# Patient Record
Sex: FEMALE | Race: WHITE | NOT HISPANIC OR LATINO | Employment: OTHER | URBAN - METROPOLITAN AREA
[De-identification: names, ages, dates, MRNs, and addresses within clinical notes are randomized per-mention and may not be internally consistent; named-entity substitution may affect disease eponyms.]

---

## 2017-07-13 ENCOUNTER — OFFICE VISIT (OUTPATIENT)
Dept: FAMILY MEDICINE CLINIC | Facility: CLINIC | Age: 57
End: 2017-07-13

## 2017-07-13 VITALS
HEART RATE: 80 BPM | TEMPERATURE: 97.7 F | SYSTOLIC BLOOD PRESSURE: 116 MMHG | HEIGHT: 63 IN | DIASTOLIC BLOOD PRESSURE: 80 MMHG | BODY MASS INDEX: 21.3 KG/M2 | RESPIRATION RATE: 20 BRPM | WEIGHT: 120.2 LBS

## 2017-07-13 DIAGNOSIS — F33.1 MODERATE EPISODE OF RECURRENT MAJOR DEPRESSIVE DISORDER (HCC): ICD-10-CM

## 2017-07-13 DIAGNOSIS — Z11.59 NEED FOR HEPATITIS C SCREENING TEST: ICD-10-CM

## 2017-07-13 DIAGNOSIS — Z23 NEED FOR PNEUMOCOCCAL VACCINATION: ICD-10-CM

## 2017-07-13 DIAGNOSIS — Z76.89 ENCOUNTER TO ESTABLISH CARE WITH NEW DOCTOR: ICD-10-CM

## 2017-07-13 DIAGNOSIS — Z12.11 SCREENING FOR COLON CANCER: ICD-10-CM

## 2017-07-13 DIAGNOSIS — F41.9 ANXIETY: Primary | ICD-10-CM

## 2017-07-13 DIAGNOSIS — Z13.220 SCREENING FOR HYPERLIPIDEMIA: ICD-10-CM

## 2017-07-13 DIAGNOSIS — Z72.0 TOBACCO ABUSE: ICD-10-CM

## 2017-07-13 DIAGNOSIS — Z12.39 SCREENING FOR BREAST CANCER: ICD-10-CM

## 2017-07-13 PROCEDURE — 99407 BEHAV CHNG SMOKING > 10 MIN: CPT | Performed by: FAMILY MEDICINE

## 2017-07-13 PROCEDURE — 99203 OFFICE O/P NEW LOW 30 MIN: CPT | Performed by: FAMILY MEDICINE

## 2017-07-13 RX ORDER — NICOTINE 21 MG/24HR
1 PATCH, TRANSDERMAL 24 HOURS TRANSDERMAL EVERY 24 HOURS
Qty: 21 PATCH | Refills: 1 | Status: SHIPPED | OUTPATIENT
Start: 2017-07-13 | End: 2018-01-17

## 2017-07-13 RX ORDER — ALPRAZOLAM 0.5 MG/1
0.5 TABLET ORAL 3 TIMES DAILY PRN
Qty: 60 TABLET | Refills: 0 | Status: SHIPPED | OUTPATIENT
Start: 2017-07-13 | End: 2017-08-09 | Stop reason: SDUPTHER

## 2017-07-13 RX ORDER — FLUOXETINE HYDROCHLORIDE 20 MG/1
CAPSULE ORAL
Qty: 60 CAPSULE | Refills: 1 | Status: SHIPPED | OUTPATIENT
Start: 2017-07-13 | End: 2017-08-09

## 2017-07-13 NOTE — PROGRESS NOTES
Subjective   Brittani Lambert is a 57 y.o. female.     History of Present Illness   Here to establish care  She moved to [place] 1 year ago from [place]  She is willing to update all health maintenance.     She has a long history of anxiety and depression, 20-30 years. She has been on multiple medications to treat the condition. Prozac, Neurontin, and Xanax, this regimen worked best for her. She feels that anxiety and depression are getting much worse quickly lately. Has been without treatment for a year. She can have up to 8 panic attacks in one day, no specific triggers. She will change her breathing to improve.   She did have a job, but before her 90 days was complete and eligible for benefits, she was fired. She is currently looking for a job.     Tobacco abuse: She smokes 1/2 ppd for many years. She is interested in quitting, hasn't previously used any aids.     The following portions of the patient's history were reviewed and updated as appropriate: allergies, current medications, past family history, past medical history, past social history, past surgical history and problem list.    Review of Systems   Constitutional: Negative.  Negative for chills, fatigue, fever and unexpected weight change.   HENT: Negative for congestion, ear pain and hearing loss.    Eyes: Negative for photophobia, pain and visual disturbance.   Respiratory: Negative for cough, chest tightness and shortness of breath.    Cardiovascular: Positive for palpitations (with anxiety). Negative for chest pain and leg swelling.   Gastrointestinal: Negative for abdominal pain, blood in stool, constipation, diarrhea and vomiting.   Endocrine: Negative for cold intolerance, heat intolerance, polydipsia, polyphagia and polyuria.   Genitourinary: Negative for difficulty urinating and dysuria.   Musculoskeletal: Positive for arthralgias. Negative for back pain and gait problem.   Skin: Negative for color change, rash and wound.   Allergic/Immunologic:  Negative for environmental allergies and food allergies.   Neurological: Negative for dizziness, weakness, numbness and headaches.   Hematological: Negative for adenopathy. Does not bruise/bleed easily.   Psychiatric/Behavioral: Positive for dysphoric mood and sleep disturbance. Negative for confusion. The patient is nervous/anxious.        Objective   Physical Exam   Constitutional: She is oriented to person, place, and time. She appears well-developed and well-nourished.   HENT:   Head: Normocephalic and atraumatic.   Right Ear: Hearing, tympanic membrane, external ear and ear canal normal.   Left Ear: Hearing, tympanic membrane, external ear and ear canal normal.   Nose: Nose normal.   Mouth/Throat: Uvula is midline, oropharynx is clear and moist and mucous membranes are normal.   Eyes: Conjunctivae and EOM are normal.   Neck: Normal range of motion. Neck supple. No tracheal deviation present. No thyromegaly present.   Cardiovascular: Normal rate, regular rhythm and normal heart sounds.    No murmur heard.  Pulmonary/Chest: Effort normal and breath sounds normal. No respiratory distress. She has no wheezes.   Abdominal: Soft. Bowel sounds are normal. She exhibits no distension. There is no tenderness.   Musculoskeletal: Normal range of motion. She exhibits no edema, tenderness or deformity.   Lymphadenopathy:     She has no cervical adenopathy.   Neurological: She is alert and oriented to person, place, and time. No cranial nerve deficit.   Skin: Skin is warm and dry. No rash noted.   Psychiatric: She has a normal mood and affect. Her behavior is normal. Judgment and thought content normal.   Nursing note and vitals reviewed.      Assessment/Plan   Brittani was seen today for establish care, anxiety and depression.    Diagnoses and all orders for this visit:    Anxiety  -     FLUoxetine (PROZAC) 20 MG capsule; Take 1 tab po daily x 2 weeks, then increase to 1 tab po BID  -     ALPRAZolam (XANAX) 0.5 MG tablet; Take  1 tablet by mouth 3 (Three) Times a Day As Needed for Anxiety or Sleep.  -     CBC & Differential  -     Lipid Panel  -     Comprehensive Metabolic Panel  -     TSH    Moderate episode of recurrent major depressive disorder  -     FLUoxetine (PROZAC) 20 MG capsule; Take 1 tab po daily x 2 weeks, then increase to 1 tab po BID  -     ALPRAZolam (XANAX) 0.5 MG tablet; Take 1 tablet by mouth 3 (Three) Times a Day As Needed for Anxiety or Sleep.  -     CBC & Differential  -     Lipid Panel  -     Comprehensive Metabolic Panel  -     TSH    Tobacco abuse  -     pneumococcal conj. 13-valent (PREVNAR-13) vaccine; Inject 0.5 mL into the shoulder, thigh, or buttocks 1 (One) Time for 1 dose.  -     nicotine ([employer] NICOTINE TRANSDERMAL SYS) 14 MG/24HR patch; Place 1 patch on the skin Daily.  -     CBC & Differential  -     Lipid Panel  -     Comprehensive Metabolic Panel  -     TSH    Encounter to establish care with new doctor  -     CBC & Differential  -     Lipid Panel  -     Comprehensive Metabolic Panel  -     TSH    Screening for breast cancer  -     Mammo Screening Digital Tomosynthesis Bilateral With CAD  -     TSH    Screening for colon cancer  -     Ambulatory Referral For Screening Colonoscopy  -     TSH    Need for pneumococcal vaccination  -     pneumococcal conj. 13-valent (PREVNAR-13) vaccine; Inject 0.5 mL into the shoulder, thigh, or buttocks 1 (One) Time for 1 dose.    Screening for hyperlipidemia    Need for hepatitis C screening test  -     Hepatitis C Antibody      Resume Prozac for treatment of anxiety. Ok for prn xanax for panic attacks. ZECHARIAH query complete. Treatment plan to include limited course of prescribed  controlled substance. Risks including addiction, benefits, and alternatives presented to patient.   Labs completed today  I advised the patient of the risks in continuing to use tobacco.  I also discussed how to quit smoking and the patient has expressed the willingness to quit.    During this  visit, I spent greater than 10 minutes counseling the patient regarding smoking cessation.

## 2017-07-13 NOTE — PATIENT INSTRUCTIONS
Go to the nearest ER or return to clinic if symptoms worsen, fever/chill develop    Generalized Anxiety Disorder  Generalized anxiety disorder (THALIA) is a mental disorder. It interferes with life functions, including relationships, work, and school.  THALIA is different from normal anxiety, which everyone experiences at some point in their lives in response to specific life events and activities. Normal anxiety actually helps us prepare for and get through these life events and activities. Normal anxiety goes away after the event or activity is over.   THALIA causes anxiety that is not necessarily related to specific events or activities. It also causes excess anxiety in proportion to specific events or activities. The anxiety associated with THALIA is also difficult to control. THALIA can vary from mild to severe. People with severe THALIA can have intense waves of anxiety with physical symptoms (panic attacks).   SYMPTOMS  The anxiety and worry associated with THALIA are difficult to control. This anxiety and worry are related to many life events and activities and also occur more days than not for 6 months or longer. People with THALIA also have three or more of the following symptoms (one or more in children):  · Restlessness.    · Fatigue.  · Difficulty concentrating.    · Irritability.  · Muscle tension.  · Difficulty sleeping or unsatisfying sleep.  DIAGNOSIS  THALIA is diagnosed through an assessment by your health care provider. Your health care provider will ask you questions about your mood, physical symptoms, and events in your life. Your health care provider may ask you about your medical history and use of alcohol or drugs, including prescription medicines. Your health care provider may also do a physical exam and blood tests. Certain medical conditions and the use of certain substances can cause symptoms similar to those associated with THALIA. Your health care provider may refer you to a mental health specialist for further  evaluation.  TREATMENT  The following therapies are usually used to treat THALIA:   · Medication. Antidepressant medication usually is prescribed for long-term daily control. Antianxiety medicines may be added in severe cases, especially when panic attacks occur.    · Talk therapy (psychotherapy). Certain types of talk therapy can be helpful in treating THALIA by providing support, education, and guidance. A form of talk therapy called cognitive behavioral therapy can teach you healthy ways to think about and react to daily life events and activities.  · Stress management techniques. These include yoga, meditation, and exercise and can be very helpful when they are practiced regularly.  A mental health specialist can help determine which treatment is best for you. Some people see improvement with one therapy. However, other people require a combination of therapies.     This information is not intended to replace advice given to you by your health care provider. Make sure you discuss any questions you have with your health care provider.     Document Released: 04/14/2014 Document Revised: 01/08/2016 Document Reviewed: 04/14/2014  [employer] Interactive Patient Education ©2017 [employer]

## 2017-07-14 LAB
ALBUMIN SERPL-MCNC: 4.5 G/DL (ref 3.2–4.8)
ALBUMIN/GLOB SERPL: 1.6 G/DL (ref 1.5–2.5)
ALP SERPL-CCNC: 96 U/L (ref 25–100)
ALT SERPL-CCNC: 15 U/L (ref 7–40)
AST SERPL-CCNC: 19 U/L (ref 0–33)
BASOPHILS # BLD AUTO: 0.05 10*3/MM3 (ref 0–0.2)
BASOPHILS NFR BLD AUTO: 0.5 % (ref 0–1)
BILIRUB SERPL-MCNC: 0.4 MG/DL (ref 0.3–1.2)
BUN SERPL-MCNC: 11 MG/DL (ref 9–23)
BUN/CREAT SERPL: 15.7 (ref 7–25)
CALCIUM SERPL-MCNC: 10.2 MG/DL (ref 8.7–10.4)
CHLORIDE SERPL-SCNC: 105 MMOL/L (ref 99–109)
CHOLEST SERPL-MCNC: 245 MG/DL (ref 0–200)
CO2 SERPL-SCNC: 26 MMOL/L (ref 20–31)
CREAT SERPL-MCNC: 0.7 MG/DL (ref 0.6–1.3)
EOSINOPHIL # BLD AUTO: 0.29 10*3/MM3 (ref 0–0.3)
EOSINOPHIL NFR BLD AUTO: 3 % (ref 0–3)
ERYTHROCYTE [DISTWIDTH] IN BLOOD BY AUTOMATED COUNT: 13.1 % (ref 11.3–14.5)
GLOBULIN SER CALC-MCNC: 2.8 GM/DL
GLUCOSE SERPL-MCNC: 86 MG/DL (ref 70–100)
HCT VFR BLD AUTO: 42.1 % (ref 34.5–44)
HCV AB S/CO SERPL IA: <0.1 S/CO RATIO (ref 0–0.9)
HDLC SERPL-MCNC: 38 MG/DL (ref 40–60)
HGB BLD-MCNC: 13.6 G/DL (ref 11.5–15.5)
IMM GRANULOCYTES # BLD: 0.02 10*3/MM3 (ref 0–0.03)
IMM GRANULOCYTES NFR BLD: 0.2 % (ref 0–0.6)
LDLC SERPL CALC-MCNC: 176 MG/DL (ref 0–100)
LYMPHOCYTES # BLD AUTO: 2.34 10*3/MM3 (ref 0.6–4.8)
LYMPHOCYTES NFR BLD AUTO: 24.1 % (ref 24–44)
MCH RBC QN AUTO: 30.6 PG (ref 27–31)
MCHC RBC AUTO-ENTMCNC: 32.3 G/DL (ref 32–36)
MCV RBC AUTO: 94.8 FL (ref 80–99)
MONOCYTES # BLD AUTO: 0.71 10*3/MM3 (ref 0–1)
MONOCYTES NFR BLD AUTO: 7.3 % (ref 0–12)
NEUTROPHILS # BLD AUTO: 6.31 10*3/MM3 (ref 1.5–8.3)
NEUTROPHILS NFR BLD AUTO: 64.9 % (ref 41–71)
PLATELET # BLD AUTO: 311 10*3/MM3 (ref 150–450)
POTASSIUM SERPL-SCNC: 4.3 MMOL/L (ref 3.5–5.5)
PROT SERPL-MCNC: 7.3 G/DL (ref 5.7–8.2)
RBC # BLD AUTO: 4.44 10*6/MM3 (ref 3.89–5.14)
SODIUM SERPL-SCNC: 138 MMOL/L (ref 132–146)
TRIGL SERPL-MCNC: 154 MG/DL (ref 0–150)
TSH SERPL DL<=0.005 MIU/L-ACNC: 1.46 MIU/ML (ref 0.35–5.35)
VLDLC SERPL CALC-MCNC: 30.8 MG/DL
WBC # BLD AUTO: 9.72 10*3/MM3 (ref 3.5–10.8)

## 2017-07-16 DIAGNOSIS — E78.2 MIXED HYPERLIPIDEMIA: Primary | ICD-10-CM

## 2017-07-16 RX ORDER — ATORVASTATIN CALCIUM 40 MG/1
40 TABLET, FILM COATED ORAL DAILY
Qty: 30 TABLET | Refills: 5 | Status: SHIPPED | OUTPATIENT
Start: 2017-07-16 | End: 2018-01-17 | Stop reason: SINTOL

## 2017-08-01 ENCOUNTER — HOSPITAL ENCOUNTER (OUTPATIENT)
Dept: MAMMOGRAPHY | Facility: HOSPITAL | Age: 57
Discharge: HOME | End: 2017-08-01
Attending: FAMILY MEDICINE | Admitting: FAMILY MEDICINE

## 2017-08-01 PROCEDURE — 77063 BREAST TOMOSYNTHESIS BI: CPT

## 2017-08-01 PROCEDURE — 77063 BREAST TOMOSYNTHESIS BI: CPT | Performed by: RADIOLOGY

## 2017-08-01 PROCEDURE — 77067 SCR MAMMO BI INCL CAD: CPT | Performed by: RADIOLOGY

## 2017-08-01 PROCEDURE — G0202 SCR MAMMO BI INCL CAD: HCPCS

## 2017-08-06 DIAGNOSIS — R92.8 ABNORMAL SCREENING MAMMOGRAM: Primary | ICD-10-CM

## 2017-08-09 ENCOUNTER — OFFICE VISIT (OUTPATIENT)
Dept: FAMILY MEDICINE CLINIC | Facility: CLINIC | Age: 57
End: 2017-08-09

## 2017-08-09 VITALS
RESPIRATION RATE: 20 BRPM | BODY MASS INDEX: 21.16 KG/M2 | WEIGHT: 119.4 LBS | DIASTOLIC BLOOD PRESSURE: 70 MMHG | HEART RATE: 72 BPM | HEIGHT: 63 IN | SYSTOLIC BLOOD PRESSURE: 108 MMHG | TEMPERATURE: 97.2 F

## 2017-08-09 DIAGNOSIS — F41.9 ANXIETY: Primary | ICD-10-CM

## 2017-08-09 PROCEDURE — 99213 OFFICE O/P EST LOW 20 MIN: CPT | Performed by: FAMILY MEDICINE

## 2017-08-09 RX ORDER — ALPRAZOLAM 0.5 MG/1
0.5 TABLET ORAL 3 TIMES DAILY PRN
Qty: 60 TABLET | Refills: 2 | Status: SHIPPED | OUTPATIENT
Start: 2017-08-09 | End: 2017-09-20 | Stop reason: SDUPTHER

## 2017-08-09 RX ORDER — SERTRALINE HYDROCHLORIDE 100 MG/1
100 TABLET, FILM COATED ORAL DAILY
Qty: 30 TABLET | Refills: 1 | Status: SHIPPED | OUTPATIENT
Start: 2017-08-09 | End: 2017-09-20 | Stop reason: SDUPTHER

## 2017-08-09 NOTE — PATIENT INSTRUCTIONS
Go to the nearest ER or return to clinic if symptoms worsen, fever/chill develop    Generalized Anxiety Disorder  Generalized anxiety disorder (THALIA) is a mental disorder. It interferes with life functions, including relationships, work, and school.  THALIA is different from normal anxiety, which everyone experiences at some point in their lives in response to specific life events and activities. Normal anxiety actually helps us prepare for and get through these life events and activities. Normal anxiety goes away after the event or activity is over.   THALIA causes anxiety that is not necessarily related to specific events or activities. It also causes excess anxiety in proportion to specific events or activities. The anxiety associated with THALIA is also difficult to control. THALIA can vary from mild to severe. People with severe THALIA can have intense waves of anxiety with physical symptoms (panic attacks).   SYMPTOMS  The anxiety and worry associated with THALIA are difficult to control. This anxiety and worry are related to many life events and activities and also occur more days than not for 6 months or longer. People with THALIA also have three or more of the following symptoms (one or more in children):  · Restlessness.    · Fatigue.  · Difficulty concentrating.    · Irritability.  · Muscle tension.  · Difficulty sleeping or unsatisfying sleep.  DIAGNOSIS  THALIA is diagnosed through an assessment by your health care provider. Your health care provider will ask you questions about your mood, physical symptoms, and events in your life. Your health care provider may ask you about your medical history and use of alcohol or drugs, including prescription medicines. Your health care provider may also do a physical exam and blood tests. Certain medical conditions and the use of certain substances can cause symptoms similar to those associated with THALIA. Your health care provider may refer you to a mental health specialist for further  evaluation.  TREATMENT  The following therapies are usually used to treat THALIA:   · Medication. Antidepressant medication usually is prescribed for long-term daily control. Antianxiety medicines may be added in severe cases, especially when panic attacks occur.    · Talk therapy (psychotherapy). Certain types of talk therapy can be helpful in treating THALIA by providing support, education, and guidance. A form of talk therapy called cognitive behavioral therapy can teach you healthy ways to think about and react to daily life events and activities.  · Stress management techniques. These include yoga, meditation, and exercise and can be very helpful when they are practiced regularly.  A mental health specialist can help determine which treatment is best for you. Some people see improvement with one therapy. However, other people require a combination of therapies.     This information is not intended to replace advice given to you by your health care provider. Make sure you discuss any questions you have with your health care provider.     Document Released: 04/14/2014 Document Revised: 01/08/2016 Document Reviewed: 04/14/2014  [employer] Interactive Patient Education ©2017 [employer]

## 2017-08-09 NOTE — PROGRESS NOTES
Subjective   Brittani Lambert is a 57 y.o. female.     HPI Comments: She is currently treated with Prozac, however states that anxiety and stress has been worse over the last 2 weeks. She is currently searching for a job. She lost her only son 1.5 years ago on [place], that increases her anxiety.   States that she has been on Prozac for so long, feels that it is no longer working for her.       Anxiety   Presents for follow-up visit. Symptoms include excessive worry, irritability and nervous/anxious behavior. Patient reports no chest pain, depressed mood, nausea, palpitations, shortness of breath or suicidal ideas. Symptoms occur most days. The severity of symptoms is moderate. The quality of sleep is good. Nighttime awakenings: none.     Compliance with medications is %.        The following portions of the patient's history were reviewed and updated as appropriate: allergies, current medications, past family history, past medical history, past social history, past surgical history and problem list.    Review of Systems   Constitutional: Positive for irritability. Negative for chills and fever.   Respiratory: Negative for cough and shortness of breath.    Cardiovascular: Negative for chest pain and palpitations.   Gastrointestinal: Negative for nausea.   Psychiatric/Behavioral: Positive for agitation. Negative for dysphoric mood and suicidal ideas. The patient is nervous/anxious.        Objective   Physical Exam   Constitutional: She is oriented to person, place, and time. She appears well-developed and well-nourished.   HENT:   Head: Normocephalic and atraumatic.   Right Ear: External ear normal.   Left Ear: External ear normal.   Nose: Nose normal.   Eyes: Conjunctivae and EOM are normal.   Neck: Normal range of motion. Neck supple.   Cardiovascular: Normal rate, regular rhythm and normal heart sounds.    Pulmonary/Chest: Effort normal and breath sounds normal.   Musculoskeletal: She exhibits no edema or  deformity.   Neurological: She is alert and oriented to person, place, and time. No cranial nerve deficit.   Skin: Skin is warm and dry.   Psychiatric: She has a normal mood and affect. Her behavior is normal. Judgment and thought content normal.   Nursing note and vitals reviewed.      Assessment/Plan   Brittani was seen today for anxiety.    Diagnoses and all orders for this visit:    Anxiety  -     ALPRAZolam (XANAX) 0.5 MG tablet; Take 1 tablet by mouth 3 (Three) Times a Day As Needed for Anxiety or Sleep.  -     sertraline (ZOLOFT) 100 MG tablet; Take 1 tablet by mouth Daily.      Change Prozac to Zoloft. If no improvement after 2 weeks, she is to notify me and dose will be increased.   Xanax prn refilled.

## 2017-08-16 ENCOUNTER — HOSPITAL ENCOUNTER (OUTPATIENT)
Dept: ULTRASOUND IMAGING | Facility: HOSPITAL | Age: 57
Discharge: HOME | End: 2017-08-16

## 2017-08-16 ENCOUNTER — TRANSCRIBE ORDERS (OUTPATIENT)
Dept: MAMMOGRAPHY | Facility: HOSPITAL | Age: 57
End: 2017-08-16

## 2017-08-16 ENCOUNTER — HOSPITAL ENCOUNTER (OUTPATIENT)
Dept: MAMMOGRAPHY | Facility: HOSPITAL | Age: 57
Discharge: HOME | End: 2017-08-16
Admitting: FAMILY MEDICINE

## 2017-08-16 DIAGNOSIS — R92.8 ABNORMAL MAMMOGRAM: Primary | ICD-10-CM

## 2017-08-16 DIAGNOSIS — R92.8 ABNORMAL SCREENING MAMMOGRAM: ICD-10-CM

## 2017-08-16 PROCEDURE — G0279 TOMOSYNTHESIS, MAMMO: HCPCS

## 2017-08-16 PROCEDURE — 77062 BREAST TOMOSYNTHESIS BI: CPT | Performed by: RADIOLOGY

## 2017-08-16 PROCEDURE — 76641 ULTRASOUND BREAST COMPLETE: CPT | Performed by: RADIOLOGY

## 2017-08-16 PROCEDURE — 77066 DX MAMMO INCL CAD BI: CPT | Performed by: RADIOLOGY

## 2017-08-16 PROCEDURE — G0204 DX MAMMO INCL CAD BI: HCPCS

## 2017-08-16 PROCEDURE — 76641 ULTRASOUND BREAST COMPLETE: CPT

## 2017-08-30 ENCOUNTER — HOSPITAL ENCOUNTER (OUTPATIENT)
Dept: MAMMOGRAPHY | Facility: HOSPITAL | Age: 57
Discharge: HOME | End: 2017-08-30

## 2017-08-30 ENCOUNTER — HOSPITAL ENCOUNTER (OUTPATIENT)
Dept: ULTRASOUND IMAGING | Facility: HOSPITAL | Age: 57
Discharge: HOME | End: 2017-08-30
Admitting: RADIOLOGY

## 2017-08-30 DIAGNOSIS — R92.8 ABNORMAL MAMMOGRAM: ICD-10-CM

## 2017-08-30 PROCEDURE — 19000 PUNCTURE ASPIR CYST BREAST: CPT | Performed by: RADIOLOGY

## 2017-08-30 PROCEDURE — 76942 ECHO GUIDE FOR BIOPSY: CPT

## 2017-08-30 PROCEDURE — 76942 ECHO GUIDE FOR BIOPSY: CPT | Performed by: RADIOLOGY

## 2017-08-30 PROCEDURE — 77065 DX MAMMO INCL CAD UNI: CPT | Performed by: RADIOLOGY

## 2017-08-30 RX ORDER — LIDOCAINE HYDROCHLORIDE 10 MG/ML
5 INJECTION, SOLUTION INFILTRATION; PERINEURAL ONCE
Status: COMPLETED | OUTPATIENT
Start: 2017-08-30 | End: 2017-08-30

## 2017-08-30 RX ORDER — LIDOCAINE HYDROCHLORIDE AND EPINEPHRINE 10; 10 MG/ML; UG/ML
5 INJECTION, SOLUTION INFILTRATION; PERINEURAL ONCE
Status: DISCONTINUED | OUTPATIENT
Start: 2017-08-30 | End: 2018-10-01

## 2017-08-30 RX ADMIN — LIDOCAINE HYDROCHLORIDE 1 ML: 10 INJECTION, SOLUTION INFILTRATION; PERINEURAL at 15:18

## 2017-09-01 LAB
LAB AP CASE REPORT: NORMAL
Lab: NORMAL
PATH REPORT.FINAL DX SPEC: NORMAL

## 2017-09-06 ENCOUNTER — TELEPHONE (OUTPATIENT)
Dept: FAMILY MEDICINE CLINIC | Facility: CLINIC | Age: 57
End: 2017-09-06

## 2017-09-06 ENCOUNTER — TELEPHONE (OUTPATIENT)
Dept: MAMMOGRAPHY | Facility: HOSPITAL | Age: 57
End: 2017-09-06

## 2017-09-06 NOTE — TELEPHONE ENCOUNTER
09.06.17 @ 1635: Pt notified of cytology results and recommendation. Verbalizes understanding. Denies discomfort. Denies any signs and symptoms of infection.

## 2017-09-06 NOTE — TELEPHONE ENCOUNTER
----- Message from Allyson Rodriguez sent at 9/6/2017 10:56 AM EDT -----  Contact: DR LYONS RESULTS  PATIENT IS CALLING FOR BIOPSY RESULTS PLEASE CALL BACK AT [phone]

## 2017-09-07 ENCOUNTER — TRANSCRIBE ORDERS (OUTPATIENT)
Dept: MAMMOGRAPHY | Facility: HOSPITAL | Age: 57
End: 2017-09-07

## 2017-09-07 DIAGNOSIS — R92.8 ABNORMAL MAMMOGRAM: Primary | ICD-10-CM

## 2017-09-20 ENCOUNTER — OFFICE VISIT (OUTPATIENT)
Dept: FAMILY MEDICINE CLINIC | Facility: CLINIC | Age: 57
End: 2017-09-20

## 2017-09-20 VITALS
RESPIRATION RATE: 20 BRPM | HEART RATE: 72 BPM | DIASTOLIC BLOOD PRESSURE: 80 MMHG | BODY MASS INDEX: 20.91 KG/M2 | SYSTOLIC BLOOD PRESSURE: 100 MMHG | WEIGHT: 118 LBS | HEIGHT: 63 IN | TEMPERATURE: 98 F

## 2017-09-20 DIAGNOSIS — Z12.4 PAP SMEAR FOR CERVICAL CANCER SCREENING: ICD-10-CM

## 2017-09-20 DIAGNOSIS — F41.9 ANXIETY: Primary | ICD-10-CM

## 2017-09-20 DIAGNOSIS — F33.1 MODERATE EPISODE OF RECURRENT MAJOR DEPRESSIVE DISORDER (HCC): ICD-10-CM

## 2017-09-20 PROCEDURE — 99396 PREV VISIT EST AGE 40-64: CPT | Performed by: FAMILY MEDICINE

## 2017-09-20 RX ORDER — ALPRAZOLAM 0.5 MG/1
0.5 TABLET ORAL 3 TIMES DAILY PRN
Qty: 60 TABLET | Refills: 2 | Status: SHIPPED | OUTPATIENT
Start: 2017-09-20 | End: 2018-01-17 | Stop reason: SDUPTHER

## 2017-09-20 RX ORDER — SERTRALINE HYDROCHLORIDE 100 MG/1
150 TABLET, FILM COATED ORAL DAILY
Qty: 45 TABLET | Refills: 2 | Status: SHIPPED | OUTPATIENT
Start: 2017-09-20 | End: 2018-01-17 | Stop reason: SDUPTHER

## 2017-09-20 NOTE — PROGRESS NOTES
Subjective     History of Present Illness      Brittani Lambert is a 57 y.o. woman who comes in today for a  pap smear only. Her most recent annual exam was years ago. Her most recent Pap smear was years ago and showed no abnormalities. Previous abnormal Pap smears: yes - precancerous cells. . Contraception: status post hysterectomy. She is currently sexually active.   She is s/p hysterectomy due to precancerous cells.     Anxiety and Depression: Chronic conditions, currently treated with Zoloft daily and Xanax prn.   She feels that Zoloft is improving symptoms overall, but feels that she needs the dose increased for better control.   Her son passed away less than 2 years ago, which causes a lot of anxiety and depression for her.  She is also currently going through breast biopsy due to recent abnormal mammogram. She has biopsy scheduled tomorrow, this makes her anxious also.      The following portions of the patient's history were reviewed and updated as appropriate: allergies, current medications, past family history, past medical history, past social history, past surgical history and problem list.    Review of Systems   Constitutional: Negative.    HENT: Negative.    Eyes: Negative for visual disturbance.   Respiratory: Negative for cough, chest tightness and shortness of breath.    Cardiovascular: Negative for chest pain, palpitations and leg swelling.   Gastrointestinal: Negative for abdominal pain and blood in stool.   Endocrine: Negative for cold intolerance and heat intolerance.   Genitourinary: Negative for dysuria and hematuria.   Musculoskeletal: Negative for gait problem and myalgias.   Skin: Negative for rash.   Allergic/Immunologic: Negative for environmental allergies, food allergies and immunocompromised state.   Neurological: Negative for dizziness, weakness, numbness and headaches.   Hematological: Negative for adenopathy. Does not bruise/bleed easily.   Psychiatric/Behavioral: Positive for  dysphoric mood. Negative for confusion, self-injury, sleep disturbance and suicidal ideas. The patient is nervous/anxious.        Objective   Physical Exam   Constitutional: She is oriented to person, place, and time. She appears well-developed and well-nourished.   HENT:   Head: Normocephalic and atraumatic.   Right Ear: External ear normal.   Left Ear: External ear normal.   Nose: Nose normal.   Eyes: Conjunctivae and EOM are normal.   Neck: Normal range of motion. Neck supple. No thyromegaly present.   Cardiovascular: Normal rate, regular rhythm and normal heart sounds.    No murmur heard.  Pulmonary/Chest: Effort normal and breath sounds normal. No respiratory distress. She has no wheezes. She exhibits no mass. Right breast exhibits no inverted nipple, no mass, no nipple discharge, no skin change and no tenderness. Left breast exhibits no inverted nipple, no mass, no nipple discharge, no skin change and no tenderness.   Abdominal: Soft. There is no tenderness.   Genitourinary: Vagina normal. Pelvic exam was performed with patient supine. There is no rash or lesion on the right labia. There is no rash or lesion on the left labia. No tenderness or bleeding in the vagina. No vaginal discharge found.   Genitourinary Comments: Cervix, uterus, and ovaries are surgically absent   Musculoskeletal: She exhibits no edema or deformity.   Lymphadenopathy:     She has no cervical adenopathy.   Neurological: She is alert and oriented to person, place, and time. No cranial nerve deficit.   Skin: Skin is warm and dry.   Psychiatric: She has a normal mood and affect. Her behavior is normal. Judgment and thought content normal.   Nursing note and vitals reviewed.      Assessment/Plan   Brittani was seen today for annual exam.    Diagnoses and all orders for this visit:    Anxiety  -     sertraline (ZOLOFT) 100 MG tablet; Take 1.5 tablets by mouth Daily.  -     ALPRAZolam (XANAX) 0.5 MG tablet; Take 1 tablet by mouth 3 (Three) Times  a Day As Needed for Anxiety or Sleep.    Moderate episode of recurrent major depressive disorder    Pap smear for cervical cancer screening      Increase Zoloft to 150mg daily  Refilled prn Xanax. ZECHARIAH query complete. Treatment plan to include course of prescribed controlled substance. Risks including addiction, benefits, and alternatives presented to patient.   They denied suicidal/homicidal ideations. Go to the nearest ER if they occur.     Pap smear completed today  Encouraged her to eat a healthy balanced diet and perform routine exercise.   Advised her to complete monthly self breast exams.

## 2017-09-20 NOTE — PATIENT INSTRUCTIONS
Go to the nearest ER or return to clinic if symptoms worsen, fever/chill develop      Preventive Care 40-64 Years, Female  Preventive care refers to lifestyle choices and visits with your health care provider that can promote health and wellness.  WHAT DOES PREVENTIVE CARE INCLUDE?  · A yearly physical exam. This is also called an annual well check.  · Dental exams once or twice a year.  · Routine eye exams. Ask your health care provider how often you should have your eyes checked.  · Personal lifestyle choices, including:    Daily care of your teeth and gums.    Regular physical activity.    Eating a healthy diet.    Avoiding tobacco and drug use.    Limiting alcohol use.    Practicing safe sex.    Taking low-dose aspirin daily starting at age 50.    Taking vitamin and mineral supplements as recommended by your health care provider.  WHAT HAPPENS DURING AN ANNUAL WELL CHECK?  The services and screenings done by your health care provider during your annual well check will depend on your age, overall health, lifestyle risk factors, and family history of disease.  Counseling  Your health care provider may ask you questions about your:  · Alcohol use.  · Tobacco use.  · Drug use.  · Emotional well-being.  · Home and relationship well-being.  · Sexual activity.  · Eating habits.  · Work and work environment.  · Method of birth control.  · Menstrual cycle.  · Pregnancy history.  Screening  You may have the following tests or measurements:  · Height, weight, and BMI.  · Blood pressure.  · Lipid and cholesterol levels. These may be checked every 5 years, or more frequently if you are over 50 years old.  · Skin check.  · Lung cancer screening. You may have this screening every year starting at age 55 if you have a 30-pack-year history of smoking and currently smoke or have quit within the past 15 years.  · Fecal occult blood test (FOBT) of the stool. You may have this test every year starting at age 50.  · Flexible  sigmoidoscopy or colonoscopy. You may have a sigmoidoscopy every 5 years or a colonoscopy every 10 years starting at age 50.  · Hepatitis C blood test.  · Hepatitis B blood test.  · Sexually transmitted disease (STD) testing.  · Diabetes screening. This is done by checking your blood sugar (glucose) after you have not eaten for a while (fasting). You may have this done every 1-3 years.  · Mammogram. This may be done every 1-2 years. Talk to your health care provider about when you should start having regular mammograms. This may depend on whether you have a family history of breast cancer.  · BRCA-related cancer screening. This may be done if you have a family history of breast, ovarian, tubal, or peritoneal cancers.  · Pelvic exam and Pap test. This may be done every 3 years starting at age 21. Starting at age 30, this may be done every 5 years if you have a Pap test in combination with an HPV test.  · Bone density scan. This is done to screen for osteoporosis. You may have this scan if you are at high risk for osteoporosis.  Discuss your test results, treatment options, and if necessary, the need for more tests with your health care provider.  Vaccines   Your health care provider may recommend certain vaccines, such as:  · Influenza vaccine. This is recommended every year.  · Tetanus, diphtheria, and acellular pertussis (Tdap, Td) vaccine. You may need a Td booster every 10 years.  · Varicella vaccine. You may need this if you have not been vaccinated.  · Zoster vaccine. You may need this after age 60.  · Measles, mumps, and rubella (MMR) vaccine. You may need at least one dose of MMR if you were born in 1957 or later. You may also need a second dose.  · Pneumococcal 13-valent conjugate (PCV13) vaccine. You may need this if you have certain conditions and were not previously vaccinated.  · Pneumococcal polysaccharide (PPSV23) vaccine. You may need one or two doses if you smoke cigarettes or if you have certain  conditions.  · Meningococcal vaccine. You may need this if you have certain conditions.  · Hepatitis A vaccine. You may need this if you have certain conditions or if you travel or work in places where you may be exposed to hepatitis A.  · Hepatitis B vaccine. You may need this if you have certain conditions or if you travel or work in places where you may be exposed to hepatitis B.  · Haemophilus influenzae type b (Hib) vaccine. You may need this if you have certain conditions.  Talk to your health care provider about which screenings and vaccines you need and how often you need them.     This information is not intended to replace advice given to you by your health care provider. Make sure you discuss any questions you have with your health care provider.     Document Released: 01/13/2017 Document Reviewed: 01/13/2017  Elsevier Interactive Patient Education ©2017 [employer] Inc.

## 2017-09-21 ENCOUNTER — HOSPITAL ENCOUNTER (OUTPATIENT)
Dept: MAMMOGRAPHY | Facility: HOSPITAL | Age: 57
Discharge: HOME | End: 2017-09-21

## 2017-09-21 ENCOUNTER — HOSPITAL ENCOUNTER (OUTPATIENT)
Dept: ULTRASOUND IMAGING | Facility: HOSPITAL | Age: 57
Discharge: HOME | End: 2017-09-21
Admitting: RADIOLOGY

## 2017-09-21 DIAGNOSIS — R92.8 ABNORMAL MAMMOGRAM: ICD-10-CM

## 2017-09-21 PROCEDURE — A4648 IMPLANTABLE TISSUE MARKER: HCPCS

## 2017-09-21 PROCEDURE — 77065 DX MAMMO INCL CAD UNI: CPT | Performed by: RADIOLOGY

## 2017-09-21 PROCEDURE — 88305 TISSUE EXAM BY PATHOLOGIST: CPT | Performed by: RADIOLOGY

## 2017-09-21 PROCEDURE — 19083 BX BREAST 1ST LESION US IMAG: CPT | Performed by: RADIOLOGY

## 2017-09-21 RX ORDER — LIDOCAINE HYDROCHLORIDE 10 MG/ML
5 INJECTION, SOLUTION INFILTRATION; PERINEURAL ONCE
Status: COMPLETED | OUTPATIENT
Start: 2017-09-21 | End: 2017-09-21

## 2017-09-21 RX ORDER — LIDOCAINE HYDROCHLORIDE AND EPINEPHRINE 10; 10 MG/ML; UG/ML
10 INJECTION, SOLUTION INFILTRATION; PERINEURAL ONCE
Status: COMPLETED | OUTPATIENT
Start: 2017-09-21 | End: 2017-09-21

## 2017-09-21 RX ADMIN — LIDOCAINE HYDROCHLORIDE 3 ML: 10 INJECTION, SOLUTION INFILTRATION; PERINEURAL at 11:09

## 2017-09-21 RX ADMIN — LIDOCAINE HYDROCHLORIDE,EPINEPHRINE BITARTRATE 2 ML: 10; .01 INJECTION, SOLUTION INFILTRATION; PERINEURAL at 11:09

## 2017-09-22 LAB
CYTO UR: NORMAL
LAB AP CASE REPORT: NORMAL
LAB AP CLINICAL INFORMATION: NORMAL
LAB AP DIAGNOSIS COMMENT: NORMAL
Lab: NORMAL
PATH REPORT.FINAL DX SPEC: NORMAL
PATH REPORT.GROSS SPEC: NORMAL

## 2017-09-25 ENCOUNTER — TELEPHONE (OUTPATIENT)
Dept: MAMMOGRAPHY | Facility: HOSPITAL | Age: 57
End: 2017-09-25

## 2017-09-25 NOTE — TELEPHONE ENCOUNTER
09.25.17 @ 1015: Pt notified of pathology results and recommendations. Verbalizes understanding. Denies discomfort. Denies any signs and symptoms of infection.

## 2017-10-03 ENCOUNTER — TELEPHONE (OUTPATIENT)
Dept: FAMILY MEDICINE CLINIC | Facility: CLINIC | Age: 57
End: 2017-10-03

## 2017-10-03 DIAGNOSIS — F41.9 ANXIETY: ICD-10-CM

## 2017-10-03 RX ORDER — SERTRALINE HYDROCHLORIDE 100 MG/1
TABLET, FILM COATED ORAL
Qty: 30 TABLET | Refills: 2 | Status: SHIPPED | OUTPATIENT
Start: 2017-10-03 | End: 2017-10-03

## 2017-10-03 NOTE — TELEPHONE ENCOUNTER
----- Message from Brittani Herring sent at 10/3/2017 12:28 PM EDT -----  Contact: ELOY  PATIENT IS OUT OF HER ZOLOFT BECAUSE THE DOSAGE WAS CHANGED AT HER LAST APPT.  MG. SO SHE HAS BEEN TAKING ONE AND 1/2 EACH DAY TO GET  MG, WHICH IS WHY SHE IS OUT OF THE ZOLOFT.     CAN YOU PLEASE CALL IN A NEW RX FOR THE ZOLOFT 150 MG    [facility] [place]

## 2017-10-03 NOTE — TELEPHONE ENCOUNTER
LVM FOR PT THAT ON LAST RX PT WAS GIVEN 9/20/2017 A RX WITH 2 REFILLS. OFFICE HRS AND NUMBER GIVEN.

## 2017-10-03 NOTE — TELEPHONE ENCOUNTER
The new dosage was sent to [facility] 9/20/17. Can we verify if the pharmacy got that rx? MATT

## 2018-01-17 ENCOUNTER — OFFICE VISIT (OUTPATIENT)
Dept: FAMILY MEDICINE CLINIC | Facility: CLINIC | Age: 58
End: 2018-01-17

## 2018-01-17 VITALS
BODY MASS INDEX: 21.62 KG/M2 | TEMPERATURE: 97.3 F | DIASTOLIC BLOOD PRESSURE: 60 MMHG | HEART RATE: 88 BPM | WEIGHT: 122 LBS | HEIGHT: 63 IN | RESPIRATION RATE: 20 BRPM | SYSTOLIC BLOOD PRESSURE: 118 MMHG

## 2018-01-17 DIAGNOSIS — F33.1 MODERATE EPISODE OF RECURRENT MAJOR DEPRESSIVE DISORDER (HCC): ICD-10-CM

## 2018-01-17 DIAGNOSIS — Z72.0 TOBACCO ABUSE: ICD-10-CM

## 2018-01-17 DIAGNOSIS — E78.2 MIXED HYPERLIPIDEMIA: ICD-10-CM

## 2018-01-17 DIAGNOSIS — F41.9 ANXIETY: Primary | ICD-10-CM

## 2018-01-17 PROCEDURE — 99214 OFFICE O/P EST MOD 30 MIN: CPT | Performed by: FAMILY MEDICINE

## 2018-01-17 RX ORDER — SERTRALINE HYDROCHLORIDE 100 MG/1
150 TABLET, FILM COATED ORAL DAILY
Qty: 45 TABLET | Refills: 5 | Status: SHIPPED | OUTPATIENT
Start: 2018-01-17 | End: 2018-04-25 | Stop reason: SDUPTHER

## 2018-01-17 RX ORDER — ALPRAZOLAM 0.5 MG/1
0.5 TABLET ORAL 3 TIMES DAILY PRN
Qty: 60 TABLET | Refills: 2 | Status: SHIPPED | OUTPATIENT
Start: 2018-01-17 | End: 2018-04-25 | Stop reason: SDUPTHER

## 2018-01-17 NOTE — PATIENT INSTRUCTIONS
Go to the nearest ER or return to clinic if symptoms worsen, fever/chill develop  Generalized Anxiety Disorder  Generalized anxiety disorder (THALIA) is a mental disorder. It interferes with life functions, including relationships, work, and school.  THALIA is different from normal anxiety, which everyone experiences at some point in their lives in response to specific life events and activities. Normal anxiety actually helps us prepare for and get through these life events and activities. Normal anxiety goes away after the event or activity is over.   THALIA causes anxiety that is not necessarily related to specific events or activities. It also causes excess anxiety in proportion to specific events or activities. The anxiety associated with THALIA is also difficult to control. THALIA can vary from mild to severe. People with severe THALIA can have intense waves of anxiety with physical symptoms (panic attacks).   SYMPTOMS  The anxiety and worry associated with THALIA are difficult to control. This anxiety and worry are related to many life events and activities and also occur more days than not for 6 months or longer. People with THALIA also have three or more of the following symptoms (one or more in children):  · Restlessness.    · Fatigue.  · Difficulty concentrating.    · Irritability.  · Muscle tension.  · Difficulty sleeping or unsatisfying sleep.  DIAGNOSIS  THALIA is diagnosed through an assessment by your health care provider. Your health care provider will ask you questions about your mood, physical symptoms, and events in your life. Your health care provider may ask you about your medical history and use of alcohol or drugs, including prescription medicines. Your health care provider may also do a physical exam and blood tests. Certain medical conditions and the use of certain substances can cause symptoms similar to those associated with THALIA. Your health care provider may refer you to a mental health specialist for further  evaluation.  TREATMENT  The following therapies are usually used to treat THALIA:   · Medication. Antidepressant medication usually is prescribed for long-term daily control. Antianxiety medicines may be added in severe cases, especially when panic attacks occur.    · Talk therapy (psychotherapy). Certain types of talk therapy can be helpful in treating THALIA by providing support, education, and guidance. A form of talk therapy called cognitive behavioral therapy can teach you healthy ways to think about and react to daily life events and activities.  · Stress management techniques. These include yoga, meditation, and exercise and can be very helpful when they are practiced regularly.  A mental health specialist can help determine which treatment is best for you. Some people see improvement with one therapy. However, other people require a combination of therapies.  This information is not intended to replace advice given to you by your health care provider. Make sure you discuss any questions you have with your health care provider.  Document Released: 04/14/2014 Document Revised: 01/08/2016 Document Reviewed: 04/14/2014  [employer] Interactive Patient Education © 2017 Elsevier Inc.

## 2018-01-17 NOTE — PROGRESS NOTES
Subjective   Brittani Lambert is a 57 y.o. female.     Anxiety   Presents for follow-up visit. Patient reports no chest pain, decreased concentration, depressed mood, dizziness, nervous/anxious behavior, panic, shortness of breath or suicidal ideas. Symptoms occur rarely. The severity of symptoms is mild. The quality of sleep is good.     Her past medical history is significant for depression. Compliance with medications is %.   Depression   Visit Type: follow-up  Patient is not experiencing: anhedonia, decreased concentration, depressed mood, feelings of worthlessness, nervousness/anxiety, panic, shortness of breath, suicidal ideas, suicidal planning and thoughts of death.  Frequency of symptoms: rarely   Severity: mild   Sleep quality: good  Compliance with medications:  %        Anxiety and depression are currently treated with Zoloft daily and Xanax as needed. Symptoms are stable with treatment.     She continues to smoke, currently 1/2 ppd. She would like to quit eventually.     History of HLD, not treated with statin at this time. Labs last checked July 2017.   She was started on Lipitor however she never started.      The following portions of the patient's history were reviewed and updated as appropriate: allergies, current medications, past family history, past medical history, past social history, past surgical history and problem list.      Review of Systems   Constitutional: Negative.    HENT: Negative.    Respiratory: Negative for cough, chest tightness and shortness of breath.    Cardiovascular: Negative for chest pain and leg swelling.   Gastrointestinal: Negative for abdominal pain, constipation and diarrhea.   Endocrine: Negative for cold intolerance and heat intolerance.   Musculoskeletal: Negative.    Skin: Negative.    Allergic/Immunologic: Negative.    Neurological: Negative for dizziness, numbness and headaches.   Hematological: Negative for adenopathy. Does not bruise/bleed easily.    Psychiatric/Behavioral: Negative for decreased concentration, sleep disturbance and suicidal ideas. The patient is not nervous/anxious.        Objective   Physical Exam   Constitutional: She is oriented to person, place, and time. She appears well-developed and well-nourished.   HENT:   Head: Normocephalic and atraumatic.   Right Ear: External ear normal.   Left Ear: External ear normal.   Nose: Nose normal.   Eyes: Conjunctivae are normal.   Neck: Normal range of motion.   Cardiovascular: Normal rate, regular rhythm and normal heart sounds.    No murmur heard.  Pulmonary/Chest: Effort normal and breath sounds normal. She has no wheezes.   Musculoskeletal: She exhibits no edema or deformity.   Neurological: She is alert and oriented to person, place, and time.   Skin: Skin is warm and dry.   Psychiatric: She has a normal mood and affect. Her behavior is normal. Judgment and thought content normal.   Nursing note and vitals reviewed.      Assessment/Plan   Brittani was seen today for anxiety.    Diagnoses and all orders for this visit:    Anxiety  -     sertraline (ZOLOFT) 100 MG tablet; Take 1.5 tablets by mouth Daily.  -     ALPRAZolam (XANAX) 0.5 MG tablet; Take 1 tablet by mouth 3 (Three) Times a Day As Needed for Anxiety or Sleep.  -     CBC & Differential; Future  -     Comprehensive Metabolic Panel; Future    Moderate episode of recurrent major depressive disorder  -     CBC & Differential; Future  -     Comprehensive Metabolic Panel; Future    Mixed hyperlipidemia  -     Lipid Panel; Future  -     CBC & Differential; Future  -     Comprehensive Metabolic Panel; Future    Tobacco abuse      Anxiety and depression are stable with Zoloft and Xanax, no changes to therapy.  Labs ordered, will complete when fasting.

## 2018-01-20 DIAGNOSIS — F41.9 ANXIETY: ICD-10-CM

## 2018-01-22 RX ORDER — SERTRALINE HYDROCHLORIDE 100 MG/1
TABLET, FILM COATED ORAL
Qty: 45 TABLET | Refills: 5 | Status: SHIPPED | OUTPATIENT
Start: 2018-01-22 | End: 2018-04-25 | Stop reason: SDUPTHER

## 2018-03-22 ENCOUNTER — RESULTS ENCOUNTER (OUTPATIENT)
Dept: FAMILY MEDICINE CLINIC | Facility: CLINIC | Age: 58
End: 2018-03-22

## 2018-03-22 DIAGNOSIS — F41.9 ANXIETY: ICD-10-CM

## 2018-03-22 DIAGNOSIS — F33.1 MODERATE EPISODE OF RECURRENT MAJOR DEPRESSIVE DISORDER (HCC): ICD-10-CM

## 2018-03-22 DIAGNOSIS — E78.2 MIXED HYPERLIPIDEMIA: ICD-10-CM

## 2018-04-25 ENCOUNTER — OFFICE VISIT (OUTPATIENT)
Dept: FAMILY MEDICINE CLINIC | Facility: CLINIC | Age: 58
End: 2018-04-25

## 2018-04-25 VITALS
DIASTOLIC BLOOD PRESSURE: 60 MMHG | HEIGHT: 63 IN | SYSTOLIC BLOOD PRESSURE: 128 MMHG | WEIGHT: 110.6 LBS | BODY MASS INDEX: 19.6 KG/M2 | TEMPERATURE: 98 F | RESPIRATION RATE: 20 BRPM | HEART RATE: 72 BPM

## 2018-04-25 DIAGNOSIS — K59.1 FUNCTIONAL DIARRHEA: ICD-10-CM

## 2018-04-25 DIAGNOSIS — F17.200 CURRENT SMOKER: ICD-10-CM

## 2018-04-25 DIAGNOSIS — F41.9 ANXIETY: Primary | ICD-10-CM

## 2018-04-25 DIAGNOSIS — R63.4 WEIGHT LOSS: ICD-10-CM

## 2018-04-25 DIAGNOSIS — R10.11 RUQ PAIN: ICD-10-CM

## 2018-04-25 LAB
ALBUMIN SERPL-MCNC: 4.6 G/DL (ref 3.2–4.8)
ALBUMIN/GLOB SERPL: 1.8 G/DL (ref 1.5–2.5)
ALP SERPL-CCNC: 94 U/L (ref 25–100)
ALT SERPL-CCNC: 12 U/L (ref 7–40)
AST SERPL-CCNC: 16 U/L (ref 0–33)
BASOPHILS # BLD AUTO: 0.07 10*3/MM3 (ref 0–0.2)
BASOPHILS NFR BLD AUTO: 0.8 % (ref 0–1)
BILIRUB SERPL-MCNC: 0.3 MG/DL (ref 0.3–1.2)
BUN SERPL-MCNC: 14 MG/DL (ref 9–23)
BUN/CREAT SERPL: 20 (ref 7–25)
CALCIUM SERPL-MCNC: 9.6 MG/DL (ref 8.7–10.4)
CHLORIDE SERPL-SCNC: 104 MMOL/L (ref 99–109)
CHOLEST SERPL-MCNC: 272 MG/DL (ref 0–200)
CO2 SERPL-SCNC: 28 MMOL/L (ref 20–31)
CREAT SERPL-MCNC: 0.7 MG/DL (ref 0.6–1.3)
EOSINOPHIL # BLD AUTO: 0.45 10*3/MM3 (ref 0–0.3)
EOSINOPHIL NFR BLD AUTO: 5.3 % (ref 0–3)
ERYTHROCYTE [DISTWIDTH] IN BLOOD BY AUTOMATED COUNT: 14.2 % (ref 11.3–14.5)
GFR SERPLBLD CREATININE-BSD FMLA CKD-EPI: 104 ML/MIN/1.73
GFR SERPLBLD CREATININE-BSD FMLA CKD-EPI: 86 ML/MIN/1.73
GLOBULIN SER CALC-MCNC: 2.6 GM/DL
GLUCOSE SERPL-MCNC: 78 MG/DL (ref 70–100)
HCT VFR BLD AUTO: 41.3 % (ref 34.5–44)
HDLC SERPL-MCNC: 40 MG/DL (ref 40–60)
HGB BLD-MCNC: 13.5 G/DL (ref 11.5–15.5)
IMM GRANULOCYTES # BLD: 0.02 10*3/MM3 (ref 0–0.03)
IMM GRANULOCYTES NFR BLD: 0.2 % (ref 0–0.6)
LDLC SERPL CALC-MCNC: 187 MG/DL (ref 0–100)
LYMPHOCYTES # BLD AUTO: 2.04 10*3/MM3 (ref 0.6–4.8)
LYMPHOCYTES NFR BLD AUTO: 24.2 % (ref 24–44)
MCH RBC QN AUTO: 29.4 PG (ref 27–31)
MCHC RBC AUTO-ENTMCNC: 32.7 G/DL (ref 32–36)
MCV RBC AUTO: 90 FL (ref 80–99)
MONOCYTES # BLD AUTO: 0.61 10*3/MM3 (ref 0–1)
MONOCYTES NFR BLD AUTO: 7.2 % (ref 0–12)
NEUTROPHILS # BLD AUTO: 5.23 10*3/MM3 (ref 1.5–8.3)
NEUTROPHILS NFR BLD AUTO: 62.3 % (ref 41–71)
PLATELET # BLD AUTO: 286 10*3/MM3 (ref 150–450)
POTASSIUM SERPL-SCNC: 4.5 MMOL/L (ref 3.5–5.5)
PROT SERPL-MCNC: 7.2 G/DL (ref 5.7–8.2)
RBC # BLD AUTO: 4.59 10*6/MM3 (ref 3.89–5.14)
SODIUM SERPL-SCNC: 139 MMOL/L (ref 132–146)
TRIGL SERPL-MCNC: 224 MG/DL (ref 0–150)
VLDLC SERPL CALC-MCNC: 44.8 MG/DL
WBC # BLD AUTO: 8.42 10*3/MM3 (ref 3.5–10.8)

## 2018-04-25 PROCEDURE — 99214 OFFICE O/P EST MOD 30 MIN: CPT | Performed by: FAMILY MEDICINE

## 2018-04-25 RX ORDER — ALPRAZOLAM 0.5 MG/1
0.5 TABLET ORAL 3 TIMES DAILY PRN
Qty: 60 TABLET | Refills: 2 | Status: SHIPPED | OUTPATIENT
Start: 2018-04-25 | End: 2018-07-19 | Stop reason: SDUPTHER

## 2018-04-25 RX ORDER — SERTRALINE HYDROCHLORIDE 100 MG/1
200 TABLET, FILM COATED ORAL DAILY
Qty: 60 TABLET | Refills: 5 | Status: SHIPPED | OUTPATIENT
Start: 2018-04-25 | End: 2018-06-12 | Stop reason: SDUPTHER

## 2018-04-25 NOTE — PATIENT INSTRUCTIONS
Go to the nearest ER or return to clinic if symptoms worsen, fever/chill develop      Chronic Diarrhea  Diarrhea is a condition in which a person passes frequent loose and watery stools. It can cause you to feel weak and dehydrated. Dehydration can make you tired and thirsty. It can also cause a dry mouth, decreased urination, and dark yellow urine. Diarrhea is a sign of another underlying problem, such as:  · Infection.  · Medication side effects.  · Dietary intolerance, such as lactose intolerance.  · Conditions such as celiac disease, irritable bowel syndrome (IBS), or inflammatory bowel disease (IBD).  In most cases, diarrhea lasts 2-3 days. Diarrhea that lasts longer than 4 weeks is called long-lasting (chronic) diarrhea. It is important that you treat your diarrhea as told by your health care provider.  Follow these instructions at home:  Follow these recommendations as told by your health care provider.  Eating and drinking   · Take an oral rehydration solution (ORS). This is a drink that is designed to keep you hydrated. It can be found at pharmacies and retail stores.  · Drink clear fluids, such as water, ice chips, diluted fruit juice, and low-calorie sports drinks.  · Follow the diet recommended by your health care provider. You may need to avoid foods that trigger diarrhea for you.  · Avoid foods and beverages that contain a lot of sugar or caffeine.  · Avoid alcohol.  · Avoid spicy or fatty foods.  General instructions   · Drink enough fluid to keep your urine clear or pale yellow.  · Wash your hands often and after each diarrhea episode. If soap and water are not available, use hand .  · Make sure that all people in your household wash their hands well and often.  · Take over-the-counter and prescription medicines only as told by your health care provider.  · If you were prescribed an antibiotic medicine, take it as told by your health care provider. Do not stop taking the antibiotic even if  you start to feel better.  · Rest at home while you recover.  · Watch your condition for any changes.  · Take a warm bath to relieve any burning or pain from frequent diarrhea episodes.  · Keep all follow-up visits as told by your health care provider. This is important.  Contact a health care provider if:  · You have a fever.  · Your diarrhea gets worse or does not get better.  · You have new symptoms.  · You cannot drink fluids without vomiting.  · You feel light-headed or dizzy.  · You have a headache.  · You have muscle cramps.  · You have severe pain in the rectum.  Get help right away if:  · You have persistent vomiting.  · You have chest pain.  · You feel extremely weak or you faint.  · You have bloody or black stools, or stools that look like tar.  · You have severe pain, cramping, or bloating in your abdomen, or pain that stays in one place.  · You have trouble breathing or you are breathing very quickly.  · Your heart is beating very quickly.  · Your skin feels cold and clammy.  · You feel confused.  · You have a severe headache.  · You have signs of dehydration, such as:  ¨ Dark urine, very little urine, or no urine.  ¨ Cracked lips.  ¨ Dry mouth.  ¨ Sunken eyes.  ¨ Sleepiness.  ¨ Weakness.  Summary  · Chronic diarrhea is a condition in which a person passes frequent loose and watery stools for more than 4 weeks.  · Diarrhea is a sign of another underlying problem.  · Drink enough fluid to keep your urine clear or pale yellow to avoid dehydration.  · Wash your hands often and after each diarrhea episode. If soap and water are not available, use hand .  · It is important that you treat your diarrhea as told by your health care provider.  This information is not intended to replace advice given to you by your health care provider. Make sure you discuss any questions you have with your health care provider.  Document Released: 03/09/2005 Document Revised: 11/06/2017 Document Reviewed:  11/06/2017  Elsevier Interactive Patient Education © 2017 Elsevier Inc.

## 2018-04-25 NOTE — PROGRESS NOTES
Subjective   Brittani Lambert is a 58 y.o. female.     Anxiety   Presents for follow-up visit. Symptoms include excessive worry and nervous/anxious behavior. Patient reports no confusion, depressed mood, insomnia, irritability, nausea or shortness of breath. Symptoms occur most days. The severity of symptoms is moderate. The quality of sleep is good. Nighttime awakenings: none.     Compliance with medications is %.   She feels that anxiety could be improved. She is currently treating with Zoloft and Xanax, would like to increase dose.       She is worried about weight loss, since Jan 2018 she has lost 12 lbs without any effort. She is also having diarrhea at least 3 times a week x the last 6 weeks.  Her appetite is really good, eats often. She has a healthy diet overall, lot of fresh foods.   Diarrhea is described as watery, no blood. No triggers, typically occurs right after a meal.   Denies abdominal pain, nausea, and vomiting    The following portions of the patient's history were reviewed and updated as appropriate: allergies, current medications, past family history, past medical history, past social history, past surgical history and problem list.    Review of Systems   Constitutional: Positive for unexpected weight loss. Negative for appetite change, chills, fatigue, fever and irritability.   HENT: Negative.    Eyes: Negative.  Negative for visual disturbance.   Respiratory: Negative for cough, chest tightness and shortness of breath.    Gastrointestinal: Positive for abdominal pain and diarrhea. Negative for abdominal distention, blood in stool, constipation, nausea, vomiting, GERD and indigestion.   Endocrine: Negative for cold intolerance and heat intolerance.   Musculoskeletal: Positive for back pain.   Neurological: Negative for headaches and confusion.   Hematological: Negative for adenopathy. Does not bruise/bleed easily.   Psychiatric/Behavioral: Negative for agitation and depressed mood. The  patient is nervous/anxious. The patient does not have insomnia.        Objective   Physical Exam   Constitutional: She is oriented to person, place, and time. She appears well-developed and well-nourished.   HENT:   Head: Normocephalic and atraumatic.   Right Ear: External ear normal.   Left Ear: External ear normal.   Nose: Nose normal.   Eyes: Conjunctivae are normal.   Neck: Normal range of motion. Neck supple.   Cardiovascular: Normal rate, regular rhythm and normal heart sounds.    No murmur heard.  Pulmonary/Chest: Effort normal and breath sounds normal. She has no wheezes.   Abdominal: Soft. Bowel sounds are normal. There is tenderness in the right upper quadrant. There is positive Hammonds's sign. There is no rigidity and no guarding.   Thin     Musculoskeletal: She exhibits no edema or deformity.   Lymphadenopathy:     She has no cervical adenopathy.   Neurological: She is alert and oriented to person, place, and time. No cranial nerve deficit.   Skin: Skin is warm and dry.   Psychiatric: She has a normal mood and affect. Her behavior is normal. Judgment and thought content normal.   Nursing note and vitals reviewed.        Assessment/Plan   Brittani was seen today for anxiety, weight loss and diarrhea.    Diagnoses and all orders for this visit:    Anxiety  -     sertraline (ZOLOFT) 100 MG tablet; Take 2 tablets by mouth Daily.  -     ALPRAZolam (XANAX) 0.5 MG tablet; Take 1 tablet by mouth 3 (Three) Times a Day As Needed for Anxiety or Sleep.    Current smoker  -     XR Chest PA & Lateral    Weight loss  -     XR Chest PA & Lateral  -     US Gallbladder    RUQ pain  -     US Gallbladder    Functional diarrhea  -     US Gallbladder      She is up to date on mammogram, pap smear, screening colonoscopy- all completed within the last year. Pap +HPV non 16 and 18, s/p hysterectomy.   Will evaluate gallbladder as possible source of diarrhea associated with eating. If normal and she still is having symptoms,  consider CT abd/pelvis  CXR to evaluate lungs and weight loss since she has a history of tobacco abuse. She has started a wellness program at work that will help with tobacco cessation.   Consider GI evaluation if diarrhea and weight loss persists.   Labs completed today.     Increase Zoloft to 200mg daily to improve anxiety.

## 2018-04-26 DIAGNOSIS — E78.00 PURE HYPERCHOLESTEROLEMIA: Primary | ICD-10-CM

## 2018-04-26 RX ORDER — ATORVASTATIN CALCIUM 40 MG/1
40 TABLET, FILM COATED ORAL DAILY
Qty: 30 TABLET | Refills: 5 | Status: SHIPPED | OUTPATIENT
Start: 2018-04-26 | End: 2018-10-26

## 2018-04-30 ENCOUNTER — HOSPITAL ENCOUNTER (OUTPATIENT)
Dept: ULTRASOUND IMAGING | Facility: HOSPITAL | Age: 58
Discharge: HOME | End: 2018-04-30
Attending: FAMILY MEDICINE | Admitting: FAMILY MEDICINE

## 2018-04-30 PROCEDURE — 76705 ECHO EXAM OF ABDOMEN: CPT

## 2018-05-01 DIAGNOSIS — R10.11 RUQ PAIN: ICD-10-CM

## 2018-05-01 DIAGNOSIS — K59.1 FUNCTIONAL DIARRHEA: ICD-10-CM

## 2018-05-01 DIAGNOSIS — R63.4 WEIGHT LOSS: Primary | ICD-10-CM

## 2018-05-08 DIAGNOSIS — R63.4 WEIGHT LOSS: Primary | ICD-10-CM

## 2018-05-08 DIAGNOSIS — K59.1 FUNCTIONAL DIARRHEA: ICD-10-CM

## 2018-05-08 DIAGNOSIS — R10.11 RUQ PAIN: ICD-10-CM

## 2018-05-11 ENCOUNTER — HOSPITAL ENCOUNTER (OUTPATIENT)
Dept: CT IMAGING | Facility: HOSPITAL | Age: 58
Discharge: HOME | End: 2018-05-11
Attending: FAMILY MEDICINE | Admitting: FAMILY MEDICINE

## 2018-05-11 ENCOUNTER — HOSPITAL ENCOUNTER (OUTPATIENT)
Dept: GENERAL RADIOLOGY | Facility: HOSPITAL | Age: 58
Discharge: HOME | End: 2018-05-11
Attending: FAMILY MEDICINE

## 2018-05-11 PROCEDURE — 25010000002 IOPAMIDOL 61 % SOLUTION: Performed by: FAMILY MEDICINE

## 2018-05-11 PROCEDURE — 71046 X-RAY EXAM CHEST 2 VIEWS: CPT

## 2018-05-11 PROCEDURE — 74160 CT ABDOMEN W/CONTRAST: CPT

## 2018-05-11 RX ADMIN — IOPAMIDOL 95 ML: 612 INJECTION, SOLUTION INTRAVENOUS at 14:15

## 2018-05-11 RX ADMIN — BARIUM SULFATE 450 ML: 21 SUSPENSION ORAL at 14:15

## 2018-05-15 ENCOUNTER — TELEPHONE (OUTPATIENT)
Dept: FAMILY MEDICINE CLINIC | Facility: CLINIC | Age: 58
End: 2018-05-15

## 2018-05-15 NOTE — TELEPHONE ENCOUNTER
----- Message from Mara Mathis sent at 5/14/2018 12:10 PM EDT -----  Results provided     Chronic changes seen on CXR, scarring secondary to previous infections and smoking. No mass or current infection seen.    No mass or inflammation seen on CT abdomen. Fatty liver is present. If diarrhea is still present, I will refer to gastroenterology for additional evaluation.

## 2018-06-12 ENCOUNTER — TELEPHONE (OUTPATIENT)
Dept: FAMILY MEDICINE CLINIC | Facility: CLINIC | Age: 58
End: 2018-06-12

## 2018-06-12 DIAGNOSIS — F41.9 ANXIETY: ICD-10-CM

## 2018-06-12 RX ORDER — SERTRALINE HYDROCHLORIDE 100 MG/1
200 TABLET, FILM COATED ORAL DAILY
Qty: 60 TABLET | Refills: 2 | Status: SHIPPED | OUTPATIENT
Start: 2018-06-12 | End: 2018-10-26 | Stop reason: SDUPTHER

## 2018-06-12 NOTE — TELEPHONE ENCOUNTER
----- Message from Mario Guzmán sent at 6/12/2018 11:08 AM EDT -----  Contact: PT   PT CALLED AND STATES SHE IS OUT OF HER RX FOR  SERTRALINE 100 MG; DR YANEZ UP THE DOSE TO 2 TABLETS AND NOW SHE IS OUT AND THE PHARMACY WONT REFILL DUE TO REFILL DATE ON THE 19TH. PT NEEDS IT CALLED IN TO [facility] IN [place]

## 2018-06-12 NOTE — TELEPHONE ENCOUNTER
Verified with pharmacy Rx was ok to go through insurance. She verified that it was. Attempted to notify pt, someone picked up but I couldn't hear them. Not sure if they could hear me or not.

## 2018-06-18 ENCOUNTER — TELEPHONE (OUTPATIENT)
Dept: FAMILY MEDICINE CLINIC | Facility: CLINIC | Age: 58
End: 2018-06-18

## 2018-06-18 DIAGNOSIS — K59.1 FUNCTIONAL DIARRHEA: Primary | ICD-10-CM

## 2018-06-18 DIAGNOSIS — R10.9 ABDOMINAL PAIN, UNSPECIFIED ABDOMINAL LOCATION: ICD-10-CM

## 2018-06-18 NOTE — TELEPHONE ENCOUNTER
----- Message from Brittani Herring sent at 6/18/2018  2:51 PM EDT -----  Contact: ELOY  PATIENT CALLING BACK TO GO AHEAD AND GET THE UPPER GI AS MENTIONED.     DENISA PREFERRED IF POSSIBLE TO HAVE THIS DONE.    PLEASE LEAVE A MESSAGE

## 2018-06-19 NOTE — TELEPHONE ENCOUNTER
Scope orders placed. I ordered a new colonoscopy due to new symptoms. If she was having diarrhea prior to previous scope, then we can cancel colonoscopy.

## 2018-06-21 ENCOUNTER — OFFICE VISIT (OUTPATIENT)
Dept: FAMILY MEDICINE CLINIC | Facility: CLINIC | Age: 58
End: 2018-06-21

## 2018-06-21 VITALS
OXYGEN SATURATION: 98 % | DIASTOLIC BLOOD PRESSURE: 62 MMHG | RESPIRATION RATE: 20 BRPM | WEIGHT: 111 LBS | HEIGHT: 63 IN | SYSTOLIC BLOOD PRESSURE: 120 MMHG | TEMPERATURE: 97.9 F | HEART RATE: 76 BPM | BODY MASS INDEX: 19.67 KG/M2

## 2018-06-21 DIAGNOSIS — K59.1 FUNCTIONAL DIARRHEA: Primary | ICD-10-CM

## 2018-06-21 DIAGNOSIS — E78.2 MIXED HYPERLIPIDEMIA: ICD-10-CM

## 2018-06-21 DIAGNOSIS — R14.0 ABDOMINAL BLOATING: ICD-10-CM

## 2018-06-21 DIAGNOSIS — M25.50 MULTIPLE JOINT PAIN: ICD-10-CM

## 2018-06-21 PROCEDURE — 99213 OFFICE O/P EST LOW 20 MIN: CPT | Performed by: FAMILY MEDICINE

## 2018-06-21 NOTE — PATIENT INSTRUCTIONS
Go to the nearest ER or return to clinic if symptoms worsen, fever/chill develop      Chronic Diarrhea  Diarrhea is a condition in which a person passes frequent loose and watery stools. It can cause you to feel weak and dehydrated. Dehydration can make you tired and thirsty. It can also cause a dry mouth, decreased urination, and dark yellow urine. Diarrhea is a sign of another underlying problem, such as:  · Infection.  · Medication side effects.  · Dietary intolerance, such as lactose intolerance.  · Conditions such as celiac disease, irritable bowel syndrome (IBS), or inflammatory bowel disease (IBD).    In most cases, diarrhea lasts 2-3 days. Diarrhea that lasts longer than 4 weeks is called long-lasting (chronic) diarrhea. It is important that you treat your diarrhea as told by your health care provider.  Follow these instructions at home:  Follow these recommendations as told by your health care provider.  Eating and drinking  · Take an oral rehydration solution (ORS). This is a drink that is designed to keep you hydrated. It can be found at pharmacies and retail stores.  · Drink clear fluids, such as water, ice chips, diluted fruit juice, and low-calorie sports drinks.  · Follow the diet recommended by your health care provider. You may need to avoid foods that trigger diarrhea for you.  · Avoid foods and beverages that contain a lot of sugar or caffeine.  · Avoid alcohol.  · Avoid spicy or fatty foods.  General instructions  · Drink enough fluid to keep your urine clear or pale yellow.  · Wash your hands often and after each diarrhea episode. If soap and water are not available, use hand .  · Make sure that all people in your household wash their hands well and often.  · Take over-the-counter and prescription medicines only as told by your health care provider.  · If you were prescribed an antibiotic medicine, take it as told by your health care provider. Do not stop taking the antibiotic even if  you start to feel better.  · Rest at home while you recover.  · Watch your condition for any changes.  · Take a warm bath to relieve any burning or pain from frequent diarrhea episodes.  · Keep all follow-up visits as told by your health care provider. This is important.  Contact a health care provider if:  · You have a fever.  · Your diarrhea gets worse or does not get better.  · You have new symptoms.  · You cannot drink fluids without vomiting.  · You feel light-headed or dizzy.  · You have a headache.  · You have muscle cramps.  · You have severe pain in the rectum.  Get help right away if:  · You have persistent vomiting.  · You have chest pain.  · You feel extremely weak or you faint.  · You have bloody or black stools, or stools that look like tar.  · You have severe pain, cramping, or bloating in your abdomen, or pain that stays in one place.  · You have trouble breathing or you are breathing very quickly.  · Your heart is beating very quickly.  · Your skin feels cold and clammy.  · You feel confused.  · You have a severe headache.  · You have signs of dehydration, such as:  ? Dark urine, very little urine, or no urine.  ? Cracked lips.  ? Dry mouth.  ? Sunken eyes.  ? Sleepiness.  ? Weakness.  Summary  · Chronic diarrhea is a condition in which a person passes frequent loose and watery stools for more than 4 weeks.  · Diarrhea is a sign of another underlying problem.  · Drink enough fluid to keep your urine clear or pale yellow to avoid dehydration.  · Wash your hands often and after each diarrhea episode. If soap and water are not available, use hand .  · It is important that you treat your diarrhea as told by your health care provider.  This information is not intended to replace advice given to you by your health care provider. Make sure you discuss any questions you have with your health care provider.  Document Released: 03/09/2005 Document Revised: 11/06/2017 Document Reviewed:  11/06/2017  Elsevier Interactive Patient Education © 2017 Elsevier Inc.

## 2018-06-22 LAB
ALBUMIN SERPL-MCNC: 4.3 G/DL (ref 3.2–4.8)
ALBUMIN/GLOB SERPL: 1.7 G/DL (ref 1.5–2.5)
ALP SERPL-CCNC: 88 U/L (ref 25–100)
ALT SERPL-CCNC: 12 U/L (ref 7–40)
ANA SER QL: NEGATIVE
AST SERPL-CCNC: 19 U/L (ref 0–33)
BASOPHILS # BLD AUTO: 0.07 10*3/MM3 (ref 0–0.2)
BASOPHILS NFR BLD AUTO: 0.8 % (ref 0–1)
BILIRUB SERPL-MCNC: 0.3 MG/DL (ref 0.3–1.2)
BUN SERPL-MCNC: 15 MG/DL (ref 9–23)
BUN/CREAT SERPL: 19.2 (ref 7–25)
CALCIUM SERPL-MCNC: 9.2 MG/DL (ref 8.7–10.4)
CHLORIDE SERPL-SCNC: 105 MMOL/L (ref 99–109)
CHOLEST SERPL-MCNC: 262 MG/DL (ref 0–200)
CHOLEST/HDLC SERPL: 6.55 {RATIO}
CO2 SERPL-SCNC: 28 MMOL/L (ref 20–31)
CREAT SERPL-MCNC: 0.78 MG/DL (ref 0.6–1.3)
EOSINOPHIL # BLD AUTO: 0.42 10*3/MM3 (ref 0–0.3)
EOSINOPHIL NFR BLD AUTO: 5 % (ref 0–3)
ERYTHROCYTE [DISTWIDTH] IN BLOOD BY AUTOMATED COUNT: 14.4 % (ref 11.3–14.5)
GFR SERPLBLD CREATININE-BSD FMLA CKD-EPI: 76 ML/MIN/1.73
GFR SERPLBLD CREATININE-BSD FMLA CKD-EPI: 92 ML/MIN/1.73
GLIADIN PEPTIDE IGA SER-ACNC: 3 UNITS (ref 0–19)
GLOBULIN SER CALC-MCNC: 2.6 GM/DL
GLUCOSE SERPL-MCNC: 86 MG/DL (ref 70–100)
HCT VFR BLD AUTO: 40.6 % (ref 34.5–44)
HDLC SERPL-MCNC: 40 MG/DL (ref 40–60)
HGB BLD-MCNC: 12.9 G/DL (ref 11.5–15.5)
IGA SERPL-MCNC: 229 MG/DL (ref 87–352)
IMM GRANULOCYTES # BLD: 0.02 10*3/MM3 (ref 0–0.03)
IMM GRANULOCYTES NFR BLD: 0.2 % (ref 0–0.6)
LDLC SERPL CALC-MCNC: 188 MG/DL (ref 0–100)
LYMPHOCYTES # BLD AUTO: 1.89 10*3/MM3 (ref 0.6–4.8)
LYMPHOCYTES NFR BLD AUTO: 22.5 % (ref 24–44)
MCH RBC QN AUTO: 29.5 PG (ref 27–31)
MCHC RBC AUTO-ENTMCNC: 31.8 G/DL (ref 32–36)
MCV RBC AUTO: 92.9 FL (ref 80–99)
MONOCYTES # BLD AUTO: 0.69 10*3/MM3 (ref 0–1)
MONOCYTES NFR BLD AUTO: 8.2 % (ref 0–12)
NEUTROPHILS # BLD AUTO: 5.32 10*3/MM3 (ref 1.5–8.3)
NEUTROPHILS NFR BLD AUTO: 63.3 % (ref 41–71)
PLATELET # BLD AUTO: 254 10*3/MM3 (ref 150–450)
POTASSIUM SERPL-SCNC: 4.2 MMOL/L (ref 3.5–5.5)
PROT SERPL-MCNC: 6.9 G/DL (ref 5.7–8.2)
RBC # BLD AUTO: 4.37 10*6/MM3 (ref 3.89–5.14)
RHEUMATOID FACT SERPL-ACNC: <10 IU/ML (ref 0–13.9)
SODIUM SERPL-SCNC: 140 MMOL/L (ref 132–146)
TRIGL SERPL-MCNC: 170 MG/DL (ref 0–150)
TTG IGA SER-ACNC: <2 U/ML (ref 0–3)
VIT B12 SERPL-MCNC: 275 PG/ML (ref 211–911)
VLDLC SERPL CALC-MCNC: 34 MG/DL
WBC # BLD AUTO: 8.41 10*3/MM3 (ref 3.5–10.8)

## 2018-07-18 NOTE — PROGRESS NOTES
Subjective   Brittani Lambert is a 58 y.o. female.   Chief Complaint   Patient presents with   • Anxiety   • Med Refill   • ck left breast     lump developed over night     Anxiety   Presents for follow-up visit. Symptoms include nervous/anxious behavior. Patient reports no chest pain, confusion or shortness of breath. Symptoms occur occasionally. The severity of symptoms is mild. The quality of sleep is good. Nighttime awakenings: none.     Compliance with medications is % (Alprazolam prn).      She noticed a lump in left breast 10 days ago, not changing in size.   It isn't tender, no skin changes or nipple discharge.   She completed mammogram Sept 2017, was supposed to follow up repeat mammogram in March 2018 but she did not.   She does have a history of ovarian cancer.     The following portions of the patient's history were reviewed and updated as appropriate: allergies, current medications, past family history, past medical history, past social history, past surgical history and problem list.    Review of Systems   Constitutional: Negative for appetite change, chills, fever and unexpected weight loss.   Respiratory: Negative for cough and shortness of breath.    Cardiovascular: Negative for chest pain.   Gastrointestinal: Negative for abdominal pain.   Endocrine: Negative for cold intolerance and heat intolerance.   Skin: Negative for rash.   Neurological: Negative for confusion.   Hematological: Negative for adenopathy. Does not bruise/bleed easily.   Psychiatric/Behavioral: Negative for agitation. The patient is nervous/anxious.        Objective   Physical Exam   Constitutional: She is oriented to person, place, and time. She appears well-developed and well-nourished.   HENT:   Head: Normocephalic and atraumatic.   Right Ear: External ear normal.   Left Ear: External ear normal.   Nose: Nose normal.   Eyes: Conjunctivae are normal.   Neck: Normal range of motion.   Cardiovascular: Normal rate, regular  rhythm and normal heart sounds.    Pulmonary/Chest: Effort normal and breath sounds normal. Right breast exhibits no inverted nipple, no mass, no nipple discharge, no skin change and no tenderness. Left breast exhibits mass. Left breast exhibits no inverted nipple, no nipple discharge, no skin change and no tenderness.       Musculoskeletal: She exhibits no deformity.   Neurological: She is alert and oriented to person, place, and time.   Skin: Skin is warm and dry.   Psychiatric: She has a normal mood and affect. Her behavior is normal.   Nursing note and vitals reviewed.        Assessment/Plan   Brittani was seen today for anxiety, med refill and ck left breast.    Diagnoses and all orders for this visit:    Left breast lump  -     Mammo Diagnostic Digital Tomosynthesis Bilateral With CAD    Abnormal mammogram  -     Mammo Diagnostic Digital Tomosynthesis Bilateral With CAD    Anxiety  -     ALPRAZolam (XANAX) 0.5 MG tablet; Take 1 tablet by mouth 3 (Three) Times a Day As Needed for Anxiety or Sleep.      Mammogram ordered to evaluate new lump and to update previous.   Xanax refilled. Stable with treatment, no sign of misuse. ZECHARIAH query complete. Treatment plan to include course of prescribed  controlled substance. Risks including addiction, benefits, and alternatives presented to patient.

## 2018-07-19 ENCOUNTER — OFFICE VISIT (OUTPATIENT)
Dept: FAMILY MEDICINE CLINIC | Facility: CLINIC | Age: 58
End: 2018-07-19

## 2018-07-19 VITALS
HEART RATE: 76 BPM | RESPIRATION RATE: 14 BRPM | DIASTOLIC BLOOD PRESSURE: 66 MMHG | HEIGHT: 63 IN | BODY MASS INDEX: 20.29 KG/M2 | SYSTOLIC BLOOD PRESSURE: 102 MMHG | OXYGEN SATURATION: 98 % | TEMPERATURE: 97.6 F | WEIGHT: 114.5 LBS

## 2018-07-19 DIAGNOSIS — R92.8 ABNORMAL MAMMOGRAM: ICD-10-CM

## 2018-07-19 DIAGNOSIS — F41.9 ANXIETY: ICD-10-CM

## 2018-07-19 DIAGNOSIS — N63.20 LEFT BREAST LUMP: Primary | ICD-10-CM

## 2018-07-19 PROCEDURE — 99214 OFFICE O/P EST MOD 30 MIN: CPT | Performed by: FAMILY MEDICINE

## 2018-07-19 RX ORDER — ALPRAZOLAM 0.5 MG/1
0.5 TABLET ORAL 3 TIMES DAILY PRN
Qty: 60 TABLET | Refills: 2 | Status: SHIPPED | OUTPATIENT
Start: 2018-07-19 | End: 2018-10-26 | Stop reason: SDUPTHER

## 2018-07-19 NOTE — PATIENT INSTRUCTIONS
Go to the nearest ER or return to clinic if symptoms worsen, fever/chill develop      Mammogram  A mammogram is an X-ray of the breasts that is done to check for changes that are not normal. This test can screen for and find any changes that may suggest breast cancer. This test can also help to find other changes and variations in the breast.  What happens before the procedure?  · Have this test done about 1-2 weeks after your period. This is usually when your breasts are the least tender.  · If you are visiting a new doctor or clinic, send any past mammogram images to your new doctor's office.  · Wash your breasts and under your arms the day of the test.  · Do not use deodorants, perfumes, lotions, or powders on the day of the test.  · Take off any jewelry from your neck.  · Wear clothes that you can change into and out of easily.  What happens during the procedure?  · You will undress from the waist up. You will put on a gown.  · You will  front of the X-ray machine.  · Each breast will be placed between two plastic or glass plates. The plates will press down on your breast for a few seconds. Try to stay as relaxed as possible. This does not cause any harm to your breasts. Any discomfort you feel will be very brief.  · X-rays will be taken from different angles of each breast.  The procedure may vary among doctors and hospitals.  What happens after the procedure?  · The mammogram will be looked at by a specialist (radiologist).  · You may need to do certain parts of the test again. This depends on the quality of the images.  · Ask when your test results will be ready. Make sure you get your test results.  · You may go back to your normal activities.  This information is not intended to replace advice given to you by your health care provider. Make sure you discuss any questions you have with your health care provider.  Document Released: 03/16/2010 Document Revised: 05/25/2017 Document Reviewed:  02/26/2016  Elsevier Interactive Patient Education © 2018 [employer]vier [employer].

## 2018-08-15 ENCOUNTER — HOSPITAL ENCOUNTER (OUTPATIENT)
Dept: ULTRASOUND IMAGING | Facility: HOSPITAL | Age: 58
Discharge: HOME | End: 2018-08-15

## 2018-08-15 ENCOUNTER — HOSPITAL ENCOUNTER (OUTPATIENT)
Dept: MAMMOGRAPHY | Facility: HOSPITAL | Age: 58
Discharge: HOME | End: 2018-08-15

## 2018-08-15 ENCOUNTER — HOSPITAL ENCOUNTER (OUTPATIENT)
Dept: MAMMOGRAPHY | Facility: HOSPITAL | Age: 58
Discharge: HOME | End: 2018-08-15
Attending: FAMILY MEDICINE | Admitting: RADIOLOGY

## 2018-08-15 DIAGNOSIS — N63.20 LEFT BREAST LUMP: ICD-10-CM

## 2018-08-15 DIAGNOSIS — R92.8 ABNORMAL MAMMOGRAM: ICD-10-CM

## 2018-08-15 DIAGNOSIS — N63.0 BREAST MASS IN FEMALE: ICD-10-CM

## 2018-08-15 PROCEDURE — 19083 BX BREAST 1ST LESION US IMAG: CPT | Performed by: RADIOLOGY

## 2018-08-15 PROCEDURE — A4648 IMPLANTABLE TISSUE MARKER: HCPCS

## 2018-08-15 PROCEDURE — 88305 TISSUE EXAM BY PATHOLOGIST: CPT | Performed by: FAMILY MEDICINE

## 2018-08-15 PROCEDURE — 76642 ULTRASOUND BREAST LIMITED: CPT

## 2018-08-15 PROCEDURE — 77066 DX MAMMO INCL CAD BI: CPT | Performed by: RADIOLOGY

## 2018-08-15 PROCEDURE — 76642 ULTRASOUND BREAST LIMITED: CPT | Performed by: RADIOLOGY

## 2018-08-15 PROCEDURE — 88360 TUMOR IMMUNOHISTOCHEM/MANUAL: CPT | Performed by: FAMILY MEDICINE

## 2018-08-15 PROCEDURE — 88173 CYTOPATH EVAL FNA REPORT: CPT | Performed by: FAMILY MEDICINE

## 2018-08-15 PROCEDURE — G0279 TOMOSYNTHESIS, MAMMO: HCPCS

## 2018-08-15 PROCEDURE — 77066 DX MAMMO INCL CAD BI: CPT

## 2018-08-15 PROCEDURE — 77062 BREAST TOMOSYNTHESIS BI: CPT | Performed by: RADIOLOGY

## 2018-08-15 RX ORDER — LIDOCAINE HYDROCHLORIDE 10 MG/ML
5 INJECTION, SOLUTION INFILTRATION; PERINEURAL ONCE
Status: COMPLETED | OUTPATIENT
Start: 2018-08-15 | End: 2018-08-15

## 2018-08-15 RX ORDER — LIDOCAINE HYDROCHLORIDE AND EPINEPHRINE 10; 10 MG/ML; UG/ML
10 INJECTION, SOLUTION INFILTRATION; PERINEURAL ONCE
Status: COMPLETED | OUTPATIENT
Start: 2018-08-15 | End: 2018-08-15

## 2018-08-15 RX ADMIN — LIDOCAINE HYDROCHLORIDE 5 ML: 10 INJECTION, SOLUTION INFILTRATION; PERINEURAL at 16:11

## 2018-08-15 RX ADMIN — LIDOCAINE HYDROCHLORIDE,EPINEPHRINE BITARTRATE 2 ML: 10; .01 INJECTION, SOLUTION INFILTRATION; PERINEURAL at 16:11

## 2018-08-17 LAB
LAB AP CASE REPORT: NORMAL
PATH REPORT.FINAL DX SPEC: NORMAL

## 2018-08-18 DIAGNOSIS — C50.912 DUCTAL CARCINOMA OF BREAST, LEFT (HCC): Primary | ICD-10-CM

## 2018-08-20 ENCOUNTER — TELEPHONE (OUTPATIENT)
Dept: MAMMOGRAPHY | Facility: HOSPITAL | Age: 58
End: 2018-08-20

## 2018-08-20 NOTE — TELEPHONE ENCOUNTER
Referring provider's office notified pathology returned as cancer & patient had been notified. Pt notified of pathology results and recommendation. Verbalizes understanding. Denies discomfort. Denies any signs and symptoms of infection.Patient desires PCP recommendation for surgeon. Patient notified of appointment with Dr Worthington on 09.19.18 @ 1100. Told to bring photo ID, insurance cards & list of current medications. Patient was encouraged to call back with any questions or concerns. Patient verbalized understanding. Breast cancer information packet offered and accepted.

## 2018-08-23 ENCOUNTER — TELEPHONE (OUTPATIENT)
Dept: FAMILY MEDICINE CLINIC | Facility: CLINIC | Age: 58
End: 2018-08-23

## 2018-08-23 NOTE — TELEPHONE ENCOUNTER
Pt started w/ diarrhea this morning. That has now subsided, she took some meds for it.  Later this afternoon became light headed and felt like she was going to pass out. That has now passed too.  However she is having pain at her biopsy site, thinks she may have pulled something. She was not able to work today. She wants to know if she needs to sched with you for tomorrow?

## 2018-08-23 NOTE — TELEPHONE ENCOUNTER
----- Message from Brittani Herring sent at 8/23/2018  3:41 PM EDT -----  Contact: ELOY  PATIENT ASKING FOR A RETURN CALL RE:    SHE IS JUST FEELING HORRIBLE, SHE HAS SEVERE DIARRHEA, LIGHT HEADED,     [phone]

## 2018-08-23 NOTE — TELEPHONE ENCOUNTER
Yes, advise to schedule tomorrow if she is still feeling bad. I can look at biopsy site then, too.   If anything worsens over night, advise that she go to ER.

## 2018-08-24 ENCOUNTER — OFFICE VISIT (OUTPATIENT)
Dept: FAMILY MEDICINE CLINIC | Facility: CLINIC | Age: 58
End: 2018-08-24

## 2018-08-24 VITALS
HEART RATE: 72 BPM | SYSTOLIC BLOOD PRESSURE: 134 MMHG | TEMPERATURE: 98.3 F | DIASTOLIC BLOOD PRESSURE: 70 MMHG | HEIGHT: 63 IN | RESPIRATION RATE: 20 BRPM | WEIGHT: 113.4 LBS | BODY MASS INDEX: 20.09 KG/M2

## 2018-08-24 DIAGNOSIS — Z98.890 S/P LEFT BREAST BIOPSY: ICD-10-CM

## 2018-08-24 DIAGNOSIS — R52 PAIN: Primary | ICD-10-CM

## 2018-08-24 PROCEDURE — 99213 OFFICE O/P EST LOW 20 MIN: CPT | Performed by: FAMILY MEDICINE

## 2018-08-24 RX ORDER — HYDROCODONE BITARTRATE AND ACETAMINOPHEN 7.5; 325 MG/1; MG/1
1 TABLET ORAL EVERY 4 HOURS PRN
Qty: 18 TABLET | Refills: 0 | Status: SHIPPED | OUTPATIENT
Start: 2018-08-24 | End: 2018-09-19 | Stop reason: SDUPTHER

## 2018-08-24 NOTE — PATIENT INSTRUCTIONS
Go to the nearest ER or return to clinic if symptoms worsen, fever/chill develop      Breast Biopsy  A breast biopsy is a procedure in which a sample of suspicious breast tissue is removed from your breast. Following the procedure, the tissue or liquid that is removed from the breast is examined under a microscope to see if cancerous cells are present. You may need a breast biopsy if you have:  · Any undiagnosed breast mass (tumor).  · Nipple abnormalities, dimpling, crusting, or ulcerations.  · Abnormal discharge from the nipple, especially blood.  · Redness, swelling, and pain of the breast.  · Calcium deposits (calcifications) or abnormalities seen on a mammogram, ultrasound results, or MRI results.  · Suspicious changes in the breast seen on your mammogram.    If the breast abnormality is found to be cancerous (malignant), a breast biopsy can help to determine what the best treatment is for you. There are many different types of breast biopsies. Talk with your health care provider about your options and which type is best for you.  Tell a health care provider about:  · Any allergies you have.  · All medicines you are taking, including vitamins, herbs, eye drops, creams, and over-the-counter medicines.  · Any problems you or family members have had with anesthetic medicines.  · Any blood disorders you have.  · Any surgeries you have had.  · Any medical conditions you have.  · Whether you are pregnant or may be pregnant.  What are the risks?  Generally, this is a safe procedure. However, problems may occur, including:  · Bleeding.  · Infection.  · Discomfort. This is temporary.  · Allergic reactions to medicines.  · Bruising and swelling of the breast.  · Alteration in the shape of the breast.  · Damage to other tissues.  · Not finding the lump or abnormality.  · Needing more surgery.    What happens before the procedure?  · Plan to have someone take you home after the procedure.  · Do not use any tobacco  products, such as cigarettes, chewing tobacco, and e-cigarettes. If you need help quitting, ask your health care provider.  · Do not drink alcohol for 24 hours before the procedure.  · Ask your health care provider about:  ? Changing or stopping your regular medicines. This is especially important if you are taking diabetes medicines or blood thinners.  ? Taking medicines such as aspirin and ibuprofen. These medicines can thin your blood. Do not take these medicines before your procedure if your health care provider instructs you not to.  · Wear a good support bra to the procedure.  · Ask your health care provider how your surgical site will be marked or identified.  · You may be given antibiotic medicine to help prevent infection.  · Your health care provider may perform a procedure to place a wire (needle localization) or a seed that gives off radiation (radioactive seed localization) in the breast lump. A mammogram, ultrasound, MRI, or a combination of these techniques will be done during this procedure to identify the location of the breast abnormality. The imaging technique used will depend on the type of biopsy you are having. The wire or seed will help the health care provider locate the lump when performing the biopsy, especially if the lump cannot be felt.  What happens during the procedure?  You may be given one or both of the following:  · A medicine to numb the breast area (local anesthetic).  · A medicine to help you relax (sedative) during the procedure.    The following are the different types of biopsies that can be performed.  Fine-Needle Aspiration  A thin needle will be attached to a syringe and inserted into a breast cyst. Fluid and cells will be removed. This technique is not as common as a core needle biopsy.  Core Needle Biopsy  A wide, hollow needle (core needle) will be inserted into a breast lump multiple times to remove tissue samples or cores.  Stereotactic Biopsy  You will lie face-down  on a table. Your breast will pass through an opening in the table and will be gently compressed into a fixed position. X-ray equipment and a computer will be used to locate the breast lump. The surgeon will use this information to collect several samples of tissue using a needle collection device.  Vacuum-Assisted Biopsy  A small incision (less than ¼ inch) will be made in your breast. A biopsy device that includes a hollow needle and vacuum will be passed through the incision and into the breast tissue. The vacuum will gently draw abnormal breast tissue into the needle to remove it. No stitches (sutures) will be needed. The incision will be covered with a bandage (dressing). In this type of biopsy, a larger tissue sample is removed than in a regular core needle biopsy.  Ultrasound-Guided Core Needle Biopsy  A high-frequency ultrasound will be used to help guide the core needle to the area of the mass or abnormality. An incision will be made to insert the needle. Then tissue samples will be removed.  Surgical Biopsy  This method requires an incision in the breast to remove part or all of the suspicious tissue. After the tissue is removed, the skin over the area will be closed with sutures and covered with a dressing. There are two types of surgical biopsies:  · Incisional biopsy. The surgeon will remove part of the breast lump.  · Excisional biopsy. The surgeon will attempt to remove the whole breast lump or as much of it as possible.    After any of these procedures, the tissue or liquid that was removed will be examined under a microscope.  What happens after the procedure?  · You will be taken to the recovery area. If you are doing well and have no problems, you will be allowed to go home.  · You may notice bruising on your breast. This is normal.  · You may have a pressure dressing applied on your breast for 24-48 hours. A pressure dressing is a bandage that is wrapped tightly around the chest to stop fluid from  collecting underneath tissues. You may also be advised to wear a supportive bra during this time.  · Do not drive for 24 hours if you received a sedative.  This information is not intended to replace advice given to you by your health care provider. Make sure you discuss any questions you have with your health care provider.  Document Released: 12/18/2006 Document Revised: 04/27/2017 Document Reviewed: 09/20/2016  Elsevier Interactive Patient Education © 2018 Elsevier Inc.

## 2018-08-24 NOTE — PROGRESS NOTES
Subjective   Brittani Lambert is a 58 y.o. female.   Chief Complaint   Patient presents with   • Dizziness     She almost passed out x5-6 times yesterday. She has pain to her L breast Bx site from 10 days ago     History of Present Illness   She had a left breast biopsy 10 days ago. She has had really intense pain at the biopsy site.   Yesterday, pain was so bad that she almost passed out multiple times. Treated with ibuprofen, heat and ice compression, no relief.  Denies any discharge, erythema associated with biopsy site.     The following portions of the patient's history were reviewed and updated as appropriate: allergies, current medications, past family history, past medical history, past social history, past surgical history and problem list.    Review of Systems   Constitutional: Negative for chills, fatigue and fever.   HENT: Negative.    Eyes: Negative.    Respiratory: Negative for cough and shortness of breath.    Cardiovascular: Negative for palpitations.   Genitourinary: Positive for breast pain.   Skin: Negative for color change and rash.   Neurological: Positive for light-headedness (secondary to pain). Negative for headache and confusion.   Hematological: Does not bruise/bleed easily.   Psychiatric/Behavioral: Negative.        Objective   Physical Exam   Constitutional: She is oriented to person, place, and time. She appears well-developed and well-nourished.   HENT:   Head: Normocephalic and atraumatic.   Right Ear: External ear normal.   Left Ear: External ear normal.   Nose: Nose normal.   Eyes: Conjunctivae are normal.   Cardiovascular: Normal rate, regular rhythm and normal heart sounds.    Pulmonary/Chest: Effort normal and breath sounds normal. She has no wheezes.       Neurological: She is alert and oriented to person, place, and time.   Skin: Skin is warm and dry.   Psychiatric: She has a normal mood and affect. Her behavior is normal.   Nursing note and vitals  reviewed.        Assessment/Plan   Brittani was seen today for dizziness.    Diagnoses and all orders for this visit:    Pain  -     HYDROcodone-acetaminophen (NORCO) 7.5-325 MG per tablet; Take 1 tablet by mouth Every 4 (Four) Hours As Needed for Moderate Pain .    S/P left breast biopsy  -     HYDROcodone-acetaminophen (NORCO) 7.5-325 MG per tablet; Take 1 tablet by mouth Every 4 (Four) Hours As Needed for Moderate Pain .      Biopsy site evaluated, no signs of infection/inflammation. Likely musculoskeletal pain related to recent biopsy.   Temporary pain relief provided to her. Advised her to rest over the weekend, continue warm and cool compression to relief symptoms.

## 2018-08-25 LAB
CYTO UR: NORMAL
LAB AP CASE REPORT: NORMAL
LAB AP CLINICAL INFORMATION: NORMAL
LAB AP DIAGNOSIS COMMENT: NORMAL
LAB AP FISH HER2/NEU REPORT,ADDENDUM: NORMAL
LAB AP SPECIAL STAINS: NORMAL
PATH REPORT.FINAL DX SPEC: NORMAL
PATH REPORT.GROSS SPEC: NORMAL

## 2018-09-19 ENCOUNTER — TELEPHONE (OUTPATIENT)
Dept: FAMILY MEDICINE CLINIC | Facility: CLINIC | Age: 58
End: 2018-09-19

## 2018-09-19 ENCOUNTER — DOCUMENTATION (OUTPATIENT)
Dept: OTHER | Facility: HOSPITAL | Age: 58
End: 2018-09-19

## 2018-09-19 DIAGNOSIS — R52 PAIN: ICD-10-CM

## 2018-09-19 DIAGNOSIS — Z98.890 S/P LEFT BREAST BIOPSY: ICD-10-CM

## 2018-09-19 RX ORDER — HYDROCODONE BITARTRATE AND ACETAMINOPHEN 7.5; 325 MG/1; MG/1
1 TABLET ORAL EVERY 6 HOURS PRN
Qty: 60 TABLET | Refills: 0 | Status: SHIPPED | OUTPATIENT
Start: 2018-09-19 | End: 2018-10-18 | Stop reason: HOSPADM

## 2018-09-19 NOTE — PROGRESS NOTES
I saw patient with Dr SEGUNDO - she asked that her mother not be in consultation - Dr SEGUNDO reviewed the pathology report and surgical options - left breast Stage IIA HG IDC, ER negative, MT positive, HER negative. The patient currently smokes. Dr SEGUNDO discussed morales adjuvant treatment if patient wanted BCT - Patient states that she prefers bilateral mastectomies and understands reconstruction would be done delayed due to her smoking. Educational and supportive materials were reviewed and given - Arm measurements were obtained - Patient agreed to be consented for Garment Study and she was consented for Decision Study.

## 2018-09-19 NOTE — TELEPHONE ENCOUNTER
----- Message from Allyson Rodriguez sent at 9/19/2018 12:38 PM EDT -----  Contact: DR LYONS   PATIENT JUST WENT TO [facility]ER [facility] AND SHE HAS STAGE 2. SHE IS GOING TO HAVE A DOUBLE MASTECTOMY IN TWO WEEKS. THEY SAID THAT SHE IS IN PAIN FROM WHERE IT IS GROWING. PATIENT WANTS TO KNOW IF YOU WILL SEND IN SOMETHING FOR PAIN TO [facility] IN [place]. PATIENT WANTS YOU TO DO IT AS SOON AS POSSIBLE BECAUSE SHE LIVES IN [place] AND SHE IS IN [place] NOW.  [phone]

## 2018-09-24 ENCOUNTER — DOCUMENTATION (OUTPATIENT)
Dept: OTHER | Facility: HOSPITAL | Age: 58
End: 2018-09-24

## 2018-09-24 NOTE — PROGRESS NOTES
Patient called to ask about surgical date - I called [facility] and they said surgery will be 10/4/18 for patient to arrive at 5:30 AM - they will call patient tomorrow with details - I gave them patients cell number as her ground line is out due to rain and weather. Patient aware

## 2018-09-28 ENCOUNTER — TRANSCRIBE ORDERS (OUTPATIENT)
Dept: ADMINISTRATIVE | Facility: HOSPITAL | Age: 58
End: 2018-09-28

## 2018-09-28 DIAGNOSIS — C50.812 MALIGNANT NEOPLASM OF OVERLAPPING SITES OF LEFT FEMALE BREAST, UNSPECIFIED ESTROGEN RECEPTOR STATUS (HCC): Primary | ICD-10-CM

## 2018-10-01 ENCOUNTER — APPOINTMENT (OUTPATIENT)
Dept: PREADMISSION TESTING | Facility: HOSPITAL | Age: 58
End: 2018-10-01

## 2018-10-01 VITALS — HEIGHT: 64 IN | WEIGHT: 113.98 LBS | BODY MASS INDEX: 19.46 KG/M2

## 2018-10-01 LAB
ALBUMIN SERPL-MCNC: 4.27 G/DL (ref 3.2–4.8)
ALBUMIN/GLOB SERPL: 2 G/DL (ref 1.5–2.5)
ALP SERPL-CCNC: 85 U/L (ref 25–100)
ALT SERPL W P-5'-P-CCNC: 8 U/L (ref 7–40)
ANION GAP SERPL CALCULATED.3IONS-SCNC: 6 MMOL/L (ref 3–11)
AST SERPL-CCNC: 17 U/L (ref 0–33)
BILIRUB SERPL-MCNC: 0.4 MG/DL (ref 0.3–1.2)
BUN BLD-MCNC: 15 MG/DL (ref 9–23)
BUN/CREAT SERPL: 17.2 (ref 7–25)
CALCIUM SPEC-SCNC: 8.9 MG/DL (ref 8.7–10.4)
CHLORIDE SERPL-SCNC: 106 MMOL/L (ref 99–109)
CO2 SERPL-SCNC: 28 MMOL/L (ref 20–31)
CREAT BLD-MCNC: 0.87 MG/DL (ref 0.6–1.3)
DEPRECATED RDW RBC AUTO: 48 FL (ref 37–54)
ERYTHROCYTE [DISTWIDTH] IN BLOOD BY AUTOMATED COUNT: 14.6 % (ref 11.3–14.5)
GFR SERPL CREATININE-BSD FRML MDRD: 67 ML/MIN/1.73
GLOBULIN UR ELPH-MCNC: 2.1 GM/DL
GLUCOSE BLD-MCNC: 130 MG/DL (ref 70–100)
HCT VFR BLD AUTO: 39 % (ref 34.5–44)
HGB BLD-MCNC: 12.6 G/DL (ref 11.5–15.5)
MCH RBC QN AUTO: 29.4 PG (ref 27–31)
MCHC RBC AUTO-ENTMCNC: 32.3 G/DL (ref 32–36)
MCV RBC AUTO: 91.1 FL (ref 80–99)
PLATELET # BLD AUTO: 211 10*3/MM3 (ref 150–450)
PMV BLD AUTO: 9.8 FL (ref 6–12)
POTASSIUM BLD-SCNC: 4.4 MMOL/L (ref 3.5–5.5)
PROT SERPL-MCNC: 6.4 G/DL (ref 5.7–8.2)
RBC # BLD AUTO: 4.28 10*6/MM3 (ref 3.89–5.14)
SODIUM BLD-SCNC: 140 MMOL/L (ref 132–146)
WBC NRBC COR # BLD: 9.44 10*3/MM3 (ref 3.5–10.8)

## 2018-10-01 PROCEDURE — 93010 ELECTROCARDIOGRAM REPORT: CPT | Performed by: INTERNAL MEDICINE

## 2018-10-01 PROCEDURE — 80053 COMPREHEN METABOLIC PANEL: CPT | Performed by: SURGERY

## 2018-10-01 PROCEDURE — 85027 COMPLETE CBC AUTOMATED: CPT | Performed by: SURGERY

## 2018-10-01 PROCEDURE — 93005 ELECTROCARDIOGRAM TRACING: CPT

## 2018-10-01 PROCEDURE — 36415 COLL VENOUS BLD VENIPUNCTURE: CPT

## 2018-10-01 NOTE — DISCHARGE INSTRUCTIONS

## 2018-10-03 ENCOUNTER — ANESTHESIA EVENT (OUTPATIENT)
Dept: PERIOP | Facility: HOSPITAL | Age: 58
End: 2018-10-03

## 2018-10-04 ENCOUNTER — HOSPITAL ENCOUNTER (OUTPATIENT)
Facility: HOSPITAL | Age: 58
Discharge: HOME | End: 2018-10-05
Attending: SURGERY | Admitting: SURGERY

## 2018-10-04 ENCOUNTER — HOSPITAL ENCOUNTER (OUTPATIENT)
Dept: NUCLEAR MEDICINE | Facility: HOSPITAL | Age: 58
Discharge: HOME | End: 2018-10-04
Attending: SURGERY

## 2018-10-04 ENCOUNTER — ANESTHESIA (OUTPATIENT)
Dept: PERIOP | Facility: HOSPITAL | Age: 58
End: 2018-10-04

## 2018-10-04 DIAGNOSIS — C50.919 BREAST CA (HCC): ICD-10-CM

## 2018-10-04 DIAGNOSIS — C50.812 MALIGNANT NEOPLASM OF OVERLAPPING SITES OF LEFT FEMALE BREAST, UNSPECIFIED ESTROGEN RECEPTOR STATUS (HCC): ICD-10-CM

## 2018-10-04 PROCEDURE — 25010000002 FENTANYL CITRATE (PF) 100 MCG/2ML SOLUTION: Performed by: NURSE ANESTHETIST, CERTIFIED REGISTERED

## 2018-10-04 PROCEDURE — 0 TECHNETIUM FILTERED SULFUR COLLOID: Performed by: SURGERY

## 2018-10-04 PROCEDURE — 88342 IMHCHEM/IMCYTCHM 1ST ANTB: CPT | Performed by: SURGERY

## 2018-10-04 PROCEDURE — 25010000002 PROPOFOL 10 MG/ML EMULSION: Performed by: NURSE ANESTHETIST, CERTIFIED REGISTERED

## 2018-10-04 PROCEDURE — 88341 IMHCHEM/IMCYTCHM EA ADD ANTB: CPT | Performed by: SURGERY

## 2018-10-04 PROCEDURE — 88307 TISSUE EXAM BY PATHOLOGIST: CPT | Performed by: SURGERY

## 2018-10-04 PROCEDURE — 25010000002 HYDROMORPHONE PER 4 MG: Performed by: SURGERY

## 2018-10-04 PROCEDURE — 25010000002 NEOSTIGMINE 10 MG/10ML SOLUTION: Performed by: NURSE ANESTHETIST, CERTIFIED REGISTERED

## 2018-10-04 PROCEDURE — 25010000002 DEXAMETHASONE SODIUM PHOSPHATE 10 MG/ML SOLUTION 1 ML VIAL: Performed by: NURSE ANESTHETIST, CERTIFIED REGISTERED

## 2018-10-04 PROCEDURE — 25010000002 PROPOFOL 1000 MG/ML EMULSION: Performed by: NURSE ANESTHETIST, CERTIFIED REGISTERED

## 2018-10-04 PROCEDURE — 25010000002 PROMETHAZINE PER 50 MG: Performed by: NURSE ANESTHETIST, CERTIFIED REGISTERED

## 2018-10-04 PROCEDURE — 88331 PATH CONSLTJ SURG 1 BLK 1SPC: CPT | Performed by: PATHOLOGY

## 2018-10-04 PROCEDURE — 25010000003 CEFAZOLIN IN DEXTROSE 2-4 GM/100ML-% SOLUTION: Performed by: SURGERY

## 2018-10-04 PROCEDURE — 38792 RA TRACER ID OF SENTINL NODE: CPT

## 2018-10-04 PROCEDURE — 25010000002 PHENYLEPHRINE PER 1 ML: Performed by: NURSE ANESTHETIST, CERTIFIED REGISTERED

## 2018-10-04 PROCEDURE — 25010000002 HYDROMORPHONE PER 4 MG: Performed by: NURSE ANESTHETIST, CERTIFIED REGISTERED

## 2018-10-04 PROCEDURE — 25010000002 MIDAZOLAM PER 1 MG: Performed by: NURSE ANESTHETIST, CERTIFIED REGISTERED

## 2018-10-04 PROCEDURE — 94799 UNLISTED PULMONARY SVC/PX: CPT

## 2018-10-04 PROCEDURE — 25010000002 ONDANSETRON PER 1 MG: Performed by: NURSE ANESTHETIST, CERTIFIED REGISTERED

## 2018-10-04 PROCEDURE — A9541 TC99M SULFUR COLLOID: HCPCS | Performed by: SURGERY

## 2018-10-04 PROCEDURE — 25010000002 BUPRENORPHINE PER 0.1 MG: Performed by: NURSE ANESTHETIST, CERTIFIED REGISTERED

## 2018-10-04 PROCEDURE — 25010000002 DEXAMETHASONE PER 1 MG: Performed by: NURSE ANESTHETIST, CERTIFIED REGISTERED

## 2018-10-04 RX ORDER — HYDROMORPHONE HYDROCHLORIDE 1 MG/ML
0.5 INJECTION, SOLUTION INTRAMUSCULAR; INTRAVENOUS; SUBCUTANEOUS
Status: DISCONTINUED | OUTPATIENT
Start: 2018-10-04 | End: 2018-10-04 | Stop reason: HOSPADM

## 2018-10-04 RX ORDER — PROMETHAZINE HYDROCHLORIDE 25 MG/1
25 SUPPOSITORY RECTAL ONCE AS NEEDED
Status: COMPLETED | OUTPATIENT
Start: 2018-10-04 | End: 2018-10-04

## 2018-10-04 RX ORDER — NEOSTIGMINE METHYLSULFATE 1 MG/ML
INJECTION, SOLUTION INTRAVENOUS AS NEEDED
Status: DISCONTINUED | OUTPATIENT
Start: 2018-10-04 | End: 2018-10-04 | Stop reason: SURG

## 2018-10-04 RX ORDER — CELECOXIB 200 MG/1
200 CAPSULE ORAL ONCE
Status: COMPLETED | OUTPATIENT
Start: 2018-10-04 | End: 2018-10-04

## 2018-10-04 RX ORDER — SODIUM CHLORIDE 0.9 % (FLUSH) 0.9 %
3 SYRINGE (ML) INJECTION EVERY 12 HOURS SCHEDULED
Status: DISCONTINUED | OUTPATIENT
Start: 2018-10-04 | End: 2018-10-04 | Stop reason: HOSPADM

## 2018-10-04 RX ORDER — PROMETHAZINE HYDROCHLORIDE 25 MG/ML
6.25 INJECTION, SOLUTION INTRAMUSCULAR; INTRAVENOUS ONCE AS NEEDED
Status: COMPLETED | OUTPATIENT
Start: 2018-10-04 | End: 2018-10-04

## 2018-10-04 RX ORDER — ACETAMINOPHEN 500 MG
1000 TABLET ORAL ONCE
Status: COMPLETED | OUTPATIENT
Start: 2018-10-04 | End: 2018-10-04

## 2018-10-04 RX ORDER — LIDOCAINE HYDROCHLORIDE 10 MG/ML
0.5 INJECTION, SOLUTION EPIDURAL; INFILTRATION; INTRACAUDAL; PERINEURAL ONCE AS NEEDED
Status: COMPLETED | OUTPATIENT
Start: 2018-10-04 | End: 2018-10-04

## 2018-10-04 RX ORDER — FAMOTIDINE 10 MG/ML
20 INJECTION, SOLUTION INTRAVENOUS ONCE
Status: DISCONTINUED | OUTPATIENT
Start: 2018-10-04 | End: 2018-10-04

## 2018-10-04 RX ORDER — CELECOXIB 200 MG/1
200 CAPSULE ORAL EVERY 12 HOURS
Status: DISCONTINUED | OUTPATIENT
Start: 2018-10-04 | End: 2018-10-05 | Stop reason: HOSPADM

## 2018-10-04 RX ORDER — OXYCODONE HYDROCHLORIDE 5 MG/1
5 TABLET ORAL EVERY 4 HOURS PRN
Status: DISCONTINUED | OUTPATIENT
Start: 2018-10-04 | End: 2018-10-05 | Stop reason: HOSPADM

## 2018-10-04 RX ORDER — FENTANYL CITRATE 50 UG/ML
50 INJECTION, SOLUTION INTRAMUSCULAR; INTRAVENOUS
Status: DISCONTINUED | OUTPATIENT
Start: 2018-10-04 | End: 2018-10-04 | Stop reason: HOSPADM

## 2018-10-04 RX ORDER — PROPOFOL 10 MG/ML
VIAL (ML) INTRAVENOUS AS NEEDED
Status: DISCONTINUED | OUTPATIENT
Start: 2018-10-04 | End: 2018-10-04 | Stop reason: SURG

## 2018-10-04 RX ORDER — PREGABALIN 75 MG/1
75 CAPSULE ORAL ONCE
Status: COMPLETED | OUTPATIENT
Start: 2018-10-04 | End: 2018-10-04

## 2018-10-04 RX ORDER — DEXAMETHASONE SODIUM PHOSPHATE 4 MG/ML
INJECTION, SOLUTION INTRA-ARTICULAR; INTRALESIONAL; INTRAMUSCULAR; INTRAVENOUS; SOFT TISSUE AS NEEDED
Status: DISCONTINUED | OUTPATIENT
Start: 2018-10-04 | End: 2018-10-04 | Stop reason: SURG

## 2018-10-04 RX ORDER — PROMETHAZINE HYDROCHLORIDE 25 MG/1
25 TABLET ORAL ONCE AS NEEDED
Status: COMPLETED | OUTPATIENT
Start: 2018-10-04 | End: 2018-10-04

## 2018-10-04 RX ORDER — MAGNESIUM HYDROXIDE 1200 MG/15ML
LIQUID ORAL AS NEEDED
Status: DISCONTINUED | OUTPATIENT
Start: 2018-10-04 | End: 2018-10-04 | Stop reason: HOSPADM

## 2018-10-04 RX ORDER — CEFAZOLIN SODIUM 2 G/100ML
2 INJECTION, SOLUTION INTRAVENOUS EVERY 8 HOURS
Status: COMPLETED | OUTPATIENT
Start: 2018-10-04 | End: 2018-10-05

## 2018-10-04 RX ORDER — ACETAMINOPHEN 500 MG
1000 TABLET ORAL EVERY 6 HOURS
Status: DISCONTINUED | OUTPATIENT
Start: 2018-10-04 | End: 2018-10-05 | Stop reason: HOSPADM

## 2018-10-04 RX ORDER — ALPRAZOLAM 0.5 MG/1
0.5 TABLET ORAL 3 TIMES DAILY PRN
Status: DISCONTINUED | OUTPATIENT
Start: 2018-10-04 | End: 2018-10-05 | Stop reason: HOSPADM

## 2018-10-04 RX ORDER — MIDAZOLAM HYDROCHLORIDE 1 MG/ML
INJECTION INTRAMUSCULAR; INTRAVENOUS AS NEEDED
Status: DISCONTINUED | OUTPATIENT
Start: 2018-10-04 | End: 2018-10-04 | Stop reason: SURG

## 2018-10-04 RX ORDER — SODIUM CHLORIDE 0.9 % (FLUSH) 0.9 %
3-10 SYRINGE (ML) INJECTION AS NEEDED
Status: DISCONTINUED | OUTPATIENT
Start: 2018-10-04 | End: 2018-10-04 | Stop reason: HOSPADM

## 2018-10-04 RX ORDER — GLYCOPYRROLATE 0.2 MG/ML
INJECTION INTRAMUSCULAR; INTRAVENOUS AS NEEDED
Status: DISCONTINUED | OUTPATIENT
Start: 2018-10-04 | End: 2018-10-04 | Stop reason: SURG

## 2018-10-04 RX ORDER — SODIUM CHLORIDE 9 MG/ML
50 INJECTION, SOLUTION INTRAVENOUS CONTINUOUS
Status: DISCONTINUED | OUTPATIENT
Start: 2018-10-04 | End: 2018-10-05 | Stop reason: HOSPADM

## 2018-10-04 RX ORDER — FENTANYL CITRATE 50 UG/ML
INJECTION, SOLUTION INTRAMUSCULAR; INTRAVENOUS AS NEEDED
Status: DISCONTINUED | OUTPATIENT
Start: 2018-10-04 | End: 2018-10-04 | Stop reason: SURG

## 2018-10-04 RX ORDER — DIPHENHYDRAMINE HYDROCHLORIDE 50 MG/ML
12.5 INJECTION INTRAMUSCULAR; INTRAVENOUS EVERY 6 HOURS PRN
Status: DISCONTINUED | OUTPATIENT
Start: 2018-10-04 | End: 2018-10-05 | Stop reason: HOSPADM

## 2018-10-04 RX ORDER — ONDANSETRON 2 MG/ML
4 INJECTION INTRAMUSCULAR; INTRAVENOUS EVERY 6 HOURS PRN
Status: DISCONTINUED | OUTPATIENT
Start: 2018-10-04 | End: 2018-10-05 | Stop reason: HOSPADM

## 2018-10-04 RX ORDER — PROMETHAZINE HYDROCHLORIDE 25 MG/ML
6.25 INJECTION, SOLUTION INTRAMUSCULAR; INTRAVENOUS EVERY 6 HOURS PRN
Status: DISCONTINUED | OUTPATIENT
Start: 2018-10-04 | End: 2018-10-05 | Stop reason: HOSPADM

## 2018-10-04 RX ORDER — ATORVASTATIN CALCIUM 40 MG/1
40 TABLET, FILM COATED ORAL DAILY
Status: DISCONTINUED | OUTPATIENT
Start: 2018-10-04 | End: 2018-10-05 | Stop reason: HOSPADM

## 2018-10-04 RX ORDER — DIAZEPAM 2 MG/1
2 TABLET ORAL EVERY 8 HOURS PRN
Status: DISCONTINUED | OUTPATIENT
Start: 2018-10-04 | End: 2018-10-05 | Stop reason: HOSPADM

## 2018-10-04 RX ORDER — FAMOTIDINE 20 MG/1
20 TABLET, FILM COATED ORAL ONCE
Status: COMPLETED | OUTPATIENT
Start: 2018-10-04 | End: 2018-10-04

## 2018-10-04 RX ORDER — ATRACURIUM BESYLATE 10 MG/ML
INJECTION, SOLUTION INTRAVENOUS AS NEEDED
Status: DISCONTINUED | OUTPATIENT
Start: 2018-10-04 | End: 2018-10-04 | Stop reason: SURG

## 2018-10-04 RX ORDER — SCOLOPAMINE TRANSDERMAL SYSTEM 1 MG/1
1 PATCH, EXTENDED RELEASE TRANSDERMAL CONTINUOUS
Status: DISCONTINUED | OUTPATIENT
Start: 2018-10-04 | End: 2018-10-05 | Stop reason: HOSPADM

## 2018-10-04 RX ORDER — NALOXONE HCL 0.4 MG/ML
0.1 VIAL (ML) INJECTION
Status: DISCONTINUED | OUTPATIENT
Start: 2018-10-04 | End: 2018-10-05 | Stop reason: HOSPADM

## 2018-10-04 RX ORDER — ONDANSETRON 2 MG/ML
INJECTION INTRAMUSCULAR; INTRAVENOUS AS NEEDED
Status: DISCONTINUED | OUTPATIENT
Start: 2018-10-04 | End: 2018-10-04 | Stop reason: SURG

## 2018-10-04 RX ORDER — CEFAZOLIN SODIUM 2 G/100ML
2 INJECTION, SOLUTION INTRAVENOUS ONCE
Status: COMPLETED | OUTPATIENT
Start: 2018-10-04 | End: 2018-10-04

## 2018-10-04 RX ORDER — SODIUM CHLORIDE, SODIUM LACTATE, POTASSIUM CHLORIDE, CALCIUM CHLORIDE 600; 310; 30; 20 MG/100ML; MG/100ML; MG/100ML; MG/100ML
9 INJECTION, SOLUTION INTRAVENOUS CONTINUOUS
Status: DISCONTINUED | OUTPATIENT
Start: 2018-10-04 | End: 2018-10-05 | Stop reason: HOSPADM

## 2018-10-04 RX ORDER — SERTRALINE HYDROCHLORIDE 100 MG/1
200 TABLET, FILM COATED ORAL DAILY
Status: DISCONTINUED | OUTPATIENT
Start: 2018-10-05 | End: 2018-10-05 | Stop reason: HOSPADM

## 2018-10-04 RX ORDER — ONDANSETRON 2 MG/ML
4 INJECTION INTRAMUSCULAR; INTRAVENOUS ONCE AS NEEDED
Status: DISCONTINUED | OUTPATIENT
Start: 2018-10-04 | End: 2018-10-04 | Stop reason: HOSPADM

## 2018-10-04 RX ADMIN — PHENYLEPHRINE HYDROCHLORIDE 80 MCG: 10 INJECTION INTRAVENOUS at 15:45

## 2018-10-04 RX ADMIN — CEFAZOLIN SODIUM 2 G: 2 INJECTION, SOLUTION INTRAVENOUS at 13:37

## 2018-10-04 RX ADMIN — FAMOTIDINE 20 MG: 20 TABLET ORAL at 11:37

## 2018-10-04 RX ADMIN — GLYCOPYRROLATE 0.2 MG: 0.2 INJECTION, SOLUTION INTRAMUSCULAR; INTRAVENOUS at 13:56

## 2018-10-04 RX ADMIN — PROPOFOL 25 MCG/KG/MIN: 10 INJECTION, EMULSION INTRAVENOUS at 14:07

## 2018-10-04 RX ADMIN — PHENYLEPHRINE HYDROCHLORIDE 40 MCG: 10 INJECTION INTRAVENOUS at 14:07

## 2018-10-04 RX ADMIN — TECHNETIUM TC 99M SULFUR COLLOID 1 DOSE: KIT at 13:45

## 2018-10-04 RX ADMIN — LIDOCAINE HYDROCHLORIDE 0.2 ML: 10 INJECTION, SOLUTION EPIDURAL; INFILTRATION; INTRACAUDAL; PERINEURAL at 11:20

## 2018-10-04 RX ADMIN — CELECOXIB 200 MG: 200 CAPSULE ORAL at 11:37

## 2018-10-04 RX ADMIN — NEOSTIGMINE METHYLSULFATE 3 MG: 1 INJECTION, SOLUTION INTRAVENOUS at 15:52

## 2018-10-04 RX ADMIN — PREGABALIN 75 MG: 75 CAPSULE ORAL at 11:37

## 2018-10-04 RX ADMIN — MIDAZOLAM HYDROCHLORIDE 2 MG: 1 INJECTION, SOLUTION INTRAMUSCULAR; INTRAVENOUS at 13:37

## 2018-10-04 RX ADMIN — HYDROMORPHONE HYDROCHLORIDE 0.5 MG: 1 INJECTION, SOLUTION INTRAMUSCULAR; INTRAVENOUS; SUBCUTANEOUS at 16:15

## 2018-10-04 RX ADMIN — SCOPOLAMINE 1 PATCH: 1 PATCH, EXTENDED RELEASE TRANSDERMAL at 11:37

## 2018-10-04 RX ADMIN — SODIUM CHLORIDE 50 ML/HR: 9 INJECTION, SOLUTION INTRAVENOUS at 18:29

## 2018-10-04 RX ADMIN — FENTANYL CITRATE 100 MCG: 50 INJECTION, SOLUTION INTRAMUSCULAR; INTRAVENOUS at 13:44

## 2018-10-04 RX ADMIN — GLYCOPYRROLATE 0.4 MG: 0.2 INJECTION, SOLUTION INTRAMUSCULAR; INTRAVENOUS at 15:52

## 2018-10-04 RX ADMIN — ATRACURIUM BESYLATE 50 MG: 10 INJECTION, SOLUTION INTRAVENOUS at 13:44

## 2018-10-04 RX ADMIN — CELECOXIB 200 MG: 200 CAPSULE ORAL at 21:32

## 2018-10-04 RX ADMIN — SODIUM CHLORIDE, POTASSIUM CHLORIDE, SODIUM LACTATE AND CALCIUM CHLORIDE: 600; 310; 30; 20 INJECTION, SOLUTION INTRAVENOUS at 15:21

## 2018-10-04 RX ADMIN — ACETAMINOPHEN 1000 MG: 500 TABLET, FILM COATED ORAL at 11:37

## 2018-10-04 RX ADMIN — ACETAMINOPHEN 1000 MG: 500 TABLET, FILM COATED ORAL at 19:28

## 2018-10-04 RX ADMIN — HYDROMORPHONE HYDROCHLORIDE 0.3 MG: 1 INJECTION, SOLUTION INTRAMUSCULAR; INTRAVENOUS; SUBCUTANEOUS at 18:38

## 2018-10-04 RX ADMIN — PHENYLEPHRINE HYDROCHLORIDE 80 MCG: 10 INJECTION INTRAVENOUS at 14:42

## 2018-10-04 RX ADMIN — DEXAMETHASONE SODIUM PHOSPHATE 60 ML: 10 INJECTION, SOLUTION INTRAMUSCULAR; INTRAVENOUS at 13:47

## 2018-10-04 RX ADMIN — CEFAZOLIN SODIUM 2 G: 2 INJECTION, SOLUTION INTRAVENOUS at 21:32

## 2018-10-04 RX ADMIN — GLYCOPYRROLATE 0.2 MG: 0.2 INJECTION, SOLUTION INTRAMUSCULAR; INTRAVENOUS at 14:00

## 2018-10-04 RX ADMIN — ATORVASTATIN CALCIUM 40 MG: 40 TABLET, FILM COATED ORAL at 19:28

## 2018-10-04 RX ADMIN — PROPOFOL 150 MG: 10 INJECTION, EMULSION INTRAVENOUS at 13:44

## 2018-10-04 RX ADMIN — SODIUM CHLORIDE, POTASSIUM CHLORIDE, SODIUM LACTATE AND CALCIUM CHLORIDE 9 ML/HR: 600; 310; 30; 20 INJECTION, SOLUTION INTRAVENOUS at 11:20

## 2018-10-04 RX ADMIN — FENTANYL CITRATE: 50 INJECTION INTRAMUSCULAR; INTRAVENOUS at 16:20

## 2018-10-04 RX ADMIN — HYDROMORPHONE HYDROCHLORIDE 0.5 MG: 1 INJECTION, SOLUTION INTRAMUSCULAR; INTRAVENOUS; SUBCUTANEOUS at 17:05

## 2018-10-04 RX ADMIN — PROMETHAZINE HYDROCHLORIDE 6.25 MG: 25 INJECTION, SOLUTION INTRAMUSCULAR; INTRAVENOUS at 17:05

## 2018-10-04 RX ADMIN — ONDANSETRON 4 MG: 2 INJECTION INTRAMUSCULAR; INTRAVENOUS at 15:38

## 2018-10-04 RX ADMIN — DEXAMETHASONE SODIUM PHOSPHATE 8 MG: 4 INJECTION, SOLUTION INTRAMUSCULAR; INTRAVENOUS at 13:54

## 2018-10-04 NOTE — INTERVAL H&P NOTE
[facility] Pre-op    Full history and physical note from office is up to date.  See office note attached.    /75 (BP Location: Right arm, Patient Position: Sitting)   Pulse 63   Temp 99.2 °F (37.3 °C) (Temporal Artery )   Resp 18   SpO2 98%   Breastfeeding? No     Cancer Staging (if applicable)  Cancer Patient: _x_ yes __no __unknown__N/A; If yes, clinical stage T:_2_ N:_x_M:_x_, stage group II (See HPI/AP in office note)    Petra Elizabeth, APRN 10/4/2018 11:50 AM

## 2018-10-04 NOTE — ANESTHESIA PREPROCEDURE EVALUATION
Anesthesia Evaluation     Patient summary reviewed and Nursing notes reviewed   NPO Solid Status: > 8 hours  NPO Liquid Status: > 8 hours           Airway   Mallampati: II  TM distance: >3 FB  Neck ROM: full  No difficulty expected  Dental - normal exam     Pulmonary    (+) a smoker,   Cardiovascular     ECG reviewed    (+) valvular problems/murmurs murmur,       Neuro/Psych  GI/Hepatic/Renal/Endo      Musculoskeletal     Abdominal    Substance History      OB/GYN          Other      history of cancer                    Anesthesia Plan    ASA 2     general   ([place])  intravenous induction   Anesthetic plan, all risks, benefits, and alternatives have been provided, discussed and informed consent has been obtained with: patient.    Plan discussed with CRNA.

## 2018-10-04 NOTE — ANESTHESIA PROCEDURE NOTES
Airway  Urgency: elective    Airway not difficult    General Information and Staff    Patient location during procedure: OR    Indications and Patient Condition  Indications for airway management: airway protection    Preoxygenated: yes  MILS not maintained throughout  Mask difficulty assessment: 1 - vent by mask    Final Airway Details  Final airway type: endotracheal airway      Successful airway: ETT  Cuffed: yes   Successful intubation technique: direct laryngoscopy  Endotracheal tube insertion site: oral  Blade: Lizeth  Blade size: 3  ETT size: 7.0 mm  Cormack-Lehane Classification: grade IIb - view of arytenoids or posterior of glottis only  Placement verified by: chest auscultation and capnometry   Measured from: lips  ETT to lips (cm): 20  Number of attempts at approach: 1    Additional Comments  Negative epigastric sounds, Breath sound equal bilaterally with symmetric chest rise and fall

## 2018-10-04 NOTE — ANESTHESIA POSTPROCEDURE EVALUATION
Patient: Brittani Lambert    Procedure Summary     Date:  10/04/18 Room / Location:   [facility] 09 /  [facility]    Anesthesia Start:  1336 Anesthesia Stop:  1612    Procedure:  LEFT SKIN SPARING BREAST MASTECTOMY WITH LEFT SENTINEL NODE BIOPSY, RIGHT PROPHYLACTIC SKIN SPARING MASTECTOMY (Bilateral Breast) Diagnosis:  Malignant neoplasm of overlapping sites of left female breast    Surgeon:  Koffi Worthington MD Provider:  Patricia Valente MD    Anesthesia Type:  general ASA Status:  2          Anesthesia Type: general  Last vitals  BP   152/69   Temp   97.0   Pulse   74   Resp   16   SpO2   100     Post Anesthesia Care and Evaluation    Patient location during evaluation: PACU  Patient participation: complete - patient participated  Level of consciousness: awake and alert  Pain score: 0  Pain management: adequate  Airway patency: patent  Anesthetic complications: No anesthetic complications  PONV Status: none  Cardiovascular status: hemodynamically stable and acceptable  Respiratory status: nonlabored ventilation, acceptable and nasal cannula  Hydration status: acceptable

## 2018-10-04 NOTE — ANESTHESIA PROCEDURE NOTES
Peripheral Block      Patient location during procedure: OR  Reason for block: at surgeon's request and post-op pain management  Performed by  CRNA: ENE GEE  Preanesthetic Checklist  Completed: patient identified, site marked, surgical consent, pre-op evaluation, timeout performed, IV checked, risks and benefits discussed and monitors and equipment checked  Prep:  Sterile barriers:cap, gloves, gown and mask  Prep: ChloraPrep  Patient monitoring: blood pressure monitoring, continuous pulse oximetry and EKG  Procedure    Guidance:ultrasound guided and landmark technique  Images:still images obtained    Laterality:Bilateral  Block Type:PECS I and PECS II  Injection Technique:single-shot  Needle Type:short-bevel  Needle Gauge:20 G    Medications  Preservative Free Saline:10ml  Comment:Block Injection:  Total volume of LA divided between Right and Left sided blocks       Adjuncts:   Buprenorphine 0.30 mg ,Decadron 4 mg PSF  Local Injected:bupivacaine 0.25% Local Amount Injected:60mL  Post Assessment  Injection Assessment: negative aspiration for heme and incremental injection  Patient Tolerance:comfortable throughout block  Complications:no  Additional Notes  The pt. Was placed in the Supine Position and GA was induced     The insertion site was prepped with CHG and Ultrasound guidance with In-Plane techniquewas  a 4inch BBraun 360 degree echogenic needle was visualized.  Normal Saline PSF was  utilized for hydrodissection of tissue. PECS 1 Block- Pectoralis Major and Minor where identified and LA was injected between PMM and PmM at the level of the 3rd Rib(10ml),  PECS 2-  Pectoralis Minor and Serratus muscle where identified and the needle was advanced laterally in-plane with the 4th rib as a backstop, pleura was monitored.  LA was injected between SA and PmM at the level of 4th rib( 20ml).  LA injection spread was visualized, injection was incremental 1-5ml, normal or low injection pressure, no intravascular  injection, no pneumothorax appreciated.  Thank You.

## 2018-10-04 NOTE — OP NOTE
Operative Note    Date of Surgery:  10/4/2018    Pre-Operative Diagnosis: Left breast cancer at 12 o'clock position    Post-Operative Diagnosis: Same    Procedure:  Left sentinel lymph node injection                        Left skin sparing mastectomy                        Left deep sentinel lymph node biopsy                        Prophylactic right skin sparing mastectomy      Anesthesia:  General    Surgeon:  Koffi Worthington MD    Assistant:  MARIJA Anderson    Estimated Blood Loss:  Very minimal    Findings: Left sentinel lymph node negative for metastases    Complications: None       Indication for Procedure: Mrs. Lambert is a 58 years old lady who presented with a palpable mass at the 12 o'clock position of the left breast with biopsy showing a 2.2 cm grade 3 invasive ductal carcinoma.  She opted to proceed with the above procedure with plan to have delayed reconstruction.    Procedure: Patient was taken to the operating room by anesthesia and placed supine on the table.  Following induction of general endotracheal anesthesia, SCDs were placed.  She received 2 g of Kefzol IV.  Anesthesia placed ultrasound-guided pectoralis nerve blocks bilaterally.  524 mCi of radioactive technetium was injected in the left subareolar region.  The breasts, chest, axilla and upper arms were then prepped and draped in a sterile fashion.  Timeout was observed.  We proceeded with the prophylactic right skin sparing mastectomy first which was done via a transverse elliptical incision, encompassing the regular and nipple complex.  Superior and inferior flaps were raised dissecting the breast tissue away from the flaps down to where the muscle was exposed, maintaining adequate thickness of the flaps.  The breast was then removed off the underlying pectoralis major muscle taking the fascia along with the specimen starting medially and proceeding laterally with no difficulty.  Some of the larger vessels the vessels were  ligated with 3-0 silk suture.  The breast was removed as a whole, marked with a silk suture laterally, weighed and sent fresh to pathology.  The wound was then irrigated with water with clear return of fluid noted.  We then proceeded with the left skin sparing mastectomy which was done in a similar fashion.  Once in the axilla I identified a deep sentinel lymph node that had a very high radioactive count.  This was excised and sent for frozen section and noted to be negative for metastases.  No other radioactivity or palpable nodes were appreciated in the axilla.  No other that the mass was easily palpable and every effort was made to make sure that the anterior margin is free.  The breast was also removed, marked with a silk suture laterally, weighed and sent fresh to pathology.  The wound was then irrigated with water with clear returns were noted.  15 Kinyarwanda round Dash-Stephens drains were placed on either side and anchored to the skin with 2-0 silk suture.  5 cc of Marcaine 0.25% was injected at the site of the drains.  The subcutaneous tissues were then approximated with interrupted 3 Vicryl sutures and the skin incisions were approximated with staples.  Polysporin ointment with Covaderm was applied.  The patient tolerated procedure well with no complications.  She was extubated and taken to the recovery room in a stable condition.  Sponge count and needle count were correct at the end of the procedure.    Koffi Worthington MD  10/04/18  4:52 PM

## 2018-10-05 VITALS
WEIGHT: 113.98 LBS | HEART RATE: 57 BPM | RESPIRATION RATE: 16 BRPM | SYSTOLIC BLOOD PRESSURE: 90 MMHG | OXYGEN SATURATION: 95 % | DIASTOLIC BLOOD PRESSURE: 52 MMHG | BODY MASS INDEX: 19.46 KG/M2 | HEIGHT: 64 IN | TEMPERATURE: 98.1 F

## 2018-10-05 PROCEDURE — 25010000002 HYDROMORPHONE PER 4 MG: Performed by: SURGERY

## 2018-10-05 PROCEDURE — 25010000003 CEFAZOLIN IN DEXTROSE 2-4 GM/100ML-% SOLUTION: Performed by: SURGERY

## 2018-10-05 RX ADMIN — DIAZEPAM 2 MG: 2 TABLET ORAL at 09:54

## 2018-10-05 RX ADMIN — ACETAMINOPHEN 1000 MG: 500 TABLET, FILM COATED ORAL at 08:43

## 2018-10-05 RX ADMIN — HYDROMORPHONE HYDROCHLORIDE 0.3 MG: 1 INJECTION, SOLUTION INTRAMUSCULAR; INTRAVENOUS; SUBCUTANEOUS at 05:59

## 2018-10-05 RX ADMIN — OXYCODONE HYDROCHLORIDE 5 MG: 5 TABLET ORAL at 02:41

## 2018-10-05 RX ADMIN — CEFAZOLIN SODIUM 2 G: 2 INJECTION, SOLUTION INTRAVENOUS at 05:54

## 2018-10-05 RX ADMIN — SERTRALINE HYDROCHLORIDE 200 MG: 100 TABLET ORAL at 08:43

## 2018-10-05 RX ADMIN — ACETAMINOPHEN 1000 MG: 500 TABLET, FILM COATED ORAL at 02:41

## 2018-10-05 RX ADMIN — CELECOXIB 200 MG: 200 CAPSULE ORAL at 08:43

## 2018-10-05 NOTE — PLAN OF CARE
Problem: Patient Care Overview  Goal: Plan of Care Review  Outcome: Ongoing (interventions implemented as appropriate)   10/05/18 1019   Coping/Psychosocial   Plan of Care Reviewed With patient   Plan of Care Review   Progress improving   OTHER   Outcome Summary C/o pain throughout night. Tolerating PO medications and food. Incisions CDI, surgival bra intact. Patient to be discharged.      Goal: Individualization and Mutuality  Outcome: Ongoing (interventions implemented as appropriate)    Goal: Discharge Needs Assessment  Outcome: Ongoing (interventions implemented as appropriate)    Goal: Interprofessional Rounds/Family Conf  Outcome: Ongoing (interventions implemented as appropriate)      Problem: Breast Surgery/Reconstruction (Adult)  Goal: Signs and Symptoms of Listed Potential Problems Will be Absent, Minimized or Managed (Breast Surgery/Reconstruction)  Outcome: Outcome(s) achieved Date Met: 10/05/18    Goal: Anesthesia/Sedation Recovery  Outcome: Ongoing (interventions implemented as appropriate)      Problem: Pain, Acute (Adult)  Goal: Identify Related Risk Factors and Signs and Symptoms  Outcome: Outcome(s) achieved Date Met: 10/05/18    Goal: Acceptable Pain Control/Comfort Level  Outcome: Ongoing (interventions implemented as appropriate)

## 2018-10-05 NOTE — PLAN OF CARE
Problem: Patient Care Overview  Goal: Plan of Care Review  Outcome: Ongoing (interventions implemented as appropriate)   10/04/18 1951 10/05/18 0320   Coping/Psychosocial   Plan of Care Reviewed With patient;family --    Plan of Care Review   Progress --  improving   OTHER   Outcome Summary --  Post op mary mastectomy. Surg bra with SONIDO x 2. Coverderms in place. Voiding Jean Claude po meds for pain. SCDs.     Goal: Discharge Needs Assessment  Outcome: Ongoing (interventions implemented as appropriate)      Problem: Breast Surgery/Reconstruction (Adult)  Goal: Signs and Symptoms of Listed Potential Problems Will be Absent, Minimized or Managed (Breast Surgery/Reconstruction)  Outcome: Ongoing (interventions implemented as appropriate)      Problem: Pain, Acute (Adult)  Goal: Identify Related Risk Factors and Signs and Symptoms  Outcome: Ongoing (interventions implemented as appropriate)    Goal: Acceptable Pain Control/Comfort Level  Outcome: Ongoing (interventions implemented as appropriate)

## 2018-10-06 ENCOUNTER — NURSE TRIAGE (OUTPATIENT)
Dept: CALL CENTER | Facility: HOSPITAL | Age: 58
End: 2018-10-06

## 2018-10-06 NOTE — TELEPHONE ENCOUNTER
"Caller reports having breast cancer surgery on Thursday. She keeps repeating that she was discharged way too soon. She has bloody drainage all over dressing/bra. She states bloody drainage is coming out by drain tube and that there is a large clot in bulb drain. What should I do? She was instructed to call and speak to surgeon and if no response to go to ER for further evaluation.     Reason for Disposition  • Dressing soaked with blood or body fluid (e.g., drainage)    Additional Information  • Negative: [1] Major abdominal surgical incision AND [2] wound gaping open AND [3] visible internal organs  • Negative: Sounds like a life-threatening emergency to the triager  • Negative: [1] Bleeding from incision AND [2] won't stop after 10 minutes of direct pressure  • Negative: [1] Widespread rash AND [2] bright red, sunburn-like  • Negative: Severe pain in the incision  • Negative: [1] Suture came out early AND [2] wound gaping AND [3] < 48 hours since sutures placed  • Negative: [1] Incision gaping open AND [2] length of opening > 2 inches (5 cm)  • Negative: Patient sounds very sick or weak to the triager  • Negative: Sounds like a serious complication to the triager  • Negative: Fever > 100.5 F (38.1 C)  • Negative: [1] Incision looks infected (spreading redness, pain) AND [2] fever > 99.5 F (37.5 C)  • Negative: [1] Incision looks infected (spreading redness, pain) AND [2] large red area (> 2 in. or 5 cm)  • Negative: [1] Incision looks infected (spreading redness, pain) AND [2] face wound  • Negative: [1] Red streak runs from the incision AND [2] longer than 1 inch (2.5 cm)  • Negative: [1] Pus or bad-smelling fluid draining from incision AND [2] no fever  • Negative: [1] Post-op pain AND [2] not controlled with pain medications    Answer Assessment - Initial Assessment Questions  1. SYMPTOM: \"What's the main symptom you're concerned about?\" (e.g., redness, pain, drainage)      Drainage and pain  2. ONSET: \"When " "did ________  start?\"      Bulb now has a clot in it and is now draining around drain  3. SURGERY: \"What surgery was performed?\"      Breast cancer surgery  4. DATE of SURGERY: \"When was surgery performed?\"       10/4/2018  5. INCISION SITE: \"Where is the incision located?\"       breast  6. REDNESS: \"Is there any redness at the incision site?\" If yes, ask: \"How wide across is the redness?\" (Inches, centimeters)       no  7. PAIN: \"Is there any pain?\" If so, ask: \"How bad is it?\"  (Scale 1-10; or mild, moderate, severe)      Yes moderate/severe  8. BLEEDING: \"Is there any bleeding?\" If so, ask: \"How much?\" and \"Where?\"      Yes, clot in bulb and bleeding around drain tube  9. DRAINAGE: \"Is there any drainage from the incision site?\" If yes, ask: \"What color and how much?\" (e.g., red, cloudy, pus; drops, teaspoon)      Yes see above  10. FEVER: \"Do you have a fever?\" If so, ask: \"What is your temperature, how was it measured, and when did it start?\"        no  11. OTHER SYMPTOMS: \"Do you have any other symptoms?\" (e.g., shaking chills, weakness, rash elsewhere on body)        Pain is pretty bad and was not given any medication.    Protocols used: POST-OP INCISION SYMPTOMS-ADULT-AH      "

## 2018-10-08 ENCOUNTER — NURSE TRIAGE (OUTPATIENT)
Dept: CALL CENTER | Facility: HOSPITAL | Age: 58
End: 2018-10-08

## 2018-10-08 NOTE — TELEPHONE ENCOUNTER
Patient says she had a double mastectomy last Thursday late afternoon and was sent home Friday.  She insists she was sent home too soon.  She is having a lot of pain and they gave her nothing, she was told to take what she had at home. Medication that was prescribed by her PCP and it makes her sick she can't take it.  Her bra is wet with bloody drainage and if she takes the bandage off, the drainage runs down her leg. She is not sure if incision is reddened or swollen, she says she can't tell. She had called the nurse line over the weekend for these same complaints and was given advice. She called the MD on call but they never called her back.  She did not go into the ER as recommended.  Patient keeps insisting she was discharged too soon.  Advice given per guideline.          Reason for Disposition  • Dressing soaked with blood or body fluid (e.g., drainage)    Additional Information  • Negative: [1] Major abdominal surgical incision AND [2] wound gaping open AND [3] visible internal organs  • Negative: Sounds like a life-threatening emergency to the triager  • Negative: [1] Bleeding from incision AND [2] won't stop after 10 minutes of direct pressure  • Negative: [1] Widespread rash AND [2] bright red, sunburn-like  • Negative: Severe pain in the incision  • Negative: [1] Suture came out early AND [2] wound gaping AND [3] < 48 hours since sutures placed  • Negative: [1] Incision gaping open AND [2] length of opening > 2 inches (5 cm)  • Negative: Patient sounds very sick or weak to the triager  • Negative: Sounds like a serious complication to the triager  • Negative: Fever > 100.5 F (38.1 C)  • Negative: [1] Incision looks infected (spreading redness, pain) AND [2] fever > 99.5 F (37.5 C)  • Negative: [1] Incision looks infected (spreading redness, pain) AND [2] large red area (> 2 in. or 5 cm)  • Negative: [1] Incision looks infected (spreading redness, pain) AND [2] face wound  • Negative: [1] Red streak runs  "from the incision AND [2] longer than 1 inch (2.5 cm)  • Negative: [1] Pus or bad-smelling fluid draining from incision AND [2] no fever  • Negative: [1] Post-op pain AND [2] not controlled with pain medications    Answer Assessment - Initial Assessment Questions  1. SYMPTOM: \"What's the main symptom you're concerned about?\" (e.g., redness, pain, drainage)      Drainage and pain  2. ONSET: \"When did ________  start?\"      Friday  3. SURGERY: \"What surgery was performed?\"      Thursday  4. DATE of SURGERY: \"When was surgery performed?\"       10/04/2018  5. INCISION SITE: \"Where is the incision located?\"       Across chest  6. REDNESS: \"Is there any redness at the incision site?\" If yes, ask: \"How wide across is the redness?\" (Inches, centimeters)       Can't tell  7. PAIN: \"Is there any pain?\" If so, ask: \"How bad is it?\"  (Scale 1-10; or mild, moderate, severe)      9/10 did not give nay medication was told to talk what she had at home  8. BLEEDING: \"Is there any bleeding?\" If so, ask: \"How much?\" and \"Where?\"      Bloody drainage  9. DRAINAGE: \"Is there any drainage from the incision site?\" If yes, ask: \"What color and how much?\" (e.g., red, cloudy, pus; drops, teaspoon)      If she takes the bandage off the drainage runs down her leg  10. FEVER: \"Do you have a fever?\" If so, ask: \"What is your temperature, how was it measured, and when did it start?\"        fever  11. OTHER SYMPTOMS: \"Do you have any other symptoms?\" (e.g., shaking chills, weakness, rash elsewhere on body)        weak    Protocols used: POST-OP INCISION SYMPTOMS-ADULT-AH      "

## 2018-10-10 ENCOUNTER — TELEPHONE (OUTPATIENT)
Dept: FAMILY MEDICINE CLINIC | Facility: CLINIC | Age: 58
End: 2018-10-10

## 2018-10-10 NOTE — TELEPHONE ENCOUNTER
Called and spoke with the patient. She followed up with her surgeon today. Said that her drain tubes were plugged, which is why she was experiencing increased drainage. They removed staples and said that the surgical site looked good. She is supposed to follow up there in 1 week.   Advised her to contact me if she needed anything else.

## 2018-10-10 NOTE — TELEPHONE ENCOUNTER
Pt just wants you to call when you can. She wants to discuss her recent surgery by Dr. SEGUNDO. I informed her it would be late this evening before you get a chance to do any call-backs.

## 2018-10-10 NOTE — TELEPHONE ENCOUNTER
----- Message from Mara Mathis sent at 10/10/2018 12:26 PM EDT -----  Contact: ELOY ; PT CALL BACK   PT WOULD LIKE A CALL BACK FROM DR. LYONS WHEN SHE GETS  A CHANCE.

## 2018-10-15 ENCOUNTER — APPOINTMENT (OUTPATIENT)
Dept: CT IMAGING | Facility: HOSPITAL | Age: 58
End: 2018-10-15

## 2018-10-15 ENCOUNTER — HOSPITAL ENCOUNTER (INPATIENT)
Facility: HOSPITAL | Age: 58
LOS: 3 days | Discharge: HOME | End: 2018-10-18
Attending: INTERNAL MEDICINE | Admitting: INTERNAL MEDICINE

## 2018-10-15 ENCOUNTER — APPOINTMENT (OUTPATIENT)
Dept: GENERAL RADIOLOGY | Facility: HOSPITAL | Age: 58
End: 2018-10-15

## 2018-10-15 PROBLEM — L03.313 CELLULITIS OF CHEST WALL: Status: ACTIVE | Noted: 2018-10-15

## 2018-10-15 PROBLEM — A41.9 SEPSIS (HCC): Status: ACTIVE | Noted: 2018-10-15

## 2018-10-15 LAB
ALBUMIN SERPL-MCNC: 3.14 G/DL (ref 3.2–4.8)
ALBUMIN/GLOB SERPL: 1.9 G/DL (ref 1.5–2.5)
ALP SERPL-CCNC: 108 U/L (ref 25–100)
ALT SERPL W P-5'-P-CCNC: 96 U/L (ref 7–40)
ANION GAP SERPL CALCULATED.3IONS-SCNC: 3 MMOL/L (ref 3–11)
AST SERPL-CCNC: 107 U/L (ref 0–33)
BASOPHILS # BLD AUTO: 0.08 10*3/MM3 (ref 0–0.2)
BASOPHILS NFR BLD AUTO: 0.4 % (ref 0–1)
BILIRUB SERPL-MCNC: 0.4 MG/DL (ref 0.3–1.2)
BUN BLD-MCNC: 9 MG/DL (ref 9–23)
BUN/CREAT SERPL: 17 (ref 7–25)
CALCIUM SPEC-SCNC: 7.5 MG/DL (ref 8.7–10.4)
CHLORIDE SERPL-SCNC: 109 MMOL/L (ref 99–109)
CO2 SERPL-SCNC: 24 MMOL/L (ref 20–31)
CREAT BLD-MCNC: 0.53 MG/DL (ref 0.6–1.3)
D-LACTATE SERPL-SCNC: 1 MMOL/L (ref 0.5–2)
DEPRECATED RDW RBC AUTO: 49.1 FL (ref 37–54)
EOSINOPHIL # BLD AUTO: 0.8 10*3/MM3 (ref 0–0.3)
EOSINOPHIL NFR BLD AUTO: 4 % (ref 0–3)
ERYTHROCYTE [DISTWIDTH] IN BLOOD BY AUTOMATED COUNT: 14.6 % (ref 11.3–14.5)
FLUAV SUBTYP SPEC NAA+PROBE: NOT DETECTED
FLUBV RNA ISLT QL NAA+PROBE: NOT DETECTED
GFR SERPL CREATININE-BSD FRML MDRD: 118 ML/MIN/1.73
GLOBULIN UR ELPH-MCNC: 1.7 GM/DL
GLUCOSE BLD-MCNC: 92 MG/DL (ref 70–100)
HBA1C MFR BLD: 5.6 % (ref 4.8–5.6)
HCT VFR BLD AUTO: 30 % (ref 34.5–44)
HGB BLD-MCNC: 9.8 G/DL (ref 11.5–15.5)
IMM GRANULOCYTES # BLD: 0.05 10*3/MM3 (ref 0–0.03)
IMM GRANULOCYTES NFR BLD: 0.2 % (ref 0–0.6)
LYMPHOCYTES # BLD AUTO: 1.67 10*3/MM3 (ref 0.6–4.8)
LYMPHOCYTES NFR BLD AUTO: 8.3 % (ref 24–44)
MAGNESIUM SERPL-MCNC: 1.7 MG/DL (ref 1.3–2.7)
MCH RBC QN AUTO: 29.8 PG (ref 27–31)
MCHC RBC AUTO-ENTMCNC: 32.7 G/DL (ref 32–36)
MCV RBC AUTO: 91.2 FL (ref 80–99)
MONOCYTES # BLD AUTO: 1.41 10*3/MM3 (ref 0–1)
MONOCYTES NFR BLD AUTO: 7 % (ref 0–12)
MRSA DNA SPEC QL NAA+PROBE: NEGATIVE
NEUTROPHILS # BLD AUTO: 16.05 10*3/MM3 (ref 1.5–8.3)
NEUTROPHILS NFR BLD AUTO: 80.3 % (ref 41–71)
PHOSPHATE SERPL-MCNC: 2.3 MG/DL (ref 2.4–5.1)
PLATELET # BLD AUTO: 231 10*3/MM3 (ref 150–450)
PMV BLD AUTO: 10.3 FL (ref 6–12)
POTASSIUM BLD-SCNC: 3.6 MMOL/L (ref 3.5–5.5)
PROCALCITONIN SERPL-MCNC: 0.35 NG/ML
PROT SERPL-MCNC: 4.8 G/DL (ref 5.7–8.2)
RBC # BLD AUTO: 3.29 10*6/MM3 (ref 3.89–5.14)
SODIUM BLD-SCNC: 136 MMOL/L (ref 132–146)
TSH SERPL DL<=0.05 MIU/L-ACNC: 1.55 MIU/ML (ref 0.35–5.35)
WBC NRBC COR # BLD: 20.01 10*3/MM3 (ref 3.5–10.8)

## 2018-10-15 PROCEDURE — 87077 CULTURE AEROBIC IDENTIFY: CPT | Performed by: SURGERY

## 2018-10-15 PROCEDURE — 93010 ELECTROCARDIOGRAM REPORT: CPT | Performed by: INTERNAL MEDICINE

## 2018-10-15 PROCEDURE — 87186 SC STD MICRODIL/AGAR DIL: CPT | Performed by: SURGERY

## 2018-10-15 PROCEDURE — C1894 INTRO/SHEATH, NON-LASER: HCPCS

## 2018-10-15 PROCEDURE — 83036 HEMOGLOBIN GLYCOSYLATED A1C: CPT | Performed by: NURSE PRACTITIONER

## 2018-10-15 PROCEDURE — 0W983ZZ DRAINAGE OF CHEST WALL, PERCUTANEOUS APPROACH: ICD-10-PCS | Performed by: SURGERY

## 2018-10-15 PROCEDURE — 87147 CULTURE TYPE IMMUNOLOGIC: CPT | Performed by: SURGERY

## 2018-10-15 PROCEDURE — 84145 PROCALCITONIN (PCT): CPT | Performed by: NURSE PRACTITIONER

## 2018-10-15 PROCEDURE — 71250 CT THORAX DX C-: CPT

## 2018-10-15 PROCEDURE — 80053 COMPREHEN METABOLIC PANEL: CPT | Performed by: NURSE PRACTITIONER

## 2018-10-15 PROCEDURE — 02HV33Z INSERTION OF INFUSION DEVICE INTO SUPERIOR VENA CAVA, PERCUTANEOUS APPROACH: ICD-10-PCS | Performed by: INTERNAL MEDICINE

## 2018-10-15 PROCEDURE — C1751 CATH, INF, PER/CENT/MIDLINE: HCPCS

## 2018-10-15 PROCEDURE — 25010000002 LINEZOLID 600 MG/300ML SOLUTION: Performed by: NURSE PRACTITIONER

## 2018-10-15 PROCEDURE — 93005 ELECTROCARDIOGRAM TRACING: CPT | Performed by: NURSE PRACTITIONER

## 2018-10-15 PROCEDURE — 87205 SMEAR GRAM STAIN: CPT | Performed by: SURGERY

## 2018-10-15 PROCEDURE — 84443 ASSAY THYROID STIM HORMONE: CPT | Performed by: NURSE PRACTITIONER

## 2018-10-15 PROCEDURE — 87015 SPECIMEN INFECT AGNT CONCNTJ: CPT | Performed by: SURGERY

## 2018-10-15 PROCEDURE — 87641 MR-STAPH DNA AMP PROBE: CPT

## 2018-10-15 PROCEDURE — 84100 ASSAY OF PHOSPHORUS: CPT | Performed by: NURSE PRACTITIONER

## 2018-10-15 PROCEDURE — 83605 ASSAY OF LACTIC ACID: CPT | Performed by: NURSE PRACTITIONER

## 2018-10-15 PROCEDURE — 85025 COMPLETE CBC W/AUTO DIFF WBC: CPT | Performed by: NURSE PRACTITIONER

## 2018-10-15 PROCEDURE — 87502 INFLUENZA DNA AMP PROBE: CPT | Performed by: NURSE PRACTITIONER

## 2018-10-15 PROCEDURE — 87070 CULTURE OTHR SPECIMN AEROBIC: CPT | Performed by: SURGERY

## 2018-10-15 PROCEDURE — 25010000002 VANCOMYCIN PER 500 MG

## 2018-10-15 PROCEDURE — 25010000003 POTASSIUM CHLORIDE PER 2 MEQ: Performed by: INTERNAL MEDICINE

## 2018-10-15 PROCEDURE — 83735 ASSAY OF MAGNESIUM: CPT | Performed by: NURSE PRACTITIONER

## 2018-10-15 PROCEDURE — 99291 CRITICAL CARE FIRST HOUR: CPT | Performed by: INTERNAL MEDICINE

## 2018-10-15 PROCEDURE — 71045 X-RAY EXAM CHEST 1 VIEW: CPT

## 2018-10-15 PROCEDURE — 25010000002 AMIODARONE IN DEXTROSE 5% 360-4.14 MG/200ML-% SOLUTION: Performed by: NURSE PRACTITIONER

## 2018-10-15 PROCEDURE — 25010000002 PIPERACILLIN SOD-TAZOBACTAM PER 1 G: Performed by: NURSE PRACTITIONER

## 2018-10-15 RX ORDER — CLINDAMYCIN PHOSPHATE 600 MG/50ML
600 INJECTION INTRAVENOUS EVERY 8 HOURS
Status: DISCONTINUED | OUTPATIENT
Start: 2018-10-15 | End: 2018-10-16

## 2018-10-15 RX ORDER — MAGNESIUM SULFATE HEPTAHYDRATE 40 MG/ML
2 INJECTION, SOLUTION INTRAVENOUS AS NEEDED
Status: DISCONTINUED | OUTPATIENT
Start: 2018-10-15 | End: 2018-10-18 | Stop reason: HOSPADM

## 2018-10-15 RX ORDER — SODIUM CHLORIDE 9 MG/ML
100 INJECTION, SOLUTION INTRAVENOUS CONTINUOUS
Status: DISCONTINUED | OUTPATIENT
Start: 2018-10-15 | End: 2018-10-16

## 2018-10-15 RX ORDER — PANTOPRAZOLE SODIUM 40 MG/1
40 TABLET, DELAYED RELEASE ORAL
Status: DISCONTINUED | OUTPATIENT
Start: 2018-10-16 | End: 2018-10-18 | Stop reason: HOSPADM

## 2018-10-15 RX ORDER — HYDROCODONE BITARTRATE AND ACETAMINOPHEN 7.5; 325 MG/1; MG/1
1 TABLET ORAL EVERY 4 HOURS PRN
Status: DISCONTINUED | OUTPATIENT
Start: 2018-10-15 | End: 2018-10-18 | Stop reason: HOSPADM

## 2018-10-15 RX ORDER — SODIUM CHLORIDE 0.9 % (FLUSH) 0.9 %
3-10 SYRINGE (ML) INJECTION AS NEEDED
Status: DISCONTINUED | OUTPATIENT
Start: 2018-10-15 | End: 2018-10-18 | Stop reason: HOSPADM

## 2018-10-15 RX ORDER — ACETAMINOPHEN 650 MG/1
650 SUPPOSITORY RECTAL EVERY 4 HOURS PRN
Status: DISCONTINUED | OUTPATIENT
Start: 2018-10-15 | End: 2018-10-18 | Stop reason: HOSPADM

## 2018-10-15 RX ORDER — POTASSIUM CHLORIDE 7.45 MG/ML
10 INJECTION INTRAVENOUS
Status: DISCONTINUED | OUTPATIENT
Start: 2018-10-15 | End: 2018-10-15

## 2018-10-15 RX ORDER — POTASSIUM CHLORIDE 29.8 MG/ML
20 INJECTION INTRAVENOUS
Status: DISCONTINUED | OUTPATIENT
Start: 2018-10-15 | End: 2018-10-18 | Stop reason: HOSPADM

## 2018-10-15 RX ORDER — IPRATROPIUM BROMIDE AND ALBUTEROL SULFATE 2.5; .5 MG/3ML; MG/3ML
3 SOLUTION RESPIRATORY (INHALATION) EVERY 6 HOURS PRN
Status: DISCONTINUED | OUTPATIENT
Start: 2018-10-15 | End: 2018-10-18 | Stop reason: HOSPADM

## 2018-10-15 RX ORDER — LINEZOLID 2 MG/ML
600 INJECTION, SOLUTION INTRAVENOUS EVERY 12 HOURS
Status: DISCONTINUED | OUTPATIENT
Start: 2018-10-15 | End: 2018-10-17

## 2018-10-15 RX ORDER — SODIUM CHLORIDE 0.9 % (FLUSH) 0.9 %
3 SYRINGE (ML) INJECTION EVERY 12 HOURS SCHEDULED
Status: DISCONTINUED | OUTPATIENT
Start: 2018-10-15 | End: 2018-10-18 | Stop reason: HOSPADM

## 2018-10-15 RX ORDER — ONDANSETRON 2 MG/ML
4 INJECTION INTRAMUSCULAR; INTRAVENOUS EVERY 6 HOURS PRN
Status: DISCONTINUED | OUTPATIENT
Start: 2018-10-15 | End: 2018-10-18 | Stop reason: HOSPADM

## 2018-10-15 RX ORDER — SODIUM CHLORIDE 9 MG/ML
100 INJECTION, SOLUTION INTRAVENOUS CONTINUOUS
Status: DISCONTINUED | OUTPATIENT
Start: 2018-10-15 | End: 2018-10-15

## 2018-10-15 RX ORDER — SODIUM CHLORIDE 0.9 % (FLUSH) 0.9 %
10 SYRINGE (ML) INJECTION AS NEEDED
Status: DISCONTINUED | OUTPATIENT
Start: 2018-10-15 | End: 2018-10-18 | Stop reason: HOSPADM

## 2018-10-15 RX ORDER — PANTOPRAZOLE SODIUM 40 MG/10ML
40 INJECTION, POWDER, LYOPHILIZED, FOR SOLUTION INTRAVENOUS
Status: DISCONTINUED | OUTPATIENT
Start: 2018-10-15 | End: 2018-10-15 | Stop reason: ALTCHOICE

## 2018-10-15 RX ORDER — POTASSIUM CHLORIDE 750 MG/1
40 CAPSULE, EXTENDED RELEASE ORAL AS NEEDED
Status: DISCONTINUED | OUTPATIENT
Start: 2018-10-15 | End: 2018-10-18 | Stop reason: HOSPADM

## 2018-10-15 RX ORDER — MAGNESIUM SULFATE HEPTAHYDRATE 40 MG/ML
4 INJECTION, SOLUTION INTRAVENOUS AS NEEDED
Status: DISCONTINUED | OUTPATIENT
Start: 2018-10-15 | End: 2018-10-18 | Stop reason: HOSPADM

## 2018-10-15 RX ORDER — SODIUM CHLORIDE 0.9 % (FLUSH) 0.9 %
10 SYRINGE (ML) INJECTION EVERY 12 HOURS SCHEDULED
Status: DISCONTINUED | OUTPATIENT
Start: 2018-10-15 | End: 2018-10-18 | Stop reason: HOSPADM

## 2018-10-15 RX ORDER — POTASSIUM CHLORIDE 1.5 G/1.77G
40 POWDER, FOR SOLUTION ORAL AS NEEDED
Status: DISCONTINUED | OUTPATIENT
Start: 2018-10-15 | End: 2018-10-18 | Stop reason: HOSPADM

## 2018-10-15 RX ORDER — SERTRALINE HYDROCHLORIDE 100 MG/1
200 TABLET, FILM COATED ORAL DAILY
Status: DISCONTINUED | OUTPATIENT
Start: 2018-10-15 | End: 2018-10-17

## 2018-10-15 RX ORDER — ACETAMINOPHEN 325 MG/1
650 TABLET ORAL EVERY 4 HOURS PRN
Status: DISCONTINUED | OUTPATIENT
Start: 2018-10-15 | End: 2018-10-18 | Stop reason: HOSPADM

## 2018-10-15 RX ADMIN — SODIUM CHLORIDE 100 ML/HR: 9 INJECTION, SOLUTION INTRAVENOUS at 09:46

## 2018-10-15 RX ADMIN — LINEZOLID 600 MG: 600 INJECTION, SOLUTION INTRAVENOUS at 18:00

## 2018-10-15 RX ADMIN — AMIODARONE HYDROCHLORIDE 1 MG/MIN: 1.8 INJECTION, SOLUTION INTRAVENOUS at 21:53

## 2018-10-15 RX ADMIN — VANCOMYCIN HYDROCHLORIDE 750 MG: 750 INJECTION, SOLUTION INTRAVENOUS at 10:42

## 2018-10-15 RX ADMIN — CLINDAMYCIN PHOSPHATE 600 MG: 600 INJECTION, SOLUTION INTRAVENOUS at 18:00

## 2018-10-15 RX ADMIN — PANTOPRAZOLE SODIUM 40 MG: 40 INJECTION, POWDER, FOR SOLUTION INTRAVENOUS at 10:03

## 2018-10-15 RX ADMIN — HYDROCODONE BITARTRATE AND ACETAMINOPHEN 1 TABLET: 7.5; 325 TABLET ORAL at 15:05

## 2018-10-15 RX ADMIN — TAZOBACTAM SODIUM AND PIPERACILLIN SODIUM 3.38 G: 375; 3 INJECTION, SOLUTION INTRAVENOUS at 16:00

## 2018-10-15 RX ADMIN — SODIUM CHLORIDE 100 ML/HR: 9 INJECTION, SOLUTION INTRAVENOUS at 19:20

## 2018-10-15 RX ADMIN — TAZOBACTAM SODIUM AND PIPERACILLIN SODIUM 3.38 G: 375; 3 INJECTION, SOLUTION INTRAVENOUS at 23:21

## 2018-10-15 RX ADMIN — MAGNESIUM SULFATE HEPTAHYDRATE 4 G: 40 INJECTION, SOLUTION INTRAVENOUS at 21:37

## 2018-10-15 RX ADMIN — POTASSIUM CHLORIDE 20 MEQ: 29.8 INJECTION, SOLUTION INTRAVENOUS at 21:42

## 2018-10-15 RX ADMIN — HYDROCODONE BITARTRATE AND ACETAMINOPHEN 1 TABLET: 7.5; 325 TABLET ORAL at 23:21

## 2018-10-15 RX ADMIN — SODIUM CHLORIDE 1000 ML: 9 INJECTION, SOLUTION INTRAVENOUS at 08:30

## 2018-10-15 RX ADMIN — HYDROCODONE BITARTRATE AND ACETAMINOPHEN 1 TABLET: 7.5; 325 TABLET ORAL at 19:18

## 2018-10-15 RX ADMIN — HYDROCODONE BITARTRATE AND ACETAMINOPHEN 1 TABLET: 7.5; 325 TABLET ORAL at 10:03

## 2018-10-15 RX ADMIN — POTASSIUM CHLORIDE 20 MEQ: 29.8 INJECTION, SOLUTION INTRAVENOUS at 23:21

## 2018-10-15 RX ADMIN — SERTRALINE HYDROCHLORIDE 200 MG: 100 TABLET ORAL at 11:59

## 2018-10-15 NOTE — PROGRESS NOTES
Clinical Nutrition   Reason For Visit: MDR, Identified at risk by screening criteria    Patient Name: Brittani Lambert  YOB: 1960  MRN: [id number]  Date of Encounter: 10/15/18 2:58 PM  Admission date: 10/15/2018        Nutrition Assessment     Admission Problem List:    r/o Sepsis    s/p recent bilateral mastectomies 10-4-18      PMH: She  has a past medical history of Anxiety; Arthritis; Breast CA (CMS/HCC) (10/4/2018); Cancer (CMS/HCC); Depression; Heart murmur; and Ovarian cancer (CMS/HCC) (1989).   PSxH: She  has a past surgical history that includes Appendectomy; Breast biopsy (Right, 2003); Hysterectomy; Oophorectomy; Colonoscopy; and Mastectomy w/ sentinel node biopsy (Bilateral, 10/4/2018).        Reported/Observed/Food/Nutrition Related History     Pt resting in bed, on nasal cannula, reports she is starving    Per MD: ok to advance diet      Anthropometrics   Height: 64in  Weight: 113lb standing scale 10-1-18  BMI: 19.4  BMI classification: Normal: 18.5-24.9kg/m2     Weight change: pt reports weight loss of 12 lb's in January, has regained 6lb since then            GI: wnl    SKIN: 2 SONIDO drains, R. chest wall -cellulitis      Labs reviewed   Labs reviewed: Yes    Results from last 7 days  Lab Units 10/15/18  1229   SODIUM mmol/L 136   POTASSIUM mmol/L 3.6   CHLORIDE mmol/L 109   CO2 mmol/L 24.0   BUN mg/dL 9   CREATININE mg/dL 0.53*   GLUCOSE mg/dL 92   CALCIUM mg/dL 7.5*   PHOSPHORUS mg/dL 2.3*   MAGNESIUM mg/dL 1.7       Results from last 7 days  Lab Units 10/15/18  1229   WBC 10*3/mm3 20.01*   ALBUMIN g/dL 3.14*           Lab Results  Lab Value Date/Time   HGBA1C 5.60 10/15/2018 1229     Medications reviewed   Medications reviewed: Yes  Pertinent: abx, protonix, NS @100ml    Intake/Ouptut 24 hrs (7:00AM - 6:59 AM)     Intake & Output (last day)       10/14 0701 - 10/15 0700 10/15 0701 - 10/16 0700    I.V.  1013    Total Intake   1013    Urine  1525    Drains  35    Total Output   1560     Net   -547                Current Nutrition Prescription   PO: NPO      Nutrition Diagnosis     Problem Inadequate oral intake   Etiology Per Diet Order   Signs/Symptoms NPO                       Intervention   Intervention: Follow treatment progress, Advised available snacks, Menu provided, Menu adjusted    Regular diet ordered    Goal:   General: Nutrition support treatment  PO: Establish PO      Monitoring/Evaluation:       Monitoring/Evaluation: Per protocol  Petra Serrano RD  Time Spent: 30min

## 2018-10-15 NOTE — H&P
Critical Care History & Physical    Patient name: Brittani Lambert  CSN: [id number]  MRN: [id number]  : 1960  Today's date: 10/15/2018    Primary Care Physician: Alla Colon DO    Chief complaint:  Fever, body aches and chills    HPI: Mrs. Lambert is a 57 y/o WF who was transferred here from [facility] due to sepsis and fever.  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 SEGUNDO 10/4/18.  She still has her SONIDO drains. She stated that the right SONIDO has had increased clear yellow-orange drainage.  Over the last two weeks, she was doing ok and only trying to manage post op pain.  Yesterday evening, she started to have body aches, chills, malaise and subjective fever.  She also stated that her right chest wall become reddened with increased pain yesterday too.  The fever concerned her and she went to the ED in [place].  She had a temp 103.2, P 117, RR 24, 112/58 and 97% on RA.  She had a WBC 20, Hgb 12.4, HCT 38.7, plts 358, BUN 15, Cr 1.0, LA 2.8 with repeat 1.5,   UA had hazy urine with 0-2 WBC, 3+ bacteria + nitrates.   She was given 1 gm Vanc, 3.375 gms Zosyn, 30 mg Toradol, total 8 mg Morphine, 4 mg Zofran and 2L NS bolus.  She also placed on a levophed drip as her SBP has trended downward.      Upon arrival to the intensive care unit at our facility, her blood pressure has been maintained at approximately 90 without pressors. She is complaining of severe pain primarily in her right breast as well as total body aches.    PMH:   Past Medical History:   Diagnosis Date   • Anxiety    • Arthritis    • Breast CA (CMS/HCC) 10/4/2018   • Cancer (CMS/HCC)    • Depression    • Heart murmur    • Ovarian cancer (CMS/HCC)      PSH:   Past Surgical History:   Procedure Laterality Date   • APPENDECTOMY     • BREAST BIOPSY Right      DONE IN [place]   • COLONOSCOPY     • HYSTERECTOMY      AGE 29   • MASTECTOMY W/ SENTINEL NODE BIOPSY Bilateral 10/4/2018    Procedure: LEFT SKIN SPARING BREAST MASTECTOMY WITH LEFT SENTINEL NODE BIOPSY, RIGHT PROPHYLACTIC SKIN SPARING MASTECTOMY;  Surgeon: Koffi Worthington MD;  Location: [facility];  Service: General   • OOPHORECTOMY      AGE 29     PFH:   Family History   Problem Relation Age of Onset   • Arthritis Mother    • Heart attack Mother    • Osteoporosis Mother    • Liver disease Father    • Cancer Maternal Grandmother    • Celiac disease Other    • Breast cancer Neg Hx    • Ovarian cancer Neg Hx      SH:   Social History     Social History   • Marital status:      Social History Main Topics   • Smoking status: Former Smoker   • Smokeless tobacco: Never Used      Comment: quit 10/3/2018   • Alcohol use No   • Drug use: No   • Sexual activity: Defer     Other Topics Concern   • Not on file     Allergies:   No Known Allergies    Code Status:  Code Status and Medical Interventions:   Ordered at: 10/15/18 0837     Code Status:    CPR     Medical Interventions (Level of Support Prior to Arrest):    Full       Home Medications:  Prescriptions Prior to Admission   Medication Sig Dispense Refill Last Dose   • ALPRAZolam (XANAX) 0.5 MG tablet Take 1 tablet by mouth 3 (Three) Times a Day As Needed for Anxiety or Sleep. 60 tablet 2 10/3/2018 at 1400   • atorvastatin (LIPITOR) 40 MG tablet Take 1 tablet by mouth Daily. (Patient not taking: Reported on 10/15/2018) 30 tablet 5 Not Taking at Unknown time   • HYDROcodone-acetaminophen (NORCO) 7.5-325 MG per tablet Take 1 tablet by mouth Every 6 (Six) Hours As Needed for Moderate Pain . 60 tablet 0 10/4/2018 at 0800   • sertraline (ZOLOFT) 100 MG tablet Take 2 tablets by mouth Daily. 60 tablet 2 10/4/2018 at 0800     Review of Systems  Negative systems except for what is noted in HPI     Vital Signs:  Blood pressure 110/60, pulse 73, temperature 98.4 °F (36.9  °C), temperature source Oral, resp. rate 20, SpO2 94 %, not currently breastfeeding.    Physical Exam:  Constitutional:  Appears well-developed and well-nourished. No distress.   HEENT:  Normocephalic and atraumatic. PERRL  Neck:  Neck supple. No JVD present.   CV: Normal rate, regular rhythm, intact distal pulses.  No gallop, murmur or rub  Pulmonary/Chest: Effort normal and breath sounds normal. No respiratory distress. No wheezes, rhonchi or rales.  Reddened, swollen, painful right anterior chest wall.  Bilateral mastectomy incisions are approximated wo drainage. Bilateral drains in place   Abdominal: Soft. +BS.  No distension and no mass. There is no tenderness.   Neurological: Alert and oriented to person, place, and time.  No focal deficits  Skin: Skin is warm and dry. No rash noted.   Extremities:  No clubbing, edema or cyanosis; moving all extremities equally   Psychiatric: Agitated and tearful    Labs:  Above outside laboratory evaluation in the HPI    Imaging:    Assessment:  Active Hospital Problems    Diagnosis   • Sepsis (CMS/HCC)   • Rt Cellulitis of chest wall   • Lt Breast CA (CMS/HCC)     S/p Left sentinel lymph node injection, Left skin sparing mastectomy, Left deep sentinel lymph node biopsy, Prophylactic right skin sparing mastectomy by Dr. SEGUNDO 10/4/18  Pathology - High grade ductal adenocarcinoma with basaloid features, T2 N0, clean margins, no LN metastasis     • Tobacco abuse   • Anxiety     Plan:   ICU admit  CT chest w/o contrast to evaluate for abscess  Continue Vanc and Zosyn  Stat labs  IVF Resuscitation  Resume levophed drip if needed to keep SBP >90  Consult Dr. SANYA Webb  PICC  Influenza A/B PCR    Florecita Abraham, APRN, AGACN-BC, FNP-BC  Pulmonary & Critical Care Medicine    I discussed the patient's care with Mrs. Parrish and I have completed my own physical examination and review the patient's chart. I have altered the note above to reflect this. The patient  remains critically ill with severe sepsis and at the very least is suffering from some cellulitis. She will remain in the intensive care unit for close monitoring and we will add back pressors as necessary.    Yuval Addison MD      Time: Critical care 37 min

## 2018-10-15 NOTE — NURSING NOTE
"  ACC REVIEW REPORT: [facility]        PATIENT NAME: Brittani Lambert    PATIENT ID: [id number]    BED:[id number]    BED TYPE: ICU    BED GIVEN TO: IBIS BARBOUR RN    TIME BED GIVEN: 0630    YOB: 1960    AGE: 58    GENDER: FEMALE    PREVIOUS ADMIT TO [facility]:     PREVIOUS ADMISSION DATE:     PATIENT CLASS:     TODAY'S DATE: 10/15/2018    TRANSFER DATE: 10/15/18    ETA: WILL CALL EMS    TRANSFERRING FACILITY: [phone]    TRANSFERRING FACILITY PHONE # : DENISA LIRA MD:     DATE/TIME REQUEST RECEIVED: 10/114/18 @ 0003    [facility] RN: CHINEDU GREGORY RN    REPORT FROM: IBIS BARBOUR RN    TIME REPORT TAKEN: 0630    DIAGNOSIS: SEPSIS, S/P DOUBLE MASTECTOMY     REASON FOR TRANSFER TO [facility]: HIGHER LEVEL OF CARE    TRANSPORTATION: LOCAL EMS    CLINICAL REASON FOR TRANSFER TO [facility]: PATIENT IS S/P MASTECTOMY 2 WEEKS AGO, NOW SEPTIC. WBC 20.5, TEMP 103.2 . ARRIVAL /58, 97% ON ROOM AIR    PRESSURE DROPPED TO 68/39  NOW ON LEVOPHED GTT, LAST PRESSURE 116/55  ALERT AND ORIENTED    SONIDO DRAINS STILL INTACT, SCANT DRAINAGE.     INCISION LOOKS OK PER REPORT        CLINICAL INFORMATION    WEIGHT: 53 KG    ALLERGIES: NKDA    SHIN: NO    INFECTIOUS DISEASE: NO    ISOLATION: NO    1ST VITAL SIGNS:   TIME: ARRIVAL  TEMP: 103.2  PULSE: 117  B/P: 112/58  RESP: 18  97% ON ROOM AIR    LAST VITAL SIGNS:  TIME:   TEMP: 98.2  PULSE: 60'S  B/P: 116/55  RESP: 18  98% ON 2 LITERS    LAB INFORMATION: WBC 20.5, LACTIC 2.8 AND 1.5, URINE 3+ BACTERIA, + NITRATES         MEDS/IV FLUIDS: # 20 RA AND # 20 RA, LEVOPHED GTT , 3 LITERS NORMAL SALINE, VANC, ZOSYN, TORADOL, ZOFRAN, MORPHINE       CARDIAC SYSTEM:    CHEST PAIN: DID HAVE A EPISODE OF \"CHEST PRESSURE\" DECRIBED IT MORE OF A SKIN PAIN, TOPICAL CREAM PLACED ON CHEST .    *    Is patient taking or has patient been given any drugs that could increase bleeding?  NONE REPORTED       RESPIRATORY SYSTEM:    LUNG SOUNDS: CLEAR      CNS/MUSCULOSKELETAL    ALERT AND " ORIENTED: YES      GI//GY      ABDOMINAL PAIN: NONE REPORTED       PAST MEDICAL HISTORY:  BREAST CANCER, DOUBLE MASTECTOMY,HYSTERECTOMY, ANXIETY           Devora Jerez RN  10/15/2018  6:38 AM

## 2018-10-15 NOTE — CONSULTS
5FR PICC placed by TUAN Castrejon RN [facility] 38cm with 0cm exposed in the right arm, tip verified by 3CG.

## 2018-10-15 NOTE — PROGRESS NOTES
Pharmacy Consult-Vancomycin Dosing  Brittani Lambert is a  58 y.o. female receiving vancomycin therapy status post bilateral mastectomies and left sentinel lymph biopsy on 10/4/2018 secondary to stage II left breast cancer.      Indication: sepsis  Consulting Provider: Intensivist  ID Consult: no  Goal Trough: 15-20 until source identified    Current Antimicrobial Therapy  Anti-Infectives       Ordered     Dose/Rate Route Frequency Start Stop    10/15/18 0903  piperacillin-tazobactam (ZOSYN) 3.375 g in iso-osmotic dextrose 50 ml (premix)     Comments:  Last dose last night at 2300 at [facility]   Ordering Provider:  Florecita Chavez APRN    3.375 g  over 4 Hours Intravenous Every 8 Hours 10/15/18 1600 10/22/18 1559    10/15/18 1034  vancomycin in dextrose 5% 150 mL (VANCOCIN) IVPB 750 mg     Ordering Provider:  Bhargavi Muñoz [facility]    15 mg/kg × 51.7 kg  over 60 Minutes Intravenous Every 12 Hours Scheduled 10/15/18 1100 10/22/18 1059    10/15/18 0903  piperacillin-tazobactam (ZOSYN) 3.375 g in iso-osmotic dextrose 50 ml (premix)     Ordering Provider:  Florecita Chavez APRN    3.375 g  over 30 Minutes Intravenous Once 10/15/18 1000 10/15/18 1112    10/15/18 0904  Pharmacy to dose vancomycin     Ordering Provider:  Florecita Chavez APRN     Does not apply Continuous PRN 10/15/18 0903 10/29/18 0902          Allergies  Allergies as of 10/15/2018   • (No Known Allergies)     Labs  Results from last 7 days   Lab Units 10/15/18  1229   BUN mg/dL 9   CREATININE mg/dL 0.53*   Serum creatinine: 0.53 mg/dL (L) 10/15/18 1229  Estimated creatinine clearance: 94.4 mL/min (A)  Results from last 7 days   Lab Units 10/15/18  1229   WBC 10*3/mm3 20.01*     Evaluation of Dosing     Last Dose Received in the ED/Outside Facility: at OSH vancomycin 1 gm IV 10/14 at 2157    Ht - 162.6 cm   Wt - 51.7 kg  No intake/output data recorded.    Microbiology and Radiology    Note: urine and  blood cultures at OSH, follow up  Body culture 10/15    Evaluation of Level  Vanc trough 10/16 at 10 am    Assessment/Plan:    1. Recommend vancomycin 15 mg/kg (750 mg) IV q12h     2. Vancomycin trough prior to 4th dose  3. MRSA PCR ordered  4. Pharmacy will continue to follow and adjust dose based on renal function, drug levels, and clinical status.    Thank you for consult,    Bhargavi Muñoz, PharmD

## 2018-10-15 NOTE — PROGRESS NOTES
Brittani Prateryunior  1960  [id number]    Surgery Progress Note    Date of visit: 10/15/2018    Subjective: Patient is well known to me status post bilateral mastectomies and left sentinel lymph biopsy on 10/4/2018 secondary to stage II left breast cancer  Patient was admitted last night with concern about sepsis fever and hypotension.  She did not require any pressors and has responded to IV fluid hydration with no fever this morning  Source most likely right chest wall cellulitis  Patient has had problems with the drains last week.  She was seen in the office on Wednesday and at the drains declogged.  Apparently she was doing well up until last night  She has been complaining of right chest wall drain site pain  She had very minimal drainage from the Dash-Stephens on the right    Objective:    /60   Pulse 73   Temp 98.4 °F (36.9 °C) (Oral)   Resp 20   SpO2 94%     Intake/Output Summary (Last 24 hours) at 10/15/18 0913  Last data filed at 10/15/18 0808   Gross per 24 hour   Intake                0 ml   Output               30 ml   Net              -30 ml         L: normal air entry  BREAST: Bilateral mastectomy incisions are intact  Swelling is noted on the right side with cellulitis of the chest wall  I aspirated 60 mL of seroma and some bloody drainage  Drainage is sent for culture  Drain stripped        LABS:              Invalid input(s): LABALBU, PROT      No results found for: LIPASE      Assessment/ Plan: Patient admitted with sepsis and concern about cellulitis of the right chest wall status post bilateral mastectomies and left sentinel biopsy secondary to left breast cancer on 10/4/2018  Continue with IV antibiotics and await culture results  Continue to monitor closely  Patient has a history of chronic narcotics use  Control pain    Problem List Items Addressed This Visit     None            Koffi Worthington MD  10/15/2018  9:13 AM

## 2018-10-15 NOTE — PLAN OF CARE
Problem: Patient Care Overview  Goal: Plan of Care Review  Outcome: Ongoing (interventions implemented as appropriate)   10/15/18 0967   Coping/Psychosocial   Plan of Care Reviewed With patient   Plan of Care Review   Progress improving   OTHER   Outcome Summary Pt came to [facility] from [facility] around 0800 this morning for sepsis. On 10/4 pt has bilateral mastectomy; last night she developed a fever and chills and went to ED. She presented to [facility] hypotensive on levophed. Turned levo off, gave 1L fluid bolus and started fluids @ 100/hr. Both breasts have a SONIDO drain; the R drain quit draining. R breast warm and red. Dr. SEGUNDO came in this morning and aspirated about 60cc of fluid off of R breast (fluid sent for culture). Pts pain semi controlled with norco PRN q4. VSS, BP labile and will go as low as 80s. UOP adequate and pt able to eat regular diet.      Goal: Individualization and Mutuality  Outcome: Ongoing (interventions implemented as appropriate)    Goal: Discharge Needs Assessment  Outcome: Ongoing (interventions implemented as appropriate)    Goal: Interprofessional Rounds/Family Conf  Outcome: Ongoing (interventions implemented as appropriate)      Problem: Skin Injury Risk (Adult)  Goal: Identify Related Risk Factors and Signs and Symptoms  Outcome: Ongoing (interventions implemented as appropriate)    Goal: Skin Health and Integrity  Outcome: Ongoing (interventions implemented as appropriate)      Problem: Pain, Acute (Adult)  Goal: Identify Related Risk Factors and Signs and Symptoms  Outcome: Ongoing (interventions implemented as appropriate)    Goal: Acceptable Pain Control/Comfort Level  Outcome: Ongoing (interventions implemented as appropriate)      Problem: Sepsis/Septic Shock (Adult)  Goal: Signs and Symptoms of Listed Potential Problems Will be Absent, Minimized or Managed (Sepsis/Septic Shock)  Outcome: Ongoing (interventions implemented as appropriate)      Problem: Breast  Surgery/Reconstruction (Adult)  Goal: Anesthesia/Sedation Recovery  Outcome: Outcome(s) achieved Date Met: 10/15/18

## 2018-10-16 ENCOUNTER — APPOINTMENT (OUTPATIENT)
Dept: CARDIOLOGY | Facility: HOSPITAL | Age: 58
End: 2018-10-16
Attending: INTERNAL MEDICINE

## 2018-10-16 PROBLEM — N30.00 ACUTE CYSTITIS WITHOUT HEMATURIA: Status: ACTIVE | Noted: 2018-10-16

## 2018-10-16 PROBLEM — I48.91 ATRIAL FIBRILLATION (HCC): Status: ACTIVE | Noted: 2018-10-16

## 2018-10-16 LAB
ANION GAP SERPL CALCULATED.3IONS-SCNC: 1 MMOL/L (ref 3–11)
BUN BLD-MCNC: 8 MG/DL (ref 9–23)
BUN/CREAT SERPL: 13.3 (ref 7–25)
CALCIUM SPEC-SCNC: 7.4 MG/DL (ref 8.7–10.4)
CHLORIDE SERPL-SCNC: 110 MMOL/L (ref 99–109)
CO2 SERPL-SCNC: 22 MMOL/L (ref 20–31)
CREAT BLD-MCNC: 0.6 MG/DL (ref 0.6–1.3)
DEPRECATED RDW RBC AUTO: 49 FL (ref 37–54)
ERYTHROCYTE [DISTWIDTH] IN BLOOD BY AUTOMATED COUNT: 14.3 % (ref 11.3–14.5)
GFR SERPL CREATININE-BSD FRML MDRD: 103 ML/MIN/1.73
GLUCOSE BLD-MCNC: 100 MG/DL (ref 70–100)
HCT VFR BLD AUTO: 29.6 % (ref 34.5–44)
HGB BLD-MCNC: 9.6 G/DL (ref 11.5–15.5)
MAGNESIUM SERPL-MCNC: 2.9 MG/DL (ref 1.3–2.7)
MCH RBC QN AUTO: 29.9 PG (ref 27–31)
MCHC RBC AUTO-ENTMCNC: 32.4 G/DL (ref 32–36)
MCV RBC AUTO: 92.2 FL (ref 80–99)
PHOSPHATE SERPL-MCNC: 2.7 MG/DL (ref 2.4–5.1)
PLATELET # BLD AUTO: 215 10*3/MM3 (ref 150–450)
PMV BLD AUTO: 10.2 FL (ref 6–12)
POTASSIUM BLD-SCNC: 4.3 MMOL/L (ref 3.5–5.5)
RBC # BLD AUTO: 3.21 10*6/MM3 (ref 3.89–5.14)
SODIUM BLD-SCNC: 133 MMOL/L (ref 132–146)
WBC NRBC COR # BLD: 18.32 10*3/MM3 (ref 3.5–10.8)

## 2018-10-16 PROCEDURE — 99291 CRITICAL CARE FIRST HOUR: CPT | Performed by: INTERNAL MEDICINE

## 2018-10-16 PROCEDURE — 83735 ASSAY OF MAGNESIUM: CPT | Performed by: NURSE PRACTITIONER

## 2018-10-16 PROCEDURE — 85027 COMPLETE CBC AUTOMATED: CPT | Performed by: NURSE PRACTITIONER

## 2018-10-16 PROCEDURE — 80048 BASIC METABOLIC PNL TOTAL CA: CPT | Performed by: NURSE PRACTITIONER

## 2018-10-16 PROCEDURE — 84100 ASSAY OF PHOSPHORUS: CPT | Performed by: NURSE PRACTITIONER

## 2018-10-16 PROCEDURE — 25010000002 CEFTRIAXONE PER 250 MG: Performed by: INTERNAL MEDICINE

## 2018-10-16 PROCEDURE — 25010000002 ALBUMIN HUMAN 5% PER 50 ML: Performed by: INTERNAL MEDICINE

## 2018-10-16 PROCEDURE — 25010000002 PIPERACILLIN SOD-TAZOBACTAM PER 1 G: Performed by: NURSE PRACTITIONER

## 2018-10-16 PROCEDURE — 93306 TTE W/DOPPLER COMPLETE: CPT

## 2018-10-16 PROCEDURE — 25010000002 HEPARIN (PORCINE) PER 1000 UNITS: Performed by: INTERNAL MEDICINE

## 2018-10-16 PROCEDURE — 25010000002 LINEZOLID 600 MG/300ML SOLUTION: Performed by: NURSE PRACTITIONER

## 2018-10-16 PROCEDURE — P9041 ALBUMIN (HUMAN),5%, 50ML: HCPCS | Performed by: INTERNAL MEDICINE

## 2018-10-16 PROCEDURE — 25010000002 AMIODARONE IN DEXTROSE 5% 360-4.14 MG/200ML-% SOLUTION: Performed by: NURSE PRACTITIONER

## 2018-10-16 PROCEDURE — 93306 TTE W/DOPPLER COMPLETE: CPT | Performed by: INTERNAL MEDICINE

## 2018-10-16 RX ORDER — SODIUM CHLORIDE, SODIUM LACTATE, POTASSIUM CHLORIDE, CALCIUM CHLORIDE 600; 310; 30; 20 MG/100ML; MG/100ML; MG/100ML; MG/100ML
75 INJECTION, SOLUTION INTRAVENOUS CONTINUOUS
Status: DISCONTINUED | OUTPATIENT
Start: 2018-10-16 | End: 2018-10-17

## 2018-10-16 RX ORDER — CEFTRIAXONE SODIUM 1 G/50ML
1 INJECTION, SOLUTION INTRAVENOUS EVERY 24 HOURS
Status: DISCONTINUED | OUTPATIENT
Start: 2018-10-16 | End: 2018-10-18 | Stop reason: HOSPADM

## 2018-10-16 RX ORDER — ALBUMIN, HUMAN INJ 5% 5 %
500 SOLUTION INTRAVENOUS ONCE
Status: COMPLETED | OUTPATIENT
Start: 2018-10-16 | End: 2018-10-16

## 2018-10-16 RX ORDER — HEPARIN SODIUM 5000 [USP'U]/ML
5000 INJECTION, SOLUTION INTRAVENOUS; SUBCUTANEOUS EVERY 8 HOURS SCHEDULED
Status: DISCONTINUED | OUTPATIENT
Start: 2018-10-16 | End: 2018-10-18 | Stop reason: HOSPADM

## 2018-10-16 RX ADMIN — CLINDAMYCIN PHOSPHATE 600 MG: 600 INJECTION, SOLUTION INTRAVENOUS at 02:08

## 2018-10-16 RX ADMIN — LINEZOLID 600 MG: 600 INJECTION, SOLUTION INTRAVENOUS at 18:30

## 2018-10-16 RX ADMIN — HYDROCODONE BITARTRATE AND ACETAMINOPHEN 1 TABLET: 7.5; 325 TABLET ORAL at 12:23

## 2018-10-16 RX ADMIN — SODIUM CHLORIDE, POTASSIUM CHLORIDE, SODIUM LACTATE AND CALCIUM CHLORIDE 75 ML/HR: 600; 310; 30; 20 INJECTION, SOLUTION INTRAVENOUS at 14:42

## 2018-10-16 RX ADMIN — HYDROCODONE BITARTRATE AND ACETAMINOPHEN 1 TABLET: 7.5; 325 TABLET ORAL at 03:40

## 2018-10-16 RX ADMIN — HEPARIN SODIUM 5000 UNITS: 5000 INJECTION, SOLUTION INTRAVENOUS; SUBCUTANEOUS at 11:17

## 2018-10-16 RX ADMIN — SERTRALINE HYDROCHLORIDE 200 MG: 100 TABLET ORAL at 08:04

## 2018-10-16 RX ADMIN — LINEZOLID 600 MG: 600 INJECTION, SOLUTION INTRAVENOUS at 06:07

## 2018-10-16 RX ADMIN — Medication 10 ML: at 08:12

## 2018-10-16 RX ADMIN — ALBUMIN HUMAN 500 ML: 0.05 INJECTION, SOLUTION INTRAVENOUS at 10:47

## 2018-10-16 RX ADMIN — HEPARIN SODIUM 5000 UNITS: 5000 INJECTION, SOLUTION INTRAVENOUS; SUBCUTANEOUS at 23:13

## 2018-10-16 RX ADMIN — AMIODARONE HYDROCHLORIDE 0.5 MG/MIN: 1.8 INJECTION, SOLUTION INTRAVENOUS at 03:39

## 2018-10-16 RX ADMIN — Medication 3 ML: at 21:00

## 2018-10-16 RX ADMIN — Medication 3 ML: at 08:12

## 2018-10-16 RX ADMIN — CEFTRIAXONE SODIUM 1 G: 1 INJECTION, SOLUTION INTRAVENOUS at 18:30

## 2018-10-16 RX ADMIN — PANTOPRAZOLE SODIUM 40 MG: 40 TABLET, DELAYED RELEASE ORAL at 06:07

## 2018-10-16 RX ADMIN — Medication 10 ML: at 21:00

## 2018-10-16 RX ADMIN — HYDROCODONE BITARTRATE AND ACETAMINOPHEN 1 TABLET: 7.5; 325 TABLET ORAL at 19:35

## 2018-10-16 RX ADMIN — HYDROCODONE BITARTRATE AND ACETAMINOPHEN 1 TABLET: 7.5; 325 TABLET ORAL at 23:22

## 2018-10-16 RX ADMIN — HYDROCODONE BITARTRATE AND ACETAMINOPHEN 1 TABLET: 7.5; 325 TABLET ORAL at 08:03

## 2018-10-16 RX ADMIN — TAZOBACTAM SODIUM AND PIPERACILLIN SODIUM 3.38 G: 375; 3 INJECTION, SOLUTION INTRAVENOUS at 09:38

## 2018-10-16 NOTE — PROGRESS NOTES
Clinical Nutrition     Multidisciplinary Rounds    Time: 20min  Patient Name: Brittani Lambert  Date of Encounter: 10/16/18 10:19 AM  MRN: [id number]  Admission date: 10/15/2018    JOS SILVA to continue to follow per protocol.     Current diet: Diet Regular    Intervention:  Follow treatment plan  Care plan reviewed    Follow up:   Per protocol      Petra Serrano RD  10:19 AM

## 2018-10-16 NOTE — PROGRESS NOTES
Brittani Petra  1960  [id number]    Surgery Progress Note    Date of visit: 10/16/2018    Subjective: Feels better today  Right chest wall pain is much better since the Dash-Stephens drain has been working    Objective:    /59   Pulse 63   Temp 98 °F (36.7 °C) (Oral)   Resp 18   SpO2 93%     Intake/Output Summary (Last 24 hours) at 10/16/18 1720  Last data filed at 10/16/18 1448   Gross per 24 hour   Intake             2647 ml   Output             3610 ml   Net             -963 ml         L: normal air entry  BREAST: Bilateral mastectomy incisions are intact and healing well  Very minimal erythema noted on the right  Dash-Stephens drains with adequate serosanguineous drainage        LABS:      Results from last 7 days  Lab Units 10/16/18  0405   WBC 10*3/mm3 18.32*   HEMOGLOBIN g/dL 9.6*   HEMATOCRIT % 29.6*   PLATELETS 10*3/mm3 215       Results from last 7 days  Lab Units 10/16/18  0405 10/15/18  1229   SODIUM mmol/L 133 136   POTASSIUM mmol/L 4.3 3.6   CHLORIDE mmol/L 110* 109   CO2 mmol/L 22.0 24.0   BUN mg/dL 8* 9   CREATININE mg/dL 0.60 0.53*   CALCIUM mg/dL 7.4* 7.5*   BILIRUBIN mg/dL  --  0.4   ALK PHOS U/L  --  108*   ALT (SGPT) U/L  --  96*   AST (SGOT) U/L  --  107*   GLUCOSE mg/dL 100 92       Results from last 7 days  Lab Units 10/16/18  0405   SODIUM mmol/L 133   POTASSIUM mmol/L 4.3   CHLORIDE mmol/L 110*   CO2 mmol/L 22.0   BUN mg/dL 8*   CREATININE mg/dL 0.60   GLUCOSE mg/dL 100   CALCIUM mg/dL 7.4*     No results found for: LIPASE      Assessment/ Plan: Stable course  Responding to IV antibiotics, right mastectomy incision cellulitis resolving   Culture showed light growth of staph aureus  Continue with the current management    Problem List Items Addressed This Visit     None            Koffi Worthington MD  10/16/2018  5:20 PM

## 2018-10-16 NOTE — PROGRESS NOTES
Discharge Planning Assessment  [facility]     Patient Name: Brittani Lambert  MRN: [id number]  Today's Date: 10/16/2018    Admit Date: 10/15/2018          Discharge Needs Assessment     Row Name 10/16/18 1101       Living Environment    Lives With significant other    Current Living Arrangements home/apartment/condo    Primary Care Provided by self    Provides Primary Care For no one    Family Caregiver if Needed significant other    Quality of Family Relationships supportive    Able to Return to Prior Arrangements yes       Resource/Environmental Concerns    Resource/Environmental Concerns none    Transportation Concerns car, none       Transition Planning    Patient/Family Anticipates Transition to home    Patient/Family Anticipated Services at Transition none    Transportation Anticipated family or friend will provide       Discharge Needs Assessment    Readmission Within the Last 30 Days no previous admission in last 30 days    Concerns to be Addressed denies needs/concerns at this time    Equipment Currently Used at Home none    Anticipated Changes Related to Illness none    Equipment Needed After Discharge none            Discharge Plan     Row Name 10/16/18 1102       Plan    Plan Home    Patient/Family in Agreement with Plan yes    Plan Comments Spoke with patient at bedside.  Patient resides in [facility] and lives with her significant other.  Prior to admission patient was independent with ADL's and mobility.  Patient does not have any DME and has never used home health.  Patient plans to go home upon discharge and denies any needs at this time.  CM will continue to follow.        Destination     No service coordination in this encounter.      Durable Medical Equipment     No service coordination in this encounter.      Dialysis/Infusion     No service coordination in this encounter.      Home Medical Care     No service coordination in this encounter.      Social Care     No service coordination in  this encounter.        Expected Discharge Date and Time     Expected Discharge Date Expected Discharge Time    Oct 20, 2018               Demographic Summary     Row Name 10/16/18 1101       General Information    Admission Type inpatient    Arrived From home    Referral Source admission list    Reason for Consult discharge planning    Preferred Language English       Contact Information    Permission Granted to Share Info With             Functional Status    No documentation.           Psychosocial    No documentation.           Abuse/Neglect    No documentation.           Legal    No documentation.           Substance Abuse    No documentation.           Patient Forms    No documentation.         Emeli Vegas RN

## 2018-10-16 NOTE — PROGRESS NOTES
Intensive Care Follow-up     Hospital:  LOS: 1 day   Ms. Brittani Lambert, 58 y.o. female is followed for:   Sepsis (CMS/HCC)        Subjective   Interval History:  The chart has been reviewed. The patient still complains of severe pain in her right chest. She still feels run down. I been able to receive information from the outside hospital which shows urine with sensitivities to Rocephin. We are still waiting cultures on the fluid from the aspiration of the chest. Her blood pressure is intermittently been in the 70s and 80s systolic. She has remained off pressors.    The patient's relevant past medical, surgical and social history were reviewed and updated in Epic as appropriate.        Objective     Infusions:    lactated ringers 75 mL/hr     Medications:    ceftriaxone 1 g Intravenous Q24H   heparin (porcine) 5,000 Units Subcutaneous Q8H   Linezolid 600 mg Intravenous Q12H   pantoprazole 40 mg Oral Q AM   sertraline 200 mg Oral Daily   sodium chloride 1,000 mL Intravenous Once   sodium chloride 10 mL Intravenous Q12H   sodium chloride 3 mL Intravenous Q12H       Vital Sign Min/Max for last 24 hours  Temp  Min: 98.4 °F (36.9 °C)  Max: 99.7 °F (37.6 °C)   BP  Min: 68/53  Max: 121/55   Pulse  Min: 63  Max: 137   Resp  Min: 16  Max: 20   SpO2  Min: 90 %  Max: 99 %   Flow (L/min)  Min: 2  Max: 2       Input/Output for last 24 hour shift  10/15 0701 - 10/16 0700  In: 3161 [P.O.:360; I.V.:2601]  Out: 4255 [Urine:4125; Drains:130]      Objective:  General Appearance:  Uncomfortable, ill-appearing, in no acute distress and in pain.    Vital signs: (most recent): Blood pressure 100/53, pulse 73, temperature 98.4 °F (36.9 °C), temperature source Oral, resp. rate 18, SpO2 94 %, not currently breastfeeding.  No fever.    Output: Producing urine.    HEENT: Normal HEENT exam.    Lungs:  Normal effort and normal respiratory rate.  Breath sounds clear to auscultation.  She is not in respiratory distress.  No decreased breath  sounds.    Heart: Normal rate.  Regular rhythm.  S1 normal and S2 normal.  No murmur.   Chest: (Bilateral mastectomy scars which appear to be well-healed and approximated. Drains in place bilaterally. The previously seen erythema on the right has for the most part resolved.)  Abdomen: Abdomen is soft and non-distended.  Bowel sounds are normal.   There is no abdominal tenderness.     Extremities: Normal range of motion.  There is no deformity or dependent edema.    Neurological: Patient is alert and oriented to person, place and time.    Pupils:  Pupils are equal, round, and reactive to light.  Pupils are equal.   Skin:  Warm.  No rash or cyanosis.               Results from last 7 days  Lab Units 10/16/18  0405 10/15/18  1229   WBC 10*3/mm3 18.32* 20.01*   HEMOGLOBIN g/dL 9.6* 9.8*   PLATELETS 10*3/mm3 215 231       Results from last 7 days  Lab Units 10/16/18  0405 10/15/18  1229   SODIUM mmol/L 133 136   POTASSIUM mmol/L 4.3 3.6   CO2 mmol/L 22.0 24.0   BUN mg/dL 8* 9   CREATININE mg/dL 0.60 0.53*   MAGNESIUM mg/dL 2.9* 1.7   PHOSPHORUS mg/dL 2.7 2.3*   GLUCOSE mg/dL 100 92     Estimated Creatinine Clearance: 83.4 mL/min (by C-G formula based on SCr of 0.6 mg/dL).          I reviewed the patient's results and images.     Assessment/Plan   Impression        Sepsis (CMS/HCC)    Tobacco abuse    Anxiety    Lt Breast CA (CMS/HCC)    Rt Cellulitis of chest wall       Plan        Continue with good pain control.  IV fluid bolus of 500 mL albumin 5%.  We will change Zosyn to Rocephin as the UA from the outside hospital has been returned.  Continue on with Zyvox for possible soft tissue infection of her right chest.  Stop clindamycin.  We will stop amiodarone for now. Check echocardiogram. We will risk stratify her after this.  Follow-up labs and orders have been placed for tomorrow morning.  She remains critically ill from sepsis.     Plan of care and goals reviewed with mulitdisciplinary team at daily rounds.   I  discussed the patient's findings and my recommendations with patient and nursing staff     Time spent Critical care 32 min (It does not include procedure time).    Yuval Addison MD, [facility]  Pulmonary and Critical Care Medicine  10/16/18 11:44 AM

## 2018-10-16 NOTE — PLAN OF CARE
Problem: Patient Care Overview  Goal: Plan of Care Review  Outcome: Ongoing (interventions implemented as appropriate)   10/16/18 9264   Coping/Psychosocial   Plan of Care Reviewed With patient   Plan of Care Review   Progress improving   OTHER   Outcome Summary Patient afebrile. R breast SONIDO drain has started draining adequately; 75cc of bloody drainage. Pain much better and controlled with NORCO PRN. Amio gtt stopped this afternoon. SBP still marginal this am; albumin 500cc given. SBP is between 100-140s. Sat up in chair for a short amount of time. Echo being done this evening. VSS and uop adequate.      Goal: Individualization and Mutuality  Outcome: Ongoing (interventions implemented as appropriate)    Goal: Discharge Needs Assessment  Outcome: Ongoing (interventions implemented as appropriate)    Goal: Interprofessional Rounds/Family Conf  Outcome: Ongoing (interventions implemented as appropriate)      Problem: Skin Injury Risk (Adult)  Goal: Identify Related Risk Factors and Signs and Symptoms  Outcome: Ongoing (interventions implemented as appropriate)    Goal: Skin Health and Integrity  Outcome: Ongoing (interventions implemented as appropriate)      Problem: Pain, Acute (Adult)  Goal: Identify Related Risk Factors and Signs and Symptoms  Outcome: Ongoing (interventions implemented as appropriate)    Goal: Acceptable Pain Control/Comfort Level  Outcome: Ongoing (interventions implemented as appropriate)      Problem: Sepsis/Septic Shock (Adult)  Goal: Signs and Symptoms of Listed Potential Problems Will be Absent, Minimized or Managed (Sepsis/Septic Shock)  Outcome: Ongoing (interventions implemented as appropriate)      Problem: Breast Surgery/Reconstruction (Adult)  Goal: Signs and Symptoms of Listed Potential Problems Will be Absent, Minimized or Managed (Breast Surgery/Reconstruction)  Outcome: Ongoing (interventions implemented as appropriate)

## 2018-10-16 NOTE — PLAN OF CARE
Problem: Patient Care Overview  Goal: Plan of Care Review  Outcome: Ongoing (interventions implemented as appropriate)   10/16/18 8747   OTHER   Outcome Summary Patient afebrile; Minimal from william; SBP remains labile ; Patient went into rapid a fib with rvr, amio drip initated. Converted at ~0000. Decreased to 0.5 at 0400; Mag and K replaced, AM labs WNL;      Goal: Individualization and Mutuality  Outcome: Ongoing (interventions implemented as appropriate)    Goal: Discharge Needs Assessment  Outcome: Ongoing (interventions implemented as appropriate)    Goal: Interprofessional Rounds/Family Conf  Outcome: Ongoing (interventions implemented as appropriate)      Problem: Skin Injury Risk (Adult)  Goal: Identify Related Risk Factors and Signs and Symptoms  Outcome: Ongoing (interventions implemented as appropriate)    Goal: Skin Health and Integrity  Outcome: Ongoing (interventions implemented as appropriate)      Problem: Pain, Acute (Adult)  Goal: Identify Related Risk Factors and Signs and Symptoms  Outcome: Ongoing (interventions implemented as appropriate)    Goal: Acceptable Pain Control/Comfort Level  Outcome: Ongoing (interventions implemented as appropriate)      Problem: Sepsis/Septic Shock (Adult)  Goal: Signs and Symptoms of Listed Potential Problems Will be Absent, Minimized or Managed (Sepsis/Septic Shock)  Outcome: Ongoing (interventions implemented as appropriate)      Problem: Breast Surgery/Reconstruction (Adult)  Goal: Signs and Symptoms of Listed Potential Problems Will be Absent, Minimized or Managed (Breast Surgery/Reconstruction)  Outcome: Ongoing (interventions implemented as appropriate)

## 2018-10-17 LAB
ANION GAP SERPL CALCULATED.3IONS-SCNC: 6 MMOL/L (ref 3–11)
BASOPHILS # BLD AUTO: 0.13 10*3/MM3 (ref 0–0.2)
BASOPHILS NFR BLD AUTO: 0.9 % (ref 0–1)
BH CV ECHO MEAS - AO ROOT AREA (BSA CORRECTED): 1.4
BH CV ECHO MEAS - AO ROOT AREA: 3.8 CM^2
BH CV ECHO MEAS - AO ROOT DIAM: 2.2 CM
BH CV ECHO MEAS - BSA(HAYCOCK): 1.5 M^2
BH CV ECHO MEAS - BSA: 1.5 M^2
BH CV ECHO MEAS - BZI_BMI: 19.4 KILOGRAMS/M^2
BH CV ECHO MEAS - BZI_METRIC_HEIGHT: 162.6 CM
BH CV ECHO MEAS - BZI_METRIC_WEIGHT: 51.3 KG
BH CV ECHO MEAS - EDV(CUBED): 71.5 ML
BH CV ECHO MEAS - EDV(MOD-SP2): 30 ML
BH CV ECHO MEAS - EDV(MOD-SP4): 43 ML
BH CV ECHO MEAS - EDV(TEICH): 76.4 ML
BH CV ECHO MEAS - EF(CUBED): 72 %
BH CV ECHO MEAS - EF(MOD-SP2): 73.3 %
BH CV ECHO MEAS - EF(MOD-SP4): 51.2 %
BH CV ECHO MEAS - EF(TEICH): 64.1 %
BH CV ECHO MEAS - ESV(CUBED): 20.1 ML
BH CV ECHO MEAS - ESV(MOD-SP2): 8 ML
BH CV ECHO MEAS - ESV(MOD-SP4): 21 ML
BH CV ECHO MEAS - ESV(TEICH): 27.4 ML
BH CV ECHO MEAS - FS: 34.5 %
BH CV ECHO MEAS - IVS/LVPW: 0.98
BH CV ECHO MEAS - IVSD: 1.1 CM
BH CV ECHO MEAS - LA DIMENSION: 3.1 CM
BH CV ECHO MEAS - LA/AO: 1.4
BH CV ECHO MEAS - LAD MAJOR: 4.4 CM
BH CV ECHO MEAS - LAT PEAK E' VEL: 7.8 CM/SEC
BH CV ECHO MEAS - LATERAL E/E' RATIO: 10.5
BH CV ECHO MEAS - LV DIASTOLIC VOL/BSA (35-75): 28 ML/M^2
BH CV ECHO MEAS - LV MASS(C)D: 149.2 GRAMS
BH CV ECHO MEAS - LV MASS(C)DI: 97.2 GRAMS/M^2
BH CV ECHO MEAS - LV SYSTOLIC VOL/BSA (12-30): 13.7 ML/M^2
BH CV ECHO MEAS - LVIDD: 4.2 CM
BH CV ECHO MEAS - LVIDS: 2.7 CM
BH CV ECHO MEAS - LVLD AP2: 4.4 CM
BH CV ECHO MEAS - LVLD AP4: 5.3 CM
BH CV ECHO MEAS - LVLS AP2: 3.6 CM
BH CV ECHO MEAS - LVLS AP4: 4.7 CM
BH CV ECHO MEAS - LVPWD: 1.1 CM
BH CV ECHO MEAS - MED PEAK E' VEL: 8 CM/SEC
BH CV ECHO MEAS - MEDIAL E/E' RATIO: 10.3
BH CV ECHO MEAS - MV A MAX VEL: 54.3 CM/SEC
BH CV ECHO MEAS - MV E MAX VEL: 84.4 CM/SEC
BH CV ECHO MEAS - MV E/A: 1.6
BH CV ECHO MEAS - RAP SYSTOLE: 15 MMHG
BH CV ECHO MEAS - RVSP: 50 MMHG
BH CV ECHO MEAS - SI(CUBED): 33.5 ML/M^2
BH CV ECHO MEAS - SI(MOD-SP2): 14.3 ML/M^2
BH CV ECHO MEAS - SI(MOD-SP4): 14.3 ML/M^2
BH CV ECHO MEAS - SI(TEICH): 31.9 ML/M^2
BH CV ECHO MEAS - SV(CUBED): 51.5 ML
BH CV ECHO MEAS - SV(MOD-SP2): 22 ML
BH CV ECHO MEAS - SV(MOD-SP4): 22 ML
BH CV ECHO MEAS - SV(TEICH): 49 ML
BH CV ECHO MEAS - TAPSE (>1.6): 1.2 CM2
BH CV ECHO MEAS - TR MAX PG: 35 MMHG
BH CV ECHO MEAS - TR MAX VEL: 222.7 CM/SEC
BH CV ECHO MEASUREMENTS AVERAGE E/E' RATIO: 10.68
BH CV VAS BP RIGHT ARM: NORMAL MMHG
BH CV XLRA - RV BASE: 3.1 CM
BH CV XLRA - RV LENGTH: 4.7 CM
BH CV XLRA - RV MID: 2.8 CM
BH CV XLRA - TDI S': 10.3 CM/SEC
BUN BLD-MCNC: 8 MG/DL (ref 9–23)
BUN/CREAT SERPL: 12.9 (ref 7–25)
CALCIUM SPEC-SCNC: 8.4 MG/DL (ref 8.7–10.4)
CHLORIDE SERPL-SCNC: 108 MMOL/L (ref 99–109)
CO2 SERPL-SCNC: 24 MMOL/L (ref 20–31)
CREAT BLD-MCNC: 0.62 MG/DL (ref 0.6–1.3)
DEPRECATED RDW RBC AUTO: 48.6 FL (ref 37–54)
EOSINOPHIL # BLD AUTO: 1.06 10*3/MM3 (ref 0–0.3)
EOSINOPHIL NFR BLD AUTO: 7 % (ref 0–3)
ERYTHROCYTE [DISTWIDTH] IN BLOOD BY AUTOMATED COUNT: 14.5 % (ref 11.3–14.5)
GFR SERPL CREATININE-BSD FRML MDRD: 99 ML/MIN/1.73
GLUCOSE BLD-MCNC: 80 MG/DL (ref 70–100)
HCT VFR BLD AUTO: 29 % (ref 34.5–44)
HGB BLD-MCNC: 9.4 G/DL (ref 11.5–15.5)
IMM GRANULOCYTES # BLD: 0.04 10*3/MM3 (ref 0–0.03)
IMM GRANULOCYTES NFR BLD: 0.3 % (ref 0–0.6)
LYMPHOCYTES # BLD AUTO: 2.86 10*3/MM3 (ref 0.6–4.8)
LYMPHOCYTES NFR BLD AUTO: 18.9 % (ref 24–44)
MAXIMAL PREDICTED HEART RATE: 162 BPM
MCH RBC QN AUTO: 29.7 PG (ref 27–31)
MCHC RBC AUTO-ENTMCNC: 32.4 G/DL (ref 32–36)
MCV RBC AUTO: 91.5 FL (ref 80–99)
MONOCYTES # BLD AUTO: 0.78 10*3/MM3 (ref 0–1)
MONOCYTES NFR BLD AUTO: 5.1 % (ref 0–12)
NEUTROPHILS # BLD AUTO: 10.33 10*3/MM3 (ref 1.5–8.3)
NEUTROPHILS NFR BLD AUTO: 68.1 % (ref 41–71)
PLATELET # BLD AUTO: 229 10*3/MM3 (ref 150–450)
PMV BLD AUTO: 10.9 FL (ref 6–12)
POTASSIUM BLD-SCNC: 4.2 MMOL/L (ref 3.5–5.5)
RBC # BLD AUTO: 3.17 10*6/MM3 (ref 3.89–5.14)
SODIUM BLD-SCNC: 138 MMOL/L (ref 132–146)
STRESS TARGET HR: 138 BPM
WBC NRBC COR # BLD: 15.16 10*3/MM3 (ref 3.5–10.8)

## 2018-10-17 PROCEDURE — 25010000002 CEFTRIAXONE PER 250 MG: Performed by: INTERNAL MEDICINE

## 2018-10-17 PROCEDURE — 25010000002 KETOROLAC TROMETHAMINE PER 15 MG: Performed by: INTERNAL MEDICINE

## 2018-10-17 PROCEDURE — 80048 BASIC METABOLIC PNL TOTAL CA: CPT | Performed by: INTERNAL MEDICINE

## 2018-10-17 PROCEDURE — 25010000002 HEPARIN (PORCINE) PER 1000 UNITS: Performed by: INTERNAL MEDICINE

## 2018-10-17 PROCEDURE — 85025 COMPLETE CBC W/AUTO DIFF WBC: CPT | Performed by: INTERNAL MEDICINE

## 2018-10-17 PROCEDURE — 99233 SBSQ HOSP IP/OBS HIGH 50: CPT | Performed by: INTERNAL MEDICINE

## 2018-10-17 PROCEDURE — 25010000002 LINEZOLID 600 MG/300ML SOLUTION: Performed by: NURSE PRACTITIONER

## 2018-10-17 RX ORDER — ALPRAZOLAM 0.5 MG/1
0.5 TABLET ORAL 3 TIMES DAILY PRN
Status: CANCELLED | OUTPATIENT
Start: 2018-10-17

## 2018-10-17 RX ORDER — KETOROLAC TROMETHAMINE 30 MG/ML
30 INJECTION, SOLUTION INTRAMUSCULAR; INTRAVENOUS ONCE
Status: COMPLETED | OUTPATIENT
Start: 2018-10-17 | End: 2018-10-17

## 2018-10-17 RX ORDER — SERTRALINE HYDROCHLORIDE 100 MG/1
100 TABLET, FILM COATED ORAL DAILY
Status: DISCONTINUED | OUTPATIENT
Start: 2018-10-18 | End: 2018-10-18 | Stop reason: HOSPADM

## 2018-10-17 RX ADMIN — SODIUM CHLORIDE, POTASSIUM CHLORIDE, SODIUM LACTATE AND CALCIUM CHLORIDE 75 ML/HR: 600; 310; 30; 20 INJECTION, SOLUTION INTRAVENOUS at 04:14

## 2018-10-17 RX ADMIN — Medication 3 ML: at 21:45

## 2018-10-17 RX ADMIN — Medication 3 ML: at 08:00

## 2018-10-17 RX ADMIN — Medication 10 ML: at 08:02

## 2018-10-17 RX ADMIN — HEPARIN SODIUM 5000 UNITS: 5000 INJECTION, SOLUTION INTRAVENOUS; SUBCUTANEOUS at 05:49

## 2018-10-17 RX ADMIN — HYDROCODONE BITARTRATE AND ACETAMINOPHEN 1 TABLET: 7.5; 325 TABLET ORAL at 05:54

## 2018-10-17 RX ADMIN — KETOROLAC TROMETHAMINE 30 MG: 30 INJECTION, SOLUTION INTRAMUSCULAR at 13:53

## 2018-10-17 RX ADMIN — Medication 10 ML: at 21:45

## 2018-10-17 RX ADMIN — HYDROCODONE BITARTRATE AND ACETAMINOPHEN 1 TABLET: 7.5; 325 TABLET ORAL at 18:23

## 2018-10-17 RX ADMIN — PANTOPRAZOLE SODIUM 40 MG: 40 TABLET, DELAYED RELEASE ORAL at 05:49

## 2018-10-17 RX ADMIN — HYDROCODONE BITARTRATE AND ACETAMINOPHEN 1 TABLET: 7.5; 325 TABLET ORAL at 22:18

## 2018-10-17 RX ADMIN — HEPARIN SODIUM 5000 UNITS: 5000 INJECTION, SOLUTION INTRAVENOUS; SUBCUTANEOUS at 13:53

## 2018-10-17 RX ADMIN — CEFTRIAXONE SODIUM 1 G: 1 INJECTION, SOLUTION INTRAVENOUS at 18:14

## 2018-10-17 RX ADMIN — HYDROCODONE BITARTRATE AND ACETAMINOPHEN 1 TABLET: 7.5; 325 TABLET ORAL at 11:51

## 2018-10-17 RX ADMIN — SERTRALINE HYDROCHLORIDE 200 MG: 100 TABLET ORAL at 08:00

## 2018-10-17 RX ADMIN — LINEZOLID 600 MG: 600 INJECTION, SOLUTION INTRAVENOUS at 05:50

## 2018-10-17 RX ADMIN — HEPARIN SODIUM 5000 UNITS: 5000 INJECTION, SOLUTION INTRAVENOUS; SUBCUTANEOUS at 21:44

## 2018-10-17 NOTE — PROGRESS NOTES
Intensive Care Follow-up     Hospital:  LOS: 2 days   Ms. Birttani Lambert, 58 y.o. female is followed for:   Sepsis (CMS/HCC)   Possibly secondary to both urinary tract infection as well as cellulitis of the right chest wall     Subjective   Interval History:  The chart is been reviewed. The patient has been a bit more hemodynamically stable with average blood pressures between 90 and 100. She has not required further pressor support. She has remained afebrile. She states that her discomfort is much more tolerable though she still has quite a bit of pain in her right chest. No further atrial fibrillation has been observed.    The patient's relevant past medical, surgical and social history were reviewed and updated in Epic as appropriate.        Objective     Infusions:     Medications:    ceftriaxone 1 g Intravenous Q24H   heparin (porcine) 5,000 Units Subcutaneous Q8H   pantoprazole 40 mg Oral Q AM   [START ON 10/18/2018] sertraline 100 mg Oral Daily   sodium chloride 1,000 mL Intravenous Once   sodium chloride 10 mL Intravenous Q12H   sodium chloride 3 mL Intravenous Q12H       Vital Sign Min/Max for last 24 hours  Temp  Min: 98 °F (36.7 °C)  Max: 98.8 °F (37.1 °C)   BP  Min: 95/73  Max: 151/126   Pulse  Min: 53  Max: 72   Resp  Min: 16  Max: 20   SpO2  Min: 89 %  Max: 100 %   Flow (L/min)  Min: 2  Max: 2       Input/Output for last 24 hour shift  10/16 0701 - 10/17 0700  In: 2829.5 [P.O.:240; I.V.:2210.4]  Out: 4762 [Urine:4600; Drains:162]      Objective:  General Appearance:  Ill-appearing, in no acute distress, comfortable and not in pain.    Vital signs: (most recent): Blood pressure 106/77, pulse 72, temperature 98.8 °F (37.1 °C), temperature source Oral, resp. rate 18, weight 53 kg (116 lb 13.5 oz), SpO2 92 %, not currently breastfeeding.  No fever.    Output: Producing urine.    HEENT: Normal HEENT exam.    Lungs:  Normal effort and normal respiratory rate.  Breath sounds clear to auscultation.  She is  not in respiratory distress.  No decreased breath sounds.    Heart: Normal rate.  Regular rhythm.  S1 normal and S2 normal.  No murmur.   Chest: (Bilateral mastectomy scars which appear to be well-healed and approximated. Drains in place bilaterally. )  Abdomen: Abdomen is soft and non-distended.  Bowel sounds are normal.   There is no abdominal tenderness.     Extremities: Normal range of motion.  There is no deformity or dependent edema.    Neurological: Patient is alert and oriented to person, place and time.    Pupils:  Pupils are equal, round, and reactive to light.  Pupils are equal.   Skin:  Warm.  No rash or cyanosis.               Results from last 7 days  Lab Units 10/17/18  0500 10/16/18  0405 10/15/18  1229   WBC 10*3/mm3 15.16* 18.32* 20.01*   HEMOGLOBIN g/dL 9.4* 9.6* 9.8*   PLATELETS 10*3/mm3 229 215 231       Results from last 7 days  Lab Units 10/17/18  0500 10/16/18  0405 10/15/18  1229   SODIUM mmol/L 138 133 136   POTASSIUM mmol/L 4.2 4.3 3.6   CO2 mmol/L 24.0 22.0 24.0   BUN mg/dL 8* 8* 9   CREATININE mg/dL 0.62 0.60 0.53*   MAGNESIUM mg/dL  --  2.9* 1.7   PHOSPHORUS mg/dL  --  2.7 2.3*   GLUCOSE mg/dL 80 100 92     Estimated Creatinine Clearance: 82.8 mL/min (by C-G formula based on SCr of 0.62 mg/dL).              I reviewed the patient's results and images.     Assessment/Plan   Impression        Sepsis (CMS/[facility])    Tobacco abuse    Anxiety    Lt Breast CA (CMS/HCC)    Rt Cellulitis of chest wall    Acute cystitis without hematuria    Atrial fibrillation (CMS/HCC)    UTI, prior to arrival     Plan        Discontinue Zyvox and we will just continue Rocephin. I will plan to transition to an oral medication such as doxycycline within the next day or so to cover the staph aureus.  Follow-up labs and orders are placed for tomorrow.  Mobilize as tolerated.  Hgb is falling, though this may be representative of dilution.  We will follow this instability.  Stop IV fluids.  Overall I will plan to treat  her for her cellulitis for 12-14 days.  I will plan to transition her to telemetry today.    Plan of care and goals reviewed with mulitdisciplinary team at daily rounds.   I discussed the patient's findings and my recommendations with patient and nursing staff       Yuval Addison MD, [facility]  Pulmonary and Critical Care Medicine  10/17/18 12:13 PM

## 2018-10-17 NOTE — PROGRESS NOTES
Clinical Nutrition   Reason For Visit: MDR, Identified at risk by screening criteria    Patient Name: Brittani Lambert  YOB: 1960  MRN: [id number]  Date of Encounter: 10/17/18 2:01 PM  Admission date: 10/15/2018        Nutrition Assessment     Admission Problem List:    r/o Sepsis    s/p recent bilateral mastectomies 10-4-18      PMH: She  has a past medical history of Anxiety; Arthritis; Breast CA (CMS/HCC) (10/4/2018); Cancer (CMS/HCC); Depression; Heart murmur; and Ovarian cancer (CMS/HCC) (1989).   PSxH: She  has a past surgical history that includes Appendectomy; Breast biopsy (Right, 2003); Hysterectomy; Oophorectomy; Colonoscopy; and Mastectomy w/ sentinel node biopsy (Bilateral, 10/4/2018).        Reported/Observed/Food/Nutrition Related History     Pt resting in bed, on nasal cannula, reports she is starving    Per MD: ok to advance diet      Anthropometrics   Height: 64in  Weight: 113lb standing scale 10-1-18  BMI: 19.4  BMI classification: Normal: 18.5-24.9kg/m2     Weight change: pt reports weight loss of 12 lb's in January, has regained 6lb since then              Labs reviewed   Labs reviewed: Yes    Results from last 7 days  Lab Units 10/17/18  0500 10/16/18  0405 10/15/18  1229   SODIUM mmol/L 138 133 136   POTASSIUM mmol/L 4.2 4.3 3.6   CHLORIDE mmol/L 108 110* 109   CO2 mmol/L 24.0 22.0 24.0   BUN mg/dL 8* 8* 9   CREATININE mg/dL 0.62 0.60 0.53*   GLUCOSE mg/dL 80 100 92   CALCIUM mg/dL 8.4* 7.4* 7.5*   PHOSPHORUS mg/dL  --  2.7 2.3*   MAGNESIUM mg/dL  --  2.9* 1.7       Results from last 7 days  Lab Units 10/17/18  0500 10/16/18  0405 10/15/18  1229   WBC 10*3/mm3 15.16* 18.32* 20.01*   ALBUMIN g/dL  --   --  3.14*           Lab Results  Lab Value Date/Time   HGBA1C 5.60 10/15/2018 1229     Medications reviewed   Medications reviewed:   LR@75mL/hr    Intake/Ouptut 24 hrs (7:00AM - 6:59 AM)     Intake & Output (last day)       10/16 0701 - 10/17 0700 10/17 0701 - 10/18 0700    P.O.  240     I.V. (mL/kg) 2210.4 (41.7) 146.3 (2.8)    IV Piggyback 379.1 252    Total Intake(mL/kg) 2829.5 (53.4) 398.3 (7.5)    Urine (mL/kg/hr) 4600 (3.6) 500 (1.3)    Drains 162     Total Output 4762 500    Net -1932.6 -101.7          Unmeasured Stool Occurrence  1 x          Current Nutrition Prescription   PO: Regular   Intake: Insufficent data   88% x 2 meals     Nutrition Diagnosis     10/15  Problem Inadequate oral intake   Etiology Per Diet Order   Signs/Symptoms NPO   Status: resolved      Intervention   Intervention: Follow treatment progress    Goal:   General: Nutrition support treatment  PO: Establish PO      Monitoring/Evaluation:       Monitoring/Evaluation: Per protocol  Cheryl Diane RDN, SUKUMAR  Time Spent: 30min

## 2018-10-17 NOTE — PROGRESS NOTES
Brittani Prateryunior  1960  [id number]    Surgery Progress Note    Date of visit: 10/17/2018    Subjective: Overall feels better    Objective:    /71   Pulse 58   Temp 98.5 °F (36.9 °C) (Oral)   Resp 18   Wt 53 kg (116 lb 13.5 oz)   SpO2 (!) 89%   BMI 20.05 kg/m²     Intake/Output Summary (Last 24 hours) at 10/17/18 0904  Last data filed at 10/17/18 0700   Gross per 24 hour   Intake          2566.45 ml   Output             4362 ml   Net         -1795.55 ml         L: normal air entry  BREAST: Mastectomy incisions are intact and healing well  No erythema  Dash-Stephens drainage is adequate bilaterally        LABS:      Results from last 7 days  Lab Units 10/17/18  0500   WBC 10*3/mm3 15.16*   HEMOGLOBIN g/dL 9.4*   HEMATOCRIT % 29.0*   PLATELETS 10*3/mm3 229       Results from last 7 days  Lab Units 10/17/18  0500  10/15/18  1229   SODIUM mmol/L 138  < > 136   POTASSIUM mmol/L 4.2  < > 3.6   CHLORIDE mmol/L 108  < > 109   CO2 mmol/L 24.0  < > 24.0   BUN mg/dL 8*  < > 9   CREATININE mg/dL 0.62  < > 0.53*   CALCIUM mg/dL 8.4*  < > 7.5*   BILIRUBIN mg/dL  --   --  0.4   ALK PHOS U/L  --   --  108*   ALT (SGPT) U/L  --   --  96*   AST (SGOT) U/L  --   --  107*   GLUCOSE mg/dL 80  < > 92   < > = values in this interval not displayed.    Results from last 7 days  Lab Units 10/17/18  0500   SODIUM mmol/L 138   POTASSIUM mmol/L 4.2   CHLORIDE mmol/L 108   CO2 mmol/L 24.0   BUN mg/dL 8*   CREATININE mg/dL 0.62   GLUCOSE mg/dL 80   CALCIUM mg/dL 8.4*     No results found for: LIPASE      Assessment/ Plan: Stable course  Right mastectomy incision cellulitis with septic picture upon presentation  Responding to antibiotics  Dash-Stephens drain is draining adequately  Continue with IV antibiotics per ID  Home when okay with primary service    Problem List Items Addressed This Visit     None            Koffi Worthingotn MD  10/17/2018  9:04 AM

## 2018-10-17 NOTE — PLAN OF CARE
Problem: Patient Care Overview  Goal: Plan of Care Review  Outcome: Ongoing (interventions implemented as appropriate)   10/17/18 1640   Coping/Psychosocial   Plan of Care Reviewed With patient   Plan of Care Review   Progress improving   OTHER   Outcome Summary Pain control improved this shift. Patient states no change in pain following administration of toradol. Continued output from SONIDO drains. Patient considered stable for transfer to floor.        Problem: Skin Injury Risk (Adult)  Goal: Identify Related Risk Factors and Signs and Symptoms  Outcome: Ongoing (interventions implemented as appropriate)    Goal: Skin Health and Integrity  Outcome: Ongoing (interventions implemented as appropriate)      Problem: Pain, Acute (Adult)  Goal: Identify Related Risk Factors and Signs and Symptoms  Outcome: Ongoing (interventions implemented as appropriate)    Goal: Acceptable Pain Control/Comfort Level  Outcome: Ongoing (interventions implemented as appropriate)      Problem: Sepsis/Septic Shock (Adult)  Goal: Signs and Symptoms of Listed Potential Problems Will be Absent, Minimized or Managed (Sepsis/Septic Shock)  Outcome: Ongoing (interventions implemented as appropriate)      Problem: Breast Surgery/Reconstruction (Adult)  Goal: Signs and Symptoms of Listed Potential Problems Will be Absent, Minimized or Managed (Breast Surgery/Reconstruction)  Outcome: Ongoing (interventions implemented as appropriate)

## 2018-10-17 NOTE — PLAN OF CARE
Problem: Patient Care Overview  Goal: Plan of Care Review   10/17/18 0522   Coping/Psychosocial   Plan of Care Reviewed With patient   Plan of Care Review   Progress improving   OTHER   Outcome Summary Patient had adequate pain relief with her pain medication last night. Having some output from the SONIDO drains. They were stripped a couple of times to ensure no blood clots. Patient seems to be in better spirits.       Problem: Skin Injury Risk (Adult)  Goal: Identify Related Risk Factors and Signs and Symptoms  Outcome: Ongoing (interventions implemented as appropriate)    Goal: Skin Health and Integrity  Outcome: Ongoing (interventions implemented as appropriate)      Problem: Pain, Acute (Adult)  Goal: Identify Related Risk Factors and Signs and Symptoms  Outcome: Ongoing (interventions implemented as appropriate)    Goal: Acceptable Pain Control/Comfort Level  Outcome: Ongoing (interventions implemented as appropriate)      Problem: Sepsis/Septic Shock (Adult)  Goal: Signs and Symptoms of Listed Potential Problems Will be Absent, Minimized or Managed (Sepsis/Septic Shock)  Outcome: Ongoing (interventions implemented as appropriate)      Problem: Breast Surgery/Reconstruction (Adult)  Goal: Signs and Symptoms of Listed Potential Problems Will be Absent, Minimized or Managed (Breast Surgery/Reconstruction)  Outcome: Ongoing (interventions implemented as appropriate)

## 2018-10-18 VITALS
OXYGEN SATURATION: 96 % | DIASTOLIC BLOOD PRESSURE: 75 MMHG | RESPIRATION RATE: 18 BRPM | HEIGHT: 64 IN | SYSTOLIC BLOOD PRESSURE: 130 MMHG | TEMPERATURE: 98.3 F | WEIGHT: 116.84 LBS | HEART RATE: 57 BPM | BODY MASS INDEX: 19.95 KG/M2

## 2018-10-18 LAB
ANION GAP SERPL CALCULATED.3IONS-SCNC: 5 MMOL/L (ref 3–11)
BASOPHILS # BLD AUTO: 0.12 10*3/MM3 (ref 0–0.2)
BASOPHILS NFR BLD AUTO: 1.1 % (ref 0–1)
BUN BLD-MCNC: 11 MG/DL (ref 9–23)
BUN/CREAT SERPL: 17.5 (ref 7–25)
CALCIUM SPEC-SCNC: 8.3 MG/DL (ref 8.7–10.4)
CHLORIDE SERPL-SCNC: 108 MMOL/L (ref 99–109)
CO2 SERPL-SCNC: 26 MMOL/L (ref 20–31)
CREAT BLD-MCNC: 0.63 MG/DL (ref 0.6–1.3)
DEPRECATED RDW RBC AUTO: 48.2 FL (ref 37–54)
EOSINOPHIL # BLD AUTO: 1.27 10*3/MM3 (ref 0–0.3)
EOSINOPHIL NFR BLD AUTO: 11.8 % (ref 0–3)
ERYTHROCYTE [DISTWIDTH] IN BLOOD BY AUTOMATED COUNT: 14.5 % (ref 11.3–14.5)
GFR SERPL CREATININE-BSD FRML MDRD: 97 ML/MIN/1.73
GLUCOSE BLD-MCNC: 77 MG/DL (ref 70–100)
HCT VFR BLD AUTO: 29.6 % (ref 34.5–44)
HGB BLD-MCNC: 9.5 G/DL (ref 11.5–15.5)
IMM GRANULOCYTES # BLD: 0.03 10*3/MM3 (ref 0–0.03)
IMM GRANULOCYTES NFR BLD: 0.3 % (ref 0–0.6)
LYMPHOCYTES # BLD AUTO: 2.65 10*3/MM3 (ref 0.6–4.8)
LYMPHOCYTES NFR BLD AUTO: 24.6 % (ref 24–44)
MCH RBC QN AUTO: 29.2 PG (ref 27–31)
MCHC RBC AUTO-ENTMCNC: 32.1 G/DL (ref 32–36)
MCV RBC AUTO: 91.1 FL (ref 80–99)
MONOCYTES # BLD AUTO: 0.57 10*3/MM3 (ref 0–1)
MONOCYTES NFR BLD AUTO: 5.3 % (ref 0–12)
NEUTROPHILS # BLD AUTO: 6.16 10*3/MM3 (ref 1.5–8.3)
NEUTROPHILS NFR BLD AUTO: 57.2 % (ref 41–71)
PLATELET # BLD AUTO: 257 10*3/MM3 (ref 150–450)
PMV BLD AUTO: 10.7 FL (ref 6–12)
POTASSIUM BLD-SCNC: 4 MMOL/L (ref 3.5–5.5)
RBC # BLD AUTO: 3.25 10*6/MM3 (ref 3.89–5.14)
SODIUM BLD-SCNC: 139 MMOL/L (ref 132–146)
WBC NRBC COR # BLD: 10.77 10*3/MM3 (ref 3.5–10.8)

## 2018-10-18 PROCEDURE — 25010000002 HEPARIN (PORCINE) PER 1000 UNITS: Performed by: INTERNAL MEDICINE

## 2018-10-18 PROCEDURE — 99239 HOSP IP/OBS DSCHRG MGMT >30: CPT | Performed by: NURSE PRACTITIONER

## 2018-10-18 PROCEDURE — 80048 BASIC METABOLIC PNL TOTAL CA: CPT | Performed by: INTERNAL MEDICINE

## 2018-10-18 PROCEDURE — 90661 CCIIV3 VAC ABX FR 0.5 ML IM: CPT | Performed by: INTERNAL MEDICINE

## 2018-10-18 PROCEDURE — 85025 COMPLETE CBC W/AUTO DIFF WBC: CPT | Performed by: INTERNAL MEDICINE

## 2018-10-18 PROCEDURE — 25010000002 INFLUENZA VAC SUBUNIT QUAD 0.5 ML SUSPENSION PREFILLED SYRINGE: Performed by: INTERNAL MEDICINE

## 2018-10-18 PROCEDURE — G0008 ADMIN INFLUENZA VIRUS VAC: HCPCS | Performed by: INTERNAL MEDICINE

## 2018-10-18 RX ORDER — DOXYCYCLINE HYCLATE 100 MG
100 TABLET ORAL 2 TIMES DAILY
Qty: 28 TABLET | Refills: 0 | Status: SHIPPED | OUTPATIENT
Start: 2018-10-18 | End: 2018-11-01

## 2018-10-18 RX ORDER — PANTOPRAZOLE SODIUM 40 MG/1
40 TABLET, DELAYED RELEASE ORAL DAILY
Qty: 30 TABLET | Refills: 0 | Status: SHIPPED | OUTPATIENT
Start: 2018-10-18 | End: 2018-10-26 | Stop reason: SDUPTHER

## 2018-10-18 RX ORDER — SACCHAROMYCES BOULARDII 250 MG
250 CAPSULE ORAL 2 TIMES DAILY
Qty: 60 CAPSULE | Refills: 0 | Status: SHIPPED | OUTPATIENT
Start: 2018-10-18 | End: 2018-10-26 | Stop reason: SDUPTHER

## 2018-10-18 RX ORDER — HYDROCODONE BITARTRATE AND ACETAMINOPHEN 7.5; 325 MG/1; MG/1
1 TABLET ORAL EVERY 4 HOURS PRN
Qty: 12 TABLET | Refills: 0 | Status: SHIPPED | OUTPATIENT
Start: 2018-10-18 | End: 2018-10-21

## 2018-10-18 RX ADMIN — HEPARIN SODIUM 5000 UNITS: 5000 INJECTION, SOLUTION INTRAVENOUS; SUBCUTANEOUS at 05:04

## 2018-10-18 RX ADMIN — SERTRALINE HYDROCHLORIDE 100 MG: 100 TABLET ORAL at 08:20

## 2018-10-18 RX ADMIN — Medication 10 ML: at 10:41

## 2018-10-18 RX ADMIN — PANTOPRAZOLE SODIUM 40 MG: 40 TABLET, DELAYED RELEASE ORAL at 05:04

## 2018-10-18 RX ADMIN — HYDROCODONE BITARTRATE AND ACETAMINOPHEN 1 TABLET: 7.5; 325 TABLET ORAL at 10:58

## 2018-10-18 RX ADMIN — INFLUENZA A VIRUS A/SINGAPORE/GP1908/2015 IVR-180 (H1N1) ANTIGEN (MDCK CELL DERIVED, PROPIOLACTONE INACTIVATED), INFLUENZA A VIRUS A/NORTH CAROLINA/04/2016 (H3N2) HEMAGGLUTININ ANTIGEN (MDCK CELL DERIVED, PROPIOLACTONE INACTIVATED), INFLUENZA B VIRUS B/IOWA/06/2017 HEMAGGLUTININ ANTIGEN (MDCK CELL DERIVED, PROPIOLACTONE INACTIVATED), INFLUENZA B VIRUS B/SINGAPORE/INFTT-16-0610/2016 HEMAGGLUTININ ANTIGEN (MDCK CELL DERIVED, PROPIOLACTONE INACTIVATED) 0.5 ML: 15; 15; 15; 15 INJECTION, SUSPENSION INTRAMUSCULAR at 14:29

## 2018-10-18 RX ADMIN — HYDROCODONE BITARTRATE AND ACETAMINOPHEN 1 TABLET: 7.5; 325 TABLET ORAL at 05:04

## 2018-10-18 RX ADMIN — Medication 10 ML: at 10:42

## 2018-10-18 NOTE — DISCHARGE SUMMARY
Discharge Summary    Patient name: Brittani Lambert  CSN: [id number]  MRN: [id number]  : 1960  Today's date: 10/18/2018     Date of Admission: 10/15/2018    Date of Discharge:  10/18/2018    Admitting Physician: LAUREN Addison MD    Primary Care Provider: Alla Colon DO    Consultations:   Dr. Worthington- Surgery      Admission Diagnosis:     Sepsis (CMS/HCC)    Lt Breast CA (CMS/HCC)    Rt Cellulitis of chest wall    Acute cystitis without hematuria    Atrial fibrillation (CMS/HCC)    Mixed hyperlipidemia    Tobacco abuse    Anxiety    Discharge Diagnoses:     Sepsis (CMS/HCC)    Lt Breast CA (CMS/HCC)    Rt Cellulitis of chest wall    Acute cystitis without hematuria    Atrial fibrillation (CMS/HCC)    Mixed hyperlipidemia    Tobacco abuse    Anxiety      Procedures:  None       History of Present Illness:  Mrs. Lambert is a 57 y/o WF who was transferred here from [facility] due to sepsis and fever.  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 SEGUNDO 10/4/18.  She still has her SONIDO drains. She stated that the right SONIDO has had increased clear yellow-orange drainage.  Over the last two weeks, she was doing ok and only trying to manage post op pain.  Yesterday evening, she started to have body aches, chills, malaise and subjective fever.  She also stated that her right chest wall become reddened with increased pain yesterday too.  The fever concerned her and she went to the ED in [place].  She had a temp 103.2, P 117, RR 24, 112/58 and 97% on RA.  She had a WBC 20, Hgb 12.4, HCT 38.7, plts 358, BUN 15, Cr 1.0, LA 2.8 with repeat 1.5,   UA had hazy urine with 0-2 WBC, 3+ bacteria + nitrates.   She was given 1 gm Vanc, 3.375 gms Zosyn, 30 mg Toradol, total 8 mg Morphine, 4 mg Zofran and 2L NS bolus.  She also placed on a levophed drip as her SBP has trended downward.       Upon arrival  "to the intensive care unit at our facility, her blood pressure has been maintained at approximately 90 without pressors. She is complaining of severe pain primarily in her right breast as well as total body aches.    Hospital Course:  Brittani Lambert is a 58 y.o. female who presented to [facility] as discussed in HPI.  Patient was admitted to ICU as discussed in history present illness.  CT of the chest was ordered to evaluate for abscess and empiric antimicrobials including vancomycin and Zosyn were initiated.  Patient underwent IV fluid resuscitation with consistent monitoring for hypotension and possible need for Levothroid.  On hospital day #1 the patient continued to complain of severe pain in her right chest.  Sensitivities returned from outside facility showing Rocephin as a good agent for urine pathogen.  The pressure remained low with no need for levofed though she did require some fluid boluses.  Cellulitis improving with antimicrobials, cultures showed light growth of Staphylococcus aureus.  She did continue to improve and by hospital day number 2 Zyvox was discontinued.  She was transitioned to doxycycline with treatment for 12-14 days.  On 10/18/18 the patient was deemed appropriate for discharge home.  She is to follow-up with primary care as soon as possible for transition of care, and general surgery. In particular, I would like her to follow-up with her primary care physician regarding the possibility of starting aspirin daily for possible atrial fibrillation. Her DOMINIQUE VASC score was 1.       Vitals:  /75   Pulse 67   Temp 98.3 °F (36.8 °C) (Oral)   Resp 16   Ht 162.6 cm (64.02\")   Wt 53 kg (116 lb 13.5 oz)   SpO2 95%   BMI 20.05 kg/m²     Advance Directives: Code Status and Medical Interventions:   Ordered at: 10/15/18 0837     Code Status:    CPR     Medical Interventions (Level of Support Prior to Arrest):    Full        Physical Exam:  Constitutional:  Appears " well-developed and well-nourished. No distress.   HEENT:  Normocephalic and atraumatic. PERRL  Neck:  Neck supple. No JVD present.   CV: Normal rate, regular rhythm,  intact distal pulses.  No gallop, murmur or rub.  Pulmonary/Chest: Effort normal and breath sounds normal. No respiratory distress. No wheezes, rhonchi or rales. SONIDO drains in place, bilateral mastectomy scars.  Abdominal: Soft. +BS. No distension and no mass. There is no tenderness.   Musculoskeletal: Normal muscle tone and strength  Neurological: Alert and oriented to person, place, and time.  No focal deficits  Skin: Skin is warm and dry. No rash noted.   Extremities:  No clubbing, edema or cyanosis  Psychiatric: Normal mood and affect. Behavior is normal.     Labs:    Results from last 7 days  Lab Units 10/18/18  0427   WBC 10*3/mm3 10.77   HEMOGLOBIN g/dL 9.5*   HEMATOCRIT % 29.6*   PLATELETS 10*3/mm3 257       Results from last 7 days  Lab Units 10/18/18  0427  10/15/18  1229   SODIUM mmol/L 139  < > 136   POTASSIUM mmol/L 4.0  < > 3.6   CHLORIDE mmol/L 108  < > 109   CO2 mmol/L 26.0  < > 24.0   BUN mg/dL 11  < > 9   CREATININE mg/dL 0.63  < > 0.53*   CALCIUM mg/dL 8.3*  < > 7.5*   BILIRUBIN mg/dL  --   --  0.4   ALK PHOS U/L  --   --  108*   ALT (SGPT) U/L  --   --  96*   AST (SGOT) U/L  --   --  107*   GLUCOSE mg/dL 77  < > 92   < > = values in this interval not displayed.      Magnesium   Date Value Ref Range Status   10/16/2018 2.9 (H) 1.3 - 2.7 mg/dL Final     Phosphorus   Date Value Ref Range Status   10/16/2018 2.7 2.4 - 5.1 mg/dL Final                 Discharge Medications:     Discharge Medications      New Medications      Instructions Start Date   doxycycline 100 MG tablet  Commonly known as:  VIBRAMYICN   100 mg, Oral, 2 Times Daily      Influenza Vac Subunit Quad 0.5 ML suspension prefilled syringe injection  Commonly known as:  FLUCELVAX   0.5 mL, Intramuscular, During Hospitalization      pantoprazole 40 MG EC tablet  Commonly known  as:  PROTONIX   40 mg, Oral, Daily      saccharomyces boulardii 250 MG capsule  Commonly known as:  FLORASTOR   250 mg, Oral, 2 Times Daily         Continue These Medications      Instructions Start Date   ALPRAZolam 0.5 MG tablet  Commonly known as:  XANAX   0.5 mg, Oral, 3 Times Daily PRN      atorvastatin 40 MG tablet  Commonly known as:  LIPITOR   40 mg, Oral, Daily      HYDROcodone-acetaminophen 7.5-325 MG per tablet  Commonly known as:  NORCO   1 tablet, Oral, Every 6 Hours PRN      sertraline 100 MG tablet  Commonly known as:  ZOLOFT   200 mg, Oral, Daily             Discharge Diet:   Diet Instructions     Diet: Regular; Thin       Discharge Diet:  Regular    Fluid Consistency:  Thin          Activity at Discharge:   Activity Instructions     Activity as Tolerated             Follow-up Appointments  No future appointments.  Additional Instructions for the Follow-ups that You Need to Schedule     Discharge Follow-up with PCP    As directed      Currently Documented PCP:  Alla Colon DO  PCP Phone Number:  [phone]    Follow Up Details:  ASAP for transition of care.         Discharge Follow-up with Specified Provider: Dr. SEGUNDO; 1 Week    As directed      To:  Dr. SEGUNDO    Follow Up:  1 Week               Discharge Instructions:  Home with assistance  Medications E-prescribed   Follow up as above       LONG Rollins, ACNP-BC  Pulmonary & Critical Care Medicine  10/18/18 1:26 PM    Time: Discharge 35 min    CC: Alla Colon DO         [unfilled]

## 2018-10-18 NOTE — PROGRESS NOTES
Clinical Nutrition     Multidisciplinary Rounds    Time: 20min  Patient Name: Brittani Lambert  Date of Encounter: 10/18/18 9:06 AM  MRN: [id number]  Admission date: 10/15/2018      Reason for visit: MDR. RD to continue to follow per protocol.     Current diet: Diet Regular    EMR reviewed     Intervention:  Follow treatment plan  Care plan reviewed    Follow up:   Per protocol      Cheryl Diane RDN, SUKUMAR  9:06 AM

## 2018-10-18 NOTE — PROGRESS NOTES
"Brittani Lambert  1960  [id number]    Surgery Progress Note    Date of visit: 10/18/2018    Subjective:patient is awaiting discharge  She feels much better    Objective:    /75   Pulse 57   Temp 98.3 °F (36.8 °C) (Oral)   Resp 18   Ht 162.6 cm (64.02\")   Wt 53 kg (116 lb 13.5 oz)   SpO2 96%   BMI 20.05 kg/m²     Intake/Output Summary (Last 24 hours) at 10/18/18 1537  Last data filed at 10/18/18 0500   Gross per 24 hour   Intake               50 ml   Output             2150 ml   Net            -2100 ml         L: normal air entry  BREAST: incisions are healing well  Dash-Stephens drainage is adequate bilaterally        LABS:      Results from last 7 days  Lab Units 10/18/18  0427   WBC 10*3/mm3 10.77   HEMOGLOBIN g/dL 9.5*   HEMATOCRIT % 29.6*   PLATELETS 10*3/mm3 257       Results from last 7 days  Lab Units 10/18/18  0427  10/15/18  1229   SODIUM mmol/L 139  < > 136   POTASSIUM mmol/L 4.0  < > 3.6   CHLORIDE mmol/L 108  < > 109   CO2 mmol/L 26.0  < > 24.0   BUN mg/dL 11  < > 9   CREATININE mg/dL 0.63  < > 0.53*   CALCIUM mg/dL 8.3*  < > 7.5*   BILIRUBIN mg/dL  --   --  0.4   ALK PHOS U/L  --   --  108*   ALT (SGPT) U/L  --   --  96*   AST (SGOT) U/L  --   --  107*   GLUCOSE mg/dL 77  < > 92   < > = values in this interval not displayed.    Results from last 7 days  Lab Units 10/18/18  0427   SODIUM mmol/L 139   POTASSIUM mmol/L 4.0   CHLORIDE mmol/L 108   CO2 mmol/L 26.0   BUN mg/dL 11   CREATININE mg/dL 0.63   GLUCOSE mg/dL 77   CALCIUM mg/dL 8.3*     No results found for: LIPASE      Assessment/ Plan: stable course with right chest wall cellulitis that is responding to IV antibiotics  Dash-Stephens drainage with adequate output  Patient is awaiting discharge  Continue with antibiotics  Follow-up on 10/24/18  Instructions were given to the patient.    Problem List Items Addressed This Visit     None            Koffi Worthington MD  10/18/2018  3:37 PM    "

## 2018-10-18 NOTE — PLAN OF CARE
Problem: Patient Care Overview  Goal: Plan of Care Review  Outcome: Ongoing (interventions implemented as appropriate)   10/18/18 0616   Coping/Psychosocial   Plan of Care Reviewed With patient   Plan of Care Review   Progress improving   OTHER   Outcome Summary VSS. Afebrile. Minimal output from SONIDO drains. 5 mL from the right, and 20 mL from the left. Pain controlled. Pt on transfer to [facility].       Problem: Skin Injury Risk (Adult)  Goal: Skin Health and Integrity  Outcome: Ongoing (interventions implemented as appropriate)      Problem: Pain, Acute (Adult)  Goal: Acceptable Pain Control/Comfort Level  Outcome: Ongoing (interventions implemented as appropriate)      Problem: Sepsis/Septic Shock (Adult)  Goal: Signs and Symptoms of Listed Potential Problems Will be Absent, Minimized or Managed (Sepsis/Septic Shock)  Outcome: Ongoing (interventions implemented as appropriate)      Problem: Breast Surgery/Reconstruction (Adult)  Goal: Signs and Symptoms of Listed Potential Problems Will be Absent, Minimized or Managed (Breast Surgery/Reconstruction)  Outcome: Ongoing (interventions implemented as appropriate)

## 2018-10-18 NOTE — PLAN OF CARE
Problem: Patient Care Overview  Goal: Plan of Care Review  Outcome: Ongoing (interventions implemented as appropriate)   10/18/18 5941   Coping/Psychosocial   Plan of Care Reviewed With patient   Plan of Care Review   Progress improving   OTHER   Outcome Summary VSS. Pain under control. Follow up appointments made and charted. Discharge instructions given. Patient considered stable for discharge.

## 2018-10-18 NOTE — PROGRESS NOTES
Case Management Discharge Note    Final Note: Home, friends or family to transport.    Destination     No service has been selected for the patient.      Durable Medical Equipment     No service has been selected for the patient.      Dialysis/Infusion     No service has been selected for the patient.      Home Medical Care     No service has been selected for the patient.      Social Care     No service has been selected for the patient.             Final Discharge Disposition Code: 01 - home or self-care

## 2018-10-19 ENCOUNTER — READMISSION MANAGEMENT (OUTPATIENT)
Dept: CALL CENTER | Facility: HOSPITAL | Age: 58
End: 2018-10-19

## 2018-10-19 ENCOUNTER — TRANSITIONAL CARE MANAGEMENT TELEPHONE ENCOUNTER (OUTPATIENT)
Dept: FAMILY MEDICINE CLINIC | Facility: CLINIC | Age: 58
End: 2018-10-19

## 2018-10-19 NOTE — OUTREACH NOTE
KYLIE call completed. Please see flow sheet for additional details.  Pt states she is doing ok. Denies fever/chills/n/v/c.  C/o diarrhea last night & this AM, but that has stopped. She notes she went to [facility] on DC & suspects that as cause. R drainage tubes draining as before DC, dressing changed earlier. She does this herself. Pt states she recvd all discharge meds at , has printed instructions re dosing, denies questions.  Confirms Dr SEGUNDO f/up Wed and PCP KYLIE on Friday.

## 2018-10-19 NOTE — OUTREACH NOTE
Prep Survey      Responses   Facility patient discharged from?  [facility]   Is patient eligible?  Yes   Discharge diagnosis   Sepsis (CMS/HCC)   Does the patient have one of the following disease processes/diagnoses(primary or secondary)?  Sepsis   Does the patient have Home health ordered?  No   Is there a DME ordered?  No   Prep survey completed?  Yes          Belén Blancas RN

## 2018-10-20 ENCOUNTER — READMISSION MANAGEMENT (OUTPATIENT)
Dept: CALL CENTER | Facility: HOSPITAL | Age: 58
End: 2018-10-20

## 2018-10-20 NOTE — OUTREACH NOTE
Sepsis Week 1 Survey      Responses   Facility patient discharged from?  [facility]   Does the patient have one of the following disease processes/diagnoses(primary or secondary)?  Sepsis   Is there a successful TCM telephone encounter documented?  Yes          Emeli Cuellar RN

## 2018-10-22 LAB
BACTERIA FLD CULT: ABNORMAL
GRAM STN SPEC: ABNORMAL
GRAM STN SPEC: ABNORMAL

## 2018-10-23 NOTE — PROGRESS NOTES
Subjective     PROBLEM LIST:  1. [id number] (Stage IIA) ER negative, NH positive, Her2 2+ by IHC, negative by FISH invasive ductal carcinoma of the left breast  A) bilateral mastectomy on 10/4/18.  Pathology showed a 2.6 cm high grade IDC with basaloid features.  0/1 SLN involved.  2. Anxiety/depression  3. Hyperlipidemia  4. History of ovarian cancer 1989, s/p BRYN/BSO, no adjuvant therapy    CHIEF COMPLAINT: breast cancer      HISTORY OF PRESENT ILLNESS:  The patient is a 58 y.o. year old female, referred for evaluation of recently diagnosed breast cancer.   She was diagnosed with a ER negative HER-2 negative breast cancer biopsy.  She had a bilateral mastectomy on 10/4/2018.  About 2 weeks after her surgery she began having fevers associated with a significant cellulitis of her chest wall.  She actually was admitted to the hospital with sepsis and was in the ICU for 5 days.  She was discharged home on oral antibiotics.  She had her drains out earlier today and has about one week of antibiotics remaining.    REVIEW OF SYSTEMS:  A 14 point review of systems was performed and is negative except as noted above.    Past Medical History:   Diagnosis Date   • Anxiety    • Arthritis    • Breast CA (CMS/HCC) 10/4/2018   • Cancer (CMS/HCC)    • Depression    • Heart murmur    • Ovarian cancer (CMS/HCC) 1989       GYN History: BRYN at 29.  HRT for 10 years.      Current Outpatient Prescriptions on File Prior to Visit   Medication Sig Dispense Refill   • ALPRAZolam (XANAX) 0.5 MG tablet Take 1 tablet by mouth 3 (Three) Times a Day As Needed for Anxiety or Sleep. 60 tablet 2   • atorvastatin (LIPITOR) 40 MG tablet Take 1 tablet by mouth Daily. 30 tablet 5   • doxycycline (VIBRAMYICN) 100 MG tablet Take 1 tablet by mouth 2 (Two) Times a Day for 14 days. 28 tablet 0   • Influenza Vac Subunit Quad (FLUCELVAX) 0.5 ML suspension prefilled syringe injection Inject 0.5 mL into the appropriate muscle as directed by prescriber During  "Hospitalization (vaccine) for up to 1 dose. 0.5 mL 0   • pantoprazole (PROTONIX) 40 MG EC tablet Take 1 tablet by mouth Daily for 30 days. 30 tablet 0   • saccharomyces boulardii (FLORASTOR) 250 MG capsule Take 1 capsule by mouth 2 (Two) Times a Day 60 capsule 0   • sertraline (ZOLOFT) 100 MG tablet Take 2 tablets by mouth Daily. 60 tablet 2     No current facility-administered medications on file prior to visit.        No Known Allergies    Past Surgical History:   Procedure Laterality Date   • APPENDECTOMY     • BREAST BIOPSY Right 2003    DONE IN [place]   • COLONOSCOPY  06/2018   • HYSTERECTOMY      AGE 29   • MASTECTOMY W/ SENTINEL NODE BIOPSY Bilateral 10/4/2018    Procedure: LEFT SKIN SPARING BREAST MASTECTOMY WITH LEFT SENTINEL NODE BIOPSY, RIGHT PROPHYLACTIC SKIN SPARING MASTECTOMY;  Surgeon: Koffi Worthington MD;  Location: [facility];  Service: General   • OOPHORECTOMY      AGE 29       Social History     Social History   • Marital status:      Social History Main Topics   • Smoking status: Current Every Day Smoker     Years: 20.00     Types: Cigarettes   • Smokeless tobacco: Never Used      Comment: 2 cigs daily; former 1/2 PPD   • Alcohol use Yes      Comment: occ   • Drug use: No   • Sexual activity: Defer     Other Topics Concern   • Not on file       Family History   Problem Relation Age of Onset   • Arthritis Mother    • Heart attack Mother    • Osteoporosis Mother    • Liver disease Father    • Cancer Maternal Grandmother    • Celiac disease Other    • Breast cancer Neg Hx    • Ovarian cancer Neg Hx        Objective     /67 Comment: RUE  Pulse 60   Temp 98.5 °F (36.9 °C) (Temporal Artery )   Resp 18   Ht 160 cm (63\")   Wt 51.3 kg (113 lb)   SpO2 100% Comment: RA  BMI 20.02 kg/m²   Performance Status: 0  General: well appearing female in no acute distress  Neuro: alert and oriented  HEENT: sclera anicteric, oropharynx clear  Lymphatics: no cervical, supraclavicular, or " axillary adenopathy  Chest: Status post bilateral mastectomy.  Wounds are closed.  There is no significant erythema, or swelling.  Cardiovascular: regular rate and rhythm, no murmurs  Lungs: clear to auscultation bilaterally  Abdomen: soft, nontender, nondistended.  No palpable organomegaly  Extremeties: no lower extremity edema  Skin: no rashes, lesions, bruising, or petechiae  Psych: mood and affect appropriate    Labs: On 10/18/2018 white count was 10.7, hemoglobin 9.5, platelets 257.    Echocardiogram 10/17/2018 showed an ejection fraction of 61-65%    CT abdomen and pelvis in May of this year showed no evidence of metastatic disease.  CT chest during her recent hospital stay showed no evidence of metastatic disease.        Assessment/Plan     Brittani Lambert is a 58 y.o. year old female with stage II ER- MT+ Her2 negative IDC of the left breast.  This is a cancer that is only weakly hormone positive and has a basaloid phenotype.  It is unfortunately a fairly high risk for recurrence.    I would recommend adjuvant chemotherapy for this tumor with dose dense AC followed by taxol.  We reviewed the side effects of this regimen including nausea, fatigue, myelosuppression, infusion reaction, cardiotoxicity, myelodysplasia, neuropathy, alopecia, and constipation or diarrhea.    I think the benefit of adjuvant hormonal therapy in this case is fairly low and we can discuss this further at a future time.  Given her recent severe infection requiring hospital and ICU admission, we'll plan to wait at least 2-3 weeks before beginning chemotherapy.  She will need port placement.  She has already had a baseline echocardiogram.  I will see her back prior to cycle 2.           Carrie Patel MD    10/24/2018

## 2018-10-24 ENCOUNTER — CONSULT (OUTPATIENT)
Dept: ONCOLOGY | Facility: CLINIC | Age: 58
End: 2018-10-24

## 2018-10-24 VITALS
SYSTOLIC BLOOD PRESSURE: 142 MMHG | BODY MASS INDEX: 20.02 KG/M2 | TEMPERATURE: 98.5 F | HEIGHT: 63 IN | HEART RATE: 60 BPM | OXYGEN SATURATION: 100 % | WEIGHT: 113 LBS | RESPIRATION RATE: 18 BRPM | DIASTOLIC BLOOD PRESSURE: 67 MMHG

## 2018-10-24 DIAGNOSIS — C50.912 MALIGNANT NEOPLASM OF LEFT BREAST IN FEMALE, ESTROGEN RECEPTOR NEGATIVE, UNSPECIFIED SITE OF BREAST (HCC): Primary | ICD-10-CM

## 2018-10-24 DIAGNOSIS — Z17.1 MALIGNANT NEOPLASM OF LEFT BREAST IN FEMALE, ESTROGEN RECEPTOR NEGATIVE, UNSPECIFIED SITE OF BREAST (HCC): Primary | ICD-10-CM

## 2018-10-24 PROCEDURE — 99205 OFFICE O/P NEW HI 60 MIN: CPT | Performed by: INTERNAL MEDICINE

## 2018-10-26 ENCOUNTER — OFFICE VISIT (OUTPATIENT)
Dept: FAMILY MEDICINE CLINIC | Facility: CLINIC | Age: 58
End: 2018-10-26

## 2018-10-26 VITALS
BODY MASS INDEX: 20.02 KG/M2 | DIASTOLIC BLOOD PRESSURE: 70 MMHG | HEART RATE: 64 BPM | RESPIRATION RATE: 16 BRPM | WEIGHT: 113 LBS | HEIGHT: 63 IN | TEMPERATURE: 97.8 F | SYSTOLIC BLOOD PRESSURE: 130 MMHG

## 2018-10-26 DIAGNOSIS — I48.0 PAROXYSMAL ATRIAL FIBRILLATION (HCC): ICD-10-CM

## 2018-10-26 DIAGNOSIS — F33.1 MODERATE EPISODE OF RECURRENT MAJOR DEPRESSIVE DISORDER (HCC): ICD-10-CM

## 2018-10-26 DIAGNOSIS — K59.1 FUNCTIONAL DIARRHEA: ICD-10-CM

## 2018-10-26 DIAGNOSIS — F41.9 ANXIETY: ICD-10-CM

## 2018-10-26 DIAGNOSIS — Z09 HOSPITAL DISCHARGE FOLLOW-UP: ICD-10-CM

## 2018-10-26 DIAGNOSIS — C50.912 DUCTAL CARCINOMA OF BREAST, LEFT (HCC): Primary | ICD-10-CM

## 2018-10-26 PROCEDURE — 99495 TRANSJ CARE MGMT MOD F2F 14D: CPT | Performed by: FAMILY MEDICINE

## 2018-10-26 RX ORDER — PANTOPRAZOLE SODIUM 40 MG/1
40 TABLET, DELAYED RELEASE ORAL DAILY
Qty: 30 TABLET | Refills: 3 | Status: SHIPPED | OUTPATIENT
Start: 2018-10-26 | End: 2019-03-12 | Stop reason: SDUPTHER

## 2018-10-26 RX ORDER — SERTRALINE HYDROCHLORIDE 100 MG/1
200 TABLET, FILM COATED ORAL DAILY
Qty: 60 TABLET | Refills: 3 | Status: SHIPPED | OUTPATIENT
Start: 2018-10-26 | End: 2019-06-12 | Stop reason: ALTCHOICE

## 2018-10-26 RX ORDER — ALPRAZOLAM 0.5 MG/1
0.5 TABLET ORAL 3 TIMES DAILY PRN
Qty: 60 TABLET | Refills: 3 | Status: SHIPPED | OUTPATIENT
Start: 2018-10-26 | End: 2019-02-08 | Stop reason: SDUPTHER

## 2018-10-26 RX ORDER — SACCHAROMYCES BOULARDII 250 MG
250 CAPSULE ORAL 2 TIMES DAILY
Qty: 60 CAPSULE | Refills: 3 | Status: SHIPPED | OUTPATIENT
Start: 2018-10-26 | End: 2019-04-10

## 2018-10-26 NOTE — PROGRESS NOTES
Transitional Care Follow Up Visit  Subjective     Brittani Lambert is a 58 y.o. female who presents for a transitional care management visit.    Within 48 business hours after discharge our office contacted her via telephone to coordinate her care and needs.      I reviewed and discussed the details of that call along with the discharge summary, hospital problems, inpatient lab results, inpatient diagnostic studies, and consultation reports with Brittani.     Current outpatient and discharge medications have been reconciled for the patient.    Date of TCM Phone Call 10/19/2018   [facility]   Date of Admission 10/15/2018   Date of Discharge 10/18/2018   Discharge Disposition Home or Self Care     Risk for Readmission (LACE) Score: 8 (10/18/2018  6:00 AM)    History of Present Illness   Course During Hospital Stay: She was admitted to [facility] ICU with presenting symptoms of sepsis secondary to complications related to s/p double mastectomy. She was initially treated at [facility], but transferred to [facility] with sepsis and fever. She had cellulitis of right chest wall. CT of the chest was completed to evaluate for abscess and treatment with  vancomycin and Zosyn. She did require IV fluid resuscitation and hypotension monitoring.    Cultures showed light growth of Staphylococcus aureus.  At discharge, she was provided Doxycycline, for a total of 14 days.  While inpatient, she was found to have atrial fibrillation. This is a new diagnosis for her. Her DOMINIQUE VASC score is 1 and they have recommended to consider daily ASA to anticoagulate her. She is rate controlled with medication treatment.   She has followed up with oncology since her discharge. She is scheduled for first chemo infusion on 11/7/18.   She still is taking Doxycycline. Also, Florastor and Protonix have helped with GI upset.   Still having bilateral chest numbness from double mastectomy, some tenderness in left axilla  after lymph nodes removed. No signs of cellulitis.     Anxiety and depression are stable with current treatment, requesting refill of medications.       The following portions of the patient's history were reviewed and updated as appropriate: allergies, current medications, past family history, past medical history, past social history, past surgical history and problem list.    Review of Systems   Constitutional: Negative.    HENT: Negative for ear pain.    Respiratory: Negative for cough, chest tightness and shortness of breath.    Cardiovascular: Positive for chest pain (chest wall pain secondary to recent double mastectomy ). Negative for palpitations and leg swelling.   Gastrointestinal: Negative for abdominal pain, constipation, diarrhea and vomiting.   Endocrine: Negative for cold intolerance and heat intolerance.   Genitourinary: Negative for dysuria.   Musculoskeletal: Negative for gait problem.   Skin: Negative for color change and rash.   Neurological: Positive for numbness (chest wall). Negative for dizziness and headaches.   Hematological: Negative for adenopathy.   Psychiatric/Behavioral: Negative for agitation and confusion.       Objective   Physical Exam   Constitutional: She is oriented to person, place, and time. She appears well-developed and well-nourished.   HENT:   Head: Normocephalic and atraumatic.   Right Ear: External ear normal.   Left Ear: External ear normal.   Nose: Nose normal.   Eyes: Conjunctivae and EOM are normal.   Cardiovascular: Normal rate, regular rhythm and normal heart sounds.    No murmur heard.  Pulmonary/Chest: Effort normal and breath sounds normal. No respiratory distress. She has no wheezes.   Musculoskeletal: She exhibits no edema or deformity.   Neurological: She is alert and oriented to person, place, and time. No cranial nerve deficit.   Skin: Skin is warm and dry.   Psychiatric: She has a normal mood and affect. Her behavior is normal.   Nursing note and vitals  reviewed.      Assessment/Plan   Brittani was seen today for fu from hosp discharge / sepsis.    Diagnoses and all orders for this visit:    Ductal carcinoma of breast, left (CMS/HCC)    Moderate episode of recurrent major depressive disorder (CMS/HCC)    Anxiety  -     sertraline (ZOLOFT) 100 MG tablet; Take 2 tablets by mouth Daily.  -     ALPRAZolam (XANAX) 0.5 MG tablet; Take 1 tablet by mouth 3 (Three) Times a Day As Needed for Anxiety or Sleep.    Functional diarrhea  -     pantoprazole (PROTONIX) 40 MG EC tablet; Take 1 tablet by mouth Daily for 30 days.  -     saccharomyces boulardii (FLORASTOR) 250 MG capsule; Take 1 capsule by mouth 2 (Two) Times a Day    Paroxysmal atrial fibrillation (CMS/HCC)    Hospital discharge follow-up      Since her discharge, she has been doing well.   Advised her to start low dose ASA due to atrial fibrillation finding during inpatient stay. DOMINIQUE VASC score is low. Also, rate controlled without intervention. She will follow up if symptoms arise.   She is happy with treatment Protonix and Florastor, will continue these.   Keep appointments with oncology and general surgery.

## 2018-10-26 NOTE — PATIENT INSTRUCTIONS
Go to the nearest ER or return to clinic if symptoms worsen, fever/chill develop      Generalized Anxiety Disorder, Adult  Generalized anxiety disorder (THALIA) is a mental health disorder. People with this condition constantly worry about everyday events. Unlike normal anxiety, worry related to THALIA is not triggered by a specific event. These worries also do not fade or get better with time. THALIA interferes with life functions, including relationships, work, and school.  THALIA can vary from mild to severe. People with severe THALIA can have intense waves of anxiety with physical symptoms (panic attacks).  What are the causes?  The exact cause of THALIA is not known.  What increases the risk?  This condition is more likely to develop in:  · Women.  · People who have a family history of anxiety disorders.  · People who are very shy.  · People who experience very stressful life events, such as the death of a loved one.  · People who have a very stressful family environment.    What are the signs or symptoms?  People with THALIA often worry excessively about many things in their lives, such as their health and family. They may also be overly concerned about:  · Doing well at work.  · Being on time.  · Natural disasters.  · Friendships.    Physical symptoms of THALIA include:  · Fatigue.  · Muscle tension or having muscle twitches.  · Trembling or feeling shaky.  · Being easily startled.  · Feeling like your heart is pounding or racing.  · Feeling out of breath or like you cannot take a deep breath.  · Having trouble falling asleep or staying asleep.  · Sweating.  · Nausea, diarrhea, or irritable bowel syndrome (IBS).  · Headaches.  · Trouble concentrating or remembering facts.  · Restlessness.  · Irritability.    How is this diagnosed?  Your health care provider can diagnose THALIA based on your symptoms and medical history. You will also have a physical exam. The health care provider will ask specific questions about your symptoms,  including how severe they are, when they started, and if they come and go. Your health care provider may ask you about your use of alcohol or drugs, including prescription medicines. Your health care provider may refer you to a mental health specialist for further evaluation.  Your health care provider will do a thorough examination and may perform additional tests to rule out other possible causes of your symptoms.  To be diagnosed with THALIA, a person must have anxiety that:  · Is out of his or her control.  · Affects several different aspects of his or her life, such as work and relationships.  · Causes distress that makes him or her unable to take part in normal activities.  · Includes at least three physical symptoms of THALIA, such as restlessness, fatigue, trouble concentrating, irritability, muscle tension, or sleep problems.    Before your health care provider can confirm a diagnosis of THALIA, these symptoms must be present more days than they are not, and they must last for six months or longer.  How is this treated?  The following therapies are usually used to treat THALIA:  · Medicine. Antidepressant medicine is usually prescribed for long-term daily control. Antianxiety medicines may be added in severe cases, especially when panic attacks occur.  · Talk therapy (psychotherapy). Certain types of talk therapy can be helpful in treating THALIA by providing support, education, and guidance. Options include:  ? Cognitive behavioral therapy (CBT). People learn coping skills and techniques to ease their anxiety. They learn to identify unrealistic or negative thoughts and behaviors and to replace them with positive ones.  ? Acceptance and commitment therapy (ACT). This treatment teaches people how to be mindful as a way to cope with unwanted thoughts and feelings.  ? Biofeedback. This process trains you to manage your body's response (physiological response) through breathing techniques and relaxation methods. You will work  with a therapist while machines are used to monitor your physical symptoms.  · Stress management techniques. These include yoga, meditation, and exercise.    A mental health specialist can help determine which treatment is best for you. Some people see improvement with one type of therapy. However, other people require a combination of therapies.  Follow these instructions at home:  · Take over-the-counter and prescription medicines only as told by your health care provider.  · Try to maintain a normal routine.  · Try to anticipate stressful situations and allow extra time to manage them.  · Practice any stress management or self-calming techniques as taught by your health care provider.  · Do not punish yourself for setbacks or for not making progress.  · Try to recognize your accomplishments, even if they are small.  · Keep all follow-up visits as told by your health care provider. This is important.  Contact a health care provider if:  · Your symptoms do not get better.  · Your symptoms get worse.  · You have signs of depression, such as:  ? A persistently sad, cranky, or irritable mood.  ? Loss of enjoyment in activities that used to bring you damir.  ? Change in weight or eating.  ? Changes in sleeping habits.  ? Avoiding friends or family members.  ? Loss of energy for normal tasks.  ? Feelings of guilt or worthlessness.  Get help right away if:  · You have serious thoughts about hurting yourself or others.  If you ever feel like you may hurt yourself or others, or have thoughts about taking your own life, get help right away. You can go to your nearest emergency department or call:  · Your local emergency services (911 in the [place]  · A suicide crisis helpline, such as the National Suicide Prevention Lifeline at 1-821.272.3812. This is open 24 hours a day.    Summary  · Generalized anxiety disorder (THALIA) is a mental health disorder that involves worry that is not triggered by a specific event.  · People with  THALIA often worry excessively about many things in their lives, such as their health and family.  · THALIA may cause physical symptoms such as restlessness, trouble concentrating, sleep problems, frequent sweating, nausea, diarrhea, headaches, and trembling or muscle twitching.  · A mental health specialist can help determine which treatment is best for you. Some people see improvement with one type of therapy. However, other people require a combination of therapies.  This information is not intended to replace advice given to you by your health care provider. Make sure you discuss any questions you have with your health care provider.  Document Released: 04/14/2014 Document Revised: 11/07/2017 Document Reviewed: 11/07/2017  [employer]vier Interactive Patient Education © 2018 [employer]

## 2018-10-29 ENCOUNTER — READMISSION MANAGEMENT (OUTPATIENT)
Dept: CALL CENTER | Facility: HOSPITAL | Age: 58
End: 2018-10-29

## 2018-10-29 NOTE — OUTREACH NOTE
"Sepsis Week 2 Survey      Responses   Facility patient discharged from?  [place]   Does the patient have one of the following disease processes/diagnoses(primary or secondary)?  Sepsis   Week 2 attempt successful?  Yes   Call start time  1205   Call end time  1229   General alerts for this patient  Breast Cancer /c bilat mastectomies.   Discharge diagnosis   Sepsis (CMS/[facility])   Meds reviewed with patient/caregiver?  Yes   Is the patient having any side effects they believe may be caused by any medication additions or changes?  Yes   Side effects comments   Pt reports skin is red and peeling off, pain with urination, \"feels like peeing glass\". Advised her to call PCP or surgeon ASAP. Denies issues with SOA.    Does the patient have all medications related to this admission filled (includes all antibiotics, inhalers, nebulizers,steroids,etc.)  Yes   Is the patient taking all medications as directed (includes completed medication regime)?  Yes   Medication comments  Pt reports that the papers said 14days of abx but the pharm filled 30 days worth. Advised her to call Dr SEGUNDO to clarify.   Does the patient have a primary care provider?   Yes   Does the patient have an appointment with their PCP within 7 days of discharge?  Yes   Has the patient kept scheduled appointments due by today?  Yes   Psychosocial issues?  Yes   Psychosocial comments  Pt reports feeling frustrated, anxious and slightly depressed.We discussed her sadness with her change in body/self-image how this is affecting her life. She does not feel she has a strong support system at home, advised her to seek support with mental health counselor or cancer support group. We discussed options such as prosthetics to help with how her clothing looks on her, help with smoking cessation, and counseling for her anx/dep over the cancer diagnosis and treatments. She plans to start counseling.   Notified Case Management  Psychosocial (Non Urgent)   Did the patient " receive a copy of their discharge instructions?  Yes   Nursing interventions  Reviewed instructions with patient   What is the patient's perception of their health status since discharge?  Improving   Nursing interventions  Nurse provided patient education   Is the patient/caregiver able to teach back Sepsis?  S - Shivering,fever or very cold, S - Sleepy, difficult to arouse,confused, S - Short of breath, E - Extreme pain or generalized discomfort (worst ever,especially abdomen)   Nursing interventions  Nurse provided reassurance to patient, Advised patient to call provider, Nurse provided patient education   Is patient/caregiver able to teach back steps to recovery at home?  Exercise as tolerated, Eat a balanced diet, Talk about feelings with family/friends, Rest and regain strength, Set small, achievable goals for return to baseline health   Is the patient/caregiver able to teach back signs and symptoms of worsening condition:  Fever, Rapid heart rate (>90), Shortness of breath/rapid respiratory rate, Edema   If the patient is a current smoker, are they able to teach back resources for cessation?  Smoking cessation medications, [id number] [Pt smoked 1/2pk/day now down to 3-4 cig/day.  Encouraged to stop smoking. She will discuss with PCP meds to assist in smoking cessation at f/u appt. ]   Is the patient/caregiver able to teach back the hierarchy of who to call/visit for symptoms/problems? PCP, Specialist, Home health nurse, Urgent Care, ED, 911  Yes   Week 2 call completed?  Yes          Emeli Cuellar RN

## 2018-10-30 ENCOUNTER — TELEPHONE (OUTPATIENT)
Dept: GENETICS | Facility: HOSPITAL | Age: 58
End: 2018-10-30

## 2018-10-30 DIAGNOSIS — C50.912 MALIGNANT NEOPLASM OF LEFT BREAST IN FEMALE, ESTROGEN RECEPTOR NEGATIVE, UNSPECIFIED SITE OF BREAST (HCC): Primary | ICD-10-CM

## 2018-10-30 DIAGNOSIS — Z17.1 MALIGNANT NEOPLASM OF LEFT BREAST IN FEMALE, ESTROGEN RECEPTOR NEGATIVE, UNSPECIFIED SITE OF BREAST (HCC): Primary | ICD-10-CM

## 2018-10-30 RX ORDER — DEXAMETHASONE 4 MG/1
TABLET ORAL
Qty: 6 TABLET | Refills: 3 | Status: SHIPPED | OUTPATIENT
Start: 2018-10-30 | End: 2018-12-12

## 2018-10-30 RX ORDER — ONDANSETRON HYDROCHLORIDE 8 MG/1
8 TABLET, FILM COATED ORAL 3 TIMES DAILY PRN
Qty: 30 TABLET | Refills: 5 | Status: SHIPPED | OUTPATIENT
Start: 2018-10-30 | End: 2020-05-26

## 2018-10-31 ENCOUNTER — TELEPHONE (OUTPATIENT)
Dept: FAMILY MEDICINE CLINIC | Facility: CLINIC | Age: 58
End: 2018-10-31

## 2018-11-01 RX ORDER — HYDROCODONE BITARTRATE AND ACETAMINOPHEN 5; 325 MG/1; MG/1
1 TABLET ORAL EVERY 4 HOURS PRN
Qty: 20 TABLET | Refills: 0 | Status: SHIPPED | OUTPATIENT
Start: 2018-11-01 | End: 2019-02-08 | Stop reason: SDUPTHER

## 2018-11-02 ENCOUNTER — TELEPHONE (OUTPATIENT)
Dept: ONCOLOGY | Facility: CLINIC | Age: 58
End: 2018-11-02

## 2018-11-02 NOTE — TELEPHONE ENCOUNTER
----- Message from Marce Holley sent at 11/2/2018 10:00 AM EDT -----  Regarding: LEANNA - RX QUESTIONS  Contact: [phone]  PATIENT HAS QUESTIONS ABOUT HER NEW MEDICATIONS AND WHEN TO TAKE THEM.  DEXAMETHASONE 4MG TABLET AND ONDANSETRON 8MG TABLET.

## 2018-11-02 NOTE — TELEPHONE ENCOUNTER
Briefly reviewed zofran and decadron prescriptions. Explained all of the take home medications will be discussed with her on Monday during chemo education visit with the np.

## 2018-11-05 ENCOUNTER — OFFICE VISIT (OUTPATIENT)
Dept: ONCOLOGY | Facility: CLINIC | Age: 58
End: 2018-11-05

## 2018-11-05 VITALS
TEMPERATURE: 97.6 F | BODY MASS INDEX: 20.2 KG/M2 | HEIGHT: 63 IN | SYSTOLIC BLOOD PRESSURE: 135 MMHG | WEIGHT: 114 LBS | RESPIRATION RATE: 12 BRPM | OXYGEN SATURATION: 99 % | HEART RATE: 70 BPM | DIASTOLIC BLOOD PRESSURE: 63 MMHG

## 2018-11-05 DIAGNOSIS — C50.912 MALIGNANT NEOPLASM OF LEFT BREAST IN FEMALE, ESTROGEN RECEPTOR NEGATIVE, UNSPECIFIED SITE OF BREAST (HCC): Primary | ICD-10-CM

## 2018-11-05 DIAGNOSIS — F41.9 ANXIETY: ICD-10-CM

## 2018-11-05 DIAGNOSIS — Z17.1 MALIGNANT NEOPLASM OF LEFT BREAST IN FEMALE, ESTROGEN RECEPTOR NEGATIVE, UNSPECIFIED SITE OF BREAST (HCC): Primary | ICD-10-CM

## 2018-11-05 PROCEDURE — 99215 OFFICE O/P EST HI 40 MIN: CPT | Performed by: NURSE PRACTITIONER

## 2018-11-05 NOTE — PROGRESS NOTES
CHEMOTHERAPY PREPARATION    Brittani Lambert  [id number]  1960    Chief Complaint: Here for chemotherapy preparation visit     History of present illness:  Brittani Lambert is a 58 y.o. year old female who is here today for chemotherapy preparation and needs assessment. The patient has been diagnosed with breast cancer and is scheduled to begin treatment with adriamycin, cytoxan and taxol.     Oncology History:     No history exists.       Past Medical History:   Diagnosis Date   • Anxiety    • Arthritis    • Breast CA (CMS/HCC) 10/4/2018   • Cancer (CMS/HCC)    • Depression    • Heart murmur    • Ovarian cancer (CMS/HCC) 1989       Past Surgical History:   Procedure Laterality Date   • APPENDECTOMY     • BREAST BIOPSY Right 2003    DONE IN [place]   • COLONOSCOPY  06/2018   • HYSTERECTOMY      AGE 29   • MASTECTOMY W/ SENTINEL NODE BIOPSY Bilateral 10/4/2018    Procedure: LEFT SKIN SPARING BREAST MASTECTOMY WITH LEFT SENTINEL NODE BIOPSY, RIGHT PROPHYLACTIC SKIN SPARING MASTECTOMY;  Surgeon: Koffi Worthington MD;  Location: [facility];  Service: General   • OOPHORECTOMY      AGE 29       MEDICATIONS: The current medication list was reviewed and reconciled.     Allergies:  has No Known Allergies.    Family History   Problem Relation Age of Onset   • Arthritis Mother    • Heart attack Mother    • Osteoporosis Mother    • Liver disease Father    • Cancer Maternal Grandmother    • Celiac disease Other    • Breast cancer Neg Hx    • Ovarian cancer Neg Hx          Review of Systems   Constitutional: Positive for activity change and fatigue. Negative for chills and fever.   HENT: Negative.    Eyes: Negative.    Respiratory: Negative.    Cardiovascular: Negative.    Gastrointestinal: Negative.    Endocrine: Negative.    Genitourinary: Positive for difficulty urinating. Negative for dysuria and hematuria.   Musculoskeletal: Positive for myalgias.   Skin: Negative.    Allergic/Immunologic: Negative.    Neurological:  "Negative.    Hematological: Negative.    Psychiatric/Behavioral: Positive for sleep disturbance. The patient is nervous/anxious.        Physical Exam  Vital Signs: /63   Pulse 70   Temp 97.6 °F (36.4 °C) (Temporal Artery )   Resp 12   Ht 160 cm (62.99\")   Wt 51.7 kg (114 lb)   SpO2 99%   BMI 20.20 kg/m²    General Appearance:  alert, cooperative, no apparent distress and appears stated age   Neurologic/Psychiatric: A&O x 3, gait steady, appropriate affect   HEENT:  Normocephalic, without obvious abnormality, mucous membranes moist   Lungs:   Clear to auscultation bilaterally; respirations regular, even, and unlabored bilaterally   Heart:  Regular rate and rhythm, no murmurs appreciated   Extremities: Normal, atraumatic; no clubbing, cyanosis, or edema    Skin: No rashes, lesions, or abnormal coloration noted     ECOG Performance Status: (0) Fully active, able to carry on all predisease performance without restriction          NEEDS ASSESSMENTS    Genetics  The patient's new diagnosis and family history have been reviewed for genetic counseling needs. A genetic referral is recommended.  Patient does not want a genetic referral at this time.     Psychosocial  The patient has completed a PHQ-9 Depression Screening and the Distress Thermometer (DT) today.   PHQ-9 results show 10-14 (Moderate Depression). The patient scored their distress today as 8 on a scale of 0-10 with 0 being no distress and 10 being extreme distress.   Problems marked by the patient as being an issue for them within the last week include practical problems, family problems, emotional problems and physical problems.   Results were reviewed along with psychosocial resources offered by our cancer center. Our oncology social worker will be flagged for a DT score of 4 or above, and a same day call will be made for a score of 9 or 10. A mental health referral is recommended at this time. The patient is accepting of a referral to Yamila Gonsales, " "APRN.   Copies of patient's questionnaires will be scanned into EMR for details and further reference.    Barriers to care  A barriers form was also completed by the patient today. We discussed services offered by our facility to help her have adequate access to care. The patient was given the name and card for our Oncology Social Worker, Frida Barnett. Based upon barriers assessment today, the patient will require a follow-up call from the  to further discuss needs.   A copy of the barriers form will also be scanned into EMR for details and further reference.     VAD Assessment  The patient and I discussed planned intervenous chemotherapy as well as other IV treatments that are often needed throughout the course of treatment. These may include, but are not limited to blood transfusions, antibiotics, and IV hydration. The vasculature does not appear to be adequate for multiple peripheral IVs throughout their treatment course. Discussed risks and benefits of VADs. The patient would like to pursue Port-A-Cath insertion prior to initiation of treatment.     Advanced Care Planning  The patient and I discussed advanced care planning, \"Conversations that Matter\".   This service was offered, free of charge, for development of advance directives with a certified ACP facilitator.  The patient does have an up-to-date advanced directive. This document is on file with our office. The patient is not interested in an appointment with one of our facilitators to create or update their advanced directives.      Palliative Care  The patient and I discussed palliative care services. Palliative care is not the same as Hospice care. This is specialized medical care for people living with serious illness with the goal of improving quality of life for the patient and their family. [facility] has partnered with [facility] to offer our patients outpatient palliative care early along with their treatment to assist " in coordination of care, symptom management, pain management, and medical decision making.  Oncology criteria for palliative care referral is not met at this time. The patient is not interested in a palliative care consultation.     Additional Referral needs  none      CHEMOTHERAPY EDUCATION    Booklets Given: Chemotherapy and You [x]  Eating Hints [x]    Sexuality/Fertility Books []      Chemotherapy/Biotherapy Education Sheets: (list all that apply)  nausea management, acid reflux management, diarrhea management, Cancer resourse contacts information, skin and mouth care and vaccination information                                                                                                                                                                 Chemotherapy Regimen:   Treatment Plans     Name Type Plan dates Plan Provider         Active    OP BREAST AC DD DOXOrubicin / Cyclophosphamide ONCOLOGY TREATMENT  10/30/2018 - Present Carrie Patel MD                    TOPICS EDUCATION PROVIDED COMMENTS   ANEMIA:  role of RBC, cause, s/s, ways to manage, role of transfusion [x]    THROMBOCYTOPENIA:  role of platelet, cause, s/s, ways to prevent bleeding, things to avoid, when to seek help [x]    NEUTROPENIA:  role of WBC, cause, infection precautions, s/s of infection, when to call MD [x]    NUTRITION & APPETITE CHANGES:  importance of maintaining healthy diet & weight, ways to manage to improve intake, dietary consult, exercise regimen [x]    DIARRHEA:  causes, s/s of dehydration, ways to manage, dietary changes, when to call MD [x]    CONSTIPATION:  causes, ways to manage, dietary changes, when to call MD [x]    NAUSEA & VOMITING:  cause, use of antiemetics, dietary changes, when to call MD [x]    MOUTH SORES:  causes, oral care, ways to manage [x]    ALOPECIA:  cause, ways to manage, resources [x]    INFERTILITY & SEXUALITY:  causes, fertility preservation options, sexuality changes, ways to manage,  importance of birth control [x]    NERVOUS SYSTEM CHANGES:  causes, s/s, neuropathies, cognitive changes, ways to manage [x]    PAIN:  causes, ways to manage [x] ????   SKIN & NAIL CHANGES:  cause, s/s, ways to manage [x]    ORGAN TOXICITIES:  cause, s/s, need for diagnostic tests, labs, when to notify MD [x]    SURVIVORSHIP:  distress, distress assessment, secondary malignancies, early/late effects, follow-up, social issues, social support [x]    HOME CARE:  use of spill kits, storing of PO chemo, how to manage bodily fluids [x]    MISCELLANEOUS:  drug interactions, administration, vesicant, et [x]        Assessment and Plan:    Diagnoses and all orders for this visit:    Malignant neoplasm of left breast in female, estrogen receptor negative, unspecified site of breast (CMS/HCC)  -     Ambulatory Referral to Behavioral Health  -     Ambulatory Referral to Social Work    Anxiety  -     Ambulatory Referral to Behavioral Health        This was a 55 minute face-to-face visit with 50 minutes spent in  counseling and coordination of care as documented above.   The patient and I have reviewed their new cancer diagnosis and scheduled treatment plan. Needs assessment was completed including genetics, psychosocial needs, barriers to care, VAD evaluation, advanced care planning, and palliative care services. Referrals have been ordered as appropriate based upon our evaluation and patient desires.     Chemotherapy teaching was also completed today as documented above. Adequate time was given to answer all questions to her satisfaction. Patient and family are aware of their care team members and contact information if they have questions or problems throughout the treatment course. Needs assessments and education has been completed. The patient is adequately prepared to begin treatment as scheduled.       Pao Levy, APRN

## 2018-11-07 ENCOUNTER — APPOINTMENT (OUTPATIENT)
Dept: ONCOLOGY | Facility: HOSPITAL | Age: 58
End: 2018-11-07

## 2018-11-08 ENCOUNTER — TRANSCRIBE ORDERS (OUTPATIENT)
Dept: ADMINISTRATIVE | Facility: HOSPITAL | Age: 58
End: 2018-11-08

## 2018-11-08 DIAGNOSIS — C50.812 MALIGNANT NEOPLASM OF OVERLAPPING SITES OF LEFT FEMALE BREAST, UNSPECIFIED ESTROGEN RECEPTOR STATUS (HCC): Primary | ICD-10-CM

## 2018-11-09 ENCOUNTER — HOSPITAL ENCOUNTER (OUTPATIENT)
Dept: GENERAL RADIOLOGY | Facility: HOSPITAL | Age: 58
Discharge: HOME | End: 2018-11-09
Attending: SURGERY

## 2018-11-09 ENCOUNTER — READMISSION MANAGEMENT (OUTPATIENT)
Dept: CALL CENTER | Facility: HOSPITAL | Age: 58
End: 2018-11-09

## 2018-11-09 DIAGNOSIS — C50.812 MALIGNANT NEOPLASM OF OVERLAPPING SITES OF LEFT FEMALE BREAST, UNSPECIFIED ESTROGEN RECEPTOR STATUS (HCC): ICD-10-CM

## 2018-11-09 PROCEDURE — 71045 X-RAY EXAM CHEST 1 VIEW: CPT

## 2018-11-09 NOTE — OUTREACH NOTE
Sepsis Week 3 Survey      Responses   Facility patient discharged from?  [place]   Does the patient have one of the following disease processes/diagnoses(primary or secondary)?  Sepsis   Week 3 attempt successful?  Yes   Call start time  1059   Call end time  1106   Discharge diagnosis   Sepsis (CMS/[facility])   Meds reviewed with patient/caregiver?  Yes   Is the patient having any side effects they believe may be caused by any medication additions or changes?  No   Does the patient have all medications related to this admission filled (includes all antibiotics, inhalers, nebulizers,steroids,etc.)  Yes   Is the patient taking all medications as directed (includes completed medication regime)?  Yes   Comments regarding appointments  having port for chemotherapy inserted today, 11/9/18   Does the patient have a primary care provider?   Yes   Does the patient have an appointment with their PCP within 7 days of discharge?  Yes   Has the patient kept scheduled appointments due by today?  Yes   Psychosocial issues?  No   Did the patient receive a copy of their discharge instructions?  Yes   What is the patient's perception of their health status since discharge?  Improving   Additional teach back comments  pt had brief call, was in process of having port placed   Week 3 call completed?  Yes          Elizabeth Love RN

## 2018-11-13 DIAGNOSIS — C50.912 MALIGNANT NEOPLASM OF LEFT BREAST IN FEMALE, ESTROGEN RECEPTOR NEGATIVE, UNSPECIFIED SITE OF BREAST (HCC): ICD-10-CM

## 2018-11-13 DIAGNOSIS — Z17.1 MALIGNANT NEOPLASM OF LEFT BREAST IN FEMALE, ESTROGEN RECEPTOR NEGATIVE, UNSPECIFIED SITE OF BREAST (HCC): ICD-10-CM

## 2018-11-13 RX ORDER — SODIUM CHLORIDE 9 MG/ML
250 INJECTION, SOLUTION INTRAVENOUS ONCE
Status: CANCELLED | OUTPATIENT
Start: 2018-11-14

## 2018-11-13 RX ORDER — PALONOSETRON 0.05 MG/ML
0.25 INJECTION, SOLUTION INTRAVENOUS ONCE
Status: CANCELLED | OUTPATIENT
Start: 2018-11-14

## 2018-11-13 RX ORDER — DOXORUBICIN HYDROCHLORIDE 2 MG/ML
60 INJECTION, SOLUTION INTRAVENOUS ONCE
Status: CANCELLED | OUTPATIENT
Start: 2018-11-14

## 2018-11-14 ENCOUNTER — EDUCATION (OUTPATIENT)
Dept: ONCOLOGY | Facility: HOSPITAL | Age: 58
End: 2018-11-14

## 2018-11-14 ENCOUNTER — INFUSION (OUTPATIENT)
Dept: ONCOLOGY | Facility: HOSPITAL | Age: 58
End: 2018-11-14

## 2018-11-14 ENCOUNTER — DOCUMENTATION (OUTPATIENT)
Dept: NUTRITION | Facility: HOSPITAL | Age: 58
End: 2018-11-14

## 2018-11-14 VITALS
RESPIRATION RATE: 14 BRPM | BODY MASS INDEX: 20.2 KG/M2 | HEART RATE: 103 BPM | HEIGHT: 63 IN | SYSTOLIC BLOOD PRESSURE: 150 MMHG | DIASTOLIC BLOOD PRESSURE: 85 MMHG | WEIGHT: 114 LBS | TEMPERATURE: 98.8 F

## 2018-11-14 DIAGNOSIS — C50.912 MALIGNANT NEOPLASM OF LEFT BREAST IN FEMALE, ESTROGEN RECEPTOR NEGATIVE, UNSPECIFIED SITE OF BREAST (HCC): Primary | ICD-10-CM

## 2018-11-14 DIAGNOSIS — Z17.1 MALIGNANT NEOPLASM OF LEFT BREAST IN FEMALE, ESTROGEN RECEPTOR NEGATIVE, UNSPECIFIED SITE OF BREAST (HCC): Primary | ICD-10-CM

## 2018-11-14 LAB
ALBUMIN SERPL-MCNC: 4.4 G/DL (ref 3.2–4.8)
ALBUMIN/GLOB SERPL: 1.8 G/DL (ref 1.5–2.5)
ALP SERPL-CCNC: 82 U/L (ref 25–100)
ALT SERPL W P-5'-P-CCNC: 16 U/L (ref 7–40)
ANION GAP SERPL CALCULATED.3IONS-SCNC: 4 MMOL/L (ref 3–11)
AST SERPL-CCNC: 23 U/L (ref 0–33)
BILIRUB SERPL-MCNC: 0.3 MG/DL (ref 0.3–1.2)
BUN BLD-MCNC: 12 MG/DL (ref 9–23)
BUN/CREAT SERPL: 18.8 (ref 7–25)
CALCIUM SPEC-SCNC: 9.3 MG/DL (ref 8.7–10.4)
CHLORIDE SERPL-SCNC: 108 MMOL/L (ref 99–109)
CO2 SERPL-SCNC: 26 MMOL/L (ref 20–31)
CREAT BLD-MCNC: 0.64 MG/DL (ref 0.6–1.3)
ERYTHROCYTE [DISTWIDTH] IN BLOOD BY AUTOMATED COUNT: 14.7 % (ref 11.3–14.5)
GFR SERPL CREATININE-BSD FRML MDRD: 95 ML/MIN/1.73
GLOBULIN UR ELPH-MCNC: 2.4 GM/DL
GLUCOSE BLD-MCNC: 88 MG/DL (ref 70–100)
HCT VFR BLD AUTO: 37.5 % (ref 34.5–44)
HGB BLD-MCNC: 12.3 G/DL (ref 11.5–15.5)
LYMPHOCYTES # BLD AUTO: 2.5 10*3/MM3 (ref 0.6–4.8)
LYMPHOCYTES NFR BLD AUTO: 27.6 % (ref 24–44)
MCH RBC QN AUTO: 29.4 PG (ref 27–31)
MCHC RBC AUTO-ENTMCNC: 32.8 G/DL (ref 32–36)
MCV RBC AUTO: 89.8 FL (ref 80–99)
MONOCYTES # BLD AUTO: 0.6 10*3/MM3 (ref 0–1)
MONOCYTES NFR BLD AUTO: 6.6 % (ref 0–12)
NEUTROPHILS # BLD AUTO: 6 10*3/MM3 (ref 1.5–8.3)
NEUTROPHILS NFR BLD AUTO: 65.8 % (ref 41–71)
PLATELET # BLD AUTO: 238 10*3/MM3 (ref 150–450)
PMV BLD AUTO: 7.8 FL (ref 6–12)
POTASSIUM BLD-SCNC: 4.2 MMOL/L (ref 3.5–5.5)
PROT SERPL-MCNC: 6.8 G/DL (ref 5.7–8.2)
RBC # BLD AUTO: 4.18 10*6/MM3 (ref 3.89–5.14)
SODIUM BLD-SCNC: 138 MMOL/L (ref 132–146)
WBC NRBC COR # BLD: 9.1 10*3/MM3 (ref 3.5–10.8)

## 2018-11-14 PROCEDURE — 25010000002 CYCLOPHOSPHAMIDE PER 100 MG: Performed by: INTERNAL MEDICINE

## 2018-11-14 PROCEDURE — 25010000002 FOSAPREPITANT PER 1 MG: Performed by: INTERNAL MEDICINE

## 2018-11-14 PROCEDURE — 80053 COMPREHEN METABOLIC PANEL: CPT

## 2018-11-14 PROCEDURE — 25010000002 DOXORUBICIN PER 10 MG: Performed by: INTERNAL MEDICINE

## 2018-11-14 PROCEDURE — 96367 TX/PROPH/DG ADDL SEQ IV INF: CPT

## 2018-11-14 PROCEDURE — 25010000002 PALONOSETRON PER 25 MCG: Performed by: INTERNAL MEDICINE

## 2018-11-14 PROCEDURE — 96413 CHEMO IV INFUSION 1 HR: CPT

## 2018-11-14 PROCEDURE — 96368 THER/DIAG CONCURRENT INF: CPT

## 2018-11-14 PROCEDURE — 85025 COMPLETE CBC W/AUTO DIFF WBC: CPT

## 2018-11-14 PROCEDURE — 96377 APPLICATON ON-BODY INJECTOR: CPT

## 2018-11-14 PROCEDURE — 96411 CHEMO IV PUSH ADDL DRUG: CPT

## 2018-11-14 PROCEDURE — 25010000002 PEGFILGRASTIM 6 MG/0.6ML PREFILLED SYRINGE KIT: Performed by: INTERNAL MEDICINE

## 2018-11-14 PROCEDURE — 25010000002 DEXAMETHASONE PER 1 MG: Performed by: INTERNAL MEDICINE

## 2018-11-14 PROCEDURE — 96375 TX/PRO/DX INJ NEW DRUG ADDON: CPT

## 2018-11-14 RX ORDER — PALONOSETRON 0.05 MG/ML
0.25 INJECTION, SOLUTION INTRAVENOUS ONCE
Status: COMPLETED | OUTPATIENT
Start: 2018-11-14 | End: 2018-11-14

## 2018-11-14 RX ORDER — LIDOCAINE AND PRILOCAINE 25; 25 MG/G; MG/G
CREAM TOPICAL AS NEEDED
Qty: 30 G | Refills: 3 | Status: SHIPPED | OUTPATIENT
Start: 2018-11-14 | End: 2019-07-23 | Stop reason: SDUPTHER

## 2018-11-14 RX ORDER — DOXORUBICIN HYDROCHLORIDE 2 MG/ML
90 INJECTION, SOLUTION INTRAVENOUS ONCE
Status: COMPLETED | OUTPATIENT
Start: 2018-11-14 | End: 2018-11-14

## 2018-11-14 RX ORDER — SODIUM CHLORIDE 9 MG/ML
250 INJECTION, SOLUTION INTRAVENOUS ONCE
Status: COMPLETED | OUTPATIENT
Start: 2018-11-14 | End: 2018-11-14

## 2018-11-14 RX ADMIN — SODIUM CHLORIDE 150 MG: 9 INJECTION, SOLUTION INTRAVENOUS at 11:29

## 2018-11-14 RX ADMIN — SODIUM CHLORIDE 250 ML: 9 INJECTION, SOLUTION INTRAVENOUS at 11:27

## 2018-11-14 RX ADMIN — PEGFILGRASTIM 6 MG: KIT SUBCUTANEOUS at 13:02

## 2018-11-14 RX ADMIN — DOXORUBICIN HYDROCHLORIDE 90 MG: 2 INJECTION, SOLUTION INTRAVENOUS at 12:09

## 2018-11-14 RX ADMIN — DEXAMETHASONE SODIUM PHOSPHATE 12 MG: 4 INJECTION, SOLUTION INTRA-ARTICULAR; INTRALESIONAL; INTRAMUSCULAR; INTRAVENOUS; SOFT TISSUE at 11:28

## 2018-11-14 RX ADMIN — HEPARIN 500 UNITS: 100 SYRINGE at 13:02

## 2018-11-14 RX ADMIN — PALONOSETRON HYDROCHLORIDE 0.25 MG: 0.25 INJECTION, SOLUTION INTRAVENOUS at 11:28

## 2018-11-14 RX ADMIN — CYCLOPHOSPHAMIDE 910 MG: 1 INJECTION, POWDER, FOR SOLUTION INTRAVENOUS; ORAL at 12:22

## 2018-11-14 NOTE — PLAN OF CARE
Outpatient Infusion • [address] • [address] • [place] [address] • [phone]      CHEMOTHERAPY EDUCATION SHEET    NAME:  Brittani Lambert      : 1960           DATE: 18    Booklets Given: Chemotherapy and You []  Eating Hints []    Sexuality/Fertility Books []     Chemotherapy/Biotherapy Education Sheets: (list all that apply)  Chemotherapy booklet already provided                                                                                                                                                                Chemotherapy Regimen:  Doxorubicin 60mg/m2 + Cyclophosphamide 600mg /m2 D1 Q14 days     TOPICS EDUCATION PROVIDED EDUCATION REINFORCED COMMENTS   ANEMIA:  role of RBC, cause, s/s, ways to manage, role of transfusion [x] [] Discussed the potential for fatigue associated with anemia   THROMBOCYTOPENIA:  role of platelet, cause, s/s, ways to prevent bleeding, things to avoid, when to seek help [x] [] Discussed the potential for increased bleeding. Patient takes an everyday aspirin. Told her to continue that and to add tylenol for any aches and pains    NEUTROPENIA:  role of WBC, cause, infection precautions, s/s of infection, when to call MD [x] [] Discussed the potential increased risk of infection with low white count. Counseled to watch for signs of infection such as fever   NUTRITION & APPETITE CHANGES:  importance of maintaining healthy diet & weight, ways to manage to improve intake, dietary consult, exercise regimen [] []    DIARRHEA:  causes, s/s of dehydration, ways to manage, dietary changes, when to call MD [] []    CONSTIPATION:  causes, ways to manage, dietary changes, when to call MD [] []    NAUSEA & VOMITING:  cause, use of antiemetics, dietary changes, when to call MD [x] [] Discussed the high emetic risk with this regimen. Counseled on the proper use of her home prn n/v medications   MOUTH SORES:  causes, oral care, ways to manage [] []    ALOPECIA:   cause, ways to manage, resources [x] []    INFERTILITY & SEXUALITY:  causes, fertility preservation options, sexuality changes, ways to manage, importance of birth control [] []    NERVOUS SYSTEM CHANGES:  causes, s/s, neuropathies, cognitive changes, ways to manage [] []    PAIN:  causes, ways to manage [x] [] See above for discussion of minor aches and pains????   SKIN & NAIL CHANGES:  cause, s/s, ways to manage [] []    ORGAN TOXICITIES:  cause, s/s, need for diagnostic tests, labs, when to notify MD [] []    SURVIVORSHIP:  distress, distress assessment, secondary malignancies, early/late effects, follow-up, social issues, social support [] []    HOME CARE:  use of spill kits, storing of PO chemo, how to manage bodily fluids [] []    MISCELLANEOUS:  drug interactions, administration, vesicant, et [x] [] Discussed the potential for infusion related reactions and potential for discoloration of urine      Referrals:        Notes:   Consent already obtained (see FYI). Patient was already counseled in clinic but I reinforced many of the topics of education. Counseled that she would be received growth factor support with this regimen and that bone pain is a common side effect. Counseled that she may try taking claritin once daily x 5 days for symptom management. Answered all questions appropriately. Medication reconciliation completed at encounter. No longer on pantoprazole or florastor

## 2018-11-15 NOTE — PROGRESS NOTES
Oncology Nutrition Screening    Patient Name:  Brittani Lambert  YOB: 1960  MRN: [id number]  Date:  11/15/18  Physician:  Dr. Patel    Type of Cancer Treatment:   Surgery:   Bilateral mastectomy  Chemotherapy:  DD Adriamycin / Cytoxan - every 14 days x 4 followed by Taxol    Patient Active Problem List   Diagnosis   • Mixed hyperlipidemia   • Tobacco abuse   • Moderate episode of recurrent major depressive disorder (CMS/HCC)   • Anxiety   • Functional diarrhea   • Lt Breast CA (CMS/HCC)   • Sepsis (CMS/HCC)   • Rt Cellulitis of chest wall   • Acute cystitis without hematuria   • Atrial fibrillation (CMS/HCC)       Current Outpatient Medications   Medication Sig Dispense Refill   • ALPRAZolam (XANAX) 0.5 MG tablet Take 1 tablet by mouth 3 (Three) Times a Day As Needed for Anxiety or Sleep. 60 tablet 3   • aspirin 81 MG tablet Take 1 tablet by mouth Daily. 30 tablet    • dexamethasone (DECADRON) 4 MG tablet Take 2 tablets in the morning daily on Days 2, 3 & 4.  Take with food. 6 tablet 3   • HYDROcodone-acetaminophen (NORCO) 5-325 MG per tablet Take 1 tablet by mouth Every 4 (Four) Hours As Needed for Moderate Pain . 20 tablet 0   • Influenza Vac Subunit Quad (FLUCELVAX) 0.5 ML suspension prefilled syringe injection Inject 0.5 mL into the appropriate muscle as directed by prescriber During Hospitalization (vaccine) for up to 1 dose. 0.5 mL 0   • lidocaine-prilocaine (EMLA) 2.5-2.5 % cream Apply  topically to the appropriate area as directed As Needed (45-60 minutes prior to port access.  Cover with saran/plastic wrap.). 30 g 3   • ondansetron (ZOFRAN) 8 MG tablet Take 1 tablet by mouth 3 (Three) Times a Day As Needed for Nausea or Vomiting. 30 tablet 5   • pantoprazole (PROTONIX) 40 MG EC tablet Take 1 tablet by mouth Daily for 30 days. 30 tablet 3   • saccharomyces boulardii (FLORASTOR) 250 MG capsule Take 1 capsule by mouth 2 (Two) Times a Day 60 capsule 3   • sertraline (ZOLOFT) 100 MG tablet Take 2  tablets by mouth Daily. 60 tablet 3     No current facility-administered medications for this visit.        Glycemic Risk:   René    Weight:   Height: 63 inches  Weight: 114 lbs.  Usual Body Weight: ~120 lbs.   BMI: 20.2  Normal  Weight has decreased ~10 pounds over last ~3 months; patient has since regained ~4# and has been stable x ~1 month     Oral Food Intake:  Regular Diet - No Restrictions    Hydration Status:   How many 8 ounce glass of water of fluid do you drink per day?  Specific amount not assessed at this time.    Enteral Feeding:   n/a    Nutrition Symptoms:   Diarrhea - chronic     Activity:   Not my normal self, but able to be up and about with fairly normal activities     reports that she has been smoking cigarettes.  She has smoked for the past 20.00 years. she has never used smokeless tobacco. She reports that she drinks alcohol. She reports that she does not use drugs.    Evaluation of Nutritional Risk:   Patient has been identified at nutritional risk due to diagnosis, treatment plan, weight loss, and nutrition symptoms.  Met with patient and her mother during her initial chemo infusion appointment.  Nutritional screening completed and confirmed by patient as above.  She states her appetite has been pretty good and reports she is managing her diarrhea fairly well.    Discussed the importance of good nutrition during her treatment course focusing on adequate calorie, protein, nutrient, and fluid intake.  Advised her to be consuming smaller more frequent meals/snacks throughout the day to aid with potential nausea management.  Emphasized the importance of protein and its role in the diet; reviewed high protein foods; and recommended she have a protein source at each meal/snack.  Also emphasized the importance of hydration; reviewed good hydrating fluid options; and recommended she drink at least 64 ounces daily.  Reviewed the ACS nutrition booklet and suggested she use it for symptom management  "as needed.  Provided and reviewed written diet material \"Nutritional Considerations in Breast Cancer\".    Answered her questions and she voiced understanding and demonstrated excellent comprehension of information discussed.  RD's contact information provided and encouraged her to call with further questions.  Will monitor as needed during her treatment course.    Electronically signed by:  Carolyn Cutler RD  3:36 PM  "

## 2018-11-26 ENCOUNTER — DOCUMENTATION (OUTPATIENT)
Dept: OTHER | Facility: HOSPITAL | Age: 58
End: 2018-11-26

## 2018-11-26 NOTE — PROGRESS NOTES
Patient called to ask about staying at [facility] after her chemo 11/28/18. I called [facility] - they have put patient on wait list - I sent referral and updated patient

## 2018-11-28 ENCOUNTER — DOCUMENTATION (OUTPATIENT)
Dept: NUTRITION | Facility: HOSPITAL | Age: 58
End: 2018-11-28

## 2018-11-28 ENCOUNTER — OFFICE VISIT (OUTPATIENT)
Dept: ONCOLOGY | Facility: CLINIC | Age: 58
End: 2018-11-28

## 2018-11-28 ENCOUNTER — INFUSION (OUTPATIENT)
Dept: ONCOLOGY | Facility: HOSPITAL | Age: 58
End: 2018-11-28

## 2018-11-28 VITALS
TEMPERATURE: 97.6 F | HEART RATE: 89 BPM | HEIGHT: 63 IN | RESPIRATION RATE: 18 BRPM | WEIGHT: 114 LBS | DIASTOLIC BLOOD PRESSURE: 72 MMHG | SYSTOLIC BLOOD PRESSURE: 138 MMHG | OXYGEN SATURATION: 99 % | BODY MASS INDEX: 20.2 KG/M2

## 2018-11-28 DIAGNOSIS — C50.912 MALIGNANT NEOPLASM OF LEFT BREAST IN FEMALE, ESTROGEN RECEPTOR NEGATIVE, UNSPECIFIED SITE OF BREAST (HCC): Primary | ICD-10-CM

## 2018-11-28 DIAGNOSIS — Z17.1 MALIGNANT NEOPLASM OF LEFT BREAST IN FEMALE, ESTROGEN RECEPTOR NEGATIVE, UNSPECIFIED SITE OF BREAST (HCC): Primary | ICD-10-CM

## 2018-11-28 LAB
ALBUMIN SERPL-MCNC: 4.48 G/DL (ref 3.2–4.8)
ALBUMIN/GLOB SERPL: 1.9 G/DL (ref 1.5–2.5)
ALP SERPL-CCNC: 122 U/L (ref 25–100)
ALT SERPL W P-5'-P-CCNC: 22 U/L (ref 7–40)
ANION GAP SERPL CALCULATED.3IONS-SCNC: 4 MMOL/L (ref 3–11)
AST SERPL-CCNC: 20 U/L (ref 0–33)
BILIRUB SERPL-MCNC: 0.3 MG/DL (ref 0.3–1.2)
BUN BLD-MCNC: 12 MG/DL (ref 9–23)
BUN/CREAT SERPL: 20.7 (ref 7–25)
CALCIUM SPEC-SCNC: 9 MG/DL (ref 8.7–10.4)
CHLORIDE SERPL-SCNC: 107 MMOL/L (ref 99–109)
CO2 SERPL-SCNC: 25 MMOL/L (ref 20–31)
CREAT BLD-MCNC: 0.58 MG/DL (ref 0.6–1.3)
ERYTHROCYTE [DISTWIDTH] IN BLOOD BY AUTOMATED COUNT: 14.6 % (ref 11.3–14.5)
GFR SERPL CREATININE-BSD FRML MDRD: 107 ML/MIN/1.73
GLOBULIN UR ELPH-MCNC: 2.4 GM/DL
GLUCOSE BLD-MCNC: 90 MG/DL (ref 70–100)
HCT VFR BLD AUTO: 34.1 % (ref 34.5–44)
HGB BLD-MCNC: 11.4 G/DL (ref 11.5–15.5)
LYMPHOCYTES # BLD AUTO: 2 10*3/MM3 (ref 0.6–4.8)
LYMPHOCYTES NFR BLD AUTO: 14 % (ref 24–44)
MCH RBC QN AUTO: 30.2 PG (ref 27–31)
MCHC RBC AUTO-ENTMCNC: 33.5 G/DL (ref 32–36)
MCV RBC AUTO: 90.2 FL (ref 80–99)
MONOCYTES # BLD AUTO: 0.3 10*3/MM3 (ref 0–1)
MONOCYTES NFR BLD AUTO: 1.8 % (ref 0–12)
NEUTROPHILS # BLD AUTO: 12.2 10*3/MM3 (ref 1.5–8.3)
NEUTROPHILS NFR BLD AUTO: 84.2 % (ref 41–71)
PLATELET # BLD AUTO: 235 10*3/MM3 (ref 150–450)
PMV BLD AUTO: 7.1 FL (ref 6–12)
POTASSIUM BLD-SCNC: 4.1 MMOL/L (ref 3.5–5.5)
PROT SERPL-MCNC: 6.9 G/DL (ref 5.7–8.2)
RBC # BLD AUTO: 3.78 10*6/MM3 (ref 3.89–5.14)
SODIUM BLD-SCNC: 136 MMOL/L (ref 132–146)
WBC NRBC COR # BLD: 14.5 10*3/MM3 (ref 3.5–10.8)

## 2018-11-28 PROCEDURE — 96367 TX/PROPH/DG ADDL SEQ IV INF: CPT

## 2018-11-28 PROCEDURE — 25010000002 CYCLOPHOSPHAMIDE PER 100 MG: Performed by: INTERNAL MEDICINE

## 2018-11-28 PROCEDURE — 25010000002 PEGFILGRASTIM 6 MG/0.6ML PREFILLED SYRINGE KIT: Performed by: INTERNAL MEDICINE

## 2018-11-28 PROCEDURE — 99213 OFFICE O/P EST LOW 20 MIN: CPT | Performed by: INTERNAL MEDICINE

## 2018-11-28 PROCEDURE — 96413 CHEMO IV INFUSION 1 HR: CPT

## 2018-11-28 PROCEDURE — 25010000002 DOXORUBICIN PER 10 MG: Performed by: INTERNAL MEDICINE

## 2018-11-28 PROCEDURE — 96411 CHEMO IV PUSH ADDL DRUG: CPT

## 2018-11-28 PROCEDURE — 25010000002 DEXAMETHASONE PER 1 MG: Performed by: INTERNAL MEDICINE

## 2018-11-28 PROCEDURE — 96377 APPLICATON ON-BODY INJECTOR: CPT

## 2018-11-28 PROCEDURE — 96375 TX/PRO/DX INJ NEW DRUG ADDON: CPT

## 2018-11-28 PROCEDURE — 25010000002 FOSAPREPITANT PER 1 MG: Performed by: INTERNAL MEDICINE

## 2018-11-28 PROCEDURE — 80053 COMPREHEN METABOLIC PANEL: CPT

## 2018-11-28 PROCEDURE — 96368 THER/DIAG CONCURRENT INF: CPT

## 2018-11-28 PROCEDURE — 85025 COMPLETE CBC W/AUTO DIFF WBC: CPT

## 2018-11-28 PROCEDURE — 25010000002 PALONOSETRON PER 25 MCG: Performed by: INTERNAL MEDICINE

## 2018-11-28 RX ORDER — DOXORUBICIN HYDROCHLORIDE 2 MG/ML
90 INJECTION, SOLUTION INTRAVENOUS ONCE
Status: COMPLETED | OUTPATIENT
Start: 2018-11-28 | End: 2018-11-28

## 2018-11-28 RX ORDER — DOXORUBICIN HYDROCHLORIDE 2 MG/ML
60 INJECTION, SOLUTION INTRAVENOUS ONCE
Status: CANCELLED | OUTPATIENT
Start: 2018-11-28

## 2018-11-28 RX ORDER — PALONOSETRON 0.05 MG/ML
0.25 INJECTION, SOLUTION INTRAVENOUS ONCE
Status: COMPLETED | OUTPATIENT
Start: 2018-11-28 | End: 2018-11-28

## 2018-11-28 RX ORDER — PALONOSETRON 0.05 MG/ML
0.25 INJECTION, SOLUTION INTRAVENOUS ONCE
Status: CANCELLED | OUTPATIENT
Start: 2018-11-28

## 2018-11-28 RX ORDER — SODIUM CHLORIDE 9 MG/ML
250 INJECTION, SOLUTION INTRAVENOUS ONCE
Status: COMPLETED | OUTPATIENT
Start: 2018-11-28 | End: 2018-11-28

## 2018-11-28 RX ORDER — SODIUM CHLORIDE 9 MG/ML
250 INJECTION, SOLUTION INTRAVENOUS ONCE
Status: CANCELLED | OUTPATIENT
Start: 2018-11-28

## 2018-11-28 RX ADMIN — DOXORUBICIN HYDROCHLORIDE 90 MG: 2 INJECTION, SOLUTION INTRAVENOUS at 13:39

## 2018-11-28 RX ADMIN — SODIUM CHLORIDE 150 MG: 9 INJECTION, SOLUTION INTRAVENOUS at 13:12

## 2018-11-28 RX ADMIN — PEGFILGRASTIM 6 MG: KIT SUBCUTANEOUS at 14:43

## 2018-11-28 RX ADMIN — CYCLOPHOSPHAMIDE 910 MG: 1 INJECTION, POWDER, FOR SOLUTION INTRAVENOUS; ORAL at 13:54

## 2018-11-28 RX ADMIN — PALONOSETRON HYDROCHLORIDE 0.25 MG: 0.25 INJECTION, SOLUTION INTRAVENOUS at 13:10

## 2018-11-28 RX ADMIN — SODIUM CHLORIDE 250 ML: 9 INJECTION, SOLUTION INTRAVENOUS at 13:10

## 2018-11-28 RX ADMIN — DEXAMETHASONE SODIUM PHOSPHATE 12 MG: 4 INJECTION, SOLUTION INTRAMUSCULAR; INTRAVENOUS at 13:11

## 2018-11-28 RX ADMIN — HEPARIN 500 UNITS: 100 SYRINGE at 14:44

## 2018-11-28 NOTE — PROGRESS NOTES
"      PROBLEM LIST:  1. [id number] (Stage IIA) ER negative, IA positive, Her2 2+ by IHC, negative by FISH invasive ductal carcinoma of the left breast  A) bilateral mastectomy on 10/4/18.  Pathology showed a 2.6 cm high grade IDC with basaloid features.  0/1 SLN involved.  B) adjuvant chemotherapy with ddAC followed by taxol started on 11/14/18  2. Anxiety/depression  3. Hyperlipidemia  4. History of ovarian cancer 1989, s/p BRYN/BSO, no adjuvant therapy          Subjective     HISTORY OF PRESENT ILLNESS:   Brittani Lambert returns for follow-up.   She says she did okay with her first round of treatment.  She did not have a lot of nausea or vomiting.  She did have trouble with constipation and also noticed that she felt more tired. She gave herself an enema at home and this helped with her symptoms. Today she feels fairly well.    Past Medical History, Past Surgical History, Social History, Family History have been reviewed and are without significant changes except as mentioned.    Review of Systems   A comprehensive 14 point review of systems was performed and was negative except as mentioned.    Medications:  The current medication list was reviewed in the EMR    ALLERGIES:  No Known Allergies    Objective      /72 Comment: RUE  Pulse 89   Temp 97.6 °F (36.4 °C) (Temporal)   Resp 18   Ht 160 cm (63\")   Wt 51.7 kg (114 lb)   SpO2 99% Comment: RA  BMI 20.19 kg/m²      Performance Status: 0    General: well appearing female in no acute distress  Neuro: alert and oriented  HEENT: sclera anicteric, oropharynx clear  Lymphatics: no cervical, supraclavicular, or axillary adenopathy  Cardiovascular: regular rate and rhythm, no murmurs  Lungs: clear to auscultation bilaterally  Abdomen: soft, nontender, nondistended.  No palpable organomegaly  Extremeties: no lower extremity edema  Skin: no rashes, lesions, bruising, or petechiae  Psych: mood and affect appropriate      RECENT LABS:  White count 14.5, hemoglobin " 11.4, platelets 235.  CMP is unremarkable other than an alkaline phosphatase of 122        Assessment/Plan   Brittani Lambert is a 58 y.o. year old female with a ER negative HER-2 negative breast cancer who returns for follow-up after her first cycle of Adriamycin and Cytoxan.  She did fairly well with treatment with expected side effects of fatigue and constipation.  We reviewed some over-the-counter constipation treatments.  We'll proceed with cycle 2 today.  She will follow-up in 2 weeks for her next round of treatment.                  Visit time was 15 minutes, greater than 50% spent in counseling      Carrie Patel MD  [facility]    11/28/2018          CC:

## 2018-11-29 ENCOUNTER — TELEPHONE (OUTPATIENT)
Dept: ONCOLOGY | Facility: CLINIC | Age: 58
End: 2018-11-29

## 2018-11-29 NOTE — TELEPHONE ENCOUNTER
Called and spoke with pt. Discussed options/instructions for constipation. Also mailed her a copy of my constipation teach sheet for OTC medications and lifestyle modifications.

## 2018-11-29 NOTE — PROGRESS NOTES
Onc Nutrition     Patient Name:  Brittani Lambert  YOB: 1960    Weight: 114#; stable  Nutrition Symptoms: none    Follow up with patient during her chemo infusion appointment.  She reports tolerating her 1st cycle of chemo pretty well.  She states her appetite has been good and denies significant nutritional complaints at this time.  She also denies nutritional questions at this time.    Encouraged her to continue with her good oral intake and advised her to call RD if questions arise.  She voiced understanding.  Will follow up as indicated.    Electronically signed by:  Carolyn Cutler RD  11/29/18 1:50 PM

## 2018-12-11 DIAGNOSIS — Z17.1 MALIGNANT NEOPLASM OF LEFT BREAST IN FEMALE, ESTROGEN RECEPTOR NEGATIVE, UNSPECIFIED SITE OF BREAST (HCC): ICD-10-CM

## 2018-12-11 DIAGNOSIS — C50.912 MALIGNANT NEOPLASM OF LEFT BREAST IN FEMALE, ESTROGEN RECEPTOR NEGATIVE, UNSPECIFIED SITE OF BREAST (HCC): ICD-10-CM

## 2018-12-11 RX ORDER — SODIUM CHLORIDE 9 MG/ML
250 INJECTION, SOLUTION INTRAVENOUS ONCE
Status: CANCELLED | OUTPATIENT
Start: 2018-12-12

## 2018-12-11 RX ORDER — PALONOSETRON 0.05 MG/ML
0.25 INJECTION, SOLUTION INTRAVENOUS ONCE
Status: CANCELLED | OUTPATIENT
Start: 2018-12-12

## 2018-12-11 RX ORDER — DOXORUBICIN HYDROCHLORIDE 2 MG/ML
60 INJECTION, SOLUTION INTRAVENOUS ONCE
Status: CANCELLED | OUTPATIENT
Start: 2018-12-12

## 2018-12-11 NOTE — PROGRESS NOTES
"      PROBLEM LIST:  1. [id number] (Stage IIA) ER negative, OH positive, Her2 2+ by IHC, negative by FISH invasive ductal carcinoma of the left breast  A) bilateral mastectomy on 10/4/18.  Pathology showed a 2.6 cm high grade IDC with basaloid features.  0/1 SLN involved.  B) adjuvant chemotherapy with ddAC followed by taxol started on 11/14/18  2. Anxiety/depression  3. Hyperlipidemia  4. History of ovarian cancer 1989, s/p BRYN/BSO, no adjuvant therapy          Subjective     HISTORY OF PRESENT ILLNESS:   Brittani Lambert returns for follow-up on chemotherapy.  She says she did okay with this cycle.  She did not take the steroids and did not have any nausea.  She feels tired.  She did have constipation but was able to manage it.  She also has developed a blister rash on her right upper arm and upper chest.  It is very itchy and sore.    Past Medical History, Past Surgical History, Social History, Family History have been reviewed and are without significant changes except as mentioned.    Review of Systems   A comprehensive 14 point review of systems was performed and was negative except as mentioned.    Medications:  The current medication list was reviewed in the EMR    ALLERGIES:  No Known Allergies    Objective      /65 Comment: LUE  Pulse 92   Temp 97.2 °F (36.2 °C) (Temporal)   Resp 16   Ht 160 cm (63\")   Wt 52.2 kg (115 lb)   SpO2 96% Comment: RA  BMI 20.37 kg/m²      Performance Status: 0    General: well appearing female in no acute distress  Neuro: alert and oriented  HEENT: sclera anicteric, oropharynx clear  Lymphatics: no cervical, supraclavicular, or axillary adenopathy  Cardiovascular: regular rate and rhythm, no murmurs  Lungs: clear to auscultation bilaterally  Abdomen: soft, nontender, nondistended.  No palpable organomegaly  Extremeties: no lower extremity edema  Skin: Vesicular rash on the right upper inner arm and right upper chest.  Lesions are broken open.  Psych: mood and affect " appropriate                Assessment/Plan   Brittani Lambert is a 58 y.o. year old female with a ER negative HER-2 negative breast cancer who returns for follow-up after her second cycle of Adriamycin and Cytoxan.  She is doing fairly well with fatigue and constipation.  She has had minimal nausea so she does not need to take the dexamethasone with any future cycles.  We discussed the options of doing Taxol weekly for 12 weeks versus every 2 weeks for 4 doses.  At this point we are planning on the every 2 week dosing, but she will think about it and let us know for sure her next visit.  I think there is a slightly higher risk of neuropathy with the 12 week treatment, but the short-term side effects of myalgias and fatigue or more significant with the every 2 week dosing.    Shingles: We discussed that shingles is a risk in settings of immunosuppression.  She does not need to worry about the contagious to her family members.  The lesions have been present for about 3-4 days.  I will put her on Valtrex.    Follow-up in 2 weeks.                I spent 25 minutes with the patient. I spent > 50% percent of this time counseling and discussing prognosis, diagnostic testing, evaluation, current status and management.        Carrie Patel MD  [facility]    12/12/2018          CC:

## 2018-12-12 ENCOUNTER — TELEPHONE (OUTPATIENT)
Dept: FAMILY MEDICINE CLINIC | Facility: CLINIC | Age: 58
End: 2018-12-12

## 2018-12-12 ENCOUNTER — HOSPITAL ENCOUNTER (OUTPATIENT)
Dept: ONCOLOGY | Facility: HOSPITAL | Age: 58
Setting detail: INFUSION SERIES
Discharge: HOME | End: 2018-12-12

## 2018-12-12 ENCOUNTER — OFFICE VISIT (OUTPATIENT)
Dept: ONCOLOGY | Facility: CLINIC | Age: 58
End: 2018-12-12

## 2018-12-12 VITALS
HEART RATE: 92 BPM | WEIGHT: 115 LBS | DIASTOLIC BLOOD PRESSURE: 65 MMHG | RESPIRATION RATE: 16 BRPM | HEIGHT: 63 IN | OXYGEN SATURATION: 96 % | TEMPERATURE: 97.2 F | SYSTOLIC BLOOD PRESSURE: 125 MMHG | BODY MASS INDEX: 20.38 KG/M2

## 2018-12-12 DIAGNOSIS — Z17.1 MALIGNANT NEOPLASM OF LEFT BREAST IN FEMALE, ESTROGEN RECEPTOR NEGATIVE, UNSPECIFIED SITE OF BREAST (HCC): Primary | ICD-10-CM

## 2018-12-12 DIAGNOSIS — C50.912 MALIGNANT NEOPLASM OF LEFT BREAST IN FEMALE, ESTROGEN RECEPTOR NEGATIVE, UNSPECIFIED SITE OF BREAST (HCC): Primary | ICD-10-CM

## 2018-12-12 LAB
ALBUMIN SERPL-MCNC: 4.26 G/DL (ref 3.2–4.8)
ALBUMIN/GLOB SERPL: 2.2 G/DL (ref 1.5–2.5)
ALP SERPL-CCNC: 125 U/L (ref 25–100)
ALT SERPL W P-5'-P-CCNC: 13 U/L (ref 7–40)
ANION GAP SERPL CALCULATED.3IONS-SCNC: 7 MMOL/L (ref 3–11)
AST SERPL-CCNC: 17 U/L (ref 0–33)
BILIRUB SERPL-MCNC: 0.2 MG/DL (ref 0.3–1.2)
BUN BLD-MCNC: 13 MG/DL (ref 9–23)
BUN/CREAT SERPL: 22.4 (ref 7–25)
CALCIUM SPEC-SCNC: 8.9 MG/DL (ref 8.7–10.4)
CHLORIDE SERPL-SCNC: 109 MMOL/L (ref 99–109)
CO2 SERPL-SCNC: 25 MMOL/L (ref 20–31)
CREAT BLD-MCNC: 0.58 MG/DL (ref 0.6–1.3)
ERYTHROCYTE [DISTWIDTH] IN BLOOD BY AUTOMATED COUNT: 15.6 % (ref 11.3–14.5)
GFR SERPL CREATININE-BSD FRML MDRD: 107 ML/MIN/1.73
GLOBULIN UR ELPH-MCNC: 1.9 GM/DL
GLUCOSE BLD-MCNC: 92 MG/DL (ref 70–100)
HCT VFR BLD AUTO: 28.4 % (ref 34.5–44)
HGB BLD-MCNC: 9.7 G/DL (ref 11.5–15.5)
LYMPHOCYTES # BLD AUTO: 1.6 10*3/MM3 (ref 0.6–4.8)
LYMPHOCYTES NFR BLD AUTO: 12.8 % (ref 24–44)
MCH RBC QN AUTO: 30.9 PG (ref 27–31)
MCHC RBC AUTO-ENTMCNC: 34.2 G/DL (ref 32–36)
MCV RBC AUTO: 90.5 FL (ref 80–99)
MONOCYTES # BLD AUTO: 0.5 10*3/MM3 (ref 0–1)
MONOCYTES NFR BLD AUTO: 4.1 % (ref 0–12)
NEUTROPHILS # BLD AUTO: 10.2 10*3/MM3 (ref 1.5–8.3)
NEUTROPHILS NFR BLD AUTO: 83.1 % (ref 41–71)
PLATELET # BLD AUTO: 168 10*3/MM3 (ref 150–450)
PMV BLD AUTO: 7.2 FL (ref 6–12)
POTASSIUM BLD-SCNC: 4.4 MMOL/L (ref 3.5–5.5)
PROT SERPL-MCNC: 6.2 G/DL (ref 5.7–8.2)
RBC # BLD AUTO: 3.14 10*6/MM3 (ref 3.89–5.14)
SODIUM BLD-SCNC: 141 MMOL/L (ref 132–146)
WBC NRBC COR # BLD: 12.3 10*3/MM3 (ref 3.5–10.8)

## 2018-12-12 PROCEDURE — 25010000002 DEXAMETHASONE PER 1 MG: Performed by: INTERNAL MEDICINE

## 2018-12-12 PROCEDURE — 25010000002 FOSAPREPITANT PER 1 MG: Performed by: INTERNAL MEDICINE

## 2018-12-12 PROCEDURE — 96377 APPLICATON ON-BODY INJECTOR: CPT

## 2018-12-12 PROCEDURE — 25010000002 PALONOSETRON PER 25 MCG: Performed by: INTERNAL MEDICINE

## 2018-12-12 PROCEDURE — 96366 THER/PROPH/DIAG IV INF ADDON: CPT

## 2018-12-12 PROCEDURE — 25010000002 CYCLOPHOSPHAMIDE PER 100 MG: Performed by: INTERNAL MEDICINE

## 2018-12-12 PROCEDURE — 96411 CHEMO IV PUSH ADDL DRUG: CPT

## 2018-12-12 PROCEDURE — 25010000002 PEGFILGRASTIM 6 MG/0.6ML PREFILLED SYRINGE KIT: Performed by: INTERNAL MEDICINE

## 2018-12-12 PROCEDURE — 85025 COMPLETE CBC W/AUTO DIFF WBC: CPT | Performed by: INTERNAL MEDICINE

## 2018-12-12 PROCEDURE — 99214 OFFICE O/P EST MOD 30 MIN: CPT | Performed by: INTERNAL MEDICINE

## 2018-12-12 PROCEDURE — 96413 CHEMO IV INFUSION 1 HR: CPT

## 2018-12-12 PROCEDURE — 80053 COMPREHEN METABOLIC PANEL: CPT | Performed by: INTERNAL MEDICINE

## 2018-12-12 PROCEDURE — 25010000002 DOXORUBICIN PER 10 MG: Performed by: INTERNAL MEDICINE

## 2018-12-12 PROCEDURE — 96367 TX/PROPH/DG ADDL SEQ IV INF: CPT

## 2018-12-12 PROCEDURE — 96375 TX/PRO/DX INJ NEW DRUG ADDON: CPT

## 2018-12-12 RX ORDER — DOXORUBICIN HYDROCHLORIDE 2 MG/ML
90 INJECTION, SOLUTION INTRAVENOUS ONCE
Status: COMPLETED | OUTPATIENT
Start: 2018-12-12 | End: 2018-12-12

## 2018-12-12 RX ORDER — PALONOSETRON 0.05 MG/ML
0.25 INJECTION, SOLUTION INTRAVENOUS ONCE
Status: COMPLETED | OUTPATIENT
Start: 2018-12-12 | End: 2018-12-12

## 2018-12-12 RX ORDER — SODIUM CHLORIDE 9 MG/ML
250 INJECTION, SOLUTION INTRAVENOUS ONCE
Status: COMPLETED | OUTPATIENT
Start: 2018-12-12 | End: 2018-12-12

## 2018-12-12 RX ORDER — VALACYCLOVIR HYDROCHLORIDE 500 MG/1
1000 TABLET, FILM COATED ORAL 3 TIMES DAILY
Qty: 42 TABLET | Refills: 0 | Status: SHIPPED | OUTPATIENT
Start: 2018-12-12 | End: 2018-12-19

## 2018-12-12 RX ADMIN — SODIUM CHLORIDE 150 MG: 9 INJECTION, SOLUTION INTRAVENOUS at 11:13

## 2018-12-12 RX ADMIN — CYCLOPHOSPHAMIDE 910 MG: 1 INJECTION, POWDER, FOR SOLUTION INTRAVENOUS; ORAL at 12:02

## 2018-12-12 RX ADMIN — PEGFILGRASTIM 6 MG: KIT SUBCUTANEOUS at 12:35

## 2018-12-12 RX ADMIN — SODIUM CHLORIDE 250 ML: 9 INJECTION, SOLUTION INTRAVENOUS at 11:04

## 2018-12-12 RX ADMIN — DOXORUBICIN HYDROCHLORIDE 90 MG: 2 INJECTION, SOLUTION INTRAVENOUS at 11:56

## 2018-12-12 RX ADMIN — PALONOSETRON HYDROCHLORIDE 0.25 MG: 0.25 INJECTION, SOLUTION INTRAVENOUS at 11:05

## 2018-12-12 RX ADMIN — DEXAMETHASONE SODIUM PHOSPHATE 12 MG: 4 INJECTION, SOLUTION INTRA-ARTICULAR; INTRALESIONAL; INTRAMUSCULAR; INTRAVENOUS; SOFT TISSUE at 11:06

## 2018-12-12 RX ADMIN — HEPARIN 500 UNITS: 100 SYRINGE at 12:35

## 2018-12-12 NOTE — TELEPHONE ENCOUNTER
----- Message from Rodger Johnson sent at 12/12/2018 12:07 PM EST -----  Contact: pt; claudia  Patient is having her chemo today and states she has shingles can she get something to called in?    [facility] [place]    Phone: [phone]

## 2018-12-12 NOTE — TELEPHONE ENCOUNTER
Looks like Valtrex was sent in to treat Shingles per another provider. Please confirm with patient.

## 2018-12-19 ENCOUNTER — OFFICE VISIT (OUTPATIENT)
Dept: PSYCHIATRY | Facility: CLINIC | Age: 58
End: 2018-12-19

## 2018-12-19 DIAGNOSIS — F41.1 GENERALIZED ANXIETY DISORDER: ICD-10-CM

## 2018-12-19 DIAGNOSIS — F33.1 MODERATE EPISODE OF RECURRENT MAJOR DEPRESSIVE DISORDER (HCC): Primary | ICD-10-CM

## 2018-12-19 PROCEDURE — 90792 PSYCH DIAG EVAL W/MED SRVCS: CPT | Performed by: NURSE PRACTITIONER

## 2018-12-19 RX ORDER — BUPROPION HYDROCHLORIDE 100 MG/1
TABLET, EXTENDED RELEASE ORAL
Qty: 30 TABLET | Refills: 1 | Status: SHIPPED | OUTPATIENT
Start: 2018-12-19 | End: 2019-02-08 | Stop reason: SDUPTHER
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
## 2018-12-26 ENCOUNTER — OFFICE VISIT (OUTPATIENT)
Dept: ONCOLOGY | Facility: CLINIC | Age: 58
End: 2018-12-26

## 2018-12-26 ENCOUNTER — HOSPITAL ENCOUNTER (OUTPATIENT)
Dept: ONCOLOGY | Facility: HOSPITAL | Age: 58
Setting detail: INFUSION SERIES
Discharge: HOME | End: 2018-12-26

## 2018-12-26 VITALS
BODY MASS INDEX: 20.02 KG/M2 | RESPIRATION RATE: 18 BRPM | DIASTOLIC BLOOD PRESSURE: 65 MMHG | WEIGHT: 113 LBS | HEART RATE: 94 BPM | SYSTOLIC BLOOD PRESSURE: 118 MMHG | TEMPERATURE: 97.7 F | OXYGEN SATURATION: 100 % | HEIGHT: 63 IN

## 2018-12-26 DIAGNOSIS — C50.912 MALIGNANT NEOPLASM OF LEFT BREAST IN FEMALE, ESTROGEN RECEPTOR NEGATIVE, UNSPECIFIED SITE OF BREAST (HCC): ICD-10-CM

## 2018-12-26 DIAGNOSIS — Z17.1 MALIGNANT NEOPLASM OF LEFT BREAST IN FEMALE, ESTROGEN RECEPTOR NEGATIVE, UNSPECIFIED SITE OF BREAST (HCC): ICD-10-CM

## 2018-12-26 DIAGNOSIS — C50.912 MALIGNANT NEOPLASM OF LEFT BREAST IN FEMALE, ESTROGEN RECEPTOR NEGATIVE, UNSPECIFIED SITE OF BREAST (HCC): Primary | ICD-10-CM

## 2018-12-26 DIAGNOSIS — Z17.1 MALIGNANT NEOPLASM OF LEFT BREAST IN FEMALE, ESTROGEN RECEPTOR NEGATIVE, UNSPECIFIED SITE OF BREAST (HCC): Primary | ICD-10-CM

## 2018-12-26 LAB
ALBUMIN SERPL-MCNC: 4.42 G/DL (ref 3.2–4.8)
ALBUMIN/GLOB SERPL: 2.4 G/DL (ref 1.5–2.5)
ALP SERPL-CCNC: 121 U/L (ref 25–100)
ALT SERPL W P-5'-P-CCNC: 14 U/L (ref 7–40)
ANION GAP SERPL CALCULATED.3IONS-SCNC: 4 MMOL/L (ref 3–11)
AST SERPL-CCNC: 17 U/L (ref 0–33)
BILIRUB SERPL-MCNC: 0.2 MG/DL (ref 0.3–1.2)
BUN BLD-MCNC: 12 MG/DL (ref 9–23)
BUN/CREAT SERPL: 17.1 (ref 7–25)
CALCIUM SPEC-SCNC: 9 MG/DL (ref 8.7–10.4)
CHLORIDE SERPL-SCNC: 107 MMOL/L (ref 99–109)
CO2 SERPL-SCNC: 26 MMOL/L (ref 20–31)
CREAT BLD-MCNC: 0.7 MG/DL (ref 0.6–1.3)
ERYTHROCYTE [DISTWIDTH] IN BLOOD BY AUTOMATED COUNT: 17.9 % (ref 11.3–14.5)
GFR SERPL CREATININE-BSD FRML MDRD: 86 ML/MIN/1.73
GLOBULIN UR ELPH-MCNC: 1.9 GM/DL
GLUCOSE BLD-MCNC: 88 MG/DL (ref 70–100)
HCT VFR BLD AUTO: 26 % (ref 34.5–44)
HGB BLD-MCNC: 8.8 G/DL (ref 11.5–15.5)
LYMPHOCYTES # BLD AUTO: 1.3 10*3/MM3 (ref 0.6–4.8)
LYMPHOCYTES NFR BLD AUTO: 10 % (ref 24–44)
MCH RBC QN AUTO: 30.7 PG (ref 27–31)
MCHC RBC AUTO-ENTMCNC: 33.9 G/DL (ref 32–36)
MCV RBC AUTO: 90.5 FL (ref 80–99)
MONOCYTES # BLD AUTO: 0.5 10*3/MM3 (ref 0–1)
MONOCYTES NFR BLD AUTO: 3.9 % (ref 0–12)
NEUTROPHILS # BLD AUTO: 11.2 10*3/MM3 (ref 1.5–8.3)
NEUTROPHILS NFR BLD AUTO: 86.1 % (ref 41–71)
PLATELET # BLD AUTO: 246 10*3/MM3 (ref 150–450)
PMV BLD AUTO: 7.2 FL (ref 6–12)
POTASSIUM BLD-SCNC: 4.2 MMOL/L (ref 3.5–5.5)
PROT SERPL-MCNC: 6.3 G/DL (ref 5.7–8.2)
RBC # BLD AUTO: 2.87 10*6/MM3 (ref 3.89–5.14)
SODIUM BLD-SCNC: 137 MMOL/L (ref 132–146)
WBC NRBC COR # BLD: 13 10*3/MM3 (ref 3.5–10.8)

## 2018-12-26 PROCEDURE — 25010000002 DOXORUBICIN PER 10 MG: Performed by: INTERNAL MEDICINE

## 2018-12-26 PROCEDURE — 99214 OFFICE O/P EST MOD 30 MIN: CPT | Performed by: NURSE PRACTITIONER

## 2018-12-26 PROCEDURE — 96417 CHEMO IV INFUS EACH ADDL SEQ: CPT

## 2018-12-26 PROCEDURE — 96367 TX/PROPH/DG ADDL SEQ IV INF: CPT

## 2018-12-26 PROCEDURE — 96374 THER/PROPH/DIAG INJ IV PUSH: CPT

## 2018-12-26 PROCEDURE — 25010000002 PEGFILGRASTIM 6 MG/0.6ML PREFILLED SYRINGE KIT: Performed by: INTERNAL MEDICINE

## 2018-12-26 PROCEDURE — 80053 COMPREHEN METABOLIC PANEL: CPT | Performed by: INTERNAL MEDICINE

## 2018-12-26 PROCEDURE — 96377 APPLICATON ON-BODY INJECTOR: CPT

## 2018-12-26 PROCEDURE — 25010000002 CYCLOPHOSPHAMIDE PER 100 MG: Performed by: INTERNAL MEDICINE

## 2018-12-26 PROCEDURE — 96413 CHEMO IV INFUSION 1 HR: CPT

## 2018-12-26 PROCEDURE — 96375 TX/PRO/DX INJ NEW DRUG ADDON: CPT

## 2018-12-26 PROCEDURE — 25010000002 FOSAPREPITANT PER 1 MG: Performed by: INTERNAL MEDICINE

## 2018-12-26 PROCEDURE — 36591 DRAW BLOOD OFF VENOUS DEVICE: CPT

## 2018-12-26 PROCEDURE — 25010000002 DEXAMETHASONE PER 1 MG: Performed by: INTERNAL MEDICINE

## 2018-12-26 PROCEDURE — 96365 THER/PROPH/DIAG IV INF INIT: CPT

## 2018-12-26 PROCEDURE — 96411 CHEMO IV PUSH ADDL DRUG: CPT

## 2018-12-26 PROCEDURE — 85025 COMPLETE CBC W/AUTO DIFF WBC: CPT | Performed by: INTERNAL MEDICINE

## 2018-12-26 PROCEDURE — 25010000002 PALONOSETRON PER 25 MCG: Performed by: INTERNAL MEDICINE

## 2018-12-26 RX ORDER — DOXORUBICIN HYDROCHLORIDE 2 MG/ML
60 INJECTION, SOLUTION INTRAVENOUS ONCE
Status: CANCELLED | OUTPATIENT
Start: 2018-12-26

## 2018-12-26 RX ORDER — SODIUM CHLORIDE 9 MG/ML
250 INJECTION, SOLUTION INTRAVENOUS ONCE
Status: CANCELLED | OUTPATIENT
Start: 2018-12-26

## 2018-12-26 RX ORDER — PALONOSETRON 0.05 MG/ML
0.25 INJECTION, SOLUTION INTRAVENOUS ONCE
Status: COMPLETED | OUTPATIENT
Start: 2018-12-26 | End: 2018-12-26

## 2018-12-26 RX ORDER — SODIUM CHLORIDE 9 MG/ML
250 INJECTION, SOLUTION INTRAVENOUS ONCE
Status: COMPLETED | OUTPATIENT
Start: 2018-12-26 | End: 2018-12-26

## 2018-12-26 RX ORDER — DOXORUBICIN HYDROCHLORIDE 2 MG/ML
90 INJECTION, SOLUTION INTRAVENOUS ONCE
Status: COMPLETED | OUTPATIENT
Start: 2018-12-26 | End: 2018-12-26

## 2018-12-26 RX ORDER — PALONOSETRON 0.05 MG/ML
0.25 INJECTION, SOLUTION INTRAVENOUS ONCE
Status: CANCELLED | OUTPATIENT
Start: 2018-12-26

## 2018-12-26 RX ADMIN — DEXAMETHASONE SODIUM PHOSPHATE 12 MG: 4 INJECTION, SOLUTION INTRAMUSCULAR; INTRAVENOUS at 13:22

## 2018-12-26 RX ADMIN — CYCLOPHOSPHAMIDE 910 MG: 1 INJECTION, POWDER, FOR SOLUTION INTRAVENOUS; ORAL at 14:33

## 2018-12-26 RX ADMIN — SODIUM CHLORIDE 150 MG: 9 INJECTION, SOLUTION INTRAVENOUS at 13:51

## 2018-12-26 RX ADMIN — SODIUM CHLORIDE 250 ML: 9 INJECTION, SOLUTION INTRAVENOUS at 13:20

## 2018-12-26 RX ADMIN — PALONOSETRON HYDROCHLORIDE 0.25 MG: 0.25 INJECTION, SOLUTION INTRAVENOUS at 13:20

## 2018-12-26 RX ADMIN — PEGFILGRASTIM 6 MG: KIT SUBCUTANEOUS at 15:20

## 2018-12-26 RX ADMIN — HEPARIN 500 UNITS: 100 SYRINGE at 15:18

## 2018-12-26 RX ADMIN — DOXORUBICIN HYDROCHLORIDE 90 MG: 2 INJECTION, SOLUTION INTRAVENOUS at 14:15

## 2018-12-26 NOTE — PROGRESS NOTES
"      PROBLEM LIST:  1. [id number] (Stage IIA) ER negative, PA positive, Her2 2+ by IHC, negative by FISH invasive ductal carcinoma of the left breast  A) bilateral mastectomy on 10/4/18.  Pathology showed a 2.6 cm high grade IDC with basaloid features.  0/1 SLN involved.  B) adjuvant chemotherapy with ddAC followed by taxol started on 11/14/18  2. Anxiety/depression  3. Hyperlipidemia  4. History of ovarian cancer 1989, s/p BRYN/BSO, no adjuvant therapy          Subjective     HISTORY OF PRESENT ILLNESS:   Brittani Lambert returns for follow-up on chemotherapy.  She is here for cycle 4 of adriamycin and cytoxan today. She tolerated her last cycle well. She had mild nausea with no vomiting. She states Zofran does help relieve the nausea. She complains of mild fatigue. She reports clear rhinorrhea but no cough, congestion or fevers.     Past Medical History, Past Surgical History, Social History, Family History have been reviewed and are without significant changes except as mentioned.    Review of Systems   A comprehensive 14 point review of systems was performed and was negative except as mentioned.    Medications:  The current medication list was reviewed in the EMR    ALLERGIES:  No Known Allergies    Objective      /65 Comment: RUE  Pulse 94   Temp 97.7 °F (36.5 °C) (Temporal)   Resp 18   Ht 160 cm (63\")   Wt 51.3 kg (113 lb)   SpO2 100% Comment: RA  BMI 20.02 kg/m²      Performance Status: 0    General: well appearing female in no acute distress  Neuro: alert and oriented  HEENT: sclera anicteric, oropharynx clear  Lymphatics: no cervical, supraclavicular, or axillary adenopathy  Cardiovascular: regular rate and rhythm, no murmurs  Lungs: clear to auscultation bilaterally  Abdomen: soft, nontender, nondistended.  No palpable organomegaly  Extremeties: no lower extremity edema  Skin: no rashes or  petechiae   Psych: mood and affect appropriate              Assessment/Plan   Brittani Lambert is a 58 y.o. " year old female with a ER negative HER-2 negative breast cancer who returns for follow-up after her second cycle of Adriamycin and Cytoxan.  She is doing fairly well with fatigue and mild nausea.  She wants to receive taxol weekly. We discussed there is a slightly higher risk of neuropathy with the 12 week treatment, but the short-term side effects of myalgias and fatigue or more significant with the every 2 week dosing. She wants to try it starting out weekly. She will begin in two weeks.     Shingles: resolved    Follow-up in 2 weeks.                I spent 25 minutes with the patient. I spent > 50% percent of this time counseling and discussing prognosis, diagnostic testing, evaluation, current status and management.        Pao Levy, LONG  [facility] On[facility]    12/26/2018          CC:

## 2018-12-27 ENCOUNTER — TRANSCRIBE ORDERS (OUTPATIENT)
Dept: PHYSICAL THERAPY | Facility: HOSPITAL | Age: 58
End: 2018-12-27

## 2018-12-27 DIAGNOSIS — C50.812 MALIGNANT NEOPLASM OF OVERLAPPING SITES OF LEFT FEMALE BREAST, UNSPECIFIED ESTROGEN RECEPTOR STATUS (HCC): Primary | ICD-10-CM

## 2019-01-02 ENCOUNTER — DOCUMENTATION (OUTPATIENT)
Dept: ONCOLOGY | Facility: CLINIC | Age: 59
End: 2019-01-02

## 2019-01-02 NOTE — PROGRESS NOTES
12/31/2018  ~per Marce  PATIENT IS CALLING TO SEE WE RECEIVED PAPERWORK FROM [employer] TO  CONTINUE HER SHORT TERM DISABILITY.  THEIR FAX NUMBER TO FAX BACK TO IS [phone].  REGROUP ALSO NEEDS DOCUMENTATION SENT TO THEM, THEIR PHONE NUMBER IS [phone]. THESE WERE FAXED TO US ON December 7TH.     Tried contacting pt, no answer.    1/2/2019  ~per Nelly PERAZA HAD FAXED OVER PAPERWORK NEEDING AN UPDATE ON TREATMENT, SHE HAS 7.5 WEEKS LEFT OF SHORT TERM, AND THEY ARE NEEDING PROOF AND DOCUMENTATION FROM OUR OFFICE.     PLEASE CALL PATIENT WITH STATUS OR IF YOU HAVEN'T RECEIVED THIS. SHE SAID THAT SHE HAS CALLED SEVERAL TIMES AND SHE ISN'T GETTING AN ANSWER BACK.     Spoke with pt, paperwork was due 12/31/2018 & we have never rec'd it.  Clarified fax number and she was given incorrect number.  Waiting on receiving forms via fax.    1/4/2019  Still have not rec'd fax.  Called insurance number above and left a vm.  Spoke with pt and notified that pw is still not rec'd.    1/7/2019  Called insurance company again and spoke with Michelle.  She will fax pw to my attn @ [phone].    Rec'd both FMLA from [employer] [facility] & Disability from [email or web address] via fax.  Completed & put in MD box.    1/9/2019  Rec'd both forms with MD signature.  Faxed FMLA to [employer] [facility] @ fax# [phone]  Faxed Disability to [email or web address] @ fax#[phone]  Pt notified.

## 2019-01-09 ENCOUNTER — TELEPHONE (OUTPATIENT)
Dept: ONCOLOGY | Facility: CLINIC | Age: 59
End: 2019-01-09

## 2019-01-09 NOTE — TELEPHONE ENCOUNTER
Called to check on patient when she did not show up for treatment today.  She said she just needed a week off.  She would like to be rescheduled for next week.

## 2019-01-14 ENCOUNTER — TELEPHONE (OUTPATIENT)
Dept: ONCOLOGY | Facility: CLINIC | Age: 59
End: 2019-01-14

## 2019-01-14 NOTE — TELEPHONE ENCOUNTER
"Returned call to patient and she reported that her short term disability is in review and patient will not be getting paid and she wanted to know if it was worth taking the taxol for 12 weeks because she needed to work and she wasn't sure what her disability would pay if any. Patient states \"I just can't do this if I am not going to get pain.  I am discussing with Dr. Patel tomorrow at my follow visit and just make sure that I have to have the taxol, I just really need to go back to work, this isn't worth all this\"      "

## 2019-01-14 NOTE — TELEPHONE ENCOUNTER
----- Message from Nelly Lozada sent at 1/14/2019 12:56 PM EST -----  Regarding: [facility]AN[facility] - TREATMENT QUESTIONS   Contact: [phone]  PATIENT CALLED NEEDING TO KNOW HOW MUCH MORE TREATMENT WILL SHE NEED BECAUSE SHE IS NOT GETTING PAID ANYMORE AND IS GETTING AGGRAVATED.     PLEASE CALL AND LET HER KNOW HOW MUCH MORE TREATMENT SHE HAS AND HOW MUCH LONGER SHE COULD BE OFF WORK

## 2019-01-15 ENCOUNTER — OFFICE VISIT (OUTPATIENT)
Dept: ONCOLOGY | Facility: CLINIC | Age: 59
End: 2019-01-15

## 2019-01-15 ENCOUNTER — HOSPITAL ENCOUNTER (OUTPATIENT)
Dept: ONCOLOGY | Facility: HOSPITAL | Age: 59
Setting detail: INFUSION SERIES
Discharge: HOME | End: 2019-01-15

## 2019-01-15 VITALS — HEART RATE: 85 BPM | DIASTOLIC BLOOD PRESSURE: 75 MMHG | SYSTOLIC BLOOD PRESSURE: 134 MMHG

## 2019-01-15 VITALS
DIASTOLIC BLOOD PRESSURE: 74 MMHG | BODY MASS INDEX: 19.31 KG/M2 | TEMPERATURE: 97.5 F | HEIGHT: 63 IN | HEART RATE: 87 BPM | SYSTOLIC BLOOD PRESSURE: 139 MMHG | RESPIRATION RATE: 18 BRPM | OXYGEN SATURATION: 100 % | WEIGHT: 109 LBS

## 2019-01-15 DIAGNOSIS — C50.912 MALIGNANT NEOPLASM OF LEFT BREAST IN FEMALE, ESTROGEN RECEPTOR NEGATIVE, UNSPECIFIED SITE OF BREAST (HCC): Primary | ICD-10-CM

## 2019-01-15 DIAGNOSIS — Z17.1 MALIGNANT NEOPLASM OF LEFT BREAST IN FEMALE, ESTROGEN RECEPTOR NEGATIVE, UNSPECIFIED SITE OF BREAST (HCC): Primary | ICD-10-CM

## 2019-01-15 DIAGNOSIS — C50.912 MALIGNANT NEOPLASM OF LEFT BREAST IN FEMALE, ESTROGEN RECEPTOR NEGATIVE, UNSPECIFIED SITE OF BREAST (HCC): ICD-10-CM

## 2019-01-15 DIAGNOSIS — Z17.1 MALIGNANT NEOPLASM OF LEFT BREAST IN FEMALE, ESTROGEN RECEPTOR NEGATIVE, UNSPECIFIED SITE OF BREAST (HCC): ICD-10-CM

## 2019-01-15 LAB
ALBUMIN SERPL-MCNC: 4.54 G/DL (ref 3.2–4.8)
ALBUMIN/GLOB SERPL: 2.2 G/DL (ref 1.5–2.5)
ALP SERPL-CCNC: 107 U/L (ref 25–100)
ALT SERPL W P-5'-P-CCNC: 25 U/L (ref 7–40)
ANION GAP SERPL CALCULATED.3IONS-SCNC: 6 MMOL/L (ref 3–11)
AST SERPL-CCNC: 21 U/L (ref 0–33)
BILIRUB SERPL-MCNC: 0.4 MG/DL (ref 0.3–1.2)
BUN BLD-MCNC: 11 MG/DL (ref 9–23)
BUN/CREAT SERPL: 18.3 (ref 7–25)
CALCIUM SPEC-SCNC: 9.1 MG/DL (ref 8.7–10.4)
CHLORIDE SERPL-SCNC: 107 MMOL/L (ref 99–109)
CO2 SERPL-SCNC: 24 MMOL/L (ref 20–31)
CREAT BLD-MCNC: 0.6 MG/DL (ref 0.6–1.3)
ERYTHROCYTE [DISTWIDTH] IN BLOOD BY AUTOMATED COUNT: 25.4 % (ref 11.3–14.5)
GFR SERPL CREATININE-BSD FRML MDRD: 103 ML/MIN/1.73
GLOBULIN UR ELPH-MCNC: 2.1 GM/DL
GLUCOSE BLD-MCNC: 99 MG/DL (ref 70–100)
HCT VFR BLD AUTO: 25.9 % (ref 34.5–44)
HGB BLD-MCNC: 8.7 G/DL (ref 11.5–15.5)
LYMPHOCYTES # BLD AUTO: 1 10*3/MM3 (ref 0.6–4.8)
LYMPHOCYTES NFR BLD AUTO: 11 % (ref 24–44)
MCH RBC QN AUTO: 31.9 PG (ref 27–31)
MCHC RBC AUTO-ENTMCNC: 33.6 G/DL (ref 32–36)
MCV RBC AUTO: 95 FL (ref 80–99)
MONOCYTES # BLD AUTO: 0.2 10*3/MM3 (ref 0–1)
MONOCYTES NFR BLD AUTO: 2.1 % (ref 0–12)
NEUTROPHILS # BLD AUTO: 8.1 10*3/MM3 (ref 1.5–8.3)
NEUTROPHILS NFR BLD AUTO: 86.9 % (ref 41–71)
PLATELET # BLD AUTO: 256 10*3/MM3 (ref 150–450)
PMV BLD AUTO: 6.6 FL (ref 6–12)
POTASSIUM BLD-SCNC: 3.7 MMOL/L (ref 3.5–5.5)
PROT SERPL-MCNC: 6.6 G/DL (ref 5.7–8.2)
RBC # BLD AUTO: 2.73 10*6/MM3 (ref 3.89–5.14)
SODIUM BLD-SCNC: 137 MMOL/L (ref 132–146)
WBC NRBC COR # BLD: 9.3 10*3/MM3 (ref 3.5–10.8)

## 2019-01-15 PROCEDURE — 96413 CHEMO IV INFUSION 1 HR: CPT

## 2019-01-15 PROCEDURE — 85025 COMPLETE CBC W/AUTO DIFF WBC: CPT | Performed by: NURSE PRACTITIONER

## 2019-01-15 PROCEDURE — 80053 COMPREHEN METABOLIC PANEL: CPT | Performed by: NURSE PRACTITIONER

## 2019-01-15 PROCEDURE — 25010000002 PACLITAXEL PER 30 MG: Performed by: NURSE PRACTITIONER

## 2019-01-15 PROCEDURE — 25010000002 DEXAMETHASONE PER 1 MG: Performed by: NURSE PRACTITIONER

## 2019-01-15 PROCEDURE — 99214 OFFICE O/P EST MOD 30 MIN: CPT | Performed by: NURSE PRACTITIONER

## 2019-01-15 PROCEDURE — 96375 TX/PRO/DX INJ NEW DRUG ADDON: CPT

## 2019-01-15 PROCEDURE — 25010000002 DIPHENHYDRAMINE PER 50 MG: Performed by: NURSE PRACTITIONER

## 2019-01-15 RX ORDER — FAMOTIDINE 10 MG/ML
20 INJECTION, SOLUTION INTRAVENOUS ONCE
Status: COMPLETED | OUTPATIENT
Start: 2019-01-15 | End: 2019-01-15

## 2019-01-15 RX ORDER — SODIUM CHLORIDE 9 MG/ML
250 INJECTION, SOLUTION INTRAVENOUS ONCE
Status: COMPLETED | OUTPATIENT
Start: 2019-01-15 | End: 2019-01-15

## 2019-01-15 RX ORDER — SODIUM CHLORIDE 9 MG/ML
250 INJECTION, SOLUTION INTRAVENOUS ONCE
Status: CANCELLED | OUTPATIENT
Start: 2019-01-15

## 2019-01-15 RX ORDER — SODIUM CHLORIDE 9 MG/ML
250 INJECTION, SOLUTION INTRAVENOUS ONCE
Status: CANCELLED | OUTPATIENT
Start: 2019-01-22

## 2019-01-15 RX ORDER — FAMOTIDINE 10 MG/ML
20 INJECTION, SOLUTION INTRAVENOUS ONCE
Status: CANCELLED | OUTPATIENT
Start: 2019-01-22

## 2019-01-15 RX ORDER — FAMOTIDINE 10 MG/ML
20 INJECTION, SOLUTION INTRAVENOUS ONCE
Status: CANCELLED | OUTPATIENT
Start: 2019-02-08

## 2019-01-15 RX ORDER — FAMOTIDINE 10 MG/ML
20 INJECTION, SOLUTION INTRAVENOUS ONCE
Status: CANCELLED | OUTPATIENT
Start: 2019-01-15

## 2019-01-15 RX ORDER — SODIUM CHLORIDE 9 MG/ML
250 INJECTION, SOLUTION INTRAVENOUS ONCE
Status: CANCELLED | OUTPATIENT
Start: 2019-02-08

## 2019-01-15 RX ADMIN — PACLITAXEL 120 MG: 6 INJECTION, SOLUTION INTRAVENOUS at 12:10

## 2019-01-15 RX ADMIN — DIPHENHYDRAMINE HYDROCHLORIDE 50 MG: 50 INJECTION INTRAMUSCULAR; INTRAVENOUS at 11:46

## 2019-01-15 RX ADMIN — HEPARIN 500 UNITS: 100 SYRINGE at 13:18

## 2019-01-15 RX ADMIN — DEXAMETHASONE SODIUM PHOSPHATE 12 MG: 4 INJECTION, SOLUTION INTRA-ARTICULAR; INTRALESIONAL; INTRAMUSCULAR; INTRAVENOUS; SOFT TISSUE at 11:45

## 2019-01-15 RX ADMIN — FAMOTIDINE 20 MG: 10 INJECTION, SOLUTION INTRAVENOUS at 11:43

## 2019-01-15 RX ADMIN — SODIUM CHLORIDE 250 ML: 9 INJECTION, SOLUTION INTRAVENOUS at 11:43

## 2019-01-15 NOTE — PROGRESS NOTES
"      PROBLEM LIST:  1. [id number] (Stage IIA) ER negative, NV positive, Her2 2+ by IHC, negative by FISH invasive ductal carcinoma of the left breast  A) bilateral mastectomy on 10/4/18.  Pathology showed a 2.6 cm high grade IDC with basaloid features.  0/1 SLN involved.  B) adjuvant chemotherapy with ddAC followed by taxol started on 11/14/18  2. Anxiety/depression  3. Hyperlipidemia  4. History of ovarian cancer 1989, s/p BYRN/BSO, no adjuvant therapy          Subjective     HISTORY OF PRESENT ILLNESS:   Brittani Lambert returns for follow-up on chemotherapy.  She is here for cycle 1 weekly Taxol today. She tolerated her adriamycin and cytoxan fairly well. She had mild nausea with no vomiting. She states Zofran does help relieve the nausea. She continues to complain of fatigue. She is having some pain under the right arm that has been there off and on since surgery and would like pain medication. She is having problems with her disability paperwork.     Past Medical History, Past Surgical History, Social History, Family History have been reviewed and are without significant changes except as mentioned.    Review of Systems   A comprehensive 14 point review of systems was performed and was negative except as mentioned.    Medications:  The current medication list was reviewed in the EMR    ALLERGIES:  No Known Allergies    Objective      /74 Comment: RUE  Pulse 87   Temp 97.5 °F (36.4 °C) (Temporal)   Resp 18   Ht 160 cm (63\")   Wt 49.4 kg (109 lb)   SpO2 100% Comment: RA  BMI 19.31 kg/m²      Performance Status: 0    General: well appearing female in no acute distress  Neuro: alert and oriented  HEENT: sclera anicteric, oropharynx clear  Lymphatics: no cervical, supraclavicular, or axillary adenopathy  Cardiovascular: regular rate and rhythm, no murmurs  Lungs: clear to auscultation bilaterally  Abdomen: soft, nontender, nondistended.  No palpable organomegaly  Extremeties: no lower extremity edema  Skin: " no rashes or  petechiae   Psych: mood and affect appropriate              Assessment/Plan   Brittani Lambert is a 58 y.o. year old female with a ER negative HER-2 negative breast cancer who returns for follow-up after her completing Adriamycin and Cytoxan.  She is doing fairly well with fatigue and mild nausea.  She would like to continue current plan to receive taxol weekly. We again reviewed  there is a slightly higher risk of neuropathy with the weekly treatment, but the short-term side effects of myalgias and fatigue were more significant with the every 2 week dosing.      Right axillary pain: We will defer to her primary care doctor for pain medication. I consulted physical therapy Christina Toscano to see if she can evaluate and help with pain.     I consulted social work to potentially help with paperwork.  This has been a big stressor for her and she is concerned about her finances.  We also discussed that she will follow up with her PCP for management of anxiety and depression medication.     Follow-up in 2 weeks.          I spent 25 minutes with the patient. I spent > 50% percent of this time counseling and discussing prognosis, diagnostic testing, evaluation, current status and management.        Luda ALVES  [facility] Hematology and Oncology    1/15/2019          CC:

## 2019-01-15 NOTE — PROGRESS NOTES
"      PROBLEM LIST:  1. [id number] (Stage IIA) ER negative, DE positive, Her2 2+ by IHC, negative by FISH invasive ductal carcinoma of the left breast  A) bilateral mastectomy on 10/4/18.  Pathology showed a 2.6 cm high grade IDC with basaloid features.  0/1 SLN involved.  B) adjuvant chemotherapy with ddAC followed by taxol started on 11/14/18  2. Anxiety/depression  3. Hyperlipidemia  4. History of ovarian cancer 1989, s/p BRYN/BSO, no adjuvant therapy          Subjective     HISTORY OF PRESENT ILLNESS:   Brittani Lambert returns for follow-up on chemotherapy.  She is here for cycle 4 of adriamycin and cytoxan today. She tolerated her last cycle well. She had mild nausea with no vomiting. She states Zofran does help relieve the nausea. She complains of mild fatigue. She reports clear rhinorrhea but no cough, congestion or fevers.     Past Medical History, Past Surgical History, Social History, Family History have been reviewed and are without significant changes except as mentioned.    Review of Systems   A comprehensive 14 point review of systems was performed and was negative except as mentioned.    Medications:  The current medication list was reviewed in the EMR    ALLERGIES:  No Known Allergies    Objective      Ht 160 cm (63\")   BMI 20.02 kg/m²      Performance Status: 0    General: well appearing female in no acute distress  Neuro: alert and oriented  HEENT: sclera anicteric, oropharynx clear  Lymphatics: no cervical, supraclavicular, or axillary adenopathy  Cardiovascular: regular rate and rhythm, no murmurs  Lungs: clear to auscultation bilaterally  Abdomen: soft, nontender, nondistended.  No palpable organomegaly  Extremeties: no lower extremity edema  Skin: no rashes or  petechiae   Psych: mood and affect appropriate              Assessment/Plan   Brittani Lambert is a 58 y.o. year old female with a ER negative HER-2 negative breast cancer who returns for follow-up after her second cycle of Adriamycin and " Cytoxan.  She is doing fairly well with fatigue and mild nausea.  She wants to receive taxol weekly. We discussed there is a slightly higher risk of neuropathy with the 12 week treatment, but the short-term side effects of myalgias and fatigue or more significant with the every 2 week dosing. She wants to try it starting out weekly. She will begin in two weeks.     Shingles: resolved    Follow-up in 2 weeks.                I spent 25 minutes with the patient. I spent > 50% percent of this time counseling and discussing prognosis, diagnostic testing, evaluation, current status and management.        Carrie Patel MD  [facility] Hematology and Oncology    1/15/2019          CC:

## 2019-01-22 ENCOUNTER — TELEPHONE (OUTPATIENT)
Dept: SOCIAL WORK | Facility: HOSPITAL | Age: 59
End: 2019-01-22

## 2019-01-22 ENCOUNTER — HOSPITAL ENCOUNTER (OUTPATIENT)
Dept: ONCOLOGY | Facility: HOSPITAL | Age: 59
Setting detail: INFUSION SERIES
Discharge: HOME | End: 2019-01-22

## 2019-01-22 VITALS
DIASTOLIC BLOOD PRESSURE: 62 MMHG | HEIGHT: 63 IN | RESPIRATION RATE: 16 BRPM | WEIGHT: 114 LBS | HEART RATE: 78 BPM | BODY MASS INDEX: 20.2 KG/M2 | SYSTOLIC BLOOD PRESSURE: 118 MMHG | TEMPERATURE: 97.1 F

## 2019-01-22 DIAGNOSIS — C50.912 MALIGNANT NEOPLASM OF LEFT BREAST IN FEMALE, ESTROGEN RECEPTOR NEGATIVE, UNSPECIFIED SITE OF BREAST (HCC): Primary | ICD-10-CM

## 2019-01-22 DIAGNOSIS — Z17.1 MALIGNANT NEOPLASM OF LEFT BREAST IN FEMALE, ESTROGEN RECEPTOR NEGATIVE, UNSPECIFIED SITE OF BREAST (HCC): Primary | ICD-10-CM

## 2019-01-22 LAB
ALBUMIN SERPL-MCNC: 4.6 G/DL (ref 3.2–4.8)
ALBUMIN/GLOB SERPL: 2.4 G/DL (ref 1.5–2.5)
ALP SERPL-CCNC: 81 U/L (ref 25–100)
ALT SERPL W P-5'-P-CCNC: 16 U/L (ref 7–40)
ANION GAP SERPL CALCULATED.3IONS-SCNC: 7 MMOL/L (ref 3–11)
AST SERPL-CCNC: 21 U/L (ref 0–33)
BILIRUB SERPL-MCNC: 0.4 MG/DL (ref 0.3–1.2)
BUN BLD-MCNC: 15 MG/DL (ref 9–23)
BUN/CREAT SERPL: 27.3 (ref 7–25)
CALCIUM SPEC-SCNC: 9 MG/DL (ref 8.7–10.4)
CHLORIDE SERPL-SCNC: 109 MMOL/L (ref 99–109)
CO2 SERPL-SCNC: 24 MMOL/L (ref 20–31)
CREAT BLD-MCNC: 0.55 MG/DL (ref 0.6–1.3)
ERYTHROCYTE [DISTWIDTH] IN BLOOD BY AUTOMATED COUNT: 25.7 % (ref 11.3–14.5)
GFR SERPL CREATININE-BSD FRML MDRD: 113 ML/MIN/1.73
GLOBULIN UR ELPH-MCNC: 1.9 GM/DL
GLUCOSE BLD-MCNC: 98 MG/DL (ref 70–100)
HCT VFR BLD AUTO: 29.9 % (ref 34.5–44)
HGB BLD-MCNC: 10.1 G/DL (ref 11.5–15.5)
LYMPHOCYTES # BLD AUTO: 1.1 10*3/MM3 (ref 0.6–4.8)
LYMPHOCYTES NFR BLD AUTO: 15 % (ref 24–44)
MCH RBC QN AUTO: 33 PG (ref 27–31)
MCHC RBC AUTO-ENTMCNC: 33.7 G/DL (ref 32–36)
MCV RBC AUTO: 98.1 FL (ref 80–99)
MONOCYTES # BLD AUTO: 0.4 10*3/MM3 (ref 0–1)
MONOCYTES NFR BLD AUTO: 6 % (ref 0–12)
NEUTROPHILS # BLD AUTO: 5.8 10*3/MM3 (ref 1.5–8.3)
NEUTROPHILS NFR BLD AUTO: 79 % (ref 41–71)
PLATELET # BLD AUTO: 312 10*3/MM3 (ref 150–450)
PMV BLD AUTO: 6.9 FL (ref 6–12)
POTASSIUM BLD-SCNC: 4 MMOL/L (ref 3.5–5.5)
PROT SERPL-MCNC: 6.5 G/DL (ref 5.7–8.2)
RBC # BLD AUTO: 3.05 10*6/MM3 (ref 3.89–5.14)
SODIUM BLD-SCNC: 140 MMOL/L (ref 132–146)
WBC NRBC COR # BLD: 7.4 10*3/MM3 (ref 3.5–10.8)

## 2019-01-22 PROCEDURE — 63710000001 DIPHENHYDRAMINE PER 50 MG

## 2019-01-22 PROCEDURE — 85025 COMPLETE CBC W/AUTO DIFF WBC: CPT | Performed by: NURSE PRACTITIONER

## 2019-01-22 PROCEDURE — 25010000002 DEXAMETHASONE PER 1 MG: Performed by: NURSE PRACTITIONER

## 2019-01-22 PROCEDURE — 25010000002 PACLITAXEL PER 30 MG: Performed by: NURSE PRACTITIONER

## 2019-01-22 PROCEDURE — 96413 CHEMO IV INFUSION 1 HR: CPT

## 2019-01-22 PROCEDURE — 96375 TX/PRO/DX INJ NEW DRUG ADDON: CPT

## 2019-01-22 PROCEDURE — 80053 COMPREHEN METABOLIC PANEL: CPT | Performed by: NURSE PRACTITIONER

## 2019-01-22 RX ORDER — DIPHENHYDRAMINE HCL 25 MG
25 CAPSULE ORAL ONCE
Status: COMPLETED | OUTPATIENT
Start: 2019-01-22 | End: 2019-01-22

## 2019-01-22 RX ORDER — FAMOTIDINE 10 MG/ML
20 INJECTION, SOLUTION INTRAVENOUS ONCE
Status: COMPLETED | OUTPATIENT
Start: 2019-01-22 | End: 2019-01-22

## 2019-01-22 RX ORDER — SODIUM CHLORIDE 9 MG/ML
250 INJECTION, SOLUTION INTRAVENOUS ONCE
Status: COMPLETED | OUTPATIENT
Start: 2019-01-22 | End: 2019-01-22

## 2019-01-22 RX ADMIN — SODIUM CHLORIDE 250 ML: 9 INJECTION, SOLUTION INTRAVENOUS at 11:47

## 2019-01-22 RX ADMIN — HEPARIN 500 UNITS: 100 SYRINGE at 13:17

## 2019-01-22 RX ADMIN — FAMOTIDINE 20 MG: 10 INJECTION, SOLUTION INTRAVENOUS at 11:45

## 2019-01-22 RX ADMIN — PACLITAXEL 120 MG: 6 INJECTION, SOLUTION INTRAVENOUS at 12:10

## 2019-01-22 RX ADMIN — DEXAMETHASONE SODIUM PHOSPHATE 12 MG: 4 INJECTION, SOLUTION INTRA-ARTICULAR; INTRALESIONAL; INTRAMUSCULAR; INTRAVENOUS; SOFT TISSUE at 11:47

## 2019-01-22 RX ADMIN — DIPHENHYDRAMINE HYDROCHLORIDE 25 MG: 25 CAPSULE ORAL at 11:45

## 2019-01-22 NOTE — TELEPHONE ENCOUNTER
SW called pt to assist with financial concerns.  Pt is mostly concerned with her deductible amount due and has requested a financial assistance packet.  SW mailed this to pt and encouraged her to complete at her earliest convenience.  SW also will provide a gas card to pt to help her get back and forth to her treatments.  Pt was appreciative.  SW available for ongoing support and resource needs.

## 2019-01-30 DIAGNOSIS — C50.912 MALIGNANT NEOPLASM OF LEFT BREAST IN FEMALE, ESTROGEN RECEPTOR NEGATIVE, UNSPECIFIED SITE OF BREAST (HCC): Primary | ICD-10-CM

## 2019-01-30 DIAGNOSIS — Z17.1 MALIGNANT NEOPLASM OF LEFT BREAST IN FEMALE, ESTROGEN RECEPTOR NEGATIVE, UNSPECIFIED SITE OF BREAST (HCC): Primary | ICD-10-CM

## 2019-02-01 DIAGNOSIS — C50.912 MALIGNANT NEOPLASM OF LEFT BREAST IN FEMALE, ESTROGEN RECEPTOR NEGATIVE, UNSPECIFIED SITE OF BREAST (HCC): ICD-10-CM

## 2019-02-01 DIAGNOSIS — Z17.1 MALIGNANT NEOPLASM OF LEFT BREAST IN FEMALE, ESTROGEN RECEPTOR NEGATIVE, UNSPECIFIED SITE OF BREAST (HCC): ICD-10-CM

## 2019-02-01 RX ORDER — FAMOTIDINE 10 MG/ML
20 INJECTION, SOLUTION INTRAVENOUS ONCE
Status: CANCELLED | OUTPATIENT
Start: 2019-02-15

## 2019-02-01 RX ORDER — SODIUM CHLORIDE 9 MG/ML
250 INJECTION, SOLUTION INTRAVENOUS ONCE
Status: CANCELLED | OUTPATIENT
Start: 2019-02-15

## 2019-02-07 NOTE — PROGRESS NOTES
"      PROBLEM LIST:  1. [id number] (Stage IIA) ER negative, [place] positive, Her2 2+ by IHC, negative by FISH invasive ductal carcinoma of the left breast  A) bilateral mastectomy on 10/4/18.  Pathology showed a 2.6 cm high grade IDC with basaloid features.  0/1 SLN involved.  B) adjuvant chemotherapy with ddAC followed by taxol started on 11/14/18  2. Anxiety/depression  3. Hyperlipidemia  4. History of ovarian cancer 1989, s/p BRYN/BSO, no adjuvant therapy          Subjective     HISTORY OF PRESENT ILLNESS:   Brittani Lambert returns for follow-up on chemotherapy.  She is here after recently starting weekly taxol.  She says she has felt tired but otherwise is tolerating the treatment fairly well.  She is not having much nausea.  She does have a little bit of diarrhea which is manageable.  She has not noticed any neuropathy symptoms so far.      Past Medical History, Past Surgical History, Social History, Family History have been reviewed and are without significant changes except as mentioned.    Review of Systems   A comprehensive 14 point review of systems was performed and was negative except as mentioned.    Medications:  The current medication list was reviewed in the EMR    ALLERGIES:  No Known Allergies    Objective      /59 Comment: RUE  Pulse 84   Temp 97.3 °F (36.3 °C) (Temporal)   Resp 18   Ht 160 cm (63\")   Wt 50.8 kg (112 lb)   SpO2 95% Comment: RA  BMI 19.84 kg/m²      Performance Status: 0    General: well appearing female in no acute distress  Neuro: alert and oriented  HEENT: sclera anicteric, oropharynx clear  Lymphatics: no cervical, supraclavicular, or axillary adenopathy  Cardiovascular: regular rate and rhythm, no murmurs  Lungs: clear to auscultation bilaterally  Abdomen: soft, nontender, nondistended.  No palpable organomegaly  Extremeties: no lower extremity edema  Skin: no rashes or  petechiae   Psych: mood and affect appropriate              Assessment/Plan   Brittani Lambert is a 59 " y.o. year old female with a ER negative HER-2 negative breast cancer who returns for follow-up on weekly taxol.    So far she is tolerating treatment well.  We discussed that neuropathy symptoms will often show up after several doses of treatment.  She can use over-the-counter medicines to manage her diarrhea as needed.    We discussed her work status.  She does not feel that she is 100% and could perform all of the duties of her job.  I think it's reasonable for her to remain on short-term disability until her chemotherapy is completed in April.    Follow-up in 3 weeks.        I spent 25 minutes with the patient. I spent > 50% percent of this time counseling and discussing prognosis, diagnostic testing, evaluation, current status and management.        Carrie Patel MD    [facility] Hematology and Oncology    2/8/2019          CC:

## 2019-02-08 ENCOUNTER — HOSPITAL ENCOUNTER (OUTPATIENT)
Dept: ONCOLOGY | Facility: HOSPITAL | Age: 59
Setting detail: INFUSION SERIES
Discharge: HOME | End: 2019-02-08

## 2019-02-08 ENCOUNTER — OFFICE VISIT (OUTPATIENT)
Dept: ONCOLOGY | Facility: CLINIC | Age: 59
End: 2019-02-08

## 2019-02-08 ENCOUNTER — DOCUMENTATION (OUTPATIENT)
Dept: ONCOLOGY | Facility: CLINIC | Age: 59
End: 2019-02-08

## 2019-02-08 ENCOUNTER — LAB (OUTPATIENT)
Dept: LAB | Facility: HOSPITAL | Age: 59
End: 2019-02-08

## 2019-02-08 ENCOUNTER — OFFICE VISIT (OUTPATIENT)
Dept: FAMILY MEDICINE CLINIC | Facility: CLINIC | Age: 59
End: 2019-02-08

## 2019-02-08 VITALS — DIASTOLIC BLOOD PRESSURE: 75 MMHG | HEART RATE: 82 BPM | SYSTOLIC BLOOD PRESSURE: 126 MMHG

## 2019-02-08 VITALS
WEIGHT: 112 LBS | SYSTOLIC BLOOD PRESSURE: 112 MMHG | RESPIRATION RATE: 16 BRPM | DIASTOLIC BLOOD PRESSURE: 68 MMHG | HEART RATE: 90 BPM | TEMPERATURE: 99.4 F | BODY MASS INDEX: 19.84 KG/M2

## 2019-02-08 VITALS
RESPIRATION RATE: 18 BRPM | OXYGEN SATURATION: 95 % | SYSTOLIC BLOOD PRESSURE: 116 MMHG | HEIGHT: 63 IN | TEMPERATURE: 97.3 F | DIASTOLIC BLOOD PRESSURE: 59 MMHG | WEIGHT: 112 LBS | HEART RATE: 84 BPM | BODY MASS INDEX: 19.84 KG/M2

## 2019-02-08 DIAGNOSIS — Z17.1 MALIGNANT NEOPLASM OF LEFT BREAST IN FEMALE, ESTROGEN RECEPTOR NEGATIVE, UNSPECIFIED SITE OF BREAST (HCC): Primary | ICD-10-CM

## 2019-02-08 DIAGNOSIS — F41.9 ANXIETY: Primary | ICD-10-CM

## 2019-02-08 DIAGNOSIS — Z90.13 H/O BILATERAL MASTECTOMY: ICD-10-CM

## 2019-02-08 DIAGNOSIS — F33.1 MODERATE EPISODE OF RECURRENT MAJOR DEPRESSIVE DISORDER (HCC): ICD-10-CM

## 2019-02-08 DIAGNOSIS — C50.912 MALIGNANT NEOPLASM OF LEFT BREAST IN FEMALE, ESTROGEN RECEPTOR NEGATIVE, UNSPECIFIED SITE OF BREAST (HCC): Primary | ICD-10-CM

## 2019-02-08 DIAGNOSIS — Z17.1 MALIGNANT NEOPLASM OF LEFT BREAST IN FEMALE, ESTROGEN RECEPTOR NEGATIVE, UNSPECIFIED SITE OF BREAST (HCC): ICD-10-CM

## 2019-02-08 DIAGNOSIS — C50.912 MALIGNANT NEOPLASM OF LEFT BREAST IN FEMALE, ESTROGEN RECEPTOR NEGATIVE, UNSPECIFIED SITE OF BREAST (HCC): ICD-10-CM

## 2019-02-08 LAB
ALBUMIN SERPL-MCNC: 4.78 G/DL (ref 3.2–4.8)
ALBUMIN/GLOB SERPL: 2.4 G/DL (ref 1.5–2.5)
ALP SERPL-CCNC: 86 U/L (ref 25–100)
ALT SERPL W P-5'-P-CCNC: 23 U/L (ref 7–40)
ANION GAP SERPL CALCULATED.3IONS-SCNC: 3 MMOL/L (ref 3–11)
AST SERPL-CCNC: 28 U/L (ref 0–33)
BILIRUB SERPL-MCNC: 0.4 MG/DL (ref 0.3–1.2)
BUN BLD-MCNC: 16 MG/DL (ref 9–23)
BUN/CREAT SERPL: 22.2 (ref 7–25)
CALCIUM SPEC-SCNC: 9.3 MG/DL (ref 8.7–10.4)
CHLORIDE SERPL-SCNC: 107 MMOL/L (ref 99–109)
CO2 SERPL-SCNC: 27 MMOL/L (ref 20–31)
CREAT BLD-MCNC: 0.72 MG/DL (ref 0.6–1.3)
ERYTHROCYTE [DISTWIDTH] IN BLOOD BY AUTOMATED COUNT: 21 % (ref 11.3–14.5)
GFR SERPL CREATININE-BSD FRML MDRD: 83 ML/MIN/1.73
GLOBULIN UR ELPH-MCNC: 2 GM/DL
GLUCOSE BLD-MCNC: 95 MG/DL (ref 70–100)
HCT VFR BLD AUTO: 37.6 % (ref 34.5–44)
HGB BLD-MCNC: 12.6 G/DL (ref 11.5–15.5)
LYMPHOCYTES # BLD AUTO: 1.1 10*3/MM3 (ref 0.6–4.8)
LYMPHOCYTES NFR BLD AUTO: 17.6 % (ref 24–44)
MCH RBC QN AUTO: 34 PG (ref 27–31)
MCHC RBC AUTO-ENTMCNC: 33.6 G/DL (ref 32–36)
MCV RBC AUTO: 101 FL (ref 80–99)
MONOCYTES # BLD AUTO: 0.2 10*3/MM3 (ref 0–1)
MONOCYTES NFR BLD AUTO: 3.6 % (ref 0–12)
NEUTROPHILS # BLD AUTO: 4.9 10*3/MM3 (ref 1.5–8.3)
NEUTROPHILS NFR BLD AUTO: 78.8 % (ref 41–71)
PLATELET # BLD AUTO: 207 10*3/MM3 (ref 150–450)
PMV BLD AUTO: 7.2 FL (ref 6–12)
POTASSIUM BLD-SCNC: 4.5 MMOL/L (ref 3.5–5.5)
PROT SERPL-MCNC: 6.8 G/DL (ref 5.7–8.2)
RBC # BLD AUTO: 3.72 10*6/MM3 (ref 3.89–5.14)
SODIUM BLD-SCNC: 137 MMOL/L (ref 132–146)
WBC NRBC COR # BLD: 6.2 10*3/MM3 (ref 3.5–10.8)

## 2019-02-08 PROCEDURE — 63710000001 DIPHENHYDRAMINE PER 50 MG: Performed by: INTERNAL MEDICINE

## 2019-02-08 PROCEDURE — 99214 OFFICE O/P EST MOD 30 MIN: CPT | Performed by: INTERNAL MEDICINE

## 2019-02-08 PROCEDURE — 25010000002 PACLITAXEL PER 30 MG: Performed by: NURSE PRACTITIONER

## 2019-02-08 PROCEDURE — 96375 TX/PRO/DX INJ NEW DRUG ADDON: CPT

## 2019-02-08 PROCEDURE — 25010000002 DEXAMETHASONE PER 1 MG: Performed by: NURSE PRACTITIONER

## 2019-02-08 PROCEDURE — 96413 CHEMO IV INFUSION 1 HR: CPT

## 2019-02-08 PROCEDURE — 80053 COMPREHEN METABOLIC PANEL: CPT

## 2019-02-08 PROCEDURE — 85025 COMPLETE CBC W/AUTO DIFF WBC: CPT

## 2019-02-08 PROCEDURE — 99213 OFFICE O/P EST LOW 20 MIN: CPT | Performed by: FAMILY MEDICINE

## 2019-02-08 PROCEDURE — 36415 COLL VENOUS BLD VENIPUNCTURE: CPT

## 2019-02-08 RX ORDER — FAMOTIDINE 10 MG/ML
20 INJECTION, SOLUTION INTRAVENOUS ONCE
Status: CANCELLED | OUTPATIENT
Start: 2019-02-22

## 2019-02-08 RX ORDER — SODIUM CHLORIDE 9 MG/ML
250 INJECTION, SOLUTION INTRAVENOUS ONCE
Status: COMPLETED | OUTPATIENT
Start: 2019-02-08 | End: 2019-02-08

## 2019-02-08 RX ORDER — FAMOTIDINE 10 MG/ML
20 INJECTION, SOLUTION INTRAVENOUS ONCE
Status: CANCELLED | OUTPATIENT
Start: 2019-03-01

## 2019-02-08 RX ORDER — FAMOTIDINE 10 MG/ML
20 INJECTION, SOLUTION INTRAVENOUS ONCE
Status: COMPLETED | OUTPATIENT
Start: 2019-02-08 | End: 2019-02-08

## 2019-02-08 RX ORDER — BUPROPION HYDROCHLORIDE 100 MG/1
TABLET, EXTENDED RELEASE ORAL
Qty: 90 TABLET | Refills: 0 | Status: SHIPPED | OUTPATIENT
Start: 2019-02-08 | End: 2019-04-17

## 2019-02-08 RX ORDER — DIPHENHYDRAMINE HCL 25 MG
25 CAPSULE ORAL ONCE
Status: COMPLETED | OUTPATIENT
Start: 2019-02-08 | End: 2019-02-08

## 2019-02-08 RX ORDER — HYDROCODONE BITARTRATE AND ACETAMINOPHEN 5; 325 MG/1; MG/1
1 TABLET ORAL EVERY 6 HOURS PRN
Qty: 30 TABLET | Refills: 0 | Status: SHIPPED | OUTPATIENT
Start: 2019-02-08 | End: 2019-03-18 | Stop reason: SDUPTHER

## 2019-02-08 RX ORDER — ALPRAZOLAM 0.5 MG/1
0.5 TABLET ORAL 3 TIMES DAILY PRN
Qty: 90 TABLET | Refills: 2 | Status: SHIPPED | OUTPATIENT
Start: 2019-02-08 | End: 2019-08-06 | Stop reason: SDUPTHER

## 2019-02-08 RX ORDER — SODIUM CHLORIDE 9 MG/ML
250 INJECTION, SOLUTION INTRAVENOUS ONCE
Status: CANCELLED | OUTPATIENT
Start: 2019-03-01

## 2019-02-08 RX ORDER — SODIUM CHLORIDE 9 MG/ML
250 INJECTION, SOLUTION INTRAVENOUS ONCE
Status: CANCELLED | OUTPATIENT
Start: 2019-02-22

## 2019-02-08 RX ADMIN — DEXAMETHASONE SODIUM PHOSPHATE 12 MG: 4 INJECTION, SOLUTION INTRAMUSCULAR; INTRAVENOUS at 09:59

## 2019-02-08 RX ADMIN — FAMOTIDINE 20 MG: 10 INJECTION, SOLUTION INTRAVENOUS at 09:59

## 2019-02-08 RX ADMIN — PACLITAXEL 120 MG: 6 INJECTION, SOLUTION INTRAVENOUS at 10:20

## 2019-02-08 RX ADMIN — SODIUM CHLORIDE 250 ML: 9 INJECTION, SOLUTION INTRAVENOUS at 09:58

## 2019-02-08 RX ADMIN — DIPHENHYDRAMINE HYDROCHLORIDE 25 MG: 25 CAPSULE ORAL at 09:59

## 2019-02-08 RX ADMIN — SODIUM CHLORIDE, PRESERVATIVE FREE 500 UNITS: 5 INJECTION INTRAVENOUS at 11:22

## 2019-02-08 NOTE — PROGRESS NOTES
2/8/2019  Spoke with GONZALO Woodson.  Pt is needing a letter for her disability stating that she is unable to work during her treatment until two weeks post final dose.  Please fax.    Typed letter & gave to MD to sign.    Rec'd with MD signature.  Faxed to [facility] @ fax#[phone]    3/1/2019  Spoke with pt today while she was checking out from her apt with [place].  She stated that she was having issues with [facility] regarding her RTW.    Called [facility] @ P#[phone]  Spoke with Faustina.  Notified her that pt's expected RTW date is at least 2 weeks post final treatment currently scheduled 4/12/2019.  I also explained that things could possibly change or effect the patient's treatment schedule.    Estimated f/o apt will be scheduled ~3/15/2019 and approximate RTW ~5/3/2019.  She verbalized understanding and put an extension on the patinent's plan.    Called and notified pt of above, she verbalized understanding.

## 2019-02-08 NOTE — PATIENT INSTRUCTIONS
Go to the nearest ER or return to clinic if symptoms worsen, fever/chill develop      Major Depressive Disorder, Adult  Major depressive disorder (MDD) is a mental health condition. MDD often makes you feel sad, hopeless, or helpless. MDD can also cause symptoms in your body. MDD can affect your:  · Work.  · School.  · Relationships.  · Other normal activities.    MDD can range from mild to very bad. It may occur once (single episode MDD). It can also occur many times (recurrent MDD).  The main symptoms of MDD often include:  · Feeling sad, depressed, or irritable most of the time.  · Loss of interest.    MDD symptoms also include:  · Sleeping too much or too little.  · Eating too much or too little.  · A change in your weight.  · Feeling tired (fatigue) or having low energy.  · Feeling worthless.  · Feeling guilty.  · Trouble making decisions.  · Trouble thinking clearly.  · Thoughts of suicide or harming others.  · Feeling weak.  · Feeling agitated.  · Keeping yourself from being around other people (isolation).    Follow these instructions at home:  Activity  · Do these things as told by your doctor:  ? Go back to your normal activities.  ? Exercise regularly.  ? Spend time outdoors.  Alcohol  · Talk with your doctor about how alcohol can affect your antidepressant medicines.  · Do not drink alcohol. Or, limit how much alcohol you drink.  ? This means no more than 1 drink a day for nonpregnant women and 2 drinks a day for men. One drink equals one of these:  § 12 oz of beer.  § 5 oz of wine.  § 1½ oz of hard liquor.  General instructions  · Take over-the-counter and prescription medicines only as told by your doctor.  · Eat a healthy diet.  · Get plenty of sleep.  · Find activities that you enjoy. Make time to do them.  · Think about joining a support group. Your doctor may be able to suggest a group for you.  · Keep all follow-up visits as told by your doctor. This is important.  Where to find more  information:  · National [place] on Mental Illness:  ? www.omid.org  · [place] National [place] of Mental Health:  ? www.[facility].nih.gov  · National Suicide Prevention Lifeline:  ? 1-783-586-5312. This is free, 24-hour help.  Contact a doctor if:  · Your symptoms get worse.  · You have new symptoms.  Get help right away if:  · You self-harm.  · You see, hear, taste, smell, or feel things that are not present (hallucinate).  If you ever feel like you may hurt yourself or others, or have thoughts about taking your own life, get help right away. You can go to your nearest emergency department or call:  · Your local emergency services (911 in the [place]  · A suicide crisis helpline, such as the National Suicide Prevention Lifeline:  ? 2-066-971-4279. This is open 24 hours a day.    This information is not intended to replace advice given to you by your health care provider. Make sure you discuss any questions you have with your health care provider.  Document Released: 11/28/2016 Document Revised: 09/03/2017 Document Reviewed: 09/03/2017  Elsevier Interactive Patient Education © 2017 Elsevier Inc.

## 2019-02-08 NOTE — PROGRESS NOTES
Subjective   Brittani Lambert is a 59 y.o. female.   Chief Complaint   Patient presents with   • Follow-up     History of Present Illness   She just came from chemotherapy, saw her oncologist today. Currently receiving chemo once per week.   Dr. Patel, oncologist, is planning to extend her short term disability until chemotherapy is complete in April 2019.    She is requesting pain medication to use prn.   History of double mastectomy. Her port is on the right side, pain at site and in right axilla.   She is starting PT soon to stretch and regain mobility in arms.     Anxiety and Depression  She is treated with Alprazolam prn, sertraline, Wellbutrin.   She saw behavioral health and was started on Wellbutrin in December 2018 to help with mood, motivation, smoking. Tolerating medication well, requests refill.     The following portions of the patient's history were reviewed and updated as appropriate: allergies, current medications, past family history, past medical history, past social history, past surgical history and problem list.    Review of Systems   Constitutional: Negative for chills.   HENT: Positive for sneezing.    Respiratory: Negative for cough and shortness of breath.    Cardiovascular: Negative for chest pain and palpitations.   Musculoskeletal:        Right axilla pain   Skin: Negative for color change and rash.   Neurological: Negative for light-headedness and headache.   Psychiatric/Behavioral: Positive for depressed mood.       Objective   Physical Exam   Constitutional: She is oriented to person, place, and time. She appears well-developed and well-nourished.   HENT:   Head: Normocephalic and atraumatic.   Right Ear: Hearing, tympanic membrane, external ear and ear canal normal.   Left Ear: Hearing, tympanic membrane, external ear and ear canal normal.   Nose: Nose normal.   Eyes: Conjunctivae are normal.   Neck: Neck supple.   Cardiovascular: Normal rate, regular rhythm and normal heart sounds.    No murmur heard.  Pulmonary/Chest: Effort normal and breath sounds normal. She has no wheezes.   Musculoskeletal: She exhibits no edema.   Neurological: She is alert and oriented to person, place, and time.   Skin: Skin is warm and dry.   Psychiatric: She has a normal mood and affect. Her behavior is normal.   Nursing note and vitals reviewed.        Assessment/Plan   Brittani was seen today for follow-up.    Diagnoses and all orders for this visit:    Anxiety  -     ALPRAZolam (XANAX) 0.5 MG tablet; Take 1 tablet by mouth 3 (Three) Times a Day As Needed for Anxiety or Sleep.    Moderate episode of recurrent major depressive disorder (CMS/HCC)  -     buPROPion SR (WELLBUTRIN SR) 100 MG 12 hr tablet; Take one tablet daily in mornings    H/O bilateral mastectomy  -     HYDROcodone-acetaminophen (NORCO) 5-325 MG per tablet; Take 1 tablet by mouth Every 6 (Six) Hours As Needed for Moderate Pain .      Anxiety and depression are stable with current treatment, medications refilled.  Pain medication provided, use prn. She is aware to contact me when she needs refills.   ZECHARIAH query complete. Treatment plan to include limited course of prescribed controlled substance. Risks including addiction, benefits, and alternatives presented to patient.     She does have a low grade fever today, just received chemo. No sign of bacterial infection at this time.   If symptoms worsen, advised her to seek medical attention.

## 2019-02-15 ENCOUNTER — HOSPITAL ENCOUNTER (OUTPATIENT)
Dept: ONCOLOGY | Facility: HOSPITAL | Age: 59
Setting detail: INFUSION SERIES
Discharge: HOME | End: 2019-02-15

## 2019-02-15 VITALS
TEMPERATURE: 97.9 F | BODY MASS INDEX: 20.2 KG/M2 | HEART RATE: 82 BPM | RESPIRATION RATE: 16 BRPM | SYSTOLIC BLOOD PRESSURE: 122 MMHG | DIASTOLIC BLOOD PRESSURE: 65 MMHG | WEIGHT: 114 LBS | HEIGHT: 63 IN

## 2019-02-15 DIAGNOSIS — C50.912 MALIGNANT NEOPLASM OF LEFT BREAST IN FEMALE, ESTROGEN RECEPTOR NEGATIVE, UNSPECIFIED SITE OF BREAST (HCC): Primary | ICD-10-CM

## 2019-02-15 DIAGNOSIS — Z17.1 MALIGNANT NEOPLASM OF LEFT BREAST IN FEMALE, ESTROGEN RECEPTOR NEGATIVE, UNSPECIFIED SITE OF BREAST (HCC): Primary | ICD-10-CM

## 2019-02-15 LAB
ALBUMIN SERPL-MCNC: 4.67 G/DL (ref 3.2–4.8)
ALBUMIN/GLOB SERPL: 2.2 G/DL (ref 1.5–2.5)
ALP SERPL-CCNC: 73 U/L (ref 25–100)
ALT SERPL W P-5'-P-CCNC: 19 U/L (ref 7–40)
ANION GAP SERPL CALCULATED.3IONS-SCNC: 6 MMOL/L (ref 3–11)
AST SERPL-CCNC: 22 U/L (ref 0–33)
BILIRUB SERPL-MCNC: 0.4 MG/DL (ref 0.3–1.2)
BUN BLD-MCNC: 13 MG/DL (ref 9–23)
BUN/CREAT SERPL: 18.6 (ref 7–25)
CALCIUM SPEC-SCNC: 9.7 MG/DL (ref 8.7–10.4)
CHLORIDE SERPL-SCNC: 106 MMOL/L (ref 99–109)
CO2 SERPL-SCNC: 24 MMOL/L (ref 20–31)
CREAT BLD-MCNC: 0.7 MG/DL (ref 0.6–1.3)
ERYTHROCYTE [DISTWIDTH] IN BLOOD BY AUTOMATED COUNT: 18.8 % (ref 11.3–14.5)
GFR SERPL CREATININE-BSD FRML MDRD: 86 ML/MIN/1.73
GLOBULIN UR ELPH-MCNC: 2.1 GM/DL
GLUCOSE BLD-MCNC: 108 MG/DL (ref 70–100)
HCT VFR BLD AUTO: 33.5 % (ref 34.5–44)
HGB BLD-MCNC: 11.4 G/DL (ref 11.5–15.5)
LYMPHOCYTES # BLD AUTO: 1 10*3/MM3 (ref 0.6–4.8)
LYMPHOCYTES NFR BLD AUTO: 15.6 % (ref 24–44)
MCH RBC QN AUTO: 34.2 PG (ref 27–31)
MCHC RBC AUTO-ENTMCNC: 33.9 G/DL (ref 32–36)
MCV RBC AUTO: 100.6 FL (ref 80–99)
MONOCYTES # BLD AUTO: 0.3 10*3/MM3 (ref 0–1)
MONOCYTES NFR BLD AUTO: 5.4 % (ref 0–12)
NEUTROPHILS # BLD AUTO: 4.8 10*3/MM3 (ref 1.5–8.3)
NEUTROPHILS NFR BLD AUTO: 79 % (ref 41–71)
PLATELET # BLD AUTO: 230 10*3/MM3 (ref 150–450)
PMV BLD AUTO: 7.1 FL (ref 6–12)
POTASSIUM BLD-SCNC: 4.1 MMOL/L (ref 3.5–5.5)
PROT SERPL-MCNC: 6.8 G/DL (ref 5.7–8.2)
RBC # BLD AUTO: 3.32 10*6/MM3 (ref 3.89–5.14)
SODIUM BLD-SCNC: 136 MMOL/L (ref 132–146)
WBC NRBC COR # BLD: 6.1 10*3/MM3 (ref 3.5–10.8)

## 2019-02-15 PROCEDURE — 96375 TX/PRO/DX INJ NEW DRUG ADDON: CPT

## 2019-02-15 PROCEDURE — 25010000002 PACLITAXEL PER 30 MG: Performed by: INTERNAL MEDICINE

## 2019-02-15 PROCEDURE — 25010000002 DEXAMETHASONE PER 1 MG: Performed by: INTERNAL MEDICINE

## 2019-02-15 PROCEDURE — 85025 COMPLETE CBC W/AUTO DIFF WBC: CPT | Performed by: NURSE PRACTITIONER

## 2019-02-15 PROCEDURE — 63710000001 DIPHENHYDRAMINE PER 50 MG: Performed by: INTERNAL MEDICINE

## 2019-02-15 PROCEDURE — 96413 CHEMO IV INFUSION 1 HR: CPT

## 2019-02-15 PROCEDURE — 80053 COMPREHEN METABOLIC PANEL: CPT | Performed by: NURSE PRACTITIONER

## 2019-02-15 RX ORDER — SODIUM CHLORIDE 9 MG/ML
250 INJECTION, SOLUTION INTRAVENOUS ONCE
Status: COMPLETED | OUTPATIENT
Start: 2019-02-15 | End: 2019-02-15

## 2019-02-15 RX ORDER — FAMOTIDINE 10 MG/ML
20 INJECTION, SOLUTION INTRAVENOUS ONCE
Status: COMPLETED | OUTPATIENT
Start: 2019-02-15 | End: 2019-02-15

## 2019-02-15 RX ORDER — DIPHENHYDRAMINE HCL 25 MG
25 CAPSULE ORAL ONCE
Status: COMPLETED | OUTPATIENT
Start: 2019-02-15 | End: 2019-02-15

## 2019-02-15 RX ADMIN — DEXAMETHASONE SODIUM PHOSPHATE 12 MG: 4 INJECTION, SOLUTION INTRAMUSCULAR; INTRAVENOUS at 11:27

## 2019-02-15 RX ADMIN — HEPARIN 500 UNITS: 100 SYRINGE at 12:56

## 2019-02-15 RX ADMIN — FAMOTIDINE 20 MG: 10 INJECTION, SOLUTION INTRAVENOUS at 11:28

## 2019-02-15 RX ADMIN — SODIUM CHLORIDE 250 ML: 9 INJECTION, SOLUTION INTRAVENOUS at 11:28

## 2019-02-15 RX ADMIN — PACLITAXEL 120 MG: 6 INJECTION, SOLUTION INTRAVENOUS at 11:48

## 2019-02-15 RX ADMIN — DIPHENHYDRAMINE HYDROCHLORIDE 25 MG: 25 CAPSULE ORAL at 11:26

## 2019-02-22 ENCOUNTER — HOSPITAL ENCOUNTER (OUTPATIENT)
Dept: ONCOLOGY | Facility: HOSPITAL | Age: 59
Setting detail: INFUSION SERIES
Discharge: HOME | End: 2019-02-22

## 2019-02-22 VITALS
SYSTOLIC BLOOD PRESSURE: 114 MMHG | HEART RATE: 92 BPM | DIASTOLIC BLOOD PRESSURE: 66 MMHG | BODY MASS INDEX: 20.2 KG/M2 | HEIGHT: 63 IN

## 2019-02-22 DIAGNOSIS — Z17.1 MALIGNANT NEOPLASM OF LEFT BREAST IN FEMALE, ESTROGEN RECEPTOR NEGATIVE, UNSPECIFIED SITE OF BREAST (HCC): Primary | ICD-10-CM

## 2019-02-22 DIAGNOSIS — C50.912 MALIGNANT NEOPLASM OF LEFT BREAST IN FEMALE, ESTROGEN RECEPTOR NEGATIVE, UNSPECIFIED SITE OF BREAST (HCC): Primary | ICD-10-CM

## 2019-02-22 LAB
ALBUMIN SERPL-MCNC: 4.6 G/DL (ref 3.2–4.8)
ALBUMIN/GLOB SERPL: 2.1 G/DL (ref 1.5–2.5)
ALP SERPL-CCNC: 71 U/L (ref 25–100)
ALT SERPL W P-5'-P-CCNC: 19 U/L (ref 7–40)
ANION GAP SERPL CALCULATED.3IONS-SCNC: 5 MMOL/L (ref 3–11)
AST SERPL-CCNC: 20 U/L (ref 0–33)
BILIRUB SERPL-MCNC: 0.5 MG/DL (ref 0.3–1.2)
BUN BLD-MCNC: 11 MG/DL (ref 9–23)
BUN/CREAT SERPL: 16.7 (ref 7–25)
CALCIUM SPEC-SCNC: 9.5 MG/DL (ref 8.7–10.4)
CHLORIDE SERPL-SCNC: 105 MMOL/L (ref 99–109)
CO2 SERPL-SCNC: 26 MMOL/L (ref 20–31)
CREAT BLD-MCNC: 0.66 MG/DL (ref 0.6–1.3)
ERYTHROCYTE [DISTWIDTH] IN BLOOD BY AUTOMATED COUNT: 17.1 % (ref 11.3–14.5)
GFR SERPL CREATININE-BSD FRML MDRD: 92 ML/MIN/1.73
GLOBULIN UR ELPH-MCNC: 2.2 GM/DL
GLUCOSE BLD-MCNC: 95 MG/DL (ref 70–100)
HCT VFR BLD AUTO: 32.4 % (ref 34.5–44)
HGB BLD-MCNC: 10.9 G/DL (ref 11.5–15.5)
LYMPHOCYTES # BLD AUTO: 1 10*3/MM3 (ref 0.6–4.8)
LYMPHOCYTES NFR BLD AUTO: 21.5 % (ref 24–44)
MCH RBC QN AUTO: 34.1 PG (ref 27–31)
MCHC RBC AUTO-ENTMCNC: 33.8 G/DL (ref 32–36)
MCV RBC AUTO: 100.8 FL (ref 80–99)
MONOCYTES # BLD AUTO: 0.2 10*3/MM3 (ref 0–1)
MONOCYTES NFR BLD AUTO: 5 % (ref 0–12)
NEUTROPHILS # BLD AUTO: 3.5 10*3/MM3 (ref 1.5–8.3)
NEUTROPHILS NFR BLD AUTO: 73.5 % (ref 41–71)
PLATELET # BLD AUTO: 223 10*3/MM3 (ref 150–450)
PMV BLD AUTO: 7 FL (ref 6–12)
POTASSIUM BLD-SCNC: 4 MMOL/L (ref 3.5–5.5)
PROT SERPL-MCNC: 6.8 G/DL (ref 5.7–8.2)
RBC # BLD AUTO: 3.21 10*6/MM3 (ref 3.89–5.14)
SODIUM BLD-SCNC: 136 MMOL/L (ref 132–146)
WBC NRBC COR # BLD: 4.8 10*3/MM3 (ref 3.5–10.8)

## 2019-02-22 PROCEDURE — 25010000002 DIPHENHYDRAMINE PER 50 MG: Performed by: INTERNAL MEDICINE

## 2019-02-22 PROCEDURE — 25010000002 DEXAMETHASONE PER 1 MG: Performed by: INTERNAL MEDICINE

## 2019-02-22 PROCEDURE — 85025 COMPLETE CBC W/AUTO DIFF WBC: CPT | Performed by: INTERNAL MEDICINE

## 2019-02-22 PROCEDURE — 80053 COMPREHEN METABOLIC PANEL: CPT | Performed by: INTERNAL MEDICINE

## 2019-02-22 PROCEDURE — 96375 TX/PRO/DX INJ NEW DRUG ADDON: CPT

## 2019-02-22 PROCEDURE — 25010000002 PACLITAXEL PER 30 MG: Performed by: INTERNAL MEDICINE

## 2019-02-22 PROCEDURE — 96413 CHEMO IV INFUSION 1 HR: CPT

## 2019-02-22 PROCEDURE — 96376 TX/PRO/DX INJ SAME DRUG ADON: CPT

## 2019-02-22 RX ORDER — FAMOTIDINE 10 MG/ML
20 INJECTION, SOLUTION INTRAVENOUS ONCE
Status: COMPLETED | OUTPATIENT
Start: 2019-02-22 | End: 2019-02-22

## 2019-02-22 RX ORDER — SODIUM CHLORIDE 9 MG/ML
250 INJECTION, SOLUTION INTRAVENOUS ONCE
Status: COMPLETED | OUTPATIENT
Start: 2019-02-22 | End: 2019-02-22

## 2019-02-22 RX ADMIN — PACLITAXEL 120 MG: 6 INJECTION, SOLUTION INTRAVENOUS at 11:56

## 2019-02-22 RX ADMIN — SODIUM CHLORIDE 250 ML: 9 INJECTION, SOLUTION INTRAVENOUS at 11:31

## 2019-02-22 RX ADMIN — DEXAMETHASONE SODIUM PHOSPHATE 12 MG: 4 INJECTION, SOLUTION INTRA-ARTICULAR; INTRALESIONAL; INTRAMUSCULAR; INTRAVENOUS; SOFT TISSUE at 11:40

## 2019-02-22 RX ADMIN — DIPHENHYDRAMINE HYDROCHLORIDE 25 MG: 50 INJECTION INTRAMUSCULAR; INTRAVENOUS at 11:40

## 2019-02-22 RX ADMIN — HEPARIN 500 UNITS: 100 SYRINGE at 13:08

## 2019-02-22 RX ADMIN — FAMOTIDINE 20 MG: 10 INJECTION, SOLUTION INTRAVENOUS at 11:31

## 2019-02-25 ENCOUNTER — TELEPHONE (OUTPATIENT)
Dept: ONCOLOGY | Facility: CLINIC | Age: 59
End: 2019-02-25

## 2019-02-25 ENCOUNTER — LAB (OUTPATIENT)
Dept: LAB | Facility: HOSPITAL | Age: 59
End: 2019-02-25

## 2019-02-25 DIAGNOSIS — C50.912 MALIGNANT NEOPLASM OF LEFT BREAST IN FEMALE, ESTROGEN RECEPTOR NEGATIVE, UNSPECIFIED SITE OF BREAST (HCC): ICD-10-CM

## 2019-02-25 DIAGNOSIS — Z17.1 MALIGNANT NEOPLASM OF LEFT BREAST IN FEMALE, ESTROGEN RECEPTOR NEGATIVE, UNSPECIFIED SITE OF BREAST (HCC): ICD-10-CM

## 2019-02-25 DIAGNOSIS — F33.1 MODERATE EPISODE OF RECURRENT MAJOR DEPRESSIVE DISORDER (HCC): Primary | ICD-10-CM

## 2019-02-25 LAB
ALBUMIN SERPL-MCNC: 4.96 G/DL (ref 3.2–4.8)
ALBUMIN/GLOB SERPL: 2.4 G/DL (ref 1.5–2.5)
ALP SERPL-CCNC: 68 U/L (ref 25–100)
ALT SERPL W P-5'-P-CCNC: 17 U/L (ref 7–40)
ANION GAP SERPL CALCULATED.3IONS-SCNC: 7 MMOL/L (ref 3–11)
AST SERPL-CCNC: 17 U/L (ref 0–33)
BASOPHILS # BLD AUTO: 0.02 10*3/MM3 (ref 0–0.2)
BASOPHILS NFR BLD AUTO: 0.4 % (ref 0–1)
BILIRUB SERPL-MCNC: 0.7 MG/DL (ref 0.3–1.2)
BUN BLD-MCNC: 10 MG/DL (ref 9–23)
BUN/CREAT SERPL: 13.9 (ref 7–25)
CALCIUM SPEC-SCNC: 10 MG/DL (ref 8.7–10.4)
CHLORIDE SERPL-SCNC: 100 MMOL/L (ref 99–109)
CO2 SERPL-SCNC: 25 MMOL/L (ref 20–31)
CREAT BLD-MCNC: 0.72 MG/DL (ref 0.6–1.3)
DEPRECATED RDW RBC AUTO: 55.4 FL (ref 37–54)
EOSINOPHIL # BLD AUTO: 0.05 10*3/MM3 (ref 0–0.3)
EOSINOPHIL NFR BLD AUTO: 0.9 % (ref 0–3)
ERYTHROCYTE [DISTWIDTH] IN BLOOD BY AUTOMATED COUNT: 14.8 % (ref 11.3–14.5)
GFR SERPL CREATININE-BSD FRML MDRD: 83 ML/MIN/1.73
GLOBULIN UR ELPH-MCNC: 2 GM/DL
GLUCOSE BLD-MCNC: 105 MG/DL (ref 70–100)
HCT VFR BLD AUTO: 37 % (ref 34.5–44)
HGB BLD-MCNC: 12.2 G/DL (ref 11.5–15.5)
IMM GRANULOCYTES # BLD AUTO: 0.01 10*3/MM3 (ref 0–0.05)
IMM GRANULOCYTES NFR BLD AUTO: 0.2 % (ref 0–0.6)
LYMPHOCYTES # BLD AUTO: 1.25 10*3/MM3 (ref 0.6–4.8)
LYMPHOCYTES NFR BLD AUTO: 23.6 % (ref 24–44)
MCH RBC QN AUTO: 33.6 PG (ref 27–31)
MCHC RBC AUTO-ENTMCNC: 33 G/DL (ref 32–36)
MCV RBC AUTO: 101.9 FL (ref 80–99)
MONOCYTES # BLD AUTO: 0.22 10*3/MM3 (ref 0–1)
MONOCYTES NFR BLD AUTO: 4.2 % (ref 0–12)
NEUTROPHILS # BLD AUTO: 3.75 10*3/MM3 (ref 1.5–8.3)
NEUTROPHILS NFR BLD AUTO: 70.9 % (ref 41–71)
PLATELET # BLD AUTO: 243 10*3/MM3 (ref 150–450)
PMV BLD AUTO: 10.8 FL (ref 6–12)
POTASSIUM BLD-SCNC: 4.2 MMOL/L (ref 3.5–5.5)
PROT SERPL-MCNC: 7 G/DL (ref 5.7–8.2)
RBC # BLD AUTO: 3.63 10*6/MM3 (ref 3.89–5.14)
SODIUM BLD-SCNC: 132 MMOL/L (ref 132–146)
WBC NRBC COR # BLD: 5.29 10*3/MM3 (ref 3.5–10.8)

## 2019-02-25 PROCEDURE — 80053 COMPREHEN METABOLIC PANEL: CPT

## 2019-02-25 PROCEDURE — 85025 COMPLETE CBC W/AUTO DIFF WBC: CPT

## 2019-02-25 PROCEDURE — 36415 COLL VENOUS BLD VENIPUNCTURE: CPT

## 2019-02-25 RX ORDER — TRAMADOL HYDROCHLORIDE 50 MG/1
TABLET ORAL
Qty: 10 TABLET | Refills: 0 | Status: SHIPPED | OUTPATIENT
Start: 2019-02-25 | End: 2019-04-10

## 2019-02-25 NOTE — TELEPHONE ENCOUNTER
"Received call from patient through triage line. She is a breast cancer patient receiving weekly taxol, most recent- cycle 5 on 2/22. Over the weekend, she has been running a low grade fever, , had a migraine, and severe body aches. She has been rotating tylenol and advil. Discussed that generalized body aches are a common side effect from chemotherapy and it is ok to continue rotating tylenol and advil. Pt said she can't handle this pain, she is getting no relief from the tylenol/advil and can't wait until her follow-up apt on Friday because the pain is \"astronomincal.\"  Discussed with LONG Fisher. Tramadol escribed to her pharmacy. For the fever, she is unable to come to [place] at all today. I asked if she would be able to stop by the [facility] in [place] to check a cbc. She can do this. I will call her back to review blood counts.   " Adequate: hears normal conversation without difficulty

## 2019-02-26 ENCOUNTER — TELEPHONE (OUTPATIENT)
Dept: ONCOLOGY | Facility: CLINIC | Age: 59
End: 2019-02-26

## 2019-02-26 NOTE — TELEPHONE ENCOUNTER
----- Message from Nelly Lozada sent at 2/26/2019 12:48 PM EST -----  Regarding: LEANNA - LAB RESULTS   Contact: [phone]  PATIENT IS WANTING LAB RESULTS. SHE HASN'T RECEIVED A PHONE CALL BACK FROM YESTERDAY

## 2019-02-26 NOTE — TELEPHONE ENCOUNTER
Returned call to patient. Let her know cbc did not result until 8pm last night, but Dr Patel reviewed it and it looked ok. She states her temp is back down, has been in normal range all day. Pain was unchanged by tramadol. The generalized body pains and headache are still severe. Pt said her head hurts so bad all she can do is close her eyes and wait for it to pass. Discussed with Dr Patel. She would like for pt to be evaluated in [facility]. Offered for pt to come in and be seen. Pt said she did not think this was necessary and will call me back if she is unable to wait until her scheduled follow up on Friday.

## 2019-03-01 ENCOUNTER — HOSPITAL ENCOUNTER (OUTPATIENT)
Dept: ONCOLOGY | Facility: HOSPITAL | Age: 59
Setting detail: INFUSION SERIES
Discharge: HOME | End: 2019-03-01

## 2019-03-01 ENCOUNTER — OFFICE VISIT (OUTPATIENT)
Dept: ONCOLOGY | Facility: CLINIC | Age: 59
End: 2019-03-01

## 2019-03-01 VITALS
TEMPERATURE: 97.6 F | RESPIRATION RATE: 17 BRPM | HEART RATE: 88 BPM | SYSTOLIC BLOOD PRESSURE: 134 MMHG | DIASTOLIC BLOOD PRESSURE: 71 MMHG | HEIGHT: 63 IN | BODY MASS INDEX: 19.84 KG/M2 | WEIGHT: 112 LBS

## 2019-03-01 VITALS — DIASTOLIC BLOOD PRESSURE: 77 MMHG | HEART RATE: 86 BPM | SYSTOLIC BLOOD PRESSURE: 136 MMHG

## 2019-03-01 DIAGNOSIS — Z17.1 MALIGNANT NEOPLASM OF LEFT BREAST IN FEMALE, ESTROGEN RECEPTOR NEGATIVE, UNSPECIFIED SITE OF BREAST (HCC): ICD-10-CM

## 2019-03-01 DIAGNOSIS — M79.602 LEFT ARM PAIN: ICD-10-CM

## 2019-03-01 DIAGNOSIS — C50.912 MALIGNANT NEOPLASM OF LEFT BREAST IN FEMALE, ESTROGEN RECEPTOR NEGATIVE, UNSPECIFIED SITE OF BREAST (HCC): ICD-10-CM

## 2019-03-01 DIAGNOSIS — R51.9 ACUTE NONINTRACTABLE HEADACHE, UNSPECIFIED HEADACHE TYPE: Primary | ICD-10-CM

## 2019-03-01 DIAGNOSIS — Z17.1 MALIGNANT NEOPLASM OF LEFT BREAST IN FEMALE, ESTROGEN RECEPTOR NEGATIVE, UNSPECIFIED SITE OF BREAST (HCC): Primary | ICD-10-CM

## 2019-03-01 DIAGNOSIS — C50.912 MALIGNANT NEOPLASM OF LEFT BREAST IN FEMALE, ESTROGEN RECEPTOR NEGATIVE, UNSPECIFIED SITE OF BREAST (HCC): Primary | ICD-10-CM

## 2019-03-01 LAB
ALBUMIN SERPL-MCNC: 4.31 G/DL (ref 3.2–4.8)
ALBUMIN/GLOB SERPL: 2.1 G/DL (ref 1.5–2.5)
ALP SERPL-CCNC: 63 U/L (ref 25–100)
ALT SERPL W P-5'-P-CCNC: 11 U/L (ref 7–40)
ANION GAP SERPL CALCULATED.3IONS-SCNC: 6 MMOL/L (ref 3–11)
AST SERPL-CCNC: 15 U/L (ref 0–33)
BILIRUB SERPL-MCNC: 0.3 MG/DL (ref 0.3–1.2)
BUN BLD-MCNC: 13 MG/DL (ref 9–23)
BUN/CREAT SERPL: 22.4 (ref 7–25)
CALCIUM SPEC-SCNC: 9 MG/DL (ref 8.7–10.4)
CHLORIDE SERPL-SCNC: 106 MMOL/L (ref 99–109)
CO2 SERPL-SCNC: 24 MMOL/L (ref 20–31)
CREAT BLD-MCNC: 0.58 MG/DL (ref 0.6–1.3)
ERYTHROCYTE [DISTWIDTH] IN BLOOD BY AUTOMATED COUNT: 15.3 % (ref 11.3–14.5)
GFR SERPL CREATININE-BSD FRML MDRD: 106 ML/MIN/1.73
GLOBULIN UR ELPH-MCNC: 2.1 GM/DL
GLUCOSE BLD-MCNC: 78 MG/DL (ref 70–100)
HCT VFR BLD AUTO: 29.1 % (ref 34.5–44)
HGB BLD-MCNC: 9.9 G/DL (ref 11.5–15.5)
LYMPHOCYTES # BLD AUTO: 1 10*3/MM3 (ref 0.6–4.8)
LYMPHOCYTES NFR BLD AUTO: 21.8 % (ref 24–44)
MCH RBC QN AUTO: 34.3 PG (ref 27–31)
MCHC RBC AUTO-ENTMCNC: 34.2 G/DL (ref 32–36)
MCV RBC AUTO: 100.3 FL (ref 80–99)
MONOCYTES # BLD AUTO: 0.3 10*3/MM3 (ref 0–1)
MONOCYTES NFR BLD AUTO: 7.3 % (ref 0–12)
NEUTROPHILS # BLD AUTO: 3.1 10*3/MM3 (ref 1.5–8.3)
NEUTROPHILS NFR BLD AUTO: 70.9 % (ref 41–71)
PLATELET # BLD AUTO: 208 10*3/MM3 (ref 150–450)
PMV BLD AUTO: 6.8 FL (ref 6–12)
POTASSIUM BLD-SCNC: 4 MMOL/L (ref 3.5–5.5)
PROT SERPL-MCNC: 6.4 G/DL (ref 5.7–8.2)
RBC # BLD AUTO: 2.9 10*6/MM3 (ref 3.89–5.14)
SODIUM BLD-SCNC: 136 MMOL/L (ref 132–146)
WBC NRBC COR # BLD: 4.4 10*3/MM3 (ref 3.5–10.8)

## 2019-03-01 PROCEDURE — 25010000002 DEXAMETHASONE PER 1 MG: Performed by: INTERNAL MEDICINE

## 2019-03-01 PROCEDURE — 99214 OFFICE O/P EST MOD 30 MIN: CPT | Performed by: NURSE PRACTITIONER

## 2019-03-01 PROCEDURE — 96375 TX/PRO/DX INJ NEW DRUG ADDON: CPT

## 2019-03-01 PROCEDURE — 80053 COMPREHEN METABOLIC PANEL: CPT | Performed by: NURSE PRACTITIONER

## 2019-03-01 PROCEDURE — 85025 COMPLETE CBC W/AUTO DIFF WBC: CPT | Performed by: INTERNAL MEDICINE

## 2019-03-01 PROCEDURE — 96376 TX/PRO/DX INJ SAME DRUG ADON: CPT

## 2019-03-01 PROCEDURE — 25010000002 DIPHENHYDRAMINE PER 50 MG: Performed by: INTERNAL MEDICINE

## 2019-03-01 PROCEDURE — 25010000002 PACLITAXEL PER 30 MG: Performed by: INTERNAL MEDICINE

## 2019-03-01 PROCEDURE — 96413 CHEMO IV INFUSION 1 HR: CPT

## 2019-03-01 RX ORDER — SODIUM CHLORIDE 9 MG/ML
250 INJECTION, SOLUTION INTRAVENOUS ONCE
Status: CANCELLED | OUTPATIENT
Start: 2019-03-08

## 2019-03-01 RX ORDER — FAMOTIDINE 10 MG/ML
20 INJECTION, SOLUTION INTRAVENOUS ONCE
Status: CANCELLED | OUTPATIENT
Start: 2019-03-08

## 2019-03-01 RX ORDER — FAMOTIDINE 10 MG/ML
20 INJECTION, SOLUTION INTRAVENOUS ONCE
Status: CANCELLED | OUTPATIENT
Start: 2019-03-15

## 2019-03-01 RX ORDER — SODIUM CHLORIDE 9 MG/ML
250 INJECTION, SOLUTION INTRAVENOUS ONCE
Status: CANCELLED | OUTPATIENT
Start: 2019-03-15

## 2019-03-01 RX ORDER — FAMOTIDINE 10 MG/ML
20 INJECTION, SOLUTION INTRAVENOUS ONCE
Status: COMPLETED | OUTPATIENT
Start: 2019-03-01 | End: 2019-03-01

## 2019-03-01 RX ORDER — SODIUM CHLORIDE 9 MG/ML
250 INJECTION, SOLUTION INTRAVENOUS ONCE
Status: COMPLETED | OUTPATIENT
Start: 2019-03-01 | End: 2019-03-01

## 2019-03-01 RX ADMIN — FAMOTIDINE 20 MG: 10 INJECTION, SOLUTION INTRAVENOUS at 11:40

## 2019-03-01 RX ADMIN — HEPARIN 500 UNITS: 100 SYRINGE at 13:08

## 2019-03-01 RX ADMIN — DIPHENHYDRAMINE HYDROCHLORIDE 25 MG: 50 INJECTION, SOLUTION INTRAMUSCULAR; INTRAVENOUS at 11:44

## 2019-03-01 RX ADMIN — DEXAMETHASONE SODIUM PHOSPHATE 12 MG: 4 INJECTION, SOLUTION INTRA-ARTICULAR; INTRALESIONAL; INTRAMUSCULAR; INTRAVENOUS; SOFT TISSUE at 11:40

## 2019-03-01 RX ADMIN — PACLITAXEL 120 MG: 6 INJECTION, SOLUTION INTRAVENOUS at 12:02

## 2019-03-01 RX ADMIN — SODIUM CHLORIDE 250 ML: 9 INJECTION, SOLUTION INTRAVENOUS at 12:02

## 2019-03-01 NOTE — PROGRESS NOTES
"      PROBLEM LIST:  1. [id number] (Stage IIA) ER negative, DE positive, Her2 2+ by IHC, negative by FISH invasive ductal carcinoma of the left breast  A) bilateral mastectomy on 10/4/18.  Pathology showed a 2.6 cm high grade IDC with basaloid features.  0/1 SLN involved.  B) adjuvant chemotherapy with ddAC followed by taxol started on 11/14/18  2. Anxiety/depression  3. Hyperlipidemia  4. History of ovarian cancer 1989, s/p BRYN/BSO, no adjuvant therapy          Subjective     HISTORY OF PRESENT ILLNESS:   Brittani Lambert returns for follow-up on chemotherapy.  She continues on weekly taxol. She complains of a 1 week history of left sided headaches with pain that radiates down her left arm.  She has light sensitivity associated with the headaches but denies any nausea or vomiting.  The headaches are worse with standing up or lying down.  She rates the pain from the headaches 10/10. No diplopia or gait changes.  She also has generalized pain in her legs and knees.  She has tried alternating Tylenol and ibuprofen with no relief.  She also tried tramadol with no relief in symptoms.  She had a low-grade fever of  Saturday through Tuesday.  She has had no further fever since that time. She has mild fatigue and diarrhea which is manageable.  She denies any neuropathy symptoms so far.      Past Medical History, Past Surgical History, Social History, Family History have been reviewed and are without significant changes except as mentioned.    Review of Systems   A comprehensive 14 point review of systems was performed and was negative except as mentioned.    Medications:  The current medication list was reviewed in the EMR    ALLERGIES:  No Known Allergies    Objective      /71   Pulse 88   Temp 97.6 °F (36.4 °C) (Temporal)   Resp 17   Ht 160 cm (62.99\")   Wt 50.8 kg (112 lb)   BMI 19.85 kg/m²      Performance Status: 0    General: well appearing female in no acute distress  Neuro: alert and oriented  HEENT: " sclera anicteric, oropharynx clear  Lymphatics: no cervical, supraclavicular, or axillary adenopathy  Cardiovascular: regular rate and rhythm, no murmurs  Lungs: clear to auscultation bilaterally  Abdomen: soft, nontender, nondistended.  No palpable organomegaly  Extremeties: no lower extremity edema  Skin: no rashes or  petechiae   Psych: mood and affect appropriate              Assessment/Plan   Brittani Lambert is a 59 y.o. year old female with a ER negative HER-2 negative breast cancer who returns for follow-up on weekly taxol.    She is experiencing arthralgias and myalgias related to the Taxol.  She is alternating Tylenol and ibuprofen without much relief in symptoms. She does have Ultram that she can use prn pain.  She is having mild diarrhea and she can use over-the-counter medicines to manage this as needed.     In regards to her headache and pain that radiates down her left arm, I discussed this with Dr. Genao and she recommends a MRI of the cervical spine with and without contrast for further evaluation.  This could possibly be a pinched nerve that could cause this constellation of symptoms.     Follow-up in 2 weeks.        I spent 25 minutes with the patient. I spent > 50% percent of this time counseling and discussing prognosis, diagnostic testing, evaluation, current status and management.        LONG Sheikh  [facility] Hematology and Oncology    3/1/2019          CC:

## 2019-03-05 ENCOUNTER — TELEPHONE (OUTPATIENT)
Dept: ONCOLOGY | Facility: CLINIC | Age: 59
End: 2019-03-05

## 2019-03-05 NOTE — TELEPHONE ENCOUNTER
"Called pt, she informed me that she wishes to RTW full-time starting Monday, 3/11/2019, with restrictions being \"as can tolerate\".    Typed letter and placed in MD box.  "

## 2019-03-05 NOTE — TELEPHONE ENCOUNTER
----- Message from Kandice Drake RN sent at 3/5/2019 11:35 AM EST -----  Regarding: FW: LEANNA-WANTS TO BE RELEASED TO GO BACK TO WORK  Contact: [phone]  Dr Patel is ok with her going back to work. Do you mind to do this?       ----- Message -----  From: Janice Wick  Sent: 3/5/2019  11:16 AM  To: e Onc [facility] Nurse Pool  Subject: LEANNA-WANTS TO BE RELEASED TO GO BACK TO WO#    Patient wants Dr. Patel to release her to go back to work. She said this release needs to be faxed to [facility] and we should have the fax number.

## 2019-03-07 NOTE — TELEPHONE ENCOUNTER
Rec'd form with MD signature.  Faxed to [facility] per request @ fax#[phone].  Will mail pt copy.

## 2019-03-08 ENCOUNTER — APPOINTMENT (OUTPATIENT)
Dept: MRI IMAGING | Facility: HOSPITAL | Age: 59
End: 2019-03-08

## 2019-03-08 ENCOUNTER — HOSPITAL ENCOUNTER (OUTPATIENT)
Dept: ONCOLOGY | Facility: HOSPITAL | Age: 59
Setting detail: INFUSION SERIES
Discharge: HOME | End: 2019-03-08

## 2019-03-08 VITALS
BODY MASS INDEX: 20.38 KG/M2 | TEMPERATURE: 98.6 F | SYSTOLIC BLOOD PRESSURE: 126 MMHG | RESPIRATION RATE: 16 BRPM | DIASTOLIC BLOOD PRESSURE: 68 MMHG | HEART RATE: 87 BPM | HEIGHT: 63 IN | WEIGHT: 115 LBS

## 2019-03-08 DIAGNOSIS — Z17.1 MALIGNANT NEOPLASM OF LEFT BREAST IN FEMALE, ESTROGEN RECEPTOR NEGATIVE, UNSPECIFIED SITE OF BREAST (HCC): Primary | ICD-10-CM

## 2019-03-08 DIAGNOSIS — C50.912 MALIGNANT NEOPLASM OF LEFT BREAST IN FEMALE, ESTROGEN RECEPTOR NEGATIVE, UNSPECIFIED SITE OF BREAST (HCC): Primary | ICD-10-CM

## 2019-03-08 LAB
ALBUMIN SERPL-MCNC: 4.41 G/DL (ref 3.2–4.8)
ALBUMIN/GLOB SERPL: 2.2 G/DL (ref 1.5–2.5)
ALP SERPL-CCNC: 63 U/L (ref 25–100)
ALT SERPL W P-5'-P-CCNC: 12 U/L (ref 7–40)
ANION GAP SERPL CALCULATED.3IONS-SCNC: 7 MMOL/L (ref 3–11)
AST SERPL-CCNC: 20 U/L (ref 0–33)
BILIRUB SERPL-MCNC: 0.3 MG/DL (ref 0.3–1.2)
BUN BLD-MCNC: 11 MG/DL (ref 9–23)
BUN/CREAT SERPL: 19 (ref 7–25)
CALCIUM SPEC-SCNC: 9.3 MG/DL (ref 8.7–10.4)
CHLORIDE SERPL-SCNC: 104 MMOL/L (ref 99–109)
CO2 SERPL-SCNC: 25 MMOL/L (ref 20–31)
CREAT BLD-MCNC: 0.58 MG/DL (ref 0.6–1.3)
CREAT BLDA-MCNC: 0.5 MG/DL
CREAT BLDA-MCNC: 0.5 MG/DL (ref 0.6–1.3)
ERYTHROCYTE [DISTWIDTH] IN BLOOD BY AUTOMATED COUNT: 14.7 % (ref 11.3–14.5)
GFR SERPL CREATININE-BSD FRML MDRD: 106 ML/MIN/1.73
GLOBULIN UR ELPH-MCNC: 2 GM/DL
GLUCOSE BLD-MCNC: 88 MG/DL (ref 70–100)
HCT VFR BLD AUTO: 30.6 % (ref 34.5–44)
HGB BLD-MCNC: 10.4 G/DL (ref 11.5–15.5)
LYMPHOCYTES # BLD AUTO: 1.1 10*3/MM3 (ref 0.6–4.8)
LYMPHOCYTES NFR BLD AUTO: 22.5 % (ref 24–44)
MCH RBC QN AUTO: 34.3 PG (ref 27–31)
MCHC RBC AUTO-ENTMCNC: 34.1 G/DL (ref 32–36)
MCV RBC AUTO: 100.5 FL (ref 80–99)
MONOCYTES # BLD AUTO: 0.5 10*3/MM3 (ref 0–1)
MONOCYTES NFR BLD AUTO: 9.6 % (ref 0–12)
NEUTROPHILS # BLD AUTO: 3.2 10*3/MM3 (ref 1.5–8.3)
NEUTROPHILS NFR BLD AUTO: 67.9 % (ref 41–71)
PLATELET # BLD AUTO: 250 10*3/MM3 (ref 150–450)
PMV BLD AUTO: 7 FL (ref 6–12)
POTASSIUM BLD-SCNC: 3.7 MMOL/L (ref 3.5–5.5)
PROT SERPL-MCNC: 6.4 G/DL (ref 5.7–8.2)
RBC # BLD AUTO: 3.04 10*6/MM3 (ref 3.89–5.14)
SODIUM BLD-SCNC: 136 MMOL/L (ref 132–146)
WBC NRBC COR # BLD: 4.7 10*3/MM3 (ref 3.5–10.8)

## 2019-03-08 PROCEDURE — 96374 THER/PROPH/DIAG INJ IV PUSH: CPT

## 2019-03-08 PROCEDURE — 36591 DRAW BLOOD OFF VENOUS DEVICE: CPT

## 2019-03-08 PROCEDURE — 25010000002 DIPHENHYDRAMINE PER 50 MG: Performed by: NURSE PRACTITIONER

## 2019-03-08 PROCEDURE — 96375 TX/PRO/DX INJ NEW DRUG ADDON: CPT

## 2019-03-08 PROCEDURE — 25010000002 DEXAMETHASONE PER 1 MG: Performed by: NURSE PRACTITIONER

## 2019-03-08 PROCEDURE — 80053 COMPREHEN METABOLIC PANEL: CPT | Performed by: NURSE PRACTITIONER

## 2019-03-08 PROCEDURE — 85025 COMPLETE CBC W/AUTO DIFF WBC: CPT | Performed by: NURSE PRACTITIONER

## 2019-03-08 PROCEDURE — 25010000002 PACLITAXEL PER 30 MG: Performed by: NURSE PRACTITIONER

## 2019-03-08 PROCEDURE — 82565 ASSAY OF CREATININE: CPT

## 2019-03-08 PROCEDURE — 96413 CHEMO IV INFUSION 1 HR: CPT

## 2019-03-08 PROCEDURE — 96360 HYDRATION IV INFUSION INIT: CPT

## 2019-03-08 RX ORDER — FAMOTIDINE 10 MG/ML
20 INJECTION, SOLUTION INTRAVENOUS ONCE
Status: COMPLETED | OUTPATIENT
Start: 2019-03-08 | End: 2019-03-08

## 2019-03-08 RX ORDER — SODIUM CHLORIDE 9 MG/ML
250 INJECTION, SOLUTION INTRAVENOUS ONCE
Status: COMPLETED | OUTPATIENT
Start: 2019-03-08 | End: 2019-03-08

## 2019-03-08 RX ADMIN — HEPARIN 500 UNITS: 100 SYRINGE at 15:19

## 2019-03-08 RX ADMIN — PACLITAXEL 120 MG: 6 INJECTION, SOLUTION INTRAVENOUS at 14:16

## 2019-03-08 RX ADMIN — SODIUM CHLORIDE 250 ML: 9 INJECTION, SOLUTION INTRAVENOUS at 13:51

## 2019-03-08 RX ADMIN — DEXAMETHASONE SODIUM PHOSPHATE 12 MG: 4 INJECTION, SOLUTION INTRAMUSCULAR; INTRAVENOUS at 13:54

## 2019-03-08 RX ADMIN — FAMOTIDINE 20 MG: 10 INJECTION, SOLUTION INTRAVENOUS at 13:52

## 2019-03-08 RX ADMIN — DIPHENHYDRAMINE HYDROCHLORIDE 25 MG: 50 INJECTION INTRAMUSCULAR; INTRAVENOUS at 13:53

## 2019-03-12 ENCOUNTER — DOCUMENTATION (OUTPATIENT)
Dept: ONCOLOGY | Facility: CLINIC | Age: 59
End: 2019-03-12

## 2019-03-12 DIAGNOSIS — K59.1 FUNCTIONAL DIARRHEA: ICD-10-CM

## 2019-03-12 RX ORDER — PANTOPRAZOLE SODIUM 40 MG/1
TABLET, DELAYED RELEASE ORAL
Qty: 30 TABLET | Refills: 3 | Status: SHIPPED | OUTPATIENT
Start: 2019-03-12 | End: 2019-04-10

## 2019-03-15 ENCOUNTER — HOSPITAL ENCOUNTER (OUTPATIENT)
Dept: ONCOLOGY | Facility: HOSPITAL | Age: 59
Setting detail: INFUSION SERIES
Discharge: HOME | End: 2019-03-15

## 2019-03-15 ENCOUNTER — LAB (OUTPATIENT)
Dept: LAB | Facility: HOSPITAL | Age: 59
End: 2019-03-15

## 2019-03-15 ENCOUNTER — OFFICE VISIT (OUTPATIENT)
Dept: ONCOLOGY | Facility: CLINIC | Age: 59
End: 2019-03-15

## 2019-03-15 VITALS
TEMPERATURE: 97.9 F | DIASTOLIC BLOOD PRESSURE: 78 MMHG | BODY MASS INDEX: 20.02 KG/M2 | RESPIRATION RATE: 16 BRPM | HEIGHT: 63 IN | WEIGHT: 113 LBS | OXYGEN SATURATION: 99 % | HEART RATE: 84 BPM | SYSTOLIC BLOOD PRESSURE: 147 MMHG

## 2019-03-15 VITALS — SYSTOLIC BLOOD PRESSURE: 130 MMHG | DIASTOLIC BLOOD PRESSURE: 66 MMHG | HEART RATE: 86 BPM

## 2019-03-15 DIAGNOSIS — C50.912 MALIGNANT NEOPLASM OF LEFT BREAST IN FEMALE, ESTROGEN RECEPTOR NEGATIVE, UNSPECIFIED SITE OF BREAST (HCC): Primary | ICD-10-CM

## 2019-03-15 DIAGNOSIS — Z17.1 MALIGNANT NEOPLASM OF LEFT BREAST IN FEMALE, ESTROGEN RECEPTOR NEGATIVE, UNSPECIFIED SITE OF BREAST (HCC): ICD-10-CM

## 2019-03-15 DIAGNOSIS — Z17.1 MALIGNANT NEOPLASM OF LEFT BREAST IN FEMALE, ESTROGEN RECEPTOR NEGATIVE, UNSPECIFIED SITE OF BREAST (HCC): Primary | ICD-10-CM

## 2019-03-15 DIAGNOSIS — C50.912 MALIGNANT NEOPLASM OF LEFT BREAST IN FEMALE, ESTROGEN RECEPTOR NEGATIVE, UNSPECIFIED SITE OF BREAST (HCC): ICD-10-CM

## 2019-03-15 LAB
ALBUMIN SERPL-MCNC: 4.44 G/DL (ref 3.2–4.8)
ALBUMIN/GLOB SERPL: 2.4 G/DL (ref 1.5–2.5)
ALP SERPL-CCNC: 67 U/L (ref 25–100)
ALT SERPL W P-5'-P-CCNC: 13 U/L (ref 7–40)
ANION GAP SERPL CALCULATED.3IONS-SCNC: 8 MMOL/L (ref 3–11)
AST SERPL-CCNC: 19 U/L (ref 0–33)
BILIRUB SERPL-MCNC: 0.4 MG/DL (ref 0.3–1.2)
BUN BLD-MCNC: 15 MG/DL (ref 9–23)
BUN/CREAT SERPL: 25.4 (ref 7–25)
CALCIUM SPEC-SCNC: 9.2 MG/DL (ref 8.7–10.4)
CHLORIDE SERPL-SCNC: 106 MMOL/L (ref 99–109)
CO2 SERPL-SCNC: 23 MMOL/L (ref 20–31)
CREAT BLD-MCNC: 0.59 MG/DL (ref 0.6–1.3)
ERYTHROCYTE [DISTWIDTH] IN BLOOD BY AUTOMATED COUNT: 13.9 % (ref 11.3–14.5)
GFR SERPL CREATININE-BSD FRML MDRD: 104 ML/MIN/1.73
GLOBULIN UR ELPH-MCNC: 1.9 GM/DL
GLUCOSE BLD-MCNC: 95 MG/DL (ref 70–100)
HCT VFR BLD AUTO: 32.3 % (ref 34.5–44)
HGB BLD-MCNC: 11 G/DL (ref 11.5–15.5)
LYMPHOCYTES # BLD AUTO: 1 10*3/MM3 (ref 0.6–4.8)
LYMPHOCYTES NFR BLD AUTO: 19.8 % (ref 24–44)
MCH RBC QN AUTO: 33.8 PG (ref 27–31)
MCHC RBC AUTO-ENTMCNC: 34.1 G/DL (ref 32–36)
MCV RBC AUTO: 99.2 FL (ref 80–99)
MONOCYTES # BLD AUTO: 0.3 10*3/MM3 (ref 0–1)
MONOCYTES NFR BLD AUTO: 7.1 % (ref 0–12)
NEUTROPHILS # BLD AUTO: 3.5 10*3/MM3 (ref 1.5–8.3)
NEUTROPHILS NFR BLD AUTO: 73.1 % (ref 41–71)
PLATELET # BLD AUTO: 257 10*3/MM3 (ref 150–450)
PMV BLD AUTO: 6.9 FL (ref 6–12)
POTASSIUM BLD-SCNC: 3.7 MMOL/L (ref 3.5–5.5)
PROT SERPL-MCNC: 6.3 G/DL (ref 5.7–8.2)
RBC # BLD AUTO: 3.26 10*6/MM3 (ref 3.89–5.14)
SODIUM BLD-SCNC: 137 MMOL/L (ref 132–146)
WBC NRBC COR # BLD: 4.8 10*3/MM3 (ref 3.5–10.8)

## 2019-03-15 PROCEDURE — 96375 TX/PRO/DX INJ NEW DRUG ADDON: CPT

## 2019-03-15 PROCEDURE — 96413 CHEMO IV INFUSION 1 HR: CPT

## 2019-03-15 PROCEDURE — 99213 OFFICE O/P EST LOW 20 MIN: CPT | Performed by: INTERNAL MEDICINE

## 2019-03-15 PROCEDURE — 36415 COLL VENOUS BLD VENIPUNCTURE: CPT

## 2019-03-15 PROCEDURE — 85025 COMPLETE CBC W/AUTO DIFF WBC: CPT

## 2019-03-15 PROCEDURE — 25010000002 DIPHENHYDRAMINE PER 50 MG: Performed by: NURSE PRACTITIONER

## 2019-03-15 PROCEDURE — 80053 COMPREHEN METABOLIC PANEL: CPT | Performed by: INTERNAL MEDICINE

## 2019-03-15 PROCEDURE — 25010000002 DEXAMETHASONE PER 1 MG: Performed by: NURSE PRACTITIONER

## 2019-03-15 PROCEDURE — 25010000002 PACLITAXEL PER 30 MG: Performed by: NURSE PRACTITIONER

## 2019-03-15 RX ORDER — SODIUM CHLORIDE 9 MG/ML
250 INJECTION, SOLUTION INTRAVENOUS ONCE
Status: COMPLETED | OUTPATIENT
Start: 2019-03-15 | End: 2019-03-15

## 2019-03-15 RX ORDER — FAMOTIDINE 10 MG/ML
20 INJECTION, SOLUTION INTRAVENOUS ONCE
Status: COMPLETED | OUTPATIENT
Start: 2019-03-15 | End: 2019-03-15

## 2019-03-15 RX ORDER — FAMOTIDINE 10 MG/ML
20 INJECTION, SOLUTION INTRAVENOUS ONCE
Status: CANCELLED | OUTPATIENT
Start: 2019-03-22

## 2019-03-15 RX ORDER — SODIUM CHLORIDE 9 MG/ML
250 INJECTION, SOLUTION INTRAVENOUS ONCE
Status: CANCELLED | OUTPATIENT
Start: 2019-03-22

## 2019-03-15 RX ADMIN — HEPARIN 500 UNITS: 100 SYRINGE at 13:32

## 2019-03-15 RX ADMIN — DEXAMETHASONE SODIUM PHOSPHATE 12 MG: 4 INJECTION, SOLUTION INTRAMUSCULAR; INTRAVENOUS at 12:07

## 2019-03-15 RX ADMIN — PACLITAXEL 120 MG: 6 INJECTION, SOLUTION INTRAVENOUS at 12:29

## 2019-03-15 RX ADMIN — FAMOTIDINE 20 MG: 10 INJECTION, SOLUTION INTRAVENOUS at 12:05

## 2019-03-15 RX ADMIN — SODIUM CHLORIDE 250 ML: 9 INJECTION, SOLUTION INTRAVENOUS at 12:04

## 2019-03-15 RX ADMIN — DIPHENHYDRAMINE HYDROCHLORIDE 25 MG: 50 INJECTION INTRAMUSCULAR; INTRAVENOUS at 12:06

## 2019-03-15 NOTE — PROGRESS NOTES
"      PROBLEM LIST:  1. [id number] (Stage IIA) ER negative, DE positive, Her2 2+ by IHC, negative by FISH invasive ductal carcinoma of the left breast  A) bilateral mastectomy on 10/4/18.  Pathology showed a 2.6 cm high grade IDC with basaloid features.  0/1 SLN involved.  B) adjuvant chemotherapy with ddAC followed by taxol started on 11/14/18  2. Anxiety/depression  3. Hyperlipidemia  4. History of ovarian cancer 1989, s/p BRYN/BSO, no adjuvant therapy          Subjective     HISTORY OF PRESENT ILLNESS:   Brittani Lambert returns for follow-up on chemotherapy.      She has requested to return to work.  She is works 2 days this week and says it went pretty well.  She does feel tired after not working for several weeks but feels like she can manage to get through her day.  She otherwise says she is doing well.  She does not have any new symptoms or complaints about her chemo.  She has some pain in the base of her thumb in the right hand but no neuropathy symptoms so far.          Past Medical History, Past Surgical History, Social History, Family History have been reviewed and are without significant changes except as mentioned.    Review of Systems   A comprehensive 14 point review of systems was performed and was negative except as mentioned.    Medications:  The current medication list was reviewed in the EMR    ALLERGIES:  No Known Allergies    Objective      /78 Comment: RUE  Pulse 84   Temp 97.9 °F (36.6 °C) (Temporal)   Resp 16   Ht 160 cm (63\")   Wt 51.3 kg (113 lb)   SpO2 99% Comment: RA  BMI 20.02 kg/m²      Performance Status: 0    General: well appearing female in no acute distress  Neuro: alert and oriented  HEENT: sclera anicteric, oropharynx clear  Lymphatics: no cervical, supraclavicular, or axillary adenopathy  Cardiovascular: regular rate and rhythm, no murmurs  Lungs: clear to auscultation bilaterally  Abdomen: soft, nontender, nondistended.  No palpable organomegaly  Extremeties: no lower " extremity edema  Skin: no rashes or  petechiae   Psych: mood and affect appropriate              Assessment/Plan   Brittani Lambert is a 59 y.o. year old female with a ER negative HER-2 negative breast cancer who returns for follow-up on weekly taxol.    She is doing fairly well with her treatment with no new complaints or side effects today.  She has returned to work and is managing pretty well with this so far.  She has about 1 month of chemotherapy remaining.    She has no neuropathy symptoms so far but we will continue to monitor for this.      Follow-up in 2 weeks.        I spent 15 minutes with the patient. I spent > 50% percent of this time counseling and discussing prognosis, diagnostic testing, evaluation, current status and management.        Carrie Patel MD  [facility]    3/15/2019          CC:

## 2019-03-18 ENCOUNTER — TELEPHONE (OUTPATIENT)
Dept: FAMILY MEDICINE CLINIC | Facility: CLINIC | Age: 59
End: 2019-03-18

## 2019-03-18 DIAGNOSIS — Z90.13 H/O BILATERAL MASTECTOMY: ICD-10-CM

## 2019-03-18 RX ORDER — HYDROCODONE BITARTRATE AND ACETAMINOPHEN 5; 325 MG/1; MG/1
1 TABLET ORAL EVERY 6 HOURS PRN
Qty: 60 TABLET | Refills: 0 | Status: ON HOLD | OUTPATIENT
Start: 2019-03-18 | End: 2019-04-12 | Stop reason: SDUPTHER

## 2019-03-18 NOTE — TELEPHONE ENCOUNTER
----- Message from Rodger Jeffries sent at 3/18/2019 12:37 PM EDT -----  Contact: PATIENT  PATIENT REQUESTED SOMETHING BE CALLED IN FOR PAIN.

## 2019-03-22 ENCOUNTER — HOSPITAL ENCOUNTER (OUTPATIENT)
Dept: ONCOLOGY | Facility: HOSPITAL | Age: 59
Setting detail: INFUSION SERIES
Discharge: HOME | End: 2019-03-22

## 2019-03-22 VITALS
BODY MASS INDEX: 19.84 KG/M2 | SYSTOLIC BLOOD PRESSURE: 161 MMHG | TEMPERATURE: 98.2 F | WEIGHT: 112 LBS | HEART RATE: 79 BPM | RESPIRATION RATE: 16 BRPM | DIASTOLIC BLOOD PRESSURE: 91 MMHG | HEIGHT: 63 IN

## 2019-03-22 DIAGNOSIS — C50.011 MALIGNANT NEOPLASM OF NIPPLE OF RIGHT BREAST IN FEMALE, UNSPECIFIED ESTROGEN RECEPTOR STATUS (HCC): Primary | ICD-10-CM

## 2019-03-22 DIAGNOSIS — C50.912 MALIGNANT NEOPLASM OF LEFT BREAST IN FEMALE, ESTROGEN RECEPTOR NEGATIVE, UNSPECIFIED SITE OF BREAST (HCC): ICD-10-CM

## 2019-03-22 DIAGNOSIS — Z17.1 MALIGNANT NEOPLASM OF LEFT BREAST IN FEMALE, ESTROGEN RECEPTOR NEGATIVE, UNSPECIFIED SITE OF BREAST (HCC): ICD-10-CM

## 2019-03-22 LAB
ALBUMIN SERPL-MCNC: 4.31 G/DL (ref 3.2–4.8)
ALBUMIN/GLOB SERPL: 2.3 G/DL (ref 1.5–2.5)
ALP SERPL-CCNC: 60 U/L (ref 25–100)
ALT SERPL W P-5'-P-CCNC: 15 U/L (ref 7–40)
ANION GAP SERPL CALCULATED.3IONS-SCNC: 8 MMOL/L (ref 3–11)
AST SERPL-CCNC: 24 U/L (ref 0–33)
BILIRUB SERPL-MCNC: 0.3 MG/DL (ref 0.3–1.2)
BUN BLD-MCNC: 9 MG/DL (ref 9–23)
BUN/CREAT SERPL: 15 (ref 7–25)
CALCIUM SPEC-SCNC: 8.9 MG/DL (ref 8.7–10.4)
CHLORIDE SERPL-SCNC: 106 MMOL/L (ref 99–109)
CO2 SERPL-SCNC: 25 MMOL/L (ref 20–31)
CREAT BLD-MCNC: 0.6 MG/DL (ref 0.6–1.3)
ERYTHROCYTE [DISTWIDTH] IN BLOOD BY AUTOMATED COUNT: 14.5 % (ref 11.3–14.5)
GFR SERPL CREATININE-BSD FRML MDRD: 102 ML/MIN/1.73
GLOBULIN UR ELPH-MCNC: 1.9 GM/DL
GLUCOSE BLD-MCNC: 91 MG/DL (ref 70–100)
HCT VFR BLD AUTO: 31 % (ref 34.5–44)
HGB BLD-MCNC: 10.5 G/DL (ref 11.5–15.5)
LYMPHOCYTES # BLD AUTO: 1 10*3/MM3 (ref 0.6–4.8)
LYMPHOCYTES NFR BLD AUTO: 20.7 % (ref 24–44)
MCH RBC QN AUTO: 33.8 PG (ref 27–31)
MCHC RBC AUTO-ENTMCNC: 33.9 G/DL (ref 32–36)
MCV RBC AUTO: 99.8 FL (ref 80–99)
MONOCYTES # BLD AUTO: 0.3 10*3/MM3 (ref 0–1)
MONOCYTES NFR BLD AUTO: 5.8 % (ref 0–12)
NEUTROPHILS # BLD AUTO: 3.6 10*3/MM3 (ref 1.5–8.3)
NEUTROPHILS NFR BLD AUTO: 73.5 % (ref 41–71)
PLATELET # BLD AUTO: 217 10*3/MM3 (ref 150–450)
PMV BLD AUTO: 7.2 FL (ref 6–12)
POTASSIUM BLD-SCNC: 4 MMOL/L (ref 3.5–5.5)
PROT SERPL-MCNC: 6.2 G/DL (ref 5.7–8.2)
RBC # BLD AUTO: 3.11 10*6/MM3 (ref 3.89–5.14)
SODIUM BLD-SCNC: 139 MMOL/L (ref 132–146)
WBC NRBC COR # BLD: 4.9 10*3/MM3 (ref 3.5–10.8)

## 2019-03-22 PROCEDURE — 25010000002 DEXAMETHASONE PER 1 MG: Performed by: INTERNAL MEDICINE

## 2019-03-22 PROCEDURE — 85025 COMPLETE CBC W/AUTO DIFF WBC: CPT

## 2019-03-22 PROCEDURE — 25010000002 DIPHENHYDRAMINE PER 50 MG: Performed by: INTERNAL MEDICINE

## 2019-03-22 PROCEDURE — 96375 TX/PRO/DX INJ NEW DRUG ADDON: CPT

## 2019-03-22 PROCEDURE — 96413 CHEMO IV INFUSION 1 HR: CPT

## 2019-03-22 PROCEDURE — 25010000002 PACLITAXEL PER 30 MG: Performed by: INTERNAL MEDICINE

## 2019-03-22 PROCEDURE — 80053 COMPREHEN METABOLIC PANEL: CPT

## 2019-03-22 PROCEDURE — 96368 THER/DIAG CONCURRENT INF: CPT

## 2019-03-22 PROCEDURE — 96367 TX/PROPH/DG ADDL SEQ IV INF: CPT

## 2019-03-22 RX ORDER — SODIUM CHLORIDE 9 MG/ML
250 INJECTION, SOLUTION INTRAVENOUS ONCE
Status: COMPLETED | OUTPATIENT
Start: 2019-03-22 | End: 2019-03-22

## 2019-03-22 RX ORDER — FAMOTIDINE 10 MG/ML
20 INJECTION, SOLUTION INTRAVENOUS ONCE
Status: COMPLETED | OUTPATIENT
Start: 2019-03-22 | End: 2019-03-22

## 2019-03-22 RX ADMIN — PACLITAXEL 120 MG: 6 INJECTION, SOLUTION INTRAVENOUS at 12:10

## 2019-03-22 RX ADMIN — FAMOTIDINE 20 MG: 10 INJECTION, SOLUTION INTRAVENOUS at 11:51

## 2019-03-22 RX ADMIN — DIPHENHYDRAMINE HYDROCHLORIDE 25 MG: 50 INJECTION INTRAMUSCULAR; INTRAVENOUS at 11:51

## 2019-03-22 RX ADMIN — DEXAMETHASONE SODIUM PHOSPHATE 12 MG: 4 INJECTION, SOLUTION INTRA-ARTICULAR; INTRALESIONAL; INTRAMUSCULAR; INTRAVENOUS; SOFT TISSUE at 11:52

## 2019-03-22 RX ADMIN — SODIUM CHLORIDE 250 ML: 9 INJECTION, SOLUTION INTRAVENOUS at 11:50

## 2019-03-22 RX ADMIN — HEPARIN 500 UNITS: 100 SYRINGE at 13:17

## 2019-03-29 ENCOUNTER — HOSPITAL ENCOUNTER (OUTPATIENT)
Dept: ONCOLOGY | Facility: HOSPITAL | Age: 59
Setting detail: INFUSION SERIES
Discharge: HOME | End: 2019-03-29

## 2019-03-29 ENCOUNTER — OFFICE VISIT (OUTPATIENT)
Dept: ONCOLOGY | Facility: CLINIC | Age: 59
End: 2019-03-29

## 2019-03-29 VITALS — HEART RATE: 87 BPM | DIASTOLIC BLOOD PRESSURE: 70 MMHG | SYSTOLIC BLOOD PRESSURE: 131 MMHG

## 2019-03-29 VITALS
OXYGEN SATURATION: 97 % | HEART RATE: 82 BPM | RESPIRATION RATE: 12 BRPM | DIASTOLIC BLOOD PRESSURE: 79 MMHG | SYSTOLIC BLOOD PRESSURE: 137 MMHG | HEIGHT: 63 IN | BODY MASS INDEX: 20.38 KG/M2 | TEMPERATURE: 97.3 F | WEIGHT: 115 LBS

## 2019-03-29 DIAGNOSIS — Z17.1 MALIGNANT NEOPLASM OF LEFT BREAST IN FEMALE, ESTROGEN RECEPTOR NEGATIVE, UNSPECIFIED SITE OF BREAST (HCC): Primary | ICD-10-CM

## 2019-03-29 DIAGNOSIS — C50.912 MALIGNANT NEOPLASM OF LEFT BREAST IN FEMALE, ESTROGEN RECEPTOR NEGATIVE, UNSPECIFIED SITE OF BREAST (HCC): ICD-10-CM

## 2019-03-29 DIAGNOSIS — Z17.1 MALIGNANT NEOPLASM OF LEFT BREAST IN FEMALE, ESTROGEN RECEPTOR NEGATIVE, UNSPECIFIED SITE OF BREAST (HCC): ICD-10-CM

## 2019-03-29 DIAGNOSIS — C50.912 MALIGNANT NEOPLASM OF LEFT BREAST IN FEMALE, ESTROGEN RECEPTOR NEGATIVE, UNSPECIFIED SITE OF BREAST (HCC): Primary | ICD-10-CM

## 2019-03-29 DIAGNOSIS — C50.011 MALIGNANT NEOPLASM OF NIPPLE OF RIGHT BREAST IN FEMALE, UNSPECIFIED ESTROGEN RECEPTOR STATUS (HCC): Primary | ICD-10-CM

## 2019-03-29 LAB
ALBUMIN SERPL-MCNC: 4.12 G/DL (ref 3.2–4.8)
ALBUMIN/GLOB SERPL: 2 G/DL (ref 1.5–2.5)
ALP SERPL-CCNC: 62 U/L (ref 25–100)
ALT SERPL W P-5'-P-CCNC: 15 U/L (ref 7–40)
ANION GAP SERPL CALCULATED.3IONS-SCNC: 10 MMOL/L (ref 3–11)
AST SERPL-CCNC: 16 U/L (ref 0–33)
BILIRUB SERPL-MCNC: 0.4 MG/DL (ref 0.3–1.2)
BUN BLD-MCNC: 13 MG/DL (ref 9–23)
BUN/CREAT SERPL: 20 (ref 7–25)
CALCIUM SPEC-SCNC: 8.9 MG/DL (ref 8.7–10.4)
CHLORIDE SERPL-SCNC: 104 MMOL/L (ref 99–109)
CO2 SERPL-SCNC: 25 MMOL/L (ref 20–31)
CREAT BLD-MCNC: 0.65 MG/DL (ref 0.6–1.3)
ERYTHROCYTE [DISTWIDTH] IN BLOOD BY AUTOMATED COUNT: 14.7 % (ref 11.3–14.5)
GFR SERPL CREATININE-BSD FRML MDRD: 93 ML/MIN/1.73
GLOBULIN UR ELPH-MCNC: 2.1 GM/DL
GLUCOSE BLD-MCNC: 139 MG/DL (ref 70–100)
HCT VFR BLD AUTO: 31 % (ref 34.5–44)
HGB BLD-MCNC: 10.7 G/DL (ref 11.5–15.5)
LYMPHOCYTES # BLD AUTO: 0.9 10*3/MM3 (ref 0.6–4.8)
LYMPHOCYTES NFR BLD AUTO: 21.2 % (ref 24–44)
MCH RBC QN AUTO: 33.9 PG (ref 27–31)
MCHC RBC AUTO-ENTMCNC: 34.4 G/DL (ref 32–36)
MCV RBC AUTO: 98.5 FL (ref 80–99)
MONOCYTES # BLD AUTO: 0.2 10*3/MM3 (ref 0–1)
MONOCYTES NFR BLD AUTO: 5.7 % (ref 0–12)
NEUTROPHILS # BLD AUTO: 3.1 10*3/MM3 (ref 1.5–8.3)
NEUTROPHILS NFR BLD AUTO: 73.1 % (ref 41–71)
PLATELET # BLD AUTO: 244 10*3/MM3 (ref 150–450)
PMV BLD AUTO: 6.9 FL (ref 6–12)
POTASSIUM BLD-SCNC: 3.7 MMOL/L (ref 3.5–5.5)
PROT SERPL-MCNC: 6.2 G/DL (ref 5.7–8.2)
RBC # BLD AUTO: 3.14 10*6/MM3 (ref 3.89–5.14)
SODIUM BLD-SCNC: 139 MMOL/L (ref 132–146)
WBC NRBC COR # BLD: 4.3 10*3/MM3 (ref 3.5–10.8)

## 2019-03-29 PROCEDURE — 99213 OFFICE O/P EST LOW 20 MIN: CPT | Performed by: NURSE PRACTITIONER

## 2019-03-29 PROCEDURE — 25010000002 DEXAMETHASONE PER 1 MG: Performed by: NURSE PRACTITIONER

## 2019-03-29 PROCEDURE — 85025 COMPLETE CBC W/AUTO DIFF WBC: CPT | Performed by: NURSE PRACTITIONER

## 2019-03-29 PROCEDURE — 25010000002 PACLITAXEL PER 30 MG: Performed by: NURSE PRACTITIONER

## 2019-03-29 PROCEDURE — 96374 THER/PROPH/DIAG INJ IV PUSH: CPT

## 2019-03-29 PROCEDURE — 96360 HYDRATION IV INFUSION INIT: CPT

## 2019-03-29 PROCEDURE — 25010000002 DIPHENHYDRAMINE PER 50 MG: Performed by: NURSE PRACTITIONER

## 2019-03-29 PROCEDURE — 80053 COMPREHEN METABOLIC PANEL: CPT | Performed by: NURSE PRACTITIONER

## 2019-03-29 PROCEDURE — 36591 DRAW BLOOD OFF VENOUS DEVICE: CPT

## 2019-03-29 PROCEDURE — 96375 TX/PRO/DX INJ NEW DRUG ADDON: CPT

## 2019-03-29 PROCEDURE — 96413 CHEMO IV INFUSION 1 HR: CPT

## 2019-03-29 RX ORDER — FAMOTIDINE 10 MG/ML
20 INJECTION, SOLUTION INTRAVENOUS ONCE
Status: CANCELLED | OUTPATIENT
Start: 2019-03-29

## 2019-03-29 RX ORDER — FAMOTIDINE 10 MG/ML
20 INJECTION, SOLUTION INTRAVENOUS ONCE
OUTPATIENT
Start: 2019-04-12

## 2019-03-29 RX ORDER — SODIUM CHLORIDE 9 MG/ML
250 INJECTION, SOLUTION INTRAVENOUS ONCE
Status: CANCELLED | OUTPATIENT
Start: 2019-04-05

## 2019-03-29 RX ORDER — SODIUM CHLORIDE 9 MG/ML
250 INJECTION, SOLUTION INTRAVENOUS ONCE
Status: COMPLETED | OUTPATIENT
Start: 2019-03-29 | End: 2019-03-29

## 2019-03-29 RX ORDER — FAMOTIDINE 10 MG/ML
20 INJECTION, SOLUTION INTRAVENOUS ONCE
Status: COMPLETED | OUTPATIENT
Start: 2019-03-29 | End: 2019-03-29

## 2019-03-29 RX ORDER — SODIUM CHLORIDE 9 MG/ML
250 INJECTION, SOLUTION INTRAVENOUS ONCE
Status: CANCELLED | OUTPATIENT
Start: 2019-03-29

## 2019-03-29 RX ORDER — FAMOTIDINE 10 MG/ML
20 INJECTION, SOLUTION INTRAVENOUS ONCE
Status: CANCELLED | OUTPATIENT
Start: 2019-04-05

## 2019-03-29 RX ORDER — SODIUM CHLORIDE 9 MG/ML
250 INJECTION, SOLUTION INTRAVENOUS ONCE
OUTPATIENT
Start: 2019-04-12

## 2019-03-29 RX ADMIN — DIPHENHYDRAMINE HYDROCHLORIDE 25 MG: 50 INJECTION INTRAMUSCULAR; INTRAVENOUS at 13:22

## 2019-03-29 RX ADMIN — FAMOTIDINE 20 MG: 10 INJECTION, SOLUTION INTRAVENOUS at 13:19

## 2019-03-29 RX ADMIN — HEPARIN 500 UNITS: 100 SYRINGE at 15:18

## 2019-03-29 RX ADMIN — PACLITAXEL 120 MG: 6 INJECTION, SOLUTION INTRAVENOUS at 14:14

## 2019-03-29 RX ADMIN — DEXAMETHASONE SODIUM PHOSPHATE 12 MG: 4 INJECTION, SOLUTION INTRA-ARTICULAR; INTRALESIONAL; INTRAMUSCULAR; INTRAVENOUS; SOFT TISSUE at 13:23

## 2019-03-29 RX ADMIN — SODIUM CHLORIDE 250 ML: 9 INJECTION, SOLUTION INTRAVENOUS at 13:19

## 2019-03-29 NOTE — PROGRESS NOTES
"      PROBLEM LIST:  1. [id number] (Stage IIA) ER negative, DE positive, Her2 2+ by IHC, negative by FISH invasive ductal carcinoma of the left breast  A) bilateral mastectomy on 10/4/18.  Pathology showed a 2.6 cm high grade IDC with basaloid features.  0/1 SLN involved.  B) adjuvant chemotherapy with ddAC followed by taxol started on 11/14/18  2. Anxiety/depression  3. Hyperlipidemia  4. History of ovarian cancer 1989, s/p BRYN/BSO, no adjuvant therapy    Chief complaint: follow up for breast cancer management       Subjective     HISTORY OF PRESENT ILLNESS:   Brittani Lambert returns for follow-up on chemotherapy.  She is working full time again but complains of mild fatigue. She has had an intermittent dry nonproductive cough for the past few weeks but has also noticed clear rhinorrhea and eyes watering. She denies any dyspnea, wheezing or hemoptysis.  She denies any fevers or chills. No sore throat.  She does not have any new symptoms or complaints about her chemo.      Past Medical History, Past Surgical History, Social History, Family History have been reviewed and are without significant changes except as mentioned.    Review of Systems   A comprehensive 14 point review of systems was performed and was negative except as mentioned.    Medications:  The current medication list was reviewed in the EMR    ALLERGIES:  No Known Allergies    Objective      /79   Pulse 82   Temp 97.3 °F (36.3 °C) (Temporal)   Resp 12   Ht 160 cm (63\")   Wt 52.2 kg (115 lb)   SpO2 97%   BMI 20.37 kg/m²      Performance Status: 0    General: well appearing female in no acute distress  Neuro: alert and oriented  HEENT: sclera anicteric, oropharynx clear  Lymphatics: no cervical, supraclavicular, or axillary adenopathy  Cardiovascular: regular rate and rhythm, no murmurs  Lungs: clear to auscultation bilaterally  Abdomen: soft, nontender, nondistended.  No palpable organomegaly  Extremeties: no lower extremity edema  Skin: no " rashes or  petechiae   Psych: mood and affect appropriate              Assessment/Plan   Brittani Lambert is a 59 y.o. year old female with a ER negative HER-2 negative breast cancer who returns for follow-up on weekly taxol.    She is doing fairly well with her treatment with no new complaints or side effects today.  We will continue with weekly Taxol unchanged. She will complete week 12 4/12/2019 if there are no delays in treatment.     She has no neuropathy symptoms so far but we will continue to monitor for this.      Follow-up in 2 weeks.        I spent 15 minutes with the patient. I spent > 50% percent of this time counseling and discussing prognosis, diagnostic testing, evaluation, current status and management.        Pao Levy, [facility]ematology [facility] Oncology    3/29/2019          CC:

## 2019-04-05 ENCOUNTER — HOSPITAL ENCOUNTER (OUTPATIENT)
Dept: ONCOLOGY | Facility: HOSPITAL | Age: 59
Setting detail: INFUSION SERIES
Discharge: HOME | End: 2019-04-05

## 2019-04-05 VITALS
WEIGHT: 112 LBS | HEIGHT: 63 IN | TEMPERATURE: 98.2 F | DIASTOLIC BLOOD PRESSURE: 74 MMHG | BODY MASS INDEX: 19.84 KG/M2 | HEART RATE: 80 BPM | SYSTOLIC BLOOD PRESSURE: 132 MMHG | RESPIRATION RATE: 16 BRPM

## 2019-04-05 DIAGNOSIS — Z17.1 MALIGNANT NEOPLASM OF LEFT BREAST IN FEMALE, ESTROGEN RECEPTOR NEGATIVE, UNSPECIFIED SITE OF BREAST (HCC): Primary | ICD-10-CM

## 2019-04-05 DIAGNOSIS — C50.912 MALIGNANT NEOPLASM OF LEFT BREAST IN FEMALE, ESTROGEN RECEPTOR NEGATIVE, UNSPECIFIED SITE OF BREAST (HCC): Primary | ICD-10-CM

## 2019-04-05 LAB
ALBUMIN SERPL-MCNC: 4.11 G/DL (ref 3.2–4.8)
ALBUMIN/GLOB SERPL: 2.2 G/DL (ref 1.5–2.5)
ALP SERPL-CCNC: 63 U/L (ref 25–100)
ALT SERPL W P-5'-P-CCNC: 13 U/L (ref 7–40)
ANION GAP SERPL CALCULATED.3IONS-SCNC: 7 MMOL/L (ref 3–11)
AST SERPL-CCNC: 18 U/L (ref 0–33)
BILIRUB SERPL-MCNC: 0.3 MG/DL (ref 0.3–1.2)
BUN BLD-MCNC: 14 MG/DL (ref 9–23)
BUN/CREAT SERPL: 21.9 (ref 7–25)
CALCIUM SPEC-SCNC: 8.9 MG/DL (ref 8.7–10.4)
CHLORIDE SERPL-SCNC: 107 MMOL/L (ref 99–109)
CO2 SERPL-SCNC: 23 MMOL/L (ref 20–31)
CREAT BLD-MCNC: 0.64 MG/DL (ref 0.6–1.3)
ERYTHROCYTE [DISTWIDTH] IN BLOOD BY AUTOMATED COUNT: 15 % (ref 11.3–14.5)
GFR SERPL CREATININE-BSD FRML MDRD: 95 ML/MIN/1.73
GLOBULIN UR ELPH-MCNC: 1.9 GM/DL
GLUCOSE BLD-MCNC: 84 MG/DL (ref 70–100)
HCT VFR BLD AUTO: 29.7 % (ref 34.5–44)
HGB BLD-MCNC: 10.1 G/DL (ref 11.5–15.5)
LYMPHOCYTES # BLD AUTO: 0.9 10*3/MM3 (ref 0.6–4.8)
LYMPHOCYTES NFR BLD AUTO: 20.5 % (ref 24–44)
MCH RBC QN AUTO: 33.6 PG (ref 27–31)
MCHC RBC AUTO-ENTMCNC: 34.1 G/DL (ref 32–36)
MCV RBC AUTO: 98.4 FL (ref 80–99)
MONOCYTES # BLD AUTO: 0.3 10*3/MM3 (ref 0–1)
MONOCYTES NFR BLD AUTO: 6.6 % (ref 0–12)
NEUTROPHILS # BLD AUTO: 3.2 10*3/MM3 (ref 1.5–8.3)
NEUTROPHILS NFR BLD AUTO: 72.9 % (ref 41–71)
PLATELET # BLD AUTO: 221 10*3/MM3 (ref 150–450)
PMV BLD AUTO: 7.6 FL (ref 6–12)
POTASSIUM BLD-SCNC: 4.1 MMOL/L (ref 3.5–5.5)
PROT SERPL-MCNC: 6 G/DL (ref 5.7–8.2)
RBC # BLD AUTO: 3.02 10*6/MM3 (ref 3.89–5.14)
SODIUM BLD-SCNC: 137 MMOL/L (ref 132–146)
WBC NRBC COR # BLD: 4.4 10*3/MM3 (ref 3.5–10.8)

## 2019-04-05 PROCEDURE — 25010000002 PACLITAXEL PER 30 MG: Performed by: NURSE PRACTITIONER

## 2019-04-05 PROCEDURE — 25010000002 DEXAMETHASONE PER 1 MG: Performed by: NURSE PRACTITIONER

## 2019-04-05 PROCEDURE — 96375 TX/PRO/DX INJ NEW DRUG ADDON: CPT

## 2019-04-05 PROCEDURE — 85025 COMPLETE CBC W/AUTO DIFF WBC: CPT | Performed by: NURSE PRACTITIONER

## 2019-04-05 PROCEDURE — 80053 COMPREHEN METABOLIC PANEL: CPT | Performed by: NURSE PRACTITIONER

## 2019-04-05 PROCEDURE — 25010000002 DIPHENHYDRAMINE PER 50 MG: Performed by: NURSE PRACTITIONER

## 2019-04-05 PROCEDURE — 96413 CHEMO IV INFUSION 1 HR: CPT

## 2019-04-05 RX ORDER — SODIUM CHLORIDE 9 MG/ML
250 INJECTION, SOLUTION INTRAVENOUS ONCE
Status: COMPLETED | OUTPATIENT
Start: 2019-04-05 | End: 2019-04-05

## 2019-04-05 RX ORDER — FAMOTIDINE 10 MG/ML
20 INJECTION, SOLUTION INTRAVENOUS ONCE
Status: COMPLETED | OUTPATIENT
Start: 2019-04-05 | End: 2019-04-05

## 2019-04-05 RX ADMIN — PACLITAXEL 120 MG: 6 INJECTION, SOLUTION INTRAVENOUS at 12:09

## 2019-04-05 RX ADMIN — FAMOTIDINE 20 MG: 10 INJECTION, SOLUTION INTRAVENOUS at 11:22

## 2019-04-05 RX ADMIN — HEPARIN 500 UNITS: 100 SYRINGE at 13:18

## 2019-04-05 RX ADMIN — DIPHENHYDRAMINE HYDROCHLORIDE 25 MG: 50 INJECTION, SOLUTION INTRAMUSCULAR; INTRAVENOUS at 11:22

## 2019-04-05 RX ADMIN — DEXAMETHASONE SODIUM PHOSPHATE 12 MG: 4 INJECTION, SOLUTION INTRAMUSCULAR; INTRAVENOUS at 11:23

## 2019-04-05 RX ADMIN — SODIUM CHLORIDE 250 ML: 9 INJECTION, SOLUTION INTRAVENOUS at 11:17

## 2019-04-08 ENCOUNTER — TELEPHONE (OUTPATIENT)
Dept: ONCOLOGY | Facility: CLINIC | Age: 59
End: 2019-04-08

## 2019-04-08 NOTE — TELEPHONE ENCOUNTER
Pt called and left  on triage line. She reports a hard knot on forehead under the skin that she noticed today. She is unsure whether she should let dr holder look at it or go see pcp for evaluation.     Attempted to call pt back for more details on her phone call. No answer, and voicemail box was full, unable to leave .

## 2019-04-08 NOTE — TELEPHONE ENCOUNTER
Pt called back and we were able to discuss further about the knot on her forehead. She First noticed the spot on Friday. It started as a very small bump under the skin and she assumed it was a pimple. It has grown in size, though still very small. She describes as a hard solid knot. Does not appear to be pus/ liquid filled. Denies discoloration of surrounding skin, itching, or any other accompanying symptoms such as pain/ fever/ headache/ vision changes. I told pt it is unlikely this is related to her cancer diagnosis or treatment. OK if she can get in with pcp, for them to evaluate and treat if necessary. Pt has scheduled follow up Friday. She is currently on her way into work. She is a busy person. Advised ok to wait until Friday to have Dr Patel look at it then. However, if anything changes or worsens before then, she should call and let us know.

## 2019-04-10 ENCOUNTER — APPOINTMENT (OUTPATIENT)
Dept: MRI IMAGING | Facility: HOSPITAL | Age: 59
End: 2019-04-10

## 2019-04-10 ENCOUNTER — APPOINTMENT (OUTPATIENT)
Dept: GENERAL RADIOLOGY | Facility: HOSPITAL | Age: 59
End: 2019-04-10

## 2019-04-10 ENCOUNTER — TELEPHONE (OUTPATIENT)
Dept: ONCOLOGY | Facility: CLINIC | Age: 59
End: 2019-04-10

## 2019-04-10 ENCOUNTER — HOSPITAL ENCOUNTER (INPATIENT)
Facility: HOSPITAL | Age: 59
LOS: 2 days | Discharge: HOME | End: 2019-04-12
Attending: EMERGENCY MEDICINE | Admitting: HOSPITALIST

## 2019-04-10 DIAGNOSIS — Z85.3 HISTORY OF BREAST CANCER: ICD-10-CM

## 2019-04-10 DIAGNOSIS — Z74.09 IMPAIRED MOBILITY AND ADLS: ICD-10-CM

## 2019-04-10 DIAGNOSIS — Z90.13 H/O BILATERAL MASTECTOMY: ICD-10-CM

## 2019-04-10 DIAGNOSIS — R29.818: Primary | ICD-10-CM

## 2019-04-10 DIAGNOSIS — C79.31 METASTATIC CANCER TO BRAIN (HCC): ICD-10-CM

## 2019-04-10 DIAGNOSIS — Z78.9 IMPAIRED MOBILITY AND ADLS: ICD-10-CM

## 2019-04-10 PROBLEM — C50.912 CANCER OF LEFT BREAST METASTATIC TO BRAIN (HCC): Status: ACTIVE | Noted: 2019-04-10

## 2019-04-10 PROBLEM — R53.1 LEFT-SIDED WEAKNESS: Status: ACTIVE | Noted: 2019-04-10

## 2019-04-10 PROBLEM — C50.912 INVASIVE DUCTAL CARCINOMA OF LEFT BREAST: Status: ACTIVE | Noted: 2019-04-10

## 2019-04-10 LAB
ALBUMIN SERPL-MCNC: 4.2 G/DL (ref 3.5–5.2)
ALBUMIN/GLOB SERPL: 1.5 G/DL
ALP SERPL-CCNC: 65 U/L (ref 39–117)
ALT SERPL W P-5'-P-CCNC: 13 U/L (ref 1–33)
ANION GAP SERPL CALCULATED.3IONS-SCNC: 12 MMOL/L
AST SERPL-CCNC: 17 U/L (ref 1–32)
BASOPHILS # BLD AUTO: 0.03 10*3/MM3 (ref 0–0.2)
BASOPHILS NFR BLD AUTO: 0.5 % (ref 0–1.5)
BILIRUB SERPL-MCNC: 0.3 MG/DL (ref 0.2–1.2)
BILIRUB UR QL STRIP: NEGATIVE
BUN BLD-MCNC: 10 MG/DL (ref 6–20)
BUN/CREAT SERPL: 18.9 (ref 7–25)
CALCIUM SPEC-SCNC: 9.3 MG/DL (ref 8.6–10.5)
CHLORIDE SERPL-SCNC: 102 MMOL/L (ref 98–107)
CLARITY UR: CLEAR
CO2 SERPL-SCNC: 24 MMOL/L (ref 22–29)
COLOR UR: YELLOW
CREAT BLD-MCNC: 0.53 MG/DL (ref 0.57–1)
DEPRECATED RDW RBC AUTO: 51.2 FL (ref 37–54)
EOSINOPHIL # BLD AUTO: 0.08 10*3/MM3 (ref 0–0.4)
EOSINOPHIL NFR BLD AUTO: 1.4 % (ref 0.3–6.2)
ERYTHROCYTE [DISTWIDTH] IN BLOOD BY AUTOMATED COUNT: 14.4 % (ref 12.3–15.4)
GFR SERPL CREATININE-BSD FRML MDRD: 118 ML/MIN/1.73
GLOBULIN UR ELPH-MCNC: 2.8 GM/DL
GLUCOSE BLD-MCNC: 92 MG/DL (ref 65–99)
GLUCOSE BLDC GLUCOMTR-MCNC: 236 MG/DL (ref 70–130)
GLUCOSE UR STRIP-MCNC: NEGATIVE MG/DL
HCT VFR BLD AUTO: 34.5 % (ref 34–46.6)
HGB BLD-MCNC: 11.4 G/DL (ref 12–15.9)
HGB UR QL STRIP.AUTO: NEGATIVE
HOLD SPECIMEN: NORMAL
HOLD SPECIMEN: NORMAL
IMM GRANULOCYTES # BLD AUTO: 0.01 10*3/MM3 (ref 0–0.05)
IMM GRANULOCYTES NFR BLD AUTO: 0.2 % (ref 0–0.5)
KETONES UR QL STRIP: NEGATIVE
LEUKOCYTE ESTERASE UR QL STRIP.AUTO: NEGATIVE
LYMPHOCYTES # BLD AUTO: 1.14 10*3/MM3 (ref 0.7–3.1)
LYMPHOCYTES NFR BLD AUTO: 19.3 % (ref 19.6–45.3)
MAGNESIUM SERPL-MCNC: 2 MG/DL (ref 1.6–2.6)
MCH RBC QN AUTO: 32.4 PG (ref 26.6–33)
MCHC RBC AUTO-ENTMCNC: 33 G/DL (ref 31.5–35.7)
MCV RBC AUTO: 98 FL (ref 79–97)
MONOCYTES # BLD AUTO: 0.27 10*3/MM3 (ref 0.1–0.9)
MONOCYTES NFR BLD AUTO: 4.6 % (ref 5–12)
NEUTROPHILS # BLD AUTO: 4.38 10*3/MM3 (ref 1.4–7)
NEUTROPHILS NFR BLD AUTO: 74.2 % (ref 42.7–76)
NITRITE UR QL STRIP: NEGATIVE
PH UR STRIP.AUTO: 5.5 [PH] (ref 5–8)
PLATELET # BLD AUTO: 248 10*3/MM3 (ref 140–450)
PMV BLD AUTO: 10.3 FL (ref 6–12)
POTASSIUM BLD-SCNC: 3.9 MMOL/L (ref 3.5–5.2)
PROT SERPL-MCNC: 7 G/DL (ref 6–8.5)
PROT UR QL STRIP: NEGATIVE
RBC # BLD AUTO: 3.52 10*6/MM3 (ref 3.77–5.28)
SODIUM BLD-SCNC: 138 MMOL/L (ref 136–145)
SP GR UR STRIP: 1.01 (ref 1–1.03)
TROPONIN I SERPL-MCNC: 0 NG/ML (ref 0–0.07)
UROBILINOGEN UR QL STRIP: NORMAL
WBC NRBC COR # BLD: 5.9 10*3/MM3 (ref 3.4–10.8)
WHOLE BLOOD HOLD SPECIMEN: NORMAL
WHOLE BLOOD HOLD SPECIMEN: NORMAL

## 2019-04-10 PROCEDURE — 93005 ELECTROCARDIOGRAM TRACING: CPT

## 2019-04-10 PROCEDURE — A9577 INJ MULTIHANCE: HCPCS | Performed by: EMERGENCY MEDICINE

## 2019-04-10 PROCEDURE — 82962 GLUCOSE BLOOD TEST: CPT

## 2019-04-10 PROCEDURE — 81003 URINALYSIS AUTO W/O SCOPE: CPT | Performed by: NURSE PRACTITIONER

## 2019-04-10 PROCEDURE — 0 GADOBENATE DIMEGLUMINE 529 MG/ML SOLUTION: Performed by: EMERGENCY MEDICINE

## 2019-04-10 PROCEDURE — 99285 EMERGENCY DEPT VISIT HI MDM: CPT

## 2019-04-10 PROCEDURE — 93005 ELECTROCARDIOGRAM TRACING: CPT | Performed by: EMERGENCY MEDICINE

## 2019-04-10 PROCEDURE — 72156 MRI NECK SPINE W/O & W/DYE: CPT

## 2019-04-10 PROCEDURE — 80053 COMPREHEN METABOLIC PANEL: CPT | Performed by: EMERGENCY MEDICINE

## 2019-04-10 PROCEDURE — 70553 MRI BRAIN STEM W/O & W/DYE: CPT

## 2019-04-10 PROCEDURE — 99223 1ST HOSP IP/OBS HIGH 75: CPT | Performed by: FAMILY MEDICINE

## 2019-04-10 PROCEDURE — 25010000002 HYDROMORPHONE PER 4 MG: Performed by: FAMILY MEDICINE

## 2019-04-10 PROCEDURE — 25010000002 DEXAMETHASONE PER 1 MG: Performed by: NURSE PRACTITIONER

## 2019-04-10 PROCEDURE — 84484 ASSAY OF TROPONIN QUANT: CPT

## 2019-04-10 PROCEDURE — 71045 X-RAY EXAM CHEST 1 VIEW: CPT

## 2019-04-10 PROCEDURE — 83735 ASSAY OF MAGNESIUM: CPT | Performed by: EMERGENCY MEDICINE

## 2019-04-10 PROCEDURE — 85025 COMPLETE CBC W/AUTO DIFF WBC: CPT | Performed by: EMERGENCY MEDICINE

## 2019-04-10 PROCEDURE — 25010000002 LORAZEPAM PER 2 MG: Performed by: EMERGENCY MEDICINE

## 2019-04-10 RX ORDER — ATORVASTATIN CALCIUM 40 MG/1
80 TABLET, FILM COATED ORAL NIGHTLY
Status: DISCONTINUED | OUTPATIENT
Start: 2019-04-10 | End: 2019-04-12 | Stop reason: HOSPADM

## 2019-04-10 RX ORDER — SODIUM CHLORIDE 0.9 % (FLUSH) 0.9 %
3-10 SYRINGE (ML) INJECTION AS NEEDED
Status: DISCONTINUED | OUTPATIENT
Start: 2019-04-10 | End: 2019-04-12 | Stop reason: HOSPADM

## 2019-04-10 RX ORDER — SODIUM CHLORIDE 0.9 % (FLUSH) 0.9 %
3 SYRINGE (ML) INJECTION EVERY 12 HOURS SCHEDULED
Status: DISCONTINUED | OUTPATIENT
Start: 2019-04-10 | End: 2019-04-12 | Stop reason: HOSPADM

## 2019-04-10 RX ORDER — LORAZEPAM 2 MG/ML
0.5 INJECTION INTRAMUSCULAR ONCE
Status: COMPLETED | OUTPATIENT
Start: 2019-04-10 | End: 2019-04-10

## 2019-04-10 RX ORDER — ASPIRIN 300 MG/1
300 SUPPOSITORY RECTAL DAILY
Status: DISCONTINUED | OUTPATIENT
Start: 2019-04-10 | End: 2019-04-12 | Stop reason: HOSPADM

## 2019-04-10 RX ORDER — LIDOCAINE 40 MG/G
CREAM TOPICAL AS NEEDED
Status: DISCONTINUED | OUTPATIENT
Start: 2019-04-10 | End: 2019-04-12 | Stop reason: HOSPADM

## 2019-04-10 RX ORDER — SERTRALINE HYDROCHLORIDE 100 MG/1
200 TABLET, FILM COATED ORAL DAILY
Status: DISCONTINUED | OUTPATIENT
Start: 2019-04-11 | End: 2019-04-12 | Stop reason: HOSPADM

## 2019-04-10 RX ORDER — ASPIRIN 81 MG/1
81 TABLET, CHEWABLE ORAL DAILY
Status: DISCONTINUED | OUTPATIENT
Start: 2019-04-10 | End: 2019-04-12 | Stop reason: HOSPADM

## 2019-04-10 RX ORDER — ALPRAZOLAM 0.5 MG/1
0.5 TABLET ORAL 3 TIMES DAILY PRN
Status: DISCONTINUED | OUTPATIENT
Start: 2019-04-10 | End: 2019-04-12 | Stop reason: HOSPADM

## 2019-04-10 RX ORDER — LORAZEPAM 2 MG/ML
1 INJECTION INTRAMUSCULAR ONCE
Status: COMPLETED | OUTPATIENT
Start: 2019-04-10 | End: 2019-04-10

## 2019-04-10 RX ORDER — SODIUM CHLORIDE 0.9 % (FLUSH) 0.9 %
10 SYRINGE (ML) INJECTION AS NEEDED
Status: DISCONTINUED | OUTPATIENT
Start: 2019-04-10 | End: 2019-04-12 | Stop reason: HOSPADM

## 2019-04-10 RX ORDER — HYDROCODONE BITARTRATE AND ACETAMINOPHEN 5; 325 MG/1; MG/1
1 TABLET ORAL EVERY 6 HOURS PRN
Status: DISCONTINUED | OUTPATIENT
Start: 2019-04-10 | End: 2019-04-12 | Stop reason: HOSPADM

## 2019-04-10 RX ORDER — DEXAMETHASONE IN 0.9 % SOD CHL 10 MG/50ML
10 INTRAVENOUS SOLUTION, PIGGYBACK (ML) INTRAVENOUS ONCE
Status: COMPLETED | OUTPATIENT
Start: 2019-04-10 | End: 2019-04-10

## 2019-04-10 RX ORDER — NALOXONE HCL 0.4 MG/ML
0.4 VIAL (ML) INJECTION
Status: DISCONTINUED | OUTPATIENT
Start: 2019-04-10 | End: 2019-04-12 | Stop reason: HOSPADM

## 2019-04-10 RX ORDER — NICOTINE 21 MG/24HR
1 PATCH, TRANSDERMAL 24 HOURS TRANSDERMAL EVERY 24 HOURS
Status: DISCONTINUED | OUTPATIENT
Start: 2019-04-10 | End: 2019-04-12 | Stop reason: HOSPADM

## 2019-04-10 RX ORDER — BUPROPION HYDROCHLORIDE 150 MG/1
150 TABLET, EXTENDED RELEASE ORAL EVERY 12 HOURS SCHEDULED
Status: DISCONTINUED | OUTPATIENT
Start: 2019-04-10 | End: 2019-04-12 | Stop reason: HOSPADM

## 2019-04-10 RX ORDER — ONDANSETRON 4 MG/1
8 TABLET, FILM COATED ORAL 3 TIMES DAILY PRN
Status: DISCONTINUED | OUTPATIENT
Start: 2019-04-10 | End: 2019-04-12 | Stop reason: HOSPADM

## 2019-04-10 RX ORDER — HYDROMORPHONE HYDROCHLORIDE 1 MG/ML
0.5 INJECTION, SOLUTION INTRAMUSCULAR; INTRAVENOUS; SUBCUTANEOUS
Status: DISCONTINUED | OUTPATIENT
Start: 2019-04-10 | End: 2019-04-12 | Stop reason: HOSPADM

## 2019-04-10 RX ADMIN — BUPROPION HYDROCHLORIDE 150 MG: 150 TABLET, EXTENDED RELEASE ORAL at 23:39

## 2019-04-10 RX ADMIN — ASPIRIN 81 MG 81 MG: 81 TABLET ORAL at 23:19

## 2019-04-10 RX ADMIN — GADOBENATE DIMEGLUMINE 10 ML: 529 INJECTION, SOLUTION INTRAVENOUS at 17:08

## 2019-04-10 RX ADMIN — SODIUM CHLORIDE, PRESERVATIVE FREE 3 ML: 5 INJECTION INTRAVENOUS at 23:20

## 2019-04-10 RX ADMIN — DEXAMETHASONE SODIUM PHOSPHATE 10 MG: 10 INJECTION INTRAMUSCULAR; INTRAVENOUS at 18:53

## 2019-04-10 RX ADMIN — NICOTINE 1 PATCH: 21 PATCH, EXTENDED RELEASE TRANSDERMAL at 23:20

## 2019-04-10 RX ADMIN — HYDROMORPHONE HYDROCHLORIDE 0.5 MG: 1 INJECTION, SOLUTION INTRAMUSCULAR; INTRAVENOUS; SUBCUTANEOUS at 23:20

## 2019-04-10 RX ADMIN — LORAZEPAM 1 MG: 2 INJECTION INTRAMUSCULAR; INTRAVENOUS at 15:33

## 2019-04-10 RX ADMIN — HYDROCODONE BITARTRATE AND ACETAMINOPHEN 1 TABLET: 5; 325 TABLET ORAL at 21:55

## 2019-04-10 RX ADMIN — LORAZEPAM 0.5 MG: 2 INJECTION INTRAMUSCULAR; INTRAVENOUS at 18:51

## 2019-04-10 RX ADMIN — ATORVASTATIN CALCIUM 80 MG: 40 TABLET, FILM COATED ORAL at 23:19

## 2019-04-10 NOTE — H&P
"    [facility]  HISTORY AND PHYSICAL    Patient Name: Brittani Lambert  : 1960  MRN: [id number]  Primary Care Physician: Alla Colon DO  Date of Admission: 4/10/2019    Subjective     Chief Complaint:  Left sided weakness    History of Present Illness: Brittani Lambert is a 59 y.o. female with invasive ductal carcinoma of the left breast s/p bilateral mastectomy and chemotherapy, ongoing tobacco dependence and HLD who identified LEFT arm weakness at work yesterday.  She describes difficulty with  and coordination of her LUE.  She went to bed and this morning she woke up with LEFT sided lower extremity weakness.  She denies falls or syncope.  Her weakness is constant.  Nothing seems to improve it.  She describes greater than one week of a headache and associated tender and growing knot to her left forehead.  She denies confusion, hallucinations or thunderclap headache.  She presented to [facility] ED with her mother and sister.  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 Another consideration is for subacute infarct.\"    Review of Systems   Constitutional: Positive for activity change. Negative for chills and fever.   HENT: Negative for ear pain and trouble swallowing.    Eyes: Negative.  Negative for visual disturbance.   Respiratory: Negative.  Negative for shortness of breath.    Cardiovascular: Negative.  Negative for chest pain and palpitations.   Gastrointestinal: Positive for diarrhea and nausea. Negative for abdominal pain and vomiting.   Endocrine: Negative.    Genitourinary: Negative.  Negative for difficulty urinating.   Musculoskeletal: Positive for neck pain.   Skin: Negative.  Negative for rash.   Allergic/Immunologic: Positive for immunocompromised state.   Neurological: " Positive for weakness and headaches. Negative for dizziness, seizures, syncope and speech difficulty.   Hematological: Negative.  Does not bruise/bleed easily.   Psychiatric/Behavioral: Negative for confusion. The patient is nervous/anxious.    All other systems reviewed and are negative.     Past Medical History:   Diagnosis Date   • Breast CA (CMS/HCC) 10/4/2018   • Depression    • DJD (degenerative joint disease) of cervical spine    • THALIA (generalized anxiety disorder)    • Heart murmur    • Ovarian cancer (CMS/HCC) 1989   • Tobacco dependence        Past Surgical History:   Procedure Laterality Date   • APPENDECTOMY     • BREAST BIOPSY Right 2003    DONE IN [place]   • COLONOSCOPY  06/2018   • HYSTERECTOMY  1989   • MASTECTOMY W/ SENTINEL NODE BIOPSY Bilateral 10/4/2018    Procedure: LEFT SKIN SPARING BREAST MASTECTOMY WITH LEFT SENTINEL NODE BIOPSY, RIGHT PROPHYLACTIC SKIN SPARING MASTECTOMY;  Surgeon: Koffi Worthington MD;  Location: [facility];  Service: General   • OOPHORECTOMY  1989       Family History   Problem Relation Age of Onset   • Arthritis Mother    • Heart attack Mother    • Osteoporosis Mother    • Liver disease Father    • Celiac disease Other        Social History     Socioeconomic History   • Marital status:      Spouse name: N/A   • Number of children: 1   • Years of education: H.S.   • Highest education level: High school graduate   Occupational History   • Occupation: Associate     Employer: [employer]   Tobacco Use   • Smoking status: Current Every Day Smoker     Packs/day: 0.50     Years: 20.00     Pack years: 10.00     Types: Cigarettes   • Smokeless tobacco: Never Used   Substance and Sexual Activity   • Alcohol use: Yes     Frequency: Monthly or less   • Drug use: No   • Sexual activity: Not Currently     Partners: Male       Medications:  Available home medication information reviewed and reconciled.      Allergies:  No Known Allergies    Objective  "    Vital Signs: /75   Pulse 89   Temp 98.1 °F (36.7 °C) (Oral)   Resp 20   Ht 160 cm (63\")   Wt 50.8 kg (112 lb)   SpO2 98%   BMI 19.84 kg/m²   Body mass index is 19.84 kg/m².    Physical Exam   Constitutional: She is oriented to person, place, and time. She appears well-developed. She is cooperative. She has a sickly appearance.   HENT:   Head: Normocephalic and atraumatic.       Right Ear: Hearing and external ear normal.   Left Ear: Hearing and external ear normal.   Nose: Nose normal.   Mouth/Throat: Uvula is midline and oropharynx is clear and moist. Mucous membranes are dry.   Tender calvaria growth to left forehead.  Skin is intact.   Eyes: Conjunctivae and EOM are normal. Pupils are equal, round, and reactive to light. No scleral icterus.   Neck: Trachea normal and normal range of motion. Neck supple. No JVD present. Muscular tenderness present. No spinous process tenderness present. Carotid bruit is not present. No thyromegaly present.   Cardiovascular: Normal rate, regular rhythm, normal heart sounds and intact distal pulses.   Pulmonary/Chest: Effort normal and breath sounds normal.   Abdominal: Soft. Bowel sounds are normal. There is no hepatosplenomegaly. There is tenderness in the epigastric area.   Musculoskeletal: Normal range of motion.   Lymphadenopathy:     She has no cervical adenopathy.   Neurological: She is alert and oriented to person, place, and time. No sensory deficit. She exhibits abnormal muscle tone. Coordination abnormal.   3/5 strength on left   Skin: Skin is warm and dry.   Psychiatric: She has a normal mood and affect. Her speech is normal and behavior is normal. Judgment and thought content normal. Cognition and memory are normal.   Nursing note and vitals reviewed.    Results Reviewed:   I have personally reviewed and interpreted available lab data dated 04/10/19 as below.  Lab Results (most recent)     Procedure Component Value Units Date/Time    Urinalysis With " Microscopic If Indicated (No Culture) - Urine, Clean Catch [[id number]]  (Normal) Collected:  04/10/19 1455    Specimen:  Urine, Clean Catch Updated:  04/10/19 1613     Color, UA Yellow     Appearance, UA Clear     pH, UA 5.5     Specific Gravity, UA 1.010     Glucose, UA Negative     Ketones, UA Negative     Bilirubin, UA Negative     Blood, UA Negative     Protein, UA Negative     Leuk Esterase, UA Negative     Nitrite, UA Negative     Urobilinogen, UA 0.2 E.U./dL    Narrative:       Urine microscopic not indicated.    Comprehensive Metabolic Panel [[id number]]  (Abnormal) Collected:  04/10/19 1330    Specimen:  Blood Updated:  04/10/19 1538     Glucose 92 mg/dL      BUN 10 mg/dL      Creatinine 0.53 mg/dL      Sodium 138 mmol/L      Potassium 3.9 mmol/L      Chloride 102 mmol/L      CO2 24.0 mmol/L      Calcium 9.3 mg/dL      Total Protein 7.0 g/dL      Albumin 4.20 g/dL      ALT (SGPT) 13 U/L      AST (SGOT) 17 U/L      Alkaline Phosphatase 65 U/L      Total Bilirubin 0.3 mg/dL      eGFR Non African Amer 118 mL/min/1.73      Globulin 2.8 gm/dL      A/G Ratio 1.5 g/dL      BUN/Creatinine Ratio 18.9     Anion Gap 12.0 mmol/L     Magnesium [[id number]]  (Normal) Collected:  04/10/19 1330    Specimen:  Blood Updated:  04/10/19 1538     Magnesium 2.0 mg/dL     POC Troponin, Rapid [[id number]]  (Normal) Collected:  04/10/19 1337    Specimen:  Blood Updated:  04/10/19 1349     Troponin I 0.00 ng/mL      Comment: Serial Number: [id number]:  [id number]       CBC & Differential [[id number]] Collected:  04/10/19 1330    Specimen:  Blood Updated:  04/10/19 1348    Narrative:       The following orders were created for panel order CBC & Differential.  Procedure                               Abnormality         Status                     ---------                               -----------         ------                     CBC Auto Differential[[id number]]        Abnormal            Final result                 Please view  results for these tests on the individual orders.    CBC Auto Differential [[id number]]  (Abnormal) Collected:  04/10/19 1330    Specimen:  Blood Updated:  04/10/19 1348     WBC 5.90 10*3/mm3      RBC 3.52 10*6/mm3      Hemoglobin 11.4 g/dL      Hematocrit 34.5 %      MCV 98.0 fL      MCH 32.4 pg      MCHC 33.0 g/dL      RDW 14.4 %      RDW-SD 51.2 fl      MPV 10.3 fL      Platelets 248 10*3/mm3      Neutrophil % 74.2 %      Lymphocyte % 19.3 %      Monocyte % 4.6 %      Eosinophil % 1.4 %      Basophil % 0.5 %      Immature Grans % 0.2 %      Neutrophils, Absolute 4.38 10*3/mm3      Lymphocytes, Absolute 1.14 10*3/mm3      Monocytes, Absolute 0.27 10*3/mm3      Eosinophils, Absolute 0.08 10*3/mm3      Basophils, Absolute 0.03 10*3/mm3      Immature Grans, Absolute 0.01 10*3/mm3         I have personally reviewed imaging reports available and dated 04/10/19 as below.  MRI Cervical Spine With & Without Contrast  Narrative: EXAMINATION: MRI BRAIN W/WO CONTRAST, MRI CERVICAL SPINE W/WO CONTRAST -  04/10/2019      INDICATION: Left upper extremity weakness.     TECHNIQUE:  Multiplanar multisequence MRI of the brain and cervical  spine was performed without and with contrast.     COMPARISONS:  None.     FINDINGS:      BRAIN: There is curvilinear leptomeningeal enhancement along the central  sulcus with surrounding T2 prolongation in the pre- and postcentral gyri  and associated diffusion restriction. There is no other clear  parenchymal abnormality. Marrow signal of the calvarium is within normal  limits. No cortical destructive lesion. Incidental benign epidermal  inclusion cyst noted in the anterior frontal scalp towards the left of  midline. [place] flow-voids are preserved.     CERVICAL SPINE:  Sagittal images cover from from the sella through the  T4 level caudally.     The cervicomedullary junction is unremarkable.  The cervical cord is  normal in caliber and signal. There is no abnormal enhancement in  the  spinal canal or in the paraspinal soft tissues. Several benign  perineural cysts are seen in the cervical and incidentally imaged  thoracic spine.     With regard to the marrow space, vertebral body heights are maintained.   There is maintenance of the usual lordosis without significant  spondylolisthesis.  Background marrow signal is normal.     Degenerative changes are as follows:     C2-3: No significant foraminal or spinal canal stenosis.     C3-4: Left greater than right facet arthropathy without significant  foraminal or spinal canal stenosis.     C4-5: Right greater than left uncovertebral arthropathy and left greater  than right facet disease. There is moderate right foraminal stenosis but  no significant spinal canal stenosis.     C5-6: Right greater than left uncovertebral arthropathy and left greater  than right facet disease. Moderate right foraminal stenosis. No  significant spinal canal stenosis.     C6-7: Left greater than right uncovertebral arthropathy. No significant  foraminal or spinal canal stenosis.     C7/T1: No significant foraminal or spinal canal stenosis.     Incidental imaging of the cervical soft tissues is unremarkable.     Impression: 1. 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 Another  consideration is for subacute infarct. Follow-up is required.  2. Degenerative changes of the cervical spine without evidence of  metastatic disease.     DICTATED:   04/10/2019  EDITED/ls :   04/10/2019      This report was finalized on 4/10/2019 6:37 PM by Adalberto Vallejo.     MRI Brain With & Without Contrast  Narrative: EXAMINATION: MRI BRAIN W/WO CONTRAST, MRI CERVICAL SPINE W/WO CONTRAST -  04/10/2019      INDICATION: Left upper extremity weakness.     TECHNIQUE:  Multiplanar multisequence MRI of the brain and cervical  spine was performed without and  with contrast.     COMPARISONS:  None.     FINDINGS:      BRAIN: There is curvilinear leptomeningeal enhancement along the central  sulcus with surrounding T2 prolongation in the pre- and postcentral gyri  and associated diffusion restriction. There is no other clear  parenchymal abnormality. Marrow signal of the calvarium is within normal  limits. No cortical destructive lesion. Incidental benign epidermal  inclusion cyst noted in the anterior frontal scalp towards the left of  midline. [place] flow-voids are preserved.     CERVICAL SPINE:  Sagittal images cover from from the sella through the  T4 level caudally.     The cervicomedullary junction is unremarkable.  The cervical cord is  normal in caliber and signal. There is no abnormal enhancement in the  spinal canal or in the paraspinal soft tissues. Several benign  perineural cysts are seen in the cervical and incidentally imaged  thoracic spine.     With regard to the marrow space, vertebral body heights are maintained.   There is maintenance of the usual lordosis without significant  spondylolisthesis.  Background marrow signal is normal.     Degenerative changes are as follows:     C2-3: No significant foraminal or spinal canal stenosis.     C3-4: Left greater than right facet arthropathy without significant  foraminal or spinal canal stenosis.     C4-5: Right greater than left uncovertebral arthropathy and left greater  than right facet disease. There is moderate right foraminal stenosis but  no significant spinal canal stenosis.     C5-6: Right greater than left uncovertebral arthropathy and left greater  than right facet disease. Moderate right foraminal stenosis. No  significant spinal canal stenosis.     C6-7: Left greater than right uncovertebral arthropathy. No significant  foraminal or spinal canal stenosis.     C7/T1: No significant foraminal or spinal canal stenosis.     Incidental imaging of the cervical soft tissues is unremarkable.      Impression: 1. 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 Another  consideration is for subacute infarct. Follow-up is required.  2. Degenerative changes of the cervical spine without evidence of  metastatic disease.     DICTATED:   04/10/2019  EDITED/ls :   04/10/2019      This report was finalized on 4/10/2019 6:37 PM by Adalberto Vallejo.     XR Chest 1 View  Narrative: EXAMINATION: XR CHEST 1 VW- 04/10/2019      INDICATION: Weak/Dizzy/AMS triage protocol      COMPARISON: 11/09/2018     FINDINGS: Note is again made of patient's right-sided Port-A-Cath in  stable position in the proximal SVC. Lungs are well-expanded and appear  clear. Heart and vasculature appear normal in size.         Impression: No evidence of active chest disease.     D:  04/10/2019  E:  04/10/2019          I have personally reviewed ECG report dated 04/10/19.       I have personally discussed the case with the ED provider and we discussed communication with oncology.  I have personally reviewed and summarized old records including recent oncology office note.    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)    Invasive ductal carcinoma of left breast (CMS/HCC)    Left-sided weakness    Functional diarrhea    Tobacco dependence    THALIA (generalized anxiety disorder)    Plan:    Admit to telemetry    Consult Oncology    Consult Neurology    Decadron IV    Anti-emetic therapy    Parentally administered controlled substance for comfort care    Routine neuro checks    Fall precautions    Seizure precautions    Continuous pulse oximetry    AM labs to include electrolytes    Physical therapy consult    Occupational therapy consult    Benzodiazepine therapy    Nicotine replacement    Tobacco cessation education    With an abrupt change in neurological  status, progression of her cancer on imaging and parentally administered controlled substance for comfort care, we will admit as inpatient.  I believe this patient meets INPATIENT status due to the need for care which can only be reasonably provided in an hospital setting such as aggressive/expedited ancillary services and/or consultation services and the necessity for IV medications, close physician monitoring and/or the possible need for procedures.  In such, I feel patient’s risk for adverse outcomes and need for care warrant INPATIENT evaluation and predict the patient’s care encounter to likely last beyond 2 midnights.      DVT prophylaxis: Mechanical  Code Status: CPR  Admission Status: Patient will be admitted as LEXINPT.     Electronically signed by Juan Carlos Gayle MD, 04/10/19, 8:30 PM

## 2019-04-10 NOTE — ED PROVIDER NOTES
"Subjective   Patient presents to the emergency department with acute motor dysfunction in her left arm onset yesterday afternoon and much worse this morning.  Further history reveals that the patient was working in the evening shift yesterday when she discovered poor coordination in her left arm.  This, together with some unexplained diarrhea, caused her to leave her shift early at 8 PM.  Patient states she slept unremarkably.   When she awoke, she discovered immediately her inability to move her arm effectively.  Meanwhile, she has had no further diarrhea and denies abdominal pain or fever or vomiting.  She ate a routine meal this morning.    This patient was diagnosed with breast cancer in the fall of last year with subsequent surgery in October.  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        History provided by:  Patient   used: No    Upper Extremity Issue   Upper extremity pain location: left arm weakness.  Pain details:     Severity:  No pain  Dislocation: no    Worsened by:  Nothing  Ineffective treatments:  None tried  Associated symptoms: muscle weakness    Associated symptoms: no back pain, no decreased range of motion, no fever, no neck pain, no swelling and no tingling        Review of Systems   Constitutional: Negative for appetite change and fever.   Gastrointestinal: Positive for diarrhea. Negative for abdominal pain, blood in stool and vomiting.   Genitourinary: Negative.    Musculoskeletal: Negative for back pain and neck pain.   Skin: Negative for pallor and rash.        Patient wears hats \"all the time\" due to her chemo related alopecia.  She has developed an early abscess it appears on her forehead   Allergic/Immunologic: Negative.    Neurological: Positive for weakness and " headaches. Negative for dizziness, facial asymmetry and numbness.        Difficulty using left hand   Hematological: Negative.    Psychiatric/Behavioral: Negative.    All other systems reviewed and are negative.      Past Medical History:   Diagnosis Date   • Anxiety    • Arthritis    • Breast CA (CMS/HCC) 10/4/2018   • Cancer (CMS/HCC)    • Depression    • Heart murmur    • Ovarian cancer (CMS/HCC) 1989       No Known Allergies    Past Surgical History:   Procedure Laterality Date   • APPENDECTOMY     • BREAST BIOPSY Right 2003    DONE IN [place]   • COLONOSCOPY  06/2018   • HYSTERECTOMY      AGE 29   • MASTECTOMY W/ SENTINEL NODE BIOPSY Bilateral 10/4/2018    Procedure: LEFT SKIN SPARING BREAST MASTECTOMY WITH LEFT SENTINEL NODE BIOPSY, RIGHT PROPHYLACTIC SKIN SPARING MASTECTOMY;  Surgeon: Koffi Worthington MD;  Location: [facility];  Service: General   • OOPHORECTOMY      AGE 29       Family History   Problem Relation Age of Onset   • Arthritis Mother    • Heart attack Mother    • Osteoporosis Mother    • Liver disease Father    • Cancer Maternal Grandmother    • Celiac disease Other    • Breast cancer Neg Hx    • Ovarian cancer Neg Hx        Social History     Socioeconomic History   • Marital status:      Spouse name: Not on file   • Number of children: Not on file   • Years of education: Not on file   • Highest education level: Not on file   Tobacco Use   • Smoking status: Current Every Day Smoker     Years: 20.00     Types: Cigarettes   • Smokeless tobacco: Never Used   • Tobacco comment: 2 cigs daily; former 1/2 PPD   Substance and Sexual Activity   • Alcohol use: Yes     Comment: occ   • Drug use: No   • Sexual activity: Defer         Objective   Physical Exam   Constitutional: She is oriented to person, place, and time. She appears well-developed and well-nourished. No distress.   Thin. Chemo induced alopecia.   HENT:   Head: Normocephalic.   Nose: Nose normal.   Mouth/Throat: Mucous membranes  are normal.   One centimeter immature forming abscess on mid forehead that is non-fluctuant, tender, and slightly erythematous.    Eyes: Conjunctivae are normal. No scleral icterus.   Conjunctivae are pink.    Neck: Normal range of motion. Neck supple.   Cardiovascular: Normal rate, regular rhythm, normal heart sounds and intact distal pulses.   No murmur heard.  No tachycardia.    Pulmonary/Chest: Effort normal and breath sounds normal. No respiratory distress.   Abdominal: Soft. Bowel sounds are normal. There is no tenderness.   Musculoskeletal: Normal range of motion. She exhibits no edema or deformity.   Neurological: She is alert and oriented to person, place, and time. She displays abnormal reflex. She exhibits abnormal muscle tone. Coordination abnormal.   Loss of control of flexion and extension of left lower arm. Weakness to left upper arm. Fine motor skills of the fingers are preserved.    Skin: Skin is warm and dry. Capillary refill takes less than 2 seconds. Cap refill is brisk. . No rash noted. No erythema. No pallor.   Psychiatric: She has a normal mood and affect. Her behavior is normal.   Nursing note and vitals reviewed.      Procedures         ED Course  ED Course as of Apr 10 1924   Wed Apr 10, 2019   1753 Hernandez Matos MD.  I saw and evaluated this patient in the emergency department.  I help direct testing and treatment.  [MS]   1810 I have spoken to Dr. Matos as well as the patient's oncologist, Dr. Carrie Patel who concurs with administration of steroid with hospital admission.   [ML]   1856 Hernandez Matos MD  I broke the news to the patient about the likely cerebral metastasis of her breast cancer.  She understands that this is not an absolute diagnosis at this point but is most likely.  [MS]   1909 Discussed with Dr. Gayle, who agrees with hospitalization.   [TB]      ED Course User Index  [ML] Shaunna Eisenberg APRN  [MS] Hernandez Matos MD  [TB] Madi Arce     Recent Results (from  the past 24 hour(s))   Comprehensive Metabolic Panel    Collection Time: 04/10/19  1:30 PM   Result Value Ref Range    Glucose 92 65 - 99 mg/dL    BUN 10 6 - 20 mg/dL    Creatinine 0.53 (L) 0.57 - 1.00 mg/dL    Sodium 138 136 - 145 mmol/L    Potassium 3.9 3.5 - 5.2 mmol/L    Chloride 102 98 - 107 mmol/L    CO2 24.0 22.0 - 29.0 mmol/L    Calcium 9.3 8.6 - 10.5 mg/dL    Total Protein 7.0 6.0 - 8.5 g/dL    Albumin 4.20 3.50 - 5.20 g/dL    ALT (SGPT) 13 1 - 33 U/L    AST (SGOT) 17 1 - 32 U/L    Alkaline Phosphatase 65 39 - 117 U/L    Total Bilirubin 0.3 0.2 - 1.2 mg/dL    eGFR Non African Amer 118 >60 mL/min/1.73    Globulin 2.8 gm/dL    A/G Ratio 1.5 g/dL    BUN/Creatinine Ratio 18.9 7.0 - 25.0    Anion Gap 12.0 mmol/L   Magnesium    Collection Time: 04/10/19  1:30 PM   Result Value Ref Range    Magnesium 2.0 1.6 - 2.6 mg/dL   Light Blue Top    Collection Time: 04/10/19  1:30 PM   Result Value Ref Range    Extra Tube hold for add-on    Green Top (Gel)    Collection Time: 04/10/19  1:30 PM   Result Value Ref Range    Extra Tube Hold for add-ons.    Lavender Top    Collection Time: 04/10/19  1:30 PM   Result Value Ref Range    Extra Tube hold for add-on    Gold Top - SST    Collection Time: 04/10/19  1:30 PM   Result Value Ref Range    Extra Tube Hold for add-ons.    CBC Auto Differential    Collection Time: 04/10/19  1:30 PM   Result Value Ref Range    WBC 5.90 3.40 - 10.80 10*3/mm3    RBC 3.52 (L) 3.77 - 5.28 10*6/mm3    Hemoglobin 11.4 (L) 12.0 - 15.9 g/dL    Hematocrit 34.5 34.0 - 46.6 %    MCV 98.0 (H) 79.0 - 97.0 fL    MCH 32.4 26.6 - 33.0 pg    MCHC 33.0 31.5 - 35.7 g/dL    RDW 14.4 12.3 - 15.4 %    RDW-SD 51.2 37.0 - 54.0 fl    MPV 10.3 6.0 - 12.0 fL    Platelets 248 140 - 450 10*3/mm3    Neutrophil % 74.2 42.7 - 76.0 %    Lymphocyte % 19.3 (L) 19.6 - 45.3 %    Monocyte % 4.6 (L) 5.0 - 12.0 %    Eosinophil % 1.4 0.3 - 6.2 %    Basophil % 0.5 0.0 - 1.5 %    Immature Grans % 0.2 0.0 - 0.5 %    Neutrophils, Absolute  4.38 1.40 - 7.00 10*3/mm3    Lymphocytes, Absolute 1.14 0.70 - 3.10 10*3/mm3    Monocytes, Absolute 0.27 0.10 - 0.90 10*3/mm3    Eosinophils, Absolute 0.08 0.00 - 0.40 10*3/mm3    Basophils, Absolute 0.03 0.00 - 0.20 10*3/mm3    Immature Grans, Absolute 0.01 0.00 - 0.05 10*3/mm3   POC Troponin, Rapid    Collection Time: 04/10/19  1:37 PM   Result Value Ref Range    Troponin I 0.00 0.00 - 0.07 ng/mL   Urinalysis With Microscopic If Indicated (No Culture) - Urine, Clean Catch    Collection Time: 04/10/19  2:55 PM   Result Value Ref Range    Color, UA Yellow Yellow, Straw    Appearance, UA Clear Clear    pH, UA 5.5 5.0 - 8.0    Specific Gravity, UA 1.010 1.001 - 1.030    Glucose, UA Negative Negative    Ketones, UA Negative Negative    Bilirubin, UA Negative Negative    Blood, UA Negative Negative    Protein, UA Negative Negative    Leuk Esterase, UA Negative Negative    Nitrite, UA Negative Negative    Urobilinogen, UA 0.2 E.U./dL 0.2 - 1.0 E.U./dL     Note: In addition to lab results from this visit, the labs listed above may include labs taken at another facility or during a different encounter within the last 24 hours. Please correlate lab times with ED admission and discharge times for further clarification of the services performed during this visit.    MRI Brain With & Without Contrast   Final Result   1. 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 Another   consideration is for subacute infarct. Follow-up is required.   2. Degenerative changes of the cervical spine without evidence of   metastatic disease.       DICTATED:   04/10/2019   EDITED/ls :   04/10/2019        This report was finalized on 4/10/2019 6:37 PM by Adalberto Vallejo.          MRI Cervical Spine With & Without Contrast   Final Result   1. 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 Another   consideration is for subacute infarct. Follow-up is required.   2. Degenerative changes of the cervical spine without evidence of   metastatic disease.       DICTATED:   04/10/2019   EDITED/ls :   04/10/2019        This report was finalized on 4/10/2019 6:37 PM by Adalberto Vallejo.          XR Chest 1 View   Preliminary Result   No evidence of active chest disease.       D:  04/10/2019   E:  04/10/2019                Vitals:    04/10/19 1740 04/10/19 1800 04/10/19 1820 04/10/19 1900   BP: 134/91 137/94 143/87 148/92   BP Location:       Patient Position:       Pulse:       Resp:       Temp:       TempSrc:       SpO2: 98% 100% 98%    Weight:       Height:         Medications   sodium chloride 0.9 % flush 10 mL (not administered)   LORazepam (ATIVAN) injection 1 mg (1 mg Intravenous Given 4/10/19 1533)   gadobenate dimeglumine (MULTIHANCE) injection 10 mL (10 mL Intravenous Given 4/10/19 1708)   Dexamethasone Sod Phos-NaCl 10-0.9 MG/50ML-% IVPB solution 10 mg (10 mg Intravenous Given 4/10/19 1853)   LORazepam (ATIVAN) injection 0.5 mg (0.5 mg Intravenous Given 4/10/19 1851)     ECG/EMG Results (last 24 hours)     Procedure Component Value Units Date/Time    ECG 12 Lead [[id number]] Collected:  04/10/19 1329     Updated:  04/10/19 1407        ECG 12 Lead                             MDM    Final diagnoses:   Acute focal neurological deficit, onset within 3-24 hours   Metastatic cancer to brain (CMS/HCC)   History of breast cancer       Documentation assistance provided by [facility].  Information recorded by the [facility] was done at my direction and has been verified and validated by me.     Madi Arce  04/10/19 1449       Shaunna Eisenberg APRN  04/10/19 1918       Shaunna Eisenberg APRN  04/10/19 1924

## 2019-04-10 NOTE — TELEPHONE ENCOUNTER
Patient left a voice mail on triage line and reported that the knot on her forehead has gotten bigger and hurts and that her left arm is useless.  I returned call to patient and she reported that last night she loss the use of her left arm.  Patient reported that she can't hold on to anything and the arm is very unsteady and weak.  Advised patient to go to the ER to be assess for the left arm weakness.  Patient verbalized understanding.

## 2019-04-11 ENCOUNTER — APPOINTMENT (OUTPATIENT)
Dept: NUCLEAR MEDICINE | Facility: HOSPITAL | Age: 59
End: 2019-04-11

## 2019-04-11 ENCOUNTER — APPOINTMENT (OUTPATIENT)
Dept: CT IMAGING | Facility: HOSPITAL | Age: 59
End: 2019-04-11

## 2019-04-11 LAB
CHOLEST SERPL-MCNC: 305 MG/DL (ref 0–200)
GLUCOSE BLDC GLUCOMTR-MCNC: 132 MG/DL (ref 70–130)
HBA1C MFR BLD: 5.2 % (ref 4.8–5.6)
HDLC SERPL-MCNC: 30 MG/DL (ref 40–60)
LDLC SERPL CALC-MCNC: 215 MG/DL (ref 0–100)
LDLC/HDLC SERPL: 7.17 {RATIO}
TRIGL SERPL-MCNC: 299 MG/DL (ref 0–150)
VLDLC SERPL-MCNC: 59.8 MG/DL

## 2019-04-11 PROCEDURE — 0 TECHNETIUM MEDRONATE KIT: Performed by: HOSPITALIST

## 2019-04-11 PROCEDURE — 80061 LIPID PANEL: CPT | Performed by: FAMILY MEDICINE

## 2019-04-11 PROCEDURE — A9503 TC99M MEDRONATE: HCPCS | Performed by: HOSPITALIST

## 2019-04-11 PROCEDURE — 74177 CT ABD & PELVIS W/CONTRAST: CPT

## 2019-04-11 PROCEDURE — 77412 RADIATION TX DELIVERY LVL 3: CPT | Performed by: RADIOLOGY

## 2019-04-11 PROCEDURE — 25010000002 IOPAMIDOL 61 % SOLUTION: Performed by: HOSPITALIST

## 2019-04-11 PROCEDURE — 25010000002 HYDROMORPHONE PER 4 MG: Performed by: FAMILY MEDICINE

## 2019-04-11 PROCEDURE — 25010000002 DEXAMETHASONE PER 1 MG: Performed by: HOSPITALIST

## 2019-04-11 PROCEDURE — 77334 RADIATION TREATMENT AID(S): CPT | Performed by: RADIOLOGY

## 2019-04-11 PROCEDURE — 99233 SBSQ HOSP IP/OBS HIGH 50: CPT | Performed by: HOSPITALIST

## 2019-04-11 PROCEDURE — 99254 IP/OBS CNSLTJ NEW/EST MOD 60: CPT | Performed by: PSYCHIATRY & NEUROLOGY

## 2019-04-11 PROCEDURE — 99254 IP/OBS CNSLTJ NEW/EST MOD 60: CPT | Performed by: INTERNAL MEDICINE

## 2019-04-11 PROCEDURE — 82962 GLUCOSE BLOOD TEST: CPT

## 2019-04-11 PROCEDURE — 78306 BONE IMAGING WHOLE BODY: CPT

## 2019-04-11 PROCEDURE — 71260 CT THORAX DX C+: CPT

## 2019-04-11 PROCEDURE — 77417 THER RADIOLOGY PORT IMAGE(S): CPT | Performed by: RADIOLOGY

## 2019-04-11 PROCEDURE — 77307 TELETHX ISODOSE PLAN CPLX: CPT | Performed by: RADIOLOGY

## 2019-04-11 PROCEDURE — 77290 THER RAD SIMULAJ FIELD CPLX: CPT | Performed by: RADIOLOGY

## 2019-04-11 PROCEDURE — 97167 OT EVAL HIGH COMPLEX 60 MIN: CPT | Performed by: OCCUPATIONAL THERAPIST

## 2019-04-11 PROCEDURE — 83036 HEMOGLOBIN GLYCOSYLATED A1C: CPT | Performed by: FAMILY MEDICINE

## 2019-04-11 RX ORDER — TC 99M MEDRONATE 20 MG/10ML
27 INJECTION, POWDER, LYOPHILIZED, FOR SOLUTION INTRAVENOUS
Status: COMPLETED | OUTPATIENT
Start: 2019-04-11 | End: 2019-04-11

## 2019-04-11 RX ORDER — DEXAMETHASONE SODIUM PHOSPHATE 4 MG/ML
4 INJECTION, SOLUTION INTRA-ARTICULAR; INTRALESIONAL; INTRAMUSCULAR; INTRAVENOUS; SOFT TISSUE EVERY 8 HOURS
Status: DISCONTINUED | OUTPATIENT
Start: 2019-04-11 | End: 2019-04-12 | Stop reason: HOSPADM

## 2019-04-11 RX ORDER — MEMANTINE HYDROCHLORIDE 10 MG/1
5 TABLET ORAL EVERY 12 HOURS SCHEDULED
Status: DISCONTINUED | OUTPATIENT
Start: 2019-04-11 | End: 2019-04-12 | Stop reason: HOSPADM

## 2019-04-11 RX ORDER — NICOTINE 21 MG/24HR
1 PATCH, TRANSDERMAL 24 HOURS TRANSDERMAL ONCE
Status: COMPLETED | OUTPATIENT
Start: 2019-04-11 | End: 2019-04-12

## 2019-04-11 RX ADMIN — HYDROMORPHONE HYDROCHLORIDE 0.5 MG: 1 INJECTION, SOLUTION INTRAMUSCULAR; INTRAVENOUS; SUBCUTANEOUS at 11:53

## 2019-04-11 RX ADMIN — IOPAMIDOL 95 ML: 612 INJECTION, SOLUTION INTRAVENOUS at 17:45

## 2019-04-11 RX ADMIN — DEXAMETHASONE SODIUM PHOSPHATE 4 MG: 4 INJECTION, SOLUTION INTRA-ARTICULAR; INTRALESIONAL; INTRAMUSCULAR; INTRAVENOUS; SOFT TISSUE at 17:42

## 2019-04-11 RX ADMIN — HYDROMORPHONE HYDROCHLORIDE 0.5 MG: 1 INJECTION, SOLUTION INTRAMUSCULAR; INTRAVENOUS; SUBCUTANEOUS at 02:17

## 2019-04-11 RX ADMIN — SODIUM CHLORIDE, PRESERVATIVE FREE 3 ML: 5 INJECTION INTRAVENOUS at 21:42

## 2019-04-11 RX ADMIN — ASPIRIN 81 MG 81 MG: 81 TABLET ORAL at 08:11

## 2019-04-11 RX ADMIN — MEMANTINE 5 MG: 10 TABLET ORAL at 21:42

## 2019-04-11 RX ADMIN — SODIUM CHLORIDE, PRESERVATIVE FREE 3 ML: 5 INJECTION INTRAVENOUS at 21:53

## 2019-04-11 RX ADMIN — ATORVASTATIN CALCIUM 80 MG: 40 TABLET, FILM COATED ORAL at 21:42

## 2019-04-11 RX ADMIN — NICOTINE 1 PATCH: 21 PATCH, EXTENDED RELEASE TRANSDERMAL at 21:52

## 2019-04-11 RX ADMIN — DEXAMETHASONE SODIUM PHOSPHATE 4 MG: 4 INJECTION, SOLUTION INTRA-ARTICULAR; INTRALESIONAL; INTRAMUSCULAR; INTRAVENOUS; SOFT TISSUE at 08:16

## 2019-04-11 RX ADMIN — HYDROMORPHONE HYDROCHLORIDE 0.5 MG: 1 INJECTION, SOLUTION INTRAMUSCULAR; INTRAVENOUS; SUBCUTANEOUS at 21:51

## 2019-04-11 RX ADMIN — HYDROMORPHONE HYDROCHLORIDE 0.5 MG: 1 INJECTION, SOLUTION INTRAMUSCULAR; INTRAVENOUS; SUBCUTANEOUS at 17:42

## 2019-04-11 RX ADMIN — BUPROPION HYDROCHLORIDE 150 MG: 150 TABLET, EXTENDED RELEASE ORAL at 21:42

## 2019-04-11 RX ADMIN — ALPRAZOLAM 0.5 MG: 0.5 TABLET ORAL at 21:51

## 2019-04-11 RX ADMIN — SERTRALINE HYDROCHLORIDE 200 MG: 100 TABLET ORAL at 08:11

## 2019-04-11 RX ADMIN — BUPROPION HYDROCHLORIDE 150 MG: 150 TABLET, EXTENDED RELEASE ORAL at 08:11

## 2019-04-11 RX ADMIN — HYDROMORPHONE HYDROCHLORIDE 0.5 MG: 1 INJECTION, SOLUTION INTRAMUSCULAR; INTRAVENOUS; SUBCUTANEOUS at 08:11

## 2019-04-11 RX ADMIN — Medication 27 MILLICURIE: at 12:45

## 2019-04-11 NOTE — THERAPY EVALUATION
Acute Care - Occupational Therapy Initial Evaluation  [facility]     Patient Name: Brittani Lambert  : 1960  MRN: [id number]  Today's Date: 2019  Onset of Illness/Injury or Date of Surgery: 04/10/19  Date of Referral to OT: 19  Referring Physician: Juan Carlos Gayle MD    Admit Date: 4/10/2019       ICD-10-CM ICD-9-CM   1. Acute focal neurological deficit, onset within 3-24 hours R29.818 781.99   2. Metastatic cancer to brain (CMS/HCC) C79.31 198.3   3. History of breast cancer Z85.3 V10.3   4. Impaired mobility and ADLs Z74.09 799.89     Patient Active Problem List   Diagnosis   • Mixed hyperlipidemia   • Tobacco abuse   • Moderate episode of recurrent major depressive disorder (CMS/HCC)   • Anxiety   • Functional diarrhea   • Lt Breast CA (CMS/HCC)   • Sepsis (CMS/HCC)   • Rt Cellulitis of chest wall   • Acute cystitis without hematuria   • Atrial fibrillation (CMS/HCC)   • Tobacco dependence   • Invasive ductal carcinoma of left breast (CMS/HCC)   • 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 (CMS/HCC)   • Left-sided weakness   • THALIA (generalized anxiety disorder)     Past Medical History:   Diagnosis Date   • Breast CA (CMS/HCC) 10/4/2018   • Depression    • DJD (degenerative joint disease) of cervical spine    • THALIA (generalized anxiety disorder)    • Heart murmur    • Ovarian cancer (CMS/HCC)    • Tobacco dependence      Past Surgical History:   Procedure Laterality Date   • APPENDECTOMY     • BREAST BIOPSY Right     DONE IN [place]   • COLONOSCOPY  2018   • HYSTERECTOMY     • MASTECTOMY W/ SENTINEL NODE BIOPSY Bilateral 10/4/2018    Procedure: LEFT SKIN SPARING BREAST MASTECTOMY WITH LEFT SENTINEL NODE BIOPSY, RIGHT PROPHYLACTIC SKIN SPARING MASTECTOMY;  Surgeon: Koffi Worthington MD;  Location: [facility];  Service: General   • OOPHORECTOMY            OT ASSESSMENT FLOWSHEET (last 12 hours)      Occupational Therapy Evaluation     Row Name 19 1246                    OT Evaluation Time/Intention    Subjective Information  complains of;weakness  -SD        Document Type  evaluation  -SD        Mode of Treatment  occupational therapy  -SD        Total Evaluation Minutes, Occupational Therapy  19  -SD        Patient Effort  good  -SD        Symptoms Noted During/After Treatment  increased pain;fatigue  -SD        Comment  pt. just completed a radiation tx & waiting to be taken to CT  -SD           General Information    Patient Profile Reviewed?  yes  -SD        Onset of Illness/Injury or Date of Surgery  04/10/19  -SD        Referring Physician  Juan Carlos Gayle MD  -SD        Patient Observations  alert;cooperative;agree to therapy  -SD        Patient/Family Observations  Pt. supine in bed. Pt.'s s.o. & family member present.  -SD        General Observations of Patient  Pt. with O2 sat monitor, port, IV, SCDS, bed check, & sz precaution padding.  -SD        Prior Level of Function  independent:;all household mobility;community mobility;ADL's;home management;driving;work;using stairs  -SD        Equipment Currently Used at Home  none  -SD        Pertinent History of Current Functional Problem  58 y/o WF presents with L sided weakness. Pt. identified L arm weakness at work on 4/9/2019, went to bed that night, and awoke with L LE weakness on 4/10. Pt. with invasive ductal carcinoma of L breast. Pt. underwent B mastectomy & chemotherapy. Pt. given steroids upon admit & L UE weakness has improved, which may be consistent with breast CA vs. CVA.  -SD        Existing Precautions/Restrictions  fall;other (see comments) radiation  -SD        Limitations/Impairments  visual;other (see comments) pt. reports having blurry vision upon admit  -SD        Risks Reviewed  patient and family:;spouse/S.O.:;LOB;nausea/vomiting;dizziness;increased discomfort;change in vital signs;lines disloged  -SD        Benefits Reviewed  spouse/S.O.:;patient and family:;improve function;increase  independence;increase strength;increase balance;decrease pain;decrease risk of DVT;improve skin integrity;increase knowledge  -SD        Barriers to Rehab  medically complex  -SD           Relationship/Environment    Primary Source of Support/Comfort  significant other  -SD        Lives With  significant other  -SD           Resource/Environmental Concerns    Current Living Arrangements  home/apartment/condo  -SD        Resource/Environmental Concerns  none  -SD        Transportation Concerns  car, none  -SD           Home Main Entrance    Number of Stairs, Main Entrance  three  -SD           Cognitive Assessment/Intervention- PT/OT    Orientation Status (Cognition)  oriented x 4  -SD        Follows Commands (Cognition)  WFL  -SD        Safety Deficit (Cognitive)  mild deficit;insight into deficits/self awareness;safety precautions awareness  -SD           Bed Mobility Assessment/Treatment    Bed Mobility Assessment/Treatment  rolling right;scooting/bridging;supine-sit;sit-supine  -SD        Rolling Right [place] (Bed Mobility)  supervision  -SD        Scooting/Bridging [place] (Bed Mobility)  supervision  -SD        Supine-Sit [place] (Bed Mobility)  supervision  -SD        Sit-Supine [place] (Bed Mobility)  supervision  -SD        Bed Mobility, Safety Issues  decreased use of arms for pushing/pulling  -SD        Assistive Device (Bed Mobility)  bed rails;head of bed elevated  -SD           Transfer Assessment/Treatment    Transfer Assessment/Treatment  sit-stand transfer;stand-sit transfer  -SD           Sit-Stand Transfer    Sit-Stand [place] (Transfers)  contact guard  -SD           Stand-Sit Transfer    Stand-Sit [place] (Transfers)  contact guard  -SD           BADL Safety/Performance    Impairments, BADL Safety/Performance  grasp/prehension;coordination;range of motion;strength  -SD        Skilled BADL Treatment/Intervention  BADL process/adaptation training;compensatory  training;fazal/one-hand technique  -SD           General ROM    GENERAL ROM COMMENTS  R UE AROM WFL; L shldr flex/abd: 90 degrees, elbow flex/ext: WFL, wrist flex.ext: 50% FROM, fingers: 50% FROM  -SD           MMT (Manual Muscle Testing)    General MMT Comments  R UE 5/5 grossly; L shldr.: 2/5, L elbow flex/ext: 3+/5, L wrist flex/ext: 2+/5, L fingers: 3+/5  -SD           Motor Assessment/Interventions    Additional Documentation  Balance (Group);Fine Motor Testing & Training (Group)  -SD           Balance    Balance  static sitting balance;static standing balance  -SD           Static Sitting Balance    Level of [place] (Unsupported Sitting, Static Balance)  standby assist  -SD        Sitting Position (Unsupported Sitting, Static Balance)  sitting on edge of bed  -SD        Time Able to Maintain Position (Unsupported Sitting, Static Balance)  4 to 5 minutes  -SD           Static Standing Balance    Level of [place] (Supported Standing, Static Balance)  contact guard assist  -SD        Time Able to Maintain Position (Supported Standing, Static Balance)  1 to 2 minutes  -SD           Sensory Assessment/Intervention    Additional Documentation  Vision Assessment/Intervention (Group)  -SD           Vision Assessment/Intervention    Visual Impairment/Limitations  blurry vision;corrective lenses for reading  -SD           Positioning and Restraints    Pre-Treatment Position  in bed  -SD        Post Treatment Position  bed  -SD        In Bed  notified nsg;fowlers;call light within reach;encouraged to call for assist;with family/caregiver;SCD pump applied  -SD           Pain Assessment    Additional Documentation  Pain Scale: Word Pre/Post-Treatment (Group)  -SD           Pain Scale: Numbers Pre/Post-Treatment    Pain Location - Side  Right  -SD        Pain Location - Orientation  upper  -SD        Pain Location  chest  -SD        Pain Intervention(s)  Repositioned  -SD           Pain Scale: Word  Pre/Post-Treatment    Pain: Word Scale, Pretreatment  0 - no pain  -SD        Pain: Word Scale, Post-Treatment  2 - mild pain  -SD           Coping    Observed Emotional State  accepting;cooperative  -SD        Verbalized Emotional State  acceptance  -SD           Plan of Care Review    Plan of Care Reviewed With  patient;significant other  -SD           Clinical Impression (OT)    Date of Referral to OT  04/11/19  -SD        OT Diagnosis  decreased L UE AROM & strength, decreased L hand FMC, decreased balance, decreased occupational endurance, decreased ADL independence  -SD        Patient/Family Goals Statement (OT Eval)  pt. desires to recover L UE strength & be able to do everything for herself  -SD        Criteria for Skilled Therapeutic Interventions Met (OT Eval)  yes;treatment indicated  -SD        Rehab Potential (OT Eval)  good, to achieve stated therapy goals  -SD        Therapy Frequency (OT Eval)  daily  -SD        Care Plan Review (OT)  evaluation/treatment results reviewed;care plan/treatment goals reviewed;risks/benefits reviewed;current/potential barriers reviewed;patient/other agree to care plan  -SD        Care Plan Review, Other Participant (OT Eval)  significant other  -SD        Anticipated Discharge Disposition (OT)  home with assist;home with home health;home with OP services;other (see comments) monitor pt.'s progress & recommendations  -SD           Vital Signs    Pre Patient Position  Supine  -SD        Intra Patient Position  Standing  -SD        Post Patient Position  Supine  -SD           OT Goals    Bathing Goal Selection (OT)  bathing, OT goal 1  -SD        Dressing Goal Selection (OT)  dressing, OT goal 1  -SD        Grooming Goal Selection (OT)  grooming, OT goal 1  -SD        Self-Feeding Goal Selection (OT)  self feeding, OT goal 1  -SD        Strength Goal Selection (OT)  strength, OT goal 1  -SD        Coordination Goal Selection (OT)  coordination, OT goal 1  -SD         Additional Documentation  Self-Feeding Goal Selection (OT) (Row);Grooming Goal Selection (OT) (Row);Coordination Goal Selection (OT) (Row);Strength Goal Selection (OT) (Row)  -SD           Bathing Goal 1 (OT)    Activity/Assistive Device (Bathing Goal 1, OT)  bathing skills, all  -SD        [place] Level/Cues Needed (Bathing Goal 1, OT)  standby assist;other (see comments) using one-handed technique and/or hand over hand tech  -SD        Time Frame (Bathing Goal 1, OT)  short term goal (STG);by discharge  -SD           Dressing Goal 1 (OT)    Activity/Assistive Device (Dressing Goal 1, OT)  upper body dressing;lower body dressing  -SD        [place]/Cues Needed (Dressing Goal 1, OT)  contact guard assist;other (see comments) using one-handed techniques  -SD        Time Frame (Dressing Goal 1, OT)  short term goal (STG);by discharge  -SD           Grooming Goal 1 (OT)    Activity/Device (Grooming Goal 1, OT)  grooming skills, all  -SD        [place] (Grooming Goal 1, OT)  set-up required;other (see comments) one handed technique  -SD        Time Frame (Grooming Goal 1, OT)  short term goal (STG);by discharge  -SD           Self-Feeding Goal 1 (OT)    Activity/Assistive Device (Self-Feeding Goal 1, OT)  self-feeding skills, all;other (see comments) rocker knife  -SD        [place] Level/Cues Needed (Self-Feeding Goal 1, OT)  set-up required  -SD        Time Frame (Self-Feeding Goal 1, OT)  short term goal (STG);by discharge  -SD           Strength Goal 1 (OT)    Strength Goal 1 (OT)  Pt.'s L UE strength will increase by 1 mm grade grossly.  -SD        Time Frame (Strength Goal 1, OT)  short term goal (STG);by discharge  -SD           Coordination Goal 1 (OT)    Activity/Assistive Device (Coordination Goal 1, OT)  FM written ex program;FM task  -SD        [place] Level/Cues Needed (Coordination Goal 1, OT)  supervision required  -SD        Time Frame (Coordination Goal 1, OT)  short term goal  (STG);by discharge  -SD           Living Environment    Home Accessibility  stairs to enter home  -SD          User Key  (r) = Recorded By, (t) = Taken By, (c) = Cosigned By    Initials Name Effective Dates    SD [place] Maru Francesth, OT 06/08/18 -          Occupational Therapy Education     Title: PT OT SLP Therapies (Done)     Topic: Occupational Therapy (Done)     Point: ADL training (Done)     Description: Instruct learner(s) on proper safety adaptation and remediation techniques during self care or transfers.   Instruct in proper use of assistive devices.    Learning Progress Summary           Patient Acceptance, E, VU,NR by SD at 4/11/2019  1:37 PM    Comment:  Pt. & s.o. educated on role of OT. Pt. & s.o. are agreeable with OT POC.   Significant Other Acceptance, E, VU,NR by SD at 4/11/2019  1:37 PM    Comment:  Pt. & s.o. educated on role of OT. Pt. & s.o. are agreeable with OT POC.                   Point: Home exercise program (Done)     Description: Instruct learner(s) on appropriate technique for monitoring, assisting and/or progressing therapeutic exercises/activities.    Learning Progress Summary           Patient Acceptance, E, VU,NR by SD at 4/11/2019  1:37 PM    Comment:  Pt. & s.o. educated on role of OT. Pt. & s.o. are agreeable with OT POC.   Significant Other Acceptance, E, VU,NR by SD at 4/11/2019  1:37 PM    Comment:  Pt. & s.o. educated on role of OT. Pt. & s.o. are agreeable with OT POC.                   Point: Precautions (Done)     Description: Instruct learner(s) on prescribed precautions during self-care and functional transfers.    Learning Progress Summary           Patient Acceptance, E, VU,NR by SD at 4/11/2019  1:37 PM    Comment:  Pt. & s.o. educated on role of OT. Pt. & s.o. are agreeable with OT POC.   Significant Other Acceptance, E, VU,NR by SD at 4/11/2019  1:37 PM    Comment:  Pt. & s.o. educated on role of OT. Pt. & s.o. are agreeable with OT POC.                                User Key     Initials Effective Dates Name Provider Type Discipline    SD 06/08/18 -  Maru Barahona OT Occupational Therapist OT                  OT Recommendation and Plan  Outcome Summary/Treatment Plan (OT)  Anticipated Discharge Disposition (OT): home with assist, home with home health, home with OP services, other (see comments)(monitor pt.'s progress & recommendations)  Therapy Frequency (OT Eval): daily  Plan of Care Review  Plan of Care Reviewed With: patient, significant other  Plan of Care Reviewed With: patient, significant other  Outcome Summary: OT IE completed. Pt. supine to sit on EOB with SBA. Pt. participated in B UE AROM/MMT & balance testing in sitting/standing. Pt. with 90 degrees AROM in L shldr. Pt. with 2+/5 strength in L UE grossly. Pt. sit to stand with CGA. Pt. stand to sit with CGA, then sit to supine with SBA. Pt. is appropriate for OT skilled services. Pt. & s.o. are agreeable with OT POC.    Outcome Measures     Row Name 04/11/19 1246             How much help from another is currently needed...    Putting on and taking off regular lower body clothing?  2  -SD      Bathing (including washing, rinsing, and drying)  2  -SD      Toileting (which includes using toilet bed pan or urinal)  3  -SD      Putting on and taking off regular upper body clothing  3  -SD      Taking care of personal grooming (such as brushing teeth)  3  -SD      Eating meals  3  -SD      Score  16  -SD         Functional Assessment    Outcome Measure Options  AM-PAC 6 Clicks Daily Activity (OT)  -SD        User Key  (r) = Recorded By, (t) = Taken By, (c) = Cosigned By    Initials Name Provider Type    Maru Arroyo OT Occupational Therapist          Time Calculation:   Time Calculation- OT     Row Name 04/11/19 1246             Time Calculation- OT    OT Start Time  1246  -SD      OT Stop Time  1339  -SD      OT Time Calculation (min)  53 min  -SD      OT Received On  04/11/19  -SD      OT  Goal Re-Cert Due Date  04/21/19  -SD        User Key  (r) = Recorded By, (t) = Taken By, (c) = Cosigned By    Initials Name Provider Type    Maru Arroyo OT Occupational Therapist        Therapy Charges for Today     Code Description Service Date Service Provider Modifiers Qty    [id number] HC OT EVAL HIGH COMPLEXITY 4 4/11/2019 Maru Barahona OT GO 1               Maru Barahona OT  4/11/2019

## 2019-04-11 NOTE — PAYOR COMM NOTE
"Ref # [id number]  Nanette Ram RN, BSN  Phone # [phone]  Fax # [phone]  Brittani Rivera (59 y.o. Female)     Date of Birth Social Security Number Address Home Phone MRN    1960  [address]  [place] [address] [phone] [id number]    Jainism Marital Status          None        Admission Date Admission Type Admitting Provider Attending Provider Department, Room/Bed    4/10/19 Emergency Alyssa Harris MD Reddy, Mayuri V, MD [facility] 6B, N644/1    Discharge Date Discharge Disposition Discharge Destination                       Attending Provider:  Alyssa Harris MD    Allergies:  No Known Allergies    Isolation:  None   Infection:  None   Code Status:  CPR    Ht:  160 cm (63\")   Wt:  49.9 kg (110 lb)    Admission Cmt:  None   Principal Problem:  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 (CMS/HCC) [C50.912,C79.31]                 Active Insurance as of 4/10/2019     Primary Coverage     Payor Plan Insurance Group Employer/Plan Group    [employer] PPO [id number]     Payor Plan Address Payor Plan Phone Number Payor Plan Fax Number Effective Dates    [address] [phone]  3/5/2018 - None Entered    [place] [address]       Subscriber Name Subscriber Birth Date Member ID       BRITTANI RVIERA 1960 [id number]                 Emergency Contacts      (Rel.) Home Phone Work Phone Mobile Phone    Turner Jimenez (Friend) [phone] -- [phone]               History & Physical      Juan Carlos Gayle MD at 4/10/2019  7:59 PM              [facility] Medicine Services  HISTORY AND PHYSICAL    Patient Name: Brittani Rivera  : 1960  MRN: [id number]  Primary Care Physician: Alla Colon DO  Date of Admission: 4/10/2019    Subjective     Chief Complaint:  Left sided weakness    History of Present Illness: Brittani Rivera is a 59 y.o. female with invasive ductal carcinoma " "of the left breast s/p bilateral mastectomy and chemotherapy, ongoing tobacco dependence and HLD who identified LEFT arm weakness at work yesterday.  She describes difficulty with  and coordination of her LUE.  She went to bed and this morning she woke up with LEFT sided lower extremity weakness.  She denies falls or syncope.  Her weakness is constant.  Nothing seems to improve it.  She describes greater than one week of a headache and associated tender and growing knot to her left forehead.  She denies confusion, hallucinations or thunderclap headache.  She presented to [facility] ED with her mother and sister.  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 Another consideration is for subacute infarct.\"    Review of Systems   Constitutional: Positive for activity change. Negative for chills and fever.   HENT: Negative for ear pain and trouble swallowing.    Eyes: Negative.  Negative for visual disturbance.   Respiratory: Negative.  Negative for shortness of breath.    Cardiovascular: Negative.  Negative for chest pain and palpitations.   Gastrointestinal: Positive for diarrhea and nausea. Negative for abdominal pain and vomiting.   Endocrine: Negative.    Genitourinary: Negative.  Negative for difficulty urinating.   Musculoskeletal: Positive for neck pain.   Skin: Negative.  Negative for rash.   Allergic/Immunologic: Positive for immunocompromised state.   Neurological: Positive for weakness and headaches. Negative for dizziness, seizures, syncope and speech difficulty.   Hematological: Negative.  Does not bruise/bleed easily.   Psychiatric/Behavioral: Negative for confusion. The patient is nervous/anxious.    All other systems reviewed and are negative.     Past Medical History:   Diagnosis Date   • Breast CA (CMS/[facility]) 10/4/2018   • Depression " "   • DJD (degenerative joint disease) of cervical spine    • THALIA (generalized anxiety disorder)    • Heart murmur    • Ovarian cancer (CMS/HCC) 1989   • Tobacco dependence        Past Surgical History:   Procedure Laterality Date   • APPENDECTOMY     • BREAST BIOPSY Right 2003    DONE IN [place]   • COLONOSCOPY  06/2018   • HYSTERECTOMY  1989   • MASTECTOMY W/ SENTINEL NODE BIOPSY Bilateral 10/4/2018    Procedure: LEFT SKIN SPARING BREAST MASTECTOMY WITH LEFT SENTINEL NODE BIOPSY, RIGHT PROPHYLACTIC SKIN SPARING MASTECTOMY;  Surgeon: Koffi Worthington MD;  Location: [facility];  Service: General   • OOPHORECTOMY  1989       Family History   Problem Relation Age of Onset   • Arthritis Mother    • Heart attack Mother    • Osteoporosis Mother    • Liver disease Father    • Celiac disease Other        Social History     Socioeconomic History   • Marital status:      Spouse name: N/A   • Number of children: 1   • Years of education: H.S.   • Highest education level: High school graduate   Occupational History   • Occupation: Associate     Employer: [employer]   Tobacco Use   • Smoking status: Current Every Day Smoker     Packs/day: 0.50     Years: 20.00     Pack years: 10.00     Types: Cigarettes   • Smokeless tobacco: Never Used   Substance and Sexual Activity   • Alcohol use: Yes     Frequency: Monthly or less   • Drug use: No   • Sexual activity: Not Currently     Partners: Male       Medications:  Available home medication information reviewed and reconciled.      Allergies:  No Known Allergies    Objective     Vital Signs: /75   Pulse 89   Temp 98.1 °F (36.7 °C) (Oral)   Resp 20   Ht 160 cm (63\")   Wt 50.8 kg (112 lb)   SpO2 98%   BMI 19.84 kg/m²    Body mass index is 19.84 kg/m².    Physical Exam   Constitutional: She is oriented to person, place, and time. She appears well-developed. She is cooperative. She has a sickly appearance.   HENT:   Head: Normocephalic and " atraumatic.       Right Ear: Hearing and external ear normal.   Left Ear: Hearing and external ear normal.   Nose: Nose normal.   Mouth/Throat: Uvula is midline and oropharynx is clear and moist. Mucous membranes are dry.   Tender calvaria growth to left forehead.  Skin is intact.   Eyes: Conjunctivae and EOM are normal. Pupils are equal, round, and reactive to light. No scleral icterus.   Neck: Trachea normal and normal range of motion. Neck supple. No JVD present. Muscular tenderness present. No spinous process tenderness present. Carotid bruit is not present. No thyromegaly present.   Cardiovascular: Normal rate, regular rhythm, normal heart sounds and intact distal pulses.   Pulmonary/Chest: Effort normal and breath sounds normal.   Abdominal: Soft. Bowel sounds are normal. There is no hepatosplenomegaly. There is tenderness in the epigastric area.   Musculoskeletal: Normal range of motion.   Lymphadenopathy:     She has no cervical adenopathy.   Neurological: She is alert and oriented to person, place, and time. No sensory deficit. She exhibits abnormal muscle tone. Coordination abnormal.   3/5 strength on left   Skin: Skin is warm and dry.   Psychiatric: She has a normal mood and affect. Her speech is normal and behavior is normal. Judgment and thought content normal. Cognition and memory are normal.   Nursing note and vitals reviewed.    Results Reviewed:   I have personally reviewed and interpreted available lab data dated 04/10/19 as below.  Lab Results (most recent)     Procedure Component Value Units Date/Time    Urinalysis With Microscopic If Indicated (No Culture) - Urine, Clean Catch [[id number]]  (Normal) Collected:  04/10/19 1455    Specimen:  Urine, Clean Catch Updated:  04/10/19 1613     Color, UA Yellow     Appearance, UA Clear     pH, UA 5.5     Specific Gravity, UA 1.010     Glucose, UA Negative     Ketones, UA Negative     Bilirubin, UA Negative     Blood, UA Negative     Protein, UA Negative      Leuk Esterase, UA Negative     Nitrite, UA Negative     Urobilinogen, UA 0.2 E.U./dL    Narrative:       Urine microscopic not indicated.    Comprehensive Metabolic Panel [[id number]]  (Abnormal) Collected:  04/10/19 1330    Specimen:  Blood Updated:  04/10/19 1538     Glucose 92 mg/dL      BUN 10 mg/dL      Creatinine 0.53 mg/dL      Sodium 138 mmol/L      Potassium 3.9 mmol/L      Chloride 102 mmol/L      CO2 24.0 mmol/L      Calcium 9.3 mg/dL      Total Protein 7.0 g/dL      Albumin 4.20 g/dL      ALT (SGPT) 13 U/L      AST (SGOT) 17 U/L      Alkaline Phosphatase 65 U/L      Total Bilirubin 0.3 mg/dL      eGFR Non African Amer 118 mL/min/1.73      Globulin 2.8 gm/dL      A/G Ratio 1.5 g/dL      BUN/Creatinine Ratio 18.9     Anion Gap 12.0 mmol/L     Magnesium [[id number]]  (Normal) Collected:  04/10/19 1330    Specimen:  Blood Updated:  04/10/19 1538     Magnesium 2.0 mg/dL     POC Troponin, Rapid [[id number]]  (Normal) Collected:  04/10/19 1337    Specimen:  Blood Updated:  04/10/19 1349     Troponin I 0.00 ng/mL      Comment: Serial Number: [id number]:  [id number]       CBC & Differential [[id number]] Collected:  04/10/19 1330    Specimen:  Blood Updated:  04/10/19 1348    Narrative:       The following orders were created for panel order CBC & Differential.  Procedure                               Abnormality         Status                     ---------                               -----------         ------                     CBC Auto Differential[[id number]]        Abnormal            Final result                 Please view results for these tests on the individual orders.    CBC Auto Differential [[id number]]  (Abnormal) Collected:  04/10/19 1330    Specimen:  Blood Updated:  04/10/19 1348     WBC 5.90 10*3/mm3      RBC 3.52 10*6/mm3      Hemoglobin 11.4 g/dL      Hematocrit 34.5 %      MCV 98.0 fL      MCH 32.4 pg      MCHC 33.0 g/dL      RDW 14.4 %      RDW-SD 51.2 fl      MPV 10.3 fL      Platelets 248  10*3/mm3      Neutrophil % 74.2 %      Lymphocyte % 19.3 %      Monocyte % 4.6 %      Eosinophil % 1.4 %      Basophil % 0.5 %      Immature Grans % 0.2 %      Neutrophils, Absolute 4.38 10*3/mm3      Lymphocytes, Absolute 1.14 10*3/mm3      Monocytes, Absolute 0.27 10*3/mm3      Eosinophils, Absolute 0.08 10*3/mm3      Basophils, Absolute 0.03 10*3/mm3      Immature Grans, Absolute 0.01 10*3/mm3         I have personally reviewed imaging reports available and dated 04/10/19 as below.  MRI Cervical Spine With & Without Contrast  Narrative: EXAMINATION: MRI BRAIN W/WO CONTRAST, MRI CERVICAL SPINE W/WO CONTRAST -  04/10/2019      INDICATION: Left upper extremity weakness.     TECHNIQUE:  Multiplanar multisequence MRI of the brain and cervical  spine was performed without and with contrast.     COMPARISONS:  None.     FINDINGS:      BRAIN: There is curvilinear leptomeningeal enhancement along the central  sulcus with surrounding T2 prolongation in the pre- and postcentral gyri  and associated diffusion restriction. There is no other clear  parenchymal abnormality. Marrow signal of the calvarium is within normal  limits. No cortical destructive lesion. Incidental benign epidermal  inclusion cyst noted in the anterior frontal scalp towards the left of  midline. [place] flow-voids are preserved.     CERVICAL SPINE:  Sagittal images cover from from the sella through the  T4 level caudally.     The cervicomedullary junction is unremarkable.  The cervical cord is  normal in caliber and signal. There is no abnormal enhancement in the  spinal canal or in the paraspinal soft tissues. Several benign  perineural cysts are seen in the cervical and incidentally imaged  thoracic spine.     With regard to the marrow space, vertebral body heights are maintained.   There is maintenance of the usual lordosis without significant  spondylolisthesis.  Background marrow signal is normal.     Degenerative changes are as follows:      C2-3: No significant foraminal or spinal canal stenosis.     C3-4: Left greater than right facet arthropathy without significant  foraminal or spinal canal stenosis.     C4-5: Right greater than left uncovertebral arthropathy and left greater  than right facet disease. There is moderate right foraminal stenosis but  no significant spinal canal stenosis.     C5-6: Right greater than left uncovertebral arthropathy and left greater  than right facet disease. Moderate right foraminal stenosis. No  significant spinal canal stenosis.     C6-7: Left greater than right uncovertebral arthropathy. No significant  foraminal or spinal canal stenosis.     C7/T1: No significant foraminal or spinal canal stenosis.     Incidental imaging of the cervical soft tissues is unremarkable.     Impression: 1. 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 Another  consideration is for subacute infarct. Follow-up is required.  2. Degenerative changes of the cervical spine without evidence of  metastatic disease.     DICTATED:   04/10/2019  EDITED/ls :   04/10/2019      This report was finalized on 4/10/2019 6:37 PM by Adalberto Vallejo.     MRI Brain With & Without Contrast  Narrative: EXAMINATION: MRI BRAIN W/WO CONTRAST, MRI CERVICAL SPINE W/WO CONTRAST -  04/10/2019      INDICATION: Left upper extremity weakness.     TECHNIQUE:  Multiplanar multisequence MRI of the brain and cervical  spine was performed without and with contrast.     COMPARISONS:  None.     FINDINGS:      BRAIN: There is curvilinear leptomeningeal enhancement along the central  sulcus with surrounding T2 prolongation in the pre- and postcentral gyri  and associated diffusion restriction. There is no other clear  parenchymal abnormality. Marrow signal of the calvarium is within normal  limits. No cortical destructive lesion. Incidental  benign epidermal  inclusion cyst noted in the anterior frontal scalp towards the left of  midline. [place] of [place] flow-voids are preserved.     CERVICAL SPINE:  Sagittal images cover from from the sella through the  T4 level caudally.     The cervicomedullary junction is unremarkable.  The cervical cord is  normal in caliber and signal. There is no abnormal enhancement in the  spinal canal or in the paraspinal soft tissues. Several benign  perineural cysts are seen in the cervical and incidentally imaged  thoracic spine.     With regard to the marrow space, vertebral body heights are maintained.   There is maintenance of the usual lordosis without significant  spondylolisthesis.  Background marrow signal is normal.     Degenerative changes are as follows:     C2-3: No significant foraminal or spinal canal stenosis.     C3-4: Left greater than right facet arthropathy without significant  foraminal or spinal canal stenosis.     C4-5: Right greater than left uncovertebral arthropathy and left greater  than right facet disease. There is moderate right foraminal stenosis but  no significant spinal canal stenosis.     C5-6: Right greater than left uncovertebral arthropathy and left greater  than right facet disease. Moderate right foraminal stenosis. No  significant spinal canal stenosis.     C6-7: Left greater than right uncovertebral arthropathy. No significant  foraminal or spinal canal stenosis.     C7/T1: No significant foraminal or spinal canal stenosis.     Incidental imaging of the cervical soft tissues is unremarkable.     Impression: 1. 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 Another  consideration is for subacute infarct. Follow-up is required.  2. Degenerative changes of the cervical spine without evidence of  metastatic disease.     DICTATED:    04/10/2019  EDITED/ls :   04/10/2019      This report was finalized on 4/10/2019 6:37 PM by Adalberto Vallejo.     XR Chest 1 View  Narrative: EXAMINATION: XR CHEST 1 VW- 04/10/2019      INDICATION: Weak/Dizzy/AMS triage protocol      COMPARISON: 11/09/2018     FINDINGS: Note is again made of patient's right-sided Port-A-Cath in  stable position in the proximal SVC. Lungs are well-expanded and appear  clear. Heart and vasculature appear normal in size.         Impression: No evidence of active chest disease.     D:  04/10/2019  E:  04/10/2019          I have personally reviewed ECG report dated 04/10/19.       I have personally discussed the case with the ED provider and we discussed communication with oncology.  I have personally reviewed and summarized old records including recent oncology office note.    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)    Invasive ductal carcinoma of left breast (CMS/HCC)    Left-sided weakness    Functional diarrhea    Tobacco dependence    THALIA (generalized anxiety disorder)    Plan:    Admit to telemetry    Consult Oncology    Consult Neurology    Decadron IV    Anti-emetic therapy    Parentally administered controlled substance for comfort care    Routine neuro checks    Fall precautions    Seizure precautions    Continuous pulse oximetry    AM labs to include electrolytes    Physical therapy consult    Occupational therapy consult    Benzodiazepine therapy    Nicotine replacement    Tobacco cessation education    With an abrupt change in neurological status, progression of her cancer on imaging and parentally administered controlled substance for comfort care, we will admit as inpatient.  I believe this patient meets INPATIENT status due to the need for care which can only be reasonably provided in an hospital setting such as aggressive/expedited ancillary services and/or consultation services and the necessity for IV  medications, close physician monitoring and/or the possible need for procedures.  In such, I feel patient’s risk for adverse outcomes and need for care warrant INPATIENT evaluation and predict the patient’s care encounter to likely last beyond 2 midnights.      DVT prophylaxis: Mechanical  Code Status: CPR  Admission Status: Patient will be admitted as LEXINPT.     Electronically signed by Juan Carlos Gayle MD, 04/10/19, 8:30 PM            Electronically signed by Juan Carlos Gayle MD at 4/10/2019  8:30 PM          Emergency Department Notes      Shaunna Eisenberg, LONG at 4/10/2019  1:44 PM          Subjective   Patient presents to the emergency department with acute motor dysfunction in her left arm onset yesterday afternoon and much worse this morning.  Further history reveals that the patient was working in the evening shift yesterday when she discovered poor coordination in her left arm.  This, together with some unexplained diarrhea, caused her to leave her shift early at 8 PM.  Patient states she slept unremarkably.   When she awoke, she discovered immediately her inability to move her arm effectively.  Meanwhile, she has had no further diarrhea and denies abdominal pain or fever or vomiting.  She ate a routine meal this morning.    This patient was diagnosed with breast cancer in the fall of last year with subsequent surgery in October.  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        History provided by:  Patient   used: No    Upper Extremity Issue   Upper extremity pain location: left arm weakness.  Pain details:     Severity:  No pain  Dislocation: no    Worsened by:  Nothing  Ineffective treatments:  None tried  Associated symptoms: muscle weakness    Associated symptoms: no back pain, no  "decreased range of motion, no fever, no neck pain, no swelling and no tingling        Review of Systems   Constitutional: Negative for appetite change and fever.   Gastrointestinal: Positive for diarrhea. Negative for abdominal pain, blood in stool and vomiting.   Genitourinary: Negative.    Musculoskeletal: Negative for back pain and neck pain.   Skin: Negative for pallor and rash.        Patient wears hats \"all the time\" due to her chemo related alopecia.  She has developed an early abscess it appears on her forehead   Allergic/Immunologic: Negative.    Neurological: Positive for weakness and headaches. Negative for dizziness, facial asymmetry and numbness.        Difficulty using left hand   Hematological: Negative.    Psychiatric/Behavioral: Negative.    All other systems reviewed and are negative.      Past Medical History:   Diagnosis Date   • Anxiety    • Arthritis    • Breast CA (CMS/HCC) 10/4/2018   • Cancer (CMS/HCC)    • Depression    • Heart murmur    • Ovarian cancer (CMS/HCC) 1989       No Known Allergies    Past Surgical History:   Procedure Laterality Date   • APPENDECTOMY     • BREAST BIOPSY Right 2003    DONE IN [place]   • COLONOSCOPY  06/2018   • HYSTERECTOMY      AGE 29   • MASTECTOMY W/ SENTINEL NODE BIOPSY Bilateral 10/4/2018    Procedure: LEFT SKIN SPARING BREAST MASTECTOMY WITH LEFT SENTINEL NODE BIOPSY, RIGHT PROPHYLACTIC SKIN SPARING MASTECTOMY;  Surgeon: Koffi Worthington MD;  Location: [facility];  Service: General   • OOPHORECTOMY      AGE 29       Family History   Problem Relation Age of Onset   • Arthritis Mother    • Heart attack Mother    • Osteoporosis Mother    • Liver disease Father    • Cancer Maternal Grandmother    • Celiac disease Other    • Breast cancer Neg Hx    • Ovarian cancer Neg Hx        Social History     Socioeconomic History   • Marital status:      Spouse name: Not on file   • Number of children: Not on file   • Years of education: Not on file   • " Highest education level: Not on file   Tobacco Use   • Smoking status: Current Every Day Smoker     Years: 20.00     Types: Cigarettes   • Smokeless tobacco: Never Used   • Tobacco comment: 2 cigs daily; former 1/2 PPD   Substance and Sexual Activity   • Alcohol use: Yes     Comment: occ   • Drug use: No   • Sexual activity: Defer         Objective   Physical Exam   Constitutional: She is oriented to person, place, and time. She appears well-developed and well-nourished. No distress.   Thin. Chemo induced alopecia.   HENT:   Head: Normocephalic.   Nose: Nose normal.   Mouth/Throat: Mucous membranes are normal.   One centimeter immature forming abscess on mid forehead that is non-fluctuant, tender, and slightly erythematous.    Eyes: Conjunctivae are normal. No scleral icterus.   Conjunctivae are pink.    Neck: Normal range of motion. Neck supple.   Cardiovascular: Normal rate, regular rhythm, normal heart sounds and intact distal pulses.   No murmur heard.  No tachycardia.    Pulmonary/Chest: Effort normal and breath sounds normal. No respiratory distress.   Abdominal: Soft. Bowel sounds are normal. There is no tenderness.   Musculoskeletal: Normal range of motion. She exhibits no edema or deformity.   Neurological: She is alert and oriented to person, place, and time. She displays abnormal reflex. She exhibits abnormal muscle tone. Coordination abnormal.   Loss of control of flexion and extension of left lower arm. Weakness to left upper arm. Fine motor skills of the fingers are preserved.    Skin: Skin is warm and dry. Capillary refill takes less than 2 seconds. Cap refill is brisk. . No rash noted. No erythema. No pallor.   Psychiatric: She has a normal mood and affect. Her behavior is normal.   Nursing note and vitals reviewed.      Procedures        ED Course  ED Course as of Apr 10 1924   Wed Apr 10, 2019   1753 Hernandez Matos MD.  I saw and evaluated this patient in the emergency department.  I help direct  testing and treatment.  [MS]   1810 I have spoken to Dr. Matos as well as the patient's oncologist, Dr. Carrie Patel who concurs with administration of steroid with hospital admission.   [ML]   1856 Hernandez Matos MD  I broke the news to the patient about the likely cerebral metastasis of her breast cancer.  She understands that this is not an absolute diagnosis at this point but is most likely.  [MS]   1909 Discussed with Dr. Gayle, who agrees with hospitalization.   [TB]      ED Course User Index  [ML] Shaunna Eisenberg APRN  [MS] Hernandez Matos MD  [TB] Madi Arce     Recent Results (from the past 24 hour(s))   Comprehensive Metabolic Panel    Collection Time: 04/10/19  1:30 PM   Result Value Ref Range    Glucose 92 65 - 99 mg/dL    BUN 10 6 - 20 mg/dL    Creatinine 0.53 (L) 0.57 - 1.00 mg/dL    Sodium 138 136 - 145 mmol/L    Potassium 3.9 3.5 - 5.2 mmol/L    Chloride 102 98 - 107 mmol/L    CO2 24.0 22.0 - 29.0 mmol/L    Calcium 9.3 8.6 - 10.5 mg/dL    Total Protein 7.0 6.0 - 8.5 g/dL    Albumin 4.20 3.50 - 5.20 g/dL    ALT (SGPT) 13 1 - 33 U/L    AST (SGOT) 17 1 - 32 U/L    Alkaline Phosphatase 65 39 - 117 U/L    Total Bilirubin 0.3 0.2 - 1.2 mg/dL    eGFR Non African Amer 118 >60 mL/min/1.73    Globulin 2.8 gm/dL    A/G Ratio 1.5 g/dL    BUN/Creatinine Ratio 18.9 7.0 - 25.0    Anion Gap 12.0 mmol/L   Magnesium    Collection Time: 04/10/19  1:30 PM   Result Value Ref Range    Magnesium 2.0 1.6 - 2.6 mg/dL   Light Blue Top    Collection Time: 04/10/19  1:30 PM   Result Value Ref Range    Extra Tube hold for add-on    Green Top (Gel)    Collection Time: 04/10/19  1:30 PM   Result Value Ref Range    Extra Tube Hold for add-ons.    Lavender Top    Collection Time: 04/10/19  1:30 PM   Result Value Ref Range    Extra Tube hold for add-on    Gold Top - SST    Collection Time: 04/10/19  1:30 PM   Result Value Ref Range    Extra Tube Hold for add-ons.    CBC Auto Differential    Collection Time: 04/10/19  1:30  PM   Result Value Ref Range    WBC 5.90 3.40 - 10.80 10*3/mm3    RBC 3.52 (L) 3.77 - 5.28 10*6/mm3    Hemoglobin 11.4 (L) 12.0 - 15.9 g/dL    Hematocrit 34.5 34.0 - 46.6 %    MCV 98.0 (H) 79.0 - 97.0 fL    MCH 32.4 26.6 - 33.0 pg    MCHC 33.0 31.5 - 35.7 g/dL    RDW 14.4 12.3 - 15.4 %    RDW-SD 51.2 37.0 - 54.0 fl    MPV 10.3 6.0 - 12.0 fL    Platelets 248 140 - 450 10*3/mm3    Neutrophil % 74.2 42.7 - 76.0 %    Lymphocyte % 19.3 (L) 19.6 - 45.3 %    Monocyte % 4.6 (L) 5.0 - 12.0 %    Eosinophil % 1.4 0.3 - 6.2 %    Basophil % 0.5 0.0 - 1.5 %    Immature Grans % 0.2 0.0 - 0.5 %    Neutrophils, Absolute 4.38 1.40 - 7.00 10*3/mm3    Lymphocytes, Absolute 1.14 0.70 - 3.10 10*3/mm3    Monocytes, Absolute 0.27 0.10 - 0.90 10*3/mm3    Eosinophils, Absolute 0.08 0.00 - 0.40 10*3/mm3    Basophils, Absolute 0.03 0.00 - 0.20 10*3/mm3    Immature Grans, Absolute 0.01 0.00 - 0.05 10*3/mm3   POC Troponin, Rapid    Collection Time: 04/10/19  1:37 PM   Result Value Ref Range    Troponin I 0.00 0.00 - 0.07 ng/mL   Urinalysis With Microscopic If Indicated (No Culture) - Urine, Clean Catch    Collection Time: 04/10/19  2:55 PM   Result Value Ref Range    Color, UA Yellow Yellow, Straw    Appearance, UA Clear Clear    pH, UA 5.5 5.0 - 8.0    Specific Gravity, UA 1.010 1.001 - 1.030    Glucose, UA Negative Negative    Ketones, UA Negative Negative    Bilirubin, UA Negative Negative    Blood, UA Negative Negative    Protein, UA Negative Negative    Leuk Esterase, UA Negative Negative    Nitrite, UA Negative Negative    Urobilinogen, UA 0.2 E.U./dL 0.2 - 1.0 E.U./dL     Note: In addition to lab results from this visit, the labs listed above may include labs taken at another facility or during a different encounter within the last 24 hours. Please correlate lab times with ED admission and discharge times for further clarification of the services performed during this visit.    MRI Brain With & Without Contrast   Final Result   1. 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 Another   consideration is for subacute infarct. Follow-up is required.   2. Degenerative changes of the cervical spine without evidence of   metastatic disease.       DICTATED:   04/10/2019   EDITED/ls :   04/10/2019        This report was finalized on 4/10/2019 6:37 PM by Adalberto Vallejo.          MRI Cervical Spine With & Without Contrast   Final Result   1. 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 Another   consideration is for subacute infarct. Follow-up is required.   2. Degenerative changes of the cervical spine without evidence of   metastatic disease.       DICTATED:   04/10/2019   EDITED/ls :   04/10/2019        This report was finalized on 4/10/2019 6:37 PM by Adalberto Vallejo.          XR Chest 1 View   Preliminary Result   No evidence of active chest disease.       D:  04/10/2019   E:  04/10/2019                Vitals:    04/10/19 1740 04/10/19 1800 04/10/19 1820 04/10/19 1900   BP: 134/91 137/94 143/87 148/92   BP Location:       Patient Position:       Pulse:       Resp:       Temp:       TempSrc:       SpO2: 98% 100% 98%    Weight:       Height:         Medications   sodium chloride 0.9 % flush 10 mL (not administered)   LORazepam (ATIVAN) injection 1 mg (1 mg Intravenous Given 4/10/19 1533)   gadobenate dimeglumine (MULTIHANCE) injection 10 mL (10 mL Intravenous Given 4/10/19 1708)   Dexamethasone Sod Phos-NaCl 10-0.9 MG/50ML-% IVPB solution 10 mg (10 mg Intravenous Given 4/10/19 1853)   LORazepam (ATIVAN) injection 0.5 mg (0.5 mg Intravenous Given 4/10/19 1851)     ECG/EMG Results (last 24 hours)     Procedure Component Value Units Date/Time    ECG 12 Lead [[id number]] Collected:   04/10/19 1329     Updated:  04/10/19 1407        ECG 12 Lead                             MDM    Final diagnoses:   Acute focal neurological deficit, onset within 3-24 hours   Metastatic cancer to brain (CMS/HCC)   History of breast cancer       Documentation assistance provided by [facility].  Information recorded by the [facility]inity was done at my direction and has been verified and validated by me.     Madi Arce  04/10/19 1449       Shaunna Eisenberg APRN  04/10/19 1918       Shaunna Eisenberg APRN  04/10/19 1924      Electronically signed by Shaunna Eisenberg APRN at 4/10/2019  7:24 PM          Physician Progress Notes (last 72 hours) (Notes from 2019  3:35 PM through 2019  3:35 PM)      Alyssa Harris MD at 2019 11:58 AM              [facility] Medicine Services  PROGRESS NOTE    Patient Name: Brittani Lambert  : 1960  MRN: [id number]    Date of Admission: 4/10/2019  Length of Stay: 1  Primary Care Physician: Alla Colon DO    Subjective   Subjective     CC:  F/U left sided weakness, headache    HPI:  Patient seen this morning. No major complaints. Headache improved. Very concerned about metastatic cancer and what options she will have.    Review of Systems  Gen-no fevers, no chills  CV-no chest pain, no palpitations  Resp-no cough, no dyspnea  GI-no N/V/D, no abd pain    Otherwise ROS is negative except as mentioned in the HPI.    Objective   Objective     Vital Signs:   Temp:  [97.5 °F (36.4 °C)-98.1 °F (36.7 °C)] 98 °F (36.7 °C)  Heart Rate:  [73-98] 86  Resp:  [14-20] 14  BP: ()/() 146/84  Total (NIH Stroke Scale): 2     Physical Exam:  Gen-no acute distress  HENT-NCAT, mucous membranes moist  CV-RRR, S1 S2 normal, no m/r/g  Resp-CTAB, no wheezes or rales  Abd-soft, NT, ND, +BS  Ext-no edema  Neuro-A&Ox3, diminished strength LUE/LLE  Skin-no rashes; tender nodule on left forehead  Psych-appropriate mood      Results  Reviewed:  I have personally reviewed current lab, radiology, and data and agree.    Results from last 7 days   Lab Units 04/10/19  1330 04/05/19  1033   WBC 10*3/mm3 5.90 4.40   HEMOGLOBIN g/dL 11.4* 10.1*   HEMATOCRIT % 34.5 29.7*   PLATELETS 10*3/mm3 248 221     Results from last 7 days   Lab Units 04/10/19  1337 04/10/19  1330 04/05/19  1033   SODIUM mmol/L  --  138 137   POTASSIUM mmol/L  --  3.9 4.1   CHLORIDE mmol/L  --  102 107   CO2 mmol/L  --  24.0 23.0   BUN mg/dL  --  10 14   CREATININE mg/dL  --  0.53* 0.64   GLUCOSE mg/dL  --  92 84   CALCIUM mg/dL  --  9.3 8.9   ALT (SGPT) U/L  --  13 13   AST (SGOT) U/L  --  17 18   TROPONIN I ng/mL 0.00  --   --      Estimated Creatinine Clearance: 90 mL/min (A) (by C-G formula based on SCr of 0.53 mg/dL (L)).    No results found for: BNP    Microbiology Results Abnormal     None          Imaging Results (last 24 hours)     Procedure Component Value Units Date/Time    XR Chest 1 View [[id number]] Collected:  04/10/19 1439     Updated:  04/10/19 2121    Narrative:       EXAMINATION: XR CHEST 1 VW- 04/10/2019      INDICATION: Weak/Dizzy/AMS triage protocol      COMPARISON: 11/09/2018     FINDINGS: Note is again made of patient's right-sided Port-A-Cath in  stable position in the proximal SVC. Lungs are well-expanded and appear  clear. Heart and vasculature appear normal in size.           Impression:       No evidence of active chest disease.     D:  04/10/2019  E:  04/10/2019     This report was finalized on 4/10/2019 9:18 PM by DR. Martinez Sheikh MD.       MRI Brain With & Without Contrast [[id number]] Collected:  04/10/19 1743     Updated:  04/10/19 1840    Narrative:       EXAMINATION: MRI BRAIN W/WO CONTRAST, MRI CERVICAL SPINE W/WO CONTRAST -  04/10/2019      INDICATION: Left upper extremity weakness.     TECHNIQUE:  Multiplanar multisequence MRI of the brain and cervical  spine was performed without and with contrast.     COMPARISONS:  None.     FINDINGS:      BRAIN:  There is curvilinear leptomeningeal enhancement along the central  sulcus with surrounding T2 prolongation in the pre- and postcentral gyri  and associated diffusion restriction. There is no other clear  parenchymal abnormality. Marrow signal of the calvarium is within normal  limits. No cortical destructive lesion. Incidental benign epidermal  inclusion cyst noted in the anterior frontal scalp towards the left of  midline. [place] flow-voids are preserved.     CERVICAL SPINE:  Sagittal images cover from from the sella through the  T4 level caudally.     The cervicomedullary junction is unremarkable.  The cervical cord is  normal in caliber and signal. There is no abnormal enhancement in the  spinal canal or in the paraspinal soft tissues. Several benign  perineural cysts are seen in the cervical and incidentally imaged  thoracic spine.     With regard to the marrow space, vertebral body heights are maintained.   There is maintenance of the usual lordosis without significant  spondylolisthesis.  Background marrow signal is normal.     Degenerative changes are as follows:     C2-3: No significant foraminal or spinal canal stenosis.     C3-4: Left greater than right facet arthropathy without significant  foraminal or spinal canal stenosis.     C4-5: Right greater than left uncovertebral arthropathy and left greater  than right facet disease. There is moderate right foraminal stenosis but  no significant spinal canal stenosis.     C5-6: Right greater than left uncovertebral arthropathy and left greater  than right facet disease. Moderate right foraminal stenosis. No  significant spinal canal stenosis.     C6-7: Left greater than right uncovertebral arthropathy. No significant  foraminal or spinal canal stenosis.     C7/T1: No significant foraminal or spinal canal stenosis.     Incidental imaging of the cervical soft tissues is unremarkable.       Impression:       1. Concern for leptomeningeal carcinomatosis  involving the central  sulcus of the right frontoparietal region; the associated diffusion  restriction is indicative of parenchymal infiltration. Another  consideration is for subacute infarct. Follow-up is required.  2. Degenerative changes of the cervical spine without evidence of  metastatic disease.     DICTATED:   04/10/2019  EDITED/ls :   04/10/2019      This report was finalized on 4/10/2019 6:37 PM by Adalberto Vallejo.       MRI Cervical Spine With & Without Contrast [[id number]] Collected:  04/10/19 1743     Updated:  04/10/19 1840    Narrative:       EXAMINATION: MRI BRAIN W/WO CONTRAST, MRI CERVICAL SPINE W/WO CONTRAST -  04/10/2019      INDICATION: Left upper extremity weakness.     TECHNIQUE:  Multiplanar multisequence MRI of the brain and cervical  spine was performed without and with contrast.     COMPARISONS:  None.     FINDINGS:      BRAIN: There is curvilinear leptomeningeal enhancement along the central  sulcus with surrounding T2 prolongation in the pre- and postcentral gyri  and associated diffusion restriction. There is no other clear  parenchymal abnormality. Marrow signal of the calvarium is within normal  limits. No cortical destructive lesion. Incidental benign epidermal  inclusion cyst noted in the anterior frontal scalp towards the left of  midline. [place] flow-voids are preserved.     CERVICAL SPINE:  Sagittal images cover from from the sella through the  T4 level caudally.     The cervicomedullary junction is unremarkable.  The cervical cord is  normal in caliber and signal. There is no abnormal enhancement in the  spinal canal or in the paraspinal soft tissues. Several benign  perineural cysts are seen in the cervical and incidentally imaged  thoracic spine.     With regard to the marrow space, vertebral body heights are maintained.   There is maintenance of the usual lordosis without significant  spondylolisthesis.  Background marrow signal is normal.     Degenerative changes are  as follows:     C2-3: No significant foraminal or spinal canal stenosis.     C3-4: Left greater than right facet arthropathy without significant  foraminal or spinal canal stenosis.     C4-5: Right greater than left uncovertebral arthropathy and left greater  than right facet disease. There is moderate right foraminal stenosis but  no significant spinal canal stenosis.     C5-6: Right greater than left uncovertebral arthropathy and left greater  than right facet disease. Moderate right foraminal stenosis. No  significant spinal canal stenosis.     C6-7: Left greater than right uncovertebral arthropathy. No significant  foraminal or spinal canal stenosis.     C7/T1: No significant foraminal or spinal canal stenosis.     Incidental imaging of the cervical soft tissues is unremarkable.       Impression:       1. 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 Another  consideration is for subacute infarct. Follow-up is required.  2. Degenerative changes of the cervical spine without evidence of  metastatic disease.     DICTATED:   04/10/2019  EDITED/ls :   04/10/2019      This report was finalized on 4/10/2019 6:37 PM by Adalberto Vallejo.             Results for orders placed during the hospital encounter of 10/15/18   Adult Transthoracic Echo Complete W/ Cont if Necessary Per Protocol    Narrative · Left ventricular systolic function is normal.  · Estimated EF appears to be in the range of 61 - 65%.  · Mild tricuspid valve regurgitation is present.  · Calculated right ventricular systolic pressure from tricuspid   regurgitation is 50 mmHg.          I have reviewed the medications:    Current Facility-Administered Medications:   •  ALPRAZolam (XANAX) tablet 0.5 mg, 0.5 mg, Oral, TID PRN, Juan Carlos Gayle MD  •  aspirin chewable tablet 81 mg, 81 mg, Oral, Daily, 81 mg at 04/11/19 0811  **OR** aspirin suppository 300 mg, 300 mg, Rectal, Daily, Juan Carlos Gayle MD  •  atorvastatin (LIPITOR) tablet 80 mg, 80 mg, Oral, Nightly, Juan Carlos Gayle MD, 80 mg at 04/10/19 2319  •  buPROPion SR (WELLBUTRIN SR) 12 hr tablet 150 mg, 150 mg, Oral, Q12H, Juan Carlos Gayle MD, 150 mg at 04/11/19 0811  •  dexamethasone (DECADRON) injection 4 mg, 4 mg, Intravenous, Q8H, Alyssa Harris MD, 4 mg at 04/11/19 0816  •  HYDROcodone-acetaminophen (NORCO) 5-325 MG per tablet 1 tablet, 1 tablet, Oral, Q6H PRN, Juan Carlos Gayle MD, 1 tablet at 04/10/19 2155  •  HYDROmorphone (DILAUDID) injection 0.5 mg, 0.5 mg, Intravenous, Q2H PRN, 0.5 mg at 04/11/19 1153 **AND** naloxone (NARCAN) injection 0.4 mg, 0.4 mg, Intravenous, Q5 Min PRN, Juan Carlos Gayle MD  •  lidocaine 4 % dressing, , Topical, PRN, Juan Carlos Gayle MD  •  nicotine (NICODERM CQ) 21 MG/24HR patch 1 patch, 1 patch, Transdermal, Q24H, Juan Carlos Gayle MD, 1 patch at 04/10/19 2320  •  ondansetron (ZOFRAN) tablet 8 mg, 8 mg, Oral, TID PRN, Juan Carlos Gayle MD  •  sertraline (ZOLOFT) tablet 200 mg, 200 mg, Oral, Daily, Juan Carlos Gayle MD, 200 mg at 04/11/19 0811  •  sodium chloride 0.9 % flush 10 mL, 10 mL, Intravenous, PRN, Hernandez Matos MD  •  sodium chloride 0.9 % flush 3 mL, 3 mL, Intravenous, Q12H, Juan Carlos Gayle MD, 3 mL at 04/10/19 2320  •  sodium chloride 0.9 % flush 3 mL, 3 mL, Intravenous, Q12H, Juan Carlos Gayle MD  •  sodium chloride 0.9 % flush 3-10 mL, 3-10 mL, Intravenous, PRN, Juan Carlos Gayle MD  •  sodium chloride 0.9 % flush 3-10 mL, 3-10 mL, Intravenous, PRN, Juan Carlos Gayle MD      Assessment/Plan   Assessment / Plan     Active Hospital Problems    Diagnosis POA   • **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 (CMS/HCC) [C50.912, C79.31] Yes   • Invasive ductal carcinoma of left breast (CMS/HCC) [C50.912] Yes   • Left-sided weakness [R53.1] Yes   • Tobacco dependence [F17.200] Yes   • THALIA (generalized anxiety disorder) [F41.1] Yes   • Functional diarrhea [K59.1] Yes           Brief Hospital Course to date:  Brittani Lambert is a 59 y.o. female with hx of breast cancer s/p bilateral mastectomy and undergoing chemotherapy with Dr. Patel who presents due to LUE and LLE weakness, along with headache. 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    PLAN:  --Continue IV Decadron.  --Dr. Patel following. Awaiting Neurology and Neurosurgery consults. If consistent with metastatic disease, plan to consult Rad/Onc. Re-staging scans ordered. Nodule on forehead could be skin metastasis.  --Supportive care.    DVT Prophylaxis:  mechanical    Disposition: I expect the patient to be discharged TBD    CODE STATUS:   Code Status and Medical Interventions:   Ordered at: 04/10/19 1954     Level Of Support Discussed With:    Patient     Code Status:    CPR     Medical Interventions (Level of Support Prior to Arrest):    Full         Electronically signed by Alyssa Harris MD, 04/11/19, 12:02 PM.        Electronically signed by Alyssa Harris MD at 4/11/2019 12:02 PM       Priti Cano, RN      Case Management   Progress Notes   Signed   Date of Service:  4/11/2019 10:18 AM   Creation Time:  4/11/2019 10:18 AM            Signed                 Show:Clear all  [x]Manual[x]Template[]Copied    Added by:  [x]Priti Cano, RN      []Ilia for details      Discharge Planning Assessment  [facility]     Patient Name: Brittani Lambert                  MRN: [id number]  Today's Date: 4/11/2019                     Admit Date: 4/10/2019          Discharge Needs Assessment      Row Name 04/11/19 1014           Living Environment     Lives With  significant other     Current Living Arrangements  home/apartment/condo     Primary Care Provided by  self     Provides Primary Care For  no one     Family Caregiver if Needed  significant other     Quality of Family Relationships  unable to assess     Able to Return to Prior Arrangements  yes            Resource/Environmental Concerns     Resource/Environmental Concerns  none     Transportation Concerns  car, none           Transition Planning     Patient/Family Anticipates Transition to  home with family     Patient/Family Anticipated Services at Transition  none     Transportation Anticipated  family or friend will provide           Discharge Needs Assessment     Readmission Within the Last 30 Days  no previous admission in last 30 days     Concerns to be Addressed  no discharge needs identified;denies needs/concerns at this time     Equipment Currently Used at Home  none     Anticipated Changes Related to Illness  none     Equipment Needed After Discharge  none     Current Discharge Risk  chronically ill          Discharge Plan      Row Name 04/11/19 1015           Plan     Plan  Home with significant other     Patient/Family in Agreement with Plan  yes     Plan Comments  Met with patient at bedside to initiate discharge planning. She lives in a home with her significant other Turner in [facility]. She is independent with all ADLs and does not use any DME or home health. Has never been to an inpatient rehab facility. States she returned to work 3/13/19; works on the line at a factory in [place]. Plans to return home at discharge. CM will continue to follow.      CM added Turner's cell phone # to Epic per patient's request ([phone]). Priti Cano RN [phone]     Final Discharge Disposition Code  01 - home or self-care              Destination       No service coordination in this encounter.               Durable Medical Equipment       No service coordination in this encounter.               Dialysis/Infusion       No service coordination in this encounter.           Home Medical Care       No service coordination in this encounter.               Therapy       No service coordination in this encounter.               Community Resources       No service coordination in this encounter.                  Demographic  Summary      Row Name 19 1012           General Information     Arrived From  emergency department     Referral Source  admission list     Reason for Consult  discharge planning     Preferred Language  English               Functional Status      Row Name 19 1013           Functional Status     Usual Activity Tolerance  moderate     Current Activity Tolerance  fair           Functional Status, IADL     Medications  independent     Meal Preparation  independent     Housekeeping  independent     Laundry  independent     Shopping  independent           Employment/     Employment Status  employed full time     Current or Previous Occupation  factory work     Employment/ Comments  [employer] with Rx medication coverage per patient          Psychosocial    No documentation.         Abuse/Neglect    No documentation.         Legal    No documentation.         Substance Abuse    No documentation.         Patient Forms    No documentation.               Priti Cano RN                                      Consult Notes (last 72 hours) (Notes from 2019  3:35 PM through 2019  3:35 PM)      Rod Garcia MD at 2019 12:50 PM          CONSULTATION NOTE    NAME:      Brittani Lambert  :                                                          1960  DATE OF CONSULTATION:                       2019   REQUESTING PHYSICIAN:                   No ref. provider found  REASON FOR CONSULTATION:             1. Acute focal neurological deficit, onset within 3-24 hours    2. Metastatic cancer to brain (CMS/HCC)    3. History of breast cancer       Thank you for requesting my services in evaluation of this pleasant individual.  I am seeing them as inpatient consultation at the request of Dr. Patel with Medical Oncology, regarding a diagnosis of metastatic breast cancer to the brain and for consideration of palliative radiation therapy.  Subjective   BRIEF HISTORY:  Brittani  Petra  is a very pleasant 59 y.o. female  who was unfortunately diagnosed in August 2018 with a high grade, triple negative left breast cancer.  She underwent bilateral mastectomy, revealing T2N0M0 disease.  She received adjuvant chemotherapy completing dose dense AC x4, and tomorrow was actually scheduled to be her final dose of Taxol.  Unfortunately, she is admitted now with symptoms of left upper extremity weakness.  She states that she has had headaches and left arm pain for approximately 1 month, with additional intermittent numbness and tingling of the left arm and hand.  It was not until she had more profound weakness involving her left upper extremity described as inability to hold objects or use her left arm, that she was seen in the emergency department and subsequently admitted.  MRI of the brain and cervical spine was obtained overnight which unfortunately shows some enhancement of the dura involving the superior sagittal sinus as well as a questionable parenchymal lesion in the right frontal cortex centered within the pre- and post-central gyrus.  She has been evaluated by neurology as well as started on steroids, and her symptoms of left upper extremity weakness have actually improved.  Further, she has a new dermal nodule on the anterior forehead suspicious for metastatic disease.  It appears she clinically has evidence of progressive metastatic triple negative breast cancer, and I am being asked to see her today for consideration of palliative radiation therapy.    No Known Allergies    Social History     Tobacco Use   • Smoking status: Current Every Day Smoker     Packs/day: 0.50     Years: 20.00     Pack years: 10.00     Types: Cigarettes   • Smokeless tobacco: Never Used   Substance Use Topics   • Alcohol use: Yes     Frequency: Monthly or less   • Drug use: No       Past Medical History:   Diagnosis Date   • Breast CA (CMS/HCC) 10/4/2018   • Depression    • DJD (degenerative joint disease) of  cervical spine    • THALIA (generalized anxiety disorder)    • Heart murmur    • Ovarian cancer (CMS/HCC) 1989   • Tobacco dependence        family history includes Arthritis in her mother; Celiac disease in her other; Heart attack in her mother; Liver disease in her father; Osteoporosis in her mother.     Past Surgical History:   Procedure Laterality Date   • APPENDECTOMY     • BREAST BIOPSY Right 2003    DONE IN [place]   • COLONOSCOPY  06/2018   • HYSTERECTOMY  1989   • MASTECTOMY W/ SENTINEL NODE BIOPSY Bilateral 10/4/2018    Procedure: LEFT SKIN SPARING BREAST MASTECTOMY WITH LEFT SENTINEL NODE BIOPSY, RIGHT PROPHYLACTIC SKIN SPARING MASTECTOMY;  Surgeon: Koffi Worthington MD;  Location: [facility];  Service: General   • OOPHORECTOMY  1989        Review of Systems   Constitutional: Positive for appetite change, fatigue and unexpected weight change.   HENT:   Positive for lump/mass.    Eyes: Negative for eye problems.   Respiratory: Negative.    Cardiovascular: Negative.    Gastrointestinal: Positive for diarrhea. Negative for nausea and vomiting.   Endocrine: Negative.    Genitourinary: Negative for bladder incontinence and difficulty urinating.    Musculoskeletal: Negative for arthralgias, back pain and gait problem.   Skin: Negative.    Neurological: Positive for extremity weakness, headaches and numbness. Negative for dizziness, gait problem, light-headedness, seizures and speech difficulty.   Hematological: Bruises/bleeds easily.   Psychiatric/Behavioral: Negative.           Objective   VITAL SIGNS:   Vitals:    04/11/19 0356 04/11/19 0721 04/11/19 1126 04/11/19 1128   BP: (!) 88/63 116/71 146/84 146/84   BP Location: Right arm Right arm  Right arm   Patient Position: Lying Lying  Sitting   Pulse: 73 77  86   Resp: 18 14  14   Temp: 97.5 °F (36.4 °C) 97.9 °F (36.6 °C)  98 °F (36.7 °C)   TempSrc: Oral Oral  Oral   SpO2:  97%  96%   Weight:       Height:            KPS        80%    Physical Exam    Constitutional: She is oriented to person, place, and time. She appears well-developed and well-nourished. No distress.   HENT:   Head: Normocephalic and atraumatic.   Mouth/Throat: Oropharynx is clear and moist.   Treatment related alopecia; there is a 2 x 3 cm raised and slightly erythematous nodule within the midline of the anterior forehead.  No ulceration.  Mildly tender to palpation.   Eyes: Conjunctivae and EOM are normal. Pupils are equal, round, and reactive to light.   Neck: Normal range of motion. Neck supple.   Cardiovascular: Normal rate and regular rhythm. Exam reveals no friction rub.   No murmur heard.  Pulmonary/Chest: Effort normal and breath sounds normal. She has no wheezes.   Postmastectomy bilaterally   Abdominal: Soft. Bowel sounds are normal. She exhibits no distension and no mass. There is no tenderness.   Musculoskeletal: Normal range of motion. She exhibits no edema.   Lymphadenopathy:     She has no cervical adenopathy.   Neurological: She is alert and oriented to person, place, and time. A sensory deficit is present. No cranial nerve deficit. She exhibits abnormal muscle tone.   3/5 strength in the left upper extremity with intact sensation to light touch, pinprick, and position.  5 out of 5 strength in the right upper extremity and bilateral lower extremities.  Left hand drifts downward which is most likely associated with weakness.   Skin: Skin is warm and dry.   Psychiatric: She has a normal mood and affect. Her behavior is normal. Judgment and thought content normal.   Nursing note and vitals reviewed.    IMAGING  I have personally reviewed the relevant imaging studies, as follows:  Mri Brain With & Without Contrast  Mri Cervical Spine With & Without Contrast    Result Date: 4/10/2019  EXAMINATION: MRI BRAIN W/WO CONTRAST, MRI CERVICAL SPINE W/WO CONTRAST - 04/10/2019      1. 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 Another consideration is for subacute infarct. Follow-up is required. 2. Degenerative changes of the cervical spine without evidence of metastatic disease.  DICTATED:   04/10/2019 EDITED/ls :   04/10/2019  This report was finalized on 4/10/2019 6:37 PM by Adalberto Vallejo.       PATHOLOGY  Breast Biopsy 8/15/2018:  LEFT BREAST ULTRASOUND GUIDED BIOPSY AT 12 O'CLOCK:  Infiltrating high-grade ductal carcinoma.  Estrogen Receptor          RESULT:  Negative.      0%        0+ (INTENSITY SCORE)   Progesterone Receptor  RESULT:  Positive         50%      2+ (INTENSITY SCORE)  Her2/Nehemiah oncoprotein     RESULT: equivocal       20%      2+ (INTENSITY SCORE)     FISH HER2/nehemiah Report, Addendum   Fluorescence in-situ Hybridization (FISH) Report  HER2/HOUSTON-17 Dual-Probe  Negative/Not Amplified  HER2/HOUSTON-17 Ratio: 1.5  Avg number of HER2 Signals/Nucleus: 3.4     Bilateral Mastectomy 10/4/2018:  1. RIGHT BREAST, SKIN SPARING SIMPLE MASTECTOMY:  Proliferative fibrocystic change without atypia, dystrophic calcifications identified.   Skin of nipple and breast, histologically unremarkable.   Previous biopsy site identified.   2. SENTINEL LYMPH NODE, EXCISION:  Lymph node x1, reactive node without evidence of metastatic carcinoma or granulomatous inflammation.   3. LEFT BREAST, SKIN SPARING SIMPLE MASTECTOMY:  High grade ductal adenocarcinoma with basaloid features, Baird-Kang score 8/9.  Tumor size 2.6 x 2.2 x 1.9 cm.  No lymphovascular invasion identified.   Dystrophic calcifications present.   Margins of excision negative for tumor.   Skin of nipple and breast show no pagetoid intraepithelial or dermal lymphatic tumor.       The following portions of the patient's history were reviewed and updated as appropriate: allergies, current medications, past family history, past medical history, past social history, past surgical history and problem  list.    Assessment      IMPRESSION:  Ms. Lambert is a 59 year old female with a poorly differentiated basaloid left breast cancer, who presents with left upper extremity weakness, with headaches and imaging consistent with metastatic disease to the brain, specifically with leptomeningeal enhancement.  Although an acute stroke could be considered, the fact her symptoms have improved somewhat with steroids and the appearance of the new nodule on her forehead concerning for a metastasis, I believe this is all consistent with breast cancer.  She has already been evaluated by Neurology who agrees.  I met with the patient, her boyfriend, and her niece and discussed the treatment options.  Considering the enhancement on the dura is a more diffuse process, this is not easily amenable to treatment using stereotactic radiosurgery, and instead, she should be treated with whole brain radiation therapy.  This will consist of 10 treatments to 30 Gy total and in addition to covering the meningeal disease, we will also easily be able to cover the dermal nodule on the forehead.  I discussed the risks and benefits with the patient.  The most concerning side effect is with regards to short term memory loss, and I recommend that we start Memantine, which has been shown in a randomized trial through the RTO[facility] to significantly reduce the cognitive deficits that can be seen with whole brain radiation therapy.  She was agreeable to begin treatment and signed informed consent.  I will bring her down to my department today for a CT simulation and will aim to start treatments as an inpatient.  Once she completes her staging workup and is appropriate for discharge, we can transition her appointments to an outpatient for the remainder of treatment.       Thank you for allowing me to participate in the care of this individual.    Sincerely,      Rod Garcia MD      Errors in dictation may reflect use of voice recognition software  and not all errors in transcription may have been detected prior to signing.      Electronically signed by Rod Garcia MD at 4/11/2019  1:09 PM     Florecita Finley MD at 4/11/2019 11:48 AM              Referring Provider: Dr. Gayle  Reason for Consultation: Left arm weakness, brain lesion    Patient Care Team:  Alla Colon DO as PCP - General (Family Medicine)    Chief complaint left arm weakness    Subjective .     History of present illness: 59-year-old right-handed woman with recent diagnosis of stage IIa breast cancer on adjuvant chemotherapy, status post bilateral mastectomy, admitted for left arm weakness.  She first noted some guilty using her left hand at work a couple of days ago in the late afternoon.  She noted no change that evening but when she woke yesterday morning she could not control her wrist and hand at all.  She did not note any numbness or tingling at all and currently denies any trouble with her left leg at any time.  She noted no gait impairment.  She came to the ED and underwent brain imaging, MRI images reviewed, showing an enhancing cortical lesion in the right frontal gyrus with increased T2 signal in the surrounding gyri.  She was given steroids on admission and this morning notes significant improvement in her left hand strength although it is not back to normal.  She began having unaccustomed intermittent bifrontal headache last weekend which was not present at onset of left arm weakness but was present again when she woke with worsened weakness yesterday.  No vision changes, no speech or swallowing difficulty, no vertigo, photo or phonophobia.  She has had recent diarrhea and nausea.    Review of Systems  Pertinent items are noted in HPI, all other systems reviewed and negative     History  Past Medical History:   Diagnosis Date   • Breast CA (CMS/HCC) 10/4/2018   • Depression    • DJD (degenerative joint disease) of cervical spine    • THALIA (generalized  anxiety disorder)    • Heart murmur    • Ovarian cancer (CMS/HCC) 1989   • Tobacco dependence    ,   Past Surgical History:   Procedure Laterality Date   • APPENDECTOMY     • BREAST BIOPSY Right 2003    DONE IN [place]   • COLONOSCOPY  06/2018   • HYSTERECTOMY  1989   • MASTECTOMY W/ SENTINEL NODE BIOPSY Bilateral 10/4/2018    Procedure: LEFT SKIN SPARING BREAST MASTECTOMY WITH LEFT SENTINEL NODE BIOPSY, RIGHT PROPHYLACTIC SKIN SPARING MASTECTOMY;  Surgeon: Koffi Worthington MD;  [facility];  Service: General   • OOPHORECTOMY  1989   ,   Family History   Problem Relation Age of Onset   • Arthritis Mother    • Heart attack Mother    • Osteoporosis Mother    • Liver disease Father    • Celiac disease Other    ,   Social History     Tobacco Use   • Smoking status: Current Every Day Smoker     Packs/day: 0.50     Years: 20.00     Pack years: 10.00     Types: Cigarettes   • Smokeless tobacco: Never Used   Substance Use Topics   • Alcohol use: Yes     Frequency: Monthly or less   • Drug use: No   ,   Medications Prior to Admission   Medication Sig Dispense Refill Last Dose   • ALPRAZolam (XANAX) 0.5 MG tablet Take 1 tablet by mouth 3 (Three) Times a Day As Needed for Anxiety or Sleep. 90 tablet 2 Taking   • buPROPion SR (WELLBUTRIN SR) 100 MG 12 hr tablet Take one tablet daily in mornings (Patient taking differently: Take 100 mg by mouth Daily. Take one tablet daily in mornings) 90 tablet 0 Taking   • HYDROcodone-acetaminophen (NORCO) 5-325 MG per tablet Take 1 tablet by mouth Every 6 (Six) Hours As Needed for Moderate Pain . 60 tablet 0    • lidocaine-prilocaine (EMLA) 2.5-2.5 % cream Apply  topically to the appropriate area as directed As Needed (45-60 minutes prior to port access.  Cover with saran/plastic wrap.). 30 g 3 Taking   • ondansetron (ZOFRAN) 8 MG tablet Take 1 tablet by mouth 3 (Three) Times a Day As Needed for Nausea or Vomiting. 30 tablet 5 Taking   • sertraline (ZOLOFT) 100 MG tablet Take 2  "tablets by mouth Daily. 60 tablet 3 Taking   , Scheduled Meds:    aspirin 81 mg Oral Daily   Or      aspirin 300 mg Rectal Daily   atorvastatin 80 mg Oral Nightly   buPROPion  mg Oral Q12H   dexamethasone 4 mg Intravenous Q8H   nicotine 1 patch Transdermal Q24H   sertraline 200 mg Oral Daily   sodium chloride 3 mL Intravenous Q12H   sodium chloride 3 mL Intravenous Q12H   , Continuous Infusions:   , PRN Meds:  •  ALPRAZolam  •  HYDROcodone-acetaminophen  •  HYDROmorphone **AND** naloxone  •  lidocaine  •  ondansetron  •  sodium chloride  •  sodium chloride  •  sodium chloride and Allergies:  Patient has no known allergies.    Objective     Vital Signs   Blood pressure 146/84, pulse 86, temperature 98 °F (36.7 °C), temperature source Oral, resp. rate 14, height 160 cm (63\"), weight 49.9 kg (110 lb), SpO2 96 %, not currently breastfeeding.    Physical Exam:   Well-developed middle-aged white woman initially tearful, fully oriented and appropriate, with normal language, attention, memory and fund of knowledge.  She has no dysarthria.  Pupils 2.5 mm and reactive, right optic disc shows no papilledema, left poorly visualized.   visual fields full to confrontation, extraocular movements intact, normal facial sensation and movement, hearing intact to finger rub, palate and shoulders elevate symmetrically and tongue protrudes midline  Motor exam: Strength is 5/5 except distal left upper extremity 2-3/5 in an upper motor neuron pattern, no pronator drift  Coordination commensurate with strength  Light touch feels different on the left hand, otherwise symmetric, vibration intact in the toes  Reflexes: 1-2+ throughout and symmetric, no response to plantar stim  Neck supple, no carotid bruit appreciated  Heart RRR no murmur appreciated  Lungs clear to auscultation, no increased respiratory effort  Chest wall shows normal expansion  Abdomen soft, NT, ND with positive bowel sounds  Skin warm and dry, no rashes, but raised " lesion on forehead, new since 6 days ago    Results Review:   I reviewed the patient's new clinical results.  I reviewed the patient's new imaging results and agree with the interpretation.  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  Discussed with patient, family and Dr. Patel.  Cannot absolutely rule out stroke but I do feel this is less likely.  Could perform serial LPs, but with staging scans planned, that may obviate the need for this.  Will continue to follow, with further recommendations pending results of body scans.    I discussed the patients findings and my recommendations with patient, family and consulting provider    Florecita Finley MD  04/11/19  11:48 AM              Electronically signed by Florecita Finley MD at 4/11/2019 11:59 AM     Carrie Patel MD at 4/11/2019 10:26 AM      Consult Orders    1. 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 female, referred  for evaluation of left arm weakness, and new finding of a brain lesion.  She has a recent diagnosis of stage IIA ER - CA +  Her2 - breast cancer, and has been on adjuvant chemotherapy since November.  She has had headache and left arm pain for about a month, and presented to the ED yesterday with left arm weakness.  MRI brain shows a right frontoparietal lesion - CNS metastases versus subacute infarct.  She has been started on steroids.  Neurology and neurosurgery consults pending.    Today she says her left arm weakness has improved on steroids.    REVIEW OF SYSTEMS:  A 14 point review of systems was performed and is negative except as noted above.    Past Medical History:   Diagnosis Date   • Breast CA (CMS/HCC) 10/4/2018   • Depression    • DJD (degenerative joint disease) of cervical spine    • THALIA (generalized anxiety disorder)    • Heart murmur    • Ovarian cancer (CMS/[facility]) 1989   • Tobacco dependence        No current facility-administered medications on file prior to encounter.      Current Outpatient Medications on File Prior to Encounter   Medication Sig Dispense Refill   • ALPRAZolam (XANAX) 0.5 MG tablet Take 1 tablet by mouth 3 (Three) Times a Day As Needed for Anxiety or Sleep. 90 tablet 2   • buPROPion SR (WELLBUTRIN SR) 100 MG 12 hr tablet Take one tablet daily in mornings (Patient taking differently: Take 100 mg by mouth Daily. Take one tablet daily in mornings) 90 tablet 0   • HYDROcodone-acetaminophen (NORCO) 5-325 MG per tablet Take 1 tablet by mouth Every 6 (Six) Hours As Needed for Moderate Pain . 60 tablet 0   • lidocaine-prilocaine (EMLA) 2.5-2.5 % cream Apply  topically to the appropriate area as directed As Needed (45-60  minutes prior to port access.  Cover with saran/plastic wrap.). 30 g 3   • ondansetron (ZOFRAN) 8 MG tablet Take 1 tablet by mouth 3 (Three) Times a Day As Needed for Nausea or Vomiting. 30 tablet 5   • sertraline (ZOLOFT) 100 MG tablet Take 2 tablets by mouth Daily. 60 tablet 3       No Known Allergies    Past Surgical History:   Procedure Laterality Date   • APPENDECTOMY     • BREAST BIOPSY Right     DONE IN [place]   • COLONOSCOPY  2018   • HYSTERECTOMY     • MASTECTOMY W/ SENTINEL NODE BIOPSY Bilateral 10/4/2018    Procedure: LEFT SKIN SPARING BREAST MASTECTOMY WITH LEFT SENTINEL NODE BIOPSY, RIGHT PROPHYLACTIC SKIN SPARING MASTECTOMY;  Surgeon: Koffi Worthington MD;  Location: [facility] OR;  Service: General   • OOPHORECTOMY         OB History    Para Term  AB Living   2 2 2         SAB TAB Ectopic Molar Multiple Live Births                    # Outcome Date GA Lbr Julito/2nd Weight Sex Delivery Anes PTL Lv   2 Term            1 Term                   Social History     Socioeconomic History   • Marital status:      Spouse name: N/A   • Number of children: 1   • Years of education: H.S.   • Highest education level: High school graduate   Occupational History   • Occupation: Associate     Employer: [employer]   Tobacco Use   • Smoking status: Current Every Day Smoker     Packs/day: 0.50     Years: 20.00     Pack years: 10.00     Types: Cigarettes   • Smokeless tobacco: Never Used   Substance and Sexual Activity   • Alcohol use: Yes     Frequency: Monthly or less   • Drug use: No   • Sexual activity: Not Currently     Partners: Male       Family History   Problem Relation Age of Onset   • Arthritis Mother    • Heart attack Mother    • Osteoporosis Mother    • Liver disease Father    • Celiac disease Other        Objective     Vitals:    04/10/19 2230 04/10/19 2339 19 0356 19 0721   BP: 152/87  (!) 88/63 116/71   BP Location: Right arm  Right arm Right  "arm   Patient Position: Lying  Lying Lying   Pulse: 83 98 73 77   Resp: 20  18 14   Temp: 97.8 °F (36.6 °C)  97.5 °F (36.4 °C) 97.9 °F (36.6 °C)   TempSrc: Oral  Oral Oral   SpO2:    97%   Weight: 49.9 kg (110 lb)      Height: 160 cm (63\")          Performance Status: 1  General: well appearing female in no acute distress  Neuro: alert and oriented  HEENT: sclera anicteric, oropharynx clear  Lymphatics: no cervical, supraclavicular, or axillary adenopathy  Cardiovascular: regular rate and rhythm, no murmurs  Lungs: clear to auscultation bilaterally  Abdomen: soft, nontender, nondistended.  No palpable organomegaly  Extremeties: no lower extremity edema  Skin: no rashes, lesions, bruising, or petechiae.  1 cm forehead nodule.  Psych: mood and affect appropriate      Results for ELVIRA RIVERA (MRN [id number]) as of 4/11/2019 10:26   Ref. Range 4/10/2019 13:30   Glucose Latest Ref Range: 65 - 99 mg/dL 92   Sodium Latest Ref Range: 136 - 145 mmol/L 138   Potassium Latest Ref Range: 3.5 - 5.2 mmol/L 3.9   CO2 Latest Ref Range: 22.0 - 29.0 mmol/L 24.0   Chloride Latest Ref Range: 98 - 107 mmol/L 102   Anion Gap Latest Units: mmol/L 12.0   Creatinine Latest Ref Range: 0.57 - 1.00 mg/dL 0.53 (L)   BUN Latest Ref Range: 6 - 20 mg/dL 10   BUN/Creatinine Ratio Latest Ref Range: 7.0 - 25.0  18.9   Calcium Latest Ref Range: 8.6 - 10.5 mg/dL 9.3   eGFR Non  Am Latest Ref Range: >60 mL/min/1.73 118   Alkaline Phosphatase Latest Ref Range: 39 - 117 U/L 65   Total Protein Latest Ref Range: 6.0 - 8.5 g/dL 7.0   ALT (SGPT) Latest Ref Range: 1 - 33 U/L 13   AST (SGOT) Latest Ref Range: 1 - 32 U/L 17   Total Bilirubin Latest Ref Range: 0.2 - 1.2 mg/dL 0.3   Albumin Latest Ref Range: 3.50 - 5.20 g/dL 4.20   Globulin Latest Units: gm/dL 2.8   A/G Ratio Latest Units: g/dL 1.5   Magnesium Latest Ref Range: 1.6 - 2.6 mg/dL 2.0   WBC Latest Ref Range: 3.40 - 10.80 10*3/mm3 5.90   RBC Latest Ref Range: 3.77 - 5.28 10*6/mm3 3.52 (L) "   Hemoglobin Latest Ref Range: 12.0 - 15.9 g/dL 11.4 (L)   Hematocrit Latest Ref Range: 34.0 - 46.6 % 34.5   RDW Latest Ref Range: 12.3 - 15.4 % 14.4   MCV Latest Ref Range: 79.0 - 97.0 fL 98.0 (H)   MCH Latest Ref Range: 26.6 - 33.0 pg 32.4   MCHC Latest Ref Range: 31.5 - 35.7 g/dL 33.0   MPV Latest Ref Range: 6.0 - 12.0 fL 10.3   Platelets Latest Ref Range: 140 - 450 10*3/mm3 248   RDW-SD Latest Ref Range: 37.0 - 54.0 fl 51.2       Imaging:  I personally reviewed MRI brain images.  IMPRESSION:  1. 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 Another  consideration is for subacute infarct. Follow-up is required.  2. Degenerative changes of the cervical spine without evidence of  metastatic disease.      Assessment/Plan     Brittani Lambert is a 59 y.o. year old female with stage IIA ER- Her2- breast cancer admitted with right arm weakness.    Right arm weakness: concern for CNS metastasis versus subacute stroke. She does have multiple stroke risk factors, but symptoms concerning for metastatic disease.  Await neurology/NS input.  If they concur this is consistent with metastatic disease, will plan to consult radiation oncology.      Breast cancer: hold chemo at this time.  Will order restaging studies including ct c/a/p and bone scan.  She has a nodule on her forehead concerning for skin metastasis.  Discussed that if her cancer has progressed during initial adjuvant therapy, it indicates fairly aggressive and resistant disease.                Carrie Patel MD    4/11/2019          Electronically signed by Carrie Patel MD at 4/11/2019 10:47 AM

## 2019-04-11 NOTE — CONSULTS
Referring Provider: Dr. Gayle  Reason for Consultation: Left arm weakness, brain lesion    Patient Care Team:  Alla Colon DO as PCP - General (Family Medicine)    Chief complaint left arm weakness    Subjective .     History of present illness: 59-year-old right-handed woman with recent diagnosis of stage IIa breast cancer on adjuvant chemotherapy, status post bilateral mastectomy, admitted for left arm weakness.  She first noted some guilty using her left hand at work a couple of days ago in the late afternoon.  She noted no change that evening but when she woke yesterday morning she could not control her wrist and hand at all.  She did not note any numbness or tingling at all and currently denies any trouble with her left leg at any time.  She noted no gait impairment.  She came to the ED and underwent brain imaging, MRI images reviewed, showing an enhancing cortical lesion in the right frontal gyrus with increased T2 signal in the surrounding gyri.  She was given steroids on admission and this morning notes significant improvement in her left hand strength although it is not back to normal.  She began having unaccustomed intermittent bifrontal headache last weekend which was not present at onset of left arm weakness but was present again when she woke with worsened weakness yesterday.  No vision changes, no speech or swallowing difficulty, no vertigo, photo or phonophobia.  She has had recent diarrhea and nausea.    Review of Systems  Pertinent items are noted in HPI, all other systems reviewed and negative     History  Past Medical History:   Diagnosis Date   • Breast CA (CMS/HCC) 10/4/2018   • Depression    • DJD (degenerative joint disease) of cervical spine    • THALIA (generalized anxiety disorder)    • Heart murmur    • Ovarian cancer (CMS/[facility], located [facility]) 1989   • Tobacco dependence    ,   Past Surgical History:   Procedure Laterality Date   • APPENDECTOMY     • BREAST BIOPSY Right 2003    DONE IN [place]    • COLONOSCOPY  06/2018   • HYSTERECTOMY  1989   • MASTECTOMY W/ SENTINEL NODE BIOPSY Bilateral 10/4/2018    Procedure: LEFT SKIN SPARING BREAST MASTECTOMY WITH LEFT SENTINEL NODE BIOPSY, RIGHT PROPHYLACTIC SKIN SPARING MASTECTOMY;  Surgeon: Koffi Worthington MD;  Location: [facility];  Service: General   • OOPHORECTOMY  1989   ,   Family History   Problem Relation Age of Onset   • Arthritis Mother    • Heart attack Mother    • Osteoporosis Mother    • Liver disease Father    • Celiac disease Other    ,   Social History     Tobacco Use   • Smoking status: Current Every Day Smoker     Packs/day: 0.50     Years: 20.00     Pack years: 10.00     Types: Cigarettes   • Smokeless tobacco: Never Used   Substance Use Topics   • Alcohol use: Yes     Frequency: Monthly or less   • Drug use: No   ,   Medications Prior to Admission   Medication Sig Dispense Refill Last Dose   • ALPRAZolam (XANAX) 0.5 MG tablet Take 1 tablet by mouth 3 (Three) Times a Day As Needed for Anxiety or Sleep. 90 tablet 2 Taking   • buPROPion SR (WELLBUTRIN SR) 100 MG 12 hr tablet Take one tablet daily in mornings (Patient taking differently: Take 100 mg by mouth Daily. Take one tablet daily in mornings) 90 tablet 0 Taking   • HYDROcodone-acetaminophen (NORCO) 5-325 MG per tablet Take 1 tablet by mouth Every 6 (Six) Hours As Needed for Moderate Pain . 60 tablet 0    • lidocaine-prilocaine (EMLA) 2.5-2.5 % cream Apply  topically to the appropriate area as directed As Needed (45-60 minutes prior to port access.  Cover with saran/plastic wrap.). 30 g 3 Taking   • ondansetron (ZOFRAN) 8 MG tablet Take 1 tablet by mouth 3 (Three) Times a Day As Needed for Nausea or Vomiting. 30 tablet 5 Taking   • sertraline (ZOLOFT) 100 MG tablet Take 2 tablets by mouth Daily. 60 tablet 3 Taking   , Scheduled Meds:    aspirin 81 mg Oral Daily   Or      aspirin 300 mg Rectal Daily   atorvastatin 80 mg Oral Nightly   buPROPion  mg Oral Q12H   dexamethasone 4 mg  "Intravenous Q8H   nicotine 1 patch Transdermal Q24H   sertraline 200 mg Oral Daily   sodium chloride 3 mL Intravenous Q12H   sodium chloride 3 mL Intravenous Q12H   , Continuous Infusions:   , PRN Meds:  •  ALPRAZolam  •  HYDROcodone-acetaminophen  •  HYDROmorphone **AND** naloxone  •  lidocaine  •  ondansetron  •  sodium chloride  •  sodium chloride  •  sodium chloride and Allergies:  Patient has no known allergies.    Objective     Vital Signs   Blood pressure 146/84, pulse 86, temperature 98 °F (36.7 °C), temperature source Oral, resp. rate 14, height 160 cm (63\"), weight 49.9 kg (110 lb), SpO2 96 %, not currently breastfeeding.    Physical Exam:   Well-developed middle-aged white woman initially tearful, fully oriented and appropriate, with normal language, attention, memory and fund of knowledge.  She has no dysarthria.  Pupils 2.5 mm and reactive, right optic disc shows no papilledema, left poorly visualized.   visual fields full to confrontation, extraocular movements intact, normal facial sensation and movement, hearing intact to finger rub, palate and shoulders elevate symmetrically and tongue protrudes midline  Motor exam: Strength is 5/5 except distal left upper extremity 2-3/5 in an upper motor neuron pattern, no pronator drift  Coordination commensurate with strength  Light touch feels different on the left hand, otherwise symmetric, vibration intact in the toes  Reflexes: 1-2+ throughout and symmetric, no response to plantar stim  Neck supple, no carotid bruit appreciated  Heart RRR no murmur appreciated  Lungs clear to auscultation, no increased respiratory effort  Chest wall shows normal expansion  Abdomen soft, NT, ND with positive bowel sounds  Skin warm and dry, no rashes, but raised lesion on forehead, new since 6 days ago    Results Review:   I reviewed the patient's new clinical results.  I reviewed the patient's new imaging results and agree with the interpretation.  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  Discussed with patient, family and Dr. Patel.  Cannot absolutely rule out stroke but I do feel this is less likely.  Could perform serial LPs, but with staging scans planned, that may obviate the need for this.  Will continue to follow, with further recommendations pending results of body scans.    I discussed the patients findings and my recommendations with patient, family and consulting provider    Florecita Finley MD  04/11/19  11:48 AM

## 2019-04-11 NOTE — PROGRESS NOTES
Discharge Planning Assessment  [facility]     Patient Name: Brittani Lambert  MRN: [id number]  Today's Date: 4/11/2019    Admit Date: 4/10/2019    Discharge Needs Assessment     Row Name 04/11/19 1014       Living Environment    Lives With  significant other    Current Living Arrangements  home/apartment/condo    Primary Care Provided by  self    Provides Primary Care For  no one    Family Caregiver if Needed  significant other    Quality of Family Relationships  unable to assess    Able to Return to Prior Arrangements  yes       Resource/Environmental Concerns    Resource/Environmental Concerns  none    Transportation Concerns  car, none       Transition Planning    Patient/Family Anticipates Transition to  home with family    Patient/Family Anticipated Services at Transition  none    Transportation Anticipated  family or friend will provide       Discharge Needs Assessment    Readmission Within the Last 30 Days  no previous admission in last 30 days    Concerns to be Addressed  no discharge needs identified;denies needs/concerns at this time    Equipment Currently Used at Home  none    Anticipated Changes Related to Illness  none    Equipment Needed After Discharge  none    Current Discharge Risk  chronically ill        Discharge Plan     Row Name 04/11/19 1015       Plan    Plan  Home with significant other    Patient/Family in Agreement with Plan  yes    Plan Comments  Met with patient at bedside to initiate discharge planning. She lives in a home with her significant other Turner in [facility]. She is independent with all ADLs and does not use any DME or home health. Has never been to an inpatient rehab facility. States she returned to work 3/13/19; works on the line at a factory in [place]. Plans to return home at discharge. CM will continue to follow.     CM added Turner's cell phone # to Epic per patient's request ([phone]). Priti Cano RN [phone]    Final Discharge Disposition Code  01 - home or  self-care        Destination      No service coordination in this encounter.      Durable Medical Equipment      No service coordination in this encounter.      Dialysis/Infusion      No service coordination in this encounter.      Home Medical Care      No service coordination in this encounter.      Therapy      No service coordination in this encounter.      Community Resources      No service coordination in this encounter.          Demographic Summary     Row Name 04/11/19 1012       General Information    Arrived From  emergency department    Referral Source  admission list    Reason for Consult  discharge planning    Preferred Language  English        Functional Status     Row Name 04/11/19 1013       Functional Status    Usual Activity Tolerance  moderate    Current Activity Tolerance  fair       Functional Status, IADL    Medications  independent    Meal Preparation  independent    Housekeeping  independent    Laundry  independent    Shopping  independent       Employment/    Employment Status  employed full time    Current or Previous Occupation  factory work    Employment/ Comments  [employer] with Rx medication coverage per patient        Psychosocial    No documentation.       Abuse/Neglect    No documentation.       Legal    No documentation.       Substance Abuse    No documentation.       Patient Forms    No documentation.           Priti Cano RN

## 2019-04-11 NOTE — CONSULTS
Patient does not meet diabetes education order criteria, therefore patient was not seen for diabetes education at this time.  Please re consult as needed. Patients current A1C is 5.20% Thank you for this referral.

## 2019-04-11 NOTE — CONSULTS
CONSULTATION NOTE    NAME:      Brittani Lambert  :                                                          1960  DATE OF CONSULTATION:                       2019   REQUESTING PHYSICIAN:                   No ref. provider found  REASON FOR CONSULTATION:             1. Acute focal neurological deficit, onset within 3-24 hours    2. Metastatic cancer to brain (CMS/HCC)    3. History of breast cancer       Thank you for requesting my services in evaluation of this pleasant individual.  I am seeing them as inpatient consultation at the request of Dr. Patel with Medical Oncology, regarding a diagnosis of metastatic breast cancer to the brain and for consideration of palliative radiation therapy.     BRIEF HISTORY:  Brittani Lambert  is a very pleasant 59 y.o. female  who was unfortunately diagnosed in 2018 with a high grade, triple negative left breast cancer.  She underwent bilateral mastectomy, revealing T2N0M0 disease.  She received adjuvant chemotherapy completing dose dense AC x4, and tomorrow was actually scheduled to be her final dose of Taxol.  Unfortunately, she is admitted now with symptoms of left upper extremity weakness.  She states that she has had headaches and left arm pain for approximately 1 month, with additional intermittent numbness and tingling of the left arm and hand.  It was not until she had more profound weakness involving her left upper extremity described as inability to hold objects or use her left arm, that she was seen in the emergency department and subsequently admitted.  MRI of the brain and cervical spine was obtained overnight which unfortunately shows some enhancement of the dura involving the superior sagittal sinus as well as a questionable parenchymal lesion in the right frontal cortex centered within the pre- and post-central gyrus.  She has been evaluated by neurology as well as started on steroids, and her symptoms of left upper extremity weakness have  actually improved.  Further, she has a new dermal nodule on the anterior forehead suspicious for metastatic disease.  It appears she clinically has evidence of progressive metastatic triple negative breast cancer, and I am being asked to see her today for consideration of palliative radiation therapy.    No Known Allergies    Social History     Tobacco Use   • Smoking status: Current Every Day Smoker     Packs/day: 0.50     Years: 20.00     Pack years: 10.00     Types: Cigarettes   • Smokeless tobacco: Never Used   Substance Use Topics   • Alcohol use: Yes     Frequency: Monthly or less   • Drug use: No       Past Medical History:   Diagnosis Date   • Breast CA (CMS/HCC) 10/4/2018   • Depression    • DJD (degenerative joint disease) of cervical spine    • THALIA (generalized anxiety disorder)    • Heart murmur    • Ovarian cancer (CMS/HCC) 1989   • Tobacco dependence        family history includes Arthritis in her mother; Celiac disease in her other; Heart attack in her mother; Liver disease in her father; Osteoporosis in her mother.     Past Surgical History:   Procedure Laterality Date   • APPENDECTOMY     • BREAST BIOPSY Right 2003    DONE IN [place]   • COLONOSCOPY  06/2018   • HYSTERECTOMY  1989   • MASTECTOMY W/ SENTINEL NODE BIOPSY Bilateral 10/4/2018    Procedure: LEFT SKIN SPARING BREAST MASTECTOMY WITH LEFT SENTINEL NODE BIOPSY, RIGHT PROPHYLACTIC SKIN SPARING MASTECTOMY;  Surgeon: Koffi Worthington MD;  Location: [facility];  Service: General   • OOPHORECTOMY  1989        Review of Systems   Constitutional: Positive for appetite change, fatigue and unexpected weight change.   HENT:   Positive for lump/mass.    Eyes: Negative for eye problems.   Respiratory: Negative.    Cardiovascular: Negative.    Gastrointestinal: Positive for diarrhea. Negative for nausea and vomiting.   Endocrine: Negative.    Genitourinary: Negative for bladder incontinence and difficulty urinating.    Musculoskeletal: Negative for  arthralgias, back pain and gait problem.   Skin: Negative.    Neurological: Positive for extremity weakness, headaches and numbness. Negative for dizziness, gait problem, light-headedness, seizures and speech difficulty.   Hematological: Bruises/bleeds easily.   Psychiatric/Behavioral: Negative.            Objective   VITAL SIGNS:   Vitals:    04/11/19 0356 04/11/19 0721 04/11/19 1126 04/11/19 1128   BP: (!) 88/63 116/71 146/84 146/84   BP Location: Right arm Right arm  Right arm   Patient Position: Lying Lying  Sitting   Pulse: 73 77  86   Resp: 18 14  14   Temp: 97.5 °F (36.4 °C) 97.9 °F (36.6 °C)  98 °F (36.7 °C)   TempSrc: Oral Oral  Oral   SpO2:  97%  96%   Weight:       Height:            KPS        80%    Physical Exam   Constitutional: She is oriented to person, place, and time. She appears well-developed and well-nourished. No distress.   HENT:   Head: Normocephalic and atraumatic.   Mouth/Throat: Oropharynx is clear and moist.   Treatment related alopecia; there is a 2 x 3 cm raised and slightly erythematous nodule within the midline of the anterior forehead.  No ulceration.  Mildly tender to palpation.   Eyes: Conjunctivae and EOM are normal. Pupils are equal, round, and reactive to light.   Neck: Normal range of motion. Neck supple.   Cardiovascular: Normal rate and regular rhythm. Exam reveals no friction rub.   No murmur heard.  Pulmonary/Chest: Effort normal and breath sounds normal. She has no wheezes.   Postmastectomy bilaterally   Abdominal: Soft. Bowel sounds are normal. She exhibits no distension and no mass. There is no tenderness.   Musculoskeletal: Normal range of motion. She exhibits no edema.   Lymphadenopathy:     She has no cervical adenopathy.   Neurological: She is alert and oriented to person, place, and time. A sensory deficit is present. No cranial nerve deficit. She exhibits abnormal muscle tone.   3/5 strength in the left upper extremity with intact sensation to light touch,  pinprick, and position.  5 out of 5 strength in the right upper extremity and bilateral lower extremities.  Left hand drifts downward which is most likely associated with weakness.   Skin: Skin is warm and dry.   Psychiatric: She has a normal mood and affect. Her behavior is normal. Judgment and thought content normal.   Nursing note and vitals reviewed.    IMAGING  I have personally reviewed the relevant imaging studies, as follows:  Mri Brain With & Without Contrast  Mri Cervical Spine With & Without Contrast    Result Date: 4/10/2019  EXAMINATION: MRI BRAIN W/WO CONTRAST, MRI CERVICAL SPINE W/WO CONTRAST - 04/10/2019      1. 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 Another consideration is for subacute infarct. Follow-up is required. 2. Degenerative changes of the cervical spine without evidence of metastatic disease.  DICTATED:   04/10/2019 EDITED/ls :   04/10/2019  This report was finalized on 4/10/2019 6:37 PM by Adalberto Vallejo.       PATHOLOGY  Breast Biopsy 8/15/2018:  LEFT BREAST ULTRASOUND GUIDED BIOPSY AT 12 O'CLOCK:  Infiltrating high-grade ductal carcinoma.  Estrogen Receptor          RESULT:  Negative.      0%        0+ (INTENSITY SCORE)   Progesterone Receptor  RESULT:  Positive         50%      2+ (INTENSITY SCORE)  Her2/Nehemiah oncoprotein     RESULT: equivocal       20%      2+ (INTENSITY SCORE)     FISH HER2/nehemiah Report, Addendum   Fluorescence in-situ Hybridization (FISH) Report  HER2/HOUSTON-17 Dual-Probe  Negative/Not Amplified  HER2/[place]STON-17 Ratio: 1.5  Avg number of HER2 Signals/Nucleus: 3.4     Bilateral Mastectomy 10/4/2018:  1. RIGHT BREAST, SKIN SPARING SIMPLE MASTECTOMY:  Proliferative fibrocystic change without atypia, dystrophic calcifications identified.   Skin of nipple and breast, histologically unremarkable.   Previous biopsy site identified.   2.  SENTINEL LYMPH NODE, EXCISION:  Lymph node x1, reactive node without evidence of metastatic carcinoma or granulomatous inflammation.   3. LEFT BREAST, SKIN SPARING SIMPLE MASTECTOMY:  High grade ductal adenocarcinoma with basaloid features, Baird-Kang score 8/9.  Tumor size 2.6 x 2.2 x 1.9 cm.  No lymphovascular invasion identified.   Dystrophic calcifications present.   Margins of excision negative for tumor.   Skin of nipple and breast show no pagetoid intraepithelial or dermal lymphatic tumor.        The following portions of the patient's history were reviewed and updated as appropriate: allergies, current medications, past family history, past medical history, past social history, past surgical history and problem list.    Assessment      IMPRESSION:  Ms. Lambert is a 59 year old female with a poorly differentiated basaloid left breast cancer, who presents with left upper extremity weakness, with headaches and imaging consistent with metastatic disease to the brain, specifically with leptomeningeal enhancement.  Although an acute stroke could be considered, the fact her symptoms have improved somewhat with steroids and the appearance of the new nodule on her forehead concerning for a metastasis, I believe this is all consistent with breast cancer.  She has already been evaluated by Neurology who agrees.  I met with the patient, her boyfriend, and her niece and discussed the treatment options.  Considering the enhancement on the dura is a more diffuse process, this is not easily amenable to treatment using stereotactic radiosurgery, and instead, she should be treated with whole brain radiation therapy.  This will consist of 10 treatments to 30 Gy total and in addition to covering the meningeal disease, we will also easily be able to cover the dermal nodule on the forehead.  I discussed the risks and benefits with the patient.  The most concerning side effect is with regards to short term memory loss, and  I recommend that we start Memantine, which has been shown in a randomized trial through the RTO[facility] to significantly reduce the cognitive deficits that can be seen with whole brain radiation therapy.  She was agreeable to begin treatment and signed informed consent.  I will bring her down to my department today for a CT simulation and will aim to start treatments as an inpatient.  Once she completes her staging workup and is appropriate for discharge, we can transition her appointments to an outpatient for the remainder of treatment.        Thank you for allowing me to participate in the care of this individual.    Sincerely,      Rod Garcia MD      Errors in dictation may reflect use of voice recognition software and not all errors in transcription may have been detected prior to signing.

## 2019-04-11 NOTE — CONSULTS
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 female, referred  for evaluation of left arm weakness, and new finding of a brain lesion.  She has a recent diagnosis of stage IIA ER - CA +  Her2 - breast cancer, and has been on adjuvant chemotherapy since November.  She has had headache and left arm pain for about a month, and presented to the ED yesterday with left arm weakness.  MRI brain shows a right frontoparietal lesion - CNS metastases versus subacute infarct.  She has been started on steroids.  Neurology and neurosurgery consults pending.    Today she says her left arm weakness has improved on steroids.    REVIEW OF SYSTEMS:  A 14 point review of systems was performed and is negative except as noted above.    Past Medical History:   Diagnosis Date   • Breast CA (CMS/HCC) 10/4/2018   • Depression    • DJD (degenerative joint disease) of cervical spine    • THALIA (generalized anxiety disorder)    • Heart murmur    • Ovarian cancer (CMS/HCC) 1989   • Tobacco dependence        No current facility-administered medications on file prior to encounter.      Current Outpatient Medications on File Prior to Encounter   Medication Sig Dispense Refill   • ALPRAZolam (XANAX) 0.5 MG tablet Take 1 tablet by mouth 3 (Three) Times a Day As Needed for Anxiety or Sleep. 90 tablet 2   • buPROPion SR (WELLBUTRIN SR) 100 MG  12 hr tablet Take one tablet daily in mornings (Patient taking differently: Take 100 mg by mouth Daily. Take one tablet daily in mornings) 90 tablet 0   • HYDROcodone-acetaminophen (NORCO) 5-325 MG per tablet Take 1 tablet by mouth Every 6 (Six) Hours As Needed for Moderate Pain . 60 tablet 0   • lidocaine-prilocaine (EMLA) 2.5-2.5 % cream Apply  topically to the appropriate area as directed As Needed (45-60 minutes prior to port access.  Cover with saran/plastic wrap.). 30 g 3   • ondansetron (ZOFRAN) 8 MG tablet Take 1 tablet by mouth 3 (Three) Times a Day As Needed for Nausea or Vomiting. 30 tablet 5   • sertraline (ZOLOFT) 100 MG tablet Take 2 tablets by mouth Daily. 60 tablet 3       No Known Allergies    Past Surgical History:   Procedure Laterality Date   • APPENDECTOMY     • BREAST BIOPSY Right     DONE IN [place]   • COLONOSCOPY  2018   • HYSTERECTOMY     • MASTECTOMY W/ SENTINEL NODE BIOPSY Bilateral 10/4/2018    Procedure: LEFT SKIN SPARING BREAST MASTECTOMY WITH LEFT SENTINEL NODE BIOPSY, RIGHT PROPHYLACTIC SKIN SPARING MASTECTOMY;  Surgeon: Koffi Worthington MD;  Location: [facility];  Service: General   • OOPHORECTOMY         OB History    Para Term  AB Living   2 2 2         SAB TAB Ectopic Molar Multiple Live Births                    # Outcome Date GA Lbr Julito/2nd Weight Sex Delivery Anes PTL Lv   2 Term            1 Term                   Social History     Socioeconomic History   • Marital status:      Spouse name: N/A   • Number of children: 1   • Years of education: H.S.   • Highest education level: High school graduate   Occupational History   • Occupation: Associate     Employer: [employer]   Tobacco Use   • Smoking status: Current Every Day Smoker     Packs/day: 0.50     Years: 20.00     Pack years: 10.00     Types: Cigarettes   • Smokeless tobacco: Never Used   Substance and Sexual Activity   • Alcohol use: Yes     Frequency: Monthly or  "less   • Drug use: No   • Sexual activity: Not Currently     Partners: Male       Family History   Problem Relation Age of Onset   • Arthritis Mother    • Heart attack Mother    • Osteoporosis Mother    • Liver disease Father    • Celiac disease Other        Objective     Vitals:    04/10/19 2230 04/10/19 2339 04/11/19 0356 04/11/19 0721   BP: 152/87  (!) 88/63 116/71   BP Location: Right arm  Right arm Right arm   Patient Position: Lying  Lying Lying   Pulse: 83 98 73 77   Resp: 20 18 14   Temp: 97.8 °F (36.6 °C)  97.5 °F (36.4 °C) 97.9 °F (36.6 °C)   TempSrc: Oral  Oral Oral   SpO2:    97%   Weight: 49.9 kg (110 lb)      Height: 160 cm (63\")          Performance Status: 1  General: well appearing female in no acute distress  Neuro: alert and oriented  HEENT: sclera anicteric, oropharynx clear  Lymphatics: no cervical, supraclavicular, or axillary adenopathy  Cardiovascular: regular rate and rhythm, no murmurs  Lungs: clear to auscultation bilaterally  Abdomen: soft, nontender, nondistended.  No palpable organomegaly  Extremeties: no lower extremity edema  Skin: no rashes, lesions, bruising, or petechiae.  1 cm forehead nodule.  Psych: mood and affect appropriate      Results for ELVIRA RIVERA (MRN [id number]) as of 4/11/2019 10:26   Ref. Range 4/10/2019 13:30   Glucose Latest Ref Range: 65 - 99 mg/dL 92   Sodium Latest Ref Range: 136 - 145 mmol/L 138   Potassium Latest Ref Range: 3.5 - 5.2 mmol/L 3.9   CO2 Latest Ref Range: 22.0 - 29.0 mmol/L 24.0   Chloride Latest Ref Range: 98 - 107 mmol/L 102   Anion Gap Latest Units: mmol/L 12.0   Creatinine Latest Ref Range: 0.57 - 1.00 mg/dL 0.53 (L)   BUN Latest Ref Range: 6 - 20 mg/dL 10   BUN/Creatinine Ratio Latest Ref Range: 7.0 - 25.0  18.9   Calcium Latest Ref Range: 8.6 - 10.5 mg/dL 9.3   eGFR Non  Am Latest Ref Range: >60 mL/min/1.73 118   Alkaline Phosphatase Latest Ref Range: 39 - 117 U/L 65   Total Protein Latest Ref Range: 6.0 - 8.5 g/dL 7.0   ALT " (SGPT) Latest Ref Range: 1 - 33 U/L 13   AST (SGOT) Latest Ref Range: 1 - 32 U/L 17   Total Bilirubin Latest Ref Range: 0.2 - 1.2 mg/dL 0.3   Albumin Latest Ref Range: 3.50 - 5.20 g/dL 4.20   Globulin Latest Units: gm/dL 2.8   A/G Ratio Latest Units: g/dL 1.5   Magnesium Latest Ref Range: 1.6 - 2.6 mg/dL 2.0   WBC Latest Ref Range: 3.40 - 10.80 10*3/mm3 5.90   RBC Latest Ref Range: 3.77 - 5.28 10*6/mm3 3.52 (L)   Hemoglobin Latest Ref Range: 12.0 - 15.9 g/dL 11.4 (L)   Hematocrit Latest Ref Range: 34.0 - 46.6 % 34.5   RDW Latest Ref Range: 12.3 - 15.4 % 14.4   MCV Latest Ref Range: 79.0 - 97.0 fL 98.0 (H)   MCH Latest Ref Range: 26.6 - 33.0 pg 32.4   MCHC Latest Ref Range: 31.5 - 35.7 g/dL 33.0   MPV Latest Ref Range: 6.0 - 12.0 fL 10.3   Platelets Latest Ref Range: 140 - 450 10*3/mm3 248   RDW-SD Latest Ref Range: 37.0 - 54.0 fl 51.2       Imaging:  I personally reviewed MRI brain images.  IMPRESSION:  1. 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 Another  consideration is for subacute infarct. Follow-up is required.  2. Degenerative changes of the cervical spine without evidence of  metastatic disease.      Assessment/Plan     Brittani Lambert is a 59 y.o. year old female with stage IIA ER- Her2- breast cancer admitted with right arm weakness.    Right arm weakness: concern for CNS metastasis versus subacute stroke. She does have multiple stroke risk factors, but symptoms concerning for metastatic disease.  Await neurology/NS input.  If they concur this is consistent with metastatic disease, will plan to consult radiation oncology.      Breast cancer: hold chemo at this time.  Will order restaging studies including ct c/a/p and bone scan.  She has a nodule on her forehead concerning for skin metastasis.  Discussed that if her cancer has progressed during initial adjuvant  therapy, it indicates fairly aggressive and resistant disease.                 Carrie Patel MD    4/11/2019

## 2019-04-11 NOTE — PLAN OF CARE
Problem: Patient Care Overview  Goal: Plan of Care Review  Outcome: Ongoing (interventions implemented as appropriate)   04/11/19 4211   Coping/Psychosocial   Plan of Care Reviewed With patient;significant other   Plan of Care Review   Progress no change   OTHER   Outcome Summary OT IE completed. Pt. supine to sit on EOB with SBA. Pt. participated in B UE AROM/MMT & balance testing in sitting/standing. Pt. with 90 degrees AROM in L shldr. Pt. with 2+/5 strength in L UE grossly. Pt. sit to stand with CGA. Pt. stand to sit with CGA, then sit to supine with SBA. Pt. is appropriate for OT skilled services. Pt. & s.o. are agreeable with OT POC.

## 2019-04-11 NOTE — PLAN OF CARE
Problem: Patient Care Overview  Goal: Plan of Care Review  Outcome: Ongoing (interventions implemented as appropriate)   04/11/19 2240   OTHER   Outcome Summary Pt very tearful today. Complaints of headaache, dilaudid given prn as ordered. CT planning scan done for whole brain radiation. CT scans of chest, abd, and pelvis and NM bone scan scheduled today as well. VSS will cont to monitor.      Goal: Individualization and Mutuality  Outcome: Ongoing (interventions implemented as appropriate)    Goal: Discharge Needs Assessment  Outcome: Ongoing (interventions implemented as appropriate)    Goal: Interprofessional Rounds/Family Conf  Outcome: Ongoing (interventions implemented as appropriate)

## 2019-04-11 NOTE — SIGNIFICANT NOTE
04/11/19 1654   SLP Deferred Reason   SLP Deferred Reason Patient unavailable for evaluation  (Consult received for cognitive-communication eval. Spoke to RN who reported pt LO in radiation oncology. SLP will f/u for eval.)

## 2019-04-11 NOTE — PROGRESS NOTES
[facility] Medicine Services  PROGRESS NOTE    Patient Name: Brittani Lambert  : 1960  MRN: [id number]    Date of Admission: 4/10/2019  Length of Stay: 1  Primary Care Physician: Alla Colon DO    Subjective   Subjective     CC:  F/U left sided weakness, headache    HPI:  Patient seen this morning. No major complaints. Headache improved. Very concerned about metastatic cancer and what options she will have.    Review of Systems  Gen-no fevers, no chills  CV-no chest pain, no palpitations  Resp-no cough, no dyspnea  GI-no N/V/D, no abd pain    Otherwise ROS is negative except as mentioned in the HPI.    Objective   Objective     Vital Signs:   Temp:  [97.5 °F (36.4 °C)-98.1 °F (36.7 °C)] 98 °F (36.7 °C)  Heart Rate:  [73-98] 86  Resp:  [14-20] 14  BP: ()/() 146/84  Total (NIH Stroke Scale): 2     Physical Exam:  Gen-no acute distress  HENT-NCAT, mucous membranes moist  CV-RRR, S1 S2 normal, no m/r/g  Resp-CTAB, no wheezes or rales  Abd-soft, NT, ND, +BS  Ext-no edema  Neuro-A&Ox3, diminished strength LUE/LLE  Skin-no rashes; tender nodule on left forehead  Psych-appropriate mood      Results Reviewed:  I have personally reviewed current lab, radiology, and data and agree.    Results from last 7 days   Lab Units 04/10/19  1330 19  1033   WBC 10*3/mm3 5.90 4.40   HEMOGLOBIN g/dL 11.4* 10.1*   HEMATOCRIT % 34.5 29.7*   PLATELETS 10*3/mm3 248 221     Results from last 7 days   Lab Units 04/10/19  1337 04/10/19  1330 19  1033   SODIUM mmol/L  --  138 137   POTASSIUM mmol/L  --  3.9 4.1   CHLORIDE mmol/L  --  102 107   CO2 mmol/L  --  24.0 23.0   BUN mg/dL  --  10 14   CREATININE mg/dL  --  0.53* 0.64   GLUCOSE mg/dL  --  92 84   CALCIUM mg/dL  --  9.3 8.9   ALT (SGPT) U/L  --  13 13   AST (SGOT) U/L  --  17 18   TROPONIN I ng/mL 0.00  --   --      Estimated Creatinine Clearance: 90 mL/min (A) (by C-G formula based on SCr of 0.53 mg/dL (L)).    No results  found for: BNP    Microbiology Results Abnormal     None          Imaging Results (last 24 hours)     Procedure Component Value Units Date/Time    XR Chest 1 View [[id number]] Collected:  04/10/19 1439     Updated:  04/10/19 2121    Narrative:       EXAMINATION: XR CHEST 1 VW- 04/10/2019      INDICATION: Weak/Dizzy/AMS triage protocol      COMPARISON: 11/09/2018     FINDINGS: Note is again made of patient's right-sided Port-A-Cath in  stable position in the proximal SVC. Lungs are well-expanded and appear  clear. Heart and vasculature appear normal in size.           Impression:       No evidence of active chest disease.     D:  04/10/2019  E:  04/10/2019     This report was finalized on 4/10/2019 9:18 PM by DR. Martinez Sheikh MD.       MRI Brain With & Without Contrast [[id number]] Collected:  04/10/19 1743     Updated:  04/10/19 1840    Narrative:       EXAMINATION: MRI BRAIN W/WO CONTRAST, MRI CERVICAL SPINE W/WO CONTRAST -  04/10/2019      INDICATION: Left upper extremity weakness.     TECHNIQUE:  Multiplanar multisequence MRI of the brain and cervical  spine was performed without and with contrast.     COMPARISONS:  None.     FINDINGS:      BRAIN: There is curvilinear leptomeningeal enhancement along the central  sulcus with surrounding T2 prolongation in the pre- and postcentral gyri  and associated diffusion restriction. There is no other clear  parenchymal abnormality. Marrow signal of the calvarium is within normal  limits. No cortical destructive lesion. Incidental benign epidermal  inclusion cyst noted in the anterior frontal scalp towards the left of  midline. [place] flow-voids are preserved.     CERVICAL SPINE:  Sagittal images cover from from the sella through the  T4 level caudally.     The cervicomedullary junction is unremarkable.  The cervical cord is  normal in caliber and signal. There is no abnormal enhancement in the  spinal canal or in the paraspinal soft tissues. Several  benign  perineural cysts are seen in the cervical and incidentally imaged  thoracic spine.     With regard to the marrow space, vertebral body heights are maintained.   There is maintenance of the usual lordosis without significant  spondylolisthesis.  Background marrow signal is normal.     Degenerative changes are as follows:     C2-3: No significant foraminal or spinal canal stenosis.     C3-4: Left greater than right facet arthropathy without significant  foraminal or spinal canal stenosis.     C4-5: Right greater than left uncovertebral arthropathy and left greater  than right facet disease. There is moderate right foraminal stenosis but  no significant spinal canal stenosis.     C5-6: Right greater than left uncovertebral arthropathy and left greater  than right facet disease. Moderate right foraminal stenosis. No  significant spinal canal stenosis.     C6-7: Left greater than right uncovertebral arthropathy. No significant  foraminal or spinal canal stenosis.     C7/T1: No significant foraminal or spinal canal stenosis.     Incidental imaging of the cervical soft tissues is unremarkable.       Impression:       1. 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 Another  consideration is for subacute infarct. Follow-up is required.  2. Degenerative changes of the cervical spine without evidence of  metastatic disease.     DICTATED:   04/10/2019  EDITED/ls :   04/10/2019      This report was finalized on 4/10/2019 6:37 PM by Adalberto Vallejo.       MRI Cervical Spine With & Without Contrast [[id number]] Collected:  04/10/19 1743     Updated:  04/10/19 1840    Narrative:       EXAMINATION: MRI BRAIN W/WO CONTRAST, MRI CERVICAL SPINE W/WO CONTRAST -  04/10/2019      INDICATION: Left upper extremity weakness.     TECHNIQUE:  Multiplanar multisequence MRI of the brain and  cervical  spine was performed without and with contrast.     COMPARISONS:  None.     FINDINGS:      BRAIN: There is curvilinear leptomeningeal enhancement along the central  sulcus with surrounding T2 prolongation in the pre- and postcentral gyri  and associated diffusion restriction. There is no other clear  parenchymal abnormality. Marrow signal of the calvarium is within normal  limits. No cortical destructive lesion. Incidental benign epidermal  inclusion cyst noted in the anterior frontal scalp towards the left of  midline. [place] flow-voids are preserved.     CERVICAL SPINE:  Sagittal images cover from from the sella through the  T4 level caudally.     The cervicomedullary junction is unremarkable.  The cervical cord is  normal in caliber and signal. There is no abnormal enhancement in the  spinal canal or in the paraspinal soft tissues. Several benign  perineural cysts are seen in the cervical and incidentally imaged  thoracic spine.     With regard to the marrow space, vertebral body heights are maintained.   There is maintenance of the usual lordosis without significant  spondylolisthesis.  Background marrow signal is normal.     Degenerative changes are as follows:     C2-3: No significant foraminal or spinal canal stenosis.     C3-4: Left greater than right facet arthropathy without significant  foraminal or spinal canal stenosis.     C4-5: Right greater than left uncovertebral arthropathy and left greater  than right facet disease. There is moderate right foraminal stenosis but  no significant spinal canal stenosis.     C5-6: Right greater than left uncovertebral arthropathy and left greater  than right facet disease. Moderate right foraminal stenosis. No  significant spinal canal stenosis.     C6-7: Left greater than right uncovertebral arthropathy. No significant  foraminal or spinal canal stenosis.     C7/T1: No significant foraminal or spinal canal stenosis.     Incidental imaging of the  cervical soft tissues is unremarkable.       Impression:       1. 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 Another  consideration is for subacute infarct. Follow-up is required.  2. Degenerative changes of the cervical spine without evidence of  metastatic disease.     DICTATED:   04/10/2019  EDITED/ls :   04/10/2019      This report was finalized on 4/10/2019 6:37 PM by Adalberto Vallejo.             Results for orders placed during the hospital encounter of 10/15/18   Adult Transthoracic Echo Complete W/ Cont if Necessary Per Protocol    Narrative · Left ventricular systolic function is normal.  · Estimated EF appears to be in the range of 61 - 65%.  · Mild tricuspid valve regurgitation is present.  · Calculated right ventricular systolic pressure from tricuspid   regurgitation is 50 mmHg.          I have reviewed the medications:    Current Facility-Administered Medications:   •  ALPRAZolam (XANAX) tablet 0.5 mg, 0.5 mg, Oral, TID PRN, Juan Carlos Gayle MD  •  aspirin chewable tablet 81 mg, 81 mg, Oral, Daily, 81 mg at 04/11/19 0811 **OR** aspirin suppository 300 mg, 300 mg, Rectal, Daily, Juan Carlos Gayle MD  •  atorvastatin (LIPITOR) tablet 80 mg, 80 mg, Oral, Nightly, Juan Carlos Gayle MD, 80 mg at 04/10/19 2319  •  buPROPion SR (WELLBUTRIN SR) 12 hr tablet 150 mg, 150 mg, Oral, Q12H, Juan Carlos Gayle MD, 150 mg at 04/11/19 0811  •  dexamethasone (DECADRON) injection 4 mg, 4 mg, Intravenous, Q8H, Alyssa Harris MD, 4 mg at 04/11/19 0816  •  HYDROcodone-acetaminophen (NORCO) 5-325 MG per tablet 1 tablet, 1 tablet, Oral, Q6H PRN, Juan Carlos Gayle MD, 1 tablet at 04/10/19 2155  •  HYDROmorphone (DILAUDID) injection 0.5 mg, 0.5 mg, Intravenous, Q2H PRN, 0.5 mg at 04/11/19 1153 **AND** naloxone (NARCAN) injection 0.4 mg, 0.4 mg, Intravenous, Q5 Min PRN, Juan Carlos Gayle MD  •  lidocaine  4 % dressing, , Topical, PRN, Juan Carlos Gayle MD  •  nicotine (NICODERM CQ) 21 MG/24HR patch 1 patch, 1 patch, Transdermal, Q24H, Juan Carlos Gayle MD, 1 patch at 04/10/19 2320  •  ondansetron (ZOFRAN) tablet 8 mg, 8 mg, Oral, TID PRN, Juan Carlos Gayle MD  •  sertraline (ZOLOFT) tablet 200 mg, 200 mg, Oral, Daily, Juan Carlos Gayle MD, 200 mg at 04/11/19 0811  •  sodium chloride 0.9 % flush 10 mL, 10 mL, Intravenous, PRN, Hernandez Matos MD  •  sodium chloride 0.9 % flush 3 mL, 3 mL, Intravenous, Q12H, Juan Carlos Gayle MD, 3 mL at 04/10/19 2320  •  sodium chloride 0.9 % flush 3 mL, 3 mL, Intravenous, Q12H, Juan Carlos Gayle MD  •  sodium chloride 0.9 % flush 3-10 mL, 3-10 mL, Intravenous, PRN, Juan Carlos Gayle MD  •  sodium chloride 0.9 % flush 3-10 mL, 3-10 mL, Intravenous, PRN, Juan Carlos Gayle MD      Assessment/Plan   Assessment / Plan     Active Hospital Problems    Diagnosis POA   • **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 female with hx of breast cancer s/p bilateral mastectomy and undergoing chemotherapy with Dr. Patel who presents due to LUE and LLE weakness, along with headache. 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    PLAN:  --Continue IV Decadron.  --Dr. Patel following. Awaiting Neurology and Neurosurgery consults. If consistent with metastatic disease, plan to consult Rad/Onc. Re-staging scans ordered. Nodule on forehead could be skin metastasis.  --Supportive care.    DVT Prophylaxis:  mechanical    Disposition: I expect the patient to be discharged TBD    CODE STATUS:   Code Status and Medical Interventions:   Ordered at: 04/10/19 1954     Level Of  Support Discussed With:    Patient     Code Status:    CPR     Medical Interventions (Level of Support Prior to Arrest):    Full         Electronically signed by Alyssa Harris MD, 04/11/19, 12:02 PM.

## 2019-04-11 NOTE — PLAN OF CARE
Problem: Patient Care Overview  Goal: Plan of Care Review  Outcome: Ongoing (interventions implemented as appropriate)   04/11/19 8129   Coping/Psychosocial   Plan of Care Reviewed With patient   Plan of Care Review   Progress no change   OTHER   Outcome Summary No acute overnight events. VSS-- SBP slightly low after PRN pain medications given. Continue current POC       Problem: Chemotherapy Effects (Adult)  Goal: Signs and Symptoms of Listed Potential Problems Will be Absent, Minimized or Managed (Chemotherapy Effects)  Outcome: Ongoing (interventions implemented as appropriate)

## 2019-04-11 NOTE — PROGRESS NOTES
Ms. Lambert completed a radiation setup session this afternoon and also started radiation treatments to the brain.  She is planned for a total of 10 treatments to 30 Gy.  I anticipate she will receive treatment today and tomorrow, and may potentially be ready for discharge by the weekend if improved.  Please continue her on steroids and memantine after discharge.  I will personally begin tapering her off steroids once she completes her first week of treatment, and I recommend that she take the memantine for at least 1-2 months.

## 2019-04-12 ENCOUNTER — APPOINTMENT (OUTPATIENT)
Dept: ONCOLOGY | Facility: HOSPITAL | Age: 59
End: 2019-04-12

## 2019-04-12 VITALS
HEART RATE: 98 BPM | OXYGEN SATURATION: 100 % | SYSTOLIC BLOOD PRESSURE: 140 MMHG | TEMPERATURE: 98.2 F | HEIGHT: 63 IN | RESPIRATION RATE: 18 BRPM | BODY MASS INDEX: 19.49 KG/M2 | WEIGHT: 110 LBS | DIASTOLIC BLOOD PRESSURE: 69 MMHG

## 2019-04-12 LAB
ANION GAP SERPL CALCULATED.3IONS-SCNC: 12 MMOL/L
BUN BLD-MCNC: 10 MG/DL (ref 6–20)
BUN/CREAT SERPL: 14.3 (ref 7–25)
CALCIUM SPEC-SCNC: 9.6 MG/DL (ref 8.6–10.5)
CANCER AG15-3 SERPL-ACNC: 35.3 U/ML
CHLORIDE SERPL-SCNC: 98 MMOL/L (ref 98–107)
CO2 SERPL-SCNC: 24 MMOL/L (ref 22–29)
CREAT BLD-MCNC: 0.7 MG/DL (ref 0.57–1)
DEPRECATED RDW RBC AUTO: 51.7 FL (ref 37–54)
ERYTHROCYTE [DISTWIDTH] IN BLOOD BY AUTOMATED COUNT: 14.5 % (ref 12.3–15.4)
GFR SERPL CREATININE-BSD FRML MDRD: 86 ML/MIN/1.73
GLUCOSE BLD-MCNC: 111 MG/DL (ref 65–99)
HCT VFR BLD AUTO: 33.7 % (ref 34–46.6)
HGB BLD-MCNC: 11 G/DL (ref 12–15.9)
MCH RBC QN AUTO: 32 PG (ref 26.6–33)
MCHC RBC AUTO-ENTMCNC: 32.6 G/DL (ref 31.5–35.7)
MCV RBC AUTO: 98 FL (ref 79–97)
PLATELET # BLD AUTO: 257 10*3/MM3 (ref 140–450)
PMV BLD AUTO: 10.4 FL (ref 6–12)
POTASSIUM BLD-SCNC: 4.4 MMOL/L (ref 3.5–5.2)
RBC # BLD AUTO: 3.44 10*6/MM3 (ref 3.77–5.28)
SODIUM BLD-SCNC: 134 MMOL/L (ref 136–145)
WBC NRBC COR # BLD: 6.76 10*3/MM3 (ref 3.4–10.8)

## 2019-04-12 PROCEDURE — 92523 SPEECH SOUND LANG COMPREHEN: CPT

## 2019-04-12 PROCEDURE — 77336 RADIATION PHYSICS CONSULT: CPT | Performed by: RADIOLOGY

## 2019-04-12 PROCEDURE — 80048 BASIC METABOLIC PNL TOTAL CA: CPT | Performed by: HOSPITALIST

## 2019-04-12 PROCEDURE — 86300 IMMUNOASSAY TUMOR CA 15-3: CPT | Performed by: INTERNAL MEDICINE

## 2019-04-12 PROCEDURE — 99024 POSTOP FOLLOW-UP VISIT: CPT | Performed by: NEUROLOGICAL SURGERY

## 2019-04-12 PROCEDURE — 99239 HOSP IP/OBS DSCHRG MGMT >30: CPT | Performed by: HOSPITALIST

## 2019-04-12 PROCEDURE — 25010000002 HYDROMORPHONE PER 4 MG: Performed by: FAMILY MEDICINE

## 2019-04-12 PROCEDURE — 77412 RADIATION TX DELIVERY LVL 3: CPT | Performed by: RADIOLOGY

## 2019-04-12 PROCEDURE — 85027 COMPLETE CBC AUTOMATED: CPT | Performed by: HOSPITALIST

## 2019-04-12 PROCEDURE — 99232 SBSQ HOSP IP/OBS MODERATE 35: CPT | Performed by: PSYCHIATRY & NEUROLOGY

## 2019-04-12 PROCEDURE — 99253 IP/OBS CNSLTJ NEW/EST LOW 45: CPT | Performed by: PHYSICIAN ASSISTANT

## 2019-04-12 PROCEDURE — 97162 PT EVAL MOD COMPLEX 30 MIN: CPT

## 2019-04-12 PROCEDURE — 99232 SBSQ HOSP IP/OBS MODERATE 35: CPT | Performed by: INTERNAL MEDICINE

## 2019-04-12 PROCEDURE — 25010000002 DEXAMETHASONE PER 1 MG: Performed by: HOSPITALIST

## 2019-04-12 RX ORDER — DEXAMETHASONE 4 MG/1
4 TABLET ORAL
Qty: 42 TABLET | Refills: 0 | Status: SHIPPED | OUTPATIENT
Start: 2019-04-12 | End: 2019-04-26

## 2019-04-12 RX ORDER — HYDROCODONE BITARTRATE AND ACETAMINOPHEN 5; 325 MG/1; MG/1
1 TABLET ORAL EVERY 6 HOURS PRN
Qty: 12 TABLET | Refills: 0 | Status: SHIPPED | OUTPATIENT
Start: 2019-04-12 | End: 2019-04-15 | Stop reason: DRUGHIGH

## 2019-04-12 RX ORDER — MEMANTINE HYDROCHLORIDE 5 MG/1
5 TABLET ORAL EVERY 12 HOURS SCHEDULED
Qty: 60 TABLET | Refills: 0 | Status: SHIPPED | OUTPATIENT
Start: 2019-04-12 | End: 2019-05-12

## 2019-04-12 RX ADMIN — ASPIRIN 81 MG 81 MG: 81 TABLET ORAL at 08:24

## 2019-04-12 RX ADMIN — SERTRALINE HYDROCHLORIDE 200 MG: 100 TABLET ORAL at 08:24

## 2019-04-12 RX ADMIN — MEMANTINE 5 MG: 10 TABLET ORAL at 08:24

## 2019-04-12 RX ADMIN — DEXAMETHASONE SODIUM PHOSPHATE 4 MG: 4 INJECTION, SOLUTION INTRA-ARTICULAR; INTRALESIONAL; INTRAMUSCULAR; INTRAVENOUS; SOFT TISSUE at 08:24

## 2019-04-12 RX ADMIN — NICOTINE 1 PATCH: 21 PATCH, EXTENDED RELEASE TRANSDERMAL at 08:31

## 2019-04-12 RX ADMIN — HYDROMORPHONE HYDROCHLORIDE 0.5 MG: 1 INJECTION, SOLUTION INTRAMUSCULAR; INTRAVENOUS; SUBCUTANEOUS at 13:01

## 2019-04-12 RX ADMIN — BUPROPION HYDROCHLORIDE 150 MG: 150 TABLET, EXTENDED RELEASE ORAL at 08:24

## 2019-04-12 RX ADMIN — DEXAMETHASONE SODIUM PHOSPHATE 4 MG: 4 INJECTION, SOLUTION INTRA-ARTICULAR; INTRALESIONAL; INTRAMUSCULAR; INTRAVENOUS; SOFT TISSUE at 03:59

## 2019-04-12 RX ADMIN — HYDROMORPHONE HYDROCHLORIDE 0.5 MG: 1 INJECTION, SOLUTION INTRAMUSCULAR; INTRAVENOUS; SUBCUTANEOUS at 08:24

## 2019-04-12 NOTE — PROGRESS NOTES
"Neurology       Patient Care Team:  Alla Colon DO as PCP - General (Family Medicine)    Chief complaint LUE weakness, headache      Subjective .     History:  LUE weakness a little better this morning, can control hand somewhat. Walked 300ft with PT, noted to have impaired endurance, balance, and strength.   Has had a headache this morning, helped by pain med.  Wondering who will give her pain meds for headache on discharge.  S/P WB XRTx2    ROS: no fever, cough    Objective     Vital Signs   Blood pressure 148/78, pulse 97, temperature 98.2 °F (36.8 °C), temperature source Oral, resp. rate 18, height 160 cm (63\"), weight 49.9 kg (110 lb), SpO2 100 %, not currently breastfeeding.    Physical Exam:              Neuro: wd maww in nad, but subdued. Alert, fluent, appropriate.  eomi face symm tml  Motor: distal LUE weakness, 3- to 4/5, LE OK on MMT    Results Review:              CT A/P without mets; bone scan pending    Assessment/Plan     1. LUE weakness a/w right superior frontal enhancing lesion in pt with breast CA on tx, most c/w LM metastasis. Improving post steroids (and now XRT).  2. Headache -- treat symptomatically -- defer to primary team, Dr Patel.  D/w pt s/s of focal motor sz -- none to date, decreasing likelihood with tx.    I discussed the patients findings and my recommendations with patient and family    Florecita Finley MD  04/12/19  10:17 AM    "

## 2019-04-12 NOTE — THERAPY EVALUATION
Acute Care - Physical Therapy Initial Evaluation  [facility]     Patient Name: Brittani Lambert  : 1960  MRN: [id number]  Today's Date: 2019   Onset of Illness/Injury or Date of Surgery: 04/10/19  Date of Referral to PT: 04/10/19  Referring Physician: MD Nalini      Admit Date: 4/10/2019    Visit Dx:     ICD-10-CM ICD-9-CM   1. Acute focal neurological deficit, onset within 3-24 hours R29.818 781.99   2. Metastatic cancer to brain (CMS/HCC) C79.31 198.3   3. History of breast cancer Z85.3 V10.3   4. Impaired mobility and ADLs Z74.09 799.89     Patient Active Problem List   Diagnosis   • Mixed hyperlipidemia   • Tobacco abuse   • Moderate episode of recurrent major depressive disorder (CMS/HCC)   • Anxiety   • Functional diarrhea   • Lt Breast CA (CMS/HCC)   • Sepsis (CMS/HCC)   • Rt Cellulitis of chest wall   • Acute cystitis without hematuria   • Atrial fibrillation (CMS/HCC)   • Tobacco dependence   • Invasive ductal carcinoma of left breast (CMS/HCC)   • 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 (CMS/HCC)   • Left-sided weakness   • THALIA (generalized anxiety disorder)     Past Medical History:   Diagnosis Date   • Breast CA (CMS/HCC) 10/4/2018   • Depression    • DJD (degenerative joint disease) of cervical spine    • THALIA (generalized anxiety disorder)    • Heart murmur    • Ovarian cancer (CMS/HCC)    • Tobacco dependence      Past Surgical History:   Procedure Laterality Date   • APPENDECTOMY     • BREAST BIOPSY Right     DONE IN [place]   • COLONOSCOPY  2018   • HYSTERECTOMY     • MASTECTOMY W/ SENTINEL NODE BIOPSY Bilateral 10/4/2018    Procedure: LEFT SKIN SPARING BREAST MASTECTOMY WITH LEFT SENTINEL NODE BIOPSY, RIGHT PROPHYLACTIC SKIN SPARING MASTECTOMY;  Surgeon: Koffi Worthington MD;  Location: [facility];  Service: General   • OOPHORECTOMY          PT ASSESSMENT (last 12 hours)      Physical Therapy Evaluation     Row Name 19 0914          PT Evaluation  Time/Intention    Subjective Information  complains of;weakness;fatigue  -VG     Document Type  evaluation  -VG     Mode of Treatment  individual therapy;physical therapy  -VG     Patient Effort  good  -VG     Symptoms Noted During/After Treatment  fatigue  -VG     Row Name 04/12/19 0914          General Information    Patient Profile Reviewed?  yes  -VG     Onset of Illness/Injury or Date of Surgery  04/10/19  -VG     Referring Physician  MD Nalini  -VG     Patient Observations  alert;cooperative;agree to therapy  -VG     Patient/Family Observations  Family present  -VG     General Observations of Patient  In bed, RA, tele  -VG     Prior Level of Function  independent:;all household mobility;community mobility;ADL's;home management;driving;work  -VG     Equipment Currently Used at Home  none  -VG     Pertinent History of Current Functional Problem  Pt presents to ED 4/10 c/o inability to move LUE, diarrhea, and headache. MRI head (+) concern for carcinoma R fronto parietal region, possible subacute infarct. PMHx: breast cancer and HLD.  -VG     Existing Precautions/Restrictions  fall  -VG     Risks Reviewed  patient and family:;LOB;nausea/vomiting;dizziness;increased discomfort;change in vital signs;increased drainage  -VG     Benefits Reviewed  patient and family:;improve function;increase independence;increase strength;increase balance;decrease pain;decrease risk of DVT  -VG     Barriers to Rehab  medically complex  -VG     Row Name 04/12/19 0914          Relationship/Environment    Primary Source of Support/Comfort  significant other  -VG     Lives With  significant other  -VG     Concerns About Impact on Relationships  Pt lives with significant other at baseline who provides 24/7 spv/support.   -VG     Row Name 04/12/19 0914          Resource/Environmental Concerns    Current Living Arrangements  home/apartment/condo  -VG     Resource/Environmental Concerns  home accessibility  -VG     Home Accessibility  Concerns  stairs to enter home  -VG     Row Name 04/12/19 0914          Home Main Entrance    Number of Stairs, Main Entrance  two  -VG     Stair Railings, Main Entrance  none  -VG     Stairs Comment, Main Entrance  2 SUMMER to H  -VG     Row Name 04/12/19 0914          Cognitive Assessment/Interventions    Additional Documentation  Cognitive Assessment/Intervention (Group)  -VG     Row Name 04/12/19 0914          Cognitive Assessment/Intervention- PT/OT    Affect/Mental Status (Cognitive)  flat/blunted affect  -VG     Orientation Status (Cognition)  oriented x 4  -VG     Follows Commands (Cognition)  WFL  -VG     Cognitive Function (Cognitive)  safety deficit  -VG     Safety Deficit (Cognitive)  mild deficit;insight into deficits/self awareness  -VG     Personal Safety Interventions  fall prevention program maintained;gait belt;nonskid shoes/slippers when out of bed;supervised activity  -VG     Row Name 04/12/19 0914          Bed Mobility Assessment/Treatment    Bed Mobility Assessment/Treatment  supine-sit;sit-supine  -VG     Supine-Sit Clements (Bed Mobility)  supervision  -VG     Sit-Supine Clements (Bed Mobility)  supervision  -VG     Comment (Bed Mobility)  Demonstrates good safety awareness. No dizziness upon EOB.  -VG     Row Name 04/12/19 0914          Transfer Assessment/Treatment    Transfer Assessment/Treatment  sit-stand transfer;stand-sit transfer  -VG     Comment (Transfers)  Slightly unsteady upon standing.  -VG     Sit-Stand Clements (Transfers)  contact guard  -VG     Stand-Sit Clements (Transfers)  contact guard  -VG     Row Name 04/12/19 0914          Gait/Stairs Assessment/Training    Gait/Stairs Assessment/Training  gait/ambulation independence  -VG     Clements Level (Gait)  contact guard;verbal cues  -VG     Distance in Feet (Gait)  300  -VG     Pattern (Gait)  step-to;step-through  -VG     Deviations/Abnormal Patterns (Gait)  gait speed decreased;stride length  decreased;festinating/shuffling;marquita decreased  -VG     Comment (Gait/Stairs)  Distance limited by fatigue. Shuffled gait, slightly unsteady. Cues for safety. Demonstrates impaired endurance, balance, strength.  -VG     Row Name 04/12/19 0914          General ROM    GENERAL ROM COMMENTS  BLEs WFL  -VG     Row Name 04/12/19 0914          MMT (Manual Muscle Testing)    General MMT Comments  BLEs 4/5 grossly  -VG     Row Name 04/12/19 0914          Pain Assessment    Additional Documentation  Pain Scale: Numbers Pre/Post-Treatment (Group)  -VG     Row Name 04/12/19 0914          Pain Scale: Numbers Pre/Post-Treatment    Pain Scale: Numbers, Pretreatment  0/10 - no pain  -VG     Pain Scale: Numbers, Post-Treatment  0/10 - no pain  -VG     Row Name 04/12/19 0914          Coping    Observed Emotional State  accepting  -VG     Verbalized Emotional State  acceptance  -VG     Row Name 04/12/19 0914          Plan of Care Review    Plan of Care Reviewed With  patient;family  -VG     Row Name 04/12/19 0914          Physical Therapy Clinical Impression    Date of Referral to PT  04/10/19  -VG     PT Diagnosis (PT Clinical Impression)  Impaired endurance, balance, strength  -VG     Criteria for Skilled Interventions Met (PT Clinical Impression)  yes;treatment indicated  -VG     Pathology/Pathophysiology Noted (Describe Specifically for Each System)  musculoskeletal;neuromuscular  -VG     Impairments Found (describe specific impairments)  aerobic capacity/endurance;gait, locomotion, and balance;muscle performance  -VG     Rehab Potential (PT Clinical Summary)  good, to achieve stated therapy goals  -VG     Care Plan Review (PT)  evaluation/treatment results reviewed;patient/other agree to care plan  -VG     Care Plan Review, Other Participant (PT Clinical Impression)  family  -VG     Row Name 04/12/19 0914          Physical Therapy Goals    Bed Mobility Goal Selection (PT)  bed mobility, PT goal 1  -VG     Transfer Goal  Selection (PT)  transfer, PT goal 1  -VG     Gait Training Goal Selection (PT)  gait training, PT goal 1  -VG     Stairs Goal Selection (PT)  stairs, PT goal 1  -VG     Additional Documentation  Stairs Goal Selection (PT) (Row)  -VG     Row Name 04/12/19 0914          Bed Mobility Goal 1 (PT)    Activity/Assistive Device (Bed Mobility Goal 1, PT)  sit to supine/supine to sit  -VG     Beaver Level/Cues Needed (Bed Mobility Goal 1, PT)  independent  -VG     Time Frame (Bed Mobility Goal 1, PT)  long term goal (LTG);2 weeks  -VG     Row Name 04/12/19 0914          Transfer Goal 1 (PT)    Activity/Assistive Device (Transfer Goal 1, PT)  sit-to-stand/stand-to-sit  -VG     Beaver Level/Cues Needed (Transfer Goal 1, PT)  independent  -VG     Time Frame (Transfer Goal 1, PT)  long term goal (LTG);2 weeks  -VG     Row Name 04/12/19 0914          Gait Training Goal 1 (PT)    Activity/Assistive Device (Gait Training Goal 1, PT)  gait (walking locomotion)  -VG     Beaver Level (Gait Training Goal 1, PT)  independent  -VG     Distance (Gait Goal 1, PT)  500  -VG     Time Frame (Gait Training Goal 1, PT)  long term goal (LTG);2 weeks  -VG     Row Name 04/12/19 0914          Stairs Goal 1 (PT)    Activity/Assistive Device (Stairs Goal 1, PT)  stairs, all skills  -VG     Beaver Level/Cues Needed (Stairs Goal 1, PT)  supervision required  -VG     Number of Stairs (Stairs Goal 1, PT)  2  -VG     Time Frame (Stairs Goal 1, PT)  long term goal (LTG);2 weeks  -VG     Row Name 04/12/19 0914          Patient Education Goal (PT)    Activity (Patient Education Goal, PT)  HEP  -VG     Beaver/Cues/Accuracy (Memory Goal 2, PT)  independent  -VG     Time Frame (Patient Education Goal, PT)  long term goal (LTG);2 weeks  -VG     Row Name 04/12/19 0914          Positioning and Restraints    Pre-Treatment Position  in bed  -VG     Post Treatment Position  bed  -VG     In Bed  fowlers;call light within reach;encouraged  to call for assist;with family/caregiver;side rails up x2  -VG     Row Name 04/12/19 0914          Living Environment    Home Accessibility  stairs to enter home  -VG       User Key  (r) = Recorded By, (t) = Taken By, (c) = Cosigned By    Initials Name Provider Type    VG Patricia Blanchard, PT Physical Therapist        Physical Therapy Education     Title: PT OT SLP Therapies (In Progress)     Topic: Physical Therapy (In Progress)     Point: Mobility training (Done)     Learning Progress Summary           Patient Acceptance, E, VU by VG at 4/12/2019  9:14 AM    Comment:  Educated on HEP and correct mechanics for safe functional mobility.   Family Acceptance, E, VU by VG at 4/12/2019  9:14 AM    Comment:  Educated on HEP and correct mechanics for safe functional mobility.                   Point: Body mechanics (Done)     Learning Progress Summary           Patient Acceptance, E, VU by VG at 4/12/2019  9:14 AM    Comment:  Educated on HEP and correct mechanics for safe functional mobility.   Family Acceptance, E, VU by VG at 4/12/2019  9:14 AM    Comment:  Educated on HEP and correct mechanics for safe functional mobility.                   Point: Precautions (Done)     Learning Progress Summary           Patient Acceptance, E, VU by VG at 4/12/2019  9:14 AM    Comment:  Educated on HEP and correct mechanics for safe functional mobility.   Family Acceptance, E, VU by VG at 4/12/2019  9:14 AM    Comment:  Educated on HEP and correct mechanics for safe functional mobility.                               User Key     Initials Effective Dates Name Provider Type [facility] 05/29/18 -  Patricia Blanchard, PT Physical Therapist PT              PT Recommendation and Plan  Anticipated Discharge Disposition (PT): home with assist, home with OP services  Planned Therapy Interventions (PT Eval): balance training, bed mobility training, gait training, home exercise program, patient/family education, stair training,  strengthening, transfer training  Therapy Frequency (PT Clinical Impression): daily  Outcome Summary/Treatment Plan (PT)  Anticipated Discharge Disposition (PT): home with assist, home with OP services  Plan of Care Reviewed With: patient, family  Outcome Summary: IP PT eval completed. Pt ambulated 300 ft with CGA and no AD. Shuffled gait, unsteady. Demonstrates impaired endurance, balance, and strength. Recommend appropriate for home with assist and OP PT services upon d/c. Will continue to progress pt as able per POC.  Outcome Measures     Row Name 04/12/19 0914 04/11/19 1246          How much help from another person do you currently need...    Turning from your back to your side while in flat bed without using bedrails?  4  -VG  --     Moving from lying on back to sitting on the side of a flat bed without bedrails?  4  -VG  --     Moving to and from a bed to a chair (including a wheelchair)?  3  -VG  --     Standing up from a chair using your arms (e.g., wheelchair, bedside chair)?  3  -VG  --     Climbing 3-5 steps with a railing?  3  -VG  --     To walk in hospital room?  3  -VG  --     AM-PAC 6 Clicks Score  20  -VG  --        How much help from another is currently needed...    Putting on and taking off regular lower body clothing?  --  2  -SD     Bathing (including washing, rinsing, and drying)  --  2  -SD     Toileting (which includes using toilet bed pan or urinal)  --  3  -SD     Putting on and taking off regular upper body clothing  --  3  -SD     Taking care of personal grooming (such as brushing teeth)  --  3  -SD     Eating meals  --  3  -SD     Score  --  16  -SD        Functional Assessment    Outcome Measure Options  AM-PAC 6 Clicks Basic Mobility (PT)  -VG  AM-PAC 6 Clicks Daily Activity (OT)  -SD       User Key  (r) = Recorded By, (t) = Taken By, (c) = Cosigned By    Initials Name Provider Type    Maru Arroyo, OT Occupational Therapist    Patricia Love, PT Physical Therapist          Time Calculation:   PT Charges     Row Name 04/12/19 0914             Time Calculation    Start Time  0914  -VG      PT Received On  04/12/19  -VG      PT Goal Re-Cert Due Date  04/22/19  -VG        User Key  (r) = Recorded By, (t) = Taken By, (c) = Cosigned By    Initials Name Provider Type    VG Patricia Blanchard, PT Physical Therapist        Therapy Charges for Today     Code Description Service Date Service Provider Modifiers Qty    62595219016 HC PT EVAL MOD COMPLEXITY 3 4/12/2019 Patricia Blanchard, PT GP 1          PT G-Codes  Outcome Measure Options: AM-PAC 6 Clicks Basic Mobility (PT)  AM-PAC 6 Clicks Score: 20  Score: 16      Tyesha Blanchard, PT  4/12/2019

## 2019-04-12 NOTE — PLAN OF CARE
Problem: Patient Care Overview  Goal: Plan of Care Review  Outcome: Ongoing (interventions implemented as appropriate)   04/12/19 0559   Coping/Psychosocial   Plan of Care Reviewed With patient   Plan of Care Review   Progress improving       Problem: Radiation, External Beam (Adult)  Goal: Signs and Symptoms of Listed Potential Problems Will be Absent, Minimized or Managed (Radiation, External Beam)  Outcome: Ongoing (interventions implemented as appropriate)   04/12/19 0559   Goal/Outcome Evaluation   Problems Assessed (External Beam Radiation) all   Problems Present (External Beam Rad) pain;fatigue

## 2019-04-12 NOTE — DISCHARGE INSTR - APPOINTMENTS
You have an appointment with Alla Colon DO  on April 19, 2019 @ 12:00 PM.   Call them if you have any questions. Phone: [phone]  [address] [facility] [address]

## 2019-04-12 NOTE — PLAN OF CARE
Problem: Patient Care Overview  Goal: Plan of Care Review   04/12/19 0914   Coping/Psychosocial   Plan of Care Reviewed With patient;family   OTHER   Outcome Summary IP PT eval completed. Pt ambulated 300 ft with CGA and no AD. Shuffled gait, unsteady. Demonstrates impaired endurance, balance, and strength. Recommend appropriate for home with assist and OP PT services upon d/c. Will continue to progress pt as able per POC.

## 2019-04-12 NOTE — PLAN OF CARE
Problem: Radiation, External Beam (Adult)  Goal: Signs and Symptoms of Listed Potential Problems Will be Absent, Minimized or Managed (Radiation, External Beam)  Outcome: Ongoing (interventions implemented as appropriate)

## 2019-04-12 NOTE — THERAPY EVALUATION
Acute Care - Speech Language Pathology Initial Evaluation  [facility]   Cognitive-Communication Evaluation     Patient Name: Brittani Lambert  : 1960  MRN: [id number]  Today's Date: 2019  Onset of Illness/Injury or Date of Surgery: 04/10/19     Referring Physician: MD Nalini      Admit Date: 4/10/2019     Visit Dx:    ICD-10-CM ICD-9-CM   1. Acute focal neurological deficit, onset within 3-24 hours R29.818 781.99   2. Metastatic cancer to brain (CMS/HCC) C79.31 198.3   3. History of breast cancer Z85.3 V10.3   4. Impaired mobility and ADLs Z74.09 799.89   5. H/O bilateral mastectomy Z90.13 V45.71     Patient Active Problem List   Diagnosis   • Mixed hyperlipidemia   • Tobacco abuse   • Moderate episode of recurrent major depressive disorder (CMS/HCC)   • Anxiety   • Functional diarrhea   • Lt Breast CA (CMS/HCC)   • Sepsis (CMS/HCC)   • Rt Cellulitis of chest wall   • Acute cystitis without hematuria   • Atrial fibrillation (CMS/HCC)   • Tobacco dependence   • Invasive ductal carcinoma of left breast (CMS/HCC)   • 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 (CMS/HCC)   • Left-sided weakness   • THALIA (generalized anxiety disorder)     Past Medical History:   Diagnosis Date   • Breast CA (CMS/HCC) 10/4/2018   • Depression    • DJD (degenerative joint disease) of cervical spine    • THALIA (generalized anxiety disorder)    • Heart murmur    • Ovarian cancer (CMS/HCC)    • Tobacco dependence      Past Surgical History:   Procedure Laterality Date   • APPENDECTOMY     • BREAST BIOPSY Right     DONE IN [place]   • COLONOSCOPY  2018   • HYSTERECTOMY     • MASTECTOMY W/ SENTINEL NODE BIOPSY Bilateral 10/4/2018    Procedure: LEFT SKIN SPARING BREAST MASTECTOMY WITH LEFT SENTINEL NODE BIOPSY, RIGHT PROPHYLACTIC SKIN SPARING MASTECTOMY;  Surgeon: Koffi Worthington MD;  [facility];  Service: General   • OOPHORECTOMY          SLP EVALUATION (last 72 hours)      SLP SLC Evaluation     Row  Name 04/12/19 1130                   Communication Assessment/Intervention    Document Type  evaluation  -MP        Subjective Information  no complaints  -MP        Patient Observations  alert;cooperative  -MP        Patient/Family Observations  Family present  -MP        Patient Effort  good  -MP           General Information    Patient Profile Reviewed  yes  -MP        Pertinent History Of Current Problem  Initialluy received consult for cognitive-communication eval per stroke protcol. Pt found to have brain mets, no CVA. Hx tobacco dependence, sepsis.  -MP        Precautions/Limitations, Vision  WFL;for purposes of eval  -MP        Precautions/Limitations, Hearing  WFL;for purposes of eval  -MP        Prior Level of Function-Communication  WFL  -MP        Plans/Goals Discussed with  patient;family;agreed upon  -MP        Barriers to Rehab  medically complex  -MP        Patient's Goals for Discharge  other (see comments)  -MP        Family Goals for Discharge  family did not state  -MP           Pain Assessment    Additional Documentation  Pain Scale: FACES Pre/Post-Treatment (Group)  -MP           Pain Scale: FACES Pre/Post-Treatment    Pain: FACES Scale, Pretreatment  2-->hurts little bit  -MP        Pain: FACES Scale, Post-Treatment  2-->hurts little bit  -MP           Comprehension Assessment/Intervention    Comprehension Assessment/Intervention  Auditory Comprehension  -MP           Auditory Comprehension Assessment/Intervention    Auditory Comprehension (Communication)  WFL  -MP        Narrative Discourse  conversational level;WFL  -MP           Expression Assessment/Intervention    Expression Assessment/Intervention  verbal expression  -MP           Verbal Expression Assessment/Intervention    Verbal Expression  WFL  -MP        Conversational Discourse/Fluency  WFL  -MP           Oral Motor Structure and Function    Dentition Assessment  natural, present and adequate  -MP           Motor Speech  Assessment/Intervention    Motor Speech Function  WFL  -MP           Cognitive Assessment Intervention- SLP    Cognitive Function (Cognition)  other (see comments) basic cog skills appear WFL  -MP        Orientation Status (Cognition)  awareness of basic personal information;person;place;time;situation;WFL  -MP           SLP Clinical Impressions    SLP Diagnosis  Initially received consult for communication eval per stroke protocol. Pt found to have brain mets, no CVA. Spoke w/ pt at bedside. Conversation-level auditory comprehension and verbal expression appear WFL. Pt declined further evaluation. Provided education re: OP SLP services if pt desiring. No further SLP intervention. Will sign off.  -MP        SLC Criteria for Skilled Therapy Interventions Met  declined skilled intervention at this time  -MP           Recommendations    Therapy Frequency (SLP SLC)  evaluation only  -MP        Anticipated Dischage Disposition  home  -MP          User Key  (r) = Recorded By, (t) = Taken By, (c) = Cosigned By    Initials Name Effective Dates    Nathen Martins, MS, CFY-SLP 08/15/18 -              EDUCATION  The patient has been educated in the following areas:     Cognitive Impairment Communication Impairment.    SLP Recommendation and Plan  SLP Diagnosis: Initially received consult for communication eval per stroke protocol. Pt found to have brain mets, no CVA. Spoke w/ pt at bedside. Conversation-level auditory comprehension and verbal expression appear WFL. Pt declined further evaluation. Provided education re: OP SLP services if pt desiring. No further SLP intervention. Will sign off.     SLC Criteria for Skilled Therapy Interventions Met: declined skilled intervention at this time  SLP Diagnosis: Initially received consult for communication eval per stroke protocol. Pt found to have brain mets, no CVA. Spoke w/ pt at bedside. Conversation-level auditory comprehension and verbal expression appear WFL. Pt declined  further evaluation. Provided education re: OP SLP services if pt desiring. No further SLP intervention. Will sign off.  Anticipated Dischage Disposition: home          Plan of Care Reviewed With: patient, family  Plan of Care Review  Plan of Care Reviewed With: patient, family                  Time Calculation:     Time Calculation- SLP     Row Name 04/12/19 1205             Time Calculation- SLP    SLP Start Time  1130  -MP      SLP Received On  04/12/19  -MP        User Key  (r) = Recorded By, (t) = Taken By, (c) = Cosigned By    Initials Name Provider Type    Nathen Martins MS, CFY-SLP Speech and Language Pathologist          Therapy Charges for Today     Code Description Service Date Service Provider Modifiers Qty    [id number] HC ST EVAL SPEECH AND PROD W LANG  2 4/12/2019 Nathen Jeronimo MS, CFY-SLP GN 1                     Nathen Jeronimo MS, SIOBHAN-SLP  4/12/2019

## 2019-04-12 NOTE — DISCHARGE SUMMARY
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          Hospital Course:  Brittani Lambert is a 59 y.o. female with hx of breast cancer s/p bilateral mastectomy and undergoing chemotherapy with Dr. Patel who presents due to LUE and LLE weakness, along with headache. 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    Neurology, Oncology, Rad/Onc, and Neurosurgery all saw the patient. Findings were felt to be consistent with CNS metastasis. Dr. Garcia has initiated whole brain radiation therapy on  and she has received 2 of 10 treatments. She also had a nodule on her forehead which was also felt to represent metastasis and Dr. Garcia is hopeful this will also respond to radiation. She will continue on Decadron. Memantine was started due to expected side effect of short term memory loss with whole brain radiation therapy. Staging scans including CT chest/abd/pelvis and NM bone scan were done and do not  reveal any other evidence of metastasis. Scans will be repeated by Dr. Patel after she completes her radiation.    She will be discharged home today with outpatient PT/OT.      Discharge Follow Up Recommendations for labs/diagnostics:  F/U with PCP in 1 week  F/U with Dr. Patel in 1-2 weeks  F/U as scheduled 3:45 PM M-F for daily radiation per Dr. Garcia (total 10 treatments)    Day of Discharge     HPI:   Patient seen this morning. Left sided weakness improving.    Review of Systems  Gen-no fevers, no chills  CV-no chest pain, no palpitations  Resp-no cough, no dyspnea  GI-no N/V/D, no abd pain      Otherwise ROS is negative except as mentioned in the HPI.    Vital Signs:   Temp:  [96.9 °F (36.1 °C)-98.2 °F (36.8 °C)] 98.2 °F (36.8 °C)  Heart Rate:  [74-97] 97  Resp:  [16-18] 18  BP: (126-148)/(75-90) 148/78     Physical Exam:  Gen-no acute distress  HENT-NCAT, mucous membranes moist  CV-RRR, S1 S2 normal, no m/r/g  Resp-CTAB, no wheezes or rales  Abd-soft, NT, ND, +BS  Ext-no edema  Neuro-A&Ox3, mild LUE/LLE weakness  Skin-no rashes  Psych-appropriate mood      Pertinent  and/or Most Recent Results     Results from last 7 days   Lab Units 04/12/19  0505 04/10/19  1330   WBC 10*3/mm3 6.76 5.90   HEMOGLOBIN g/dL 11.0* 11.4*   HEMATOCRIT % 33.7* 34.5   PLATELETS 10*3/mm3 257 248   SODIUM mmol/L 134* 138   POTASSIUM mmol/L 4.4 3.9   CHLORIDE mmol/L 98 102   CO2 mmol/L 24.0 24.0   BUN mg/dL 10 10   CREATININE mg/dL 0.70 0.53*   GLUCOSE mg/dL 111* 92   CALCIUM mg/dL 9.6 9.3     Results from last 7 days   Lab Units 04/10/19  1330   BILIRUBIN mg/dL 0.3   ALK PHOS U/L 65   ALT (SGPT) U/L 13   AST (SGOT) U/L 17     Results from last 7 days   Lab Units 04/11/19  0720   CHOLESTEROL mg/dL 305*   TRIGLYCERIDES mg/dL 299*   HDL CHOL mg/dL 30*     Results from last 7 days   Lab Units 04/11/19  0720 04/10/19  1337   HEMOGLOBIN A1C % 5.20  --    TROPONIN I ng/mL  --  0.00       Brief Urine Lab Results  (Last result in the past  365 days)      Color   Clarity   Blood   Leuk Est   Nitrite   Protein   CREAT   Urine HCG        04/10/19 1455 Yellow Clear Negative Negative Negative Negative               Microbiology Results Abnormal     None          Imaging Results (all)     Procedure Component Value Units Date/Time    CT Chest With Contrast [[id number]] Collected:  04/12/19 0854     Updated:  04/12/19 1041    Narrative:       EXAMINATION: CT CHEST W CONTRAST-, CT ABDOMEN PELVIS W CONTRAST-  04/11/2019      INDICATION: breast cancer; R29.818-Other symptoms and signs involving  the nervous system; C79.31-Secondary malignant neoplasm of brain;  Z85.3-Personal history of malignant neoplasm of breast; Z74.09-Other  reduced mobility      TECHNIQUE: CT chest, abdomen and pelvis with intravenous contrast  administration.     The radiation dose reduction device was turned on for each scan per the  ALARA (As Low as Reasonably Achievable) protocol.     COMPARISON: CT chest 10/15/2018, CT abdomen and pelvis 05/11/2018     FINDINGS:      CT CHEST: Thyroid is mildly heterogeneous in appearance. No bulky  mediastinal adenopathy. Central airways are patent. Esophagus in normal  course and caliber. Atherosclerotic nonaneurysmal thoracic aorta with  patent great vessel origins. Central pulmonary arteries are grossly  patent and unremarkable. Cardiac size within normal limits and without  pericardial effusion demonstrating coronary calcifications within the  left anterior descending in particular. Extended lung windows  demonstrate chronic changes including subsegmental atelectasis and/or  scar at the lung bases without focal groundglass consolidation, dominant  nodule or mass lesion. No pleural effusion. Degenerative changes of the  spine without aggressive osseous lesion. Status post bilateral  mastectomies. Right chest wall Port-A-Cath terminates distal SVC.     CT ABDOMEN AND PELVIS: Liver without focal lesion. Gallbladder  contracted and unremarkable. No  gross biliary dilatation. Pancreas and  spleen grossly unremarkable. Adrenals without distinct nodule. Kidneys  without hydronephrosis or hydroureter. No bulky retroperitoneal  adenopathy. Atherosclerotic nonaneurysmal abdominal aorta. Celiac and  SMA patent. Portal veins and IVC patent. GI tract grossly unremarkable  without focal thickening or disproportionate dilatation of bowel. No  free fluid or intra-abdominal free air. Pelvic viscera grossly  unremarkable with multiple calcified phleboliths however no bulky pelvic  adenopathy or significant free fluid with a moderately distended urinary  bladder. Degenerative changes of the spine without aggressive osseous  lesion. No soft tissue body wall findings of concern.       Impression:       Status post bilateral mastectomies without evidence for  adjacent axillary adenopathy or metastatic extension of disease within  the chest, abdomen or pelvis. No acute pathology otherwise noted within  the chest, abdomen or pelvis.     D:  04/11/2019  E:  04/12/2019        This report was finalized on 4/12/2019 10:38 AM by Dr. Dann Dawson.       CT Abdomen Pelvis With Contrast [[id number]] Collected:  04/12/19 0854     Updated:  04/12/19 1041    Narrative:       EXAMINATION: CT CHEST W CONTRAST-, CT ABDOMEN PELVIS W CONTRAST-  04/11/2019      INDICATION: breast cancer; R29.818-Other symptoms and signs involving  the nervous system; C79.31-Secondary malignant neoplasm of brain;  Z85.3-Personal history of malignant neoplasm of breast; Z74.09-Other  reduced mobility      TECHNIQUE: CT chest, abdomen and pelvis with intravenous contrast  administration.     The radiation dose reduction device was turned on for each scan per the  ALARA (As Low as Reasonably Achievable) protocol.     COMPARISON: CT chest 10/15/2018, CT abdomen and pelvis 05/11/2018     FINDINGS:      CT CHEST: Thyroid is mildly heterogeneous in appearance. No bulky  mediastinal adenopathy. Central airways are patent.  Esophagus in normal  course and caliber. Atherosclerotic nonaneurysmal thoracic aorta with  patent great vessel origins. Central pulmonary arteries are grossly  patent and unremarkable. Cardiac size within normal limits and without  pericardial effusion demonstrating coronary calcifications within the  left anterior descending in particular. Extended lung windows  demonstrate chronic changes including subsegmental atelectasis and/or  scar at the lung bases without focal groundglass consolidation, dominant  nodule or mass lesion. No pleural effusion. Degenerative changes of the  spine without aggressive osseous lesion. Status post bilateral  mastectomies. Right chest wall Port-A-Cath terminates distal SVC.     CT ABDOMEN AND PELVIS: Liver without focal lesion. Gallbladder  contracted and unremarkable. No gross biliary dilatation. Pancreas and  spleen grossly unremarkable. Adrenals without distinct nodule. Kidneys  without hydronephrosis or hydroureter. No bulky retroperitoneal  adenopathy. Atherosclerotic nonaneurysmal abdominal aorta. Celiac and  SMA patent. Portal veins and IVC patent. GI tract grossly unremarkable  without focal thickening or disproportionate dilatation of bowel. No  free fluid or intra-abdominal free air. Pelvic viscera grossly  unremarkable with multiple calcified phleboliths however no bulky pelvic  adenopathy or significant free fluid with a moderately distended urinary  bladder. Degenerative changes of the spine without aggressive osseous  lesion. No soft tissue body wall findings of concern.       Impression:       Status post bilateral mastectomies without evidence for  adjacent axillary adenopathy or metastatic extension of disease within  the chest, abdomen or pelvis. No acute pathology otherwise noted within  the chest, abdomen or pelvis.     D:  04/11/2019  E:  04/12/2019        This report was finalized on 4/12/2019 10:38 AM by Dr. Dann Dawson.       NM Bone Scan Whole Body  [[id number]] Updated:  04/11/19 1654    XR Chest 1 View [[id number]] Collected:  04/10/19 1439     Updated:  04/10/19 2121    Narrative:       EXAMINATION: XR CHEST 1 VW- 04/10/2019      INDICATION: Weak/Dizzy/AMS triage protocol      COMPARISON: 11/09/2018     FINDINGS: Note is again made of patient's right-sided Port-A-Cath in  stable position in the proximal SVC. Lungs are well-expanded and appear  clear. Heart and vasculature appear normal in size.           Impression:       No evidence of active chest disease.     D:  04/10/2019  E:  04/10/2019     This report was finalized on 4/10/2019 9:18 PM by DR. Martinez Sheikh MD.       MRI Brain With & Without Contrast [[id number]] Collected:  04/10/19 1743     Updated:  04/10/19 1840    Narrative:       EXAMINATION: MRI BRAIN W/WO CONTRAST, MRI CERVICAL SPINE W/WO CONTRAST -  04/10/2019      INDICATION: Left upper extremity weakness.     TECHNIQUE:  Multiplanar multisequence MRI of the brain and cervical  spine was performed without and with contrast.     COMPARISONS:  None.     FINDINGS:      BRAIN: There is curvilinear leptomeningeal enhancement along the central  sulcus with surrounding T2 prolongation in the pre- and postcentral gyri  and associated diffusion restriction. There is no other clear  parenchymal abnormality. Marrow signal of the calvarium is within normal  limits. No cortical destructive lesion. Incidental benign epidermal  inclusion cyst noted in the anterior frontal scalp towards the left of  midline. [place] flow-voids are preserved.     CERVICAL SPINE:  Sagittal images cover from from the sella through the  T4 level caudally.     The cervicomedullary junction is unremarkable.  The cervical cord is  normal in caliber and signal. There is no abnormal enhancement in the  spinal canal or in the paraspinal soft tissues. Several benign  perineural cysts are seen in the cervical and incidentally imaged  thoracic spine.     With regard to the marrow  space, vertebral body heights are maintained.   There is maintenance of the usual lordosis without significant  spondylolisthesis.  Background marrow signal is normal.     Degenerative changes are as follows:     C2-3: No significant foraminal or spinal canal stenosis.     C3-4: Left greater than right facet arthropathy without significant  foraminal or spinal canal stenosis.     C4-5: Right greater than left uncovertebral arthropathy and left greater  than right facet disease. There is moderate right foraminal stenosis but  no significant spinal canal stenosis.     C5-6: Right greater than left uncovertebral arthropathy and left greater  than right facet disease. Moderate right foraminal stenosis. No  significant spinal canal stenosis.     C6-7: Left greater than right uncovertebral arthropathy. No significant  foraminal or spinal canal stenosis.     C7/T1: No significant foraminal or spinal canal stenosis.     Incidental imaging of the cervical soft tissues is unremarkable.       Impression:       1. 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 Another  consideration is for subacute infarct. Follow-up is required.  2. Degenerative changes of the cervical spine without evidence of  metastatic disease.     DICTATED:   04/10/2019  EDITED/ls :   04/10/2019      This report was finalized on 4/10/2019 6:37 PM by Adalberto Vallejo.       MRI Cervical Spine With & Without Contrast [[id number]] Collected:  04/10/19 1743     Updated:  04/10/19 1840    Narrative:       EXAMINATION: MRI BRAIN W/WO CONTRAST, MRI CERVICAL SPINE W/WO CONTRAST -  04/10/2019      INDICATION: Left upper extremity weakness.     TECHNIQUE:  Multiplanar multisequence MRI of the brain and cervical  spine was performed without and with contrast.     COMPARISONS:  None.     FINDINGS:      BRAIN: There is curvilinear  leptomeningeal enhancement along the central  sulcus with surrounding T2 prolongation in the pre- and postcentral gyri  and associated diffusion restriction. There is no other clear  parenchymal abnormality. Marrow signal of the calvarium is within normal  limits. No cortical destructive lesion. Incidental benign epidermal  inclusion cyst noted in the anterior frontal scalp towards the left of  midline. [place] flow-voids are preserved.     CERVICAL SPINE:  Sagittal images cover from from the sella through the  T4 level caudally.     The cervicomedullary junction is unremarkable.  The cervical cord is  normal in caliber and signal. There is no abnormal enhancement in the  spinal canal or in the paraspinal soft tissues. Several benign  perineural cysts are seen in the cervical and incidentally imaged  thoracic spine.     With regard to the marrow space, vertebral body heights are maintained.   There is maintenance of the usual lordosis without significant  spondylolisthesis.  Background marrow signal is normal.     Degenerative changes are as follows:     C2-3: No significant foraminal or spinal canal stenosis.     C3-4: Left greater than right facet arthropathy without significant  foraminal or spinal canal stenosis.     C4-5: Right greater than left uncovertebral arthropathy and left greater  than right facet disease. There is moderate right foraminal stenosis but  no significant spinal canal stenosis.     C5-6: Right greater than left uncovertebral arthropathy and left greater  than right facet disease. Moderate right foraminal stenosis. No  significant spinal canal stenosis.     C6-7: Left greater than right uncovertebral arthropathy. No significant  foraminal or spinal canal stenosis.     C7/T1: No significant foraminal or spinal canal stenosis.     Incidental imaging of the cervical soft tissues is unremarkable.       Impression:       1. 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 Another  consideration is for subacute infarct. Follow-up is required.  2. Degenerative changes of the cervical spine without evidence of  metastatic disease.     DICTATED:   04/10/2019  EDITED/ls :   04/10/2019      This report was finalized on 4/10/2019 6:37 PM by Adalberto Vallejo.                       Results for orders placed during the hospital encounter of 10/15/18   Adult Transthoracic Echo Complete W/ Cont if Necessary Per Protocol    Narrative · Left ventricular systolic function is normal.  · Estimated EF appears to be in the range of 61 - 65%.  · Mild tricuspid valve regurgitation is present.  · Calculated right ventricular systolic pressure from tricuspid   regurgitation is 50 mmHg.           Order Current Status    Cancer Antigen 27.29 In process        Discharge Details        Discharge Medications      New Medications      Instructions Start Date   dexamethasone 4 MG tablet  Commonly known as:  DECADRON   4 mg, Oral, 3 Times Daily With Meals      memantine 5 MG tablet  Commonly known as:  NAMENDA   5 mg, Oral, Every 12 Hours Scheduled         Changes to Medications      Instructions Start Date   buPROPion  MG 12 hr tablet  Commonly known as:  WELLBUTRIN SR  What changed:    · how much to take  · how to take this  · when to take this  · additional instructions   Take one tablet daily in mornings         Continue These Medications      Instructions Start Date   ALPRAZolam 0.5 MG tablet  Commonly known as:  XANAX   0.5 mg, Oral, 3 Times Daily PRN      HYDROcodone-acetaminophen 5-325 MG per tablet  Commonly known as:  NORCO   1 tablet, Oral, Every 6 Hours PRN      lidocaine-prilocaine 2.5-2.5 % cream  Commonly known as:  EMLA   Topical, As Needed      ondansetron 8 MG tablet  Commonly known as:  ZOFRAN   8 mg, Oral, 3 Times Daily PRN      sertraline 100 MG tablet  Commonly  known as:  ZOLOFT   200 mg, Oral, Daily             No Known Allergies      Discharge Disposition:  Home or Self Care    Discharge Diet:  Diet Order   Procedures   • Diet Regular; Cardiac         Discharge Activity:   Activity Instructions     Activity as Tolerated              CODE STATUS:    Code Status and Medical Interventions:   Ordered at: 04/10/19 1954     Level Of Support Discussed With:    Patient     Code Status:    CPR     Medical Interventions (Level of Support Prior to Arrest):    Full         Future Appointments   Date Time Provider Department Center   4/12/2019  5:30 PM [facility]   4/15/2019  3:45 PM [facility]   4/16/2019  3:45 PM [facility]   4/17/2019  3:45 PM [facility]   4/18/2019  3:45 PM [facility]   4/19/2019  3:45 PM [facility]   4/22/2019  3:45 PM [facility]   4/23/2019  3:45 PM [facility]   4/24/2019  3:45 PM [facility]       Additional Instructions for the Follow-ups that You Need to Schedule     Ambulatory Referral to Occupational Therapy   As directed      Specialty needed:  Evaluate and treat         Ambulatory Referral to Physical Therapy Evaluate and treat   As directed      Specialty needed:  Evaluate and treat         Discharge Follow-up with PCP   As directed       Currently Documented PCP:    Alla Colon DO    PCP Phone Number:    [phone]     Follow Up Details:  1 week         Discharge Follow-up with Specified Provider: Dr. Patel in 1-2 weeks   As directed      To:  Dr. Patel in 1-2 weeks               Time Spent on Discharge:  35 minutes    Electronically signed by Alyssa Harris MD, 04/12/19, 11:44 AM

## 2019-04-12 NOTE — PROGRESS NOTES
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  Sad.  Wants to talk to her therapist.  Arm is better but not strong enough to lift and hold things.      REVIEW OF SYSTEMS:  A 14 point review of systems was performed and is negative except as noted above.      Objective     Vitals:    04/11/19 1737 04/11/19 1905 04/12/19 0000 04/12/19 0330   BP: 135/90 131/75 127/75 126/84   BP Location: Right arm Right arm Right arm Right arm   Patient Position: Sitting Lying Lying Lying   Pulse: 82 86 94 74   Resp: 16 18 18 18   Temp: 96.9 °F (36.1 °C) 98.1 °F (36.7 °C) 97.6 °F (36.4 °C) 98.1 °F (36.7 °C)   TempSrc: Axillary Oral Oral Oral   SpO2: 95%      Weight:       Height:           Performance Status: 1  General: well appearing female in no acute distress  Neuro: alert and oriented.  Left UE 3/5  HEENT: sclerae anicteric, oropharynx clear  Lymphatics: no cervical, supraclavicular, or axillary adenopathy  Cardiovascular: regular rate and rhythm, no murmurs  Lungs: clear to auscultation bilaterally  Abdomen: soft, nontender, nondistended.  No palpable organomegaly  Extremities: no lower extremity edema  Skin: no rashes, lesions, bruising, or petechiae  Psych: tearful        Labs: reviewed.  Notable for glc 111, cr 0.7, hgb 11.0    Imaging:   I personally reviewed ct and bone scan images.  No obvious metastatic disease, final report pending.  MRI brain - forehead lesion appears to  be cystic.    Assessment/Plan     Brittani Lambert is a 59 y.o. year old female with ER- Her2 - breast cancer, now with newly diagnosed CNS and likely LM disease.    Started RT yesterday - will complete 10 day course of WBRT.  Steroids per Dr. Garcia.  On memantine for prevention of cognitive toxicity.    Epidermal cyst on forehead - symptomatic.  ? Steroid injection versus drainge.  Does not appear to be severely inflamed.  Discussed with Dr. MARTINEZ  - he can see her Monday in the office to evaluate.    Breast cancer: on my review of imaging - no obvious other sites of mets but await radiology report.  I will plan to see her about 1-2 weeks after completing radiation to discuss further plans.  We discussed that given her new diagnosis of metastatic disease it is important to make sure advance directive, will, etc is addressed, and to discuss her wishes regarding end of life care with family members.  She says she has started this.  It is difficult to predict a time frame for her survival, but leptomeningeal disease unfortunately has a poor prognosis.               Carrie Patel MD  4/12/2019

## 2019-04-12 NOTE — PLAN OF CARE
Problem: Patient Care Overview  Goal: Plan of Care Review  Outcome: Ongoing (interventions implemented as appropriate)   04/12/19 1204   Coping/Psychosocial   Plan of Care Reviewed With patient;family   SLP evaluation completed. Will sign-off. Please see note for further details and recommendations.

## 2019-04-12 NOTE — PROGRESS NOTES
Continued Stay Note   [place]     Patient Name: Brittani Lambert  MRN: [id number]  Today's Date: 4/12/2019    Admit Date: 4/10/2019    Discharge Plan     Row Name 04/12/19 1120       Plan    Plan  Home with significant other + outpatient OT    Patient/Family in Agreement with Plan  yes    Plan Comments  Met with patient to update discharge plan. Possible discharge home today. She started WBRT yesterday, 4/11, and will have 10 treatments. Her outpatient schedule will be for radiation at 3:45 PM daily. She prefers to have outpatient PT/OT at [facility] since she will be coming here for radiation anyway for the next few days. Spoke with Elizabeth with [facility] outpatient therapy and she can arrange for OT, but not PT until 4/29 because their schedule is full. She will contact patient to coordinate outpatient OT appts. Priti Cano RN [phone]    Update @ 1237: Spoke with patient and she would now prefer to have outpatient PT/OT at [facility]. Referrals called & faxed to  at [place] (P: [phone]; F: [phone]). They will call patient to schedule initial evaluations.     Checked with Adrián at [facility] in [place] and [facility] (memantine) does not require a prior auth. Priti Cano RN [phone]        Discharge Codes    No documentation.       Expected Discharge Date and Time     Expected Discharge Date Expected Discharge Time    Apr 16, 2019             Priti Cano RN

## 2019-04-12 NOTE — CONSULTS
Inpatient Neurosurgery Consult  Consult performed by: Darcy Garcia PA-C  Consult ordered by: Juan Carlos Gayle MD        Patient Care Team:  Alla Colon DO as PCP - General (Family Medicine)    Chief Complaint: brain mass    Subjective .     History of present illness:       Ms. James is a pleasant 59-year-old woman with known breast cancer who presented to [facility] ED on 4/10/19 with left-sided weakness x 2 days.  She states the day prior to arrival she started having some difficulty using her left arm while at work.  The following morning, she had some increased difficulties with coordination of her left upper and lower extremity.  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 Neurosurgical evaluation was requested. Patient has been evaluated by radiation oncology with plans for whole brain radiation to begin this week. The patient was started on steroids and has had improvement in her LUE weakness. She has had some dull headaches. Additionally, she has a nodule on her forehead that is concerning for metastatic disease.       Review of Systems   Neurological: Positive for weakness. Negative for seizures, facial asymmetry, speech difficulty and numbness.   All other systems reviewed and are negative.      History  Past Medical History:   Diagnosis Date   • Breast CA (CMS/HCC) 10/4/2018   • Depression    • DJD (degenerative joint disease) of cervical spine    • THALIA (generalized anxiety disorder)    • Heart murmur    • Ovarian cancer (CMS/HCC) 1989   • Tobacco dependence    ,   Past Surgical History:   Procedure Laterality Date   • APPENDECTOMY     • BREAST BIOPSY Right 2003    DONE IN [place]   • COLONOSCOPY  06/2018   • HYSTERECTOMY  1989   • MASTECTOMY W/ SENTINEL NODE BIOPSY Bilateral 10/4/2018    Procedure: LEFT SKIN SPARING BREAST MASTECTOMY WITH LEFT SENTINEL NODE BIOPSY,  RIGHT PROPHYLACTIC SKIN SPARING MASTECTOMY;  Surgeon: Koffi Worthington MD;  Location: [facility];  Service: General   • OOPHORECTOMY  1989   ,   Family History   Problem Relation Age of Onset   • Arthritis Mother    • Heart attack Mother    • Osteoporosis Mother    • Liver disease Father    • Celiac disease Other    ,   Social History     Tobacco Use   • Smoking status: Current Every Day Smoker     Packs/day: 0.50     Years: 20.00     Pack years: 10.00     Types: Cigarettes   • Smokeless tobacco: Never Used   Substance Use Topics   • Alcohol use: Yes     Frequency: Monthly or less   • Drug use: No   ,   Medications Prior to Admission   Medication Sig Dispense Refill Last Dose   • ALPRAZolam (XANAX) 0.5 MG tablet Take 1 tablet by mouth 3 (Three) Times a Day As Needed for Anxiety or Sleep. 90 tablet 2 Taking   • buPROPion SR (WELLBUTRIN SR) 100 MG 12 hr tablet Take one tablet daily in mornings (Patient taking differently: Take 100 mg by mouth Daily. Take one tablet daily in mornings) 90 tablet 0 Taking   • lidocaine-prilocaine (EMLA) 2.5-2.5 % cream Apply  topically to the appropriate area as directed As Needed (45-60 minutes prior to port access.  Cover with saran/plastic wrap.). 30 g 3 Taking   • ondansetron (ZOFRAN) 8 MG tablet Take 1 tablet by mouth 3 (Three) Times a Day As Needed for Nausea or Vomiting. 30 tablet 5 Taking   • sertraline (ZOLOFT) 100 MG tablet Take 2 tablets by mouth Daily. 60 tablet 3 Taking   • [DISCONTINUED] HYDROcodone-acetaminophen (NORCO) 5-325 MG per tablet Take 1 tablet by mouth Every 6 (Six) Hours As Needed for Moderate Pain . 60 tablet 0    , Scheduled Meds:    aspirin 81 mg Oral Daily   Or      aspirin 300 mg Rectal Daily   atorvastatin 80 mg Oral Nightly   buPROPion  mg Oral Q12H   dexamethasone 4 mg Intravenous Q8H   memantine 5 mg Oral Q12H   nicotine 1 patch Transdermal Q24H   sertraline 200 mg Oral Daily   sodium chloride 3 mL Intravenous Q12H   sodium chloride 3 mL  Intravenous Q12H   , Continuous Infusions:   , PRN Meds:  •  ALPRAZolam  •  HYDROcodone-acetaminophen  •  HYDROmorphone **AND** naloxone  •  lidocaine  •  ondansetron  •  sodium chloride  •  sodium chloride  •  sodium chloride and Allergies:  Patient has no known allergies.   SMOKING STATUS: Current everyday smoker   Objective     Vital Signs   Temp:  [96.9 °F (36.1 °C)-98.2 °F (36.8 °C)] 98.2 °F (36.8 °C)  Heart Rate:  [74-97] 97  Resp:  [14-18] 18  BP: (126-148)/(75-90) 148/78  Body mass index is 19.49 kg/m².    Physical Exam:  Pt is awake, alert and oriented resting in bed in NAD  Patient appears depressed with flat affect    She has full strength of right upper and lower extremity  Left lower extremity strength intact to direct testing   Left upper extremity strength is decreased but 3-4/5 proximally. 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 Fam. Patient will need lumbar puncture to confirm leptomeningeal carcinomatosis. Patient is followed by Dr. Garcia and planned for whole brain radiation treatment which will begin today. We will be happy to see the patient in follow up if needed.     I discussed the patients findings and my recommendations with patient    Darcy Garcia PA-C  04/12/19  11:05 AM

## 2019-04-12 NOTE — DISCHARGE PLACEMENT REQUEST
"Priti Cano RN  Case Management  P: [phone]    Referral for outpatient PT/OT  Please call patient to schedule initial evaluation. Thank you.    Brittani Rivera (59 y.o. Female)     Date of Birth Social Security Number Address Home Phone MRN    1960  [address] [facility] [place] [address] [phone] [id number]    [facility] Mar[employer] Status          None        Admission Date Admission Type Admitting Provider Attending Provider Department, Room/Bed    4/10/19 Emergency Alyssa Harris MD Reddy, Mayuri V, MD [facility] 6B, N644/1    Discharge Date Discharge Disposition Discharge Destination         Home or Self Care              Attending Provider:  Alyssa Harris MD    Allergies:  No Known Allergies    Isolation:  None   Infection:  None   Code Status:  CPR    Ht:  160 cm (63\")   Wt:  49.9 kg (110 lb)    Admission Cmt:  None   Principal Problem:  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 (CMS/HCC) [C50.912,C79.31]                 Active Insurance as of 4/10/2019     Primary Coverage     Payor Plan Insurance Group Employer/Plan Group    [employer] BLUE CROSS AN[employer]M BLUE CROSS BLUE SHIELD PPO [id number]     Payor Plan Address Payor Plan Phone Number Payor Plan Fax Number Effective Dates    [address] [phone]  3/5/2018 - None Entered    [place] [address]       Subscriber Name Subscriber Birth Date Member ID       BRITTANI RIVERA 1960 [id number]                 Emergency Contacts      (Rel.) Home Phone Work Phone Mobile Phone    Turner Jimenez (Friend) [phone] -- [phone]               Physical Therapy Notes (most recent note)      Patricia Blanchard, PT at 2019  9:39 AM  Version 1 of 1         Acute Care - Physical Therapy Initial Evaluation  [facility]     Patient Name: Brittani Rivera  : 1960  MRN: [id number]  Today's Date: 2019   Onset of Illness/Injury or Date of Surgery: 04/10/19  Date of Referral to PT: " 04/10/19  Referring Physician: MD Nalini      Admit Date: 4/10/2019    Visit Dx:     ICD-10-CM ICD-9-CM   1. Acute focal neurological deficit, onset within 3-24 hours R29.818 781.99   2. Metastatic cancer to brain (CMS/HCC) C79.31 198.3   3. History of breast cancer Z85.3 V10.3   4. Impaired mobility and ADLs Z74.09 799.89     Patient Active Problem List   Diagnosis   • Mixed hyperlipidemia   • Tobacco abuse   • Moderate episode of recurrent major depressive disorder (CMS/HCC)   • Anxiety   • Functional diarrhea   • Lt Breast CA (CMS/HCC)   • Sepsis (CMS/HCC)   • Rt Cellulitis of chest wall   • Acute cystitis without hematuria   • Atrial fibrillation (CMS/HCC)   • Tobacco dependence   • Invasive ductal carcinoma of left breast (CMS/HCC)   • 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 (CMS/HCC)   • Left-sided weakness   • THALIA (generalized anxiety disorder)     Past Medical History:   Diagnosis Date   • Breast CA (CMS/HCC) 10/4/2018   • Depression    • DJD (degenerative joint disease) of cervical spine    • THALIA (generalized anxiety disorder)    • Heart murmur    • Ovarian cancer (CMS/HCC) 1989   • Tobacco dependence      Past Surgical History:   Procedure Laterality Date   • APPENDECTOMY     • BREAST BIOPSY Right 2003    DONE IN [place]   • COLONOSCOPY  06/2018   • HYSTERECTOMY  1989   • MASTECTOMY W/ SENTINEL NODE BIOPSY Bilateral 10/4/2018    Procedure: LEFT SKIN SPARING BREAST MASTECTOMY WITH LEFT SENTINEL NODE BIOPSY, RIGHT PROPHYLACTIC SKIN SPARING MASTECTOMY;  Surgeon: Koffi Worthington MD;  Location: [facility];  Service: General   • OOPHORECTOMY  1989        PT ASSESSMENT (last 12 hours)      Physical Therapy Evaluation     Row Name 04/12/19 0914          PT Evaluation Time/Intention    Subjective Information  complains of;weakness;fatigue  -VG     Document Type  evaluation  -VG     Mode of Treatment  individual therapy;physical therapy  -VG     Patient Effort  good  -VG     Symptoms Noted During/After  Treatment  fatigue  -VG     Row Name 04/12/19 0914          General Information    Patient Profile Reviewed?  yes  -VG     Onset of Illness/Injury or Date of Surgery  04/10/19  -VG     Referring Physician  MD Nalini  -VG     Patient Observations  alert;cooperative;agree to therapy  -VG     Patient/Family Observations  Family present  -VG     General Observations of Patient  In bed, RA, tele  -VG     Prior Level of Function  independent:;all household mobility;community mobility;ADL's;home management;driving;work  -VG     Equipment Currently Used at Home  none  -VG     Pertinent History of Current Functional Problem  Pt presents to ED 4/10 c/o inability to move LUE, diarrhea, and headache. MRI head (+) concern for carcinoma R fronto parietal region, possible subacute infarct. PMHx: breast cancer and HLD.  -VG     Existing Precautions/Restrictions  fall  -VG     Risks Reviewed  patient and family:;LOB;nausea/vomiting;dizziness;increased discomfort;change in vital signs;increased drainage  -VG     Benefits Reviewed  patient and family:;improve function;increase independence;increase strength;increase balance;decrease pain;decrease risk of DVT  -VG     Barriers to Rehab  medically complex  -VG     Row Name 04/12/19 0914          Relationship/Environment    Primary Source of Support/Comfort  significant other  -VG     Lives With  significant other  -VG     Concerns About Impact on Relationships  Pt lives with significant other at baseline who provides 24/7 spv/support.   -VG     Row Name 04/12/19 0914          Resource/Environmental Concerns    Current Living Arrangements  home/apartment/condo  -VG     Resource/Environmental Concerns  home accessibility  -VG     Home Accessibility Concerns  stairs to enter home  -VG     Row Name 04/12/19 0914          Home Main Entrance    Number of Stairs, Main Entrance  two  -VG     Stair Railings, Main Entrance  none  -VG     Stairs Comment, Main Entrance  [address] [facility]  -VG     Row  Name 04/12/19 0914          Cognitive Assessment/Interventions    Additional Documentation  Cognitive Assessment/Intervention (Group)  -VG     Row Name 04/12/19 0914          Cognitive Assessment/Intervention- PT/OT    Affect/Mental Status (Cognitive)  flat/blunted affect  -VG     Orientation Status (Cognition)  oriented x 4  -VG     Follows Commands (Cognition)  WFL  -VG     Cognitive Function (Cognitive)  safety deficit  -VG     Safety Deficit (Cognitive)  mild deficit;insight into deficits/self awareness  -VG     Personal Safety Interventions  fall prevention program maintained;gait belt;nonskid shoes/slippers when out of bed;supervised activity  -VG     Row Name 04/12/19 0914          Bed Mobility Assessment/Treatment    Bed Mobility Assessment/Treatment  supine-sit;sit-supine  -VG     Supine-Sit [place] (Bed Mobility)  supervision  -VG     Sit-Supine [place] (Bed Mobility)  supervision  -VG     Comment (Bed Mobility)  Demonstrates good safety awareness. No dizziness upon EOB.  -VG     Row Name 04/12/19 0914          Transfer Assessment/Treatment    Transfer Assessment/Treatment  sit-stand transfer;stand-sit transfer  -VG     Comment (Transfers)  Slightly unsteady upon standing.  -VG     Sit-Stand [place] (Transfers)  contact guard  -VG     Stand-Sit [place] (Transfers)  contact guard  -VG     Row Name 04/12/19 0914          Gait/Stairs Assessment/Training    Gait/Stairs Assessment/Training  gait/ambulation independence  -VG     [place] Level (Gait)  contact guard;verbal cues  -VG     Distance in Feet (Gait)  300  -VG     Pattern (Gait)  step-to;step-through  -VG     Deviations/Abnormal Patterns (Gait)  gait speed decreased;stride length decreased;festinating/shuffling;marquita decreased  -VG     Comment (Gait/Stairs)  Distance limited by fatigue. Shuffled gait, slightly unsteady. Cues for safety. Demonstrates impaired endurance, balance, strength.  -VG     Row Name 04/12/19 0914           General ROM    GENERAL ROM COMMENTS  BLEs WFL  -VG     Row Name 04/12/19 0914          MMT (Manual Muscle Testing)    General MMT Comments  BLEs 4/5 grossly  -VG     Row Name 04/12/19 0914          Pain Assessment    Additional Documentation  Pain Scale: Numbers Pre/Post-Treatment (Group)  -VG     Row Name 04/12/19 0914          Pain Scale: Numbers Pre/Post-Treatment    Pain Scale: Numbers, Pretreatment  0/10 - no pain  -VG     Pain Scale: Numbers, Post-Treatment  0/10 - no pain  -VG     Row Name 04/12/19 0914          Coping    Observed Emotional State  accepting  -VG     Verbalized Emotional State  acceptance  -VG     Row Name 04/12/19 0914          Plan of Care Review    Plan of Care Reviewed With  patient;family  -VG     Row Name 04/12/19 0914          Physical Therapy Clinical Impression    Date of Referral to PT  04/10/19  -VG     PT Diagnosis (PT Clinical Impression)  Impaired endurance, balance, strength  -VG     Criteria for Skilled Interventions Met (PT Clinical Impression)  yes;treatment indicated  -VG     Pathology/Pathophysiology Noted (Describe Specifically for Each System)  musculoskeletal;neuromuscular  -VG     Impairments Found (describe specific impairments)  aerobic capacity/endurance;gait, locomotion, and balance;muscle performance  -VG     Rehab Potential (PT Clinical Summary)  good, to achieve stated therapy goals  -VG     Care Plan Review (PT)  evaluation/treatment results reviewed;patient/other agree to care plan  -VG     Care Plan Review, Other Participant (PT Clinical Impression)  family  -VG     Row Name 04/12/19 0914          Physical Therapy Goals    Bed Mobility Goal Selection (PT)  bed mobility, PT goal 1  -VG     Transfer Goal Selection (PT)  transfer, PT goal 1  -VG     Gait Training Goal Selection (PT)  gait training, PT goal 1  -VG     Stairs Goal Selection (PT)  stairs, PT goal 1  -VG     Additional Documentation  Stairs Goal Selection (PT) (Row)  -VG     Row Name 04/12/19 0914           Bed Mobility Goal 1 (PT)    Activity/Assistive Device (Bed Mobility Goal 1, PT)  sit to supine/supine to sit  -VG     [place] Level/Cues Needed (Bed Mobility Goal 1, PT)  independent  -VG     Time Frame (Bed Mobility Goal 1, PT)  long term goal (LTG);2 weeks  -VG     Row Name 04/12/19 0914          Transfer Goal 1 (PT)    Activity/Assistive Device (Transfer Goal 1, PT)  sit-to-stand/stand-to-sit  -VG     [place] Level/Cues Needed (Transfer Goal 1, PT)  independent  -VG     Time Frame (Transfer Goal 1, PT)  long term goal (LTG);2 weeks  -VG     Row Name 04/12/19 0914          Gait Training Goal 1 (PT)    Activity/Assistive Device (Gait Training Goal 1, PT)  gait (walking locomotion)  -VG     [place] Level (Gait Training Goal 1, PT)  independent  -VG     Distance (Gait Goal 1, PT)  500  -VG     Time Frame (Gait Training Goal 1, PT)  long term goal (LTG);2 weeks  -VG     Row Name 04/12/19 0914          Stairs Goal 1 (PT)    Activity/Assistive Device (Stairs Goal 1, PT)  stairs, all skills  -VG     [place] Level/Cues Needed (Stairs Goal 1, PT)  supervision required  -VG     Number of Stairs (Stairs Goal 1, PT)  2  -VG     Time Frame (Stairs Goal 1, PT)  long term goal (LTG);2 weeks  -VG     Row Name 04/12/19 0914          Patient Education Goal (PT)    Activity (Patient Education Goal, PT)  HEP  -VG     [place]/Cues/Accuracy (Memory Goal 2, PT)  independent  -VG     Time Frame (Patient Education Goal, PT)  long term goal (LTG);2 weeks  -VG     Row Name 04/12/19 0914          Positioning and Restraints    Pre-Treatment Position  in bed  -VG     Post Treatment Position  bed  -VG     In Bed  fowlers;call light within reach;encouraged to call for assist;with family/caregiver;side rails up x2  -VG     Row Name 04/12/19 0914          Living Environment    Home Accessibility  stairs to enter home  -VG       User Key  (r) = Recorded By, (t) = Taken By, (c) = Cosigned By    Initials Name Provider  Type    VG Patricia Blanchard, PT Physical Therapist        Physical Therapy Education     Title: PT OT SLP Therapies (In Progress)     Topic: Physical Therapy (In Progress)     Point: Mobility training (Done)     Learning Progress Summary           Patient Acceptance, E, VU by  at 4/12/2019  9:14 AM    Comment:  Educated on HEP and correct mechanics for safe functional mobility.   Family Acceptance, E, VU by  at 4/12/2019  9:14 AM    Comment:  Educated on HEP and correct mechanics for safe functional mobility.                   Point: Body mechanics (Done)     Learning Progress Summary           Patient Acceptance, E, VU by  at 4/12/2019  9:14 AM    Comment:  Educated on HEP and correct mechanics for safe functional mobility.   Family Acceptance, E, VU by  at 4/12/2019  9:14 AM    Comment:  Educated on HEP and correct mechanics for safe functional mobility.                   Point: Precautions (Done)     Learning Progress Summary           Patient Acceptance, E, VU by  at 4/12/2019  9:14 AM    Comment:  Educated on HEP and correct mechanics for safe functional mobility.   Family Acceptance, E, VU by  at 4/12/2019  9:14 AM    Comment:  Educated on HEP and correct mechanics for safe functional mobility.                               User Key     Initials Effective Dates Name Provider Type Discipline     05/29/18 -  Patricia Blanchard, PT Physical Therapist PT              PT Recommendation and Plan  Anticipated Discharge Disposition (PT): home with assist, home with OP services  Planned Therapy Interventions (PT Eval): balance training, bed mobility training, gait training, home exercise program, patient/family education, stair training, strengthening, transfer training  Therapy Frequency (PT Clinical Impression): daily  Outcome Summary/Treatment Plan (PT)  Anticipated Discharge Disposition (PT): home with assist, home with OP services  Plan of Care Reviewed With: patient, family  Outcome Summary: IP PT  day completed. Pt ambulated 300 ft with CGA and no AD. Shuffled gait, unsteady. Demonstrates impaired endurance, balance, and strength. Recommend appropriate for home with assist and OP PT services upon d/c. Will continue to progress pt as able per POC.  Outcome Measures     Row Name 04/12/19 0914 04/11/19 1246          How much help from another person do you currently need...    Turning from your back to your side while in flat bed without using bedrails?  4  -VG  --     Moving from lying on back to sitting on the side of a flat bed without bedrails?  4  -VG  --     Moving to and from a bed to a chair (including a wheelchair)?  3  -VG  --     Standing up from a chair using your arms (e.g., wheelchair, bedside chair)?  3  -VG  --     Climbing 3-5 steps with a railing?  3  -VG  --     To walk in hospital room?  3  -VG  --     AM-PAC 6 Clicks Score  20  -VG  --        How much help from another is currently needed...    Putting on and taking off regular lower body clothing?  --  2  -SD     Bathing (including washing, rinsing, and drying)  --  2  -SD     Toileting (which includes using toilet bed pan or urinal)  --  3  -SD     Putting on and taking off regular upper body clothing  --  3  -SD     Taking care of personal grooming (such as brushing teeth)  --  3  -SD     Eating meals  --  3  -SD     Score  --  16  -SD        Functional Assessment    Outcome Measure Options  AM-PAC 6 Clicks Basic Mobility (PT)  -VG  AM-PAC 6 Clicks Daily Activity (OT)  -SD       User Key  (r) = Recorded By, (t) = Taken By, (c) = Cosigned By    Initials Name Provider Type    Maru Arroyo, OT Occupational Therapist    Patricia Love, PT Physical Therapist         Time Calculation:   PT Charges     Row Name 04/12/19 0914             Time Calculation    Start Time  0914  -VG      PT Received On  04/12/19  -PASQUALE      PT Goal Re-Cert Due Date  04/22/19  -VG        User Key  (r) = Recorded By, (t) = Taken By, (c) = Cosigned By     Initials Name Provider Type    VG Patricia Blanchard, PT Physical Therapist        Therapy Charges for Today     Code Description Service Date Service Provider Modifiers Qty    24175147499 HC PT EVAL MOD COMPLEXITY 3 2019 Patricia Blanchard, PT GP 1          PT G-Codes  Outcome Measure Options: AM-PAC 6 Clicks Basic Mobility (PT)  AM-PAC 6 Clicks Score: 20  Score: 16      Tyesha Blanchard, CHRIS  2019         Electronically signed by Patricia Blanchard, PT at 2019  9:39 AM          Occupational Therapy Notes (most recent note)      Maru Barahona, OT at 2019  1:39 PM          Acute Care - Occupational Therapy Initial Evaluation   [facility]     Patient Name: Brittani Lambert  : 1960  MRN: [id number]  Today's Date: 2019  Onset of Illness/Injury or Date of Surgery: 04/10/19  Date of Referral to OT: 19  Referring Physician: Juan Carlos Gayle MD    Admit Date: 4/10/2019       ICD-10-CM ICD-9-CM   1. Acute focal neurological deficit, onset within 3-24 hours R29.818 781.99   2. Metastatic cancer to brain (CMS/HCC) C79.31 198.3   3. History of breast cancer Z85.3 V10.3   4. Impaired mobility and ADLs Z74.09 799.89     Patient Active Problem List   Diagnosis   • Mixed hyperlipidemia   • Tobacco abuse   • Moderate episode of recurrent major depressive disorder (CMS/HCC)   • Anxiety   • Functional diarrhea   • Lt Breast CA (CMS/HCC)   • Sepsis (CMS/HCC)   • Rt Cellulitis of chest wall   • Acute cystitis without hematuria   • Atrial fibrillation (CMS/HCC)   • Tobacco dependence   • Invasive ductal carcinoma of left breast (CMS/HCC)   • 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 (CMS/HCC)   • Left-sided weakness   • THALIA (generalized anxiety disorder)     Past Medical History:   Diagnosis Date   • Breast CA (CMS/HCC) 10/4/2018   • Depression    • DJD (degenerative joint disease) of cervical spine    • THALIA (generalized anxiety disorder)    • Heart murmur    • Ovarian cancer (CMS/HCC)    •  Tobacco dependence      Past Surgical History:   Procedure Laterality Date   • APPENDECTOMY     • BREAST BIOPSY Right 2003    DONE IN [place]   • COLONOSCOPY  06/2018   • HYSTERECTOMY  1989   • MASTECTOMY W/ SENTINEL NODE BIOPSY Bilateral 10/4/2018    Procedure: LEFT SKIN SPARING BREAST MASTECTOMY WITH LEFT SENTINEL NODE BIOPSY, RIGHT PROPHYLACTIC SKIN SPARING MASTECTOMY;  Surgeon: Koffi Worthington MD;  Location: [facility] OR;  Service: General   • OOPHORECTOMY  1989          OT ASSESSMENT FLOWSHEET (last 12 hours)      Occupational Therapy Evaluation     Row Name 04/11/19 1246                   OT Evaluation Time/Intention    Subjective Information  complains of;weakness  -SD        Document Type  evaluation  -SD        Mode of Treatment  occupational therapy  -SD        Total Evaluation Minutes, Occupational Therapy  19  -SD        Patient Effort  good  -SD        Symptoms Noted During/After Treatment  increased pain;fatigue  -SD        Comment  pt. just completed a radiation tx & waiting to be taken to CT  -SD           General Information    Patient Profile Reviewed?  yes  -SD        Onset of Illness/Injury or Date of Surgery  04/10/19  -SD        Referring Physician  Juan Carlos Gayle MD  -SD        Patient Observations  alert;cooperative;agree to therapy  -SD        Patient/Family Observations  Pt. supine in bed. Pt.'s s.o. & family member present.  -SD        General Observations of Patient  Pt. with O2 sat monitor, port, IV, SCDS, bed check, & sz precaution padding.  -SD        Prior Level of Function  independent:;all household mobility;community mobility;ADL's;home management;driving;work;using stairs  -SD        Equipment Currently Used at Home  none  -SD        Pertinent History of Current Functional Problem  58 y/o WF presents with L sided weakness. Pt. identified L arm weakness at work on 4/9/2019, went to bed that night, and awoke with L LE weakness on 4/10. Pt. with invasive ductal carcinoma of L  breast. Pt. underwent B mastectomy & chemotherapy. Pt. given steroids upon admit & L UE weakness has improved, which may be consistent with breast CA vs. CVA.  -SD        Existing Precautions/Restrictions  fall;other (see comments) radiation  -SD        Limitations/Impairments  visual;other (see comments) pt. reports having blurry vision upon admit  -SD        Risks Reviewed  patient and family:;spouse/S.O.:;LOB;nausea/vomiting;dizziness;increased discomfort;change in vital signs;lines disloged  -SD        Benefits Reviewed  spouse/S.O.:;patient and family:;improve function;increase independence;increase strength;increase balance;decrease pain;decrease risk of DVT;improve skin integrity;increase knowledge  -SD        Barriers to Rehab  medically complex  -SD           Relationship/Environment    Primary Source of Support/Comfort  significant other  -SD        Lives With  significant other  -SD           Resource/Environmental Concerns    Current Living Arrangements  home/apartment/condo  -SD        Resource/Environmental Concerns  none  -SD        Transportation Concerns  car, none  -SD           Home Main Entrance    Number of Stairs, Main Entrance  three  -SD           Cognitive Assessment/Intervention- PT/OT    Orientation Status (Cognition)  oriented x 4  -SD        Follows Commands (Cognition)  WFL  -SD        Safety Deficit (Cognitive)  mild deficit;insight into deficits/self awareness;safety precautions awareness  -SD           Bed Mobility Assessment/Treatment    Bed Mobility Assessment/Treatment  rolling right;scooting/bridging;supine-sit;sit-supine  -SD        Rolling Right Walker (Bed Mobility)  supervision  -SD        Scooting/Bridging Walker (Bed Mobility)  supervision  -SD        Supine-Sit Walker (Bed Mobility)  supervision  -SD        Sit-Supine Walker (Bed Mobility)  supervision  -SD        Bed Mobility, Safety Issues  decreased use of arms for pushing/pulling  -SD         Assistive Device (Bed Mobility)  bed rails;head of bed elevated  -SD           Transfer Assessment/Treatment    Transfer Assessment/Treatment  sit-stand transfer;stand-sit transfer  -SD           Sit-Stand Transfer    Sit-Stand [place] (Transfers)  contact guard  -SD           Stand-Sit Transfer    Stand-Sit [place] (Transfers)  contact guard  -SD           BADL Safety/Performance    Impairments, BADL Safety/Performance  grasp/prehension;coordination;range of motion;strength  -SD        Skilled BADL Treatment/Intervention  BADL process/adaptation training;compensatory training;fazal/one-hand technique  -SD           General ROM    GENERAL ROM COMMENTS  R UE AROM WFL; L shldr flex/abd: 90 degrees, elbow flex/ext: WFL, wrist flex.ext: 50% FROM, fingers: 50% FROM  -SD           MMT (Manual Muscle Testing)    General MMT Comments  R UE 5/5 grossly; L shldr.: 2/5, L elbow flex/ext: 3+/5, L wrist flex/ext: 2+/5, L fingers: 3+/5  -SD           Motor Assessment/Interventions    Additional Documentation  Balance (Group);Fine Motor Testing & Training (Group)  -SD           Balance    Balance  static sitting balance;static standing balance  -SD           Static Sitting Balance    Level of [place] (Unsupported Sitting, Static Balance)  standby assist  -SD        Sitting Position (Unsupported Sitting, Static Balance)  sitting on edge of bed  -SD        Time Able to Maintain Position (Unsupported Sitting, Static Balance)  4 to 5 minutes  -SD           Static Standing Balance    Level of [place] (Supported Standing, Static Balance)  contact guard assist  -SD        Time Able to Maintain Position (Supported Standing, Static Balance)  1 to 2 minutes  -SD           Sensory Assessment/Intervention    Additional Documentation  Vision Assessment/Intervention (Group)  -SD           Vision Assessment/Intervention    Visual Impairment/Limitations  blurry vision;corrective lenses for reading  -SD           Positioning and  Restraints    Pre-Treatment Position  in bed  -SD        Post Treatment Position  bed  -SD        In Bed  notified nsg;fowlers;call light within reach;encouraged to call for assist;with family/caregiver;SCD pump applied  -SD           Pain Assessment    Additional Documentation  Pain Scale: Word Pre/Post-Treatment (Group)  -SD           Pain Scale: Numbers Pre/Post-Treatment    Pain Location - Side  Right  -SD        Pain Location - Orientation  upper  -SD        Pain Location  chest  -SD        Pain Intervention(s)  Repositioned  -SD           Pain Scale: Word Pre/Post-Treatment    Pain: Word Scale, Pretreatment  0 - no pain  -SD        Pain: Word Scale, Post-Treatment  2 - mild pain  -SD           Coping    Observed Emotional State  accepting;cooperative  -SD        Verbalized Emotional State  acceptance  -SD           Plan of Care Review    Plan of Care Reviewed With  patient;significant other  -SD           Clinical Impression (OT)    Date of Referral to OT  04/11/19  -SD        OT Diagnosis  decreased L UE AROM & strength, decreased L hand FMC, decreased balance, decreased occupational endurance, decreased ADL independence  -SD        Patient/Family Goals Statement (OT Eval)  pt. desires to recover L UE strength & be able to do everything for herself  -SD        Criteria for Skilled Therapeutic Interventions Met (OT Eval)  yes;treatment indicated  -SD        Rehab Potential (OT Eval)  good, to achieve stated therapy goals  -SD        Therapy Frequency (OT Eval)  daily  -SD        Care Plan Review (OT)  evaluation/treatment results reviewed;care plan/treatment goals reviewed;risks/benefits reviewed;current/potential barriers reviewed;patient/other agree to care plan  -SD        Care Plan Review, Other Participant (OT Eval)  significant other  -SD        Anticipated Discharge Disposition (OT)  home with assist;home with home health;home with OP services;other (see comments) monitor pt.'s progress &  recommendations  -SD           Vital Signs    Pre Patient Position  Supine  -SD        Intra Patient Position  Standing  -SD        Post Patient Position  Supine  -SD           OT Goals    Bathing Goal Selection (OT)  bathing, OT goal 1  -SD        Dressing Goal Selection (OT)  dressing, OT goal 1  -SD        Grooming Goal Selection (OT)  grooming, OT goal 1  -SD        Self-Feeding Goal Selection (OT)  self feeding, OT goal 1  -SD        Strength Goal Selection (OT)  strength, OT goal 1  -SD        Coordination Goal Selection (OT)  coordination, OT goal 1  -SD        Additional Documentation  Self-Feeding Goal Selection (OT) (Row);Grooming Goal Selection (OT) (Row);Coordination Goal Selection (OT) (Row);Strength Goal Selection (OT) (Row)  -SD           Bathing Goal 1 (OT)    Activity/Assistive Device (Bathing Goal 1, OT)  bathing skills, all  -SD        [place] Level/Cues Needed (Bathing Goal 1, OT)  standby assist;other (see comments) using one-handed technique and/or hand over hand tech  -SD        Time Frame (Bathing Goal 1, OT)  short term goal (STG);by discharge  -SD           Dressing Goal 1 (OT)    Activity/Assistive Device (Dressing Goal 1, OT)  upper body dressing;lower body dressing  -SD        [place]/Cues Needed (Dressing Goal 1, OT)  contact guard assist;other (see comments) using one-handed techniques  -SD        Time Frame (Dressing Goal 1, OT)  short term goal (STG);by discharge  -SD           Grooming Goal 1 (OT)    Activity/Device (Grooming Goal 1, OT)  grooming skills, all  -SD        [place] (Grooming Goal 1, OT)  set-up required;other (see comments) one handed technique  -SD        Time Frame (Grooming Goal 1, OT)  short term goal (STG);by discharge  -SD           Self-Feeding Goal 1 (OT)    Activity/Assistive Device (Self-Feeding Goal 1, OT)  self-feeding skills, all;other (see comments) rocker knife  -SD        [place] Level/Cues Needed (Self-Feeding Goal 1, OT)  set-up  required  -SD        Time Frame (Self-Feeding Goal 1, OT)  short term goal (STG);by discharge  -SD           Strength Goal 1 (OT)    Strength Goal 1 (OT)  Pt.'s L UE strength will increase by 1 mm grade grossly.  -SD        Time Frame (Strength Goal 1, OT)  short term goal (STG);by discharge  -SD           Coordination Goal 1 (OT)    Activity/Assistive Device (Coordination Goal 1, OT)  FM written ex program;FM task  -SD        [place] Level/Cues Needed (Coordination Goal 1, OT)  supervision required  -SD        Time Frame (Coordination Goal 1, OT)  short term goal (STG);by discharge  -SD           Living Environment    Home Accessibility  stairs to enter home  -SD          User Key  (r) = Recorded By, (t) = Taken By, (c) = Cosigned By    Initials Name Effective Dates    Maru Arroyo, OT 06/08/18 -          Occupational Therapy Education     Title: PT OT SLP Therapies (Done)     Topic: Occupational Therapy (Done)     Point: ADL training (Done)     Description: Instruct learner(s) on proper safety adaptation and remediation techniques during self care or transfers.   Instruct in proper use of assistive devices.    Learning Progress Summary           Patient Acceptance, E, VU,NR by SD at 4/11/2019  1:37 PM    Comment:  Pt. & s.o. educated on role of OT. Pt. & s.o. are agreeable with OT POC.   Significant Other Acceptance, E, VU,NR by SD at 4/11/2019  1:37 PM    Comment:  Pt. & s.o. educated on role of OT. Pt. & s.o. are agreeable with OT POC.                   Point: Home exercise program (Done)     Description: Instruct learner(s) on appropriate technique for monitoring, assisting and/or progressing therapeutic exercises/activities.    Learning Progress Summary           Patient Acceptance, E, VU,NR by SD at 4/11/2019  1:37 PM    Comment:  Pt. & s.o. educated on role of OT. Pt. & s.o. are agreeable with OT POC.   Significant Other Acceptance, E, VU,NR by SD at 4/11/2019  1:37 PM    Comment:  Pt. &  s.o. educated on role of OT. Pt. & s.o. are agreeable with OT POC.                   Point: Precautions (Done)     Description: Instruct learner(s) on prescribed precautions during self-care and functional transfers.    Learning Progress Summary           Patient Acceptance, E, VU,NR by SD at 4/11/2019  1:37 PM    Comment:  Pt. & s.o. educated on role of OT. Pt. & s.o. are agreeable with OT POC.   Significant Other Acceptance, E, VU,NR by SD at 4/11/2019  1:37 PM    Comment:  Pt. & s.o. educated on role of OT. Pt. & s.o. are agreeable with OT POC.                               User Key     Initials Effective Dates Name Provider Type Discipline    SD 06/08/18 -  Maru Barahona, OT Occupational Therapist OT                  OT Recommendation and Plan  Outcome Summary/Treatment Plan (OT)  Anticipated Discharge Disposition (OT): home with assist, home with home health, home with OP services, other (see comments)(monitor pt.'s progress & recommendations)  Therapy Frequency (OT Eval): daily  Plan of Care Review  Plan of Care Reviewed With: patient, significant other  Plan of Care Reviewed With: patient, significant other  Outcome Summary: OT IE completed. Pt. supine to sit on EOB with SBA. Pt. participated in B UE AROM/MMT & balance testing in sitting/standing. Pt. with 90 degrees AROM in L shldr. Pt. with 2+/5 strength in L UE grossly. Pt. sit to stand with CGA. Pt. stand to sit with CGA, then sit to supine with SBA. Pt. is appropriate for OT skilled services. Pt. & s.o. are agreeable with OT POC.    Outcome Measures     Row Name 04/11/19 1246             How much help from another is currently needed...    Putting on and taking off regular lower body clothing?  2  -SD      Bathing (including washing, rinsing, and drying)  2  -SD      Toileting (which includes using toilet bed pan or urinal)  3  -SD      Putting on and taking off regular upper body clothing  3  -SD      Taking care of personal grooming (such as  brushing teeth)  3  -SD      Eating meals  3  -SD      Score  16  -SD         Functional Assessment    Outcome Measure Options  AM-PAC 6 Clicks Daily Activity (OT)  -SD        User Key  (r) = Recorded By, (t) = Taken By, (c) = Cosigned By    Initials Name Provider Type    Maru Arroyo OT Occupational Therapist          Time Calculation:   Time Calculation- OT     Row Name 19 1246             Time Calculation- OT    OT Start Time  1246  -SD      OT Stop Time  1339  -SD      OT Time Calculation (min)  53 min  -SD      OT Received On  19  -SD      OT Goal Re-Cert Due Date  19  -SD        User Key  (r) = Recorded By, (t) = Taken By, (c) = Cosigned By    Initials Name Provider Type    Maru Arroyo OT Occupational Therapist        Therapy Charges for Today     Code Description Service Date Service Provider Modifiers Qty    18519858190 HC OT EVAL HIGH COMPLEXITY 4 2019 Maru Barahona OT GO 1               Maru Barahona OT  2019    Electronically signed by Maru Barahona OT at 2019  1:39 PM          Discharge Summary      Alyssa Harris MD at 2019 11:35 AM              [facility] Medicine Services  DISCHARGE SUMMARY    Patient Name: Brittani Lambert  : 1960  MRN: [id number]    Date of Admission: 4/10/2019  Date of Discharge:  19  Primary Care Physician: Alla Colon DO    Consults     Date and Time Order Name Status Description    2019 Inpatient Neurology Consult Other (see comments)      2019 Hematology & Oncology Inpatient Consult Completed     2019 Inpatient Neurosurgery Consult            Hospital Course     Presenting Problem:   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          Hospital Course:  Brittani Lambert is a 59 y.o. female with hx of breast cancer s/p bilateral mastectomy and undergoing chemotherapy with Dr. Patel who presents due to LUE and LLE weakness, along with headache. 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    Neurology, Oncology, Rad/Onc, and Neurosurgery all saw the patient. Findings were felt to be consistent with CNS metastasis. Dr. Garcia has initiated whole brain radiation therapy on 4/11 and she has received 2 of 10 treatments. She also had a nodule on her forehead which was also felt to represent metastasis and Dr. Garcia is hopeful this will also respond to radiation. She will continue on Decadron. Memantine was started due to expected side effect of short term memory loss with whole brain radiation therapy. Staging scans including CT chest/abd/pelvis and NM bone scan were done and do not reveal any other evidence of metastasis. Scans will be repeated by Dr. Patel after she completes her radiation.    She will be discharged home today with outpatient PT/OT.      Discharge Follow Up Recommendations for labs/diagnostics:  F/U with PCP in 1 week  F/U with Dr. Patel in 1-2 weeks  F/U as scheduled 3:45 PM M-F for daily radiation per Dr. Garcia (total 10 treatments)    Day of Discharge     HPI:   Patient seen this morning. Left sided weakness improving.    Review of Systems  Gen-no fevers, no chills  CV-no chest pain, no palpitations  Resp-no cough, no dyspnea  GI-no N/V/D, no abd pain      Otherwise ROS is negative except as mentioned in the HPI.    Vital Signs:   Temp:  [96.9 °F (36.1 °C)-98.2 °F (36.8 °C)] 98.2 °F (36.8 °C)  Heart Rate:  [74-97] 97  Resp:   [16-18] 18  BP: (126-148)/(75-90) 148/78     Physical Exam:  Gen-no acute distress  HENT-NCAT, mucous membranes moist  CV-RRR, S1 S2 normal, no m/r/g  Resp-CTAB, no wheezes or rales  Abd-soft, NT, ND, +BS  Ext-no edema  Neuro-A&Ox3, mild LUE/LLE weakness  Skin-no rashes  Psych-appropriate mood      Pertinent  and/or Most Recent Results     Results from last 7 days   Lab Units 04/12/19  0505 04/10/19  1330   WBC 10*3/mm3 6.76 5.90   HEMOGLOBIN g/dL 11.0* 11.4*   HEMATOCRIT % 33.7* 34.5   PLATELETS 10*3/mm3 257 248   SODIUM mmol/L 134* 138   POTASSIUM mmol/L 4.4 3.9   CHLORIDE mmol/L 98 102   CO2 mmol/L 24.0 24.0   BUN mg/dL 10 10   CREATININE mg/dL 0.70 0.53*   GLUCOSE mg/dL 111* 92   CALCIUM mg/dL 9.6 9.3     Results from last 7 days   Lab Units 04/10/19  1330   BILIRUBIN mg/dL 0.3   ALK PHOS U/L 65   ALT (SGPT) U/L 13   AST (SGOT) U/L 17     Results from last 7 days   Lab Units 04/11/19  0720   CHOLESTEROL mg/dL 305*   TRIGLYCERIDES mg/dL 299*   HDL CHOL mg/dL 30*     Results from last 7 days   Lab Units 04/11/19  0720 04/10/19  1337   HEMOGLOBIN A1C % 5.20  --    TROPONIN I ng/mL  --  0.00       Brief Urine Lab Results  (Last result in the past 365 days)      Color   Clarity   Blood   Leuk Est   Nitrite   Protein   CREAT   Urine HCG        04/10/19 1455 Yellow Clear Negative Negative Negative Negative               Microbiology Results Abnormal     None          Imaging Results (all)     Procedure Component Value Units Date/Time    CT Chest With Contrast [[id number]] Collected:  04/12/19 0854     Updated:  04/12/19 1041    Narrative:       EXAMINATION: CT CHEST W CONTRAST-, CT ABDOMEN PELVIS W CONTRAST-  04/11/2019      INDICATION: breast cancer; R29.818-Other symptoms and signs involving  the nervous system; C79.31-Secondary malignant neoplasm of brain;  Z85.3-Personal history of malignant neoplasm of breast; Z74.09-Other  reduced mobility      TECHNIQUE: CT chest, abdomen and pelvis with intravenous  contrast  administration.     The radiation dose reduction device was turned on for each scan per the  ALARA (As Low as Reasonably Achievable) protocol.     COMPARISON: CT chest 10/15/2018, CT abdomen and pelvis 05/11/2018     FINDINGS:      CT CHEST: Thyroid is mildly heterogeneous in appearance. No bulky  mediastinal adenopathy. Central airways are patent. Esophagus in normal  course and caliber. Atherosclerotic nonaneurysmal thoracic aorta with  patent great vessel origins. Central pulmonary arteries are grossly  patent and unremarkable. Cardiac size within normal limits and without  pericardial effusion demonstrating coronary calcifications within the  left anterior descending in particular. Extended lung windows  demonstrate chronic changes including subsegmental atelectasis and/or  scar at the lung bases without focal groundglass consolidation, dominant  nodule or mass lesion. No pleural effusion. Degenerative changes of the  spine without aggressive osseous lesion. Status post bilateral  mastectomies. Right chest wall Port-A-Cath terminates distal SVC.     CT ABDOMEN AND PELVIS: Liver without focal lesion. Gallbladder  contracted and unremarkable. No gross biliary dilatation. Pancreas and  spleen grossly unremarkable. Adrenals without distinct nodule. Kidneys  without hydronephrosis or hydroureter. No bulky retroperitoneal  adenopathy. Atherosclerotic nonaneurysmal abdominal aorta. Celiac and  SMA patent. Portal veins and IVC patent. GI tract grossly unremarkable  without focal thickening or disproportionate dilatation of bowel. No  free fluid or intra-abdominal free air. Pelvic viscera grossly  unremarkable with multiple calcified phleboliths however no bulky pelvic  adenopathy or significant free fluid with a moderately distended urinary  bladder. Degenerative changes of the spine without aggressive osseous  lesion. No soft tissue body wall findings of concern.       Impression:       Status post bilateral  mastectomies without evidence for  adjacent axillary adenopathy or metastatic extension of disease within  the chest, abdomen or pelvis. No acute pathology otherwise noted within  the chest, abdomen or pelvis.     D:  04/11/2019  E:  04/12/2019        This report was finalized on 4/12/2019 10:38 AM by Dr. Dann Dawson.       CT Abdomen Pelvis With Contrast [[id number]] Collected:  04/12/19 0854     Updated:  04/12/19 1041    Narrative:       EXAMINATION: CT CHEST W CONTRAST-, CT ABDOMEN PELVIS W CONTRAST-  04/11/2019      INDICATION: breast cancer; R29.818-Other symptoms and signs involving  the nervous system; C79.31-Secondary malignant neoplasm of brain;  Z85.3-Personal history of malignant neoplasm of breast; Z74.09-Other  reduced mobility      TECHNIQUE: CT chest, abdomen and pelvis with intravenous contrast  administration.     The radiation dose reduction device was turned on for each scan per the  ALARA (As Low as Reasonably Achievable) protocol.     COMPARISON: CT chest 10/15/2018, CT abdomen and pelvis 05/11/2018     FINDINGS:      CT CHEST: Thyroid is mildly heterogeneous in appearance. No bulky  mediastinal adenopathy. Central airways are patent. Esophagus in normal  course and caliber. Atherosclerotic nonaneurysmal thoracic aorta with  patent great vessel origins. Central pulmonary arteries are grossly  patent and unremarkable. Cardiac size within normal limits and without  pericardial effusion demonstrating coronary calcifications within the  left anterior descending in particular. Extended lung windows  demonstrate chronic changes including subsegmental atelectasis and/or  scar at the lung bases without focal groundglass consolidation, dominant  nodule or mass lesion. No pleural effusion. Degenerative changes of the  spine without aggressive osseous lesion. Status post bilateral  mastectomies. Right chest wall Port-A-Cath terminates distal SVC.     CT ABDOMEN AND PELVIS: Liver without focal lesion.  Gallbladder  contracted and unremarkable. No gross biliary dilatation. Pancreas and  spleen grossly unremarkable. Adrenals without distinct nodule. Kidneys  without hydronephrosis or hydroureter. No bulky retroperitoneal  adenopathy. Atherosclerotic nonaneurysmal abdominal aorta. Celiac and  SMA patent. Portal veins and IVC patent. GI tract grossly unremarkable  without focal thickening or disproportionate dilatation of bowel. No  free fluid or intra-abdominal free air. Pelvic viscera grossly  unremarkable with multiple calcified phleboliths however no bulky pelvic  adenopathy or significant free fluid with a moderately distended urinary  bladder. Degenerative changes of the spine without aggressive osseous  lesion. No soft tissue body wall findings of concern.       Impression:       Status post bilateral mastectomies without evidence for  adjacent axillary adenopathy or metastatic extension of disease within  the chest, abdomen or pelvis. No acute pathology otherwise noted within  the chest, abdomen or pelvis.     D:  04/11/2019  E:  04/12/2019        This report was finalized on 4/12/2019 10:38 AM by Dr. Dann Dawson.       NM Bone Scan Whole Body [[id number]] Updated:  04/11/19 1654    XR Chest 1 View [[id number]] Collected:  04/10/19 1439     Updated:  04/10/19 2121    Narrative:       EXAMINATION: XR CHEST 1 VW- 04/10/2019      INDICATION: Weak/Dizzy/AMS triage protocol      COMPARISON: 11/09/2018     FINDINGS: Note is again made of patient's right-sided Port-A-Cath in  stable position in the proximal SVC. Lungs are well-expanded and appear  clear. Heart and vasculature appear normal in size.           Impression:       No evidence of active chest disease.     D:  04/10/2019  E:  04/10/2019     This report was finalized on 4/10/2019 9:18 PM by DR. Martinez Sheikh MD.       MRI Brain With & Without Contrast [[id number]] Collected:  04/10/19 1743     Updated:  04/10/19 1840    Narrative:       EXAMINATION: MRI BRAIN  W/WO CONTRAST, MRI CERVICAL SPINE W/WO CONTRAST -  04/10/2019      INDICATION: Left upper extremity weakness.     TECHNIQUE:  Multiplanar multisequence MRI of the brain and cervical  spine was performed without and with contrast.     COMPARISONS:  None.     FINDINGS:      BRAIN: There is curvilinear leptomeningeal enhancement along the central  sulcus with surrounding T2 prolongation in the pre- and postcentral gyri  and associated diffusion restriction. There is no other clear  parenchymal abnormality. Marrow signal of the calvarium is within normal  limits. No cortical destructive lesion. Incidental benign epidermal  inclusion cyst noted in the anterior frontal scalp towards the left of  midline. [place] flow-voids are preserved.     CERVICAL SPINE:  Sagittal images cover from from the sella through the  T4 level caudally.     The cervicomedullary junction is unremarkable.  The cervical cord is  normal in caliber and signal. There is no abnormal enhancement in the  spinal canal or in the paraspinal soft tissues. Several benign  perineural cysts are seen in the cervical and incidentally imaged  thoracic spine.     With regard to the marrow space, vertebral body heights are maintained.   There is maintenance of the usual lordosis without significant  spondylolisthesis.  Background marrow signal is normal.     Degenerative changes are as follows:     C2-3: No significant foraminal or spinal canal stenosis.     C3-4: Left greater than right facet arthropathy without significant  foraminal or spinal canal stenosis.     C4-5: Right greater than left uncovertebral arthropathy and left greater  than right facet disease. There is moderate right foraminal stenosis but  no significant spinal canal stenosis.     C5-6: Right greater than left uncovertebral arthropathy and left greater  than right facet disease. Moderate right foraminal stenosis. No  significant spinal canal stenosis.     C6-7: Left greater than right  uncovertebral arthropathy. No significant  foraminal or spinal canal stenosis.     C7/T1: No significant foraminal or spinal canal stenosis.     Incidental imaging of the cervical soft tissues is unremarkable.       Impression:       1. 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 Another  consideration is for subacute infarct. Follow-up is required.  2. Degenerative changes of the cervical spine without evidence of  metastatic disease.     DICTATED:   04/10/2019  EDITED/ls :   04/10/2019      This report was finalized on 4/10/2019 6:37 PM by Adalberto Vallejo.       MRI Cervical Spine With & Without Contrast [[id number]] Collected:  04/10/19 1743     Updated:  04/10/19 1840    Narrative:       EXAMINATION: MRI BRAIN W/WO CONTRAST, MRI CERVICAL SPINE W/WO CONTRAST -  04/10/2019      INDICATION: Left upper extremity weakness.     TECHNIQUE:  Multiplanar multisequence MRI of the brain and cervical  spine was performed without and with contrast.     COMPARISONS:  None.     FINDINGS:      BRAIN: There is curvilinear leptomeningeal enhancement along the central  sulcus with surrounding T2 prolongation in the pre- and postcentral gyri  and associated diffusion restriction. There is no other clear  parenchymal abnormality. Marrow signal of the calvarium is within normal  limits. No cortical destructive lesion. Incidental benign epidermal  inclusion cyst noted in the anterior frontal scalp towards the left of  midline. [place] flow-voids are preserved.     CERVICAL SPINE:  Sagittal images cover from from the sella through the  T4 level caudally.     The cervicomedullary junction is unremarkable.  The cervical cord is  normal in caliber and signal. There is no abnormal enhancement in the  spinal canal or in the paraspinal soft tissues. Several benign  perineural cysts are seen in  the cervical and incidentally imaged  thoracic spine.     With regard to the marrow space, vertebral body heights are maintained.   There is maintenance of the usual lordosis without significant  spondylolisthesis.  Background marrow signal is normal.     Degenerative changes are as follows:     C2-3: No significant foraminal or spinal canal stenosis.     C3-4: Left greater than right facet arthropathy without significant  foraminal or spinal canal stenosis.     C4-5: Right greater than left uncovertebral arthropathy and left greater  than right facet disease. There is moderate right foraminal stenosis but  no significant spinal canal stenosis.     C5-6: Right greater than left uncovertebral arthropathy and left greater  than right facet disease. Moderate right foraminal stenosis. No  significant spinal canal stenosis.     C6-7: Left greater than right uncovertebral arthropathy. No significant  foraminal or spinal canal stenosis.     C7/T1: No significant foraminal or spinal canal stenosis.     Incidental imaging of the cervical soft tissues is unremarkable.       Impression:       1. 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 Another  consideration is for subacute infarct. Follow-up is required.  2. Degenerative changes of the cervical spine without evidence of  metastatic disease.     DICTATED:   04/10/2019  EDITED/ls :   04/10/2019      This report was finalized on 4/10/2019 6:37 PM by Adalberto Vallejo.                       Results for orders placed during the hospital encounter of 10/15/18   Adult Transthoracic Echo Complete W/ Cont if Necessary Per Protocol    Narrative · Left ventricular systolic function is normal.  · Estimated EF appears to be in the range of 61 - 65%.  · Mild tricuspid valve regurgitation is present.  · Calculated right ventricular systolic pressure  from tricuspid   regurgitation is 50 mmHg.           Order Current Status    Cancer Antigen 27.29 In process        Discharge Details        Discharge Medications      New Medications      Instructions Start Date   dexamethasone 4 MG tablet  Commonly known as:  DECADRON   4 mg, Oral, 3 Times Daily With Meals      memantine 5 MG tablet  Commonly known as:  NAMENDA   5 mg, Oral, Every 12 Hours Scheduled         Changes to Medications      Instructions Start Date   buPROPion  MG 12 hr tablet  Commonly known as:  WELLBUTRIN SR  What changed:    · how much to take  · how to take this  · when to take this  · additional instructions   Take one tablet daily in mornings         Continue These Medications      Instructions Start Date   ALPRAZolam 0.5 MG tablet  Commonly known as:  XANAX   0.5 mg, Oral, 3 Times Daily PRN      HYDROcodone-acetaminophen 5-325 MG per tablet  Commonly known as:  NORCO   1 tablet, Oral, Every 6 Hours PRN      lidocaine-prilocaine 2.5-2.5 % cream  Commonly known as:  EMLA   Topical, As Needed      ondansetron 8 MG tablet  Commonly known as:  ZOFRAN   8 mg, Oral, 3 Times Daily PRN      sertraline 100 MG tablet  Commonly known as:  ZOLOFT   200 mg, Oral, Daily             No Known Allergies      Discharge Disposition:  Home or Self Care    Discharge Diet:  Diet Order   Procedures   • Diet Regular; Cardiac         Discharge Activity:   Activity Instructions     Activity as Tolerated              CODE STATUS:    Code Status and Medical Interventions:   Ordered at: 04/10/19 1954     Level Of Support Discussed With:    Patient     Code Status:    CPR     Medical Interventions (Level of Support Prior to Arrest):    Full         Future Appointments   Date Time Provider Department Center   4/12/2019  5:30 PM  [facility]   4/15/2019  3:45 PM  [facility]   4/16/2019  3:45 PM  [facility]   4/17/2019  3:45 PM  [facility]   4/18/2019  3:45 PM   [facility]   2019  3:45 PM [facility]   2019  3:45 PM [facility]   2019  3:45 PM [facility]   2019  3:45 PM [facility]       Additional Instructions for the Follow-ups that You Need to Schedule     Ambulatory Referral to Occupational Therapy   As directed      Specialty needed:  Evaluate and treat         Ambulatory Referral to Physical Therapy Evaluate and treat   As directed      Specialty needed:  Evaluate and treat         Discharge Follow-up with PCP   As directed       Currently Documented PCP:    Alla Colon DO    PCP Phone Number:    [phone]     Follow Up Details:  1 week         Discharge Follow-up with Specified Provider: Dr. Patel in 1-2 weeks   As directed      To:  Dr. Patel in 1-2 weeks               Time Spent on Discharge:  35 minutes    Electronically signed by Alessandra Roque MD, 19, 11:44 AM          Electronically signed by Alessandra Roque MD at 2019 11:44 AM       [address] [address]  Phone:  [phone]  Fax:   Date: 2019      Ambulatory Referral to Physical Therapy Evaluate and treat     Patient:  Brittani Lambert MRN:  [id number]   [address] [place], [place] [address] :  1960  SSN:    Phone: [phone] Sex:  F      INSURANCE PAYOR PLAN GROUP # SUBSCRIBER ID   Primary:    [employer] [id number]      Referring Provider Information:  ALESSANDRA ROQUE Phone: [phone] Fax:       Referral Information:   # Visits:  1 Referral Type: Therapy [AE1]   Urgency:  Routine Referral Reason: Specialty Services Required   Start Date: 2019 End Date:  To be determined by Insurer   Diagnosis: Acute focal neurological deficit, onset within 3-24 hours (R29.818 [ICD-10-CM] 781.99 [ICD-9-CM])  Impaired mobility and ADLs (Z74.09 [ICD-10-CM] 799.89 [ICD-9-CM])      Refer  to Dept:   Refer to Provider:   Refer to Facility:       Specialty needed: Evaluate and treat     This document serves as a request of services and does not constitute Insurance authorization or approval of services.  To determine eligibility, please contact the members Insurance carrier to verify and review coverage.     If you have medical questions regarding this request for services. Please contact [address] at [phone] between the hours of 8:00am - 5:00pm (Mon-Fri).       Verbal Order Mode: Telephone with readback   Authorizing Provider: Alyssa Roque MD  Authorizing Provider's NPI: [id number]     Order Entered By: Priti Cano RN 2019 12:19 PM     Electronically signed by: Alyssa Roque MD 2019 12:35 PM        [address] [address]  Phone:  [phone]  Fax:   Date: 2019      Ambulatory Referral to Occupational Therapy     Patient:  Brittani Lambert MRN:  [id number]   [address] [place] [id number] :  1960  SSN:    Phone: [phone] Sex:  F      INSURANCE PAYOR PLAN GROUP # SUBSCRIBER ID   Primary:    [employer] [id number]      Referring Provider Information:  ALYSSA ROQUE Phone: [phone] Fax:       Referral Information:   # Visits:  1 Referral Type: Therapy [AE1]   Urgency:  Routine Referral Reason: Specialty Services Required   Start Date: 2019 End Date:  To be determined by Insurer   Diagnosis: Acute focal neurological deficit, onset within 3-24 hours (R29.818 [ICD-10-CM] 781.99 [ICD-9-CM])  Impaired mobility and ADLs (Z74.09 [ICD-10-CM] 799.89 [ICD-9-CM])      Refer to Dept:   Refer to Provider:   Refer to Facility:       Specialty needed: Evaluate and treat     This document serves as a request of services and does not constitute Insurance authorization or approval of services.  To determine eligibility, please contact the  members Insurance carrier to verify and review coverage.     If you have medical questions regarding this request for services. Please contact [address] at [phone] between the hours of 8:00am - 5:00pm (Mon-Fri).       Verbal Order Mode: Telephone with readback   Authorizing Provider: Alyssa Harris MD  Authorizing Provider's NPI: [id number]     Order Entered By: Priti Cano RN 4/12/2019 12:19 PM     Electronically signed by: Alyssa Harris MD 4/12/2019 12:35 PM

## 2019-04-12 NOTE — PROGRESS NOTES
I saw and evaluated Ms. Lambert on the treatment machine this morning.  She has now received 2 fractions of 300 cGy each to the whole brain.  She appears to have tolerated her first treatment very well.  In addition, her left upper extremity weakness appears slightly better.  I anticipate that she should be ready to for discharge later today or perhaps tomorrow.  As far as her radiation treatments, we have given her an appointment to resume on Monday as an outpatient.

## 2019-04-13 ENCOUNTER — READMISSION MANAGEMENT (OUTPATIENT)
Dept: CALL CENTER | Facility: HOSPITAL | Age: 59
End: 2019-04-13

## 2019-04-13 LAB — CANCER AG27-29 SERPL-ACNC: 41.8 U/ML (ref 0–38.6)

## 2019-04-14 NOTE — OUTREACH NOTE
Prep Survey      Responses   Facility patient discharged from?  [facility]   Is patient eligible?  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  Other   Does the patient have Home health ordered?  No   Is there a DME ordered?  No   Prep survey completed?  Yes          Abena Aj RN

## 2019-04-15 ENCOUNTER — TRANSITIONAL CARE MANAGEMENT TELEPHONE ENCOUNTER (OUTPATIENT)
Dept: FAMILY MEDICINE CLINIC | Facility: CLINIC | Age: 59
End: 2019-04-15

## 2019-04-15 ENCOUNTER — HOSPITAL ENCOUNTER (OUTPATIENT)
Dept: RADIATION ONCOLOGY | Facility: HOSPITAL | Age: 59
Discharge: HOME | End: 2019-04-15

## 2019-04-15 ENCOUNTER — HOSPITAL ENCOUNTER (OUTPATIENT)
Dept: RADIATION ONCOLOGY | Facility: HOSPITAL | Age: 59
Setting detail: RADIATION/ONCOLOGY SERIES
Discharge: HOME | End: 2019-04-15

## 2019-04-15 DIAGNOSIS — Z90.13 H/O BILATERAL MASTECTOMY: ICD-10-CM

## 2019-04-15 PROCEDURE — 77412 RADIATION TX DELIVERY LVL 3: CPT | Performed by: RADIOLOGY

## 2019-04-15 RX ORDER — DOCUSATE SODIUM 100 MG/1
100 CAPSULE, LIQUID FILLED ORAL 2 TIMES DAILY PRN
Qty: 60 CAPSULE | Refills: 2 | Status: SHIPPED | OUTPATIENT
Start: 2019-04-15 | End: 2019-10-16 | Stop reason: SDUPTHER

## 2019-04-15 RX ORDER — OXYCODONE AND ACETAMINOPHEN 7.5; 325 MG/1; MG/1
1 TABLET ORAL EVERY 6 HOURS PRN
Qty: 15 TABLET | Refills: 0 | Status: SHIPPED | OUTPATIENT
Start: 2019-04-15 | End: 2019-04-19 | Stop reason: SDUPTHER

## 2019-04-15 RX ORDER — POLYETHYLENE GLYCOL 3350 17 G/17G
17 POWDER, FOR SOLUTION ORAL DAILY
Qty: 30 EACH | Refills: 2 | Status: SHIPPED | OUTPATIENT
Start: 2019-04-15 | End: 2020-05-11

## 2019-04-15 NOTE — OUTREACH NOTE
I will change her pain medication to percocet instead, hold Norco while she is taking this. This medication can cause constipation. Based on description, doesn't sound like a blockage is present. She will need to take Miralax and a stool softener, may take days for bowel movements to begin. Will send to pharmacy.   If we need to make additional pain management changes, will discuss at her appointment.

## 2019-04-15 NOTE — OUTREACH NOTE
"Please see note.  Pt requesting PCP recommendations for pain management and constipation. Pt uses [facility].  Pt call back is [phone]. Pt gave verbal authorization to leave detailed voicemail if no answer.    The patient states she is experiencing pain \"under right arm where a flap of skin rubs\" and \"severe headaches.\" She is taking Norco as Rx'd which she states is not providing any relief. Pt states pain is \"8 or 9 out of 10.\" She would like to know if Dr. Colon can make any other recommendations for management of pain? Only other complaint at time of call r/t constipation x 6 days. She reports last BM 4/9/19 diarrhea. Eating, staying hydrated, ambulating, passing gas. No abd pain or abd cramping. Reports not taking a stool softener. She denies any fever or chills. Agreeable to PCP appt and confirmed for 4/19/19. Pt denies any other questions or needs at time of call.  "

## 2019-04-16 ENCOUNTER — READMISSION MANAGEMENT (OUTPATIENT)
Dept: CALL CENTER | Facility: HOSPITAL | Age: 59
End: 2019-04-16

## 2019-04-16 ENCOUNTER — HOSPITAL ENCOUNTER (OUTPATIENT)
Dept: RADIATION ONCOLOGY | Facility: HOSPITAL | Age: 59
Discharge: HOME | End: 2019-04-16

## 2019-04-16 VITALS — BODY MASS INDEX: 20.03 KG/M2 | WEIGHT: 113.1 LBS

## 2019-04-16 PROCEDURE — 77412 RADIATION TX DELIVERY LVL 3: CPT | Performed by: RADIOLOGY

## 2019-04-16 NOTE — OUTREACH NOTE
Medical Week 1 Survey      Responses   Facility patient discharged from?  [place] [facility]   Does the patient have one of the following disease processes/diagnoses(primary or secondary)?  Other   Is there a successful TCM telephone encounter documented?  Yes          Kerri Boone RN

## 2019-04-17 ENCOUNTER — HOSPITAL ENCOUNTER (OUTPATIENT)
Dept: RADIATION ONCOLOGY | Facility: HOSPITAL | Age: 59
Discharge: HOME | End: 2019-04-17

## 2019-04-17 ENCOUNTER — OFFICE VISIT (OUTPATIENT)
Dept: PSYCHIATRY | Facility: CLINIC | Age: 59
End: 2019-04-17

## 2019-04-17 DIAGNOSIS — F41.1 GENERALIZED ANXIETY DISORDER: ICD-10-CM

## 2019-04-17 DIAGNOSIS — F33.1 MODERATE EPISODE OF RECURRENT MAJOR DEPRESSIVE DISORDER (HCC): Primary | ICD-10-CM

## 2019-04-17 PROCEDURE — 99214 OFFICE O/P EST MOD 30 MIN: CPT | Performed by: NURSE PRACTITIONER

## 2019-04-17 PROCEDURE — 77412 RADIATION TX DELIVERY LVL 3: CPT | Performed by: RADIOLOGY

## 2019-04-17 NOTE — PROGRESS NOTES
"    Subjective   Brittani Lambert is a 59 y.o. female who is here today for medication management follow up.     Chief Complaint:    Moderate episode of recurrent major depressive disorder (CMS/HCC)    Generalized anxiety disorder       History of Present Illness Patient presents by herself for medication management follow-up.  Patient reports she was having left arm weakness and presented to the emergency department they performed a CT of the head which showed lesions.  She was admitted to the hospital and began irradiation to those brain lesions.  She denies headaches or memory loss.  She still has 3 treatments to go.  She states Carlos her live-in boyfriend really made a turnaround in January when she was contemplating leaving him.  She states he has been very supportive and caring and his family has come in this week to also be of support.  Patient is on short-term disability and work has been helpful and expediting her checks.  She states her mother and sisters have been wonderful and supportive.  She reports feeling loved and cared for and she only wants positive people around her.  She states she is going to get treatment as \"things come up\".  She states none of us know when we are going to die and she plans on living.  She denies panic attacks, reports sleeping well, rates anxiety about a 4 and denies significant depressive symptoms.  She states she will get down sometimes when she thinks about this but does not stay \"stuck in it\" and will be tearful but that goes away.  She is motivated to do things and has interests.  She talked about [place] shopping which she always does early for people.  She is going to go get her nails done today.  She is going to go by a scarf so her boyfriend's mother who is from [facility] can help her wrap her head.  She denies adverse effects from medications.  She denies seizures, however with having brain lesions recommend stopping the Wellbutrin.  She reports being fine with " this and is taking the Zoloft 200 mg daily.  She is also on Xanax as needed through her PCP.  Patient also discussed the fact that her daughter son-in-law and grandson who is 8 now is coming to visit her.  She had not talked to her daughter for 8 years since her ex  had .  The divorce was final the day prior to the divorce.  Her ex- had changed his will to have their grandson be the beneficiary of his money.  Patient states her daughter only gave her a small amount of money and they had argued and had not talked since then.  Processed how she will react and visit with her daughter in a healthy manner.  She is looking forward to seeing her grandson but is wary of seeing her daughter.  (Scales based on 0 - 10 with 10 being the worst)        The following portions of the patient's history were reviewed and updated as appropriate: allergies, current medications, past family history, past medical history, past social history, past surgical history and problem list.    Review of Systemsdenies fever, cough, s/s’s of infection, denies GI/ problems, denies new medical issues     Objective   Physical Exam  not currently breastfeeding.    No Known Allergies    Current Medications:   Current Outpatient Medications   Medication Sig Dispense Refill   • ALPRAZolam (XANAX) 0.5 MG tablet Take 1 tablet by mouth 3 (Three) Times a Day As Needed for Anxiety or Sleep. 90 tablet 2   • dexamethasone (DECADRON) 4 MG tablet Take 1 tablet by mouth 3 (Three) Times a Day With Meals for 14 days. 42 tablet 0   • docusate sodium (COLACE) 100 MG capsule Take 1 capsule by mouth 2 (Two) Times a Day As Needed for Constipation. 60 capsule 2   • lidocaine-prilocaine (EMLA) 2.5-2.5 % cream Apply  topically to the appropriate area as directed As Needed (45-60 minutes prior to port access.  Cover with saran/plastic wrap.). 30 g 3   • memantine (NAMENDA) 5 MG tablet Take 1 tablet by mouth Every 12 (Twelve) Hours for 30 days. 60 tablet 0    • ondansetron (ZOFRAN) 8 MG tablet Take 1 tablet by mouth 3 (Three) Times a Day As Needed for Nausea or Vomiting. 30 tablet 5   • oxyCODONE-acetaminophen (PERCOCET) 7.5-325 MG per tablet Take 1 tablet by mouth Every 6 (Six) Hours As Needed for Moderate Pain . Don't combine with Norco 15 tablet 0   • polyethylene glycol (MIRALAX) packet Take 17 g by mouth Daily. 30 each 2   • sertraline (ZOLOFT) 100 MG tablet Take 2 tablets by mouth Daily. 60 tablet 3     No current facility-administered medications for this visit.        Lab Results: reviewed in Epic from 4/2019    Appearance: appropriately dressed, looks stated age, she wears a hat covering her head   Hygiene:   good  Cooperation:  Cooperative  Eye Contact:  Good  Psychomotor Behavior:  No psychomotor agitation/retardation, No EPS, No motor tics  Mood:  within normal limits  Affect:  Appropriate  Hopelessness: Denies  Speech:  Normal  Thought Process:  Linear  Thought Content:  Normal  Concentration: Normal   Suicidal:  None  Homicidal:  None  Hallucinations:  None  Delusion:  None  Memory:  Intact  Orientation:  Person, Place, Time and Situation  Reliability:  fair  Insight:  Fair  Judgement:  Fair  Impulse Control:  Fair  Estimated Intelligence: average range      Assessment/Plan   Diagnoses and all orders for this visit:    Moderate episode of recurrent major depressive disorder (CMS/HCC)    Generalized anxiety disorder      25 minutes face-to-face  In 1030  Out 11 AM  IMPRESSION: Within normal limits for medical condition she is in and dealing with having stage IV breast cancer.  PLAN:   We will discontinue Wellbutrin had originally placed that for motivation and interest however with brain lesions do not want to risk lowering seizure threshold.  Refill and continue sertraline 200 mg daily    We discussed risks, benefits, and side effects of the above medications and the patient was agreeable with the plan. Patient was educated on the importance of compliance  with treatment and follow-up appointments.     Counseled patient regarding multimodal approach with healthy nutrition, healthy sleep, regular physical activity, social activities, counseling, and medications.    Coping skills reviewed     Assisted patient in processing above session content; stage IV breast cancer with lesions to brain acknowledged and normalized patient’s thoughts, feelings, and concerns.  Applied  positive coping skills and behavior management in session.  Allowed patient to freely discuss issues without interruption or judgment. Provided safe, confidential environment to facilitate the development of positive therapeutic relationship and encourage open, honest communication. Assisted patient in identifying risk factors which would indicate the need for higher level of care including thoughts to harm self or others and/or self-harming behavior and encouraged patient to contact this office, call 911, or present to the nearest emergency room should any of these events occur. Discussed crisis intervention services and means to access.  Patient adamantly and convincingly denies current suicidal or homicidal ideation or perceptual disturbance.    Treatment Plan: stabilize mood, patient will stay out of the hospital and be at optimal level of functioning, take all medication as prescribed. Patient verbalized  understanding and agreement to plan.    Instructed to call for questions or concerns and return early if necessary. Crisis plan reviewed including going to the Emergency department.    Return in about 4 weeks (around 5/15/2019).

## 2019-04-18 ENCOUNTER — HOSPITAL ENCOUNTER (OUTPATIENT)
Dept: RADIATION ONCOLOGY | Facility: HOSPITAL | Age: 59
Discharge: HOME | End: 2019-04-18

## 2019-04-18 PROCEDURE — 77417 THER RADIOLOGY PORT IMAGE(S): CPT | Performed by: RADIOLOGY

## 2019-04-18 PROCEDURE — 77412 RADIATION TX DELIVERY LVL 3: CPT | Performed by: RADIOLOGY

## 2019-04-19 ENCOUNTER — HOSPITAL ENCOUNTER (OUTPATIENT)
Dept: RADIATION ONCOLOGY | Facility: HOSPITAL | Age: 59
Discharge: HOME | End: 2019-04-19

## 2019-04-19 ENCOUNTER — OFFICE VISIT (OUTPATIENT)
Dept: FAMILY MEDICINE CLINIC | Facility: CLINIC | Age: 59
End: 2019-04-19

## 2019-04-19 VITALS
RESPIRATION RATE: 16 BRPM | DIASTOLIC BLOOD PRESSURE: 60 MMHG | TEMPERATURE: 99.8 F | HEART RATE: 88 BPM | BODY MASS INDEX: 20.02 KG/M2 | HEIGHT: 63 IN | SYSTOLIC BLOOD PRESSURE: 110 MMHG | WEIGHT: 113 LBS

## 2019-04-19 DIAGNOSIS — R53.1 LEFT-SIDED WEAKNESS: ICD-10-CM

## 2019-04-19 DIAGNOSIS — C79.31 MALIGNANT NEOPLASM OF BREAST METASTATIC TO BRAIN, UNSPECIFIED LATERALITY (HCC): ICD-10-CM

## 2019-04-19 DIAGNOSIS — C50.919 MALIGNANT NEOPLASM OF BREAST METASTATIC TO BRAIN, UNSPECIFIED LATERALITY (HCC): ICD-10-CM

## 2019-04-19 DIAGNOSIS — G89.3 CANCER ASSOCIATED PAIN: Primary | ICD-10-CM

## 2019-04-19 PROCEDURE — 77412 RADIATION TX DELIVERY LVL 3: CPT | Performed by: RADIOLOGY

## 2019-04-19 PROCEDURE — 99495 TRANSJ CARE MGMT MOD F2F 14D: CPT | Performed by: FAMILY MEDICINE

## 2019-04-19 PROCEDURE — 77336 RADIATION PHYSICS CONSULT: CPT | Performed by: RADIOLOGY

## 2019-04-19 RX ORDER — OXYCODONE AND ACETAMINOPHEN 7.5; 325 MG/1; MG/1
1 TABLET ORAL EVERY 6 HOURS PRN
Qty: 30 TABLET | Refills: 0 | Status: SHIPPED | OUTPATIENT
Start: 2019-04-19 | End: 2019-05-07 | Stop reason: SDUPTHER

## 2019-04-19 NOTE — PROGRESS NOTES
"Transitional Care Follow Up Visit  Subjective     Brittani Lambert is a 59 y.o. female who presents for a transitional care management visit.    Within 48 business hours after discharge our office contacted her via telephone to coordinate her care and needs.      I reviewed and discussed the details of that call along with the discharge summary, hospital problems, inpatient lab results, inpatient diagnostic studies, and consultation reports with Brittani.     Current outpatient and discharge medications have been reconciled for the patient.    Date of TCM Phone Call 10/19/2018 4/15/2019   [facility]   Date of Admission 10/15/2018 4/10/2019   Date of Discharge 10/18/2018 4/12/2019   Discharge Disposition Home or Self Care Home or Self Care     Risk for Readmission (LACE) Score: 11 (4/12/2019  6:00 AM)      History of Present Illness   Course During Hospital Stay:  From Discharge Summary:\"hx of breast cancer s/p bilateral mastectomy and undergoing chemotherapy with Dr. Patel who presents due to LUE and LLE weakness, along with headache. 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   Neurology, Oncology, Rad/Onc, and Neurosurgery all saw the patient. Findings were felt to be consistent with CNS metastasis. Dr. Garcia has initiated whole brain radiation therapy on 4/11 and she has received 2 of 10 treatments. She also had a nodule on her forehead which was also felt to represent metastasis and Dr. Garcia is hopeful this will also respond to radiation. She will continue on Decadron. Memantine was started due to expected side effect of short term memory loss with whole brain radiation therapy. Staging scans including CT chest/abd/pelvis and NM bone scan were done and do not reveal any other evidence of metastasis. Scans will be repeated by Dr. Patel after she completes her radiation.  She will be discharged home today with outpatient " "PT/OT.\"    She is currently receiving radiation daily until next week, planned for 10 therapies total. Then will schedule a repeat imaging to determine if brain mets are improving.  Overall, doing well. Radiation is causing fatigue.   I recently changed her pain medication from hydrocodone to oxycodone, since the change her pain is being managed much better. Currently only taking Percocet 7.5 mg nightly.   Also, constipation has resolved with the addition of MiraLAX and Colace.  Left-sided weakness is still present, gradually improving.  She does still drop items with her left hand.  She was supposed to be set up for PT/OT outpatient at [facility], she states that she has yet to be contacted to schedule this.     The following portions of the patient's history were reviewed and updated as appropriate: allergies, current medications, past family history, past medical history, past social history, past surgical history and problem list.    Review of Systems   Constitutional: Negative.    Eyes: Negative for visual disturbance.   Respiratory: Negative for cough and shortness of breath.    Cardiovascular: Negative for chest pain.   Gastrointestinal: Negative for abdominal pain and constipation.   Skin:        Growth on forehead   Neurological: Negative for headaches.   Hematological: Negative for adenopathy. Does not bruise/bleed easily.   Psychiatric/Behavioral: Negative for sleep disturbance.       Objective   Physical Exam   Constitutional: She is oriented to person, place, and time. She appears well-developed.   thin   HENT:   Head: Normocephalic and atraumatic.       Right Ear: External ear normal.   Left Ear: External ear normal.   Nose: Nose normal.   Prominent growth on forehead   Eyes: Conjunctivae and EOM are normal.   Neck: Neck supple.   Cardiovascular: Normal rate, regular rhythm and normal heart sounds.   Pulmonary/Chest: Effort normal and breath sounds normal. She has no wheezes. "   Musculoskeletal: She exhibits no edema.   Neurological: She is alert and oriented to person, place, and time.   Skin: Skin is warm and dry.   Psychiatric: Her behavior is normal.   Nursing note and vitals reviewed.      Assessment/Plan   Brittani was seen today for follow-up, hospital discharge and l breast cancer w/ mets.    Diagnoses and all orders for this visit:    Cancer associated pain  -     oxyCODONE-acetaminophen (PERCOCET) 7.5-325 MG per tablet; Take 1 tablet by mouth Every 6 (Six) Hours As Needed for Moderate Pain . Don't combine with Norco    Left-sided weakness    Malignant neoplasm of breast metastatic to brain, unspecified laterality (CMS/HCC)      She is doing well with current pain management, no sign of misuse.  She is in good spirits currently, consider recent diagnosis.   Additional pain medication provided to use as needed.    There are physical therapy and occupational therapy orders in her chart, but she states that she has not been contacted to schedule this.  Will speak to the referral coordinator about contacting [facility] with these orders to improve left-sided weakness.

## 2019-04-22 ENCOUNTER — READMISSION MANAGEMENT (OUTPATIENT)
Dept: CALL CENTER | Facility: HOSPITAL | Age: 59
End: 2019-04-22

## 2019-04-22 ENCOUNTER — HOSPITAL ENCOUNTER (OUTPATIENT)
Dept: RADIATION ONCOLOGY | Facility: HOSPITAL | Age: 59
Discharge: HOME | End: 2019-04-22

## 2019-04-22 PROCEDURE — 77412 RADIATION TX DELIVERY LVL 3: CPT | Performed by: RADIOLOGY

## 2019-04-22 NOTE — OUTREACH NOTE
Medical Week 2 Survey      Responses   Facility patient discharged from?  [facility]   Does the patient have one of the following disease processes/diagnoses(primary or secondary)?  Other   Week 2 attempt successful?  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

## 2019-04-23 ENCOUNTER — READMISSION MANAGEMENT (OUTPATIENT)
Dept: CALL CENTER | Facility: HOSPITAL | Age: 59
End: 2019-04-23

## 2019-04-23 ENCOUNTER — HOSPITAL ENCOUNTER (OUTPATIENT)
Dept: RADIATION ONCOLOGY | Facility: HOSPITAL | Age: 59
Discharge: HOME | End: 2019-04-23

## 2019-04-23 VITALS — BODY MASS INDEX: 20.16 KG/M2 | WEIGHT: 113.8 LBS

## 2019-04-23 PROCEDURE — 77412 RADIATION TX DELIVERY LVL 3: CPT | Performed by: RADIOLOGY

## 2019-04-23 NOTE — OUTREACH NOTE
Medical Week 2 Survey      Responses   Facility patient discharged from?  [facility]   Does the patient have one of the following disease processes/diagnoses(primary or secondary)?  Other   Week 2 attempt successful?  Yes   Call start time  1204   Call end time  1213   Meds reviewed with patient/caregiver?  Yes   Is the patient having any side effects they believe may be caused by any medication additions or changes?  No   Does the patient have all medications ordered at discharge?  Yes   Is the patient taking all medications as directed (includes completed medication regime)?  Yes   Medication comments  Pt. has one more radiation tx 4/23/19   Does the patient have a primary care provider?   Yes   Does the patient have an appointment with their PCP within 7 days of discharge?  Yes   Has the patient kept scheduled appointments due by today?  Yes   Psychosocial issues?  No   Did the patient receive a copy of their discharge instructions?  Yes   Nursing interventions  Reviewed instructions with patient   What is the patient's perception of their health status since discharge?  Improving   Is the patient/caregiver able to teach back signs and symptoms related to disease process for when to call PCP?  Yes   Is the patient/caregiver able to teach back signs and symptoms related to disease process for when to call 911?  Yes   Is the patient/caregiver able to teach back the hierarchy of who to call/visit for symptoms/problems? PCP, Specialist, Home health nurse, Urgent Care, ED, 911  Yes   Week 2 Call Completed?  Yes          Yojana Chandler RN

## 2019-04-24 ENCOUNTER — HOSPITAL ENCOUNTER (OUTPATIENT)
Dept: RADIATION ONCOLOGY | Facility: HOSPITAL | Age: 59
Discharge: HOME | End: 2019-04-24

## 2019-04-24 PROCEDURE — 77412 RADIATION TX DELIVERY LVL 3: CPT | Performed by: RADIOLOGY

## 2019-04-25 ENCOUNTER — DOCUMENTATION (OUTPATIENT)
Dept: NUTRITION | Facility: HOSPITAL | Age: 59
End: 2019-04-25

## 2019-04-25 NOTE — PROGRESS NOTES
ONC Nutrition - RAD ONC    Diagnosis:  Metastatic breast cancer to brain  Treatment:  Whole brain radiation / 10 fractions    Patient seen today as she completed radiation for brain mets.  Patient describes symptoms of dry mouth and metallic tastes; provided nutrition education for management, including recommendation to use the baking soda, salt and water mouth rinses.  Patient states that otherwise, she is focused on maintaining nutritional status and eating well without issues.  Will follow as needed.

## 2019-04-26 NOTE — RADIATION COMPLETION NOTES
DATE OF COMPLETION: 4/24/2019  DIAGNOSIS: Metastatic Breast Cancer to Brain    REFERRING: Carrie Patel MD        Brittani Marino completed radiation therapy today.      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  She underwent a course of whole brain radiation therapy as detailed below:    Treatment Summary     Dates of Therapy: 4/11/2019-4/24/2019  Treatment Site: Whole brain  Dose: 30 Gy in 10 fractions of 3 Gy each  Technique: Opposed lateral photon fields with 6X energy    Treatment Course and Tolerance: Mrs. James tolerated her radiation treatments fairly well.  At the beginning of treatment she was having rather significant amount of left hand weakness.  This improved slightly with the initiation of steroids, but over the first 4 to 5 days of treatment, she regained quite a bit of strength in her left arm.  She also coincidentally had a anterior forehead skin lesion that was possibly consistent with a dermal metastasis.  This evolved and ruptured during the second week of treatment and appeared clinically to be consistent with a sebaceous cyst.  She was evaluated by surgical oncology, who did not feel like any further treatment was indicated.  She otherwise tolerated her treatments very well.  I gave her a schedule for tapering off of her steroids prior to finishing her final treatment.    The initial follow up visit will be in 1 month.    Brittani Lambert knows to call if any problems or concerns develop in the meantime.     Electronically signed by: Rod Garcia MD                    Cc: Alla Colon DO

## 2019-05-01 ENCOUNTER — OFFICE VISIT (OUTPATIENT)
Dept: PSYCHIATRY | Facility: CLINIC | Age: 59
End: 2019-05-01

## 2019-05-01 ENCOUNTER — READMISSION MANAGEMENT (OUTPATIENT)
Dept: CALL CENTER | Facility: HOSPITAL | Age: 59
End: 2019-05-01

## 2019-05-01 DIAGNOSIS — F41.1 GENERALIZED ANXIETY DISORDER: ICD-10-CM

## 2019-05-01 DIAGNOSIS — F33.1 MODERATE EPISODE OF RECURRENT MAJOR DEPRESSIVE DISORDER (HCC): Primary | ICD-10-CM

## 2019-05-01 PROCEDURE — 90837 PSYTX W PT 60 MINUTES: CPT | Performed by: NURSE PRACTITIONER

## 2019-05-01 NOTE — OUTREACH NOTE
Medical Week 3 Survey      Responses   Facility patient discharged from?  [facility]   Does the patient have one of the following disease processes/diagnoses(primary or secondary)?  Other   Week 3 attempt successful?  Yes   Rescheduled  Rescheduled-pt requested [Pt at Dr keating]          Kerri Boone RN

## 2019-05-01 NOTE — PROGRESS NOTES
"    Subjective   Brittani Lambert is a 59 y.o. female who is here for therapy only    Chief Complaint:    Moderate episode of recurrent major depressive disorder (CMS/HCC)    Generalized anxiety disorder      History of Present Illness Patient presents by herself dressed up with a scarf around her head.  She reports she feels better when she looks good.  Patient is still living with her boyfriend Carlos and states \"she is not taking anything off him\", she also reports \"I think he is afraid of me\".  Patient states her daughter whom she had not seen in 8 years came up with her  and patient's grandson.  She reports that he hardly said 2 words to each other.  Patient has a lot of anger towards her daughter for past transgressions.  Patient is not able to forgive her.  She has no interest if she sees her again.  Patient feels good about setting up her will,  arrangements and has her niece Sandrita ANNE.  She reports good support from her niece, mother and sister.  Patient states she has more imaging and if those are okay she plans on moving out into her own apartment and has put an application for low income housing.  She wants to have her own place and not worry about Carlos's \"stuff\".  Patient states she is in a good place mentally and living each day is full that she can.  She denies suicidal thoughts, denies homicidal ideations or AVH.  She reports sleeping well..  Denies adverse effects from medications.   (Scales based on 0 - 10 with 10 being the worst)        The following portions of the patient's history were reviewed and updated as appropriate: allergies, current medications, past family history, past medical history, past social history, past surgical history and problem list.    Review of Systemsdenies fever, cough, s/s’s of infection, denies GI/ problems, denies new medical issues     Objective   Physical Exam  not currently breastfeeding.    No Known Allergies    Current Medications:   Current " Outpatient Medications   Medication Sig Dispense Refill   • ALPRAZolam (XANAX) 0.5 MG tablet Take 1 tablet by mouth 3 (Three) Times a Day As Needed for Anxiety or Sleep. 90 tablet 2   • docusate sodium (COLACE) 100 MG capsule Take 1 capsule by mouth 2 (Two) Times a Day As Needed for Constipation. 60 capsule 2   • lidocaine-prilocaine (EMLA) 2.5-2.5 % cream Apply  topically to the appropriate area as directed As Needed (45-60 minutes prior to port access.  Cover with saran/plastic wrap.). 30 g 3   • memantine (NAMENDA) 5 MG tablet Take 1 tablet by mouth Every 12 (Twelve) Hours for 30 days. 60 tablet 0   • ondansetron (ZOFRAN) 8 MG tablet Take 1 tablet by mouth 3 (Three) Times a Day As Needed for Nausea or Vomiting. 30 tablet 5   • oxyCODONE-acetaminophen (PERCOCET) 7.5-325 MG per tablet Take 1 tablet by mouth Every 6 (Six) Hours As Needed for Moderate Pain . Don't combine with Norco 30 tablet 0   • polyethylene glycol (MIRALAX) packet Take 17 g by mouth Daily. 30 each 2   • sertraline (ZOLOFT) 100 MG tablet Take 2 tablets by mouth Daily. 60 tablet 3     No current facility-administered medications for this visit.        Appearance: appropriate  Hygiene:   good  Cooperation:  Cooperative  Eye Contact:  Good  Psychomotor Behavior:  No psychomotor agitation/retardation, No EPS, No motor tics  Mood:  within normal limits  Affect:  Appropriate  Hopelessness: Denies  Speech:  Normal  Thought Process:  Linear  Thought Content:  Normal  Concentration: Normal   Suicidal:  None  Homicidal:  None  Hallucinations:  None  Delusion:  None  Memory:  Intact  Orientation:  Person, Place, Time and Situation  Reliability:  fair  Insight:  Fair  Judgement:  Fair  Impulse Control:  Fair  Estimated Intelligence: average range      Assessment/Plan   Diagnoses and all orders for this visit:    Moderate episode of recurrent major depressive disorder (CMS/HCC)    Generalized anxiety disorder    IN at 10:00  Out at 10:55  Clinical  intervention:  Assisted patient in processing above session content; acknowledged and normalized patient’s thoughts, feelings, and concerns.  Applied  positive coping skills and behavior management in session. Allowed patient to freely discuss issues without interruption or judgment. Provided safe, confidential environment to facilitate the development and cont  positive therapeutic relationship and encourage open, honest communication.     Assisted patient in identifying risk factors which would indicate the need for higher level of care including thoughts to harm self or others and/or self-harming behavior and encouraged patient to contact this office, call 911, or present to the nearest emergency room should any of these events occur. Discussed crisis intervention services and means to access.  Patient adamantly and convincingly denies current suicidal or homicidal ideation or perceptual disturbance.    Treatment Plan: stabilize mood, patient will stay out of psychiatric hospital and be at optimal level of functioning with therapy and take all medication as prescribed. Patient verbalized  understanding and agreement to plan.    Instructed to call for questions or concerns and return early if necessary.     Return in about 4 weeks (around 5/29/2019).

## 2019-05-03 ENCOUNTER — READMISSION MANAGEMENT (OUTPATIENT)
Dept: CALL CENTER | Facility: HOSPITAL | Age: 59
End: 2019-05-03

## 2019-05-03 NOTE — OUTREACH NOTE
Medical Week 3 Survey      Responses   Facility patient discharged from?  [facility]   Does the patient have one of the following disease processes/diagnoses(primary or secondary)?  Other   Week 3 attempt successful?  Yes   Rescheduled  Revoked          Nando Sheldon RN

## 2019-05-07 ENCOUNTER — TELEPHONE (OUTPATIENT)
Dept: RADIATION ONCOLOGY | Facility: HOSPITAL | Age: 59
End: 2019-05-07

## 2019-05-07 ENCOUNTER — TELEPHONE (OUTPATIENT)
Dept: FAMILY MEDICINE CLINIC | Facility: CLINIC | Age: 59
End: 2019-05-07

## 2019-05-07 DIAGNOSIS — C50.912 CANCER OF LEFT BREAST METASTATIC TO BRAIN (HCC): Primary | ICD-10-CM

## 2019-05-07 DIAGNOSIS — G89.3 CANCER ASSOCIATED PAIN: ICD-10-CM

## 2019-05-07 DIAGNOSIS — C79.31 CANCER OF LEFT BREAST METASTATIC TO BRAIN (HCC): Primary | ICD-10-CM

## 2019-05-07 RX ORDER — OXYCODONE AND ACETAMINOPHEN 7.5; 325 MG/1; MG/1
1 TABLET ORAL EVERY 6 HOURS PRN
Qty: 60 TABLET | Refills: 0 | Status: SHIPPED | OUTPATIENT
Start: 2019-05-07 | End: 2019-06-05 | Stop reason: SDUPTHER

## 2019-05-07 NOTE — TELEPHONE ENCOUNTER
----- Message from Allyson Rodriguez sent at 5/7/2019  1:44 PM EDT -----  Contact: DR LYONS  MED REFILL ON oxyCODONE-acetaminophen (PERCOCET) 7.5-325 MG per tablet. PATIENT SAYS SHE IS USING IT TWICE A DAY INSTEAD OF ONCE A DAY BECAUSE OF HER HEAD HURTING SO BAD. HER PHARMACY IS [facility] IN [place].  [phone]

## 2019-05-07 NOTE — TELEPHONE ENCOUNTER
Left VM for pt to call back- left message that MRI has been ordered and she will be contacted when scheduled

## 2019-05-08 ENCOUNTER — TELEPHONE (OUTPATIENT)
Dept: RADIATION ONCOLOGY | Facility: HOSPITAL | Age: 59
End: 2019-05-08

## 2019-05-08 DIAGNOSIS — F33.1 MODERATE EPISODE OF RECURRENT MAJOR DEPRESSIVE DISORDER (HCC): ICD-10-CM

## 2019-05-08 LAB
CYTO UR: NORMAL
LAB AP CARIS, ADDENDUM: NORMAL
LAB AP CASE REPORT: NORMAL
LAB AP CLINICAL INFORMATION: NORMAL
Lab: NORMAL
PATH REPORT.FINAL DX SPEC: NORMAL
PATH REPORT.GROSS SPEC: NORMAL

## 2019-05-08 NOTE — TELEPHONE ENCOUNTER
Spoke with pt, who states she sees Dr. Patel Friday prior to MRI-(ordered per Dr. Garcia for pt c/o headaches). pt spoke with Chandni ( rad Onc) for instructions and directions for MRI on 5/10/19.

## 2019-05-09 RX ORDER — BUPROPION HYDROCHLORIDE 100 MG/1
TABLET, EXTENDED RELEASE ORAL
Qty: 90 TABLET | Refills: 0 | OUTPATIENT
Start: 2019-05-09

## 2019-05-10 ENCOUNTER — HOSPITAL ENCOUNTER (OUTPATIENT)
Dept: MRI IMAGING | Facility: HOSPITAL | Age: 59
Discharge: HOME | End: 2019-05-10
Admitting: RADIOLOGY

## 2019-05-10 ENCOUNTER — OFFICE VISIT (OUTPATIENT)
Dept: ONCOLOGY | Facility: CLINIC | Age: 59
End: 2019-05-10

## 2019-05-10 VITALS
BODY MASS INDEX: 20.38 KG/M2 | WEIGHT: 115 LBS | HEIGHT: 63 IN | TEMPERATURE: 98.5 F | HEART RATE: 85 BPM | DIASTOLIC BLOOD PRESSURE: 75 MMHG | RESPIRATION RATE: 18 BRPM | SYSTOLIC BLOOD PRESSURE: 122 MMHG

## 2019-05-10 DIAGNOSIS — C50.912 CANCER OF LEFT BREAST METASTATIC TO BRAIN (HCC): ICD-10-CM

## 2019-05-10 DIAGNOSIS — C79.31 CANCER OF LEFT BREAST METASTATIC TO BRAIN (HCC): ICD-10-CM

## 2019-05-10 DIAGNOSIS — C79.31 CANCER OF LEFT BREAST METASTATIC TO BRAIN (HCC): Primary | ICD-10-CM

## 2019-05-10 DIAGNOSIS — C50.912 CANCER OF LEFT BREAST METASTATIC TO BRAIN (HCC): Primary | ICD-10-CM

## 2019-05-10 DIAGNOSIS — C50.011 MALIGNANT NEOPLASM OF NIPPLE OF RIGHT BREAST IN FEMALE, UNSPECIFIED ESTROGEN RECEPTOR STATUS (HCC): ICD-10-CM

## 2019-05-10 PROCEDURE — 0 GADOBENATE DIMEGLUMINE 529 MG/ML SOLUTION: Performed by: RADIOLOGY

## 2019-05-10 PROCEDURE — A9577 INJ MULTIHANCE: HCPCS | Performed by: RADIOLOGY

## 2019-05-10 PROCEDURE — 99214 OFFICE O/P EST MOD 30 MIN: CPT | Performed by: INTERNAL MEDICINE

## 2019-05-10 PROCEDURE — 70553 MRI BRAIN STEM W/O & W/DYE: CPT

## 2019-05-10 RX ADMIN — GADOBENATE DIMEGLUMINE 10 ML: 529 INJECTION, SOLUTION INTRAVENOUS at 16:52

## 2019-05-10 NOTE — PROGRESS NOTES
"      PROBLEM LIST:  1. [id number] (Stage IIA) ER negative, [place] positive, Her2 2+ by IHC, negative by FISH invasive ductal carcinoma of the left breast  A) bilateral mastectomy on 10/4/18.  Pathology showed a 2.6 cm high grade IDC with basaloid features.  0/1 SLN involved.  B) adjuvant chemotherapy with ddAC followed by taxol started on 11/14/18  C) admitted to the hospital on 4/10/2019 with left upper extremity weakness.  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  CT chest abdomen pelvis and bone scan on 4/11/2019 showed no other sites of disease.  Completed whole brain radiation on 4/24/2019.  2. Anxiety/depression  3. Hyperlipidemia  4. History of ovarian cancer 1989, s/p BRYN/BSO, no adjuvant therapy    Chief complaint: follow up for breast cancer management       Subjective     HISTORY OF PRESENT ILLNESS:   Brittani Lambert returns for follow-up.  She finished whole brain radiation about 2 weeks ago.  She says she has been feeling okay, but the past several days she has started to have more headaches, nausea, and some balance difficulties.  She also reports some shaking and weakness in her right arm, where as her left arm has actually improved quite a bit.  She also notes some blurry vision.    Past Medical History, Past Surgical History, Social History, Family History have been reviewed and are without significant changes except as mentioned.    Review of Systems   A comprehensive 14 point review of systems was performed and was negative except as mentioned.    Medications:  The current medication list was reviewed in the EMR    ALLERGIES:  No Known Allergies    Objective      /75   Pulse 85   Temp 98.5 °F (36.9 °C) (Oral)   Resp 18   Ht 160 cm (62.99\")   Wt 52.2 kg (115 lb)   BMI 20.38 kg/m²      Performance Status: 0    General: well appearing female in no acute distress  Neuro: alert and oriented.  Gait is somewhat ataxic.  HEENT: sclera " anicteric, oropharynx clear  Lymphatics: no cervical, supraclavicular, or axillary adenopathy  Cardiovascular: regular rate and rhythm, no murmurs  Lungs: clear to auscultation bilaterally  Abdomen: soft, nontender, nondistended.  No palpable organomegaly  Extremeties: no lower extremity edema  Skin: no rashes or  petechiae   Psych: mood and affect appropriate              Assessment/Plan   Brittani Lambert is a 59 y.o. year old female with a ER negative HER-2 negative breast cancer who returns for follow-up after recent diagnosis of leptomeningeal disease.  She is now status post a course of whole brain radiation treatment.  She has tapered completely off of steroids and for the past several days is having some worsening symptoms.  I recommended that she go back on the dexamethasone at a dose of 4 mg twice a day.  She has already been scheduled for an MRI of the brain by Dr. Garcia and this will be done this afternoon.  I will watch for these results.  As she does not have any disease outside of the CNS I do not think there is much value for chemotherapy at this time.  I would tentatively plan to repeat imaging in July if she is stable from a CNS standpoint.  If she does have progression in the brain after radiation treatment, unfortunately this would indicate a fairly poor prognosis, however Xeloda could be considered as an option.      Follow-up in 1 month.      I spent 25 minutes with the patient. I spent > 50% percent of this time counseling and discussing prognosis, diagnostic testing, evaluation, current status and management.        Carrie Patel MD  [facility] Hematology and Oncology    5/10/2019          CC:

## 2019-05-14 RX ORDER — DEXAMETHASONE 2 MG/1
4 TABLET ORAL 2 TIMES DAILY WITH MEALS
Qty: 120 TABLET | Refills: 1 | Status: SHIPPED | OUTPATIENT
Start: 2019-05-14 | End: 2019-06-12

## 2019-05-30 ENCOUNTER — HOSPITAL ENCOUNTER (OUTPATIENT)
Dept: RADIATION ONCOLOGY | Facility: HOSPITAL | Age: 59
Setting detail: RADIATION/ONCOLOGY SERIES
Discharge: HOME | End: 2019-05-30

## 2019-06-05 ENCOUNTER — TELEPHONE (OUTPATIENT)
Dept: FAMILY MEDICINE CLINIC | Facility: CLINIC | Age: 59
End: 2019-06-05

## 2019-06-05 DIAGNOSIS — G89.3 CANCER ASSOCIATED PAIN: ICD-10-CM

## 2019-06-05 RX ORDER — OXYCODONE AND ACETAMINOPHEN 7.5; 325 MG/1; MG/1
1 TABLET ORAL EVERY 6 HOURS PRN
Qty: 60 TABLET | Refills: 0 | Status: SHIPPED | OUTPATIENT
Start: 2019-06-05 | End: 2019-07-03 | Stop reason: SDUPTHER

## 2019-06-05 NOTE — TELEPHONE ENCOUNTER
----- Message from Allyson Rodriguez sent at 6/5/2019  1:26 PM EDT -----  Contact: ELOY, PATIENT  REFILL OXYCODONE FOR PAIN, [facility], [phone] MAY LEAVE A MESSAGE    PT HAS AN APPT WITH DR. RAM ON Friday

## 2019-06-12 ENCOUNTER — OFFICE VISIT (OUTPATIENT)
Dept: FAMILY MEDICINE CLINIC | Facility: CLINIC | Age: 59
End: 2019-06-12

## 2019-06-12 VITALS
HEART RATE: 84 BPM | SYSTOLIC BLOOD PRESSURE: 128 MMHG | WEIGHT: 113 LBS | RESPIRATION RATE: 16 BRPM | TEMPERATURE: 97.4 F | BODY MASS INDEX: 20.02 KG/M2 | DIASTOLIC BLOOD PRESSURE: 84 MMHG

## 2019-06-12 DIAGNOSIS — Z13.1 SCREENING FOR DIABETES MELLITUS: ICD-10-CM

## 2019-06-12 DIAGNOSIS — Z87.891 FORMER TOBACCO USE: ICD-10-CM

## 2019-06-12 DIAGNOSIS — E78.2 MIXED HYPERLIPIDEMIA: Primary | ICD-10-CM

## 2019-06-12 DIAGNOSIS — F33.1 MODERATE EPISODE OF RECURRENT MAJOR DEPRESSIVE DISORDER (HCC): ICD-10-CM

## 2019-06-12 DIAGNOSIS — F41.1 GAD (GENERALIZED ANXIETY DISORDER): ICD-10-CM

## 2019-06-12 PROCEDURE — 99214 OFFICE O/P EST MOD 30 MIN: CPT | Performed by: FAMILY MEDICINE

## 2019-06-12 RX ORDER — VENLAFAXINE HYDROCHLORIDE 150 MG/1
150 CAPSULE, EXTENDED RELEASE ORAL DAILY
Qty: 90 CAPSULE | Refills: 0 | Status: SHIPPED | OUTPATIENT
Start: 2019-06-12 | End: 2019-09-06 | Stop reason: SDUPTHER

## 2019-06-12 NOTE — PATIENT INSTRUCTIONS
Go to the nearest ER or return to clinic if symptoms worsen, fever/chill develop  Venlafaxine tablets  What is this medicine?  VENLAFAXINE (CHRISTA la fax een) is used to treat depression, anxiety and panic disorder.  This medicine may be used for other purposes; ask your health care provider or pharmacist if you have questions.  COMMON BRAND NAME(S): Effexor  What should I tell my health care provider before I take this medicine?  They need to know if you have any of these conditions:  -bleeding disorders  -glaucoma  -heart disease  -high blood pressure  -high cholesterol  -kidney disease  -liver disease  -low levels of sodium in the blood  -da or bipolar disorder  -seizures  -suicidal thoughts, plans, or attempt; a previous suicide attempt by you or a family  -take medicines that treat or prevent blood clots  -thyroid disease  -an unusual or allergic reaction to venlafaxine, desvenlafaxine, other medicines, foods, dyes, or preservatives  -pregnant or trying to get pregnant  -breast-feeding  How should I use this medicine?  Take this medicine by mouth with a glass of water. Follow the directions on the prescription label. Take it with food. Take your medicine at regular intervals. Do not take your medicine more often than directed. Do not stop taking this medicine suddenly except upon the advice of your doctor. Stopping this medicine too quickly may cause serious side effects or your condition may worsen.  A special MedGuide will be given to you by the pharmacist with each prescription and refill. Be sure to read this information carefully each time.  Talk to your pediatrician regarding the use of this medicine in children. Special care may be needed.  Overdosage: If you think you have taken too much of this medicine contact a poison control center or emergency room at once.  NOTE: This medicine is only for you. Do not share this medicine with others.  What if I miss a dose?  If you miss a dose, take it as soon as  you can. If it is almost time for your next dose, take only that dose. Do not take double or extra doses.  What may interact with this medicine?  Do not take this medicine with any of the following medications:  -certain medicines for fungal infections like fluconazole, itraconazole, ketoconazole, posaconazole, voriconazole  -cisapride  -desvenlafaxine  -dofetilide  -dronedarone  -duloxetine  -levomilnacipran  -linezolid  -MAOIs like Carbex, Eldepryl, Marplan, Nardil, and Parnate  -methylene blue (injected into a vein)  -milnacipran  -pimozide  -thioridazine  -ziprasidone  This medicine may also interact with the following medications:  -amphetamines  -aspirin and aspirin-like medicines  -certain medicines for depression, anxiety, or psychotic disturbances  -certain medicines for migraine headaches like almotriptan, eletriptan, frovatriptan, naratriptan, rizatriptan, sumatriptan, zolmitriptan  -certain medicines for sleep  -certain medicines that treat or prevent blood clots like dalteparin, enoxaparin, warfarin  -cimetidine  -clozapine  -diuretics  -fentanyl  -furazolidone  -indinavir  -isoniazid  -lithium  -metoprolol  -NSAIDS, medicines for pain and inflammation, like ibuprofen or naproxen  -other medicines that prolong the QT interval (cause an abnormal heart rhythm)  -procarbazine  -rasagiline  -supplements like [place]'s wort, kava kava, valerian  -tramadol  -tryptophan  This list may not describe all possible interactions. Give your health care provider a list of all the medicines, herbs, non-prescription drugs, or dietary supplements you use. Also tell them if you smoke, drink alcohol, or use illegal drugs. Some items may interact with your medicine.  What should I watch for while using this medicine?  Tell your doctor if your symptoms do not get better or if they get worse. Visit your doctor or health care professional for regular checks on your progress. Because it may take several weeks to see the full  effects of this medicine, it is important to continue your treatment as prescribed by your doctor.  Patients and their families should watch out for new or worsening thoughts of suicide or depression. Also watch out for sudden changes in feelings such as feeling anxious, agitated, panicky, irritable, hostile, aggressive, impulsive, severely restless, overly excited and hyperactive, or not being able to sleep. If this happens, especially at the beginning of treatment or after a change in dose, call your health care professional.  This medicine can cause an increase in blood pressure. Check with your doctor for instructions on monitoring your blood pressure while taking this medicine.  You may get drowsy or dizzy. Do not drive, use machinery, or do anything that needs mental alertness until you know how this medicine affects you. Do not stand or sit up quickly, especially if you are an older patient. This reduces the risk of dizzy or fainting spells. Alcohol may interfere with the effect of this medicine. Avoid alcoholic drinks.  Your mouth may get dry. Chewing sugarless gum, sucking hard candy and drinking plenty of water will help. Contact your doctor if the problem does not go away or is severe.  What side effects may I notice from receiving this medicine?  Side effects that you should report to your doctor or health care professional as soon as possible:  -allergic reactions like skin rash, itching or hives, swelling of the face, lips, or tongue  -anxious  -breathing problems  -confusion  -changes in vision  -chest pain  -confusion  -elevated mood, decreased need for sleep, racing thoughts, impulsive behavior  -eye pain  -fast, irregular heartbeat  -feeling faint or lightheaded, falls  -feeling agitated, angry, or irritable  -hallucination, loss of contact with reality  -high blood pressure  -loss of balance or coordination  -palpitations  -redness, blistering, peeling or loosening of the skin, including inside  the mouth  -restlessness, pacing, inability to keep still  -seizures  -stiff muscles  -suicidal thoughts or other mood changes  -trouble passing urine or change in the amount of urine  -trouble sleeping  -unusual bleeding or bruising  -unusually weak or tired  -vomiting  Side effects that usually do not require medical attention (report to your doctor or health care professional if they continue or are bothersome):  -change in sex drive or performance  -change in appetite or weight  -constipation  -dizziness  -dry mouth  -headache  -increased sweating  -nausea  -tired  This list may not describe all possible side effects. Call your doctor for medical advice about side effects. You may report side effects to FDA at 0-556-FDA-3450.  Where should I keep my medicine?  Keep out of the reach of children.  Store at a controlled temperature between 20 and 25 degrees C (68 and 77 degrees F), in a dry place. Throw away any unused medicine after the expiration date.  NOTE: This sheet is a summary. It may not cover all possible information. If you have questions about this medicine, talk to your doctor, pharmacist, or health care provider.  © 2019 [employer]vier/Gold Standard (2017-05-18 18:42:26)

## 2019-06-14 ENCOUNTER — OFFICE VISIT (OUTPATIENT)
Dept: ONCOLOGY | Facility: CLINIC | Age: 59
End: 2019-06-14

## 2019-06-14 ENCOUNTER — DOCUMENTATION (OUTPATIENT)
Dept: ONCOLOGY | Facility: CLINIC | Age: 59
End: 2019-06-14

## 2019-06-14 VITALS
DIASTOLIC BLOOD PRESSURE: 74 MMHG | SYSTOLIC BLOOD PRESSURE: 128 MMHG | WEIGHT: 114 LBS | BODY MASS INDEX: 20.2 KG/M2 | HEART RATE: 107 BPM | OXYGEN SATURATION: 98 % | HEIGHT: 63 IN | RESPIRATION RATE: 16 BRPM | TEMPERATURE: 97.5 F

## 2019-06-14 DIAGNOSIS — C79.31 CANCER OF LEFT BREAST METASTATIC TO BRAIN (HCC): Primary | ICD-10-CM

## 2019-06-14 DIAGNOSIS — C50.912 CANCER OF LEFT BREAST METASTATIC TO BRAIN (HCC): Primary | ICD-10-CM

## 2019-06-14 PROCEDURE — 99214 OFFICE O/P EST MOD 30 MIN: CPT | Performed by: INTERNAL MEDICINE

## 2019-06-14 NOTE — PROGRESS NOTES
"      PROBLEM LIST:  1. [id number] (Stage IIA) ER negative, [place] positive, Her2 2+ by IHC, negative by FISH invasive ductal carcinoma of the left breast  A) bilateral mastectomy on 10/4/18.  Pathology showed a 2.6 cm high grade IDC with basaloid features.  0/1 SLN involved.  B) adjuvant chemotherapy with ddAC followed by taxol started on 11/14/18  C) admitted to the hospital on 4/10/2019 with left upper extremity weakness.  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  CT chest abdomen pelvis and bone scan on 4/11/2019 showed no other sites of disease.  Completed whole brain radiation on 4/24/2019.  2. Anxiety/depression  3. Hyperlipidemia  4. History of ovarian cancer 1989, s/p BRYN/BSO, no adjuvant therapy    Chief complaint: follow up for breast cancer management       Subjective     HISTORY OF PRESENT ILLNESS:   Brittani Lambert returns for follow-up.  She has not been feeling great.  She says she has tightness in her mid chest.  She was given an inhaler for this.  She says her whole chest wall just feels sensitive.  She also feels like things get caught in her throat when she eats and swallows, although this is not happening consistently.  She has numbness in the fingers of her right hand and also in her toes.  She is no longer taking any steroids.    Past Medical History, Past Surgical History, Social History, Family History have been reviewed and are without significant changes except as mentioned.    Review of Systems   A comprehensive 14 point review of systems was performed and was negative except as mentioned.    Medications:  The current medication list was reviewed in the EMR    ALLERGIES:  No Known Allergies    Objective      /74 Comment: LUE  Pulse 107   Temp 97.5 °F (36.4 °C) (Temporal)   Resp 16   Ht 160 cm (63\")   Wt 51.7 kg (114 lb)   SpO2 98% Comment: RA  BMI 20.19 kg/m²      Performance Status: 0    General: well appearing female in " no acute distress  Neuro: alert and oriented.  Gait is somewhat ataxic.  HEENT: sclera anicteric, oropharynx clear  Lymphatics: no cervical, supraclavicular, or axillary adenopathy  Chest: Sensitive to light touch across the entire chest wall, no palpable masses  Cardiovascular: regular rate and rhythm, no murmurs  Lungs: clear to auscultation bilaterally  Abdomen: soft, nontender, nondistended.  No palpable organomegaly  Extremeties: no lower extremity edema  Skin: no rashes or  petechiae   Psych: mood and affect appropriate              Assessment/Plan   Brittani Lambert is a 59 y.o. year old female with a ER negative HER-2 negative breast cancer who returns for follow-up after recent diagnosis of leptomeningeal disease.  She is now status post a course of whole brain radiation treatment.      She is having some chest tightness and swallowing difficulty.  We will do restaging scans to assess for any evidence of metastatic disease.  If these are normal I may send her for a barium swallow.  She has pain medication prescribed by her primary care doctor that has helped her some.  I will call her once the scan results are available, and otherwise plan to see her back in 1 month.      I spent 25 minutes with the patient. I spent > 50% percent of this time counseling and discussing prognosis, diagnostic testing, evaluation, current status and management.        Carrie Patel MD  [facility] Hematology and Oncology    6/14/2019          CC:

## 2019-06-14 NOTE — PROGRESS NOTES
6/14/2019  During patient's appointment today, she gave Dr. Patel [facility] [employer] Benefit Claim Form to be completed and mailed back to insurance and copy mailed to pt.    6/17/2019  Completed, attached MR, and placed in MD box.    6/20/2019  Rec'd with MD signature.  Mailed to insurance and mailed copy to pt.  Called and notified pt.  She verbalized understanding.

## 2019-06-21 ENCOUNTER — HOSPITAL ENCOUNTER (OUTPATIENT)
Dept: CT IMAGING | Facility: HOSPITAL | Age: 59
Discharge: HOME | End: 2019-06-21
Admitting: INTERNAL MEDICINE

## 2019-06-21 ENCOUNTER — APPOINTMENT (OUTPATIENT)
Dept: LAB | Facility: HOSPITAL | Age: 59
End: 2019-06-21

## 2019-06-21 ENCOUNTER — HOSPITAL ENCOUNTER (OUTPATIENT)
Dept: NUCLEAR MEDICINE | Facility: HOSPITAL | Age: 59
Discharge: HOME | End: 2019-06-21

## 2019-06-21 ENCOUNTER — TELEPHONE (OUTPATIENT)
Dept: ONCOLOGY | Facility: CLINIC | Age: 59
End: 2019-06-21

## 2019-06-21 DIAGNOSIS — C79.31 CANCER OF LEFT BREAST METASTATIC TO BRAIN (HCC): ICD-10-CM

## 2019-06-21 DIAGNOSIS — C50.912 CANCER OF LEFT BREAST METASTATIC TO BRAIN (HCC): ICD-10-CM

## 2019-06-21 PROBLEM — I82.622 ACUTE DEEP VEIN THROMBOSIS (DVT) OF LEFT UPPER EXTREMITY (HCC): Status: ACTIVE | Noted: 2019-06-21

## 2019-06-21 LAB
ALBUMIN SERPL-MCNC: 4.4 G/DL (ref 3.5–5.2)
ALBUMIN/GLOB SERPL: 1.3 G/DL
ALP SERPL-CCNC: 117 U/L (ref 39–117)
ALT SERPL W P-5'-P-CCNC: 7 U/L (ref 1–33)
ANION GAP SERPL CALCULATED.3IONS-SCNC: 14 MMOL/L
AST SERPL-CCNC: 15 U/L (ref 1–32)
BASOPHILS # BLD AUTO: 0.04 10*3/MM3 (ref 0–0.2)
BASOPHILS NFR BLD AUTO: 0.5 % (ref 0–1.5)
BILIRUB SERPL-MCNC: 0.4 MG/DL (ref 0.2–1.2)
BUN BLD-MCNC: 11 MG/DL (ref 6–20)
BUN/CREAT SERPL: 15.7 (ref 7–25)
CALCIUM SPEC-SCNC: 10.2 MG/DL (ref 8.6–10.5)
CHLORIDE SERPL-SCNC: 99 MMOL/L (ref 98–107)
CHOLEST SERPL-MCNC: 304 MG/DL (ref 0–200)
CO2 SERPL-SCNC: 27 MMOL/L (ref 22–29)
CREAT BLD-MCNC: 0.7 MG/DL (ref 0.57–1)
DEPRECATED RDW RBC AUTO: 53.5 FL (ref 37–54)
EOSINOPHIL # BLD AUTO: 0.04 10*3/MM3 (ref 0–0.4)
EOSINOPHIL NFR BLD AUTO: 0.5 % (ref 0.3–6.2)
ERYTHROCYTE [DISTWIDTH] IN BLOOD BY AUTOMATED COUNT: 14.4 % (ref 12.3–15.4)
GFR SERPL CREATININE-BSD FRML MDRD: 86 ML/MIN/1.73
GLOBULIN UR ELPH-MCNC: 3.3 GM/DL
GLUCOSE BLD-MCNC: 112 MG/DL (ref 65–99)
HBA1C MFR BLD: 4.7 % (ref 4.8–5.6)
HCT VFR BLD AUTO: 42.1 % (ref 34–46.6)
HDLC SERPL-MCNC: 49 MG/DL (ref 40–60)
HGB BLD-MCNC: 13.4 G/DL (ref 12–15.9)
IMM GRANULOCYTES # BLD AUTO: 0.05 10*3/MM3 (ref 0–0.05)
IMM GRANULOCYTES NFR BLD AUTO: 0.6 % (ref 0–0.5)
LDLC SERPL CALC-MCNC: 220 MG/DL (ref 0–100)
LDLC/HDLC SERPL: 4.49 {RATIO}
LYMPHOCYTES # BLD AUTO: 0.88 10*3/MM3 (ref 0.7–3.1)
LYMPHOCYTES NFR BLD AUTO: 11.2 % (ref 19.6–45.3)
MCH RBC QN AUTO: 32.1 PG (ref 26.6–33)
MCHC RBC AUTO-ENTMCNC: 31.8 G/DL (ref 31.5–35.7)
MCV RBC AUTO: 100.7 FL (ref 79–97)
MONOCYTES # BLD AUTO: 0.7 10*3/MM3 (ref 0.1–0.9)
MONOCYTES NFR BLD AUTO: 8.9 % (ref 5–12)
NEUTROPHILS # BLD AUTO: 6.17 10*3/MM3 (ref 1.7–7)
NEUTROPHILS NFR BLD AUTO: 78.3 % (ref 42.7–76)
NRBC BLD AUTO-RTO: 0 /100 WBC (ref 0–0.2)
PLATELET # BLD AUTO: 239 10*3/MM3 (ref 140–450)
PMV BLD AUTO: 9.1 FL (ref 6–12)
POTASSIUM BLD-SCNC: 4.2 MMOL/L (ref 3.5–5.2)
PROT SERPL-MCNC: 7.7 G/DL (ref 6–8.5)
RBC # BLD AUTO: 4.18 10*6/MM3 (ref 3.77–5.28)
SODIUM BLD-SCNC: 140 MMOL/L (ref 136–145)
TRIGL SERPL-MCNC: 174 MG/DL (ref 0–150)
VLDLC SERPL-MCNC: 34.8 MG/DL
WBC NRBC COR # BLD: 7.88 10*3/MM3 (ref 3.4–10.8)

## 2019-06-21 PROCEDURE — 25010000002 IOPAMIDOL 61 % SOLUTION: Performed by: INTERNAL MEDICINE

## 2019-06-21 PROCEDURE — 78306 BONE IMAGING WHOLE BODY: CPT

## 2019-06-21 PROCEDURE — 82565 ASSAY OF CREATININE: CPT

## 2019-06-21 PROCEDURE — 36415 COLL VENOUS BLD VENIPUNCTURE: CPT | Performed by: FAMILY MEDICINE

## 2019-06-21 PROCEDURE — 0 TECHNETIUM MEDRONATE KIT: Performed by: INTERNAL MEDICINE

## 2019-06-21 PROCEDURE — 74177 CT ABD & PELVIS W/CONTRAST: CPT

## 2019-06-21 PROCEDURE — 80061 LIPID PANEL: CPT | Performed by: FAMILY MEDICINE

## 2019-06-21 PROCEDURE — 71260 CT THORAX DX C+: CPT

## 2019-06-21 PROCEDURE — 85025 COMPLETE CBC W/AUTO DIFF WBC: CPT | Performed by: FAMILY MEDICINE

## 2019-06-21 PROCEDURE — 83036 HEMOGLOBIN GLYCOSYLATED A1C: CPT | Performed by: FAMILY MEDICINE

## 2019-06-21 PROCEDURE — 80053 COMPREHEN METABOLIC PANEL: CPT | Performed by: FAMILY MEDICINE

## 2019-06-21 PROCEDURE — A9503 TC99M MEDRONATE: HCPCS | Performed by: INTERNAL MEDICINE

## 2019-06-21 RX ORDER — TC 99M MEDRONATE 20 MG/10ML
25.8 INJECTION, POWDER, LYOPHILIZED, FOR SOLUTION INTRAVENOUS
Status: COMPLETED | OUTPATIENT
Start: 2019-06-21 | End: 2019-06-21

## 2019-06-21 RX ADMIN — IOPAMIDOL 95 ML: 612 INJECTION, SOLUTION INTRAVENOUS at 09:55

## 2019-06-21 RX ADMIN — Medication 25.8 MILLICURIE: at 09:30

## 2019-06-21 NOTE — TELEPHONE ENCOUNTER
PA completed for starter pack xarelto: status approved. Spoke with pharmacy and confirmed this went through with no issues.     Pharmacy did not have starter pack in stock. Another PA completed for xarelto 15mg tablets with Rx- 2 tablets BID for 21 days. Called pharmacy again to verify this went through- it did with no charge per pharmacy. Called pt to let her know Rx is good to go. No answer and VM box was full, unable to leave message. I called emergency contact and left VM on his phone: Rx is ready for pickup, no charge, and she should  and start today.

## 2019-06-21 NOTE — TELEPHONE ENCOUNTER
Called patient re: scan results.  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     Will send Rx for oral anticoagulant to pharmacy now.  Asked her to pick it up and start it this evening.

## 2019-06-24 LAB — CREAT BLDA-MCNC: 0.7 MG/DL (ref 0.6–1.3)

## 2019-06-25 RX ORDER — PRAVASTATIN SODIUM 20 MG
20 TABLET ORAL NIGHTLY
Qty: 90 TABLET | Refills: 1 | Status: SHIPPED | OUTPATIENT
Start: 2019-06-25 | End: 2019-12-08 | Stop reason: SDUPTHER

## 2019-06-27 ENCOUNTER — OFFICE VISIT (OUTPATIENT)
Dept: PSYCHIATRY | Facility: CLINIC | Age: 59
End: 2019-06-27

## 2019-06-27 DIAGNOSIS — F41.1 GENERALIZED ANXIETY DISORDER: ICD-10-CM

## 2019-06-27 DIAGNOSIS — F33.1 MODERATE EPISODE OF RECURRENT MAJOR DEPRESSIVE DISORDER (HCC): Primary | ICD-10-CM

## 2019-06-27 PROCEDURE — 90837 PSYTX W PT 60 MINUTES: CPT | Performed by: NURSE PRACTITIONER

## 2019-06-27 NOTE — PROGRESS NOTES
Subjective   Brittani Lambert is a 59 y.o. female who is here today for medication management follow up.and therapy    Chief Complaint: THALIA, MDD moderate     History of Present Illness Patient presents by herself for session. She reports having felt very down in the relationship she is in for past 4 years and also went and got her  arranged by herself. She also had excessive pain in her left shoulder and after testing it was a blood clot and is on med management for that. She has decided to move out into her own apartment and is planning what she will take that is of hers and will buy new furniture. Her PCP also changed her from sertraline to venlafaxine XR and is feeling improvement in mood. She reports sleeping well. Denies SI/HI or AVH. Her niece whom she is very close to and niece's four children and  are moving to [place], she will miss her a lot but niece (who is more like a daughter) will be coming back every month to see her. Pt has her sister whom they have strengthened their relationship, her mother and best friend here for emergent support and daily support.   Denies adverse effects from medications.   (Scales based on 0 - 10 with 10 being the worst)    CLINICAL MANUEVERING/INTERVENTION:   Patient talked about current stressors, primarily having a difficult time dealing with health and relationship she is in,. Venting of frustrations was conducted. Feelings were processed and validated, both negative and positive. Flushing out worries and concerns was conducted in order to diminish emotional tension. Processing treatment was conducted. Ways in which patient may take stress off themselves in a purposeful manner was discussed. Patient was assisted in 'talking out' what they may do if health continues to be a challenge,.  The patient expressed gratitude for today's session and said that counseling helps her feel better.       The following portions of the patient's history were reviewed and  updated as appropriate: allergies, current medications, past family history, past medical history, past social history, past surgical history and problem list.    Review of Systemsdenies fever, cough, s/s’s of infection, denies GI/ problems, denies new medical issues     Objective   Physical Exam  not currently breastfeeding.    No Known Allergies    Current Medications:   Current Outpatient Medications   Medication Sig Dispense Refill   • albuterol (PROAIR RESPICLICK) 108 (90 Base) MCG/ACT inhaler Inhale 2 puffs Every 6 (Six) Hours As Needed for Wheezing or Shortness of Air. 1 inhaler 11   • ALPRAZolam (XANAX) 0.5 MG tablet Take 1 tablet by mouth 3 (Three) Times a Day As Needed for Anxiety or Sleep. 90 tablet 2   • docusate sodium (COLACE) 100 MG capsule Take 1 capsule by mouth 2 (Two) Times a Day As Needed for Constipation. 60 capsule 2   • lidocaine-prilocaine (EMLA) 2.5-2.5 % cream Apply  topically to the appropriate area as directed As Needed (45-60 minutes prior to port access.  Cover with saran/plastic wrap.). 30 g 3   • ondansetron (ZOFRAN) 8 MG tablet Take 1 tablet by mouth 3 (Three) Times a Day As Needed for Nausea or Vomiting. 30 tablet 5   • oxyCODONE-acetaminophen (PERCOCET) 7.5-325 MG per tablet Take 1 tablet by mouth Every 6 (Six) Hours As Needed for Moderate Pain . Don't combine with Norco 60 tablet 0   • polyethylene glycol (MIRALAX) packet Take 17 g by mouth Daily. 30 each 2   • pravastatin (PRAVACHOL) 20 MG tablet Take 1 tablet by mouth Every Night. 90 tablet 1   • rivaroxaban (XARELTO) 15 MG tablet Take 1 tablet by mouth 2 (Two) Times a Day With Meals. 42 tablet 0   • venlafaxine XR (EFFEXOR XR) 150 MG 24 hr capsule Take 1 capsule by mouth Daily. 90 capsule 0     No current facility-administered medications for this visit.      Appearance: appropriate  Hygiene:   good  Cooperation:  Cooperative  Eye Contact:  Good  Psychomotor Behavior:  No psychomotor agitation/retardation, No EPS, No motor  tics  Mood: some depression related with health and relationship, but has more motivation and interest now  Affect:  Appropriate  Hopelessness: Denies  Speech:  Normal  Thought Process:  Linear  Thought Content:  Normal  Concentration: Normal   Suicidal:  None  Homicidal:  None  Hallucinations:  None  Delusion:  None  Memory:  Intact  Orientation:  Person, Place, Time and Situation  Reliability:  fair  Insight:  Fair  Judgement:  Fair  Impulse Control:  Fair  Estimated Intelligence: average range    ZECHARIAH REVIEWED NO RED FLAGS  UDS:    Assessment/Plan   Diagnoses and all orders for this visit:    Moderate episode of recurrent major depressive disorder (CMS/HCC)    Generalized anxiety disorder      Code 37046  In at 11a  Out at 12 p  60 minutes therapy  PLAN:   Cont supportive expressive therapy processing met breast cancer and stabilizing her life   Counseled patient regarding multimodal approach with healthy nutrition, healthy sleep, regular physical activity, social activities, counseling, and medications. .    Assisted patient in processing above session content; acknowledged and normalized patient’s thoughts, feelings, and concerns.  Applied  positive coping skills and behavior management in session. Allowed patient to freely discuss issues without interruption or judgment. Provided safe, confidential environment to facilitate the development and cont  positive therapeutic relationship and encourage open, honest communication.     Assisted patient in identifying risk factors which would indicate the need for higher level of care including thoughts to harm self or others and/or self-harming behavior and encouraged patient to contact this office, call 911, or present to the nearest emergency room should any of these events occur. Discussed crisis intervention services and means to access.  Patient adamantly and convincingly denies current suicidal or homicidal ideation or perceptual disturbance.    Treatment  Plan: stabilize mood, patient will stay out of psychiatric hospital and be at optimal level of functioning with therapy and take all medication as prescribed. Patient verbalized  understanding and agreement to plan.    Instructed to call for questions or concerns and return early if necessary.     Return in about 3 weeks (around 7/18/2019).

## 2019-07-03 ENCOUNTER — TELEPHONE (OUTPATIENT)
Dept: FAMILY MEDICINE CLINIC | Facility: CLINIC | Age: 59
End: 2019-07-03

## 2019-07-03 DIAGNOSIS — G89.3 CANCER ASSOCIATED PAIN: ICD-10-CM

## 2019-07-03 RX ORDER — OXYCODONE AND ACETAMINOPHEN 7.5; 325 MG/1; MG/1
1 TABLET ORAL EVERY 6 HOURS PRN
Qty: 60 TABLET | Refills: 0 | Status: SHIPPED | OUTPATIENT
Start: 2019-07-03 | End: 2019-08-02 | Stop reason: SDUPTHER

## 2019-07-03 NOTE — TELEPHONE ENCOUNTER
----- Message from Allyson Rodriguez sent at 7/3/2019 11:18 AM EDT -----  Contact: DR LYONS   MED REFILL ON oxyCODONE-acetaminophen (PERCOCET) 7.5-325 MG per tablet TO [facility] [place].  [phone]

## 2019-07-11 ENCOUNTER — DOCUMENTATION (OUTPATIENT)
Dept: ONCOLOGY | Facility: CLINIC | Age: 59
End: 2019-07-11

## 2019-07-11 NOTE — PROGRESS NOTES
7/9/2019  [facility], treatment plan, restrictions/limitations, & estimated RTW date.    7/10/2019  Typed disability letter providing information requested above with attached MR.  Placed in MD box for signature.

## 2019-07-12 ENCOUNTER — OFFICE VISIT (OUTPATIENT)
Dept: ONCOLOGY | Facility: CLINIC | Age: 59
End: 2019-07-12

## 2019-07-12 ENCOUNTER — HOSPITAL ENCOUNTER (OUTPATIENT)
Dept: ONCOLOGY | Facility: HOSPITAL | Age: 59
Setting detail: INFUSION SERIES
Discharge: HOME | End: 2019-07-12

## 2019-07-12 VITALS
RESPIRATION RATE: 16 BRPM | HEART RATE: 93 BPM | WEIGHT: 110 LBS | OXYGEN SATURATION: 98 % | BODY MASS INDEX: 19.49 KG/M2 | SYSTOLIC BLOOD PRESSURE: 137 MMHG | DIASTOLIC BLOOD PRESSURE: 83 MMHG | TEMPERATURE: 97.1 F | HEIGHT: 63 IN

## 2019-07-12 DIAGNOSIS — C50.912 CANCER OF LEFT BREAST METASTATIC TO BRAIN (HCC): Primary | ICD-10-CM

## 2019-07-12 DIAGNOSIS — C79.31 CANCER OF LEFT BREAST METASTATIC TO BRAIN (HCC): Primary | ICD-10-CM

## 2019-07-12 DIAGNOSIS — C50.912 MALIGNANT NEOPLASM OF LEFT BREAST IN FEMALE, ESTROGEN RECEPTOR NEGATIVE, UNSPECIFIED SITE OF BREAST (HCC): Primary | ICD-10-CM

## 2019-07-12 DIAGNOSIS — Z17.1 MALIGNANT NEOPLASM OF LEFT BREAST IN FEMALE, ESTROGEN RECEPTOR NEGATIVE, UNSPECIFIED SITE OF BREAST (HCC): Primary | ICD-10-CM

## 2019-07-12 PROCEDURE — 99214 OFFICE O/P EST MOD 30 MIN: CPT | Performed by: INTERNAL MEDICINE

## 2019-07-12 PROCEDURE — 96523 IRRIG DRUG DELIVERY DEVICE: CPT

## 2019-07-12 RX ORDER — DEXAMETHASONE 1 MG
2 TABLET ORAL 2 TIMES DAILY WITH MEALS
Qty: 120 TABLET | Refills: 3 | Status: SHIPPED | OUTPATIENT
Start: 2019-07-12 | End: 2019-09-26

## 2019-07-12 RX ORDER — HEPARIN SODIUM (PORCINE) LOCK FLUSH IV SOLN 100 UNIT/ML 100 UNIT/ML
500 SOLUTION INTRAVENOUS AS NEEDED
OUTPATIENT
Start: 2019-07-12

## 2019-07-12 RX ADMIN — HEPARIN 500 UNITS: 100 SYRINGE at 11:07

## 2019-07-12 NOTE — PROGRESS NOTES
"      PROBLEM LIST:  1. [id number] (Stage IIA) ER negative, IL positive, Her2 2+ by IHC, negative by FISH invasive ductal carcinoma of the left breast  A) bilateral mastectomy on 10/4/18.  Pathology showed a 2.6 cm high grade IDC with basaloid features.  0/1 SLN involved.  B) adjuvant chemotherapy with ddAC followed by taxol started on 11/14/18  C) admitted to the hospital on 4/10/2019 with left upper extremity weakness.  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  CT chest abdomen pelvis and bone scan on 4/11/2019 showed no other sites of disease.  Completed whole brain radiation on 4/24/2019.  2. Anxiety/depression  3. Hyperlipidemia  4. History of ovarian cancer 1989, s/p BRYN/BSO, no adjuvant therapy    Chief complaint: follow up for breast cancer management       Subjective     HISTORY OF PRESENT ILLNESS:   Brittani Lambert returns for follow-up.  She has lost some more weight since her last visit.  She says she really is not able to swallow very well.  She feels like medicines and food gets stuck  just in her lower throat.  She still is having headaches.  She has some fatigue.  She has been having pain in her right knee.    Past Medical History, Past Surgical History, Social History, Family History have been reviewed and are without significant changes except as mentioned.    Review of Systems   A comprehensive 14 point review of systems was performed and was negative except as mentioned.    Medications:  The current medication list was reviewed in the EMR    ALLERGIES:  No Known Allergies    Objective      /83 Comment: RUE  Pulse 93   Temp 97.1 °F (36.2 °C) (Temporal)   Resp 16   Ht 160 cm (63\")   Wt 49.9 kg (110 lb)   SpO2 98% Comment: RA  BMI 19.49 kg/m²      Performance Status: 0    General: well appearing female in no acute distress  Neuro: alert and oriented.    HEENT: sclera anicteric, oropharynx clear  Lymphatics: no cervical, " supraclavicular, or axillary adenopathy  Cardiovascular: regular rate and rhythm, no murmurs  Lungs: clear to auscultation bilaterally  Abdomen: soft, nontender, nondistended.  No palpable organomegaly  Extremeties: no lower extremity edema  Skin: no rashes or  petechiae   Psych: mood and affect appropriate              Assessment/Plan   Brittani Lambert is a 59 y.o. year old female with a ER negative HER-2 negative breast cancer who returns for follow-up after recent diagnosis of leptomeningeal disease.  She is now status post a course of whole brain radiation treatment.      Dysphasia: No extrinsic compression of the esophagus based on recent scans.  I will order a swallow evaluation.  It sounds like she may have a stricture in her upper esophagus.    Headaches: Repeat MRI brain now.  I will resume steroids with dexamethasone 2 mg twice a day.    Metastatic breast cancer: We will repeat scans of the chest abdomen and pelvis in mid September or sooner if she has a change in symptoms.  If she does have disease progression we may consider Xeloda.      I spent 25 minutes with the patient. I spent > 50% percent of this time counseling and discussing prognosis, diagnostic testing, evaluation, current status and management.        Carrie Patel MD  [facility] On[facility]    7/12/2019          CC:

## 2019-07-19 ENCOUNTER — DOCUMENTATION (OUTPATIENT)
Dept: ONCOLOGY | Facility: CLINIC | Age: 59
End: 2019-07-19

## 2019-07-19 NOTE — PROGRESS NOTES
7/17/2019  ~shalonda Villegas  PATIENT WOULD LIKE TO SPEAK TO IBIS REGARDING THE PAPERWORK SHE HAS FOR THE PATIENT    7/19/2019  Rec'd [employer] via fax asking about pt's life expectancy.  Signed STEPHANIE attached.  Marked that pt is terminal and put in MD box.    Rec'd with MD signature and 6-12 mo life expectancy checked.    Called and spoke with pt.  She stated that she will only receive 25% of Life Insurance unless life expectancy is marked 6 mo or less.  Made correction on form with MD ok.

## 2019-07-24 RX ORDER — LIDOCAINE AND PRILOCAINE 25; 25 MG/G; MG/G
CREAM TOPICAL
Qty: 30 G | Refills: 3 | Status: SHIPPED | OUTPATIENT
Start: 2019-07-24 | End: 2020-11-08 | Stop reason: HOSPADM

## 2019-07-26 ENCOUNTER — DOCUMENTATION (OUTPATIENT)
Dept: ONCOLOGY | Facility: CLINIC | Age: 59
End: 2019-07-26

## 2019-07-26 NOTE — PROGRESS NOTES
"7/26/2019  Rec'd fax from [facility] requesting copy of last 2 OV, treatment plan, restrictions/limitations, & RTW date.  Attached MR and typed letter with restrictions/limitations with statement that pt is terminal and will not be returning to work.  Placed in MD box.    7/30/2019  Pt LMOM stating that [facility] is needing MR because they are going to cut her disability check.    7/31/2019  Rec'd with MD signature.  Faxed, marked as \"Urgent\" & \"STAT!\"  Mailed copy to pt.  Pt notified  "

## 2019-07-29 RX ORDER — RIVAROXABAN 15 MG/1
TABLET, FILM COATED ORAL
Qty: 42 TABLET | Refills: 0 | OUTPATIENT
Start: 2019-07-29

## 2019-07-30 ENCOUNTER — HOSPITAL ENCOUNTER (OUTPATIENT)
Dept: MRI IMAGING | Facility: HOSPITAL | Age: 59
Discharge: HOME | End: 2019-07-30
Admitting: INTERNAL MEDICINE

## 2019-07-30 ENCOUNTER — TELEPHONE (OUTPATIENT)
Dept: ONCOLOGY | Facility: CLINIC | Age: 59
End: 2019-07-30

## 2019-07-30 ENCOUNTER — HOSPITAL ENCOUNTER (OUTPATIENT)
Dept: GENERAL RADIOLOGY | Facility: HOSPITAL | Age: 59
Discharge: HOME | End: 2019-07-30

## 2019-07-30 DIAGNOSIS — C79.31 CANCER OF LEFT BREAST METASTATIC TO BRAIN (HCC): ICD-10-CM

## 2019-07-30 DIAGNOSIS — C50.912 CANCER OF LEFT BREAST METASTATIC TO BRAIN (HCC): ICD-10-CM

## 2019-07-30 PROCEDURE — 70553 MRI BRAIN STEM W/O & W/DYE: CPT

## 2019-07-30 PROCEDURE — 0 GADOBENATE DIMEGLUMINE 529 MG/ML SOLUTION: Performed by: INTERNAL MEDICINE

## 2019-07-30 PROCEDURE — A9577 INJ MULTIHANCE: HCPCS | Performed by: INTERNAL MEDICINE

## 2019-07-30 PROCEDURE — 74220 X-RAY XM ESOPHAGUS 1CNTRST: CPT

## 2019-07-30 RX ORDER — OMEPRAZOLE 20 MG/1
20 CAPSULE, DELAYED RELEASE ORAL DAILY
Qty: 30 CAPSULE | Refills: 5 | Status: SHIPPED | OUTPATIENT
Start: 2019-07-30 | End: 2020-03-10

## 2019-07-30 RX ADMIN — GADOBENATE DIMEGLUMINE 10 ML: 529 INJECTION, SOLUTION INTRAVENOUS at 11:21

## 2019-07-30 RX ADMIN — BARIUM SULFATE 183 ML: 960 POWDER, FOR SUSPENSION ORAL at 09:29

## 2019-07-30 NOTE — TELEPHONE ENCOUNTER
Called patient regarding her scans.  MRI of the brain still shows some enhancement in the right frontoparietal region, but improved compared to the last study.  Swallow evaluation shows no evidence of stricture.  She does have reflux and some esophageal dysmotility.  I will put her on omeprazole 20 mg daily.

## 2019-08-02 ENCOUNTER — TELEPHONE (OUTPATIENT)
Dept: FAMILY MEDICINE CLINIC | Facility: CLINIC | Age: 59
End: 2019-08-02

## 2019-08-02 DIAGNOSIS — G89.3 CANCER ASSOCIATED PAIN: ICD-10-CM

## 2019-08-02 RX ORDER — OXYCODONE AND ACETAMINOPHEN 7.5; 325 MG/1; MG/1
1 TABLET ORAL EVERY 6 HOURS PRN
Qty: 60 TABLET | Refills: 0 | Status: SHIPPED | OUTPATIENT
Start: 2019-08-02 | End: 2019-08-29 | Stop reason: SDUPTHER

## 2019-08-02 NOTE — TELEPHONE ENCOUNTER
----- Message from Rodger Cornejo sent at 8/2/2019 10:00 AM EDT -----  Contact: ELOY / CHRIS   PT CALLED REQUESTING REFILL    oxyCODONE-acetaminophen (PERCOCET) 7.5-325 MG per tablet [[id number]]   Order Details   Dose: 1 tablet Route: Oral Frequency: Every 6 Hours PRN for Moderate Pain   Dispense Quantity: 60 tablet Refills: 0 Fills remaining: --         Sig: Take 1 tablet by mouth Every 6 (Six) Hours As Needed for Moderate Pain .        Written Date: 07/03/19 Expiration Date: 09/01/19    Start Date: 07/03/19 End Date: --    Earliest Fill Date: 07/03/19           Ordering Provider:  Alla Colon DO Phone:  [phone] Fax:  [phone]   Address:  [address] [place] [address] NPI:  [id number]          Authorizing Provider:  Alla Colon DO Phone:  [phone] Fax:  [phone]   Address:  [address] [place] [address] [place] [address] NPI:  [id number]          Ordering User:  Alla Colon DO          Diagnosis Association: Cancer associated pain (G89.3)    Original Order:  oxyCODONE-acetaminophen (PERCOCET) 7.5-325 MG per tablet [[id number]]    Pharmacy:  [facility]/pharmacy #[id number] [address] AT [place] [address] - [phone] [facility] [phone]  Phone:  [phone] Fax:  [phone]   Address:  [address] [place] [address]

## 2019-08-06 DIAGNOSIS — F41.9 ANXIETY: ICD-10-CM

## 2019-08-07 RX ORDER — ALPRAZOLAM 0.5 MG/1
0.5 TABLET ORAL 3 TIMES DAILY PRN
Qty: 90 TABLET | Refills: 2 | Status: SHIPPED | OUTPATIENT
Start: 2019-08-07 | End: 2020-01-30 | Stop reason: SDUPTHER

## 2019-08-22 ENCOUNTER — LAB (OUTPATIENT)
Dept: LAB | Facility: HOSPITAL | Age: 59
End: 2019-08-22

## 2019-08-22 ENCOUNTER — OFFICE VISIT (OUTPATIENT)
Dept: ONCOLOGY | Facility: CLINIC | Age: 59
End: 2019-08-22

## 2019-08-22 ENCOUNTER — APPOINTMENT (OUTPATIENT)
Dept: ONCOLOGY | Facility: HOSPITAL | Age: 59
End: 2019-08-22

## 2019-08-22 VITALS
DIASTOLIC BLOOD PRESSURE: 81 MMHG | HEIGHT: 63 IN | HEART RATE: 88 BPM | WEIGHT: 117 LBS | TEMPERATURE: 97.1 F | RESPIRATION RATE: 16 BRPM | OXYGEN SATURATION: 100 % | BODY MASS INDEX: 20.73 KG/M2 | SYSTOLIC BLOOD PRESSURE: 175 MMHG

## 2019-08-22 DIAGNOSIS — C50.912 CANCER OF LEFT BREAST METASTATIC TO BRAIN (HCC): Primary | ICD-10-CM

## 2019-08-22 DIAGNOSIS — C50.912 MALIGNANT NEOPLASM OF LEFT BREAST IN FEMALE, ESTROGEN RECEPTOR NEGATIVE, UNSPECIFIED SITE OF BREAST (HCC): ICD-10-CM

## 2019-08-22 DIAGNOSIS — C50.011 MALIGNANT NEOPLASM OF NIPPLE OF RIGHT BREAST IN FEMALE, UNSPECIFIED ESTROGEN RECEPTOR STATUS (HCC): ICD-10-CM

## 2019-08-22 DIAGNOSIS — C79.31 CANCER OF LEFT BREAST METASTATIC TO BRAIN (HCC): Primary | ICD-10-CM

## 2019-08-22 DIAGNOSIS — Z17.1 MALIGNANT NEOPLASM OF LEFT BREAST IN FEMALE, ESTROGEN RECEPTOR NEGATIVE, UNSPECIFIED SITE OF BREAST (HCC): ICD-10-CM

## 2019-08-22 LAB
ALBUMIN SERPL-MCNC: 4.3 G/DL (ref 3.5–5.2)
ALBUMIN/GLOB SERPL: 2.2 G/DL
ALP SERPL-CCNC: 77 U/L (ref 39–117)
ALT SERPL W P-5'-P-CCNC: 19 U/L (ref 1–33)
ANION GAP SERPL CALCULATED.3IONS-SCNC: 12 MMOL/L (ref 5–15)
AST SERPL-CCNC: 17 U/L (ref 1–32)
BILIRUB SERPL-MCNC: 0.5 MG/DL (ref 0.2–1.2)
BUN BLD-MCNC: 20 MG/DL (ref 6–20)
BUN/CREAT SERPL: 31.7 (ref 7–25)
CALCIUM SPEC-SCNC: 8.8 MG/DL (ref 8.6–10.5)
CHLORIDE SERPL-SCNC: 97 MMOL/L (ref 98–107)
CO2 SERPL-SCNC: 27 MMOL/L (ref 22–29)
CREAT BLD-MCNC: 0.63 MG/DL (ref 0.57–1)
ERYTHROCYTE [DISTWIDTH] IN BLOOD BY AUTOMATED COUNT: 15.7 % (ref 12.3–15.4)
GFR SERPL CREATININE-BSD FRML MDRD: 97 ML/MIN/1.73
GLOBULIN UR ELPH-MCNC: 2 GM/DL
GLUCOSE BLD-MCNC: 101 MG/DL (ref 65–99)
HCT VFR BLD AUTO: 40.7 % (ref 34–46.6)
HGB BLD-MCNC: 13.4 G/DL (ref 12–15.9)
LYMPHOCYTES # BLD AUTO: 0.9 10*3/MM3 (ref 0.7–3.1)
LYMPHOCYTES NFR BLD AUTO: 9.3 % (ref 19.6–45.3)
MCH RBC QN AUTO: 32.9 PG (ref 26.6–33)
MCHC RBC AUTO-ENTMCNC: 32.9 G/DL (ref 31.5–35.7)
MCV RBC AUTO: 100 FL (ref 79–97)
MONOCYTES # BLD AUTO: 0.5 10*3/MM3 (ref 0.1–0.9)
MONOCYTES NFR BLD AUTO: 5.3 % (ref 5–12)
NEUTROPHILS # BLD AUTO: 8.5 10*3/MM3 (ref 1.7–7)
NEUTROPHILS NFR BLD AUTO: 85.4 % (ref 42.7–76)
PLATELET # BLD AUTO: 223 10*3/MM3 (ref 140–450)
PMV BLD AUTO: 5.5 FL (ref 6–12)
POTASSIUM BLD-SCNC: 4.5 MMOL/L (ref 3.5–5.2)
PROT SERPL-MCNC: 6.3 G/DL (ref 6–8.5)
RBC # BLD AUTO: 4.07 10*6/MM3 (ref 3.77–5.28)
SODIUM BLD-SCNC: 136 MMOL/L (ref 136–145)
WBC NRBC COR # BLD: 9.9 10*3/MM3 (ref 3.4–10.8)

## 2019-08-22 PROCEDURE — 99214 OFFICE O/P EST MOD 30 MIN: CPT | Performed by: INTERNAL MEDICINE

## 2019-08-22 PROCEDURE — 80053 COMPREHEN METABOLIC PANEL: CPT

## 2019-08-22 PROCEDURE — 85025 COMPLETE CBC W/AUTO DIFF WBC: CPT

## 2019-08-22 PROCEDURE — 36415 COLL VENOUS BLD VENIPUNCTURE: CPT

## 2019-08-22 NOTE — PROGRESS NOTES
"      PROBLEM LIST:  1. [id number] (Stage IIA) ER negative, MD positive, Her2 2+ by IHC, negative by FISH invasive ductal carcinoma of the left breast  A) bilateral mastectomy on 10/4/18.  Pathology showed a 2.6 cm high grade IDC with basaloid features.  0/1 SLN involved.  B) adjuvant chemotherapy with ddAC followed by taxol started on 11/14/18  C) admitted to the hospital on 4/10/2019 with left upper extremity weakness.  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  CT chest abdomen pelvis and bone scan on 4/11/2019 showed no other sites of disease.  Completed whole brain radiation on 4/24/2019.  2. Anxiety/depression  3. Hyperlipidemia  4. History of ovarian cancer 1989, s/p BRYN/BSO, no adjuvant therapy    Chief complaint: follow up for breast cancer management       Subjective     HISTORY OF PRESENT ILLNESS:   Brittani Lambert returns for follow-up.  She says she is doing okay.  She has decided to move into her own apartment and so far this is been really good.  She still has a relationship with her boyfriend and they do things together but she feels better living on her own.  She is still taking dexamethasone 2 mg twice a day.  The omeprazole helped with her swallowing issues and the symptoms have essentially resolved.  She notices that she bruises more easily.  She also reports some weakness and pain in her thigh muscles.    Past Medical History, Past Surgical History, Social History, Family History have been reviewed and are without significant changes except as mentioned.    Review of Systems   A comprehensive 14 point review of systems was performed and was negative except as mentioned.    Medications:  The current medication list was reviewed in the EMR    ALLERGIES:  No Known Allergies    Objective      /81 Comment: LUE  Pulse 88   Temp 97.1 °F (36.2 °C) (Temporal)   Resp 16   Ht 160 cm (63\")   Wt 53.1 kg (117 lb)   SpO2 100% Comment: RA  " BMI 20.73 kg/m²      Performance Status: 0    General: well appearing female in no acute distress  Neuro: alert and oriented.    HEENT: sclera anicteric, oropharynx clear  Lymphatics: no cervical, supraclavicular, or axillary adenopathy  Cardiovascular: regular rate and rhythm, no murmurs  Lungs: clear to auscultation bilaterally  Abdomen: soft, nontender, nondistended.  No palpable organomegaly  Extremeties: no lower extremity edema  Skin: no rashes or  petechiae   Psych: mood and affect appropriate      Results for ELVIRA RIVERA (MRN [id number]) as of 8/22/2019 11:24   Ref. Range 8/22/2019 08:53   Glucose Latest Ref Range: 65 - 99 mg/dL 101 (H)   Sodium Latest Ref Range: 136 - 145 mmol/L 136   Potassium Latest Ref Range: 3.5 - 5.2 mmol/L 4.5   CO2 Latest Ref Range: 22.0 - 29.0 mmol/L 27.0   Chloride Latest Ref Range: 98 - 107 mmol/L 97 (L)   Anion Gap Latest Ref Range: 5.0 - 15.0 mmol/L 12.0   Creatinine Latest Ref Range: 0.57 - 1.00 mg/dL 0.63   BUN Latest Ref Range: 6 - 20 mg/dL 20   BUN/Creatinine Ratio Latest Ref Range: 7.0 - 25.0  31.7 (H)   Calcium Latest Ref Range: 8.6 - 10.5 mg/dL 8.8   eGFR Non  Am Latest Ref Range: >60 mL/min/1.73 97   Alkaline Phosphatase Latest Ref Range: 39 - 117 U/L 77   Total Protein Latest Ref Range: 6.0 - 8.5 g/dL 6.3   ALT (SGPT) Latest Ref Range: 1 - 33 U/L 19   AST (SGOT) Latest Ref Range: 1 - 32 U/L 17   Total Bilirubin Latest Ref Range: 0.2 - 1.2 mg/dL 0.5   Albumin Latest Ref Range: 3.50 - 5.20 g/dL 4.30   Globulin Latest Units: gm/dL 2.0   A/G Ratio Latest Units: g/dL 2.2   WBC Latest Ref Range: 3.40 - 10.80 10*3/mm3 9.90   RBC Latest Ref Range: 3.77 - 5.28 10*6/mm3 4.07   Hemoglobin Latest Ref Range: 12.0 - 15.9 g/dL 13.4   Hematocrit Latest Ref Range: 34.0 - 46.6 % 40.7   RDW Latest Ref Range: 12.3 - 15.4 % 15.7 (H)   MCV Latest Ref Range: 79.0 - 97.0 fL 100.0 (H)   MCH Latest Ref Range: 26.6 - 33.0 pg 32.9   MCHC Latest Ref Range: 31.5 - 35.7 g/dL 32.9   MPV  Latest Ref Range: 6.0 - 12.0 fL 5.5 (L)   Platelets Latest Ref Range: 140 - 450 10*3/mm3 223   Neutrophil Rel % Latest Ref Range: 42.7 - 76.0 % 85.4 (H)   Lymphocyte Rel % Latest Ref Range: 19.6 - 45.3 % 9.3 (L)   Monocyte Rel % Latest Ref Range: 5.0 - 12.0 % 5.3   Neutrophils Absolute Latest Ref Range: 1.70 - 7.00 10*3/mm3 8.50 (H)   Lymphocytes Absolute Latest Ref Range: 0.70 - 3.10 10*3/mm3 0.90   Monocytes Absolute Latest Ref Range: 0.10 - 0.90 10*3/mm3 0.50           Assessment/Plan   Brittani Lambert is a 59 y.o. 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     Dysphasia: Improved on PPI.  Continue omeprazole.    Leptomeningeal disease: Stable at this point.  I will repeat an MRI of the brain in 1 month.  She will try reducing her steroids to 2 mg once daily.    Metastatic breast cancer: We will repeat scans of the chest abdomen and pelvis in mid September prior to her return.    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  I recommended trying to do some exercises to strengthen her legs and we will work on reducing her steroid dose as much as possible.    Follow-up in 1 month.      I spent 25 minutes with the patient. I spent > 50% percent of this time counseling and discussing prognosis, diagnostic testing, evaluation, current status and management.        Carrie Patel MD  [facility] Hematology and Oncology    8/22/2019          CC:

## 2019-08-29 ENCOUNTER — TELEPHONE (OUTPATIENT)
Dept: FAMILY MEDICINE CLINIC | Facility: CLINIC | Age: 59
End: 2019-08-29

## 2019-08-29 DIAGNOSIS — G89.3 CANCER ASSOCIATED PAIN: ICD-10-CM

## 2019-08-29 RX ORDER — OXYCODONE AND ACETAMINOPHEN 7.5; 325 MG/1; MG/1
1 TABLET ORAL EVERY 6 HOURS PRN
Qty: 60 TABLET | Refills: 0 | Status: SHIPPED | OUTPATIENT
Start: 2019-08-29 | End: 2019-09-30 | Stop reason: SDUPTHER

## 2019-08-29 NOTE — TELEPHONE ENCOUNTER
----- Message from Rodger Cornejo sent at 8/29/2019 10:08 AM EDT -----  Contact: ELOY  / PT CALL  PT CALLED REQUESTING REFILL    oxyCODONE-acetaminophen (PERCOCET) 7.5-325 MG per tablet [[id number]]   Order Details   Dose: 1 tablet Route: Oral Frequency: Every 6 Hours PRN for Moderate Pain   Dispense Quantity: 60 tablet Refills: 0 Fills remaining: --         Sig: Take 1 tablet by mouth Every 6 (Six) Hours As Needed for Moderate Pain .        Written Date: 08/02/19 Expiration Date: 10/01/19    Start Date: 08/02/19 End Date: --    Earliest Fill Date: 08/02/19           Ordering Provider:  Alla Colon DO Phone:  [phone] Fax:  [phone]   Address:  [address] [place] [address] [place] [address] NPI:  [id number]          Authorizing Provider:  Alla Colon DO Phone:  [phone] Fax:  [phone]   Address:  [address] [place] [address] [place] [address] NPI:  [id number]          Ordering User:  Alla Colon DO          Diagnosis Association: Cancer associated pain (G89.3)    Original Order:  oxyCODONE-acetaminophen (PERCOCET) 7.5-325 MG per tablet [[id number]]    Pharmacy:  [facility]/pharmacy #[id number] [address] AT [place] [address] - [phone] [facility] [phone]  Phone:  [phone] Fax:  [phone]   Address:  [address] [place] [address]

## 2019-09-06 DIAGNOSIS — F33.1 MODERATE EPISODE OF RECURRENT MAJOR DEPRESSIVE DISORDER (HCC): ICD-10-CM

## 2019-09-06 DIAGNOSIS — F41.1 GAD (GENERALIZED ANXIETY DISORDER): ICD-10-CM

## 2019-09-06 RX ORDER — VENLAFAXINE HYDROCHLORIDE 150 MG/1
CAPSULE, EXTENDED RELEASE ORAL
Qty: 30 CAPSULE | Refills: 2 | Status: SHIPPED | OUTPATIENT
Start: 2019-09-06 | End: 2019-11-10 | Stop reason: SDUPTHER

## 2019-09-10 ENCOUNTER — TELEPHONE (OUTPATIENT)
Dept: ONCOLOGY | Facility: CLINIC | Age: 59
End: 2019-09-10

## 2019-09-10 ENCOUNTER — APPOINTMENT (OUTPATIENT)
Dept: GENERAL RADIOLOGY | Facility: HOSPITAL | Age: 59
End: 2019-09-10

## 2019-09-10 ENCOUNTER — HOSPITAL ENCOUNTER (EMERGENCY)
Facility: HOSPITAL | Age: 59
Discharge: HOME | End: 2019-09-10
Attending: EMERGENCY MEDICINE | Admitting: EMERGENCY MEDICINE

## 2019-09-10 VITALS
HEART RATE: 77 BPM | HEIGHT: 63 IN | BODY MASS INDEX: 21.09 KG/M2 | TEMPERATURE: 98.9 F | RESPIRATION RATE: 18 BRPM | OXYGEN SATURATION: 91 % | SYSTOLIC BLOOD PRESSURE: 124 MMHG | WEIGHT: 119 LBS | DIASTOLIC BLOOD PRESSURE: 93 MMHG

## 2019-09-10 DIAGNOSIS — J01.00 ACUTE NON-RECURRENT MAXILLARY SINUSITIS: Primary | ICD-10-CM

## 2019-09-10 DIAGNOSIS — R91.8 INFILTRATE OF LEFT LUNG PRESENT ON CHEST X-RAY: ICD-10-CM

## 2019-09-10 LAB
ALBUMIN SERPL-MCNC: 3.2 G/DL (ref 3.5–5.2)
ALBUMIN/GLOB SERPL: 0.8 G/DL
ALP SERPL-CCNC: 144 U/L (ref 39–117)
ALT SERPL W P-5'-P-CCNC: 13 U/L (ref 1–33)
ANION GAP SERPL CALCULATED.3IONS-SCNC: 18 MMOL/L (ref 5–15)
AST SERPL-CCNC: 13 U/L (ref 1–32)
BASOPHILS # BLD MANUAL: 0 10*3/MM3 (ref 0–0.2)
BASOPHILS NFR BLD AUTO: 0 % (ref 0–1.5)
BILIRUB SERPL-MCNC: 0.2 MG/DL (ref 0.2–1.2)
BUN BLD-MCNC: 17 MG/DL (ref 6–20)
BUN/CREAT SERPL: 30.9 (ref 7–25)
CALCIUM SPEC-SCNC: 9.5 MG/DL (ref 8.6–10.5)
CHLORIDE SERPL-SCNC: 92 MMOL/L (ref 98–107)
CO2 SERPL-SCNC: 27 MMOL/L (ref 22–29)
CREAT BLD-MCNC: 0.55 MG/DL (ref 0.57–1)
DEPRECATED RDW RBC AUTO: 55.1 FL (ref 37–54)
EOSINOPHIL # BLD MANUAL: 0 10*3/MM3 (ref 0–0.4)
EOSINOPHIL NFR BLD MANUAL: 0 % (ref 0.3–6.2)
ERYTHROCYTE [DISTWIDTH] IN BLOOD BY AUTOMATED COUNT: 15.4 % (ref 12.3–15.4)
GFR SERPL CREATININE-BSD FRML MDRD: 113 ML/MIN/1.73
GLOBULIN UR ELPH-MCNC: 4.2 GM/DL
GLUCOSE BLD-MCNC: 174 MG/DL (ref 65–99)
HCT VFR BLD AUTO: 37.7 % (ref 34–46.6)
HGB BLD-MCNC: 12.3 G/DL (ref 12–15.9)
HOLD SPECIMEN: NORMAL
HOLD SPECIMEN: NORMAL
LYMPHOCYTES # BLD MANUAL: 1.3 10*3/MM3 (ref 0.7–3.1)
LYMPHOCYTES NFR BLD MANUAL: 7 % (ref 19.6–45.3)
LYMPHOCYTES NFR BLD MANUAL: 7 % (ref 5–12)
MCH RBC QN AUTO: 31.8 PG (ref 26.6–33)
MCHC RBC AUTO-ENTMCNC: 32.6 G/DL (ref 31.5–35.7)
MCV RBC AUTO: 97.4 FL (ref 79–97)
METAMYELOCYTES NFR BLD MANUAL: 2 % (ref 0–0)
MONOCYTES # BLD AUTO: 1.3 10*3/MM3 (ref 0.1–0.9)
NEUTROPHILS # BLD AUTO: 15.57 10*3/MM3 (ref 1.7–7)
NEUTROPHILS NFR BLD MANUAL: 69 % (ref 42.7–76)
NEUTS BAND NFR BLD MANUAL: 15 % (ref 0–5)
NT-PROBNP SERPL-MCNC: 364.4 PG/ML (ref 5–900)
PLAT MORPH BLD: NORMAL
PLATELET # BLD AUTO: 302 10*3/MM3 (ref 140–450)
PMV BLD AUTO: 9 FL (ref 6–12)
POLYCHROMASIA BLD QL SMEAR: ABNORMAL
POTASSIUM BLD-SCNC: 4.2 MMOL/L (ref 3.5–5.2)
PROT SERPL-MCNC: 7.4 G/DL (ref 6–8.5)
RBC # BLD AUTO: 3.87 10*6/MM3 (ref 3.77–5.28)
SODIUM BLD-SCNC: 137 MMOL/L (ref 136–145)
TROPONIN T SERPL-MCNC: <0.01 NG/ML (ref 0–0.03)
WBC MORPH BLD: NORMAL
WBC NRBC COR # BLD: 18.54 10*3/MM3 (ref 3.4–10.8)
WHOLE BLOOD HOLD SPECIMEN: NORMAL
WHOLE BLOOD HOLD SPECIMEN: NORMAL

## 2019-09-10 PROCEDURE — 83880 ASSAY OF NATRIURETIC PEPTIDE: CPT

## 2019-09-10 PROCEDURE — 84484 ASSAY OF TROPONIN QUANT: CPT

## 2019-09-10 PROCEDURE — 71046 X-RAY EXAM CHEST 2 VIEWS: CPT

## 2019-09-10 PROCEDURE — 85007 BL SMEAR W/DIFF WBC COUNT: CPT | Performed by: EMERGENCY MEDICINE

## 2019-09-10 PROCEDURE — 99284 EMERGENCY DEPT VISIT MOD MDM: CPT

## 2019-09-10 PROCEDURE — 80053 COMPREHEN METABOLIC PANEL: CPT

## 2019-09-10 PROCEDURE — 85025 COMPLETE CBC W/AUTO DIFF WBC: CPT | Performed by: EMERGENCY MEDICINE

## 2019-09-10 PROCEDURE — 93005 ELECTROCARDIOGRAM TRACING: CPT

## 2019-09-10 PROCEDURE — 93005 ELECTROCARDIOGRAM TRACING: CPT | Performed by: EMERGENCY MEDICINE

## 2019-09-10 RX ORDER — DEXTROMETHORPHAN HYDROBROMIDE AND PROMETHAZINE HYDROCHLORIDE 15; 6.25 MG/5ML; MG/5ML
5 SYRUP ORAL 4 TIMES DAILY PRN
Qty: 120 ML | Refills: 0 | Status: SHIPPED | OUTPATIENT
Start: 2019-09-10 | End: 2019-09-30

## 2019-09-10 RX ORDER — SODIUM CHLORIDE 0.9 % (FLUSH) 0.9 %
10 SYRINGE (ML) INJECTION AS NEEDED
Status: DISCONTINUED | OUTPATIENT
Start: 2019-09-10 | End: 2019-09-10 | Stop reason: HOSPADM

## 2019-09-10 RX ORDER — DOXYCYCLINE 100 MG/1
100 CAPSULE ORAL 2 TIMES DAILY
Qty: 14 CAPSULE | Refills: 0 | Status: SHIPPED | OUTPATIENT
Start: 2019-09-10 | End: 2019-09-26

## 2019-09-10 NOTE — ED PROVIDER NOTES
Subjective   Brittani Lambert is a 59 y.o female who presents to the ED with complaints of cough. She reports she began experiencing a cough with an onset two weeks ago. She also complains of congestion, chest pain, and shortness of breath. She denies fever and chills. She has a past medical history of breast cancer and ovarian cancer. She is not currently on chemotherapy. There are no other acute symptoms at this time.        History provided by:  Patient  Cough   Cough characteristics:  Productive  Severity:  Moderate  Onset quality:  Sudden  Duration:  2 weeks  Timing:  Constant  Progression:  Unchanged  Chronicity:  New  Associated symptoms: chest pain, shortness of breath and sinus congestion    Associated symptoms: no chills and no fever    Chest pain:     Severity:  Mild    Onset quality:  Sudden    Timing:  Constant    Progression:  Unchanged    Chronicity:  New  Shortness of breath:     Severity:  Mild    Onset quality:  Sudden    Timing:  Constant    Progression:  Unchanged      Review of Systems   Constitutional: Negative for chills and fever.   HENT: Positive for congestion.    Respiratory: Positive for cough and shortness of breath.    Cardiovascular: Positive for chest pain.   All other systems reviewed and are negative.      Past Medical History:   Diagnosis Date   • Breast CA (CMS/HCC) 10/4/2018   • Depression    • DJD (degenerative joint disease) of cervical spine    • THALIA (generalized anxiety disorder)    • Heart murmur    • Ovarian cancer (CMS/HCC) 1989   • Tobacco dependence        No Known Allergies    Past Surgical History:   Procedure Laterality Date   • APPENDECTOMY     • BREAST BIOPSY Right 2003    DONE IN [place]   • COLONOSCOPY  06/2018   • HYSTERECTOMY  1989   • MASTECTOMY W/ SENTINEL NODE BIOPSY Bilateral 10/4/2018    Procedure: LEFT SKIN SPARING BREAST MASTECTOMY WITH LEFT SENTINEL NODE BIOPSY, RIGHT PROPHYLACTIC SKIN SPARING MASTECTOMY;  Surgeon: Koffi Worthington MD;  Location:   [facility] OR;  Service: General   • OOPHORECTOMY  1989       Family History   Problem Relation Age of Onset   • Arthritis Mother    • Heart attack Mother    • Osteoporosis Mother    • Liver disease Father    • Celiac disease Other        Social History     Socioeconomic History   • Marital status: Single     Spouse name: N/A   • Number of children: 1   • Years of education: H.S.   • Highest education level: High school graduate   Occupational History   • Occupation: Associate     Employer: [employer]   Tobacco Use   • Smoking status: Current Some Day Smoker     Packs/day: 0.50     Years: 20.00     Pack years: 10.00     Types: Cigarettes   • Smokeless tobacco: Never Used   Substance and Sexual Activity   • Alcohol use: Yes     Alcohol/week: 1.2 oz     Types: 2 Glasses of wine per week     Frequency: Monthly or less   • Drug use: No   • Sexual activity: Defer     Partners: Male         Objective   Physical Exam   Constitutional: She is oriented to person, place, and time. She appears well-developed and well-nourished. No distress.   HENT:   Head: Normocephalic and atraumatic.   Nose: Nose normal.   Eyes: Conjunctivae are normal. No scleral icterus.   Neck: Normal range of motion. Neck supple.   Cardiovascular: Normal rate, regular rhythm and normal heart sounds.   No murmur heard.  Pulmonary/Chest: Effort normal and breath sounds normal. No respiratory distress.   Abdominal: Soft. There is no tenderness.   Musculoskeletal: Normal range of motion. She exhibits no edema.   Neurological: She is alert and oriented to person, place, and time.   Skin: Skin is warm and dry.   Psychiatric: She has a normal mood and affect. Her behavior is normal.   Nursing note and vitals reviewed.      Procedures         ED Course     CXR shows LLL opacity, atelectasis vs early infiltrate.  Labs show leukocytosis.  Pt not toxic appearing, sats are wnl, not hypotensive or tachycardic.  She wants to go home.  I think this is  progressing sinus infection rather than CAP but we can cover both with doxycycline.  She is not immunocompromised by active chemotherapy.  Patient stable on serial rechecks.  Discussed findings, concerns, plan of care, expected course, reasons to return and followup.                  MDM  Number of Diagnoses or Management Options  Acute non-recurrent maxillary sinusitis:   Infiltrate of left lung present on chest x-ray:      Amount and/or Complexity of Data Reviewed  Clinical lab tests: reviewed and ordered  Tests in the radiology section of CPT®: reviewed and ordered  Independent visualization of images, tracings, or specimens: yes        Final diagnoses:   Acute non-recurrent maxillary sinusitis   Infiltrate of left lung present on chest x-ray       Documentation assistance provided by trinity Ruelas.  Information recorded by the scribe was done at my direction and has been verified and validated by me.     Lance Ruelas  09/10/19 0146       Eben Alatorre MD  09/10/19 2649

## 2019-09-10 NOTE — TELEPHONE ENCOUNTER
Attempted to reach pt to discuss symptoms. Left DELFINO. Provided her my direct extension in [place] and asked her to call back.

## 2019-09-10 NOTE — TELEPHONE ENCOUNTER
----- Message from Rodger Jolly sent at 9/9/2019 12:27 PM EDT -----  Regarding: [facility]- [facility]   Contact: [phone]  Pt called has a cold and is feeling really bad. This cold has been going on for about 2 weeks coughing with mucus. Pt would like a call back

## 2019-09-11 ENCOUNTER — TELEPHONE (OUTPATIENT)
Dept: ONCOLOGY | Facility: CLINIC | Age: 59
End: 2019-09-11

## 2019-09-11 NOTE — TELEPHONE ENCOUNTER
Called to check on pt and see how she was feeling today/ if we could do anything for her.  Left VM.

## 2019-09-12 ENCOUNTER — TELEPHONE (OUTPATIENT)
Dept: ONCOLOGY | Facility: CLINIC | Age: 59
End: 2019-09-12

## 2019-09-12 NOTE — TELEPHONE ENCOUNTER
Received call from pt. She wanted c/o nasal drainage & productive cough- mucous for 2 1/2 weeks. She is concerned, and wants Dr Patel to tell her if the medication given to her by the ER will make her get better. Discussed with Dr Patel. Explained to pt that it seems like she is suffering from a sinus infection, in which case the doxycycline abx is an appropriate choice, and if effective, she should start feeling better 3-5 days after starting. Pt began antibiotic 9/10. I asked her to call back Monday with an update of symptoms and how she is feeling.

## 2019-09-24 ENCOUNTER — HOSPITAL ENCOUNTER (OUTPATIENT)
Dept: CT IMAGING | Facility: HOSPITAL | Age: 59
Discharge: HOME | End: 2019-09-24

## 2019-09-24 ENCOUNTER — HOSPITAL ENCOUNTER (OUTPATIENT)
Dept: MRI IMAGING | Facility: HOSPITAL | Age: 59
Discharge: HOME | End: 2019-09-24
Admitting: INTERNAL MEDICINE

## 2019-09-24 DIAGNOSIS — C50.011 MALIGNANT NEOPLASM OF NIPPLE OF RIGHT BREAST IN FEMALE, UNSPECIFIED ESTROGEN RECEPTOR STATUS (HCC): ICD-10-CM

## 2019-09-24 PROCEDURE — 71260 CT THORAX DX C+: CPT

## 2019-09-24 PROCEDURE — 70553 MRI BRAIN STEM W/O & W/DYE: CPT

## 2019-09-24 PROCEDURE — 74177 CT ABD & PELVIS W/CONTRAST: CPT

## 2019-09-24 PROCEDURE — 0 GADOBENATE DIMEGLUMINE 529 MG/ML SOLUTION: Performed by: INTERNAL MEDICINE

## 2019-09-24 PROCEDURE — 25010000002 IOPAMIDOL 61 % SOLUTION: Performed by: INTERNAL MEDICINE

## 2019-09-24 PROCEDURE — A9577 INJ MULTIHANCE: HCPCS | Performed by: INTERNAL MEDICINE

## 2019-09-24 RX ADMIN — IOPAMIDOL 80 ML: 612 INJECTION, SOLUTION INTRAVENOUS at 13:18

## 2019-09-24 RX ADMIN — GADOBENATE DIMEGLUMINE 8 ML: 529 INJECTION, SOLUTION INTRAVENOUS at 12:18

## 2019-09-26 ENCOUNTER — OFFICE VISIT (OUTPATIENT)
Dept: ONCOLOGY | Facility: CLINIC | Age: 59
End: 2019-09-26

## 2019-09-26 VITALS
TEMPERATURE: 97.4 F | RESPIRATION RATE: 18 BRPM | OXYGEN SATURATION: 96 % | BODY MASS INDEX: 20.02 KG/M2 | HEART RATE: 97 BPM | DIASTOLIC BLOOD PRESSURE: 67 MMHG | SYSTOLIC BLOOD PRESSURE: 123 MMHG | WEIGHT: 113 LBS | HEIGHT: 63 IN

## 2019-09-26 DIAGNOSIS — C79.31 CANCER OF LEFT BREAST METASTATIC TO BRAIN (HCC): ICD-10-CM

## 2019-09-26 DIAGNOSIS — C50.011 MALIGNANT NEOPLASM OF NIPPLE OF RIGHT BREAST IN FEMALE, UNSPECIFIED ESTROGEN RECEPTOR STATUS (HCC): Primary | ICD-10-CM

## 2019-09-26 DIAGNOSIS — C50.912 CANCER OF LEFT BREAST METASTATIC TO BRAIN (HCC): ICD-10-CM

## 2019-09-26 PROCEDURE — 99214 OFFICE O/P EST MOD 30 MIN: CPT | Performed by: INTERNAL MEDICINE

## 2019-09-26 RX ORDER — AMOXICILLIN AND CLAVULANATE POTASSIUM 875; 125 MG/1; MG/1
1 TABLET, FILM COATED ORAL 2 TIMES DAILY
Qty: 28 TABLET | Refills: 0 | Status: SHIPPED | OUTPATIENT
Start: 2019-09-26 | End: 2019-09-30

## 2019-09-26 RX ORDER — DEXAMETHASONE 1 MG
2 TABLET ORAL
Qty: 120 TABLET | Refills: 3
Start: 2019-09-26 | End: 2019-12-17 | Stop reason: SDUPTHER

## 2019-09-26 NOTE — PROGRESS NOTES
"      PROBLEM LIST:  1. [id number] (Stage IIA) ER negative, DE positive, Her2 2+ by IHC, negative by FISH invasive ductal carcinoma of the left breast  A) bilateral mastectomy on 10/4/18.  Pathology showed a 2.6 cm high grade IDC with basaloid features.  0/1 SLN involved.  B) adjuvant chemotherapy with ddAC followed by taxol started on 11/14/18  C) admitted to the hospital on 4/10/2019 with left upper extremity weakness.  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  CT chest abdomen pelvis and bone scan on 4/11/2019 showed no other sites of disease.  Completed whole brain radiation on 4/24/2019.  2. Anxiety/depression  3. Hyperlipidemia  4. History of ovarian cancer 1989, s/p BRYN/BSO, no adjuvant therapy    Chief complaint: follow up for breast cancer management       Subjective     HISTORY OF PRESENT ILLNESS:   Brittani Lambert returns for follow-up.  She has had a 6 lb weight loss since her last visit.  She says she has not been feeling well.  Her sinuses are full.  She has a lot of congestion.  She is coughing up mucus especially when she wakes up in the morning.  She did take a 10-day course of doxycycline without much improvement.  She gets winded with activity.    Past Medical History, Past Surgical History, Social History, Family History have been reviewed and are without significant changes except as mentioned.    Review of Systems   A comprehensive 14 point review of systems was performed and was negative except as mentioned.    Medications:  The current medication list was reviewed in the EMR    ALLERGIES:  No Known Allergies    Objective      /67 Comment: LUE  Pulse 97   Temp 97.4 °F (36.3 °C) (Temporal)   Resp 18   Ht 160 cm (63\")   Wt 51.3 kg (113 lb)   SpO2 96% Comment: RA  BMI 20.02 kg/m²      Performance Status: 0    General: well appearing female in no acute distress  Neuro: alert and oriented.    HEENT: sclera anicteric, oropharynx " clear  Lymphatics: no cervical, supraclavicular, or axillary adenopathy  Cardiovascular: regular rate and rhythm, no murmurs  Lungs: clear to auscultation bilaterally  Abdomen: soft, nontender, nondistended.  No palpable organomegaly  Extremeties: no lower extremity edema  Skin: no rashes or  petechiae   Psych: mood and affect appropriate      Imaging: I personally reviewed the CT and MRI brain images.  The CT of the chest shows some nodular infiltrates in bilateral lung bases.  MRI of the brain shows no evidence of disease progression but she has new development of pansinusitis.    Assessment/Plan   Brittani Lambert is a 59 y.o. 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     Dysphasia: Improved on PPI.  Continue omeprazole.    Leptomeningeal disease: Stable at this point.  She is tolerating dexamethasone 2 mg daily we will continue this dose.  Tentative plan to repeat MRI of the brain in about 2 months.    Metastatic breast cancer: No evidence of disease progression on scans.  I will likely repeat scans in about 4 months.    Sinusitis and possible pneumonia: She is symptomatic with congestion and cough.  I will prescribe a 14-day course of Augmentin.  If no improvement she may need a specialty referral.  I asked her to let me know if she is not feeling better in about a week.    Follow-up in 1 month.      I spent 25 minutes with the patient. I spent > 50% percent of this time counseling and discussing prognosis, diagnostic testing, evaluation, current status and management.        Carrie Patel MD  [facility]    9/26/2019          CC:

## 2019-09-30 ENCOUNTER — HOSPITAL ENCOUNTER (OUTPATIENT)
Dept: GENERAL RADIOLOGY | Facility: HOSPITAL | Age: 59
Discharge: HOME | End: 2019-09-30
Admitting: PHYSICIAN ASSISTANT

## 2019-09-30 ENCOUNTER — OFFICE VISIT (OUTPATIENT)
Dept: FAMILY MEDICINE CLINIC | Facility: CLINIC | Age: 59
End: 2019-09-30

## 2019-09-30 ENCOUNTER — TELEPHONE (OUTPATIENT)
Dept: FAMILY MEDICINE CLINIC | Facility: CLINIC | Age: 59
End: 2019-09-30

## 2019-09-30 VITALS
TEMPERATURE: 97.2 F | HEART RATE: 76 BPM | RESPIRATION RATE: 18 BRPM | SYSTOLIC BLOOD PRESSURE: 118 MMHG | BODY MASS INDEX: 20.11 KG/M2 | WEIGHT: 113.5 LBS | DIASTOLIC BLOOD PRESSURE: 76 MMHG | HEIGHT: 63 IN

## 2019-09-30 DIAGNOSIS — M25.532 LEFT WRIST PAIN: ICD-10-CM

## 2019-09-30 DIAGNOSIS — S69.92XA INJURY OF LEFT WRIST, INITIAL ENCOUNTER: Primary | ICD-10-CM

## 2019-09-30 DIAGNOSIS — G89.3 CANCER ASSOCIATED PAIN: ICD-10-CM

## 2019-09-30 PROCEDURE — 73110 X-RAY EXAM OF WRIST: CPT

## 2019-09-30 PROCEDURE — 99213 OFFICE O/P EST LOW 20 MIN: CPT | Performed by: PHYSICIAN ASSISTANT

## 2019-09-30 RX ORDER — OXYCODONE AND ACETAMINOPHEN 7.5; 325 MG/1; MG/1
1 TABLET ORAL EVERY 6 HOURS PRN
Qty: 60 TABLET | Refills: 0 | Status: SHIPPED | OUTPATIENT
Start: 2019-09-30 | End: 2019-11-13 | Stop reason: SDUPTHER

## 2019-09-30 NOTE — PROGRESS NOTES
"Subjective   Brittani Lambert is a 59 y.o. female.     History of Present Illness   L wrist injury sustained yesterday evening after falling from bar stool and landing with weight on L wrist. Area swollen and very painful. Can not move wrist. Aching and throbbing when sitting at rest. Some swelling in fingers. Deformity along back of wrist noted. Permament sensation changes in fingers from cancer treatment not worsening. This is patient's first evaluation. Has wrapped in ace bandaged and has made a scarf to hold up wrist around her neck.   Needs refill on her pain management (has metastatic breast cancer)   Pt is R hand dominant  No hx of fractures     The following portions of the patient's history were reviewed and updated as appropriate: allergies, current medications, past family history, past medical history, past social history, past surgical history and problem list.    Review of Systems   Constitutional: Negative for chills and fever.   Respiratory: Negative for shortness of breath and wheezing.    Cardiovascular: Negative for chest pain.   Gastrointestinal: Positive for nausea. Negative for abdominal pain and vomiting.   Musculoskeletal: Positive for arthralgias and joint swelling.   Skin: Negative for rash.   Neurological: Positive for numbness.       Objective    Blood pressure 118/76, pulse 76, temperature 97.2 °F (36.2 °C), resp. rate 18, height 160 cm (63\"), weight 51.5 kg (113 lb 8 oz), not currently breastfeeding.     Physical Exam   Constitutional: She is oriented to person, place, and time. She appears well-developed and well-nourished. She appears distressed.   HENT:   Head: Normocephalic and atraumatic.   Right Ear: External ear normal.   Left Ear: External ear normal.   Nose: Nose normal.   Mouth/Throat: Oropharynx is clear and moist.   Eyes: Conjunctivae are normal.   Cardiovascular: Normal rate and regular rhythm.   Pulmonary/Chest: Effort normal and breath sounds normal.   Musculoskeletal: She " exhibits no edema.        Left wrist: She exhibits decreased range of motion, tenderness, bony tenderness, swelling and deformity. She exhibits no crepitus and no laceration.   Neurological: She is alert and oriented to person, place, and time.   Skin: Skin is warm and dry.   Psychiatric: She has a normal mood and affect. Her behavior is normal. Judgment and thought content normal.   Nursing note and vitals reviewed.      Assessment/Plan   Brittani was seen today for wrist injury.    Diagnoses and all orders for this visit:    Injury of left wrist, initial encounter  -     XR Wrist 3+ View Left  -     Ambulatory Referral to Orthopedic Surgery  -     oxyCODONE-acetaminophen (PERCOCET) 7.5-325 MG per tablet; Take 1 tablet by mouth Every 6 (Six) Hours As Needed for Moderate Pain .    Left wrist pain  -     XR Wrist 3+ View Left  -     Ambulatory Referral to Orthopedic Surgery  -     oxyCODONE-acetaminophen (PERCOCET) 7.5-325 MG per tablet; Take 1 tablet by mouth Every 6 (Six) Hours As Needed for Moderate Pain .    Cancer associated pain  -     oxyCODONE-acetaminophen (PERCOCET) 7.5-325 MG per tablet; Take 1 tablet by mouth Every 6 (Six) Hours As Needed for Moderate Pain .    strong suspicion for fracture. Send her for STAT XR today. And will refer to ortho. PCP has refilled her pain medication.   Rest, ice, elevate and may continue with compression.  Rx for brace pending XR results to confirm break.

## 2019-09-30 NOTE — TELEPHONE ENCOUNTER
Called [facility] and was transferred from pharmacy to home medical. Spoke with Medina and she states they have to have something faxed for the splint to [phone] in order to bill out and give to patient.

## 2019-09-30 NOTE — TELEPHONE ENCOUNTER
Patient informed. See referral, appt for tomorrow. Name of PA, office and their number given to patient. She would like a brace order sent to [facility] please.

## 2019-09-30 NOTE — TELEPHONE ENCOUNTER
Will not have what she needs at [facility] will need to go to medical supply store like [facility]   Please call in L wrist and forearm splint, neurtral position   Dx: left Distal radius fracture

## 2019-09-30 NOTE — TELEPHONE ENCOUNTER
Preliminary XR of wrist confirms fracture of distal radius with multiple fracture fragments. Referral coordinator working on getting her in with ortho now. If this will be a couple of days, will send her in a temporary wrist brace to medical supply store, however will see how soon appointment can be scheduled. . TOD

## 2019-10-10 ENCOUNTER — TELEPHONE (OUTPATIENT)
Dept: ONCOLOGY | Facility: CLINIC | Age: 59
End: 2019-10-10

## 2019-10-10 NOTE — TELEPHONE ENCOUNTER
Pt had called stating that her disability check didn't come in again.  This happens about every 3 months.      Faxed prev disability letter with MR to both her [employer] insurance.    Called and notified pt.  She verbalized understanding and gratitude.  Told her to call me back if any furthur issues arise.

## 2019-10-16 RX ORDER — DOCUSATE SODIUM 100 MG/1
100 CAPSULE, LIQUID FILLED ORAL 2 TIMES DAILY PRN
Qty: 60 CAPSULE | Refills: 2 | Status: SHIPPED | OUTPATIENT
Start: 2019-10-16 | End: 2020-05-11

## 2019-11-07 RX ORDER — DEXAMETHASONE 1 MG
2 TABLET ORAL 2 TIMES DAILY WITH MEALS
Qty: 120 TABLET | Refills: 3 | OUTPATIENT
Start: 2019-11-07

## 2019-11-10 DIAGNOSIS — F41.1 GAD (GENERALIZED ANXIETY DISORDER): ICD-10-CM

## 2019-11-10 DIAGNOSIS — F33.1 MODERATE EPISODE OF RECURRENT MAJOR DEPRESSIVE DISORDER (HCC): ICD-10-CM

## 2019-11-11 RX ORDER — VENLAFAXINE HYDROCHLORIDE 150 MG/1
CAPSULE, EXTENDED RELEASE ORAL
Qty: 90 CAPSULE | Refills: 1 | Status: SHIPPED | OUTPATIENT
Start: 2019-11-11 | End: 2020-02-10

## 2019-11-13 ENCOUNTER — TELEPHONE (OUTPATIENT)
Dept: FAMILY MEDICINE CLINIC | Facility: CLINIC | Age: 59
End: 2019-11-13

## 2019-11-13 DIAGNOSIS — G89.3 CANCER ASSOCIATED PAIN: ICD-10-CM

## 2019-11-13 DIAGNOSIS — S69.92XA INJURY OF LEFT WRIST, INITIAL ENCOUNTER: ICD-10-CM

## 2019-11-13 DIAGNOSIS — M25.532 LEFT WRIST PAIN: ICD-10-CM

## 2019-11-13 RX ORDER — OXYCODONE AND ACETAMINOPHEN 7.5; 325 MG/1; MG/1
1 TABLET ORAL EVERY 6 HOURS PRN
Qty: 60 TABLET | Refills: 0 | Status: SHIPPED | OUTPATIENT
Start: 2019-11-13 | End: 2019-12-10 | Stop reason: SDUPTHER

## 2019-11-13 NOTE — TELEPHONE ENCOUNTER
Pt requesting refill on oxyCODONE-acetaminophen (PERCOCET) 7.5-325 MG to be sent to the [facility] pharmacy in chart. Please advise.

## 2019-11-14 ENCOUNTER — TELEPHONE (OUTPATIENT)
Dept: ONCOLOGY | Facility: CLINIC | Age: 59
End: 2019-11-14

## 2019-11-14 NOTE — TELEPHONE ENCOUNTER
LEANNA - PT SAID SHE IS FAXING OVER SOME PAPERWORK REGARDING HR NEEDING DR RAM TO FILL OUT PHYSICIANS SECTION AND FAX BACK TO HER HR DEPARTMENT - HER CONTACT AT HER HR DEPT IS [facility] - SIMONE PT OUR FAX # - PT STATED SHE IS RESIGNING HER POSITION WITH HER EMPLOYER

## 2019-11-18 ENCOUNTER — DOCUMENTATION (OUTPATIENT)
Dept: ONCOLOGY | Facility: CLINIC | Age: 59
End: 2019-11-18

## 2019-11-18 NOTE — TELEPHONE ENCOUNTER
Rec'd pw via fax.  Called pt and she said that, once completed, the paperwork can be mailed to Xavier HENAO Attn: Jose @ address: [address] [place] [address]  Jose's P# [phone]    Called Jose and KETAN to verify address because pt needs this taken care of ASAP.

## 2019-11-18 NOTE — PROGRESS NOTES
11/18/2019  Rec'd pw via fax.      Called pt and she said that, once completed, the paperwork can be mailed to Xavier HENAO Attn: Jose @ address: [address] [place] [address]  Jose's P# [phone]    Called Jose and KETAN to verify address because pt needs this taken care of ASAP.    Completed paperwork & attached MR.    11/19/2019  Rec'd with MD signature.   Mailed.

## 2019-12-03 DIAGNOSIS — C50.011 MALIGNANT NEOPLASM OF NIPPLE OF RIGHT BREAST IN FEMALE, UNSPECIFIED ESTROGEN RECEPTOR STATUS (HCC): Primary | ICD-10-CM

## 2019-12-03 NOTE — PROGRESS NOTES
Returned patient's call.  She reports bilateral leg pain below the knees, bruising of the left arm.  Pain makes it difficult to walk.    Recommended emergency room for evaluation. She does not want to do this.  Will try to arrange for urgent care appt tomorrow.

## 2019-12-04 ENCOUNTER — LAB (OUTPATIENT)
Dept: LAB | Facility: HOSPITAL | Age: 59
End: 2019-12-04

## 2019-12-04 DIAGNOSIS — C50.011 MALIGNANT NEOPLASM OF NIPPLE OF RIGHT BREAST IN FEMALE, UNSPECIFIED ESTROGEN RECEPTOR STATUS (HCC): ICD-10-CM

## 2019-12-04 LAB
ALBUMIN SERPL-MCNC: 4.1 G/DL (ref 3.5–5.2)
ALBUMIN/GLOB SERPL: 1.3 G/DL
ALP SERPL-CCNC: 104 U/L (ref 39–117)
ALT SERPL W P-5'-P-CCNC: 26 U/L (ref 1–33)
ANION GAP SERPL CALCULATED.3IONS-SCNC: 11 MMOL/L (ref 5–15)
APTT PPP: 29.1 SECONDS (ref 24–37)
AST SERPL-CCNC: 19 U/L (ref 1–32)
BILIRUB SERPL-MCNC: 0.4 MG/DL (ref 0.2–1.2)
BUN BLD-MCNC: 14 MG/DL (ref 6–20)
BUN/CREAT SERPL: 21.5 (ref 7–25)
CALCIUM SPEC-SCNC: 10.4 MG/DL (ref 8.6–10.5)
CHLORIDE SERPL-SCNC: 102 MMOL/L (ref 98–107)
CO2 SERPL-SCNC: 26 MMOL/L (ref 22–29)
CREAT BLD-MCNC: 0.65 MG/DL (ref 0.57–1)
ERYTHROCYTE [DISTWIDTH] IN BLOOD BY AUTOMATED COUNT: 15.8 % (ref 12.3–15.4)
GFR SERPL CREATININE-BSD FRML MDRD: 93 ML/MIN/1.73
GLOBULIN UR ELPH-MCNC: 3.1 GM/DL
GLUCOSE BLD-MCNC: 140 MG/DL (ref 65–99)
HCT VFR BLD AUTO: 41 % (ref 34–46.6)
HGB BLD-MCNC: 13.6 G/DL (ref 12–15.9)
INR PPP: 0.9 (ref 0.85–1.16)
LYMPHOCYTES # BLD AUTO: 0.9 10*3/MM3 (ref 0.7–3.1)
LYMPHOCYTES NFR BLD AUTO: 8.3 % (ref 19.6–45.3)
MCH RBC QN AUTO: 34 PG (ref 26.6–33)
MCHC RBC AUTO-ENTMCNC: 33.2 G/DL (ref 31.5–35.7)
MCV RBC AUTO: 102.4 FL (ref 79–97)
MONOCYTES # BLD AUTO: 0.3 10*3/MM3 (ref 0.1–0.9)
MONOCYTES NFR BLD AUTO: 3.3 % (ref 5–12)
NEUTROPHILS # BLD AUTO: 9.2 10*3/MM3 (ref 1.7–7)
NEUTROPHILS NFR BLD AUTO: 88.4 % (ref 42.7–76)
PLATELET # BLD AUTO: 215 10*3/MM3 (ref 140–450)
PMV BLD AUTO: 6.3 FL (ref 6–12)
POTASSIUM BLD-SCNC: 4.3 MMOL/L (ref 3.5–5.2)
PROT SERPL-MCNC: 7.2 G/DL (ref 6–8.5)
PROTHROMBIN TIME: 11.7 SECONDS (ref 11.2–14.3)
RBC # BLD AUTO: 4 10*6/MM3 (ref 3.77–5.28)
SODIUM BLD-SCNC: 139 MMOL/L (ref 136–145)
WBC NRBC COR # BLD: 10.4 10*3/MM3 (ref 3.4–10.8)

## 2019-12-04 PROCEDURE — 36415 COLL VENOUS BLD VENIPUNCTURE: CPT

## 2019-12-04 PROCEDURE — 85730 THROMBOPLASTIN TIME PARTIAL: CPT

## 2019-12-04 PROCEDURE — 85610 PROTHROMBIN TIME: CPT

## 2019-12-04 PROCEDURE — 80053 COMPREHEN METABOLIC PANEL: CPT

## 2019-12-04 PROCEDURE — 85025 COMPLETE CBC W/AUTO DIFF WBC: CPT

## 2019-12-05 ENCOUNTER — APPOINTMENT (OUTPATIENT)
Dept: MRI IMAGING | Facility: HOSPITAL | Age: 59
End: 2019-12-05

## 2019-12-09 ENCOUNTER — TELEPHONE (OUTPATIENT)
Dept: FAMILY MEDICINE CLINIC | Facility: CLINIC | Age: 59
End: 2019-12-09

## 2019-12-09 DIAGNOSIS — M25.532 LEFT WRIST PAIN: ICD-10-CM

## 2019-12-09 DIAGNOSIS — G89.3 CANCER ASSOCIATED PAIN: ICD-10-CM

## 2019-12-09 DIAGNOSIS — S69.92XA INJURY OF LEFT WRIST, INITIAL ENCOUNTER: ICD-10-CM

## 2019-12-09 NOTE — TELEPHONE ENCOUNTER
Please call.  Does she have any pills left?  Does she have enough left until Dr. Colon is back on Wednesday? That way she can get her full prescription.

## 2019-12-09 NOTE — TELEPHONE ENCOUNTER
Pt was confused and thought today was Saturday when she was counting her pills. She has enough to last til Wed when Dr. Colon returns.

## 2019-12-10 RX ORDER — OXYCODONE AND ACETAMINOPHEN 7.5; 325 MG/1; MG/1
1 TABLET ORAL EVERY 6 HOURS PRN
Qty: 60 TABLET | Refills: 0 | Status: SHIPPED | OUTPATIENT
Start: 2019-12-10 | End: 2020-01-06 | Stop reason: SDUPTHER

## 2019-12-11 ENCOUNTER — HOSPITAL ENCOUNTER (OUTPATIENT)
Dept: MRI IMAGING | Facility: HOSPITAL | Age: 59
Discharge: HOME | End: 2019-12-11
Admitting: INTERNAL MEDICINE

## 2019-12-11 DIAGNOSIS — C50.011 MALIGNANT NEOPLASM OF NIPPLE OF RIGHT BREAST IN FEMALE, UNSPECIFIED ESTROGEN RECEPTOR STATUS (HCC): ICD-10-CM

## 2019-12-11 PROCEDURE — 70553 MRI BRAIN STEM W/O & W/DYE: CPT

## 2019-12-11 PROCEDURE — A9577 INJ MULTIHANCE: HCPCS | Performed by: INTERNAL MEDICINE

## 2019-12-11 PROCEDURE — 0 GADOBENATE DIMEGLUMINE 529 MG/ML SOLUTION: Performed by: INTERNAL MEDICINE

## 2019-12-11 RX ORDER — PRAVASTATIN SODIUM 20 MG
TABLET ORAL
Qty: 30 TABLET | Refills: 5 | Status: SHIPPED | OUTPATIENT
Start: 2019-12-11 | End: 2020-12-10

## 2019-12-11 RX ADMIN — GADOBENATE DIMEGLUMINE 10 ML: 529 INJECTION, SOLUTION INTRAVENOUS at 18:15

## 2019-12-17 ENCOUNTER — OFFICE VISIT (OUTPATIENT)
Dept: ONCOLOGY | Facility: CLINIC | Age: 59
End: 2019-12-17

## 2019-12-17 VITALS
SYSTOLIC BLOOD PRESSURE: 122 MMHG | OXYGEN SATURATION: 98 % | HEIGHT: 63 IN | WEIGHT: 118 LBS | TEMPERATURE: 97.3 F | BODY MASS INDEX: 20.91 KG/M2 | DIASTOLIC BLOOD PRESSURE: 83 MMHG | HEART RATE: 104 BPM | RESPIRATION RATE: 12 BRPM

## 2019-12-17 DIAGNOSIS — C50.011 MALIGNANT NEOPLASM OF NIPPLE OF RIGHT BREAST IN FEMALE, UNSPECIFIED ESTROGEN RECEPTOR STATUS (HCC): Primary | ICD-10-CM

## 2019-12-17 PROCEDURE — 99214 OFFICE O/P EST MOD 30 MIN: CPT | Performed by: INTERNAL MEDICINE

## 2019-12-17 RX ORDER — DEXAMETHASONE 1 MG
0.5 TABLET ORAL
Qty: 120 TABLET | Refills: 3
Start: 2019-12-17 | End: 2021-02-25

## 2019-12-17 NOTE — PROGRESS NOTES
"      PROBLEM LIST:  1. [id number] (Stage IIA) ER negative, MI positive, Her2 2+ by IHC, negative by FISH invasive ductal carcinoma of the left breast  A) bilateral mastectomy on 10/4/18.  Pathology showed a 2.6 cm high grade IDC with basaloid features.  0/1 SLN involved.  B) adjuvant chemotherapy with ddAC followed by taxol started on 11/14/18  C) admitted to the hospital on 4/10/2019 with left upper extremity weakness.  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  CT chest abdomen pelvis and bone scan on 4/11/2019 showed no other sites of disease.  Completed whole brain radiation on 4/24/2019.  2. Anxiety/depression  3. Hyperlipidemia  4. History of ovarian cancer 1989, s/p BRYN/BSO, no adjuvant therapy    Chief complaint: follow up for breast cancer management       Subjective     HISTORY OF PRESENT ILLNESS:   Brittani Lambert returns for follow-up.  She called previously complaining of bilateral leg pain that was keeping her from being able to walk.  She says this has improved somewhat although it still happens, mostly in the mornings.  She does also notice lower extremity weakness and shakiness.  She is currently taking dexamethasone 1 mg daily.  No headaches.    Past Medical History, Past Surgical History, Social History, Family History have been reviewed and are without significant changes except as mentioned.    Review of Systems   A comprehensive 14 point review of systems was performed and was negative except as mentioned.    Medications:  The current medication list was reviewed in the EMR    ALLERGIES:  No Known Allergies    Objective      /83   Pulse 104   Temp 97.3 °F (36.3 °C) (Temporal)   Resp 12   Ht 160 cm (63\")   Wt 53.5 kg (118 lb)   SpO2 98%   BMI 20.90 kg/m²      Performance Status: 0    General: well appearing female in no acute distress  Neuro: alert and oriented.    HEENT: sclera anicteric, oropharynx clear  Lymphatics: no " cervical, supraclavicular, or axillary adenopathy  Cardiovascular: regular rate and rhythm, no murmurs  Lungs: clear to auscultation bilaterally  Abdomen: soft, nontender, nondistended.  No palpable organomegaly  Extremeties: no lower extremity edema  Skin: no rashes or  petechiae   Psych: mood and affect appropriate      MRI Brain With & Without Contrast  Narrative:  MRI Brain WO W     History: History of breast carcinoma with metastasis to the brain. Bilateral leg pain x4 weeks. Dizziness x3 weeks    Technique: Sagittal, axial and coronal images of the brain were obtained using the standard protocol. Pre and postcontrast imaging was performed. Patient was given 10 mL's of MultiHance.    Comparison: 9/24/2019    Findings:  The diffusion sequence demonstrates no evidence of restricted diffusion to indicate acute infarction. The FLAIR sequence shows the ventricles to be generous in size. Cortical sulci are correspondingly prominent. No extra-axial fluid collections. No  significant shift of midline structures. There are extensive areas of FLAIR hyperintensity in the periventricular white matter and subcortical white matter of the cerebral hemispheres. This likely represents microvascular disease in this patient. There  is also increased FLAIR signal in the brainstem. This also likely represents microvascular disease.    After the administration of contrast, there is redemonstrated persistent trace enhancement in the right posterior frontal lobe representing an area of prior metastatic disease. This was present on the prior exam of 9/24/2019. It is slightly decreased in  conspicuity in the interval. There are no new enhancing lesions identified.    The pituitary is within normal limits. The cervicomedullary junction is normal. 7th and 8th cranial nerve complexes are within normal limits.    There are prominent bilateral mastoid effusions. There is complete opacification of the left maxillary sinus. The right maxillary  sinus is clear. Prominent inflammatory changes seen in the ethmoid air cells and sphenoid sinus. The frontal sinus is clear.    No acute orbital findings.  Impression: Impression:  1.  Redemonstrated is persistent trace enhancement in the posterior right frontal lobe and an area of prior metastatic disease. This is slightly decreased in conspicuity when compared to the study of 9/24/2019.  2.  No evidence of acute infarct. Generalized cerebral atrophy and prominent white matter microvascular disease as described.  3.  Prominent bilateral mastoid effusions. Complete opacification of the left maxillary sinus. Prominent inflammatory changes in the ethmoid air cells and sphenoid sinus.    Signer Name: Danny Hernandez MD   Signed: 12/12/2019 11:18 AM   Workstation Name: [id number]    [facility]        Assessment/Plan   Brittani Lambert is a 59 y.o. 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     Dysphasia:   Continue omeprazole.    Leptomeningeal disease: No evidence of progression of leptomeningeal disease on MRI of the brain.  However given her symptoms, we will do an MRI of the spine.  We discussed that leptomeningeal disease can sometimes involve the cord and nerve roots.    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  She will dose decrease her dexamethasone 0.5 mg daily and we will slowly taper this.    Metastatic breast cancer: Repeat CT chest abdomen and pelvis for monitoring.      Follow-up in 1 month.      I spent 25 minutes with the patient. I spent > 50% percent of this time counseling and discussing prognosis, diagnostic testing, evaluation, current status and management.        Carrie Patel MD  [facility] On[facility]    12/17/2019          CC:

## 2020-01-02 ENCOUNTER — APPOINTMENT (OUTPATIENT)
Dept: MRI IMAGING | Facility: HOSPITAL | Age: 60
End: 2020-01-02

## 2020-01-06 DIAGNOSIS — M25.532 LEFT WRIST PAIN: ICD-10-CM

## 2020-01-06 DIAGNOSIS — S69.92XA INJURY OF LEFT WRIST, INITIAL ENCOUNTER: ICD-10-CM

## 2020-01-06 DIAGNOSIS — G89.3 CANCER ASSOCIATED PAIN: ICD-10-CM

## 2020-01-07 RX ORDER — OXYCODONE AND ACETAMINOPHEN 7.5; 325 MG/1; MG/1
1 TABLET ORAL EVERY 6 HOURS PRN
Qty: 60 TABLET | Refills: 0 | Status: SHIPPED | OUTPATIENT
Start: 2020-01-07 | End: 2020-01-30 | Stop reason: SDUPTHER

## 2020-01-14 ENCOUNTER — TELEPHONE (OUTPATIENT)
Dept: ONCOLOGY | Facility: CLINIC | Age: 60
End: 2020-01-14

## 2020-01-14 ENCOUNTER — TELEPHONE (OUTPATIENT)
Dept: SOCIAL WORK | Facility: HOSPITAL | Age: 60
End: 2020-01-14

## 2020-01-14 NOTE — TELEPHONE ENCOUNTER
Pt wants to give mac a fax number to fax over the paper work stating that she has breast cancer, fax [phone]  Put to the attention shahriar .

## 2020-01-14 NOTE — TELEPHONE ENCOUNTER
ZAHRA called pt to provide assistance with insurance and financial concerns.  Pt reports that she recently lost her health insurance because her employer does not provide long-term disability.  Pt cancelled her appointment with Dr. Patel due to not having any insurance. SW informed pt that she may apply for Medicaid and if she does not qualify for that, she can apply for [facility] financial assistance program. Payment source is absolutely not required in order for her to receive her care.  ZAHRA called [employer] and requested that they screen pt for any program that she may be eligible for.  Pt was very appreciative of the call.  SW available for ongoing support and resource needs.

## 2020-01-14 NOTE — TELEPHONE ENCOUNTER
Patient called and she lost in October and today she found out they took her off payroll and she doesn't have insurance she is trying to get disability so she asked that appointment and port flush be cancelled until she get's this worked out. Wants Dr. Patel to be aware of this.

## 2020-01-16 ENCOUNTER — APPOINTMENT (OUTPATIENT)
Dept: ONCOLOGY | Facility: HOSPITAL | Age: 60
End: 2020-01-16

## 2020-01-30 ENCOUNTER — TELEPHONE (OUTPATIENT)
Dept: FAMILY MEDICINE CLINIC | Facility: CLINIC | Age: 60
End: 2020-01-30

## 2020-01-30 DIAGNOSIS — S69.92XA INJURY OF LEFT WRIST, INITIAL ENCOUNTER: ICD-10-CM

## 2020-01-30 DIAGNOSIS — M25.532 LEFT WRIST PAIN: ICD-10-CM

## 2020-01-30 DIAGNOSIS — F41.9 ANXIETY: ICD-10-CM

## 2020-01-30 DIAGNOSIS — G89.3 CANCER ASSOCIATED PAIN: ICD-10-CM

## 2020-01-30 RX ORDER — ALPRAZOLAM 0.5 MG/1
0.5 TABLET ORAL 3 TIMES DAILY PRN
Qty: 90 TABLET | Refills: 0 | Status: SHIPPED | OUTPATIENT
Start: 2020-01-30 | End: 2020-05-26 | Stop reason: SDUPTHER

## 2020-01-30 RX ORDER — OXYCODONE AND ACETAMINOPHEN 7.5; 325 MG/1; MG/1
1 TABLET ORAL EVERY 6 HOURS PRN
Qty: 60 TABLET | Refills: 0 | Status: SHIPPED | OUTPATIENT
Start: 2020-01-30 | End: 2020-02-24 | Stop reason: SDUPTHER

## 2020-01-30 NOTE — TELEPHONE ENCOUNTER
Patient called in to request a refill on the selected meds. oxyCODONE-acetaminophen (PERCOCET) 7.5-325 MG per tablet and ALPRAZolam (XANAX) 0.5 MG tablet.    Pt. Call back [phone]  [facility] Normal confirmed

## 2020-02-10 ENCOUNTER — OFFICE VISIT (OUTPATIENT)
Dept: FAMILY MEDICINE CLINIC | Facility: CLINIC | Age: 60
End: 2020-02-10

## 2020-02-10 VITALS
BODY MASS INDEX: 21.44 KG/M2 | HEIGHT: 63 IN | HEART RATE: 96 BPM | RESPIRATION RATE: 16 BRPM | OXYGEN SATURATION: 96 % | SYSTOLIC BLOOD PRESSURE: 122 MMHG | DIASTOLIC BLOOD PRESSURE: 84 MMHG | WEIGHT: 121 LBS | TEMPERATURE: 97 F

## 2020-02-10 DIAGNOSIS — E78.2 MIXED HYPERLIPIDEMIA: Primary | ICD-10-CM

## 2020-02-10 DIAGNOSIS — Z51.81 ENCOUNTER FOR THERAPEUTIC DRUG MONITORING: ICD-10-CM

## 2020-02-10 DIAGNOSIS — G89.3 CANCER ASSOCIATED PAIN: ICD-10-CM

## 2020-02-10 DIAGNOSIS — F41.1 GAD (GENERALIZED ANXIETY DISORDER): ICD-10-CM

## 2020-02-10 DIAGNOSIS — F33.1 MODERATE EPISODE OF RECURRENT MAJOR DEPRESSIVE DISORDER (HCC): ICD-10-CM

## 2020-02-10 PROCEDURE — 99214 OFFICE O/P EST MOD 30 MIN: CPT | Performed by: FAMILY MEDICINE

## 2020-02-10 RX ORDER — VENLAFAXINE HYDROCHLORIDE 225 MG/1
225 TABLET, EXTENDED RELEASE ORAL
Qty: 90 EACH | Refills: 1 | Status: SHIPPED | OUTPATIENT
Start: 2020-02-10 | End: 2020-05-11

## 2020-02-10 NOTE — PROGRESS NOTES
Subjective   Brittani Lambert is a 60 y.o. female.   Chief Complaint   Patient presents with   • Follow-up   • Hyperlipidemia     Hyperlipidemia   This is a chronic problem. The current episode started more than 1 month ago. The problem is uncontrolled. Recent lipid tests were reviewed and are high. She has no history of obesity. There are no known factors aggravating her hyperlipidemia. Pertinent negatives include no chest pain, myalgias or shortness of breath. Current antihyperlipidemic treatment includes statins. The current treatment provides significant improvement of lipids. There are no compliance problems.  Risk factors for coronary artery disease include dyslipidemia.   Anxiety   Presents for follow-up visit. Symptoms include depressed mood and nervous/anxious behavior. Patient reports no chest pain, excessive worry, irritability, palpitations or shortness of breath. Symptoms occur most days. The severity of symptoms is moderate. The quality of sleep is fair. Nighttime awakenings: occasional.     Her past medical history is significant for depression. Compliance with medications is %.   Depression   Visit Type: follow-up  Patient presents with the following symptoms: depressed mood and nervousness/anxiety.  Patient is not experiencing: excessive worry, irritability, palpitations and shortness of breath.  Frequency of symptoms: most days   Severity: moderate   Sleep quality: fair  Nighttime awakenings: occasional  Compliance with medications:  %         Doesn't feel that venlafaxine  mg daily isn't improving anxiety and depression enough.    Continues to take alprazolam as needed, which does help breakthrough anxiety. No side effect associated with medication     Chronic pain related to cancer managed with Percocet 7.5-325 mg prn.   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  She last saw oncology 12/2019, MRI brain  updated Dec 2019.  Additional imaging has been ordered, however she does not have insurance active at this time, plans to schedule those after getting insurance.     The following portions of the patient's history were reviewed and updated as appropriate: allergies, current medications, past family history, past medical history, past social history, past surgical history and problem list.    Review of Systems   Constitutional: Negative for chills, fever and irritability.   Respiratory: Negative for cough and shortness of breath.    Cardiovascular: Negative for chest pain, palpitations and leg swelling.   Musculoskeletal: Negative for myalgias.   Hematological: Bruises/bleeds easily.   Psychiatric/Behavioral: Positive for depressed mood. The patient is nervous/anxious.        Objective   Physical Exam   Constitutional: She appears well-developed and well-nourished. No distress.   HENT:   Head: Normocephalic and atraumatic.   Right Ear: External ear normal.   Left Ear: External ear normal.   Nose: Nose normal.   Eyes: Conjunctivae are normal.   Neck: Neck supple.   Cardiovascular: Normal rate, regular rhythm and normal heart sounds.   No murmur heard.  Pulmonary/Chest: Effort normal and breath sounds normal.   Musculoskeletal: She exhibits no deformity.   Neurological: She is alert.   Skin: Skin is warm and dry.   Psychiatric: She has a normal mood and affect. Her behavior is normal. Thought content normal.   Nursing note and vitals reviewed.        Assessment/Plan   Brittani was seen today for follow-up and hyperlipidemia.    Diagnoses and all orders for this visit:    Mixed hyperlipidemia  Comments:  Continue Pravastatin to improve HLD. She will return to complete fasting labs   Orders:  -     CBC & Differential; Future  -     Comprehensive Metabolic Panel; Future  -     Lipid Panel; Future    Cancer associated pain  Comments:  Symptoms are stable and managed with Percocet 7.5 mg.   Orders:  -     Pain Management  Profile (13 Drugs) Urine - Urine, Clean Catch    THALIA (generalized anxiety disorder)  Comments:  Venlafaxine increased to 225 mg daily.  Continue prn alprazolam, no sign of misuse.   Orders:  -     venlafaxine 225 MG tablet sustained-release 24 hour 24 hr tablet; Take 1 tablet by mouth Daily With Breakfast.    Moderate episode of recurrent major depressive disorder (CMS/HCC)  -     venlafaxine 225 MG tablet sustained-release 24 hour 24 hr tablet; Take 1 tablet by mouth Daily With Breakfast.    Encounter for therapeutic drug monitoring  -     Pain Management Profile (13 Drugs) Urine - Urine, Clean Catch      ZECHARIAH query complete. Treatment plan to include limited course of prescribed controlled substance. Risks including addiction, benefits, and alternatives presented to patient.

## 2020-02-10 NOTE — PATIENT INSTRUCTIONS
Go to the nearest ER or return to clinic if symptoms worsen, fever/chill develop    Cholesterol    Cholesterol is a fat. Your body needs a small amount of cholesterol. Cholesterol (plaque) may build up in your blood vessels (arteries). That makes you more likely to have a heart attack or stroke.  You cannot feel your cholesterol level. Having a blood test is the only way to find out if your level is high. Keep your test results. Work with your doctor to keep your cholesterol at a good level.  What do the results mean?  · Total cholesterol is how much cholesterol is in your blood.  · LDL is bad cholesterol. This is the type that can build up. Try to have low LDL.  · HDL is good cholesterol. It cleans your blood vessels and carries LDL away. Try to have high HDL.  · Triglycerides are fat that the body can store or burn for energy.  What are good levels of cholesterol?  · Total cholesterol below 200.  · LDL below 100 is good for people who have health risks. LDL below 70 is good for people who have very high risks.  · HDL above 40 is good. It is best to have HDL of 60 or higher.  · Triglycerides below 150.  How can I lower my cholesterol?  Diet  Follow your diet program as told by your doctor.  · Choose fish, white meat chicken, or turkey that is roasted or baked. Try not to eat red meat, fried foods, sausage, or lunch meats.  · Eat lots of fresh fruits and vegetables.  · Choose whole grains, beans, pasta, potatoes, and cereals.  · Choose olive oil, corn oil, or canola oil. Only use small amounts.  · Try not to eat butter, mayonnaise, shortening, or palm kernel oils.  · Try not to eat foods with trans fats.  · Choose low-fat or nonfat dairy foods.  ? Drink skim or nonfat milk.  ? Eat low-fat or nonfat yogurt and cheeses.  ? Try not to drink whole milk or cream.  ? Try not to eat ice cream, egg yolks, or full-fat cheeses.  · Healthy desserts include kati food cake, samantha snaps, animal crackers, hard candy, popsicles,  and low-fat or nonfat frozen yogurt. Try not to eat pastries, cakes, pies, and cookies.    Exercise  Follow your exercise program as told by your doctor.  · Be more active. Try gardening, walking, and taking the stairs.  · Ask your doctor about ways that you can be more active.  Medicine  · Take over-the-counter and prescription medicines only as told by your doctor.  This information is not intended to replace advice given to you by your health care provider. Make sure you discuss any questions you have with your health care provider.  Document Released: 03/16/2010 Document Revised: 07/19/2017 Document Reviewed: 06/29/2017  [employer] Interactive Patient Education © 2019 [employer]

## 2020-02-20 ENCOUNTER — TELEPHONE (OUTPATIENT)
Dept: GYNECOLOGIC ONCOLOGY | Facility: CLINIC | Age: 60
End: 2020-02-20

## 2020-02-24 ENCOUNTER — TELEPHONE (OUTPATIENT)
Dept: FAMILY MEDICINE CLINIC | Facility: CLINIC | Age: 60
End: 2020-02-24

## 2020-02-24 DIAGNOSIS — S69.92XA INJURY OF LEFT WRIST, INITIAL ENCOUNTER: ICD-10-CM

## 2020-02-24 DIAGNOSIS — M25.532 LEFT WRIST PAIN: ICD-10-CM

## 2020-02-24 DIAGNOSIS — G89.3 CANCER ASSOCIATED PAIN: ICD-10-CM

## 2020-02-24 RX ORDER — OXYCODONE AND ACETAMINOPHEN 7.5; 325 MG/1; MG/1
1 TABLET ORAL EVERY 6 HOURS PRN
Qty: 60 TABLET | Refills: 0 | Status: SHIPPED | OUTPATIENT
Start: 2020-02-24 | End: 2020-03-19 | Stop reason: SDUPTHER

## 2020-02-24 NOTE — TELEPHONE ENCOUNTER
PT CALLED IN REQUESTING A REFILL ON HER OxyCODONE-acetaminophen (PERCOCET) 7.5-325 MG per tablet      PT CB NUMBER: [phone]  [facility] [address]  FAX# [phone]

## 2020-03-10 ENCOUNTER — RESULTS ENCOUNTER (OUTPATIENT)
Dept: FAMILY MEDICINE CLINIC | Facility: CLINIC | Age: 60
End: 2020-03-10

## 2020-03-10 DIAGNOSIS — E78.2 MIXED HYPERLIPIDEMIA: ICD-10-CM

## 2020-03-10 RX ORDER — OMEPRAZOLE 20 MG/1
CAPSULE, DELAYED RELEASE ORAL
Qty: 30 CAPSULE | Refills: 5 | Status: SHIPPED | OUTPATIENT
Start: 2020-03-10 | End: 2020-05-11

## 2020-03-12 ENCOUNTER — TELEPHONE (OUTPATIENT)
Dept: FAMILY MEDICINE CLINIC | Facility: CLINIC | Age: 60
End: 2020-03-12

## 2020-03-12 NOTE — TELEPHONE ENCOUNTER
Please provide sample if available. Also, advise her to contact her hematologist/oncologist about this. They might have samples available or change her treatment.

## 2020-03-12 NOTE — TELEPHONE ENCOUNTER
I'm assuming she is talking about the Xarelto. We have about 14 days of samples in office at this time. We do not have any co-pay cards. Please advise.

## 2020-03-12 NOTE — TELEPHONE ENCOUNTER
PT CALLED STATING THAT HER MEDICATION FOR BLOOD CLOTS (COULD NOT UNDERSTAND OR FIND THE NAME OF THE MED) IS NOW $400 WITHOUT HER INSURANCE AND WANTED TO KNOW IF THERE WERE ANY SAMPLES SHE COULD  AT THE OFFICE.    PT CONTACT [phone]     PLEASE ADVISE

## 2020-03-19 ENCOUNTER — TELEPHONE (OUTPATIENT)
Dept: FAMILY MEDICINE CLINIC | Facility: CLINIC | Age: 60
End: 2020-03-19

## 2020-03-19 DIAGNOSIS — M25.532 LEFT WRIST PAIN: ICD-10-CM

## 2020-03-19 DIAGNOSIS — S69.92XA INJURY OF LEFT WRIST, INITIAL ENCOUNTER: ICD-10-CM

## 2020-03-19 DIAGNOSIS — G89.3 CANCER ASSOCIATED PAIN: ICD-10-CM

## 2020-03-19 RX ORDER — OXYCODONE AND ACETAMINOPHEN 7.5; 325 MG/1; MG/1
1 TABLET ORAL EVERY 6 HOURS PRN
Qty: 60 TABLET | Refills: 0 | Status: SHIPPED | OUTPATIENT
Start: 2020-03-19 | End: 2020-04-09 | Stop reason: SDUPTHER

## 2020-03-19 NOTE — TELEPHONE ENCOUNTER
Please call, requested meds sent to pharmacy.     Wolfgang for samples of Xarelto.  20 mg daily.

## 2020-03-19 NOTE — TELEPHONE ENCOUNTER
PATIENT CALLED AND SAID THAT SHE WILL  SAMPLE AT OFFICE TOMORROW (03/20/2020).    PATIENT CALLBACK # [phone]

## 2020-03-19 NOTE — TELEPHONE ENCOUNTER
Left detailed msg that we currently dont have any samples but we do have a coupon card if she would like to come p/u.

## 2020-03-19 NOTE — TELEPHONE ENCOUNTER
Patient is calling requesting a refill on her percocet and also if she could get another week supply of xarelto. Patient did get approved for medicaid and will receive her card on Saturday and will be able to get a full prescription once that comes in but would like a sample for now if possible. Please advise    Confirmed pharmacy

## 2020-04-09 DIAGNOSIS — G89.3 CANCER ASSOCIATED PAIN: ICD-10-CM

## 2020-04-09 DIAGNOSIS — M25.532 LEFT WRIST PAIN: ICD-10-CM

## 2020-04-09 DIAGNOSIS — S69.92XA INJURY OF LEFT WRIST, INITIAL ENCOUNTER: ICD-10-CM

## 2020-04-09 RX ORDER — OXYCODONE AND ACETAMINOPHEN 7.5; 325 MG/1; MG/1
1 TABLET ORAL EVERY 6 HOURS PRN
Qty: 60 TABLET | Refills: 0 | Status: SHIPPED | OUTPATIENT
Start: 2020-04-09 | End: 2020-05-07 | Stop reason: SDUPTHER

## 2020-04-09 NOTE — TELEPHONE ENCOUNTER
Patient states that she knows she is early with the call in but wasn't sure if Easter would affect hours or not. She is requesting refill on oxyCODONE-acetaminophen (Percocet) 7.5-325 MG per tablet to be sent to the [facility] on [address] Please advise.

## 2020-04-20 ENCOUNTER — TELEPHONE (OUTPATIENT)
Dept: ONCOLOGY | Facility: CLINIC | Age: 60
End: 2020-04-20

## 2020-04-20 NOTE — TELEPHONE ENCOUNTER
Patient needs to set up appointment.  She hasn't been in for months.  She said she lost her job and insurance, but now she has Medicaid.    She is about to run out of Xarelto, but wants to talk to Dr. Patel about possibly discontinuing it.  She states she is having side effects.    [phone]

## 2020-04-20 NOTE — TELEPHONE ENCOUNTER
Looks like she was supposed to have CT scans that were ordered but not scheduled.  I will have to check with Carrie but she may want patient to have scans before we schedule anything.

## 2020-04-23 NOTE — TELEPHONE ENCOUNTER
[facility] for patient. Per Dr. Patel she wants patient to have scans done first and then we will schedule a video visit or telephone visit to go over the results and then discuss the Xarelto. Told patient to call scheduling at [phone] to set scans up and to continue the Xarelto for now.

## 2020-04-24 ENCOUNTER — TELEPHONE (OUTPATIENT)
Dept: PSYCHIATRY | Facility: CLINIC | Age: 60
End: 2020-04-24

## 2020-04-24 NOTE — TELEPHONE ENCOUNTER
This patient has not been seen since June 2019- she lost her insurance and now has it back so she would like to continue her care. I have scheduled her for a face to face visit on 4/29 but the patient said she would be agreeable to a telephone visit if you wanted. She also wants to talk with you about filling out papers for her to get a therapy dog. Please advise.

## 2020-04-29 ENCOUNTER — OFFICE VISIT (OUTPATIENT)
Dept: PSYCHIATRY | Facility: CLINIC | Age: 60
End: 2020-04-29

## 2020-04-29 DIAGNOSIS — F41.1 GENERALIZED ANXIETY DISORDER: ICD-10-CM

## 2020-04-29 DIAGNOSIS — F33.1 MODERATE EPISODE OF RECURRENT MAJOR DEPRESSIVE DISORDER (HCC): Primary | ICD-10-CM

## 2020-04-29 PROCEDURE — 99214 OFFICE O/P EST MOD 30 MIN: CPT | Performed by: NURSE PRACTITIONER

## 2020-04-29 RX ORDER — ARIPIPRAZOLE 2 MG/1
2 TABLET ORAL DAILY
Qty: 30 TABLET | Refills: 1 | Status: SHIPPED | OUTPATIENT
Start: 2020-04-29 | End: 2020-06-10 | Stop reason: SDUPTHER

## 2020-04-29 NOTE — PROGRESS NOTES
"  Subjective   Brittani Lambert is a 60 y.o. female who is here today for medication management follow up. You have chosen to receive care through a telephone visit. Do you consent to use a telephone visit for your medical care today? Yes. Patient has metastatic breast cancer stage IV. She is under the care of Dr. Carrie Patel Med Onc. And Primary Dr. Colon. This provider hasn't seen patient since June 2019.     Time IN 11 am   Time OUT: 11:27 am     Chief Complaint: MDD recurrent mod     History of Present Illness Patient per telephone reports she lost her job and with it her health insurance. She was able to cash out her life insurance and after several months got disability and medicaid and moved into her own apartment which she states she loves. She hasn't seen Dr. Patel her medical oncologist since 12/17/2019 \"because I lost health insurance so I cancelled my appointments\". She did work with onc . She now has scans scheduled and an appt with Dr. Patel for f/u. Patient reports she is having more discomfort in her lower back and legs. She has been in care of her PCP Dr. Colon and communication with oncology social worker and Dr. Patel and her staff . The patient reports depressive symptoms including depressed mood, crying spells, decreased appetite, decreased interest and motivation, anhedonia, feelings of hopelessness regarding cancer, feelings of helplessness, feelings of worthlessness \"what's the meaning\", low energy and psychomotor retardation, present on most days for the past 1 month(s)  and have caused impairment in important areas of functioning. Depression rated 8/10 with 10 being the worst. She denies suicidal thoughts, denies HI/AVH. She denies confusion. Denies panic, Rates anxiety about a 3-4 most days.  denies alcohol intake, denies unsafe behaviors. She is sleeping with after supper at 6pm falls asleep for two hours then awake and goes back to bed about 1am, sleeps until morning. She " is interested in getting a dog, and discussed options, this provider agrees would be good comfort, emotional support. She will look at humane society in surrounding areas. She has good support from sister, and her niece who moved texts her daily and she and her daughter have been developing a much closer relationship along with her grandson who is 14yo. Patient continues to take venlafaxine XR 225mg daily. And last filled alprazolam through her PCP 0.5mg #90 last filled 2. 1.2020 , she states uses sparingly. Oxycodone/Acetaminophen 7.5mg/325mg #60 for 15 days filled last per Abhijit 4.9.2020. Denies adverse effects from medications.   (Scales based on 0 - 10 with 10 being the worst)        The following portions of the patient's history were reviewed and updated as appropriate: allergies, current medications, past family history, past medical history, past social history, past surgical history and problem list.    Review of Systems  A 14 point review of systems was performed and is negative except as noted above.    Objective   Physical Exam  not currently breastfeeding.    No Known Allergies     Med history   1. [id number] (Stage IIA) ER negative, GA positive, Her2 2+ by IHC, negative by FISH invasive ductal carcinoma of the left breast  A) bilateral mastectomy on 10/4/18.  Pathology showed a 2.6 cm high grade IDC with basaloid features.  0/1 SLN involved.  B) adjuvant chemotherapy with ddAC followed by taxol started on 11/14/18  C) admitted to the hospital on 4/10/2019 with left upper extremity weakness.  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  CT chest abdomen pelvis and bone scan on 4/11/2019 showed no other sites of disease.  Completed whole brain radiation on 4/24/2019.  2. Anxiety/depression  3. Hyperlipidemia  4. History of ovarian cancer 1989, s/p BRYN/BSO, no adjuvant therapy    Current Medications:   Current Outpatient Medications   Medication Sig  Dispense Refill   • albuterol (PROAIR RESPICLICK) 108 (90 Base) MCG/ACT inhaler Inhale 2 puffs Every 6 (Six) Hours As Needed for Wheezing or Shortness of Air. 1 inhaler 11   • ALPRAZolam (XANAX) 0.5 MG tablet Take 1 tablet by mouth 3 (Three) Times a Day As Needed for Anxiety or Sleep. 90 tablet 0   • ARIPiprazole (ABILIFY) 2 MG tablet Take 1 tablet by mouth Daily. 30 tablet 1   • dexamethasone (DECADRON) 1 MG tablet Take 0.5 tablets by mouth Daily With Breakfast. 120 tablet 3   • docusate sodium (COLACE) 100 MG capsule TAKE 1 CAPSULE BY MOUTH 2 (TWO) TIMES A DAY AS NEEDED FOR CONSTIPATION. 60 capsule 2   • lidocaine-prilocaine (EMLA) 2.5-2.5 % cream PLEASE SEE ATTACHED FOR DETAILED DIRECTIONS 30 g 3   • omeprazole (priLOSEC) 20 MG capsule TAKE 1 CAPSULE BY MOUTH EVERY DAY 30 capsule 5   • ondansetron (ZOFRAN) 8 MG tablet Take 1 tablet by mouth 3 (Three) Times a Day As Needed for Nausea or Vomiting. 30 tablet 5   • oxyCODONE-acetaminophen (Percocet) 7.5-325 MG per tablet Take 1 tablet by mouth Every 6 (Six) Hours As Needed for Moderate Pain . 60 tablet 0   • polyethylene glycol (MIRALAX) packet Take 17 g by mouth Daily. 30 each 2   • pravastatin (PRAVACHOL) 20 MG tablet TAKE 1 TABLET BY MOUTH EVERY DAY AT NIGHT 30 tablet 5   • rivaroxaban (XARELTO) 20 MG tablet Take 1 tablet by mouth Daily. 30 tablet 11   • venlafaxine 225 MG tablet sustained-release 24 hour 24 hr tablet Take 1 tablet by mouth Daily With Breakfast. 90 each 1     No current facility-administered medications for this visit.      THALIA-7:    Over the last two weeks, how often have you been bothered by the following problems?  Feeling nervous, anxious or on edge: Several days  Not being able to stop or control worrying: Several days  Worrying too much about different things: Not at all  Trouble Relaxing: Several days  Being so restless that it is hard to sit still: Not at all  Becoming easily annoyed or irritable: Several days  Feeling afraid as if  something awful might happen: Several days  THALIA 7 Total Score: 5  If you checked any problems, how difficult have these problems made it for you to do your work, take care of things at home, or get along with other people: Not difficult at all  0-4: Minimal anxiety  5-9: Mild anxiety  10-14: Moderate anxiety  15-21: Severe anxiety  PHQ-9:  PHQ-2/PHQ-9 Depression Screening 4/29/2020   Little interest or pleasure in doing things 3   Feeling down, depressed, or hopeless 3   Trouble falling or staying asleep, or sleeping too much 1   Feeling tired or having little energy 3   Poor appetite or overeating 0   Feeling bad about yourself - or that you are a failure or have let yourself or your family down 2   Trouble concentrating on things, such as reading the newspaper or watching television 2   Moving or speaking so slowly that other people could have noticed. Or the opposite - being so fidgety or restless that you have been moving around a lot more than usual 2   Thoughts that you would be better off dead, or of hurting yourself in some way 0   Total Score 16   If you checked off any problems, how difficult have these problems made it for you to do your work, take care of things at home, or get along with other people? Very difficult      5-9: Minimal symptoms  10-14: Major depression mild  15-19: Major depression moderate  Greater then 20: Major depression severe    Appearance: NA on telephone   Hygiene: reports  good  Cooperation:  Cooperative  Eye Contact:    Psychomotor Behavior:  Denies  psychomotor agitation,  EPS,  motor tics  Mood:  Depressed   Affect:    Hopelessness: some regarding cancer   Speech:  Normal  Thought Process:  Linear  Thought Content:  Normal  Concentration: Normal   Suicidal:  None  Homicidal:  None  Hallucinations:  None  Delusion:  None  Memory:  Intact  Orientation:  Person, Place, Time and Situation  Reliability:  good  Insight:  Fair  Judgement: good  Impulse Control: good  Estimated  Intelligence: average range    [facility] REPORT GENERATED,  REVIEWED, NO RED FLAGS # [id number]     Assessment/Plan   Diagnoses and all orders for this visit:    Moderate episode of recurrent major depressive disorder (CMS/HCC)    Generalized anxiety disorder    Other orders  -     ARIPiprazole (ABILIFY) 2 MG tablet; Take 1 tablet by mouth Daily.        IMPRESSION: increased depressive symptoms     PLAN:   Cont venlafaxine XR 225mg daily  ADD abilify 2 mg po one QHS for adjuvant treatment for MDD     We discussed risks, benefits, and side effects of the above medications and the patient was agreeable with the plan. Patient was educated on the importance of compliance with treatment and follow-up appointments.     +++ discussed coping skills Encouraged patient regarding multimodal approach with encouragement of healthy nutrition, healthy sleep, regular physical mobility, social involvement with Covid 19 precautions, counseling, and medication compliance.     +++Also discussed life line for pt to have since she lives alone, she liked idea and will contact them in [place], [place] where she lives.     +++Pt to either email me or fax me the emotional support animal form for her apartment complex so she can get a pet. I will fill out and return to her or apartment complex      Assisted patient in identifying risk factors which would indicate the need for higher level of care including thoughts to harm self or others and/or self-harming behavior and encouraged patient to contact this office, call 911, or present to the nearest emergency room should any of these events occur. Discussed crisis intervention services and means to access.  Patient adamantly and convincingly denies current suicidal or homicidal ideation or perceptual disturbance.    Treatment Plan: stabilize mood, patient will stay out of psychiatric hospital and be at optimal level of functioning with therapy and take all medication as prescribed. Patient verbalized   understanding and agreement to plan.    Instructed to call for questions or concerns and return early if necessary.     Return in about 2 weeks (around 5/13/2020). via telephone, pt prefers

## 2020-05-07 DIAGNOSIS — G89.3 CANCER ASSOCIATED PAIN: ICD-10-CM

## 2020-05-07 DIAGNOSIS — S69.92XA INJURY OF LEFT WRIST, INITIAL ENCOUNTER: ICD-10-CM

## 2020-05-07 DIAGNOSIS — M25.532 LEFT WRIST PAIN: ICD-10-CM

## 2020-05-07 RX ORDER — OXYCODONE AND ACETAMINOPHEN 7.5; 325 MG/1; MG/1
1 TABLET ORAL EVERY 6 HOURS PRN
Qty: 60 TABLET | Refills: 0 | Status: SHIPPED | OUTPATIENT
Start: 2020-05-07 | End: 2020-05-26 | Stop reason: SDUPTHER

## 2020-05-07 NOTE — TELEPHONE ENCOUNTER
PT CALLED TO REQUEST A REFILL FOR oxyCODONE-acetaminophen (Percocet) 7.5-325 MG per tablet.    PT CONTACT [phone]     [facility]

## 2020-05-08 ENCOUNTER — HOSPITAL ENCOUNTER (OUTPATIENT)
Dept: MRI IMAGING | Facility: HOSPITAL | Age: 60
Discharge: HOME | End: 2020-05-08

## 2020-05-08 ENCOUNTER — HOSPITAL ENCOUNTER (OUTPATIENT)
Dept: CT IMAGING | Facility: HOSPITAL | Age: 60
Discharge: HOME | End: 2020-05-08
Admitting: INTERNAL MEDICINE

## 2020-05-08 ENCOUNTER — TELEPHONE (OUTPATIENT)
Dept: ONCOLOGY | Facility: CLINIC | Age: 60
End: 2020-05-08

## 2020-05-08 DIAGNOSIS — C50.011 MALIGNANT NEOPLASM OF NIPPLE OF RIGHT BREAST IN FEMALE, UNSPECIFIED ESTROGEN RECEPTOR STATUS (HCC): ICD-10-CM

## 2020-05-08 LAB — CREAT BLDA-MCNC: 0.7 MG/DL (ref 0.6–1.3)

## 2020-05-08 PROCEDURE — 72156 MRI NECK SPINE W/O & W/DYE: CPT

## 2020-05-08 PROCEDURE — 71260 CT THORAX DX C+: CPT

## 2020-05-08 PROCEDURE — 0 GADOBENATE DIMEGLUMINE 529 MG/ML SOLUTION: Performed by: INTERNAL MEDICINE

## 2020-05-08 PROCEDURE — 72158 MRI LUMBAR SPINE W/O & W/DYE: CPT

## 2020-05-08 PROCEDURE — 74177 CT ABD & PELVIS W/CONTRAST: CPT

## 2020-05-08 PROCEDURE — A9577 INJ MULTIHANCE: HCPCS | Performed by: INTERNAL MEDICINE

## 2020-05-08 PROCEDURE — 82565 ASSAY OF CREATININE: CPT

## 2020-05-08 PROCEDURE — 72157 MRI CHEST SPINE W/O & W/DYE: CPT

## 2020-05-08 PROCEDURE — 25010000002 IOPAMIDOL 61 % SOLUTION: Performed by: INTERNAL MEDICINE

## 2020-05-08 RX ADMIN — GADOBENATE DIMEGLUMINE 10 ML: 529 INJECTION, SOLUTION INTRAVENOUS at 10:40

## 2020-05-08 RX ADMIN — IOPAMIDOL 60 ML: 612 INJECTION, SOLUTION INTRAVENOUS at 08:55

## 2020-05-08 NOTE — TELEPHONE ENCOUNTER
PT IS CALLING FOR THE RESULTS OF HER MRI DONE THIS MORNING     PT CONTACT # [phone]      Call placed to patient. No answer. Will return call on Monday.

## 2020-05-11 ENCOUNTER — OFFICE VISIT (OUTPATIENT)
Dept: FAMILY MEDICINE CLINIC | Facility: CLINIC | Age: 60
End: 2020-05-11

## 2020-05-11 VITALS
DIASTOLIC BLOOD PRESSURE: 90 MMHG | SYSTOLIC BLOOD PRESSURE: 120 MMHG | TEMPERATURE: 97.5 F | WEIGHT: 120 LBS | OXYGEN SATURATION: 99 % | HEIGHT: 63 IN | BODY MASS INDEX: 21.26 KG/M2 | HEART RATE: 102 BPM

## 2020-05-11 DIAGNOSIS — E78.2 MIXED HYPERLIPIDEMIA: ICD-10-CM

## 2020-05-11 DIAGNOSIS — G89.3 CANCER ASSOCIATED PAIN: ICD-10-CM

## 2020-05-11 DIAGNOSIS — G62.9 NEUROPATHY: ICD-10-CM

## 2020-05-11 DIAGNOSIS — F41.1 GAD (GENERALIZED ANXIETY DISORDER): Primary | ICD-10-CM

## 2020-05-11 PROCEDURE — 99214 OFFICE O/P EST MOD 30 MIN: CPT | Performed by: FAMILY MEDICINE

## 2020-05-11 RX ORDER — GABAPENTIN 100 MG/1
100 CAPSULE ORAL 3 TIMES DAILY
Qty: 90 CAPSULE | Refills: 2 | Status: SHIPPED | OUTPATIENT
Start: 2020-05-11 | End: 2020-06-17

## 2020-05-11 RX ORDER — VENLAFAXINE HYDROCHLORIDE 150 MG/1
CAPSULE, EXTENDED RELEASE ORAL
COMMUNITY
Start: 2020-04-16 | End: 2020-07-14 | Stop reason: SDUPTHER

## 2020-05-11 NOTE — PROGRESS NOTES
Subjective   Brittani Lambert is a 60 y.o. female.   Chief Complaint   Patient presents with   • 6mo f/u   • Hyperlipidemia     not fasting   • L forearm bruised up     take a look at it please      Hyperlipidemia   This is a chronic problem. The current episode started more than 1 year ago. The problem is uncontrolled. Recent lipid tests were reviewed and are high. She has no history of diabetes. Factors aggravating her hyperlipidemia include smoking. Pertinent negatives include no chest pain or shortness of breath. Current antihyperlipidemic treatment includes statins. There are no compliance problems.  Risk factors for coronary artery disease include post-menopausal, dyslipidemia and a sedentary lifestyle.      Recently completed MRI cervical, thoracic, and lumbar spine. She has numbness and tingling in legs and hands. Chronic low back pain with radiation into bilateral lower extremities. MRI lumbar spine showed degenerative changes.   Chronic pain and pain associated with cancer treated with Percocet 7.5 mg prn. Years ago, took gabapentin and tolerated without side effect.     Stopped Xarelto 2 weeks ago due to significant bruising on extremities. Since stopping treatment, she has also noticed that GERD has improved.   History of DVT in left brachiocephalic vein and subclavian vein June 2019.   Scheduled to see Dr. Patel in June 2020.     She is seeing psychiatry. Mood is doing well.   Taking alprazolam, Abilify, and venlafaxine.     The following portions of the patient's history were reviewed and updated as appropriate: allergies, current medications, past family history, past medical history, past social history, past surgical history and problem list.    Review of Systems   Constitutional: Negative for fever and unexpected weight gain.   HENT: Negative.    Eyes: Negative.    Respiratory: Negative for cough, chest tightness and shortness of breath.    Cardiovascular: Negative for chest pain and palpitations.    Gastrointestinal: Negative for abdominal pain and nausea.   Endocrine: Negative.    Musculoskeletal: Positive for back pain.   Skin: Negative for rash.   Neurological: Positive for numbness (hands and feet). Negative for light-headedness and confusion.   Hematological: Negative for adenopathy. Bruises/bleeds easily.   Psychiatric/Behavioral: Negative.        Objective   Physical Exam   Constitutional: She is oriented to person, place, and time. She appears well-developed.   HENT:   Head: Normocephalic and atraumatic.   Right Ear: External ear normal.   Left Ear: External ear normal.   Nose: Nose normal.   Eyes: Conjunctivae are normal.   Neck: Neck supple.   Cardiovascular: Normal rate, regular rhythm and normal heart sounds.   No murmur heard.  Pulmonary/Chest: Effort normal and breath sounds normal. No respiratory distress.   Musculoskeletal: She exhibits no deformity.   Neurological: She is alert and oriented to person, place, and time.   Skin: Skin is warm and dry.   Bruising on bilateral forearms   Psychiatric: She has a normal mood and affect. Her behavior is normal.   Nursing note and vitals reviewed.        Assessment/Plan   Brittani was seen today for 6mo f/u, hyperlipidemia and l forearm bruised up.    Diagnoses and all orders for this visit:    THALIA (generalized anxiety disorder)  -     CBC & Differential  -     Comprehensive Metabolic Panel    Cancer associated pain  -     CBC & Differential  -     Comprehensive Metabolic Panel    Mixed hyperlipidemia  -     CBC & Differential  -     Comprehensive Metabolic Panel  -     Lipid Panel    Neuropathy  -     Vitamin B12  -     gabapentin (NEURONTIN) 100 MG capsule; Take 1 capsule by mouth 3 (Three) Times a Day.      Labs updated today.   Start gabapentin for treatment of neuropathy, likely associated with chemotherapy and findings on MRI.   ZECHARIAH query complete. Treatment plan to include limited course of prescribed controlled substance. Risks including  addiction, benefits, and alternatives presented to patient.     Advised her to follow up with Dr. Patel regarding anticoagulation.

## 2020-05-11 NOTE — TELEPHONE ENCOUNTER
Per Oneida Levy, I  Called patient to let her know that the results of her MRI and CT scans did not show disease.  Patient did schedule appt to see Dr. Patel in June.

## 2020-05-11 NOTE — PATIENT INSTRUCTIONS
"Go to the nearest ER or return to clinic if symptoms worsen, fever/chill develop    High Cholesterol    High cholesterol is a condition in which the blood has high levels of a white, waxy, fat-like substance (cholesterol). The human body needs small amounts of cholesterol. The liver makes all the cholesterol that the body needs. Extra (excess) cholesterol comes from the food that we eat.  Cholesterol is carried from the liver by the blood through the blood vessels. If you have high cholesterol, deposits (plaques) may build up on the walls of your blood vessels (arteries). Plaques make the arteries narrower and stiffer. Cholesterol plaques increase your risk for heart attack and stroke. Work with your health care provider to keep your cholesterol levels in a healthy range.  What increases the risk?  This condition is more likely to develop in people who:  · Eat foods that are high in animal fat (saturated fat) or cholesterol.  · Are overweight.  · Are not getting enough exercise.  · Have a family history of high cholesterol.  What are the signs or symptoms?  There are no symptoms of this condition.  How is this diagnosed?  This condition may be diagnosed from the results of a blood test.  · If you are older than age 20, your health care provider may check your cholesterol every 4-6 years.  · You may be checked more often if you already have high cholesterol or other risk factors for heart disease.  The blood test for cholesterol measures:  · \"Bad\" cholesterol (LDL cholesterol). This is the main type of cholesterol that causes heart disease. The desired level for LDL is less than 100.  · \"Good\" cholesterol (HDL cholesterol). This type helps to protect against heart disease by cleaning the arteries and carrying the LDL away. The desired level for HDL is 60 or higher.  · Triglycerides. These are fats that the body can store or burn for energy. The desired number for triglycerides is lower than 150.  · Total cholesterol. " This is a measure of the total amount of cholesterol in your blood, including LDL cholesterol, HDL cholesterol, and triglycerides. A healthy number is less than 200.  How is this treated?  This condition is treated with diet changes, lifestyle changes, and medicines.  Diet changes  · This may include eating more whole grains, fruits, vegetables, nuts, and fish.  · This may also include cutting back on red meat and foods that have a lot of added sugar.  Lifestyle changes  · Changes may include getting at least 40 minutes of aerobic exercise 3 times a week. Aerobic exercises include walking, biking, and swimming. Aerobic exercise along with a healthy diet can help you maintain a healthy weight.  · Changes may also include quitting smoking.  Medicines  · Medicines are usually given if diet and lifestyle changes have failed to reduce your cholesterol to healthy levels.  · Your health care provider may prescribe a statin medicine. Statin medicines have been shown to reduce cholesterol, which can reduce the risk of heart disease.  Follow these instructions at home:  Eating and drinking  If told by your health care provider:  · Eat chicken (without skin), fish, veal, shellfish, ground turkey breast, and round or loin cuts of red meat.  · Do not eat fried foods or fatty meats, such as hot dogs and salami.  · Eat plenty of fruits, such as apples.  · Eat plenty of vegetables, such as broccoli, potatoes, and carrots.  · Eat beans, peas, and lentils.  · Eat grains such as barley, rice, couscous, and bulgur wheat.  · Eat pasta without cream sauces.  · Use skim or nonfat milk, and eat low-fat or nonfat yogurt and cheeses.  · Do not eat or drink whole milk, cream, ice cream, egg yolks, or hard cheeses.  · Do not eat stick margarine or tub margarines that contain trans fats (also called partially hydrogenated oils).  · Do not eat saturated tropical oils, such as coconut oil and palm oil.  · Do not eat cakes, cookies, crackers, or  other baked goods that contain trans fats.    General instructions  · Exercise as directed by your health care provider. Increase your activity level with activities such as gardening, walking, and taking the stairs.  · Take over-the-counter and prescription medicines only as told by your health care provider.  · Do not use any products that contain nicotine or tobacco, such as cigarettes and e-cigarettes. If you need help quitting, ask your health care provider.  · Keep all follow-up visits as told by your health care provider. This is important.  Contact a health care provider if:  · You are struggling to maintain a healthy diet or weight.  · You need help to start on an exercise program.  · You need help to stop smoking.  Get help right away if:  · You have chest pain.  · You have trouble breathing.  This information is not intended to replace advice given to you by your health care provider. Make sure you discuss any questions you have with your health care provider.  Document Released: 12/18/2006 Document Revised: 12/20/2019 Document Reviewed: 06/17/2017  [employer] Interactive Patient Education © 2020 [employer]

## 2020-05-13 ENCOUNTER — OFFICE VISIT (OUTPATIENT)
Dept: PSYCHIATRY | Facility: CLINIC | Age: 60
End: 2020-05-13

## 2020-05-13 DIAGNOSIS — F41.1 GENERALIZED ANXIETY DISORDER: ICD-10-CM

## 2020-05-13 DIAGNOSIS — F33.1 MODERATE EPISODE OF RECURRENT MAJOR DEPRESSIVE DISORDER (HCC): Primary | ICD-10-CM

## 2020-05-13 PROCEDURE — 99214 OFFICE O/P EST MOD 30 MIN: CPT | Performed by: NURSE PRACTITIONER

## 2020-05-13 NOTE — PROGRESS NOTES
"  Subjective   Brittani Lambert is a 60 y.o. female who is here today for medication management follow up. You have chosen to receive care through a telephone visit. Do you consent to use a telephone visit for your medical care today? Yes. Patient of Dr. Carrie Patel Med Onc for stage IV breast cancer.     TIME IN: 11AM  TIME OUT: 11:25AM   25 minutes with pt evaluation, education, supportive therapy     Chief Complaint: MDD, mod, THALIA       History of Present Illness Patient presents via telephone. She reports the Abilify has helped improve her mood and decreased anxiety and sleep improved. She states she saw her PCP for major pain she is having and neuropathy. Patient reports being placed on percocet but having to take every four hours to be comfortable \"doesn't take it away but helps quality of life\". Patient taking more often than prescribed. Discussed risks of this and she needs to speak to the provider that prescribed percocet as she needs to know, and pt will run out early, out of compliance. Discussed latest imaging and no progression in breat cancer . Discussed palliative care options for symptom management. Patient reports her legs will go numb and has fallen. Denies alcohol denies illicit drug use. She is getting a puppy to keep her company and think about other things than her illness, and lives alone, \"this will brighten my life\". Has contact with her sister and mother. Rates depression a 3 and anxiety about the same.  Denies adverse effects from medications.   (Scales based on 0 - 10 with 10 being the worst)        The following portions of the patient's history were reviewed and updated as appropriate: allergies, current medications, past family history, past medical history, past social history, past surgical history and problem list.    Review of Systems  A 14 point review of systems was performed and is negative except as noted above.    Objective   Physical Exam  not currently breastfeeding.    No " Known Allergies    Current Medications:   Current Outpatient Medications   Medication Sig Dispense Refill   • albuterol (PROAIR RESPICLICK) 108 (90 Base) MCG/ACT inhaler Inhale 2 puffs Every 6 (Six) Hours As Needed for Wheezing or Shortness of Air. 1 inhaler 11   • ALPRAZolam (XANAX) 0.5 MG tablet Take 1 tablet by mouth 3 (Three) Times a Day As Needed for Anxiety or Sleep. 90 tablet 0   • ARIPiprazole (ABILIFY) 2 MG tablet Take 1 tablet by mouth Daily. 30 tablet 1   • dexamethasone (DECADRON) 1 MG tablet Take 0.5 tablets by mouth Daily With Breakfast. 120 tablet 3   • gabapentin (NEURONTIN) 100 MG capsule Take 1 capsule by mouth 3 (Three) Times a Day. 90 capsule 2   • lidocaine-prilocaine (EMLA) 2.5-2.5 % cream PLEASE SEE ATTACHED FOR DETAILED DIRECTIONS 30 g 3   • ondansetron (ZOFRAN) 8 MG tablet Take 1 tablet by mouth 3 (Three) Times a Day As Needed for Nausea or Vomiting. 30 tablet 5   • oxyCODONE-acetaminophen (Percocet) 7.5-325 MG per tablet Take 1 tablet by mouth Every 6 (Six) Hours As Needed for Moderate Pain . 60 tablet 0   • pravastatin (PRAVACHOL) 20 MG tablet TAKE 1 TABLET BY MOUTH EVERY DAY AT NIGHT 30 tablet 5   • venlafaxine XR (EFFEXOR-XR) 150 MG 24 hr capsule        No current facility-administered medications for this visit.            FOLLOWING PERFORMED TODAY WITH PT   THALIA-7:    Over the last two weeks, how often have you been bothered by the following problems?  Feeling nervous, anxious or on edge: Several days  Not being able to stop or control worrying: Not at all  Worrying too much about different things: Not at all  Trouble Relaxing: Not at all  Being so restless that it is hard to sit still: Not at all  Becoming easily annoyed or irritable: Several days  Feeling afraid as if something awful might happen: Several days  THALIA 7 Total Score: 3  If you checked any problems, how difficult have these problems made it for you to do your work, take care of things at home, or get along with other  people: Not difficult at all  0-4: Minimal anxiety  5-9: Mild anxiety  10-14: Moderate anxiety  15-21: Severe anxiety  PHQ-9:  PHQ-2/PHQ-9 Depression Screening 5/13/2020   Little interest or pleasure in doing things 1   Feeling down, depressed, or hopeless 0   Trouble falling or staying asleep, or sleeping too much 0   Feeling tired or having little energy 1   Poor appetite or overeating 0   Feeling bad about yourself - or that you are a failure or have let yourself or your family down 1   Trouble concentrating on things, such as reading the newspaper or watching television 1   Moving or speaking so slowly that other people could have noticed. Or the opposite - being so fidgety or restless that you have been moving around a lot more than usual 0   Thoughts that you would be better off dead, or of hurting yourself in some way 0   Total Score 4   If you checked off any problems, how difficult have these problems made it for you to do your work, take care of things at home, or get along with other people? Not difficult at all      5-9: Minimal symptoms  10-14: Major depression mild  15-19: Major depression moderate  Greater then 20: Major depression severe    Appearance:   Hygiene: REPORTS good  Cooperation:  Cooperative  Eye Contact:    Psychomotor Behavior:  Moves slower pt reports because of arthritic pain and neuropathy from chemotherapy she states   Mood:  within normal limits  Affect:    Hopelessness: Denies  Speech:  Normal  Thought Process:  Linear  Thought Content:  Normal  Concentration: Normal   Suicidal:  None  Homicidal:  None  Hallucinations:  None  Delusion:  None  Memory:  Intact  Orientation:  Person, Place, Time and Situation  Reliability:  good  Insight:  Fair  Judgement: good  Impulse Control: good  Estimated Intelligence: average range        Assessment/Plan   Diagnoses and all orders for this visit:    Moderate episode of recurrent major depressive disorder (CMS/HCC)    Generalized anxiety  disorder        IMPRESSION: improved mood with Abilify and good results on imaging     PLAN:   Cont Abilify 2mg nightly for depression   Cont venlafaxine XR 150mg po one QAM for depression and anxiety    We discussed risks, benefits, and side effects of the above medications and the patient was agreeable with the plan. Patient was educated on the importance of compliance with treatment and follow-up appointments.   Cautioned pt with opioid treatment to stay in compliance and notify her pre scriber PCP she is taking more than prescribed for pain. Discussed risks of non compliance including dependence.     Counseled patient regarding multimodal approach with encouragement of healthy nutrition, healthy sleep, regular physical mobility, social involvement with social distancing following covid 19 restrictions, counseling, and medication compliance.     Assisted patient in identifying risk factors which would indicate the need for higher level of care including thoughts to harm self or others and/or self-harming behavior and encouraged patient to contact this office, call 911, or present to the nearest emergency room should any of these events occur. Discussed crisis intervention services and means to access.  Patient adamantly and convincingly denies current suicidal or homicidal ideation or perceptual disturbance.    Treatment Plan: stabilize mood, patient will stay out of psychiatric hospital and be at optimal level of functioning with therapy and take all medication as prescribed. Patient verbalized  understanding and agreement to plan.    Instructed to call for questions or concerns and return early if necessary.     Return in about 4 weeks (around 6/10/2020).

## 2020-05-25 DIAGNOSIS — Z17.1 MALIGNANT NEOPLASM OF LEFT BREAST IN FEMALE, ESTROGEN RECEPTOR NEGATIVE, UNSPECIFIED SITE OF BREAST (HCC): ICD-10-CM

## 2020-05-25 DIAGNOSIS — C50.912 MALIGNANT NEOPLASM OF LEFT BREAST IN FEMALE, ESTROGEN RECEPTOR NEGATIVE, UNSPECIFIED SITE OF BREAST (HCC): ICD-10-CM

## 2020-05-26 ENCOUNTER — TELEPHONE (OUTPATIENT)
Dept: FAMILY MEDICINE CLINIC | Facility: CLINIC | Age: 60
End: 2020-05-26

## 2020-05-26 DIAGNOSIS — S69.92XA INJURY OF LEFT WRIST, INITIAL ENCOUNTER: ICD-10-CM

## 2020-05-26 DIAGNOSIS — Z17.1 MALIGNANT NEOPLASM OF LEFT BREAST IN FEMALE, ESTROGEN RECEPTOR NEGATIVE, UNSPECIFIED SITE OF BREAST: ICD-10-CM

## 2020-05-26 DIAGNOSIS — M25.532 LEFT WRIST PAIN: ICD-10-CM

## 2020-05-26 DIAGNOSIS — G89.3 CANCER ASSOCIATED PAIN: ICD-10-CM

## 2020-05-26 DIAGNOSIS — F41.9 ANXIETY: ICD-10-CM

## 2020-05-26 DIAGNOSIS — C50.912 MALIGNANT NEOPLASM OF LEFT BREAST IN FEMALE, ESTROGEN RECEPTOR NEGATIVE, UNSPECIFIED SITE OF BREAST: ICD-10-CM

## 2020-05-26 RX ORDER — ONDANSETRON HYDROCHLORIDE 8 MG/1
8 TABLET, FILM COATED ORAL 3 TIMES DAILY PRN
Qty: 30 TABLET | Refills: 0 | Status: SHIPPED | OUTPATIENT
Start: 2020-05-26 | End: 2020-05-26 | Stop reason: SDUPTHER

## 2020-05-26 RX ORDER — ALPRAZOLAM 0.5 MG/1
0.5 TABLET ORAL 3 TIMES DAILY PRN
Qty: 90 TABLET | Refills: 0 | Status: SHIPPED | OUTPATIENT
Start: 2020-05-26 | End: 2020-06-24 | Stop reason: SDUPTHER

## 2020-05-26 RX ORDER — ONDANSETRON HYDROCHLORIDE 8 MG/1
8 TABLET, FILM COATED ORAL 3 TIMES DAILY PRN
Qty: 30 TABLET | Refills: 0 | Status: ON HOLD | OUTPATIENT
Start: 2020-05-26 | End: 2020-09-24

## 2020-05-26 RX ORDER — OXYCODONE AND ACETAMINOPHEN 7.5; 325 MG/1; MG/1
1 TABLET ORAL EVERY 6 HOURS PRN
Qty: 60 TABLET | Refills: 0 | Status: SHIPPED | OUTPATIENT
Start: 2020-05-26 | End: 2020-06-11 | Stop reason: SDUPTHER

## 2020-05-26 NOTE — TELEPHONE ENCOUNTER
Called and left detailed message on patient identified voicemail box stating that prescriptions were sent to the pharmacy.

## 2020-05-26 NOTE — TELEPHONE ENCOUNTER
Patient states that she would like a refill of Ondansetron 8 mg    [facility], [place]    She only has 2 pills left.

## 2020-06-10 ENCOUNTER — OFFICE VISIT (OUTPATIENT)
Dept: PSYCHIATRY | Facility: CLINIC | Age: 60
End: 2020-06-10

## 2020-06-10 DIAGNOSIS — F41.1 GENERALIZED ANXIETY DISORDER: ICD-10-CM

## 2020-06-10 DIAGNOSIS — F33.1 MODERATE EPISODE OF RECURRENT MAJOR DEPRESSIVE DISORDER (HCC): Primary | ICD-10-CM

## 2020-06-10 PROCEDURE — 99214 OFFICE O/P EST MOD 30 MIN: CPT | Performed by: NURSE PRACTITIONER

## 2020-06-10 RX ORDER — ARIPIPRAZOLE 5 MG/1
5 TABLET ORAL DAILY
Qty: 30 TABLET | Refills: 2 | Status: SHIPPED | OUTPATIENT
Start: 2020-06-10 | End: 2020-09-03

## 2020-06-10 NOTE — PROGRESS NOTES
"  Subjective   Brittani Lambert is a 60 y.o. female who is here today for medication management follow up. You have chosen to receive care through a telephone visit. Do you consent to use a telephone visit for your medical care today? Yes    Time in 10:57 am  Time Out: 11:25am     Chief Complaint: MDD , THALIA     History of Present Illness Patient presents via telephone and reports she is crying often daily. She tries to stay motivated, making herself do a project a day and walking as much as she can in her apartment, not sleeping during the day so she will sleep better at night. At first she reports depression lifted a lot with the Abilify 2 mg but now on it for some time she rates depression about a 7 most days, denies SI. She has her sister she is close to and do things with, she has her mother, and she and her daughter who lives in [place] have reconciled and text each other daily and her grandson \"which is wonderful\". She has concerns about the \"fat under my arm pits and think I should get implants\". Patient also has chronic pain and states it is getting worse and defiantly effects her mood. Discussed palliative care and she sees Dr. Patel next week to talk with her regarding increase pain in back. She reports eating well and her niece she is so close to maybe moving back to town and she loves that. Cont to like her apartment . Denies worrying all the time \"but life now is strange with Covid and having cancer\". Denies fears of dying.  Denies adverse effects from medications.   (Scales based on 0 - 10 with 10 being the worst)        The following portions of the patient's history were reviewed and updated as appropriate: allergies, current medications, past family history, past medical history, past social history, past surgical history and problem list.    Review of Systems  A 14 point review of systems was performed and is negative except as noted above.    Objective   Physical Exam  not currently " breastfeeding.    No Known Allergies    Current Medications:   Current Outpatient Medications   Medication Sig Dispense Refill   • albuterol (PROAIR RESPICLICK) 108 (90 Base) MCG/ACT inhaler Inhale 2 puffs Every 6 (Six) Hours As Needed for Wheezing or Shortness of Air. 1 inhaler 11   • ALPRAZolam (XANAX) 0.5 MG tablet Take 1 tablet by mouth 3 (Three) Times a Day As Needed for Anxiety or Sleep. 90 tablet 0   • ARIPiprazole (ABILIFY) 5 MG tablet Take 1 tablet by mouth Daily. 30 tablet 2   • dexamethasone (DECADRON) 1 MG tablet Take 0.5 tablets by mouth Daily With Breakfast. 120 tablet 3   • gabapentin (NEURONTIN) 100 MG capsule Take 1 capsule by mouth 3 (Three) Times a Day. 90 capsule 2   • lidocaine-prilocaine (EMLA) 2.5-2.5 % cream PLEASE SEE ATTACHED FOR DETAILED DIRECTIONS 30 g 3   • ondansetron (ZOFRAN) 8 MG tablet Take 1 tablet by mouth 3 (Three) Times a Day As Needed for Nausea or Vomiting. 30 tablet 0   • oxyCODONE-acetaminophen (Percocet) 7.5-325 MG per tablet Take 1 tablet by mouth Every 6 (Six) Hours As Needed for Moderate Pain . 60 tablet 0   • pravastatin (PRAVACHOL) 20 MG tablet TAKE 1 TABLET BY MOUTH EVERY DAY AT NIGHT 30 tablet 5   • venlafaxine XR (EFFEXOR-XR) 150 MG 24 hr capsule        No current facility-administered medications for this visit.        Lab Results:  Hospital Outpatient Visit on 05/08/2020   Component Date Value Ref Range Status   • Creatinine 05/08/2020 0.70  0.60 - 1.30 mg/dL Final    Serial Number: [id number]:  [id number]       Appearance:   Hygiene: reports  good  Cooperation:  Cooperative  Eye Contact:    Psychomotor Behavior:  denies psychomotor agitation/retardation, No EPS, No motor tics  Mood: depressed  Affect:   Hopelessness: Denies  Speech:  Normal  Thought Process:  Linear  Thought Content:  Normal  Concentration: Normal   Suicidal:  None  Homicidal:  None  Hallucinations:  None  Delusion:  None  Memory:  Intact  Orientation:  Person, Place, Time and  Situation  Reliability:  good  Insight:  Fair  Judgement: good  Impulse Control: good  Estimated Intelligence: average range    ZECHARIAH REPORT GENERATED AND REVIEWED,  NO RED FLAGS  Ref # [id number] ,she is prescribed gabapentin, alprazolam, oxycodone through her PCP Dr. Alla Colon     Assessment/Plan   Diagnoses and all orders for this visit:    Moderate episode of recurrent major depressive disorder (CMS/HCC)    Generalized anxiety disorder    Other orders  -     ARIPiprazole (ABILIFY) 5 MG tablet; Take 1 tablet by mouth Daily.      IMPRESSION: increased depression     PLAN: Discussed coping skills, social support, pain management with palliative care and to discuss with Dr. Patel her Med Onc,   Increase Abilify to 5 mg daily for depression and helps iwht anxiety as well, take at hs nightly     We discussed risks, benefits, and side effects of the above medications and the patient was agreeable with the plan. Patient was educated on the importance of compliance with treatment and follow-up appointments.     Counseled patient regarding multimodal approach with encouragement of healthy nutrition, healthy sleep, regular physical mobility, social involvement, counseling, and medication compliance.     Assisted patient in identifying risk factors which would indicate the need for higher level of care including thoughts to harm self or others and/or self-harming behavior and encouraged patient to contact this office, call 911, or present to the nearest emergency room should any of these events occur. Discussed crisis intervention services and means to access.  Patient adamantly and convincingly denies current suicidal or homicidal ideation or perceptual disturbance.    Treatment Plan: stabilize mood, patient will stay out of psychiatric hospital and be at optimal level of functioning with therapy and take all medication as prescribed. Patient verbalized  understanding and agreement to plan.    Instructed to call for  questions or concerns and return early if necessary.     Return in about 2 weeks (around 6/24/2020).

## 2020-06-11 DIAGNOSIS — M25.532 LEFT WRIST PAIN: ICD-10-CM

## 2020-06-11 DIAGNOSIS — G89.3 CANCER ASSOCIATED PAIN: ICD-10-CM

## 2020-06-11 DIAGNOSIS — S69.92XA INJURY OF LEFT WRIST, INITIAL ENCOUNTER: ICD-10-CM

## 2020-06-11 RX ORDER — OXYCODONE AND ACETAMINOPHEN 7.5; 325 MG/1; MG/1
1 TABLET ORAL EVERY 6 HOURS PRN
Qty: 60 TABLET | Refills: 0 | Status: SHIPPED | OUTPATIENT
Start: 2020-06-11 | End: 2020-06-29 | Stop reason: SDUPTHER

## 2020-06-17 ENCOUNTER — OFFICE VISIT (OUTPATIENT)
Dept: ONCOLOGY | Facility: CLINIC | Age: 60
End: 2020-06-17

## 2020-06-17 VITALS
SYSTOLIC BLOOD PRESSURE: 175 MMHG | HEIGHT: 63 IN | WEIGHT: 123 LBS | RESPIRATION RATE: 16 BRPM | DIASTOLIC BLOOD PRESSURE: 83 MMHG | HEART RATE: 89 BPM | OXYGEN SATURATION: 94 % | TEMPERATURE: 97.8 F | BODY MASS INDEX: 21.79 KG/M2

## 2020-06-17 DIAGNOSIS — C79.31 CANCER OF LEFT BREAST METASTATIC TO BRAIN (HCC): Primary | ICD-10-CM

## 2020-06-17 DIAGNOSIS — C50.912 CANCER OF LEFT BREAST METASTATIC TO BRAIN (HCC): Primary | ICD-10-CM

## 2020-06-17 PROCEDURE — 99214 OFFICE O/P EST MOD 30 MIN: CPT | Performed by: INTERNAL MEDICINE

## 2020-06-17 RX ORDER — ARIPIPRAZOLE 2 MG/1
TABLET ORAL
Qty: 30 TABLET | Refills: 1 | OUTPATIENT
Start: 2020-06-17

## 2020-06-17 NOTE — PROGRESS NOTES
"      PROBLEM LIST:  1. [id number] (Stage IIA) ER negative, VA positive, Her2 2+ by IHC, negative by FISH invasive ductal carcinoma of the left breast  A) bilateral mastectomy on 10/4/18.  Pathology showed a 2.6 cm high grade IDC with basaloid features.  0/1 SLN involved.  B) adjuvant chemotherapy with ddAC followed by taxol started on 11/14/18  C) admitted to the hospital on 4/10/2019 with left upper extremity weakness.  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  CT chest abdomen pelvis and bone scan on 4/11/2019 showed no other sites of disease.  Completed whole brain radiation on 4/24/2019.  2. Anxiety/depression  3. Hyperlipidemia  4. History of ovarian cancer 1989, s/p BRYN/BSO, no adjuvant therapy    Chief complaint: follow up for breast cancer management       Subjective     HISTORY OF PRESENT ILLNESS:   Brittani Lambert returns for follow-up.  She says she is doing okay.  She stopped taking Xarelto about 2 weeks ago because she has bruising extensively.  She still has quite a bit of bruising on her arms but it has improved.  She says she has back pain which is chronic.  She has pain medications prescribed by her primary care doctor.  She otherwise is doing fairly well.    Past Medical History, Past Surgical History, Social History, Family History have been reviewed and are without significant changes except as mentioned.    Review of Systems   A comprehensive 14 point review of systems was performed and was negative except as mentioned.    Medications:  The current medication list was reviewed in the EMR    ALLERGIES:  No Known Allergies    Objective      /83 Comment: RUE  Pulse 89   Temp 97.8 °F (36.6 °C) (Temporal)   Resp 16   Ht 160 cm (63\")   Wt 55.8 kg (123 lb)   SpO2 94% Comment: RA  BMI 21.79 kg/m²      Performance Status: 0    General: well appearing female in no acute distress  Neuro: alert and oriented.    HEENT: sclera anicteric, " oropharynx clear  Lymphatics: no cervical, supraclavicular, or axillary adenopathy  Cardiovascular: regular rate and rhythm, no murmurs  Lungs: clear to auscultation bilaterally  Abdomen: soft, nontender, nondistended.  No palpable organomegaly  Extremeties: no lower extremity edema  Skin: no rashes or  Petechiae.  Extensive bruising on forearms bilaterally, few bruises on shins  Psych: mood and affect appropriate      MRI Cervical Spine With & Without Contrast  Narrative: EXAMINATION: MRI CERVICAL SPINE WWO CONTRAST, MRI THORACIC SPINE WWO  CONTRAST, MRI LUMBAR SPINE WWO CONTRAST - 05/08/2020     INDICATION: leptomeningeal disease, leg pain; C50.011-Malignant neoplasm  of nipple and areola, right female breast      TECHNIQUE: Multiplanar MRI of the cervical spine with and without  intravenous contrast, multiplanar MRI of the thoracic spine with and  without intravenous contrast, and multiplanar MRI of the lumbar spine  with and without intravenous contrast.     COMPARISON: MRI brain dated 12/11/2019.     FINDINGS:      C-SPINE: Sagittal datasets of the cervical spine reveal grossly normal  appearing cervical lordotic curvature without focal subluxation or  listhesis. Vertebral body heights are preserved without compression  deformity or fracture. No aggressive bone marrow signal findings.  Degenerative changes of the mid and lower cervical segments without  critical spinal canal or neuroforaminal stenosis identified including  axial datasets evaluation of involvement with only mild spinal canal  stenoses evident. Cervicomedullary junction widely patent. Grossly  normal caliber and contour of the cervical cord without intrinsic cord  signal findings. Postcontrast sequences without abnormal enhancement,  specifically no nodular enhancement of the meningeal contours. No  abnormal intramedullary enhancement.     T-SPINE: Mildly exaggerated thoracic kyphotic curvature without focal  subluxation or listhesis. Vertebral  body heights are preserved without  compression deformity or fracture. No aggressive bone marrow signal  findings. No advanced degenerative disease throughout the disc spaces  which are grossly well preserved. Axial datasets evaluation of the left  T1 and T2 levels demonstrate synovitis cyst formations without  significant neuroforaminal or spinal canal stenosis at these levels or  elsewhere throughout the thoracic segments/levels. Postcontrast  sequences without abnormal enhancement, specifically no abnormal  intradural or intramedullary enhancement and no nodular meningeal  contour enhancement clearly evident. Paraspinal soft tissues grossly  unremarkable otherwise with only subsegmental atelectatic changes in the  lung bases.     L-SPINE: Exaggerated lumbar lordosis with flattening of the lordosis  lower segments. No aggressive bone marrow signal findings or compression  deformity with vertebral body heights preserved, however, degenerative  changes in the lower segments noted as detailed further below. Distal  spinal cord conus and cauda equina are grossly unremarkable for caliber  and contour with the exception of sacral Tarlov cysts at the S2 and S3  levels. Axial datasets evaluation for degenerative changes demonstrates  only mild spondylitic changes at the L4-L5 and L5-S1 levels from mild  spinal canal stenoses with mild to moderate right L4-L5 and mild to  moderate left L5-S1 neuroforaminal stenoses. Paraspinous soft tissues  unremarkable without paraspinal hematoma or paraspinal soft tissue  abnormality of concern. Postcontrast sequences without abnormal  intradural or intramedullary enhancement evident, specifically no  nodular contour irregularity or enhancement of the meningeal contour  surfaces.     Impression: Mild degenerative changes as detailed above seen within the  thoracic and lumbar spines primarily without dominant focus of severe  spinal canal or neuroforaminal stenosis evident. No aggressive  bone  marrow signal findings or compression deformity fracture and no abnormal  enhancement to suggest abnormal intradural or intramedullary involvement  of enhancement or meningeal surface enhancement irregularity.      DICTATED:   05/08/2020  EDITED/ls :   05/09/2020      This report was finalized on 5/9/2020 7:00 PM by Dr. Dann Dawson.     MRI Thoracic Spine With & Without Contrast  Narrative: EXAMINATION: MRI CERVICAL SPINE WWO CONTRAST, MRI THORACIC SPINE WWO  CONTRAST, MRI LUMBAR SPINE WWO CONTRAST - 05/08/2020     INDICATION: leptomeningeal disease, leg pain; C50.011-Malignant neoplasm  of nipple and areola, right female breast      TECHNIQUE: Multiplanar MRI of the cervical spine with and without  intravenous contrast, multiplanar MRI of the thoracic spine with and  without intravenous contrast, and multiplanar MRI of the lumbar spine  with and without intravenous contrast.     COMPARISON: MRI brain dated 12/11/2019.     FINDINGS:      C-SPINE: Sagittal datasets of the cervical spine reveal grossly normal  appearing cervical lordotic curvature without focal subluxation or  listhesis. Vertebral body heights are preserved without compression  deformity or fracture. No aggressive bone marrow signal findings.  Degenerative changes of the mid and lower cervical segments without  critical spinal canal or neuroforaminal stenosis identified including  axial datasets evaluation of involvement with only mild spinal canal  stenoses evident. Cervicomedullary junction widely patent. Grossly  normal caliber and contour of the cervical cord without intrinsic cord  signal findings. Postcontrast sequences without abnormal enhancement,  specifically no nodular enhancement of the meningeal contours. No  abnormal intramedullary enhancement.     T-SPINE: Mildly exaggerated thoracic kyphotic curvature without focal  subluxation or listhesis. Vertebral body heights are preserved without  compression deformity or fracture. No  aggressive bone marrow signal  findings. No advanced degenerative disease throughout the disc spaces  which are grossly well preserved. Axial datasets evaluation of the left  T1 and T2 levels demonstrate synovitis cyst formations without  significant neuroforaminal or spinal canal stenosis at these levels or  elsewhere throughout the thoracic segments/levels. Postcontrast  sequences without abnormal enhancement, specifically no abnormal  intradural or intramedullary enhancement and no nodular meningeal  contour enhancement clearly evident. Paraspinal soft tissues grossly  unremarkable otherwise with only subsegmental atelectatic changes in the  lung bases.     L-SPINE: Exaggerated lumbar lordosis with flattening of the lordosis  lower segments. No aggressive bone marrow signal findings or compression  deformity with vertebral body heights preserved, however, degenerative  changes in the lower segments noted as detailed further below. Distal  spinal cord conus and cauda equina are grossly unremarkable for caliber  and contour with the exception of sacral Tarlov cysts at the S2 and S3  levels. Axial datasets evaluation for degenerative changes demonstrates  only mild spondylitic changes at the L4-L5 and L5-S1 levels from mild  spinal canal stenoses with mild to moderate right L4-L5 and mild to  moderate left L5-S1 neuroforaminal stenoses. Paraspinous soft tissues  unremarkable without paraspinal hematoma or paraspinal soft tissue  abnormality of concern. Postcontrast sequences without abnormal  intradural or intramedullary enhancement evident, specifically no  nodular contour irregularity or enhancement of the meningeal contour  surfaces.     Impression: Mild degenerative changes as detailed above seen within the  thoracic and lumbar spines primarily without dominant focus of severe  spinal canal or neuroforaminal stenosis evident. No aggressive bone  marrow signal findings or compression deformity fracture and no  abnormal  enhancement to suggest abnormal intradural or intramedullary involvement  of enhancement or meningeal surface enhancement irregularity.      DICTATED:   05/08/2020  EDITED/ls :   05/09/2020      This report was finalized on 5/9/2020 7:00 PM by Dr. Dann Dawson.     MRI Lumbar Spine With & Without Contrast  Narrative: EXAMINATION: MRI CERVICAL SPINE WWO CONTRAST, MRI THORACIC SPINE WWO  CONTRAST, MRI LUMBAR SPINE WWO CONTRAST - 05/08/2020     INDICATION: leptomeningeal disease, leg pain; C50.011-Malignant neoplasm  of nipple and areola, right female breast      TECHNIQUE: Multiplanar MRI of the cervical spine with and without  intravenous contrast, multiplanar MRI of the thoracic spine with and  without intravenous contrast, and multiplanar MRI of the lumbar spine  with and without intravenous contrast.     COMPARISON: MRI brain dated 12/11/2019.     FINDINGS:      C-SPINE: Sagittal datasets of the cervical spine reveal grossly normal  appearing cervical lordotic curvature without focal subluxation or  listhesis. Vertebral body heights are preserved without compression  deformity or fracture. No aggressive bone marrow signal findings.  Degenerative changes of the mid and lower cervical segments without  critical spinal canal or neuroforaminal stenosis identified including  axial datasets evaluation of involvement with only mild spinal canal  stenoses evident. Cervicomedullary junction widely patent. Grossly  normal caliber and contour of the cervical cord without intrinsic cord  signal findings. Postcontrast sequences without abnormal enhancement,  specifically no nodular enhancement of the meningeal contours. No  abnormal intramedullary enhancement.     T-SPINE: Mildly exaggerated thoracic kyphotic curvature without focal  subluxation or listhesis. Vertebral body heights are preserved without  compression deformity or fracture. No aggressive bone marrow signal  findings. No advanced degenerative disease  throughout the disc spaces  which are grossly well preserved. Axial datasets evaluation of the left  T1 and T2 levels demonstrate synovitis cyst formations without  significant neuroforaminal or spinal canal stenosis at these levels or  elsewhere throughout the thoracic segments/levels. Postcontrast  sequences without abnormal enhancement, specifically no abnormal  intradural or intramedullary enhancement and no nodular meningeal  contour enhancement clearly evident. Paraspinal soft tissues grossly  unremarkable otherwise with only subsegmental atelectatic changes in the  lung bases.     L-SPINE: Exaggerated lumbar lordosis with flattening of the lordosis  lower segments. No aggressive bone marrow signal findings or compression  deformity with vertebral body heights preserved, however, degenerative  changes in the lower segments noted as detailed further below. Distal  spinal cord conus and cauda equina are grossly unremarkable for caliber  and contour with the exception of sacral Tarlov cysts at the S2 and S3  levels. Axial datasets evaluation for degenerative changes demonstrates  only mild spondylitic changes at the L4-L5 and L5-S1 levels from mild  spinal canal stenoses with mild to moderate right L4-L5 and mild to  moderate left L5-S1 neuroforaminal stenoses. Paraspinous soft tissues  unremarkable without paraspinal hematoma or paraspinal soft tissue  abnormality of concern. Postcontrast sequences without abnormal  intradural or intramedullary enhancement evident, specifically no  nodular contour irregularity or enhancement of the meningeal contour  surfaces.     Impression: Mild degenerative changes as detailed above seen within the  thoracic and lumbar spines primarily without dominant focus of severe  spinal canal or neuroforaminal stenosis evident. No aggressive bone  marrow signal findings or compression deformity fracture and no abnormal  enhancement to suggest abnormal intradural or intramedullary  involvement  of enhancement or meningeal surface enhancement irregularity.      DICTATED:   05/08/2020  EDITED/ls :   05/09/2020      This report was finalized on 5/9/2020 7:00 PM by Dr. Dann Dawson.           Assessment/Plan   Brittani Lambert is a 60 y.o. 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     Dysphasia:   Continue omeprazole.    History of pulmonary embolism.  This was diagnosed as an asymptomatic incidental finding on a CT scan.  She is taking Xarelto for more than 6 months.  I think it is reasonable to stop anticoagulation at this point.    Bruising: This should continue to improve now that she is off Xarelto.  We will check labs today including coags and platelet count.    Leptomeningeal disease: No evidence of disease involving the cord on recent MRI scans.  She has not had an MRI of the brain since December.  I will order this today.    Steroid use: She has been on 0.5 mg of dexamethasone daily for quite some time.  I would like to try to taper her off of this.  I will have her taper slowly over about 3 weeks.  We discussed that symptoms of adrenal insufficiency can include lightheadedness, weakness, nausea and vomiting.  She should call if she experiences any of the symptoms.    Metastatic breast cancer: CT chest abdomen and pelvis is stable with no evidence of recurrence.  She would like to have her port removed and I will set this up.    She has skin flaps under the arms that are uncomfortable and she is interested in reconstruction.  We discussed that this may be an option for her but only as long as her disease remains stable.  I will refer her to plastic surgery for further discussion of this.      Follow-up in 3 months.      I spent 25 minutes with the patient. I spent > 50% percent of this time counseling and discussing prognosis, diagnostic testing, evaluation, current status and  management.        Carrie Patel MD  [facility]    6/17/2020          CC:

## 2020-06-23 ENCOUNTER — TELEPHONE (OUTPATIENT)
Dept: FAMILY MEDICINE CLINIC | Facility: CLINIC | Age: 60
End: 2020-06-23

## 2020-06-23 NOTE — TELEPHONE ENCOUNTER
PT CALLED TO TO SEE IF DR JAIME RAM REFERRED A MEDICATION THAT WILL BOOST THE PT'S PAIN MEDICATION.  PT DIDN'T KNOW THE NAME.    PT CONTACT [phone]     PLEASE ADVISE

## 2020-06-23 NOTE — TELEPHONE ENCOUNTER
I haven't received any recommendations and nothing is mentioned in her last office note with Dr. Patel. She will need to contact her office to determine what recommendation was made regarding her pain.

## 2020-06-24 ENCOUNTER — OFFICE VISIT (OUTPATIENT)
Dept: PSYCHIATRY | Facility: CLINIC | Age: 60
End: 2020-06-24

## 2020-06-24 DIAGNOSIS — F41.1 GENERALIZED ANXIETY DISORDER: ICD-10-CM

## 2020-06-24 DIAGNOSIS — F33.1 MODERATE EPISODE OF RECURRENT MAJOR DEPRESSIVE DISORDER (HCC): Primary | ICD-10-CM

## 2020-06-24 PROCEDURE — 90833 PSYTX W PT W E/M 30 MIN: CPT | Performed by: NURSE PRACTITIONER

## 2020-06-24 PROCEDURE — 99213 OFFICE O/P EST LOW 20 MIN: CPT | Performed by: NURSE PRACTITIONER

## 2020-06-24 RX ORDER — ALPRAZOLAM 0.5 MG/1
0.5 TABLET ORAL 3 TIMES DAILY PRN
Qty: 90 TABLET | Refills: 0 | Status: SHIPPED | OUTPATIENT
Start: 2020-06-24 | End: 2020-09-15 | Stop reason: SDUPTHER

## 2020-06-25 ENCOUNTER — LAB (OUTPATIENT)
Dept: LAB | Facility: HOSPITAL | Age: 60
End: 2020-06-25

## 2020-06-25 DIAGNOSIS — C79.31 CANCER OF LEFT BREAST METASTATIC TO BRAIN (HCC): ICD-10-CM

## 2020-06-25 DIAGNOSIS — C50.912 CANCER OF LEFT BREAST METASTATIC TO BRAIN (HCC): ICD-10-CM

## 2020-06-25 LAB
ALBUMIN SERPL-MCNC: 4.4 G/DL (ref 3.5–5.2)
ALBUMIN/GLOB SERPL: 1.8 G/DL
ALP SERPL-CCNC: 96 U/L (ref 39–117)
ALT SERPL W P-5'-P-CCNC: 43 U/L (ref 1–33)
ANION GAP SERPL CALCULATED.3IONS-SCNC: 14 MMOL/L (ref 5–15)
APTT PPP: 27.1 SECONDS (ref 24–37)
AST SERPL-CCNC: 36 U/L (ref 1–32)
BASOPHILS # BLD AUTO: 0.05 10*3/MM3 (ref 0–0.2)
BASOPHILS NFR BLD AUTO: 0.5 % (ref 0–1.5)
BILIRUB SERPL-MCNC: 0.4 MG/DL (ref 0.2–1.2)
BUN BLD-MCNC: 14 MG/DL (ref 8–23)
BUN/CREAT SERPL: 18.4 (ref 7–25)
CALCIUM SPEC-SCNC: 9.5 MG/DL (ref 8.6–10.5)
CHLORIDE SERPL-SCNC: 102 MMOL/L (ref 98–107)
CLUMPED PLATELETS: PRESENT
CO2 SERPL-SCNC: 26 MMOL/L (ref 22–29)
CREAT BLD-MCNC: 0.76 MG/DL (ref 0.57–1)
DEPRECATED RDW RBC AUTO: 56.1 FL (ref 37–54)
EOSINOPHIL # BLD AUTO: 0.07 10*3/MM3 (ref 0–0.4)
EOSINOPHIL NFR BLD AUTO: 0.7 % (ref 0.3–6.2)
ERYTHROCYTE [DISTWIDTH] IN BLOOD BY AUTOMATED COUNT: 14 % (ref 12.3–15.4)
GFR SERPL CREATININE-BSD FRML MDRD: 78 ML/MIN/1.73
GLOBULIN UR ELPH-MCNC: 2.4 GM/DL
GLUCOSE BLD-MCNC: 97 MG/DL (ref 65–99)
HCT VFR BLD AUTO: 39.3 % (ref 34–46.6)
HGB BLD-MCNC: 12.5 G/DL (ref 12–15.9)
IMM GRANULOCYTES # BLD AUTO: 0.07 10*3/MM3 (ref 0–0.05)
IMM GRANULOCYTES NFR BLD AUTO: 0.7 % (ref 0–0.5)
INR PPP: 0.87 (ref 0.85–1.16)
LYMPHOCYTES # BLD AUTO: 1.52 10*3/MM3 (ref 0.7–3.1)
LYMPHOCYTES NFR BLD AUTO: 15.4 % (ref 19.6–45.3)
MCH RBC QN AUTO: 34.3 PG (ref 26.6–33)
MCHC RBC AUTO-ENTMCNC: 31.8 G/DL (ref 31.5–35.7)
MCV RBC AUTO: 108 FL (ref 79–97)
MONOCYTES # BLD AUTO: 0.69 10*3/MM3 (ref 0.1–0.9)
MONOCYTES NFR BLD AUTO: 7 % (ref 5–12)
NEUTROPHILS # BLD AUTO: 7.46 10*3/MM3 (ref 1.7–7)
NEUTROPHILS NFR BLD AUTO: 75.7 % (ref 42.7–76)
NRBC BLD AUTO-RTO: 0 /100 WBC (ref 0–0.2)
PLATELET # BLD AUTO: 96 10*3/MM3 (ref 140–450)
PMV BLD AUTO: 10.9 FL (ref 6–12)
POTASSIUM BLD-SCNC: 4.5 MMOL/L (ref 3.5–5.2)
PROT SERPL-MCNC: 6.8 G/DL (ref 6–8.5)
PROTHROMBIN TIME: 11.6 SECONDS (ref 11.5–14)
RBC # BLD AUTO: 3.64 10*6/MM3 (ref 3.77–5.28)
RBC MORPH BLD: NORMAL
SODIUM BLD-SCNC: 142 MMOL/L (ref 136–145)
WBC MORPH BLD: NORMAL
WBC NRBC COR # BLD: 9.86 10*3/MM3 (ref 3.4–10.8)

## 2020-06-25 PROCEDURE — 80053 COMPREHEN METABOLIC PANEL: CPT

## 2020-06-25 PROCEDURE — 85730 THROMBOPLASTIN TIME PARTIAL: CPT

## 2020-06-25 PROCEDURE — 85610 PROTHROMBIN TIME: CPT

## 2020-06-25 PROCEDURE — 36415 COLL VENOUS BLD VENIPUNCTURE: CPT

## 2020-06-25 PROCEDURE — 85007 BL SMEAR W/DIFF WBC COUNT: CPT

## 2020-06-25 PROCEDURE — 85025 COMPLETE CBC W/AUTO DIFF WBC: CPT

## 2020-06-29 DIAGNOSIS — S69.92XA INJURY OF LEFT WRIST, INITIAL ENCOUNTER: ICD-10-CM

## 2020-06-29 DIAGNOSIS — G89.3 CANCER ASSOCIATED PAIN: ICD-10-CM

## 2020-06-29 DIAGNOSIS — M25.532 LEFT WRIST PAIN: ICD-10-CM

## 2020-06-29 NOTE — TELEPHONE ENCOUNTER
Patient called and stated that refills are needed for the following prescription(s):    oxyCODONE-acetaminophen (Percocet) 7.5-325 MG per tablet    Pharmacy: [facility] [place]-CONFIRMED  PH: [phone]  FX: [phone]    Patient callback: [phone]    Please advise.

## 2020-06-30 RX ORDER — OXYCODONE AND ACETAMINOPHEN 7.5; 325 MG/1; MG/1
1 TABLET ORAL EVERY 6 HOURS PRN
Qty: 60 TABLET | Refills: 0 | Status: SHIPPED | OUTPATIENT
Start: 2020-06-30 | End: 2020-07-21 | Stop reason: SDUPTHER

## 2020-07-01 ENCOUNTER — TELEPHONE (OUTPATIENT)
Dept: FAMILY MEDICINE CLINIC | Facility: CLINIC | Age: 60
End: 2020-07-01

## 2020-07-01 NOTE — TELEPHONE ENCOUNTER
PATIENT IS CALLING TO CHECK ON THE REFILL REQUEST OF THE    oxyCODONE-acetaminophen (Percocet) 7.5-325 MG per tablet    SHE KNOWS IT'S A WEEK EARLY BUT THEY ARE WEANING HER OFF THE STEROIDS, AND SHE IS NOT SLEEPING WELL SO THAT'S WHY THIS ONE IS EARLY    PATIENT PH: [phone]    SHE IS OUT OF THE MEDICATION

## 2020-07-07 ENCOUNTER — APPOINTMENT (OUTPATIENT)
Dept: MRI IMAGING | Facility: HOSPITAL | Age: 60
End: 2020-07-07

## 2020-07-10 ENCOUNTER — TELEPHONE (OUTPATIENT)
Dept: ONCOLOGY | Facility: CLINIC | Age: 60
End: 2020-07-10

## 2020-07-10 NOTE — TELEPHONE ENCOUNTER
Patient needs reschedule for MRI, she was unable to go to her appointment on 7/7/20  Please call [phone]

## 2020-07-14 ENCOUNTER — OFFICE VISIT (OUTPATIENT)
Dept: PSYCHIATRY | Facility: CLINIC | Age: 60
End: 2020-07-14

## 2020-07-14 DIAGNOSIS — F33.1 MODERATE EPISODE OF RECURRENT MAJOR DEPRESSIVE DISORDER (HCC): Primary | ICD-10-CM

## 2020-07-14 DIAGNOSIS — F41.1 GENERALIZED ANXIETY DISORDER: ICD-10-CM

## 2020-07-14 PROCEDURE — 99213 OFFICE O/P EST LOW 20 MIN: CPT | Performed by: NURSE PRACTITIONER

## 2020-07-14 RX ORDER — VENLAFAXINE HYDROCHLORIDE 150 MG/1
150 CAPSULE, EXTENDED RELEASE ORAL DAILY
Qty: 30 CAPSULE | Refills: 5 | Status: SHIPPED | OUTPATIENT
Start: 2020-07-14 | End: 2020-12-07 | Stop reason: SDUPTHER

## 2020-07-14 NOTE — PROGRESS NOTES
"  Subjective   Brittani Lambert is a 60 y.o. female who is here today for medication management follow up. You have chosen to receive care through a telephone visit. Do you consent to use a telephone visit for your medical care today? Yes    TIME IN 9:55AM  TIME OUT 10:10AM    Chief Complaint: MDD, THALIA, sleep disturbance    History of Present Illness Patient reports sleeping well, appetite \"good enough\". States she bathes daily, keeps her home clean, has her plants. Talks with her mother and sees her sister weekly. Went there for July 4th. Niece in town and going horseback riding today. Enjoys her niece and great nieces. No new physical complaints other than chronic low energy .  Acknowledged and normalized patient's thoughts, feelings, and concerns. Denies adverse effects from medications. Taking alprazolam at bedtime only for sleep, denies panic or severe anxiety so doesn't take during day. Taking Abilify and venlafaxine as prescribed.         The following portions of the patient's history were reviewed and updated as appropriate: allergies, current medications, past family history, past medical history, past social history, past surgical history and problem list.    Review of Systems  A 14 point review of systems was performed and is negative except as noted above.    Objective   Physical Exam  not currently breastfeeding.    No Known Allergies    Current Medications:   Current Outpatient Medications   Medication Sig Dispense Refill   • albuterol (PROAIR RESPICLICK) 108 (90 Base) MCG/ACT inhaler Inhale 2 puffs Every 6 (Six) Hours As Needed for Wheezing or Shortness of Air. 1 inhaler 11   • ALPRAZolam (XANAX) 0.5 MG tablet Take 1 tablet by mouth 3 (Three) Times a Day As Needed for Anxiety or Sleep. 90 tablet 0   • ARIPiprazole (ABILIFY) 5 MG tablet Take 1 tablet by mouth Daily. 30 tablet 2   • dexamethasone (DECADRON) 1 MG tablet Take 0.5 tablets by mouth Daily With Breakfast. 120 tablet 3   • lidocaine-prilocaine " "(EMLA) 2.5-2.5 % cream PLEASE SEE ATTACHED FOR DETAILED DIRECTIONS 30 g 3   • ondansetron (ZOFRAN) 8 MG tablet Take 1 tablet by mouth 3 (Three) Times a Day As Needed for Nausea or Vomiting. 30 tablet 0   • oxyCODONE-acetaminophen (Percocet) 7.5-325 MG per tablet Take 1 tablet by mouth Every 6 (Six) Hours As Needed for Moderate Pain . 60 tablet 0   • pravastatin (PRAVACHOL) 20 MG tablet TAKE 1 TABLET BY MOUTH EVERY DAY AT NIGHT 30 tablet 5   • venlafaxine XR (EFFEXOR-XR) 150 MG 24 hr capsule Take 1 capsule by mouth Daily. 30 capsule 5     No current facility-administered medications for this visit.        Appearance:   Hygiene: reports  good  Cooperation:  Cooperative  Eye Contact:    Psychomotor Behavior:  Denies psychomotor agitation/retardation, No EPS, No motor tics  Mood:  within normal limits, low energy has to \"force herself to do things\"  Affect:   Hopelessness: Denies  Speech:  Normal  Thought Process:  Linear  Thought Content:  Normal  Concentration: Normal   Suicidal:  None  Homicidal:  None  Hallucinations:  None  Delusion:  None  Memory:  Intact  Orientation:  Person, Place, Time and Situation  Reliability:  good  Insight:  Fair  Judgement: good  Impulse Control: good  Estimated Intelligence: average range        Assessment/Plan   Diagnoses and all orders for this visit:    Moderate episode of recurrent major depressive disorder (CMS/HCC)    Generalized anxiety disorder    Other orders  -     venlafaxine XR (EFFEXOR-XR) 150 MG 24 hr capsule; Take 1 capsule by mouth Daily.      IMPRESSION:  Anxiety well managed, sleeping, energy low and effects motivation    PLAN:   Cont current med management  She has MRI of brain on July 29th,  Will f/u  On Monday following MRI   Refill venlafaxine XR 150mg daily for depression and anxiety  Cont alprazolam 0.5mg at hs if unable to sleep, she rarely needs one for anxiety now has enough, no refill required  Cont abilify 5mg daily,  Has refills  Cont  Self care, engagement " with family who are respecting covid precautions    We discussed risks, benefits, and side effects of the above medications and the patient was agreeable with the plan. Patient was educated on the importance of compliance with treatment and follow-up appointments.   Counseled patient regarding multimodal approach with encouragement of healthy nutrition, healthy sleep, regular physical mobility, social involvement, counseling, and medication compliance.     Assisted patient in identifying risk factors which would indicate the need for higher level of care including thoughts to harm self or others and/or self-harming behavior and encouraged patient to contact this office, call 911, or present to the nearest emergency room should any of these events occur. Discussed crisis intervention services and means to access.  Patient adamantly and convincingly denies current suicidal or homicidal ideation or perceptual disturbance.    Treatment Plan: stabilize mood, patient will stay out of psychiatric hospital and be at optimal level of functioning with therapy and take all medication as prescribed. Patient verbalized  understanding and agreement to plan.    Instructed to call for questions or concerns and return early if necessary.     Return in about 20 days (around 8/3/2020).

## 2020-07-15 ENCOUNTER — APPOINTMENT (OUTPATIENT)
Dept: PREADMISSION TESTING | Facility: HOSPITAL | Age: 60
End: 2020-07-15

## 2020-07-15 PROCEDURE — U0004 COV-19 TEST NON-CDC HGH THRU: HCPCS

## 2020-07-15 PROCEDURE — C9803 HOPD COVID-19 SPEC COLLECT: HCPCS

## 2020-07-15 PROCEDURE — U0002 COVID-19 LAB TEST NON-CDC: HCPCS

## 2020-07-15 NOTE — TELEPHONE ENCOUNTER
PATIENT IS CALLING REQUESTING A REFILL ON HER PERCOCET. PLEASE ADVISE    CONFIRMED PHARMACY   admission

## 2020-07-16 LAB
REF LAB TEST METHOD: NORMAL
SARS-COV-2 RNA RESP QL NAA+PROBE: NOT DETECTED

## 2020-07-21 DIAGNOSIS — M25.532 LEFT WRIST PAIN: ICD-10-CM

## 2020-07-21 DIAGNOSIS — S69.92XA INJURY OF LEFT WRIST, INITIAL ENCOUNTER: ICD-10-CM

## 2020-07-21 DIAGNOSIS — G89.3 CANCER ASSOCIATED PAIN: ICD-10-CM

## 2020-07-21 RX ORDER — OXYCODONE AND ACETAMINOPHEN 7.5; 325 MG/1; MG/1
1 TABLET ORAL EVERY 6 HOURS PRN
Qty: 60 TABLET | Refills: 0 | Status: SHIPPED | OUTPATIENT
Start: 2020-07-21 | End: 2020-08-12 | Stop reason: SDUPTHER

## 2020-07-21 NOTE — TELEPHONE ENCOUNTER
Caller: JosiyuniorKierstena    Relationship: Self    Best call back number: [phone]    Medication needed:   Requested Prescriptions     Pending Prescriptions Disp Refills   • oxyCODONE-acetaminophen (Percocet) 7.5-325 MG per tablet 60 tablet 0     Sig: Take 1 tablet by mouth Every 6 (Six) Hours As Needed for Moderate Pain .       When do you need the refill by: 07-21-20    What details did the patient provide when requesting the medication: Patient stated that she had outpatient surgery on 7/17/20.  The port was taken out.    Does the patient have less than a 3 day supply:  [x] Yes  [] No    What is the patient's preferred pharmacy: [id number] - [place], [place] - [address] AT [facility] [address] - [phone]  - [phone]

## 2020-07-29 ENCOUNTER — HOSPITAL ENCOUNTER (OUTPATIENT)
Dept: MRI IMAGING | Facility: HOSPITAL | Age: 60
Discharge: HOME | End: 2020-07-29
Admitting: INTERNAL MEDICINE

## 2020-07-29 ENCOUNTER — TELEPHONE (OUTPATIENT)
Dept: ONCOLOGY | Facility: CLINIC | Age: 60
End: 2020-07-29

## 2020-07-29 DIAGNOSIS — C79.31 CANCER OF LEFT BREAST METASTATIC TO BRAIN (HCC): ICD-10-CM

## 2020-07-29 DIAGNOSIS — C50.912 CANCER OF LEFT BREAST METASTATIC TO BRAIN (HCC): ICD-10-CM

## 2020-07-29 PROCEDURE — 25010000002 GADOTERATE MEGLUMINE 5 MMOL/10ML SOLUTION: Performed by: INTERNAL MEDICINE

## 2020-07-29 PROCEDURE — A9575 INJ GADOTERATE MEGLUMI 0.1ML: HCPCS | Performed by: INTERNAL MEDICINE

## 2020-07-29 PROCEDURE — 70553 MRI BRAIN STEM W/O & W/DYE: CPT

## 2020-07-29 RX ORDER — GADOTERATE MEGLUMINE 376.9 MG/ML
10 INJECTION INTRAVENOUS
Status: COMPLETED | OUTPATIENT
Start: 2020-07-29 | End: 2020-07-29

## 2020-07-29 RX ADMIN — GADOTERATE MEGLUMINE 10 ML: 376.9 INJECTION, SOLUTION INTRAVENOUS at 10:50

## 2020-07-29 NOTE — TELEPHONE ENCOUNTER
PT called for her labs done 2 weeks ago, I am not seeing them In the chart. Labs may be with her primary care Please call pt with lab results @[phone]

## 2020-07-30 ENCOUNTER — TELEPHONE (OUTPATIENT)
Dept: ONCOLOGY | Facility: CLINIC | Age: 60
End: 2020-07-30

## 2020-07-30 NOTE — TELEPHONE ENCOUNTER
I called patient back to tell her the mri has not been read by radiologist yet.  She said she had labs a couple weeks go but I did not see them in [facility].  She said Dr Colon in [facility] did them.  I called Dr Colon and had them sent over.  I told patient I would call after Dr Patel reviewed labs and MRI. Patient verbalized understanding.

## 2020-07-31 ENCOUNTER — TELEPHONE (OUTPATIENT)
Dept: ONCOLOGY | Facility: CLINIC | Age: 60
End: 2020-07-31

## 2020-07-31 NOTE — TELEPHONE ENCOUNTER
Patient MRI reviewed by LONG Fraga and exam is stable without new areas of disease.   Outside labs are reviewed and only abnormality is plt at 96 but there is clumping.  I gave patient the information and she verbalized understanding.

## 2020-07-31 NOTE — TELEPHONE ENCOUNTER
PT said she already has the results of the labs and MRI and both were fine.  [id number]  ~Shell

## 2020-08-03 ENCOUNTER — OFFICE VISIT (OUTPATIENT)
Dept: PSYCHIATRY | Facility: CLINIC | Age: 60
End: 2020-08-03

## 2020-08-03 DIAGNOSIS — F41.1 GENERALIZED ANXIETY DISORDER: ICD-10-CM

## 2020-08-03 DIAGNOSIS — F33.1 MODERATE EPISODE OF RECURRENT MAJOR DEPRESSIVE DISORDER (HCC): Primary | ICD-10-CM

## 2020-08-03 DIAGNOSIS — G47.9 SLEEP DISTURBANCE: ICD-10-CM

## 2020-08-03 DIAGNOSIS — G89.29 OTHER CHRONIC PAIN: ICD-10-CM

## 2020-08-03 PROCEDURE — 90833 PSYTX W PT W E/M 30 MIN: CPT | Performed by: NURSE PRACTITIONER

## 2020-08-03 PROCEDURE — 99212 OFFICE O/P EST SF 10 MIN: CPT | Performed by: NURSE PRACTITIONER

## 2020-08-03 RX ORDER — MIRTAZAPINE 15 MG/1
TABLET, FILM COATED ORAL
Qty: 60 TABLET | Refills: 1 | Status: SHIPPED | OUTPATIENT
Start: 2020-08-03 | End: 2020-09-15 | Stop reason: SDUPTHER

## 2020-08-03 NOTE — PROGRESS NOTES
Subjective   Brittani Lambert is a 60 y.o. female who is here today for medication management follow up. and therapy. You have chosen to receive care through a telephone visit. Do you consent to use a telephone visit for your medical care today? Yes  Time in 10:05am  Time out 10:45am     Chief Complaint: MDD, poor sleep, poor appetite, anxiety /stress    History of Present Illness Patient presents via telephone. She reports the isolating with COVID precautions has been hard on her because she is alone too much. She talks with her sister most days and sister comes over once or twice a week but still a lot of time alone. She states she hasn't been sleeping well for 3 weeks, she will lay in bed and then toss and turn most of the night feeling tired the entire day. She has chronic pain in back and on two percocet daily. She states she will get relief from pain in back for a couple hours and tries to get things she wants done completed but then counts the hours until she can have another one to get relief. She denies other pain. She saw her mother and that was nice this past weekend. Patient taking medications as prescribed. Rates depression about a 5-6. The patient reports depressive symptoms including depressed mood, crying spells, insomnia, decreased appetite, anhedonia, feelings of worthlessness, low energy, difficulty concentrating and psychomotor retardation, increased symptom intensity present on most days for the past 3 week(s)  and have caused impairment in important areas of functioning. Depression rated 7/10 with 10 being the worst.  .  Denies adverse effects from medications.   (Scales based on 0 - 10 with 10 being the worst)    Therapy:  Start Time: 10:05a   Stop Time: 10:35am    (30 ) minutes was spent for psychotherapy. Assisted patient in processing patient's (Diagnosis). Acknowledged and normalized patient's thoughts, feelings, and concerns. Utilized cognitive behavioral therapy to assist the patient in  recognizing more appropriate coping mechanisms when (she/he) becomes agitated/anxious which are proven effective in reducing the severity of frequency of symptoms.     CLINICAL MANUEVERING/INTERVENTION:   Patient talked about current stressors, primarily having a difficult time being alone so much and chronic pain, not sleeping. Venting of frustrations was conducted. Feelings were processed and validated, both negative and positive. Flushing out worries and concerns was conducted regarding health, and COVID in order to diminish emotional tension. . Ways in which patient may take stress off herself in a purposeful manner was discussed but she reports back pain diminishes her physical abilites. Patient was assisted in 'talking out' what she may do not to be so isolated, keeping in mind the notion that there is typically a solution to any given problem. The patient expressed gratitude for today's session and said that counseling helps her feel better.      The following portions of the patient's history were reviewed and updated as appropriate: allergies, current medications, past family history, past medical history, past social history, past surgical history and problem list.    Review of Systems  A 14 point review of systems was performed and is negative except as noted above.    Objective   Physical Exam  not currently breastfeeding.    No Known Allergies    Current Medications:   Current Outpatient Medications   Medication Sig Dispense Refill   • albuterol (PROAIR RESPICLICK) 108 (90 Base) MCG/ACT inhaler Inhale 2 puffs Every 6 (Six) Hours As Needed for Wheezing or Shortness of Air. 1 inhaler 11   • ALPRAZolam (XANAX) 0.5 MG tablet Take 1 tablet by mouth 3 (Three) Times a Day As Needed for Anxiety or Sleep. 90 tablet 0   • ARIPiprazole (ABILIFY) 5 MG tablet Take 1 tablet by mouth Daily. 30 tablet 2   • dexamethasone (DECADRON) 1 MG tablet Take 0.5 tablets by mouth Daily With Breakfast. 120 tablet 3   •  lidocaine-prilocaine (EMLA) 2.5-2.5 % cream PLEASE SEE ATTACHED FOR DETAILED DIRECTIONS 30 g 3   • mirtazapine (REMERON) 15 MG tablet Take 1 - 2 tablets at bedtime for sleep nightly 60 tablet 1   • ondansetron (ZOFRAN) 8 MG tablet Take 1 tablet by mouth 3 (Three) Times a Day As Needed for Nausea or Vomiting. 30 tablet 0   • oxyCODONE-acetaminophen (Percocet) 7.5-325 MG per tablet Take 1 tablet by mouth Every 6 (Six) Hours As Needed for Moderate Pain . 60 tablet 0   • pravastatin (PRAVACHOL) 20 MG tablet TAKE 1 TABLET BY MOUTH EVERY DAY AT NIGHT 30 tablet 5   • venlafaxine XR (EFFEXOR-XR) 150 MG 24 hr capsule Take 1 capsule by mouth Daily. 30 capsule 5     No current facility-administered medications for this visit.        Lab Results:  Appointment on 07/15/2020   Component Date Value Ref Range Status   • Reference Lab Report 07/15/2020    Final    See scanned report     • COVID19 07/15/2020 Not Detected  Not Detected - Ref. Range Final   Lab on 06/25/2020   Component Date Value Ref Range Status   • Glucose 06/25/2020 97  65 - 99 mg/dL Final   • BUN 06/25/2020 14  8 - 23 mg/dL Final   • Creatinine 06/25/2020 0.76  0.57 - 1.00 mg/dL Final   • Sodium 06/25/2020 142  136 - 145 mmol/L Final   • Potassium 06/25/2020 4.5  3.5 - 5.2 mmol/L Final   • Chloride 06/25/2020 102  98 - 107 mmol/L Final   • CO2 06/25/2020 26.0  22.0 - 29.0 mmol/L Final   • Calcium 06/25/2020 9.5  8.6 - 10.5 mg/dL Final   • Total Protein 06/25/2020 6.8  6.0 - 8.5 g/dL Final   • Albumin 06/25/2020 4.40  3.50 - 5.20 g/dL Final   • ALT (SGPT) 06/25/2020 43* 1 - 33 U/L Final   • AST (SGOT) 06/25/2020 36* 1 - 32 U/L Final   • Alkaline Phosphatase 06/25/2020 96  39 - 117 U/L Final   • Total Bilirubin 06/25/2020 0.4  0.2 - 1.2 mg/dL Final   • eGFR Non African Amer 06/25/2020 78  >60 mL/min/1.73 Final   • Globulin 06/25/2020 2.4  gm/dL Final   • A/G Ratio 06/25/2020 1.8  g/dL Final   • BUN/Creatinine Ratio 06/25/2020 18.4  7.0 - 25.0 Final   • Anion Gap  06/25/2020 14.0  5.0 - 15.0 mmol/L Final   • Protime 06/25/2020 11.6  11.5 - 14.0 Seconds Final   • INR 06/25/2020 0.87  0.85 - 1.16 Final   • PTT 06/25/2020 27.1  24.0 - 37.0 seconds Final   • WBC 06/25/2020 9.86  3.40 - 10.80 10*3/mm3 Final   • RBC 06/25/2020 3.64* 3.77 - 5.28 10*6/mm3 Final   • Hemoglobin 06/25/2020 12.5  12.0 - 15.9 g/dL Final   • Hematocrit 06/25/2020 39.3  34.0 - 46.6 % Final   • MCV 06/25/2020 108.0* 79.0 - 97.0 fL Final   • MCH 06/25/2020 34.3* 26.6 - 33.0 pg Final   • MCHC 06/25/2020 31.8  31.5 - 35.7 g/dL Final   • RDW 06/25/2020 14.0  12.3 - 15.4 % Final   • RDW-SD 06/25/2020 56.1* 37.0 - 54.0 fl Final   • MPV 06/25/2020 10.9  6.0 - 12.0 fL Final   • Platelets 06/25/2020 96* 140 - 450 10*3/mm3 Final   • Neutrophil % 06/25/2020 75.7  42.7 - 76.0 % Final   • Lymphocyte % 06/25/2020 15.4* 19.6 - 45.3 % Final   • Monocyte % 06/25/2020 7.0  5.0 - 12.0 % Final   • Eosinophil % 06/25/2020 0.7  0.3 - 6.2 % Final   • Basophil % 06/25/2020 0.5  0.0 - 1.5 % Final   • Immature Grans % 06/25/2020 0.7* 0.0 - 0.5 % Final   • Neutrophils, Absolute 06/25/2020 7.46* 1.70 - 7.00 10*3/mm3 Final   • Lymphocytes, Absolute 06/25/2020 1.52  0.70 - 3.10 10*3/mm3 Final   • Monocytes, Absolute 06/25/2020 0.69  0.10 - 0.90 10*3/mm3 Final   • Eosinophils, Absolute 06/25/2020 0.07  0.00 - 0.40 10*3/mm3 Final   • Basophils, Absolute 06/25/2020 0.05  0.00 - 0.20 10*3/mm3 Final   • Immature Grans, Absolute 06/25/2020 0.07* 0.00 - 0.05 10*3/mm3 Final   • nRBC 06/25/2020 0.0  0.0 - 0.2 /100 WBC Final   • RBC Morphology 06/25/2020 Normal  Normal Final   • WBC Morphology 06/25/2020 Normal  Normal Final   • Clumped Platelets 06/25/2020 Present  None Seen Final   Hospital Outpatient Visit on 05/08/2020   Component Date Value Ref Range Status   • Creatinine 05/08/2020 0.70  0.60 - 1.30 mg/dL Final    Serial Number: [id number]:  [id number]       Appearance:   Hygiene: reports  good  Cooperation:  Cooperative  Eye Contact:     Psychomotor Behavior: reports moving slower,  psychomotor retardation, No EPS, No motor tics  Mood:  Depressed tired, chronic pain   Affect:    Hopelessness: some  Speech:  Normal  Thought Process:  Linear  Thought Content:  Normal  Concentration: Normal   Suicidal:  None  Homicidal:  None  Hallucinations:  None  Delusion:  None  Memory:  Intact  Orientation:  Person, Place, Time and Situation  Reliability:  good  Insight:  Fair  Judgement: good  Impulse Control: good  Estimated Intelligence: average range        Assessment/Plan   Diagnoses and all orders for this visit:    Moderate episode of recurrent major depressive disorder (CMS/HCC)    Sleep disturbance    Other chronic pain  -     Ambulatory Referral to Palliative Care    Generalized anxiety disorder    Other orders  -     mirtazapine (REMERON) 15 MG tablet; Take 1 - 2 tablets at bedtime for sleep nightly      IMPRESSION: not sleeping well, has chronic pain, more depressive symptoms      PLAN:   ADD mirtazapine 15mg po 1-2 at hs as needed for sleep, may benefit appetite and mood   Discussed palliative care for chronic pain     We discussed risks, benefits, and side effects of the above medications and the patient was agreeable with the plan. Patient was educated on the importance of compliance with treatment and follow-up appointments.     Provide Cognitive Behavioral Therapy and Solution Focused Therapy to improve functioning, maintain stability, and avoid decompensation and the need for higher level of care.       Assisted patient in identifying risk factors which would indicate the need for higher level of care including thoughts to harm self or others and/or self-harming behavior and encouraged patient to contact this office, call 911, or present to the nearest emergency room should any of these events occur. Discussed crisis intervention services and means to access.  Patient adamantly and convincingly denies current suicidal or homicidal ideation or  perceptual disturbance.    Treatment Plan: stabilize mood, patient will stay out of psychiatric hospital and be at optimal level of functioning with therapy and take all medication as prescribed. Patient verbalized  understanding and agreement to plan.    Instructed to call for questions or concerns and return early if necessary.     Return in about 2 weeks (around 8/17/2020).

## 2020-08-12 DIAGNOSIS — M25.532 LEFT WRIST PAIN: ICD-10-CM

## 2020-08-12 DIAGNOSIS — G89.3 CANCER ASSOCIATED PAIN: ICD-10-CM

## 2020-08-12 DIAGNOSIS — S69.92XA INJURY OF LEFT WRIST, INITIAL ENCOUNTER: ICD-10-CM

## 2020-08-12 NOTE — TELEPHONE ENCOUNTER
Refill request:  oxyCODONE-acetaminophen (Percocet) 7.5-325 MG per tablet 60 tablet       Sig - Route: Take 1 tablet by mouth Every 6 (Six) Hours As Needed for Moderate Pain . - Oral                Associated Diagnoses     Cancer associated pain       Injury of left wrist, initial encounter       Left wrist pain       Pharmacy     [id number] - [place], [place] - [address] [place] [address] AT [facility] [address] - [phone]  - [phone] FX

## 2020-08-13 RX ORDER — OXYCODONE AND ACETAMINOPHEN 7.5; 325 MG/1; MG/1
1 TABLET ORAL EVERY 6 HOURS PRN
Qty: 60 TABLET | Refills: 0 | Status: SHIPPED | OUTPATIENT
Start: 2020-08-13 | End: 2020-09-08 | Stop reason: SDUPTHER

## 2020-08-17 ENCOUNTER — OFFICE VISIT (OUTPATIENT)
Dept: PSYCHIATRY | Facility: CLINIC | Age: 60
End: 2020-08-17

## 2020-08-17 ENCOUNTER — OFFICE VISIT (OUTPATIENT)
Dept: FAMILY MEDICINE CLINIC | Facility: CLINIC | Age: 60
End: 2020-08-17

## 2020-08-17 VITALS
TEMPERATURE: 97.7 F | RESPIRATION RATE: 16 BRPM | DIASTOLIC BLOOD PRESSURE: 76 MMHG | HEIGHT: 63 IN | HEART RATE: 96 BPM | SYSTOLIC BLOOD PRESSURE: 108 MMHG | OXYGEN SATURATION: 98 % | WEIGHT: 119 LBS | BODY MASS INDEX: 21.09 KG/M2

## 2020-08-17 DIAGNOSIS — G47.9 SLEEP DISTURBANCE: ICD-10-CM

## 2020-08-17 DIAGNOSIS — F41.1 GENERALIZED ANXIETY DISORDER: ICD-10-CM

## 2020-08-17 DIAGNOSIS — F33.1 MODERATE EPISODE OF RECURRENT MAJOR DEPRESSIVE DISORDER (HCC): Primary | ICD-10-CM

## 2020-08-17 DIAGNOSIS — G89.3 CANCER ASSOCIATED PAIN: ICD-10-CM

## 2020-08-17 DIAGNOSIS — R60.0 PEDAL EDEMA: Primary | ICD-10-CM

## 2020-08-17 PROCEDURE — 99213 OFFICE O/P EST LOW 20 MIN: CPT | Performed by: FAMILY MEDICINE

## 2020-08-17 PROCEDURE — 99213 OFFICE O/P EST LOW 20 MIN: CPT | Performed by: NURSE PRACTITIONER

## 2020-08-17 NOTE — PATIENT INSTRUCTIONS
Go to the nearest ER or return to clinic if symptoms worsen, fever/chill develop    Edema    Edema is when you have too much fluid in your body or under your skin. Edema may make your legs, feet, and ankles swell up. Swelling is also common in looser tissues, like around your eyes. This is a common condition. It gets more common as you get older. There are many possible causes of edema. Eating too much salt (sodium) and being on your feet or sitting for a long time can cause edema in your legs, feet, and ankles. Hot weather may make edema worse.  Edema is usually painless. Your skin may look swollen or shiny.  Follow these instructions at home:  · Keep the swollen body part raised (elevated) above the level of your heart when you are sitting or lying down.  · Do not sit still or stand for a long time.  · Do not wear tight clothes. Do not wear garters on your upper legs.  · Exercise your legs. This can help the swelling go down.  · Wear elastic bandages or support stockings as told by your doctor.  · Eat a low-salt (low-sodium) diet to reduce fluid as told by your doctor.  · Depending on the cause of your swelling, you may need to limit how much fluid you drink (fluid restriction).  · Take over-the-counter and prescription medicines only as told by your doctor.  Contact a doctor if:  · Treatment is not working.  · You have heart, liver, or kidney disease and have symptoms of edema.  · You have sudden and unexplained weight gain.  Get help right away if:  · You have shortness of breath or chest pain.  · You cannot breathe when you lie down.  · You have pain, redness, or warmth in the swollen areas.  · You have heart, liver, or kidney disease and get edema all of a sudden.  · You have a fever and your symptoms get worse all of a sudden.  Summary  · Edema is when you have too much fluid in your body or under your skin.  · Edema may make your legs, feet, and ankles swell up. Swelling is also common in looser tissues,  like around your eyes.  · Raise (elevate) the swollen body part above the level of your heart when you are sitting or lying down.  · Follow your doctor's instructions about diet and how much fluid you can drink (fluid restriction).  This information is not intended to replace advice given to you by your health care provider. Make sure you discuss any questions you have with your health care provider.  Document Released: 06/05/2009 Document Revised: 12/21/2018 Document Reviewed: 01/05/2018  Elsevier Patient Education © 2020 [employer]

## 2020-08-17 NOTE — PROGRESS NOTES
"  Subjective   Brittani Lambert is a 60 y.o. female who is here today for medication management follow up. You have chosen to receive care through a telephone visit. Do you consent to use a telephone visit for your medical care today? Yes  Time in 11:00  Time out: 11:15 am     Chief Complaint: THALIA, MDD, ADHD     History of Present Illness Patient presents via telephone. She reports she is sleeping so well on mirtazapine 15 mg PO QHS and getting about 7-8 hours. States she isn't crying \"at a drop of the hat\" and  Has improvement in mood, motivation and interest. She has made plans with her sister and mother and finding more things she is able to do. Appetite has improved. Denies panic, denies SI.  Denies adverse effects from medications.  Rates anxiety about a 4 and depression about the same, down from 8.    The following portions of the patient's history were reviewed and updated as appropriate: allergies, current medications, past family history, past medical history, past social history, past surgical history and problem list.    Review of Systems  A 14 point review of systems was performed and is negative except as noted above.    Objective   Physical Exam  not currently breastfeeding.    No Known Allergies    Current Medications:   Current Outpatient Medications   Medication Sig Dispense Refill   • albuterol (PROAIR RESPICLICK) 108 (90 Base) MCG/ACT inhaler Inhale 2 puffs Every 6 (Six) Hours As Needed for Wheezing or Shortness of Air. 1 inhaler 11   • ALPRAZolam (XANAX) 0.5 MG tablet Take 1 tablet by mouth 3 (Three) Times a Day As Needed for Anxiety or Sleep. 90 tablet 0   • ARIPiprazole (ABILIFY) 5 MG tablet Take 1 tablet by mouth Daily. 30 tablet 2   • dexamethasone (DECADRON) 1 MG tablet Take 0.5 tablets by mouth Daily With Breakfast. 120 tablet 3   • lidocaine-prilocaine (EMLA) 2.5-2.5 % cream PLEASE SEE ATTACHED FOR DETAILED DIRECTIONS 30 g 3   • mirtazapine (REMERON) 15 MG tablet Take 1 - 2 tablets at " bedtime for sleep nightly 60 tablet 1   • ondansetron (ZOFRAN) 8 MG tablet Take 1 tablet by mouth 3 (Three) Times a Day As Needed for Nausea or Vomiting. 30 tablet 0   • oxyCODONE-acetaminophen (Percocet) 7.5-325 MG per tablet Take 1 tablet by mouth Every 6 (Six) Hours As Needed for Moderate Pain . 60 tablet 0   • pravastatin (PRAVACHOL) 20 MG tablet TAKE 1 TABLET BY MOUTH EVERY DAY AT NIGHT 30 tablet 5   • venlafaxine XR (EFFEXOR-XR) 150 MG 24 hr capsule Take 1 capsule by mouth Daily. 30 capsule 5     No current facility-administered medications for this visit.      Appearance: appropriate  Hygiene:   good  Cooperation:  Cooperative  Eye Contact:  Good  Psychomotor Behavior:  No psychomotor agitation/retardation, No EPS, No motor tics  Mood:  within normal limits  Affect:  Appropriate  Hopelessness: Denies  Speech:  Normal  Thought Process:  Linear  Thought Content:  Normal  Concentration: Normal   Suicidal:  None  Homicidal:  None  Hallucinations:  None  Delusion:  None  Memory:  Intact  Orientation:  Person, Place, Time and Situation  Reliability:  good  Insight:  Fair  Judgement: good  Impulse Control: good  Estimated Intelligence: average ra    Assessment/Plan   Diagnoses and all orders for this visit:    Moderate episode of recurrent major depressive disorder (CMS/HCC)    Generalized anxiety disorder    Sleep disturbance          IMPRESSION: greatly improved sleep, mood and appetite on mirtazapine 15mg    PLAN:   Cont all current med management  Mirtazapine 15mg at hs for sleep , mood, appetite   Abilify 5mg po one QD  Depression   Venlafaxine XR 150mg po one QAM depression     We discussed risks, benefits, and side effects of the above medications and the patient was agreeable with the plan. Patient was educated on the importance of compliance with treatment and follow-up appointments.         Counseled patient regarding multimodal approach with encouragement of healthy nutrition, healthy sleep, regular  physical mobility, social involvement, counseling, and medication compliance.     Assisted patient in identifying risk factors which would indicate the need for higher level of care including thoughts to harm self or others and/or self-harming behavior and encouraged patient to contact this office, call 911, or present to the nearest emergency room should any of these events occur. Discussed crisis intervention services and means to access.  Patient adamantly and convincingly denies current suicidal or homicidal ideation or perceptual disturbance.    Treatment Plan: stabilize mood, patient will stay out of psychiatric hospital and be at optimal level of functioning with therapy and take all medication as prescribed. Patient verbalized  understanding and agreement to plan.    Instructed to call for questions or concerns and return early if necessary.     Return in about 4 weeks (around 9/14/2020).

## 2020-08-17 NOTE — PROGRESS NOTES
Subjective   Brittani Lambert is a 60 y.o. female.   Chief Complaint   Patient presents with   • 3mo f/u Cancer     History of Present Illness   She has bilateral feet and ankle edema x 2 weeks. Improves if she elevates her legs and overnight.   Denies shortness of breath and orthopnea.   Wears compression stocking, which does help edema.   She does have a high salt diet.     Pain management  Chronic, related to cancer.   She is treated with Percocet 7.5 mg QID. Some days, only requires BID dosage, but usually take QID.  Overall, doing well with current treatment.     Still seeing psychiatry for management of anxiety and depression. Doing well with current medication regimen.    The following portions of the patient's history were reviewed and updated as appropriate: allergies, current medications, past family history, past medical history, past social history, past surgical history and problem list.    Review of Systems   Constitutional: Negative for fever, unexpected weight gain and unexpected weight loss.   HENT: Negative.    Eyes: Negative.    Respiratory: Negative for cough, chest tightness, shortness of breath and wheezing.    Cardiovascular: Positive for leg swelling (feet and ankles). Negative for chest pain and palpitations.   Endocrine: Negative for cold intolerance and heat intolerance.   Musculoskeletal: Positive for arthralgias.   Neurological: Negative for light-headedness and headache.   Hematological: Negative for adenopathy. Bruises/bleeds easily.   Psychiatric/Behavioral: Negative.        Objective   Physical Exam   Constitutional: She is oriented to person, place, and time. She appears well-developed and well-nourished.   HENT:   Head: Normocephalic and atraumatic.   Right Ear: External ear normal.   Left Ear: External ear normal.   Nose: Nose normal.   Eyes: Conjunctivae are normal.   Neck: Neck supple.   Cardiovascular: Normal rate, regular rhythm and normal heart sounds.   No murmur  heard.  Pulmonary/Chest: Effort normal and breath sounds normal.   Musculoskeletal: She exhibits edema (+1 pitting edema b/l ankles). She exhibits no deformity.   Neurological: She is alert and oriented to person, place, and time.   Skin: Skin is warm and dry. No erythema.   Psychiatric: She has a normal mood and affect. Her behavior is normal. Thought content normal.   Nursing note and vitals reviewed.        Assessment/Plan   Brittani was seen today for 3mo f/u cancer.    Diagnoses and all orders for this visit:    Pedal edema    Cancer associated pain      Likely dependent edema present, as it improves if she elevates legs and overnight.   No signs of cardiopulmonary involvement. If symptoms persist, consider CXR and echocardiogram.   Recommended to elevate legs while seated or lying down.  Wear compression socks as needed and to decrease sodium intake, no more than 2200 mg daily.    Pain management improving symptoms. Not due for refill at this time. Will increase quantity on next refill, as she is running out quicker with small supply.  Abhijit report reviewed.

## 2020-08-19 ENCOUNTER — TELEPHONE (OUTPATIENT)
Dept: FAMILY MEDICINE CLINIC | Facility: CLINIC | Age: 60
End: 2020-08-19

## 2020-08-19 RX ORDER — HYDROCHLOROTHIAZIDE 12.5 MG/1
12.5 TABLET ORAL DAILY
Qty: 5 TABLET | Refills: 0 | Status: SHIPPED | OUTPATIENT
Start: 2020-08-19 | End: 2020-09-09

## 2020-08-19 NOTE — TELEPHONE ENCOUNTER
"DR LYONS  PATIENT IS REQUESTING \"WATER PILLS\" BE [facility]ED INTO [facility] [place] FOR PATIENTS SWOLLEN ANKLES. SHE SAID SHE TRIED EVERYTHING SHE WAS SUPPOSED TO DO AND THEY ARE STILL SWOLLEN.  [phone]  "

## 2020-08-19 NOTE — TELEPHONE ENCOUNTER
I just saw her Monday, so hasn't really had time to make improvements. Needs to limit sodium intake to 2000 mg daily, elevate legs while seated/lying down, wear compression stockings.   Will send a mild diuretic to pharmacy, but this is temporary. If her blood pressure is low, she doesn't need to take the water pill.

## 2020-09-03 RX ORDER — ARIPIPRAZOLE 5 MG/1
TABLET ORAL
Qty: 30 TABLET | Refills: 2 | Status: SHIPPED | OUTPATIENT
Start: 2020-09-03 | End: 2020-09-08 | Stop reason: SDUPTHER

## 2020-09-08 DIAGNOSIS — G89.3 CANCER ASSOCIATED PAIN: ICD-10-CM

## 2020-09-08 DIAGNOSIS — S69.92XA INJURY OF LEFT WRIST, INITIAL ENCOUNTER: ICD-10-CM

## 2020-09-08 DIAGNOSIS — M25.532 LEFT WRIST PAIN: ICD-10-CM

## 2020-09-08 RX ORDER — ARIPIPRAZOLE 5 MG/1
5 TABLET ORAL DAILY
Qty: 30 TABLET | Refills: 2 | Status: SHIPPED | OUTPATIENT
Start: 2020-09-08 | End: 2020-12-07 | Stop reason: SDUPTHER

## 2020-09-08 RX ORDER — OXYCODONE AND ACETAMINOPHEN 7.5; 325 MG/1; MG/1
1 TABLET ORAL EVERY 6 HOURS PRN
Qty: 60 TABLET | Refills: 0 | Status: SHIPPED | OUTPATIENT
Start: 2020-09-08 | End: 2020-09-30 | Stop reason: SDUPTHER

## 2020-09-08 NOTE — TELEPHONE ENCOUNTER
PT CALLED TO REQUEST REFILL FOR RX  ARIPiprazole (ABILIFY) 5 MG tablet  AND  ARIPiprazole (ABILIFY) 5 MG tablet.  PT STATED THAT SHE ONLY HAS 1 PILL LEFT FOR BOTH RX.    PLEASE ADVISE.  CALL BACK:[phone]       [id number] - [place], [place] - [address] [facility] [address] [place] AT [place] [id number]

## 2020-09-09 ENCOUNTER — APPOINTMENT (OUTPATIENT)
Dept: GENERAL RADIOLOGY | Facility: HOSPITAL | Age: 60
End: 2020-09-09

## 2020-09-09 ENCOUNTER — APPOINTMENT (OUTPATIENT)
Dept: CT IMAGING | Facility: HOSPITAL | Age: 60
End: 2020-09-09

## 2020-09-09 ENCOUNTER — HOSPITAL ENCOUNTER (OUTPATIENT)
Facility: HOSPITAL | Age: 60
Setting detail: OBSERVATION
Discharge: HOME | End: 2020-09-10
Attending: EMERGENCY MEDICINE | Admitting: INTERNAL MEDICINE

## 2020-09-09 ENCOUNTER — APPOINTMENT (OUTPATIENT)
Dept: MRI IMAGING | Facility: HOSPITAL | Age: 60
End: 2020-09-09

## 2020-09-09 DIAGNOSIS — N30.00 ACUTE CYSTITIS WITHOUT HEMATURIA: ICD-10-CM

## 2020-09-09 DIAGNOSIS — R42 DIZZINESS: Primary | ICD-10-CM

## 2020-09-09 DIAGNOSIS — C50.919 METASTATIC BREAST CANCER: ICD-10-CM

## 2020-09-09 PROBLEM — N39.0 UTI (URINARY TRACT INFECTION): Status: ACTIVE | Noted: 2020-09-09

## 2020-09-09 LAB
ALBUMIN SERPL-MCNC: 4.2 G/DL (ref 3.5–5.2)
ALBUMIN/GLOB SERPL: 2 G/DL
ALP SERPL-CCNC: 133 U/L (ref 39–117)
ALT SERPL W P-5'-P-CCNC: 56 U/L (ref 1–33)
ANION GAP SERPL CALCULATED.3IONS-SCNC: 16 MMOL/L (ref 5–15)
AST SERPL-CCNC: 76 U/L (ref 1–32)
BACTERIA UR QL AUTO: ABNORMAL /HPF
BASOPHILS # BLD AUTO: 0.05 10*3/MM3 (ref 0–0.2)
BASOPHILS NFR BLD AUTO: 0.6 % (ref 0–1.5)
BILIRUB SERPL-MCNC: 0.5 MG/DL (ref 0–1.2)
BILIRUB UR QL STRIP: NEGATIVE
BUN SERPL-MCNC: 8 MG/DL (ref 8–23)
BUN/CREAT SERPL: 13.6 (ref 7–25)
CALCIUM SPEC-SCNC: 9.3 MG/DL (ref 8.6–10.5)
CHLORIDE SERPL-SCNC: 98 MMOL/L (ref 98–107)
CLARITY UR: CLEAR
CO2 SERPL-SCNC: 20 MMOL/L (ref 22–29)
COLOR UR: YELLOW
CREAT SERPL-MCNC: 0.59 MG/DL (ref 0.57–1)
D DIMER PPP FEU-MCNC: 0.58 MCGFEU/ML (ref 0–0.56)
D DIMER PPP FEU-MCNC: NORMAL
D-LACTATE SERPL-SCNC: 1.1 MMOL/L (ref 0.5–2)
DEPRECATED RDW RBC AUTO: 51.4 FL (ref 37–54)
EOSINOPHIL # BLD AUTO: 0.09 10*3/MM3 (ref 0–0.4)
EOSINOPHIL NFR BLD AUTO: 1.1 % (ref 0.3–6.2)
ERYTHROCYTE [DISTWIDTH] IN BLOOD BY AUTOMATED COUNT: 13.1 % (ref 12.3–15.4)
GFR SERPL CREATININE-BSD FRML MDRD: 104 ML/MIN/1.73
GLOBULIN UR ELPH-MCNC: 2.1 GM/DL
GLUCOSE SERPL-MCNC: 92 MG/DL (ref 65–99)
GLUCOSE UR STRIP-MCNC: NEGATIVE MG/DL
HCT VFR BLD AUTO: 47.5 % (ref 34–46.6)
HGB BLD-MCNC: 15.1 G/DL (ref 12–15.9)
HGB UR QL STRIP.AUTO: NEGATIVE
HOLD SPECIMEN: NORMAL
HOLD SPECIMEN: NORMAL
HYALINE CASTS UR QL AUTO: ABNORMAL /LPF
IMM GRANULOCYTES # BLD AUTO: 0.03 10*3/MM3 (ref 0–0.05)
IMM GRANULOCYTES NFR BLD AUTO: 0.4 % (ref 0–0.5)
KETONES UR QL STRIP: ABNORMAL
LEUKOCYTE ESTERASE UR QL STRIP.AUTO: ABNORMAL
LYMPHOCYTES # BLD AUTO: 1.32 10*3/MM3 (ref 0.7–3.1)
LYMPHOCYTES NFR BLD AUTO: 15.9 % (ref 19.6–45.3)
MAGNESIUM SERPL-MCNC: 2 MG/DL (ref 1.6–2.4)
MCH RBC QN AUTO: 34 PG (ref 26.6–33)
MCHC RBC AUTO-ENTMCNC: 31.8 G/DL (ref 31.5–35.7)
MCV RBC AUTO: 107 FL (ref 79–97)
MONOCYTES # BLD AUTO: 0.87 10*3/MM3 (ref 0.1–0.9)
MONOCYTES NFR BLD AUTO: 10.5 % (ref 5–12)
NEUTROPHILS NFR BLD AUTO: 5.96 10*3/MM3 (ref 1.7–7)
NEUTROPHILS NFR BLD AUTO: 71.5 % (ref 42.7–76)
NITRITE UR QL STRIP: POSITIVE
NRBC BLD AUTO-RTO: 0 /100 WBC (ref 0–0.2)
PH UR STRIP.AUTO: 7 [PH] (ref 5–8)
PLATELET # BLD AUTO: 188 10*3/MM3 (ref 140–450)
PMV BLD AUTO: 9.5 FL (ref 6–12)
POTASSIUM SERPL-SCNC: 4.7 MMOL/L (ref 3.5–5.2)
PROCALCITONIN SERPL-MCNC: 0.09 NG/ML (ref 0–0.25)
PROT SERPL-MCNC: 6.3 G/DL (ref 6–8.5)
PROT UR QL STRIP: NEGATIVE
RBC # BLD AUTO: 4.44 10*6/MM3 (ref 3.77–5.28)
RBC # UR: ABNORMAL /HPF
REF LAB TEST METHOD: ABNORMAL
SODIUM SERPL-SCNC: 134 MMOL/L (ref 136–145)
SP GR UR STRIP: <=1.005 (ref 1–1.03)
SQUAMOUS #/AREA URNS HPF: ABNORMAL /HPF
TROPONIN T SERPL-MCNC: <0.01 NG/ML (ref 0–0.03)
UROBILINOGEN UR QL STRIP: ABNORMAL
WBC # BLD AUTO: 8.32 10*3/MM3 (ref 3.4–10.8)
WBC UR QL AUTO: ABNORMAL /HPF
WHOLE BLOOD HOLD SPECIMEN: NORMAL
WHOLE BLOOD HOLD SPECIMEN: NORMAL

## 2020-09-09 PROCEDURE — 99285 EMERGENCY DEPT VISIT HI MDM: CPT

## 2020-09-09 PROCEDURE — 25010000002 DIAZEPAM PER 5 MG: Performed by: EMERGENCY MEDICINE

## 2020-09-09 PROCEDURE — 96375 TX/PRO/DX INJ NEW DRUG ADDON: CPT

## 2020-09-09 PROCEDURE — 25010000002 CEFTRIAXONE PER 250 MG: Performed by: PHYSICIAN ASSISTANT

## 2020-09-09 PROCEDURE — G0378 HOSPITAL OBSERVATION PER HR: HCPCS

## 2020-09-09 PROCEDURE — 85379 FIBRIN DEGRADATION QUANT: CPT | Performed by: EMERGENCY MEDICINE

## 2020-09-09 PROCEDURE — A9577 INJ MULTIHANCE: HCPCS | Performed by: EMERGENCY MEDICINE

## 2020-09-09 PROCEDURE — 71275 CT ANGIOGRAPHY CHEST: CPT

## 2020-09-09 PROCEDURE — 83605 ASSAY OF LACTIC ACID: CPT | Performed by: PHYSICIAN ASSISTANT

## 2020-09-09 PROCEDURE — 83735 ASSAY OF MAGNESIUM: CPT | Performed by: EMERGENCY MEDICINE

## 2020-09-09 PROCEDURE — 99219 PR INITIAL OBSERVATION CARE/DAY 50 MINUTES: CPT | Performed by: NURSE PRACTITIONER

## 2020-09-09 PROCEDURE — 87186 SC STD MICRODIL/AGAR DIL: CPT | Performed by: NURSE PRACTITIONER

## 2020-09-09 PROCEDURE — 70553 MRI BRAIN STEM W/O & W/DYE: CPT

## 2020-09-09 PROCEDURE — 87088 URINE BACTERIA CULTURE: CPT | Performed by: NURSE PRACTITIONER

## 2020-09-09 PROCEDURE — 81001 URINALYSIS AUTO W/SCOPE: CPT | Performed by: EMERGENCY MEDICINE

## 2020-09-09 PROCEDURE — 87186 SC STD MICRODIL/AGAR DIL: CPT | Performed by: PHYSICIAN ASSISTANT

## 2020-09-09 PROCEDURE — 96365 THER/PROPH/DIAG IV INF INIT: CPT

## 2020-09-09 PROCEDURE — 87147 CULTURE TYPE IMMUNOLOGIC: CPT | Performed by: PHYSICIAN ASSISTANT

## 2020-09-09 PROCEDURE — C9803 HOPD COVID-19 SPEC COLLECT: HCPCS | Performed by: NURSE PRACTITIONER

## 2020-09-09 PROCEDURE — 25010000002 ONDANSETRON PER 1 MG: Performed by: EMERGENCY MEDICINE

## 2020-09-09 PROCEDURE — 87040 BLOOD CULTURE FOR BACTERIA: CPT | Performed by: PHYSICIAN ASSISTANT

## 2020-09-09 PROCEDURE — 87086 URINE CULTURE/COLONY COUNT: CPT | Performed by: NURSE PRACTITIONER

## 2020-09-09 PROCEDURE — 93005 ELECTROCARDIOGRAM TRACING: CPT | Performed by: EMERGENCY MEDICINE

## 2020-09-09 PROCEDURE — 0 GADOBENATE DIMEGLUMINE 529 MG/ML SOLUTION: Performed by: EMERGENCY MEDICINE

## 2020-09-09 PROCEDURE — 84484 ASSAY OF TROPONIN QUANT: CPT | Performed by: EMERGENCY MEDICINE

## 2020-09-09 PROCEDURE — U0004 COV-19 TEST NON-CDC HGH THRU: HCPCS | Performed by: NURSE PRACTITIONER

## 2020-09-09 PROCEDURE — 80053 COMPREHEN METABOLIC PANEL: CPT | Performed by: EMERGENCY MEDICINE

## 2020-09-09 PROCEDURE — C9803 HOPD COVID-19 SPEC COLLECT: HCPCS

## 2020-09-09 PROCEDURE — 0 IOPAMIDOL PER 1 ML: Performed by: EMERGENCY MEDICINE

## 2020-09-09 PROCEDURE — 71045 X-RAY EXAM CHEST 1 VIEW: CPT

## 2020-09-09 PROCEDURE — 96361 HYDRATE IV INFUSION ADD-ON: CPT

## 2020-09-09 PROCEDURE — 87150 DNA/RNA AMPLIFIED PROBE: CPT | Performed by: PHYSICIAN ASSISTANT

## 2020-09-09 PROCEDURE — 84145 PROCALCITONIN (PCT): CPT | Performed by: PHYSICIAN ASSISTANT

## 2020-09-09 PROCEDURE — 85025 COMPLETE CBC W/AUTO DIFF WBC: CPT | Performed by: EMERGENCY MEDICINE

## 2020-09-09 RX ORDER — ARIPIPRAZOLE 5 MG/1
5 TABLET ORAL DAILY
Status: DISCONTINUED | OUTPATIENT
Start: 2020-09-10 | End: 2020-09-10 | Stop reason: HOSPADM

## 2020-09-09 RX ORDER — DIPHENOXYLATE HYDROCHLORIDE AND ATROPINE SULFATE 2.5; .025 MG/1; MG/1
1 TABLET ORAL DAILY
Status: DISCONTINUED | OUTPATIENT
Start: 2020-09-10 | End: 2020-09-10 | Stop reason: HOSPADM

## 2020-09-09 RX ORDER — OMEPRAZOLE 20 MG/1
20 CAPSULE, DELAYED RELEASE ORAL DAILY
COMMUNITY
End: 2020-10-06

## 2020-09-09 RX ORDER — NICOTINE 21 MG/24HR
1 PATCH, TRANSDERMAL 24 HOURS TRANSDERMAL NIGHTLY
Status: DISCONTINUED | OUTPATIENT
Start: 2020-09-09 | End: 2020-09-10 | Stop reason: HOSPADM

## 2020-09-09 RX ORDER — OXYCODONE AND ACETAMINOPHEN 7.5; 325 MG/1; MG/1
1 TABLET ORAL EVERY 6 HOURS PRN
Status: DISCONTINUED | OUTPATIENT
Start: 2020-09-09 | End: 2020-09-10 | Stop reason: HOSPADM

## 2020-09-09 RX ORDER — MECLIZINE HYDROCHLORIDE 25 MG/1
25 TABLET ORAL ONCE
Status: COMPLETED | OUTPATIENT
Start: 2020-09-09 | End: 2020-09-09

## 2020-09-09 RX ORDER — FOLIC ACID 1 MG/1
1 TABLET ORAL DAILY
Status: DISCONTINUED | OUTPATIENT
Start: 2020-09-10 | End: 2020-09-10 | Stop reason: HOSPADM

## 2020-09-09 RX ORDER — THIAMINE MONONITRATE (VIT B1) 100 MG
100 TABLET ORAL ONCE
Status: COMPLETED | OUTPATIENT
Start: 2020-09-09 | End: 2020-09-10

## 2020-09-09 RX ORDER — ACETAMINOPHEN 325 MG/1
650 TABLET ORAL EVERY 6 HOURS PRN
Status: DISCONTINUED | OUTPATIENT
Start: 2020-09-09 | End: 2020-09-10 | Stop reason: HOSPADM

## 2020-09-09 RX ORDER — VENLAFAXINE HYDROCHLORIDE 75 MG/1
150 CAPSULE, EXTENDED RELEASE ORAL DAILY
Status: DISCONTINUED | OUTPATIENT
Start: 2020-09-10 | End: 2020-09-10 | Stop reason: HOSPADM

## 2020-09-09 RX ORDER — SODIUM CHLORIDE 9 MG/ML
75 INJECTION, SOLUTION INTRAVENOUS CONTINUOUS
Status: ACTIVE | OUTPATIENT
Start: 2020-09-09 | End: 2020-09-10

## 2020-09-09 RX ORDER — ALBUTEROL SULFATE 2.5 MG/3ML
2.5 SOLUTION RESPIRATORY (INHALATION) EVERY 6 HOURS PRN
Status: DISCONTINUED | OUTPATIENT
Start: 2020-09-09 | End: 2020-09-10 | Stop reason: HOSPADM

## 2020-09-09 RX ORDER — GABAPENTIN 100 MG/1
100 CAPSULE ORAL 3 TIMES DAILY
Status: ON HOLD | COMMUNITY
End: 2020-11-08

## 2020-09-09 RX ORDER — ONDANSETRON 2 MG/ML
4 INJECTION INTRAMUSCULAR; INTRAVENOUS EVERY 6 HOURS PRN
Status: DISCONTINUED | OUTPATIENT
Start: 2020-09-09 | End: 2020-09-10 | Stop reason: HOSPADM

## 2020-09-09 RX ORDER — THIAMINE MONONITRATE (VIT B1) 100 MG
100 TABLET ORAL DAILY
Status: DISCONTINUED | OUTPATIENT
Start: 2020-09-10 | End: 2020-09-10 | Stop reason: HOSPADM

## 2020-09-09 RX ORDER — SODIUM CHLORIDE 0.9 % (FLUSH) 0.9 %
10 SYRINGE (ML) INJECTION AS NEEDED
Status: DISCONTINUED | OUTPATIENT
Start: 2020-09-09 | End: 2020-09-10 | Stop reason: HOSPADM

## 2020-09-09 RX ORDER — CHOLECALCIFEROL (VITAMIN D3) 125 MCG
5 CAPSULE ORAL NIGHTLY PRN
Status: DISCONTINUED | OUTPATIENT
Start: 2020-09-09 | End: 2020-09-10 | Stop reason: HOSPADM

## 2020-09-09 RX ORDER — SODIUM CHLORIDE 0.9 % (FLUSH) 0.9 %
10 SYRINGE (ML) INJECTION EVERY 12 HOURS SCHEDULED
Status: DISCONTINUED | OUTPATIENT
Start: 2020-09-09 | End: 2020-09-10 | Stop reason: HOSPADM

## 2020-09-09 RX ORDER — ONDANSETRON 2 MG/ML
4 INJECTION INTRAMUSCULAR; INTRAVENOUS ONCE
Status: COMPLETED | OUTPATIENT
Start: 2020-09-09 | End: 2020-09-09

## 2020-09-09 RX ORDER — DIAZEPAM 5 MG/ML
5 INJECTION, SOLUTION INTRAMUSCULAR; INTRAVENOUS ONCE
Status: COMPLETED | OUTPATIENT
Start: 2020-09-09 | End: 2020-09-09

## 2020-09-09 RX ADMIN — SODIUM CHLORIDE, PRESERVATIVE FREE 10 ML: 5 INJECTION INTRAVENOUS at 21:53

## 2020-09-09 RX ADMIN — ONDANSETRON 4 MG: 2 INJECTION INTRAMUSCULAR; INTRAVENOUS at 14:05

## 2020-09-09 RX ADMIN — OXYCODONE HYDROCHLORIDE AND ACETAMINOPHEN 1 TABLET: 7.5; 325 TABLET ORAL at 21:54

## 2020-09-09 RX ADMIN — SODIUM CHLORIDE 1000 ML: 9 INJECTION, SOLUTION INTRAVENOUS at 14:04

## 2020-09-09 RX ADMIN — GADOBENATE DIMEGLUMINE 10 ML: 529 INJECTION, SOLUTION INTRAVENOUS at 15:07

## 2020-09-09 RX ADMIN — SODIUM CHLORIDE 75 ML/HR: 9 INJECTION, SOLUTION INTRAVENOUS at 21:54

## 2020-09-09 RX ADMIN — CEFTRIAXONE SODIUM 1 G: 1 INJECTION, POWDER, FOR SOLUTION INTRAMUSCULAR; INTRAVENOUS at 18:32

## 2020-09-09 RX ADMIN — IOPAMIDOL 52 ML: 755 INJECTION, SOLUTION INTRAVENOUS at 18:04

## 2020-09-09 RX ADMIN — NICOTINE 1 PATCH: 21 PATCH TRANSDERMAL at 21:53

## 2020-09-09 RX ADMIN — DIAZEPAM 5 MG: 5 INJECTION, SOLUTION INTRAMUSCULAR; INTRAVENOUS at 14:07

## 2020-09-09 RX ADMIN — MELATONIN TAB 5 MG 5 MG: 5 TAB at 21:54

## 2020-09-09 RX ADMIN — MECLIZINE HYDROCHLORIDE 25 MG: 25 TABLET ORAL at 14:05

## 2020-09-09 NOTE — ED PROVIDER NOTES
Subjective   Pt is a 59 yo female presenting to ED with complaints of dizziness. PMHx significant for Breast cancer s/p mastectomy 2018 with brain mets, Ovarian cancer s/p hysterectomy 1989, Anxiety, HLD, HTN, Depression, chronic pain, and tobacco use. Pt followed by Oncologist Dr. Patel and states hasn't had chemo/radiation in several months. Pt reports while sitting this morning watching morning news and drinking coffee had sudden onset of dizziness. Pt felt lightheaded with a headache and also complains of mid sternal chest tightness and palpitations. She reports the dizziness is worse with movement of her head and standing up. She denies vision changes, slurred speech, facial droops, numbness, tingling, weakness, fever, chills, nasal congestion, ear pain, CP, SOB, cough, N/V/D, abdominal pain or urinary sx. She denies recent medication change or admission. She reports 2 other dizzy episodes over the past few weeks but not as severe as today and resolved within a few hours. Pt denies prior hx of vertigo or CVA. She denies recent fall or head injury. She does use tobacco and drinks wine nightly.       History provided by:  Medical records and patient      Review of Systems   Constitutional: Negative for chills and fever.   HENT: Negative for congestion, ear pain, sore throat and trouble swallowing.    Eyes: Negative for pain, redness and visual disturbance.   Respiratory: Negative for cough, chest tightness and shortness of breath.    Cardiovascular: Positive for chest pain and palpitations. Negative for leg swelling.   Gastrointestinal: Negative for abdominal pain, constipation, diarrhea, nausea and vomiting.   Genitourinary: Negative for difficulty urinating, dysuria, flank pain, hematuria and vaginal bleeding.   Musculoskeletal: Negative for arthralgias, back pain and joint swelling.   Skin: Negative for rash and wound.   Neurological: Positive for dizziness and headaches. Negative for syncope, speech  difficulty, weakness and numbness.   Psychiatric/Behavioral: Negative for confusion.   All other systems reviewed and are negative.      Past Medical History:   Diagnosis Date   • Breast CA (CMS/HCC) 10/4/2018   • Depression    • DJD (degenerative joint disease) of cervical spine    • THALIA (generalized anxiety disorder)    • Heart murmur    • Ovarian cancer (CMS/HCC) 1989   • Tobacco dependence        No Known Allergies    Past Surgical History:   Procedure Laterality Date   • APPENDECTOMY     • BREAST BIOPSY Right 2003    DONE IN [place]   • COLONOSCOPY  06/2018   • HYSTERECTOMY  1989   • MASTECTOMY W/ SENTINEL NODE BIOPSY Bilateral 10/4/2018    Procedure: LEFT SKIN SPARING BREAST MASTECTOMY WITH LEFT SENTINEL NODE BIOPSY, RIGHT PROPHYLACTIC SKIN SPARING MASTECTOMY;  Surgeon: Koffi Worthington MD;  Location: [facility];  Service: General   • OOPHORECTOMY  1989       Family History   Problem Relation Age of Onset   • Arthritis Mother    • Heart attack Mother    • Osteoporosis Mother    • Liver disease Father    • Celiac disease Other        Social History     Socioeconomic History   • Marital status: Single     Spouse name: N/A   • Number of children: 1   • Years of education: H.S.   • Highest education level: High school graduate   Occupational History   • Occupation: Associate     Employer: [employer]   Tobacco Use   • Smoking status: Current Some Day Smoker     Packs/day: 0.50     Years: 20.00     Pack years: 10.00     Types: Cigarettes   • Smokeless tobacco: Never Used   Substance and Sexual Activity   • Alcohol use: Yes     Alcohol/week: 3.0 standard drinks     Types: 3 Glasses of wine per week     Frequency: 4 or more times a week     Comment: nightly    • Drug use: No   • Sexual activity: Defer     Partners: Male           Objective   Physical Exam   Constitutional: She is oriented to person, place, and time. Vital signs are normal. She appears well-developed.   HENT:   Head: Atraumatic.    Right Ear: Tympanic membrane and ear canal normal.   Left Ear: Tympanic membrane and ear canal normal.   Nose: Nose normal.   Mouth/Throat: Mucous membranes are normal.   Eyes: Pupils are equal, round, and reactive to light. Conjunctivae, EOM and lids are normal.   Neck: Normal range of motion. Neck supple.   Cardiovascular: Normal rate, regular rhythm and normal heart sounds.   Pulmonary/Chest: Effort normal and breath sounds normal. She has no wheezes.   Abdominal: Soft. She exhibits no distension. There is no tenderness. There is no rebound and no guarding.   Musculoskeletal: Normal range of motion. She exhibits no edema or tenderness.   Neurological: She is alert and oriented to person, place, and time. She has normal strength. No cranial nerve deficit or sensory deficit.   Skin: Skin is warm and dry. No rash noted. No erythema.   Psychiatric: She has a normal mood and affect. Her speech is normal and behavior is normal.   Nursing note and vitals reviewed.      Procedures           ED Course  ED Course as of Sep 09 1945   Wed Sep 09, 2020   1617 Nitrite, UA(!): Positive [RT]   1617 Bacteria, UA(!): 3+ [RT]      ED Course User Index  [RT] Usha Restrepo PA      Re-examined patient several times in ED. Pt resting and feeling better but still dizzy. Discussed results and tx plan. She states she feels too weak and dizzy to go home. Plan to admit for further evaluation.     Discussed patient with Dr. Roa who is agreeable with ED course and admission. Rocephin given for UTI after blood cultures sent off. MRI without acute findings. Noted elevated Ddimer but with CTA negative for PE or infectious process.     Reviewed old records.     Discussed admission with hospitalist Dr. Abebe       Recent Results (from the past 24 hour(s))   Comprehensive Metabolic Panel    Collection Time: 09/09/20  1:26 PM   Result Value Ref Range    Glucose 92 65 - 99 mg/dL    BUN 8 8 - 23 mg/dL    Creatinine 0.59 0.57 - 1.00 mg/dL     Sodium 134 (L) 136 - 145 mmol/L    Potassium 4.7 3.5 - 5.2 mmol/L    Chloride 98 98 - 107 mmol/L    CO2 20.0 (L) 22.0 - 29.0 mmol/L    Calcium 9.3 8.6 - 10.5 mg/dL    Total Protein 6.3 6.0 - 8.5 g/dL    Albumin 4.20 3.50 - 5.20 g/dL    ALT (SGPT) 56 (H) 1 - 33 U/L    AST (SGOT) 76 (H) 1 - 32 U/L    Alkaline Phosphatase 133 (H) 39 - 117 U/L    Total Bilirubin 0.5 0.0 - 1.2 mg/dL    eGFR Non African Amer 104 >60 mL/min/1.73    Globulin 2.1 gm/dL    A/G Ratio 2.0 g/dL    BUN/Creatinine Ratio 13.6 7.0 - 25.0    Anion Gap 16.0 (H) 5.0 - 15.0 mmol/L   Troponin    Collection Time: 09/09/20  1:26 PM   Result Value Ref Range    Troponin T <0.010 0.000 - 0.030 ng/mL   Magnesium    Collection Time: 09/09/20  1:26 PM   Result Value Ref Range    Magnesium 2.0 1.6 - 2.4 mg/dL   Green Top (Gel)    Collection Time: 09/09/20  1:26 PM   Result Value Ref Range    Extra Tube Hold for add-ons.    Lavender Top    Collection Time: 09/09/20  1:26 PM   Result Value Ref Range    Extra Tube hold for add-on    Gold Top - SST    Collection Time: 09/09/20  1:26 PM   Result Value Ref Range    Extra Tube Hold for add-ons.    CBC Auto Differential    Collection Time: 09/09/20  1:26 PM   Result Value Ref Range    WBC 8.32 3.40 - 10.80 10*3/mm3    RBC 4.44 3.77 - 5.28 10*6/mm3    Hemoglobin 15.1 12.0 - 15.9 g/dL    Hematocrit 47.5 (H) 34.0 - 46.6 %    .0 (H) 79.0 - 97.0 fL    MCH 34.0 (H) 26.6 - 33.0 pg    MCHC 31.8 31.5 - 35.7 g/dL    RDW 13.1 12.3 - 15.4 %    RDW-SD 51.4 37.0 - 54.0 fl    MPV 9.5 6.0 - 12.0 fL    Platelets 188 140 - 450 10*3/mm3    Neutrophil % 71.5 42.7 - 76.0 %    Lymphocyte % 15.9 (L) 19.6 - 45.3 %    Monocyte % 10.5 5.0 - 12.0 %    Eosinophil % 1.1 0.3 - 6.2 %    Basophil % 0.6 0.0 - 1.5 %    Immature Grans % 0.4 0.0 - 0.5 %    Neutrophils, Absolute 5.96 1.70 - 7.00 10*3/mm3    Lymphocytes, Absolute 1.32 0.70 - 3.10 10*3/mm3    Monocytes, Absolute 0.87 0.10 - 0.90 10*3/mm3    Eosinophils, Absolute 0.09 0.00 - 0.40  10*3/mm3    Basophils, Absolute 0.05 0.00 - 0.20 10*3/mm3    Immature Grans, Absolute 0.03 0.00 - 0.05 10*3/mm3    nRBC 0.0 0.0 - 0.2 /100 WBC   D-dimer, Quantitative    Collection Time: 09/09/20  1:26 PM   Result Value Ref Range    D-Dimer, Quantitative     Procalcitonin    Collection Time: 09/09/20  1:26 PM   Result Value Ref Range    Procalcitonin 0.09 0.00 - 0.25 ng/mL   Urinalysis With Microscopic If Indicated (No Culture) - Urine, Clean Catch    Collection Time: 09/09/20  2:02 PM   Result Value Ref Range    Color, UA Yellow Yellow, Straw    Appearance, UA Clear Clear    pH, UA 7.0 5.0 - 8.0    Specific Gravity, UA <=1.005 1.001 - 1.030    Glucose, UA Negative Negative    Ketones, UA 15 mg/dL (1+) (A) Negative    Bilirubin, UA Negative Negative    Blood, UA Negative Negative    Protein, UA Negative Negative    Leuk Esterase, UA Trace (A) Negative    Nitrite, UA Positive (A) Negative    Urobilinogen, UA 0.2 E.U./dL 0.2 - 1.0 E.U./dL   Urinalysis, Microscopic Only - Urine, Clean Catch    Collection Time: 09/09/20  2:02 PM   Result Value Ref Range    RBC, UA 0-2 None Seen, 0-2 /HPF    WBC, UA 3-5 (A) None Seen, 0-2 /HPF    Bacteria, UA 3+ (A) None Seen, Trace /HPF    Squamous Epithelial Cells, UA 0-2 None Seen, 0-2 /HPF    Hyaline Casts, UA 0-6 0 - 6 /LPF    Methodology Automated Microscopy    Light Blue Top    Collection Time: 09/09/20  4:15 PM   Result Value Ref Range    Extra Tube hold for add-on    D-dimer, Quantitative    Collection Time: 09/09/20  4:15 PM   Result Value Ref Range    D-Dimer, Quantitative 0.58 (H) 0.00 - 0.56 MCGFEU/mL   Lactic Acid, Plasma    Collection Time: 09/09/20  5:22 PM   Result Value Ref Range    Lactate 1.1 0.5 - 2.0 mmol/L     Note: In addition to lab results from this visit, the labs listed above may include labs taken at another facility or during a different encounter within the last 24 hours. Please correlate lab times with ED admission and discharge times for further  clarification of the services performed during this visit.    CT Angiogram Chest   Final Result   No pulmonary embolus or additional acute process in the   chest.           This report was finalized on 9/9/2020 6:10 PM by Ted Cabrera.          XR Chest 1 View   Final Result   Chronic changes seen throughout the lung fields with no   evidence of acute parenchymal disease.       D:  09/09/2020   E:  09/09/2020       This report was finalized on 9/9/2020 5:00 PM by Dr. Carolyn Younger MD.          MRI Brain With & Without Contrast   Final Result   Small focus of increased signal and hemosiderin identified   at the right vertex which is stable and unchanged when compared to the   prior study. Findings may suggest residual change from prior tumor   treatment. There is no evidence of new areas of enhancement. No definite   evidence of metastatic disease. No acute intracranial abnormality   identified.        DICTATED:   09/09/2020   EDITED/ls :   09/09/2020        This report was finalized on 9/9/2020 4:58 PM by Dr. Carolyn Younger MD.            Vitals:    09/09/20 1730 09/09/20 1830 09/09/20 1831 09/09/20 1834   BP: 134/86 127/72     Pulse: 90 92     Resp:       Temp:       TempSrc:       SpO2: 95%  96% 95%   Weight:       Height:         Medications   sodium chloride 0.9 % flush 10 mL (has no administration in time range)   diazePAM (VALIUM) injection 5 mg (5 mg Intravenous Given 9/9/20 1407)   ondansetron (ZOFRAN) injection 4 mg (4 mg Intravenous Given 9/9/20 1405)   meclizine (ANTIVERT) tablet 25 mg (25 mg Oral Given 9/9/20 1405)   sodium chloride 0.9 % bolus 1,000 mL (0 mL Intravenous Stopped 9/9/20 1606)   gadobenate dimeglumine (MULTIHANCE) injection 10 mL (10 mL Intravenous Given 9/9/20 1507)   cefTRIAXone (ROCEPHIN) 1 g/100 mL 0.9% NS (MBP) (1 g Intravenous New Bag 9/9/20 1832)   iopamidol (ISOVUE-370) 76 % injection 100 mL (52 mL Intravenous Given 9/9/20 1804)     ECG/EMG Results (last 24 hours)      Procedure Component Value Units Date/Time    ECG 12 Lead [[id number]] Collected:  09/09/20 1246     Updated:  09/09/20 1314        ECG 12 Lead               COVID-19 RISK SCREEN     1. Has the patient had close contact without PPE with a lab confirmed COVID-19 (+) person or a person under investigation (PUI) for COVID-19 infection?  -- No     2. Has the patient had respiratory symptoms, worsened/new cough and/or SOA, unexplained fever, or sudden loss of smell and/or taste in the past 7 days? --  No    3. Does the patient have baseline higher exposure risk such as working in healthcare field, currently residing in healthcare facility, or ongoing hemodialysis?  --  No                                       MDM    Final diagnoses:   Dizziness   Acute cystitis without hematuria   Metastatic breast cancer (CMS/HCC)            Usha Restrepo PA  09/09/20 1946

## 2020-09-09 NOTE — H&P
[facility] Medicine Services  Clinical Decision Unit (CDU)  HISTORY & PHYSICAL    Patient Name: Brittani Lambert  : 1960  MRN: [id number]  Primary Care Physician: Alla Colon DO  Date of admission: 2020 12:46 PM      Subjective   Subjective     Chief Complaint:  Dizziness     HPI:  Brittani Lambert is a 60 y.o. female with PMH significant for breast cancer s/p mastectomy  with brain mets, ovarian cancer s/p hysterectomy , anxiety, chronic pain, HTN, depression, and tobacco abuse, alcohol use, who presents to the ED with complaint of dizziness.   She states that she was sitting watching TV this morning drinking coffee when she developed acute onset of dizziness and headache. She notes that she has had 2 other episodes of dizziness over the past 2 weeks that lasted about an hour, but this event progressively worsened throughout the day. She notes that when she gets up to walk, she becomes lightheaded and has generalized weakness. She denies slurred speech, numbness/tingling, fever, cough, or increased SOB.   Upon arrival to the ED, she is noted to have mild hyponatremia, elevated liver enzymes, and elevated D-dimer. CTA chest and MRI brain are negative for acute findings. UA is concerning for UTI.   She will be admitted to Hospital Medicine for further evaluation.     Review of Systems   Constitutional: Positive for activity change. Negative for appetite change, chills, diaphoresis, fatigue and fever.   Eyes: Negative for visual disturbance.   Respiratory: Positive for shortness of breath (chronic, without increase ). Negative for cough, chest tightness and wheezing.    Cardiovascular: Positive for palpitations. Negative for chest pain and leg swelling.   Gastrointestinal: Positive for nausea. Negative for abdominal distention, abdominal pain, constipation, diarrhea and vomiting.   Genitourinary: Positive for frequency. Negative for difficulty urinating, dysuria,  flank pain and urgency.   Musculoskeletal: Positive for gait problem. Negative for arthralgias, back pain and myalgias.   Skin: Negative for color change, pallor and rash.   Neurological: Positive for dizziness, weakness, light-headedness and headaches. Negative for syncope, speech difficulty and numbness.   Psychiatric/Behavioral: Negative for confusion. The patient is nervous/anxious.         COVID-19 RISK SCREEN     1. Has the patient had close contact without PPE with a lab confirmed COVID-19 (+) person or a person under investigation (PUI) for COVID-19 infection?  -- No     2. Has the patient had respiratory symptoms, worsened/new cough and/or SOA, unexplained fever, or sudden loss of smell and/or taste in the past 7 days? --  No    3. Does the patient have baseline higher exposure risk such as working in healthcare field, currently residing in healthcare facility, or ongoing hemodialysis?  --  No       All other systems reviewed and negative    Personal History     Past Medical History:   Diagnosis Date   • Breast CA (CMS/HCC) 10/4/2018   • Depression    • DJD (degenerative joint disease) of cervical spine    • THALIA (generalized anxiety disorder)    • Heart murmur    • Ovarian cancer (CMS/HCC) 1989   • Tobacco dependence        Past Surgical History:   Procedure Laterality Date   • APPENDECTOMY     • BREAST BIOPSY Right 2003    DONE IN [place]   • COLONOSCOPY  06/2018   • HYSTERECTOMY  1989   • MASTECTOMY W/ SENTINEL NODE BIOPSY Bilateral 10/4/2018    Procedure: LEFT SKIN SPARING BREAST MASTECTOMY WITH LEFT SENTINEL NODE BIOPSY, RIGHT PROPHYLACTIC SKIN SPARING MASTECTOMY;  Surgeon: Koffi Worthington MD;  Location: [facility];  Service: General   • OOPHORECTOMY  1989       Family History: family history includes Arthritis in her mother; Celiac disease in an other family member; Heart attack in her mother; Liver disease in her father; Osteoporosis in her mother. Otherwise pertinent FHx was reviewed and  unremarkable.     Social History:  reports that she has been smoking cigarettes. She has a 10.00 pack-year smoking history. She has never used smokeless tobacco. She reports that she drinks about 3.0 standard drinks of alcohol per week. She reports that she does not use drugs.  Social History     Social History Narrative   • Not on file       Medications:  Available home medication information reviewed.    (Not in a hospital admission)    No Known Allergies    Objective   Objective     Vital Signs:   Temp:  [99.2 °F (37.3 °C)] 99.2 °F (37.3 °C)  Heart Rate:  [] 92  Resp:  [18] 18  BP: (117-134)/(72-93) 127/72        Physical Exam   Constitutional: She is oriented to person, place, and time. She appears well-developed and well-nourished. No distress.   HENT:   Head: Normocephalic and atraumatic.   Eyes: Pupils are equal, round, and reactive to light.   Neck: Normal range of motion. Neck supple.   Cardiovascular: Normal rate, regular rhythm, normal heart sounds and intact distal pulses. Exam reveals no gallop and no friction rub.   No murmur heard.  Pulmonary/Chest: Effort normal and breath sounds normal. No respiratory distress. She has no wheezes. She has no rales.   Abdominal: Soft. Bowel sounds are normal. She exhibits no distension and no mass. There is no tenderness. There is no guarding.   Musculoskeletal: Normal range of motion. She exhibits no edema or tenderness.   Neurological: She is alert and oriented to person, place, and time.   Skin: Skin is warm and dry. Capillary refill takes less than 2 seconds. No erythema. No pallor.   Psychiatric: She has a normal mood and affect. Her behavior is normal. Thought content normal.   Vitals reviewed.         Results Reviewed:  All labs reviewed.    Na+ 134  Anion Gap 16  Alkaline phos 133, ALT 56, AST, 76  D-dimer 0.58  Hgb 15.1, hct 47.5  UA: +Nitrite, +leukocytes, WBC 3-5, bacteria 3+  Mri Brain With & Without Contrast    Result Date: 9/9/2020  Small focus of  increased signal and hemosiderin identified at the right vertex which is stable and unchanged when compared to the prior study. Findings may suggest residual change from prior tumor treatment. There is no evidence of new areas of enhancement. No definite evidence of metastatic disease. No acute intracranial abnormality identified.   DICTATED:   09/09/2020 EDITED/ls :   09/09/2020  This report was finalized on 9/9/2020 4:58 PM by Dr. Carolyn Younger MD.      Xr Chest 1 View    Result Date: 9/9/2020  Chronic changes seen throughout the lung fields with no evidence of acute parenchymal disease.  D:  09/09/2020 E:  09/09/2020  This report was finalized on 9/9/2020 5:00 PM by Dr. Carolyn Younger MD.      Ct Angiogram Chest    Result Date: 9/9/2020  No pulmonary embolus or additional acute process in the chest.   This report was finalized on 9/9/2020 6:10 PM by Ted Cabrera.            Assessment/Plan   Assessment / Plan     Active Hospital Problems    Diagnosis POA   • **UTI (urinary tract infection) [N39.0] Yes   • 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 (CMS/HCC) [C50.912, C79.31] Yes   • Tobacco abuse [Z72.0] Yes   • Mixed hyperlipidemia [E78.2] Yes     60 year old female presenting to the ED with complaint of dizziness who is found to have concern for UTI.     Plan:  UTI  -BC x 2 pending  -Rocephin started in the ED, will continue for now with plan to transition to PO for discharge   -Urine cx  -CBC in am     Dizziness  -fall precautions  -appears mildly dehydrated, gentle IVF overnight   -orthostatic bp     Elevated Liver Enzymes   -repeat CMP in am   -consider ultrasound liver vs outpt follow up     Chronic pain   -continue PRN Norco   -alida verified     Tobacco abuse   -nicotine patch   -smoking cessation     Alcohol use   -reports 2-3 glasses of wine a night   -CIWA       CODE STATUS:    Code Status and Medical Interventions:   Ordered at: 09/09/20 2022     Code Status:    CPR     Medical  Interventions (Level of Support Prior to Arrest):    Full       Admission Status:   I believe this patient meets OBSERVATION status, however if further evaluation or treatment plans warrant, status may change.  Based upon current information, I predict patient's care encounter to be less than or equal to 2 midnights.      Discharge Blueprint (criteria for discharge):   4. Resolution of dizziness   5. Tolerating po     Electronically signed by LONG Day, 09/09/20, 7:48 PM.

## 2020-09-10 ENCOUNTER — READMISSION MANAGEMENT (OUTPATIENT)
Dept: CALL CENTER | Facility: HOSPITAL | Age: 60
End: 2020-09-10

## 2020-09-10 VITALS
OXYGEN SATURATION: 98 % | BODY MASS INDEX: 20.43 KG/M2 | HEIGHT: 63 IN | TEMPERATURE: 97.9 F | DIASTOLIC BLOOD PRESSURE: 87 MMHG | WEIGHT: 115.3 LBS | RESPIRATION RATE: 18 BRPM | HEART RATE: 72 BPM | SYSTOLIC BLOOD PRESSURE: 128 MMHG

## 2020-09-10 LAB
ALBUMIN SERPL-MCNC: 3.5 G/DL (ref 3.5–5.2)
ALBUMIN/GLOB SERPL: 1.9 G/DL
ALP SERPL-CCNC: 99 U/L (ref 39–117)
ALT SERPL W P-5'-P-CCNC: 36 U/L (ref 1–33)
ANION GAP SERPL CALCULATED.3IONS-SCNC: 8 MMOL/L (ref 5–15)
AST SERPL-CCNC: 40 U/L (ref 1–32)
BACTERIA BLD CULT: ABNORMAL
BASOPHILS # BLD AUTO: 0.05 10*3/MM3 (ref 0–0.2)
BASOPHILS NFR BLD AUTO: 0.9 % (ref 0–1.5)
BILIRUB SERPL-MCNC: 0.3 MG/DL (ref 0–1.2)
BOTTLE TYPE: ABNORMAL
BUN SERPL-MCNC: 9 MG/DL (ref 8–23)
BUN/CREAT SERPL: 15.3 (ref 7–25)
CALCIUM SPEC-SCNC: 8.6 MG/DL (ref 8.6–10.5)
CHLORIDE SERPL-SCNC: 105 MMOL/L (ref 98–107)
CO2 SERPL-SCNC: 25 MMOL/L (ref 22–29)
CREAT SERPL-MCNC: 0.59 MG/DL (ref 0.57–1)
DEPRECATED RDW RBC AUTO: 51 FL (ref 37–54)
EOSINOPHIL # BLD AUTO: 0.13 10*3/MM3 (ref 0–0.4)
EOSINOPHIL NFR BLD AUTO: 2.5 % (ref 0.3–6.2)
ERYTHROCYTE [DISTWIDTH] IN BLOOD BY AUTOMATED COUNT: 13.2 % (ref 12.3–15.4)
GFR SERPL CREATININE-BSD FRML MDRD: 104 ML/MIN/1.73
GLOBULIN UR ELPH-MCNC: 1.8 GM/DL
GLUCOSE SERPL-MCNC: 91 MG/DL (ref 65–99)
HCT VFR BLD AUTO: 39.5 % (ref 34–46.6)
HGB BLD-MCNC: 12.8 G/DL (ref 12–15.9)
IMM GRANULOCYTES # BLD AUTO: 0.03 10*3/MM3 (ref 0–0.05)
IMM GRANULOCYTES NFR BLD AUTO: 0.6 % (ref 0–0.5)
LYMPHOCYTES # BLD AUTO: 1.2 10*3/MM3 (ref 0.7–3.1)
LYMPHOCYTES NFR BLD AUTO: 22.7 % (ref 19.6–45.3)
MAGNESIUM SERPL-MCNC: 1.9 MG/DL (ref 1.6–2.4)
MCH RBC QN AUTO: 34.5 PG (ref 26.6–33)
MCHC RBC AUTO-ENTMCNC: 32.4 G/DL (ref 31.5–35.7)
MCV RBC AUTO: 106.5 FL (ref 79–97)
MONOCYTES # BLD AUTO: 0.64 10*3/MM3 (ref 0.1–0.9)
MONOCYTES NFR BLD AUTO: 12.1 % (ref 5–12)
NEUTROPHILS NFR BLD AUTO: 3.24 10*3/MM3 (ref 1.7–7)
NEUTROPHILS NFR BLD AUTO: 61.2 % (ref 42.7–76)
NRBC BLD AUTO-RTO: 0 /100 WBC (ref 0–0.2)
PLATELET # BLD AUTO: 150 10*3/MM3 (ref 140–450)
PMV BLD AUTO: 9.6 FL (ref 6–12)
POTASSIUM SERPL-SCNC: 4 MMOL/L (ref 3.5–5.2)
PROT SERPL-MCNC: 5.3 G/DL (ref 6–8.5)
RBC # BLD AUTO: 3.71 10*6/MM3 (ref 3.77–5.28)
SARS-COV-2 RNA NOSE QL NAA+PROBE: NOT DETECTED
SODIUM SERPL-SCNC: 138 MMOL/L (ref 136–145)
TSH SERPL DL<=0.05 MIU/L-ACNC: 2.43 UIU/ML (ref 0.27–4.2)
WBC # BLD AUTO: 5.29 10*3/MM3 (ref 3.4–10.8)

## 2020-09-10 PROCEDURE — 80053 COMPREHEN METABOLIC PANEL: CPT | Performed by: NURSE PRACTITIONER

## 2020-09-10 PROCEDURE — G0378 HOSPITAL OBSERVATION PER HR: HCPCS

## 2020-09-10 PROCEDURE — 99217 PR OBSERVATION CARE DISCHARGE MANAGEMENT: CPT | Performed by: INTERNAL MEDICINE

## 2020-09-10 PROCEDURE — 96372 THER/PROPH/DIAG INJ SC/IM: CPT

## 2020-09-10 PROCEDURE — 83735 ASSAY OF MAGNESIUM: CPT | Performed by: NURSE PRACTITIONER

## 2020-09-10 PROCEDURE — 84443 ASSAY THYROID STIM HORMONE: CPT | Performed by: NURSE PRACTITIONER

## 2020-09-10 PROCEDURE — 96361 HYDRATE IV INFUSION ADD-ON: CPT

## 2020-09-10 PROCEDURE — 25010000002 ENOXAPARIN PER 10 MG: Performed by: NURSE PRACTITIONER

## 2020-09-10 PROCEDURE — 85025 COMPLETE CBC W/AUTO DIFF WBC: CPT | Performed by: NURSE PRACTITIONER

## 2020-09-10 RX ORDER — NICOTINE 21 MG/24HR
1 PATCH, TRANSDERMAL 24 HOURS TRANSDERMAL NIGHTLY
Qty: 30 PATCH | Refills: 0 | Status: SHIPPED | OUTPATIENT
Start: 2020-09-10 | End: 2020-11-08 | Stop reason: HOSPADM

## 2020-09-10 RX ORDER — NITROFURANTOIN 25; 75 MG/1; MG/1
100 CAPSULE ORAL 2 TIMES DAILY
Qty: 12 CAPSULE | Refills: 0 | Status: SHIPPED | OUTPATIENT
Start: 2020-09-10 | End: 2020-09-16

## 2020-09-10 RX ADMIN — ENOXAPARIN SODIUM 40 MG: 40 INJECTION SUBCUTANEOUS at 05:31

## 2020-09-10 RX ADMIN — MULTIVITAMIN TABLET 1 TABLET: TABLET at 09:01

## 2020-09-10 RX ADMIN — ARIPIPRAZOLE 5 MG: 5 TABLET ORAL at 09:01

## 2020-09-10 RX ADMIN — OXYCODONE HYDROCHLORIDE AND ACETAMINOPHEN 1 TABLET: 7.5; 325 TABLET ORAL at 09:01

## 2020-09-10 RX ADMIN — SODIUM CHLORIDE, PRESERVATIVE FREE 10 ML: 5 INJECTION INTRAVENOUS at 09:03

## 2020-09-10 RX ADMIN — VENLAFAXINE HYDROCHLORIDE 150 MG: 75 CAPSULE, EXTENDED RELEASE ORAL at 09:01

## 2020-09-10 RX ADMIN — THIAMINE HCL TAB 100 MG 100 MG: 100 TAB at 09:01

## 2020-09-10 RX ADMIN — FOLIC ACID 1 MG: 1 TABLET ORAL at 09:01

## 2020-09-10 RX ADMIN — THIAMINE HCL TAB 100 MG 100 MG: 100 TAB at 00:16

## 2020-09-10 NOTE — DISCHARGE SUMMARY
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          Brief CDU Course:  Brittani Lambert is a 60 y.o. female with PMH significant for breast cancer s/p mastectomy  with brain mets, ovarian cancer s/p hysterectomy , anxiety, chronic pain, HTN, depression, and tobacco abuse, alcohol use, who presents to the ED with complaint of dizziness. She was found to have a UTI in the ED and was admitted to the CDU for overnight observation.  She was started on IV Rocephin and has received one dose of ABX,  she will be given PO ABX (Macrobid 100mg BID) to complete a seven day course for complicated UTI. UCx is currently PENDING, but if resistant pathogen ultimately is found, will personally follow up with patient and call in another antibiotic if needed.       Key Discharge Recommendations:  Follow up with PCP within 1 week    Discharge Evaluation     Vital Signs:   Temp:  [97.7 °F (36.5 °C)-99.2 °F (37.3 °C)] 97.9 °F (36.6 °C)  Heart Rate:  [] 72  Resp:  [18] 18  BP: (112-135)/(60-93) 128/87     Physical Exam:  Constitutional: No acute distress, awake, alert  HENT: NCAT, mucous membranes moist  Respiratory: Clear to auscultation bilaterally, respiratory effort normal   Cardiovascular: RRR, no murmurs, rubs, or gallops, palpable pedal pulses bilaterally  Gastrointestinal: Positive bowel sounds, soft, nontender, nondistended  Musculoskeletal: No  bilateral ankle edema  Psychiatric: flat affect, cooperative  Neurologic: Oriented x 3, strength symmetric in all extremities, Cranial Nerves grossly intact to confrontation, speech clear  Skin: No rashes    Pertinent  and/or Most Recent Results     Results from last 7 days   Lab Units 09/10/20  0415 09/09/20  1326   WBC 10*3/mm3 5.29 8.32   HEMOGLOBIN g/dL 12.8 15.1   HEMATOCRIT % 39.5 47.5*   PLATELETS 10*3/mm3 150 188   SODIUM mmol/L 138 134*   POTASSIUM mmol/L 4.0 4.7   CHLORIDE mmol/L 105 98   CO2 mmol/L 25.0 20.0*   BUN mg/dL 9 8   CREATININE mg/dL 0.59 0.59   GLUCOSE mg/dL 91 92   CALCIUM mg/dL 8.6 9.3     Results from last 7 days   Lab Units 09/10/20  0415 09/09/20  1326   BILIRUBIN mg/dL 0.3 0.5   ALK PHOS U/L 99 133*   ALT (SGPT) U/L 36* 56*   AST (SGOT) U/L 40* 76*           Invalid input(s): TG, LDLCALC, LDLREALC  Results from last 7 days   Lab Units 09/10/20  0415 09/09/20  1722 09/09/20  1326   TSH uIU/mL 2.430  --   --    TROPONIN T ng/mL  --   --  <0.010   PROCALCITONIN ng/mL  --   --  0.09   LACTATE mmol/L  --  1.1  --        Brief Urine Lab Results  (Last result in the past 365 days)      Color   Clarity   Blood   Leuk Est   Nitrite   Protein   CREAT   Urine HCG        09/09/20 1402 Yellow Clear Negative Trace Positive Negative               Microbiology Results Abnormal     Procedure Component Value - Date/Time    Urine Culture - Urine, Urine, Clean Catch [[id number]]  (Normal) Collected:  09/09/20 1402    Lab Status:  Preliminary result Specimen:  Urine, Clean Catch Updated:  09/10/20 0752     Urine Culture Growth present, too young to evaluate          Imaging Results (All)     Procedure Component Value Units Date/Time    CT Angiogram Chest [[id number]] Collected:  09/09/20 1807     Updated:  09/09/20 1813    Narrative:       EXAMINATION: CT ANGIOGRAM CHEST-      INDICATION: DIZZINESS, ELEVATED DDIMER, CANCER PATIENT     TECHNIQUE: Pulmonary arterial phase contrast-enhanced CT of the chest  with  two-dimensional post processed reconstructions.     The radiation dose reduction device was turned on for each scan per the  ALARA (As Low as Reasonably Achievable) protocol.     COMPARISON: 5/8/2020     FINDINGS: The thyroid gland is homogeneous. No suspicious soft tissue  body wall findings. No acute findings in the partially imaged upper  abdomen. Normal caliber atherosclerotic thoracic aorta. No enlarging  mediastinal or hilar adenopathy. Evaluation of the osseous structures  demonstrates no acute or suspicious bony findings. Normal esophagus. The  pulmonary arteries are well-opacified and demonstrate no filling defect  concerning for pulmonary embolus. Mild bilateral atelectatic changes of  the lung bases without focal lobar consolidation. No new suspicious  pulmonary nodules.       Impression:       No pulmonary embolus or additional acute process in the  chest.        This report was finalized on 9/9/2020 6:10 PM by Ted Cabrera.       XR Chest 1 View [[id number]] Collected:  09/09/20 1542     Updated:  09/09/20 1703    Narrative:          EXAMINATION: XR CHEST 1 VW-09/09/2020:      INDICATION: Weak/Dizzy/AMS, triage protocol.      COMPARISON: 09/10/2019.     FINDINGS: Portable chest reveals removal of the Port-A-Catheter on the  right. Cardiac and mediastinal silhouettes are within normal limits.  Atelectatic changes seen in the left lung base. The lung fields are  clear. No focal parenchymal opacification present. No pleural effusion  or pneumothorax. Degenerative changes seen within the spine.           Impression:       Chronic changes seen throughout the lung fields with no  evidence of acute parenchymal disease.     D:  09/09/2020  E:  09/09/2020     This report was finalized on 9/9/2020 5:00 PM by Dr. Carolyn Younger MD.       MRI Brain With & Without Contrast [[id number]] Collected:  09/09/20 1508     Updated:  09/09/20 1701    Narrative:       EXAMINATION: MRI BRAIN WWO CONTRAST - 09/09/2020      INDICATION: History of breast cancer, difficulty walking, dizziness.     TECHNIQUE: Routine multiplanar imaging is obtained of the brain with and  without the administration of gadolinium contrast.     COMPARISON: 07/29/2020     FINDINGS: No evidence of restricted diffusion to suggest evidence of an  acute ischemic insult. Flow voids are preserved in the major  intracranial  vessels. There is no hemorrhage or hydrocephalus. There is  no mass, mass effect, or midline shift. Contrast enhanced imaging  reveals no evidence of abnormal contrast enhancement to suggest evidence  of a metastatic focus. FLAIR imaging reveals abnormal signal intensity  seen scattered throughout the periventricular and subcortical white  matter representing chronic small vessel ischemic change. Findings are  all stable when compared to 07/29/2020. There is fluid identified in the  mastoid air cells bilaterally, right greater than left, stable and  unchanged. Visualized paranasal sinuses are clear. Pituitary and sella  are unremarkable. Craniovertebral junction is preserved. No  cerebellopontine mass identified. Hemosiderin identified within the  right vertex which is stable and unchanged when compared to the prior  study. Small cavernous angioma cannot be completely excluded at the  right vertex.       Impression:       Small focus of increased signal and hemosiderin identified  at the right vertex which is stable and unchanged when compared to the  prior study. Findings may suggest residual change from prior tumor  treatment. There is no evidence of new areas of enhancement. No definite  evidence of metastatic disease. No acute intracranial abnormality  identified.      DICTATED:   09/09/2020  EDITED/ls :   09/09/2020      This report was finalized on 9/9/2020 4:58 PM by Dr. Carolyn Younger MD.                       Results for orders placed during the hospital encounter of 10/15/18   Adult Transthoracic Echo Complete W/ Cont if  Necessary Per Protocol    Narrative · Left ventricular systolic function is normal.  · Estimated EF appears to be in the range of 61 - 65%.  · Mild tricuspid valve regurgitation is present.  · Calculated right ventricular systolic pressure from tricuspid   regurgitation is 50 mmHg.            Plan for Follow-up of Pending Labs/Results:    Order Current Status    Blood Culture - Blood, Arm, Left In process    Blood Culture - Blood, Arm, Right In process    COVID PRE-OP / PRE-PROCEDURE SCREENING ORDER (NO ISOLATION) - Swab, Nasopharynx In process    COVID-19,[facility], NP SWAB IN LEXAR VIRAL TRANSPORT MEDIA 24-30 HR TAT - Swab, Nasopharynx In process    Urine Culture - Urine, Urine, Clean Catch Preliminary result        Discharge Medications and Follow-Up        Discharge Medications      New Medications      Instructions Start Date   nicotine 21 MG/24HR patch  Commonly known as:  NICODERM CQ   1 patch, Transdermal, Nightly      nitrofurantoin (macrocrystal-monohydrate) 100 MG capsule  Commonly known as:  Macrobid   100 mg, Oral, 2 Times Daily         Changes to Medications      Instructions Start Date   dexamethasone 1 MG tablet  Commonly known as:  DECADRON  What changed:  how much to take   0.5 mg, Oral, Daily With Breakfast         Continue These Medications      Instructions Start Date   albuterol 108 (90 Base) MCG/ACT inhaler  Commonly known as:  ProAir RespiClick   2 puffs, Inhalation, Every 6 Hours PRN      ALPRAZolam 0.5 MG tablet  Commonly known as:  XANAX   0.5 mg, Oral, 3 Times Daily PRN      ARIPiprazole 5 MG tablet  Commonly known as:  ABILIFY   5 mg, Oral, Daily      gabapentin 100 MG capsule  Commonly known as:  NEURONTIN   100 mg, Oral, 3 Times Daily      lidocaine-prilocaine 2.5-2.5 % cream  Commonly known as:  EMLA   PLEASE SEE ATTACHED FOR DETAILED DIRECTIONS      mirtazapine 15 MG tablet  Commonly known as:  REMERON   Take 1 - 2 tablets at bedtime for sleep nightly      omeprazole 20 MG  capsule  Commonly known as:  priLOSEC   20 mg, Oral, Daily      ondansetron 8 MG tablet  Commonly known as:  ZOFRAN   8 mg, Oral, 3 Times Daily PRN      oxyCODONE-acetaminophen 7.5-325 MG per tablet  Commonly known as:  Percocet   1 tablet, Oral, Every 6 Hours PRN      pravastatin 20 MG tablet  Commonly known as:  PRAVACHOL   TAKE 1 TABLET BY MOUTH EVERY DAY AT NIGHT      venlafaxine  MG 24 hr capsule  Commonly known as:  EFFEXOR-XR   150 mg, Oral, Daily               Future Appointments   Date Time Provider Department Center   9/15/2020 11:00 AM Ariana Gonsales APRN [facility] None   9/24/2020 11:00 AM Carrie Patel MD [facility]   9/24/2020 12:00 PM Naima Gould MD [facility] None             Electronically signed by Therese Cano MD, 09/10/20, 9:00 AM.    Time Spent on Discharge: I spent  35  minutes on this discharge activity which included: face-to-face encounter with the patient, reviewing the data in the system, coordination of the care with the nursing staff as well as consultants, documentation, and entering orders.

## 2020-09-10 NOTE — PLAN OF CARE
Patient resting at this time, pending d/c orders today.     Problem: Patient Care Overview  Goal: Plan of Care Review  Outcome: Ongoing (interventions implemented as appropriate)  Goal: Individualization and Mutuality  Outcome: Ongoing (interventions implemented as appropriate)  Goal: Discharge Needs Assessment  Outcome: Ongoing (interventions implemented as appropriate)  Goal: Interprofessional Rounds/Family Conf  Outcome: Ongoing (interventions implemented as appropriate)     Problem: Pain, Chronic (Adult)  Goal: Identify Related Risk Factors and Signs and Symptoms  Outcome: Ongoing (interventions implemented as appropriate)  Goal: Acceptable Pain/Comfort Level and Functional Ability  Outcome: Ongoing (interventions implemented as appropriate)     Problem: Urinary Tract Infection (Adult)  Goal: Signs and Symptoms of Listed Potential Problems Will be Absent, Minimized or Managed (Urinary Tract Infection)  Outcome: Ongoing (interventions implemented as appropriate)

## 2020-09-10 NOTE — OUTREACH NOTE
Prep Survey      Responses   [facility] patient discharged from?  [place]   Is LACE score < 7 ?  No   Eligibility  [facility]   Date of Admission  09/09/20   Date of Discharge  09/10/20   Discharge Disposition  Home or Self Care   Discharge diagnosis  UTI  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 (   COVID-19 Test Status  Negative   Does the patient have one of the following disease processes/diagnoses(primary or secondary)?  Other   Does the patient have Home health ordered?  No   Is there a DME ordered?  No   Prep survey completed?  Yes          Beryl Chan RN        
No

## 2020-09-11 ENCOUNTER — TRANSITIONAL CARE MANAGEMENT TELEPHONE ENCOUNTER (OUTPATIENT)
Dept: CALL CENTER | Facility: HOSPITAL | Age: 60
End: 2020-09-11

## 2020-09-11 LAB — BACTERIA SPEC AEROBE CULT: ABNORMAL

## 2020-09-11 NOTE — OUTREACH NOTE
Call Center TCM Note      Responses   [facility] patient discharged from?  [place]   COVID-19 Test Status  Negative   Does the patient have one of the following disease processes/diagnoses(primary or secondary)?  Other   TCM attempt successful?  No   Unsuccessful attempts  Attempt 2          Sunday Nunez RN    9/11/2020, 17:48

## 2020-09-11 NOTE — OUTREACH NOTE
Call Center TCM Note      Responses   [facility] patient discharged from?  [place]   COVID-19 Test Status  Negative   Does the patient have one of the following disease processes/diagnoses(primary or secondary)?  Other   TCM attempt successful?  No   Unsuccessful attempts  Attempt 1          Sunday Nunez RN    9/11/2020, 17:46

## 2020-09-12 ENCOUNTER — TELEPHONE (OUTPATIENT)
Dept: EMERGENCY DEPT | Facility: HOSPITAL | Age: 60
End: 2020-09-12

## 2020-09-12 ENCOUNTER — TRANSITIONAL CARE MANAGEMENT TELEPHONE ENCOUNTER (OUTPATIENT)
Dept: CALL CENTER | Facility: HOSPITAL | Age: 60
End: 2020-09-12

## 2020-09-12 NOTE — TELEPHONE ENCOUNTER
Telephone call to patient's number of record.  Blood culture result today reported gram-negative rods and left arm aerobic culture from 9/9/2020.  No answer on patient's telephone of record, left voicemail.  Letter printed, signed, handed to charge nurse at 1640 6 PM

## 2020-09-12 NOTE — OUTREACH NOTE
Call Center TCM Note      Responses   [facility] patient discharged from?  [place]   COVID-19 Test Status  Negative   Does the patient have one of the following disease processes/diagnoses(primary or secondary)?  Other   TCM attempt successful?  No   Unsuccessful attempts  Attempt 3          Beryl Malcolm RN    9/12/2020, 10:15 EDT

## 2020-09-13 ENCOUNTER — TELEPHONE (OUTPATIENT)
Dept: EMERGENCY DEPT | Facility: HOSPITAL | Age: 60
End: 2020-09-13

## 2020-09-13 LAB
BACTERIA BLD CULT: NORMAL
BACTERIA SPEC AEROBE CULT: ABNORMAL
BOTTLE TYPE: NORMAL
GRAM STN SPEC: ABNORMAL
ISOLATED FROM: ABNORMAL

## 2020-09-13 NOTE — TELEPHONE ENCOUNTER
Advise patient of positive blood culture.  States the earliest she will be able to return is tomorrow morning for repeat blood cultures.

## 2020-09-14 ENCOUNTER — HOSPITAL ENCOUNTER (EMERGENCY)
Facility: HOSPITAL | Age: 60
Discharge: HOME | End: 2020-09-14
Attending: EMERGENCY MEDICINE | Admitting: EMERGENCY MEDICINE

## 2020-09-14 VITALS
HEART RATE: 81 BPM | WEIGHT: 115 LBS | HEIGHT: 63 IN | TEMPERATURE: 97.6 F | RESPIRATION RATE: 16 BRPM | SYSTOLIC BLOOD PRESSURE: 130 MMHG | OXYGEN SATURATION: 97 % | DIASTOLIC BLOOD PRESSURE: 87 MMHG | BODY MASS INDEX: 20.38 KG/M2

## 2020-09-14 DIAGNOSIS — R78.81 BLOOD BACTERIAL CULTURE POSITIVE: Primary | ICD-10-CM

## 2020-09-14 LAB
ALBUMIN SERPL-MCNC: 4.5 G/DL (ref 3.5–5.2)
ALBUMIN/GLOB SERPL: 1.6 G/DL
ALP SERPL-CCNC: 116 U/L (ref 39–117)
ALT SERPL W P-5'-P-CCNC: 29 U/L (ref 1–33)
ANION GAP SERPL CALCULATED.3IONS-SCNC: 11 MMOL/L (ref 5–15)
AST SERPL-CCNC: 35 U/L (ref 1–32)
BACTERIA SPEC AEROBE CULT: ABNORMAL
BACTERIA SPEC AEROBE CULT: ABNORMAL
BACTERIA UR QL AUTO: ABNORMAL /HPF
BASOPHILS # BLD AUTO: 0.07 10*3/MM3 (ref 0–0.2)
BASOPHILS NFR BLD AUTO: 1 % (ref 0–1.5)
BILIRUB SERPL-MCNC: 0.4 MG/DL (ref 0–1.2)
BILIRUB UR QL STRIP: ABNORMAL
BUN SERPL-MCNC: 7 MG/DL (ref 8–23)
BUN/CREAT SERPL: 11.7 (ref 7–25)
CALCIUM SPEC-SCNC: 9.7 MG/DL (ref 8.6–10.5)
CHLORIDE SERPL-SCNC: 101 MMOL/L (ref 98–107)
CLARITY UR: ABNORMAL
CO2 SERPL-SCNC: 26 MMOL/L (ref 22–29)
COLOR UR: ABNORMAL
CREAT SERPL-MCNC: 0.6 MG/DL (ref 0.57–1)
D-LACTATE SERPL-SCNC: 1.4 MMOL/L (ref 0.5–2)
DEPRECATED RDW RBC AUTO: 52.1 FL (ref 37–54)
EOSINOPHIL # BLD AUTO: 0.09 10*3/MM3 (ref 0–0.4)
EOSINOPHIL NFR BLD AUTO: 1.2 % (ref 0.3–6.2)
ERYTHROCYTE [DISTWIDTH] IN BLOOD BY AUTOMATED COUNT: 13.5 % (ref 12.3–15.4)
GFR SERPL CREATININE-BSD FRML MDRD: 102 ML/MIN/1.73
GLOBULIN UR ELPH-MCNC: 2.8 GM/DL
GLUCOSE SERPL-MCNC: 104 MG/DL (ref 65–99)
GLUCOSE UR STRIP-MCNC: NEGATIVE MG/DL
GRAM STN SPEC: ABNORMAL
GRAM STN SPEC: ABNORMAL
HCT VFR BLD AUTO: 44.9 % (ref 34–46.6)
HGB BLD-MCNC: 14.4 G/DL (ref 12–15.9)
HGB UR QL STRIP.AUTO: NEGATIVE
HOLD SPECIMEN: NORMAL
HOLD SPECIMEN: NORMAL
HYALINE CASTS UR QL AUTO: ABNORMAL /LPF
IMM GRANULOCYTES # BLD AUTO: 0.04 10*3/MM3 (ref 0–0.05)
IMM GRANULOCYTES NFR BLD AUTO: 0.5 % (ref 0–0.5)
ISOLATED FROM: ABNORMAL
ISOLATED FROM: ABNORMAL
KETONES UR QL STRIP: ABNORMAL
LEUKOCYTE ESTERASE UR QL STRIP.AUTO: ABNORMAL
LYMPHOCYTES # BLD AUTO: 1.45 10*3/MM3 (ref 0.7–3.1)
LYMPHOCYTES NFR BLD AUTO: 19.8 % (ref 19.6–45.3)
MCH RBC QN AUTO: 33.6 PG (ref 26.6–33)
MCHC RBC AUTO-ENTMCNC: 32.1 G/DL (ref 31.5–35.7)
MCV RBC AUTO: 104.9 FL (ref 79–97)
MONOCYTES # BLD AUTO: 0.69 10*3/MM3 (ref 0.1–0.9)
MONOCYTES NFR BLD AUTO: 9.4 % (ref 5–12)
NEUTROPHILS NFR BLD AUTO: 5 10*3/MM3 (ref 1.7–7)
NEUTROPHILS NFR BLD AUTO: 68.1 % (ref 42.7–76)
NITRITE UR QL STRIP: NEGATIVE
NRBC BLD AUTO-RTO: 0 /100 WBC (ref 0–0.2)
PH UR STRIP.AUTO: 5.5 [PH] (ref 5–8)
PLATELET # BLD AUTO: 223 10*3/MM3 (ref 140–450)
PMV BLD AUTO: 9.7 FL (ref 6–12)
POTASSIUM SERPL-SCNC: 3.6 MMOL/L (ref 3.5–5.2)
PROCALCITONIN SERPL-MCNC: 0.07 NG/ML (ref 0–0.25)
PROT SERPL-MCNC: 7.3 G/DL (ref 6–8.5)
PROT UR QL STRIP: NEGATIVE
RBC # BLD AUTO: 4.28 10*6/MM3 (ref 3.77–5.28)
RBC # UR: ABNORMAL /HPF
REF LAB TEST METHOD: ABNORMAL
SODIUM SERPL-SCNC: 138 MMOL/L (ref 136–145)
SP GR UR STRIP: 1.02 (ref 1–1.03)
SQUAMOUS #/AREA URNS HPF: ABNORMAL /HPF
UROBILINOGEN UR QL STRIP: ABNORMAL
WBC # BLD AUTO: 7.34 10*3/MM3 (ref 3.4–10.8)
WBC UR QL AUTO: ABNORMAL /HPF
WHOLE BLOOD HOLD SPECIMEN: NORMAL
WHOLE BLOOD HOLD SPECIMEN: NORMAL

## 2020-09-14 PROCEDURE — 83605 ASSAY OF LACTIC ACID: CPT | Performed by: PHYSICIAN ASSISTANT

## 2020-09-14 PROCEDURE — 99284 EMERGENCY DEPT VISIT MOD MDM: CPT

## 2020-09-14 PROCEDURE — 81001 URINALYSIS AUTO W/SCOPE: CPT | Performed by: PHYSICIAN ASSISTANT

## 2020-09-14 PROCEDURE — 36415 COLL VENOUS BLD VENIPUNCTURE: CPT

## 2020-09-14 PROCEDURE — 85025 COMPLETE CBC W/AUTO DIFF WBC: CPT | Performed by: PHYSICIAN ASSISTANT

## 2020-09-14 PROCEDURE — 84145 PROCALCITONIN (PCT): CPT | Performed by: PHYSICIAN ASSISTANT

## 2020-09-14 PROCEDURE — 80053 COMPREHEN METABOLIC PANEL: CPT | Performed by: PHYSICIAN ASSISTANT

## 2020-09-14 PROCEDURE — 87040 BLOOD CULTURE FOR BACTERIA: CPT | Performed by: PHYSICIAN ASSISTANT

## 2020-09-14 RX ORDER — SODIUM CHLORIDE 0.9 % (FLUSH) 0.9 %
10 SYRINGE (ML) INJECTION AS NEEDED
Status: DISCONTINUED | OUTPATIENT
Start: 2020-09-14 | End: 2020-09-14 | Stop reason: HOSPADM

## 2020-09-14 NOTE — DISCHARGE INSTRUCTIONS
Continue taking your Macrobid as previously prescribed.  You will be contacted if days blood cultures are positive.  If so, you will need to return to the emergency department for IV antibiotics.  Return to the emergency department immediately if you experience any change or worsening of symptoms in the meanwhile.

## 2020-09-14 NOTE — ED PROVIDER NOTES
EMERGENCY DEPARTMENT ENCOUNTER    Room Number:  05/05  Date of encounter:  9/14/2020  PCP: Alla Colon DO  Historian: Patient      HPI:  Chief Complaint: Return for repeat blood culture.    A complete HPI/ROS/PMH/PSH/SH/FH are unobtainable due to: NA    Context: Brittani Lambert is a 60 y.o. female who presents to the ED at provider request secondary to blood culture positive for gram-negative bacilli.  She was recently hospitalized for urinary tract infection, blood cultures at that time were remarkable for staph hominis, and 9/13, her blood culture final report included gram-negative rods.  She did have a gram-negative diann UTI for which she was hospitalized here recently.  I contacted the patient and requested she return for repeat blood cultures.  She is currently asymptomatic and feeling well with no complaints.      PAST MEDICAL HISTORY  Active Ambulatory Problems     Diagnosis Date Noted   • Mixed hyperlipidemia 07/16/2017   • Tobacco abuse 01/17/2018   • Moderate episode of recurrent major depressive disorder (CMS/[facility]) 01/17/2018   • Anxiety 01/17/2018   • Functional diarrhea 06/21/2018   • Lt Breast CA (CMS/[facility]) 10/04/2018   • Sepsis (CMS/[facility]) 10/15/2018   • Rt Cellulitis of chest wall 10/15/2018   • Acute cystitis without hematuria 10/16/2018   • Atrial fibrillation (CMS/[facility]) 10/16/2018   • Tobacco dependence    • Invasive ductal carcinoma of left breast (CMS/[facility]) 04/10/2019   • 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 (CMS/[facility]) 04/10/2019   • Left-sided weakness 04/10/2019   • THALIA (generalized anxiety disorder)    • Acute deep vein thrombosis (DVT) of left upper extremity (CMS/[facility]) 06/21/2019   • UTI (urinary tract infection) 09/09/2020     Resolved Ambulatory Problems     Diagnosis Date Noted   • No Resolved Ambulatory Problems     Past Medical History:   Diagnosis Date   • Depression    • DJD (degenerative joint disease) of cervical spine    • Heart murmur    • Ovarian cancer (CMS/[facility]) 1989          PAST SURGICAL HISTORY  Past Surgical History:   Procedure Laterality Date   • APPENDECTOMY     • BREAST BIOPSY Right 2003    DONE IN [place]   • COLONOSCOPY  06/2018   • HYSTERECTOMY  1989   • MASTECTOMY W/ SENTINEL NODE BIOPSY Bilateral 10/4/2018    Procedure: LEFT SKIN SPARING BREAST MASTECTOMY WITH LEFT SENTINEL NODE BIOPSY, RIGHT PROPHYLACTIC SKIN SPARING MASTECTOMY;  Surgeon: Koffi Worthington MD;  Location: [facility];  Service: General   • OOPHORECTOMY  1989         FAMILY HISTORY  Family History   Problem Relation Age of Onset   • Arthritis Mother    • Heart attack Mother    • Osteoporosis Mother    • Liver disease Father    • Celiac disease Other          SOCIAL HISTORY  Social History     Socioeconomic History   • Marital status: Single     Spouse name: N/A   • Number of children: 1   • Years of education: H.S.   • Highest education level: High school graduate   Occupational History   • Occupation: Associate     Employer: [employer]   Tobacco Use   • Smoking status: Current Some Day Smoker     Packs/day: 0.50     Years: 20.00     Pack years: 10.00     Types: Cigarettes   • Smokeless tobacco: Never Used   Substance and Sexual Activity   • Alcohol use: Yes     Alcohol/week: 3.0 standard drinks     Types: 3 Glasses of wine per week     Frequency: 4 or more times a week     Comment: nightly    • Drug use: No   • Sexual activity: Defer     Partners: Male         ALLERGIES  Patient has no known allergies.        REVIEW OF SYSTEMS  Review of Systems     All systems reviewed and negative except for those discussed in HPI.       PHYSICAL EXAM    I have reviewed the triage vital signs and nursing notes.    ED Triage Vitals   Temp Pulse Resp BP SpO2   -- -- -- -- --      Temp src Heart Rate Source Patient Position BP Location FiO2 (%)   -- -- -- -- --       Physical Exam  GENERAL: Pleasant awake alert and oriented not toxic appearing, afebrile.  Well developed.  Appears in no acute  distress.  HENT: Nares patent  EYES: No scleral icterus  CV: Regular rhythm, regular rate  RESPIRATORY: Normal effort.  No audible wheezes, rales or rhonchi  ABDOMEN: Soft, nontender  MUSCULOSKELETAL: No deformities.   NEURO: Alert, moves all extremities, follows commands  SKIN: Warm, dry, no rash visualized        LAB RESULTS  Recent Results (from the past 24 hour(s))   Lactic Acid, Plasma    Collection Time: 09/14/20  8:32 AM    Specimen: Blood   Result Value Ref Range    Lactate 1.4 0.5 - 2.0 mmol/L   Procalcitonin    Collection Time: 09/14/20  8:32 AM    Specimen: Blood   Result Value Ref Range    Procalcitonin 0.07 0.00 - 0.25 ng/mL   Comprehensive Metabolic Panel    Collection Time: 09/14/20  8:32 AM    Specimen: Blood   Result Value Ref Range    Glucose 104 (H) 65 - 99 mg/dL    BUN 7 (L) 8 - 23 mg/dL    Creatinine 0.60 0.57 - 1.00 mg/dL    Sodium 138 136 - 145 mmol/L    Potassium 3.6 3.5 - 5.2 mmol/L    Chloride 101 98 - 107 mmol/L    CO2 26.0 22.0 - 29.0 mmol/L    Calcium 9.7 8.6 - 10.5 mg/dL    Total Protein 7.3 6.0 - 8.5 g/dL    Albumin 4.50 3.50 - 5.20 g/dL    ALT (SGPT) 29 1 - 33 U/L    AST (SGOT) 35 (H) 1 - 32 U/L    Alkaline Phosphatase 116 39 - 117 U/L    Total Bilirubin 0.4 0.0 - 1.2 mg/dL    eGFR Non African Amer 102 >60 mL/min/1.73    Globulin 2.8 gm/dL    A/G Ratio 1.6 g/dL    BUN/Creatinine Ratio 11.7 7.0 - 25.0    Anion Gap 11.0 5.0 - 15.0 mmol/L   CBC Auto Differential    Collection Time: 09/14/20  8:32 AM    Specimen: Blood   Result Value Ref Range    WBC 7.34 3.40 - 10.80 10*3/mm3    RBC 4.28 3.77 - 5.28 10*6/mm3    Hemoglobin 14.4 12.0 - 15.9 g/dL    Hematocrit 44.9 34.0 - 46.6 %    .9 (H) 79.0 - 97.0 fL    MCH 33.6 (H) 26.6 - 33.0 pg    MCHC 32.1 31.5 - 35.7 g/dL    RDW 13.5 12.3 - 15.4 %    RDW-SD 52.1 37.0 - 54.0 fl    MPV 9.7 6.0 - 12.0 fL    Platelets 223 140 - 450 10*3/mm3    Neutrophil % 68.1 42.7 - 76.0 %    Lymphocyte % 19.8 19.6 - 45.3 %    Monocyte % 9.4 5.0 - 12.0 %     Eosinophil % 1.2 0.3 - 6.2 %    Basophil % 1.0 0.0 - 1.5 %    Immature Grans % 0.5 0.0 - 0.5 %    Neutrophils, Absolute 5.00 1.70 - 7.00 10*3/mm3    Lymphocytes, Absolute 1.45 0.70 - 3.10 10*3/mm3    Monocytes, Absolute 0.69 0.10 - 0.90 10*3/mm3    Eosinophils, Absolute 0.09 0.00 - 0.40 10*3/mm3    Basophils, Absolute 0.07 0.00 - 0.20 10*3/mm3    Immature Grans, Absolute 0.04 0.00 - 0.05 10*3/mm3    nRBC 0.0 0.0 - 0.2 /100 WBC   Light Blue Top    Collection Time: 09/14/20  8:32 AM   Result Value Ref Range    Extra Tube hold for add-on    Green Top (Gel)    Collection Time: 09/14/20  8:32 AM   Result Value Ref Range    Extra Tube Hold for add-ons.    Lavender Top    Collection Time: 09/14/20  8:32 AM   Result Value Ref Range    Extra Tube hold for add-on    Gold Top - SST    Collection Time: 09/14/20  8:32 AM   Result Value Ref Range    Extra Tube Hold for add-ons.    Urinalysis With Microscopic If Indicated (No Culture) - Urine, Clean Catch    Collection Time: 09/14/20  9:06 AM    Specimen: Urine, Clean Catch   Result Value Ref Range    Color, UA Dark Yellow (A) Yellow, Straw    Appearance, UA Turbid (A) Clear    pH, UA 5.5 5.0 - 8.0    Specific Gravity, UA 1.022 1.001 - 1.030    Glucose, UA Negative Negative    Ketones, UA Trace (A) Negative    Bilirubin, UA Small (1+) (A) Negative    Blood, UA Negative Negative    Protein, UA Negative Negative    Leuk Esterase, UA Trace (A) Negative    Nitrite, UA Negative Negative    Urobilinogen, UA 1.0 E.U./dL 0.2 - 1.0 E.U./dL   Urinalysis, Microscopic Only - Urine, Clean Catch    Collection Time: 09/14/20  9:06 AM    Specimen: Urine, Clean Catch   Result Value Ref Range    RBC, UA 0-2 None Seen, 0-2 /HPF    WBC, UA 3-5 (A) None Seen, 0-2 /HPF    Bacteria, UA 2+ (A) None Seen, Trace /HPF    Squamous Epithelial Cells, UA Too Numerous to Count (A) None Seen, 0-2 /HPF    Hyaline Casts, UA Unable to determine due to loaded field 0 - 6 /LPF    Methodology Manual Light Microscopy         Ordered the above labs and independently reviewed the results.        RADIOLOGY  No Radiology Exams Resulted Within Past 24 Hours          PROCEDURES    Procedures      MEDICATIONS GIVEN IN ER    Medications - No data to display      PROGRESS, DATA ANALYSIS, CONSULTS, AND MEDICAL DECISION MAKING    All labs have been independently reviewed by me.  All radiology studies have been reviewed by me and the radiologist dictating the report.   EKG's have been independently viewed and interpreted by me.                 No acute findings here in the emergency department today.  Suspect contaminant, but given history of gram-negative diann UTI, must rule out bacteremia.  Plan to repeat blood cultures.  Discussed with Dr. Santoyo, on-call infectious disease.  He reviewed all of her data and agrees with path forward.  Continue treating UTI, and await repeat blood cultures.  If these are positive, she will need to return for IV antibiotics.  Reviewed with patient, who verbalizes understanding and is agreeable to plan.      AS OF 17:27 EDT VITALS:    BP - 130/87  HR - 81  TEMP - 97.6 °F (36.4 °C) (Oral)  O2 SATS - 97%        DIAGNOSIS  Final diagnoses:   Blood bacterial culture positive         DISPOSITION  DISCHARGE    Patient discharged in stable condition.    Reviewed implications of results, diagnosis, meds, responsibility to follow up, warning signs and symptoms of possible worsening, potential complications and reasons to return to ER.    Patient/Family voiced understanding of above instructions.    Discussed plan for discharge, as there is no emergent indication for admission.  Pt/family is agreeable and understands need for follow up and possible repeat testing.  Pt/family is aware that discharge does not mean that nothing is wrong but that it indicates no emergency is currently present that requires admission and they must continue care with follow-up as given below or with a physician of their choice.      FOLLOW-UP  Alla Colon  [address]  [place]  [facility] [id number] [phone]    Schedule an appointment as soon as possible for a visit   This week for general recheck.         Medication List      Changed    dexamethasone 1 MG tablet  Commonly known as: DECADRON  Take 0.5 tablets by mouth Daily With Breakfast.  What changed: how much to take                   Karson Elliott PA-C  09/14/20 [id number]

## 2020-09-15 ENCOUNTER — TELEPHONE (OUTPATIENT)
Dept: ONCOLOGY | Facility: CLINIC | Age: 60
End: 2020-09-15

## 2020-09-15 ENCOUNTER — OFFICE VISIT (OUTPATIENT)
Dept: PSYCHIATRY | Facility: CLINIC | Age: 60
End: 2020-09-15

## 2020-09-15 DIAGNOSIS — F33.1 MODERATE EPISODE OF RECURRENT MAJOR DEPRESSIVE DISORDER (HCC): Primary | ICD-10-CM

## 2020-09-15 DIAGNOSIS — F41.1 GENERALIZED ANXIETY DISORDER: ICD-10-CM

## 2020-09-15 DIAGNOSIS — G47.9 SLEEP DISTURBANCE: ICD-10-CM

## 2020-09-15 PROCEDURE — 99213 OFFICE O/P EST LOW 20 MIN: CPT | Performed by: NURSE PRACTITIONER

## 2020-09-15 RX ORDER — MIRTAZAPINE 15 MG/1
TABLET, FILM COATED ORAL
Qty: 30 TABLET | Refills: 5 | Status: SHIPPED | OUTPATIENT
Start: 2020-09-15 | End: 2021-02-25

## 2020-09-15 RX ORDER — MIRTAZAPINE 15 MG/1
TABLET, FILM COATED ORAL
Qty: 60 TABLET | Refills: 5 | Status: SHIPPED | OUTPATIENT
Start: 2020-09-15 | End: 2020-09-15 | Stop reason: SDUPTHER

## 2020-09-15 RX ORDER — ALPRAZOLAM 0.5 MG/1
0.5 TABLET ORAL 3 TIMES DAILY PRN
Qty: 90 TABLET | Refills: 0 | Status: SHIPPED | OUTPATIENT
Start: 2020-09-15 | End: 2021-04-27

## 2020-09-15 NOTE — PROGRESS NOTES
Subjective   Brittani Lambert is a 60 y.o. female who is here today for medication management follow up. You have chosen to receive care through a telephone visit. Do you consent to use a telephone visit for your medical care today? Yes  11:15am  11:30am     Chief Complaint: MDD, THALIA, sleep disturbance    History of Present Illness Patient presents via telephone reporting she emotionally has been doing really well.  She is sleeping 8 hours a night with mirtazapine 15 mg 1 p.o.  She reports her energy improved and she is not as anxious or sad.  She has more motivation and interest.  However she was not feeling well physically and went to the emergency department and had a significant urinary tract infection plus or minus bacteria in her blood.  She is awaiting blood cultures to come back.  She is in her home taking oral antibiotics.  Denies panic.  Getting along with her mother and sister.  No new concerns other than urinary tract infection plus or minus bacteremia she reports being followed closely by her emergency department physician.  Denies adverse effects from medications.   (Scales based on 0 - 10 with 10 being the worst)        The following portions of the patient's history were reviewed and updated as appropriate: allergies, current medications, past family history, past medical history, past social history, past surgical history and problem list.    Review of Systems  A 14 point review of systems was performed and is negative except as noted above.    Objective   Physical Exam  not currently breastfeeding.    No Known Allergies    Current Medications:   Current Outpatient Medications   Medication Sig Dispense Refill   • albuterol (PROAIR RESPICLICK) 108 (90 Base) MCG/ACT inhaler Inhale 2 puffs Every 6 (Six) Hours As Needed for Wheezing or Shortness of Air. 1 inhaler 11   • ALPRAZolam (XANAX) 0.5 MG tablet Take 1 tablet by mouth 3 (Three) Times a Day As Needed for Anxiety or Sleep. 90 tablet 0   •  ARIPiprazole (ABILIFY) 5 MG tablet Take 1 tablet by mouth Daily. 30 tablet 2   • dexamethasone (DECADRON) 1 MG tablet Take 0.5 tablets by mouth Daily With Breakfast. (Patient taking differently: Take 1 mg by mouth Daily With Breakfast.) 120 tablet 3   • gabapentin (NEURONTIN) 100 MG capsule Take 100 mg by mouth 3 (Three) Times a Day.     • lidocaine-prilocaine (EMLA) 2.5-2.5 % cream PLEASE SEE ATTACHED FOR DETAILED DIRECTIONS 30 g 3   • mirtazapine (REMERON) 15 MG tablet Take 1  tablet at bedtime for sleep nightly 30 tablet 5   • nicotine (NICODERM CQ) 21 MG/24HR patch Place 1 patch on the skin as directed by provider Every Night. 30 patch 0   • nitrofurantoin, macrocrystal-monohydrate, (Macrobid) 100 MG capsule Take 1 capsule by mouth 2 (Two) Times a Day for 6 days. 12 capsule 0   • omeprazole (priLOSEC) 20 MG capsule Take 20 mg by mouth Daily.     • ondansetron (ZOFRAN) 8 MG tablet Take 1 tablet by mouth 3 (Three) Times a Day As Needed for Nausea or Vomiting. 30 tablet 0   • oxyCODONE-acetaminophen (Percocet) 7.5-325 MG per tablet Take 1 tablet by mouth Every 6 (Six) Hours As Needed for Moderate Pain . 60 tablet 0   • pravastatin (PRAVACHOL) 20 MG tablet TAKE 1 TABLET BY MOUTH EVERY DAY AT NIGHT 30 tablet 5   • venlafaxine XR (EFFEXOR-XR) 150 MG 24 hr capsule Take 1 capsule by mouth Daily. 30 capsule 5     No current facility-administered medications for this visit.        Lab Results: Reviewed in epic, being treated for UTI      Appearance:   Hygiene: Reports good  Cooperation:  Cooperative  Eye Contact:    Psychomotor Behavior: Denies psychomotor agitation/retardation, No EPS, No motor tics  Mood:  within normal limits  Affect:    Hopelessness: Denies  Speech:  Normal  Thought Process:  Linear  Thought Content:  Normal  Concentration: Normal   Suicidal:  None  Homicidal:  None  Hallucinations:  None  Delusion:  None  Memory:  Intact  Orientation:  Person, Place, Time and Situation  Reliability:  good  Insight:   Fair  Judgement: good  Impulse Control: good  Estimated Intelligence: average range        Assessment/Plan   Diagnoses and all orders for this visit:    Moderate episode of recurrent major depressive disorder (CMS/HCC)    Generalized anxiety disorder  -     ALPRAZolam (XANAX) 0.5 MG tablet; Take 1 tablet by mouth 3 (Three) Times a Day As Needed for Anxiety or Sleep.    Sleep disturbance    Other orders  -     Discontinue: mirtazapine (REMERON) 15 MG tablet; Take 1 - 2 tablets at bedtime for sleep nightly    mirtazapine 15 mg take one tablet at bedtime for sleep #30, 5 refills sent to her pharmacy       IMPRESSION: sleep much improved with mirtazapine getting 8 hours a night    PLAN:   Refilled mirtazapine 15 mg 1 tablet at at bedtime for sleep  Refilled alprazolam 0.5 mg 1 p.o. daily as needed for anxiety  Continue venlafaxine X are 150 mg p.o. daily for depression and anxiety  Continue Abilify 5 mg 1 p.o. daily for depression    We discussed risks, benefits, and side effects of the above medications and the patient was agreeable with the plan. Patient was educated on the importance of compliance with treatment and follow-up appointments.     Provide Cognitive Behavioral Therapy and Solution Focused Therapy to improve functioning, maintain stability, and avoid decompensation and the need for higher level of care.    Counseled patient regarding multimodal approach with encouragement of healthy nutrition, healthy sleep, regular physical mobility, social involvement, counseling, and medication compliance.     Assisted patient in identifying risk factors which would indicate the need for higher level of care including thoughts to harm self or others and/or self-harming behavior and encouraged patient to contact this office, call 911, or present to the nearest emergency room should any of these events occur. Discussed crisis intervention services and means to access.  Patient adamantly and convincingly denies current  suicidal or homicidal ideation or perceptual disturbance.    Treatment Plan: stabilize mood, patient will stay out of psychiatric hospital and be at optimal level of functioning with therapy and take all medication as prescribed. Patient verbalized  understanding and agreement to plan.    Instructed to call for questions or concerns and return early if necessary.     Return in about 4 weeks (around 10/13/2020).

## 2020-09-15 NOTE — TELEPHONE ENCOUNTER
Gonazlovd incoming fax from [facility] pharmacy.  Her insurance will not cover the mirtazapine 15mg you prescribed earlier for the patient.  They asking for alternative request.

## 2020-09-17 ENCOUNTER — TELEPHONE (OUTPATIENT)
Dept: EMERGENCY DEPT | Facility: HOSPITAL | Age: 60
End: 2020-09-17

## 2020-09-17 NOTE — TELEPHONE ENCOUNTER
Patient called to inquire about her recent blood cultures collected during her Sept 14 ED visit.  She was informed that both blood cultures showed no growth at 2 days.  She was happy to hear this news.

## 2020-09-18 ENCOUNTER — READMISSION MANAGEMENT (OUTPATIENT)
Dept: CALL CENTER | Facility: HOSPITAL | Age: 60
End: 2020-09-18

## 2020-09-18 NOTE — OUTREACH NOTE
Medical Week 2 Survey      Responses   [facility] patient discharged from?  [place]   COVID-19 Test Status  Negative   Does the patient have one of the following disease processes/diagnoses(primary or secondary)?  Other   Week 2 attempt successful?  No   Unsuccessful attempts  Attempt 1   Rescheduled  Revoked   Revoke  Phone number issues          Priti Moreno, RN

## 2020-09-19 LAB
BACTERIA SPEC AEROBE CULT: NORMAL
BACTERIA SPEC AEROBE CULT: NORMAL

## 2020-09-21 ENCOUNTER — OFFICE VISIT (OUTPATIENT)
Dept: FAMILY MEDICINE CLINIC | Facility: CLINIC | Age: 60
End: 2020-09-21

## 2020-09-21 VITALS
TEMPERATURE: 98.9 F | RESPIRATION RATE: 16 BRPM | DIASTOLIC BLOOD PRESSURE: 86 MMHG | OXYGEN SATURATION: 97 % | BODY MASS INDEX: 20.2 KG/M2 | WEIGHT: 114 LBS | HEIGHT: 63 IN | HEART RATE: 101 BPM | SYSTOLIC BLOOD PRESSURE: 130 MMHG

## 2020-09-21 DIAGNOSIS — R42 DIZZINESS: ICD-10-CM

## 2020-09-21 DIAGNOSIS — R73.9 ELEVATED BLOOD SUGAR: ICD-10-CM

## 2020-09-21 DIAGNOSIS — D64.9 ANEMIA, UNSPECIFIED TYPE: ICD-10-CM

## 2020-09-21 DIAGNOSIS — N76.0 BACTERIAL VAGINOSIS: ICD-10-CM

## 2020-09-21 DIAGNOSIS — B96.89 BACTERIAL VAGINOSIS: ICD-10-CM

## 2020-09-21 DIAGNOSIS — N76.0 ACUTE VAGINITIS: ICD-10-CM

## 2020-09-21 DIAGNOSIS — N30.00 ACUTE CYSTITIS WITHOUT HEMATURIA: Primary | ICD-10-CM

## 2020-09-21 LAB
BILIRUB BLD-MCNC: NEGATIVE MG/DL
CLARITY, POC: CLEAR
COLOR UR: YELLOW
EXPIRATION DATE: NORMAL
GLUCOSE UR STRIP-MCNC: NEGATIVE MG/DL
KETONES UR QL: NEGATIVE
LEUKOCYTE EST, POC: NEGATIVE
Lab: NORMAL
NITRITE UR-MCNC: NEGATIVE MG/ML
PH UR: 6 [PH] (ref 5–8)
PROT UR STRIP-MCNC: NEGATIVE MG/DL
RBC # UR STRIP: NEGATIVE /UL
SP GR UR: 1.02 (ref 1–1.03)
UROBILINOGEN UR QL: NORMAL

## 2020-09-21 PROCEDURE — 99214 OFFICE O/P EST MOD 30 MIN: CPT | Performed by: PHYSICIAN ASSISTANT

## 2020-09-21 RX ORDER — FLUCONAZOLE 150 MG/1
150 TABLET ORAL ONCE
Qty: 1 TABLET | Refills: 0 | Status: SHIPPED | OUTPATIENT
Start: 2020-09-21 | End: 2020-09-21

## 2020-09-21 RX ORDER — METRONIDAZOLE 500 MG/1
500 TABLET ORAL 2 TIMES DAILY
Qty: 10 TABLET | Refills: 0 | Status: ON HOLD | OUTPATIENT
Start: 2020-09-21 | End: 2020-11-08

## 2020-09-21 NOTE — PROGRESS NOTES
Subjective   Brittani Lambert is a 60 y.o. female.     History of Present Illness   Pt presents for follow up from [facility] for UTI (cultured E.coli infection). Blood cultures negative upon repeat on 9/14/20  Macrobid Rx finished several days ago   Started feeling bad again 4-5 days ago. Dizziness (mostly with standing too quickly), dark urine, vaginal discharge, vaginal itching, fishy vaginal discharge (white in color). No frequency, urgency or burning noted. Pt is not sexually active   Takes medication to help with bowel movements. This has been pretty normal.   Not on any form of cancer treatment right now, just palliative care.  Breast cancer has metastasized to brain. Has upcoming appointment with oncology for follow up.   No fever or chills. Breathing doing okay. Feels short of breath with activity. Tested negative for COVID while in hospital   Noted mild anemia in hospital with elevated MCV. Has not has iron or b12/ folate levels checked recently     The following portions of the patient's history were reviewed and updated as appropriate: allergies, current medications, past family history, past medical history, past social history, past surgical history and problem list.    Review of Systems   Constitutional: Positive for fatigue. Negative for chills, diaphoresis and fever.   HENT: Negative.  Negative for congestion, ear discharge, ear pain, hearing loss, nosebleeds, postnasal drip, sinus pressure, sneezing and sore throat.    Eyes: Negative.    Respiratory: Negative for cough, chest tightness and wheezing.         SOB with exertion, ongoing    Cardiovascular: Negative.  Negative for chest pain, palpitations and leg swelling.   Gastrointestinal: Negative for abdominal distention, abdominal pain, blood in stool, constipation, diarrhea, nausea and vomiting.   Genitourinary: Positive for vaginal discharge. Negative for difficulty urinating, dysuria, flank pain, frequency, hematuria and urgency.  "  Skin: Negative.  Negative for color change, pallor, rash and wound.   Neurological: Positive for dizziness and light-headedness. Negative for headaches.       Objective    Blood pressure 130/86, pulse 101, temperature 98.9 °F (37.2 °C), resp. rate 16, height 160 cm (63\"), weight 51.7 kg (114 lb), SpO2 97 %, not currently breastfeeding.     Physical Exam  Vitals signs and nursing note reviewed.   Constitutional:       Appearance: She is well-developed.   HENT:      Head: Normocephalic and atraumatic.      Right Ear: Tympanic membrane, ear canal and external ear normal.      Left Ear: Tympanic membrane, ear canal and external ear normal.      Nose: Nose normal.      Mouth/Throat:      Pharynx: No oropharyngeal exudate.   Eyes:      Conjunctiva/sclera: Conjunctivae normal.   Neck:      Musculoskeletal: Normal range of motion and neck supple.      Thyroid: No thyromegaly.      Trachea: No tracheal deviation.   Cardiovascular:      Rate and Rhythm: Normal rate and regular rhythm.      Heart sounds: Normal heart sounds.   Pulmonary:      Effort: Pulmonary effort is normal. No respiratory distress.      Breath sounds: Normal breath sounds. No wheezing or rales.   Chest:      Chest wall: No tenderness.   Abdominal:      General: Bowel sounds are normal. There is no distension.      Palpations: Abdomen is soft. There is no mass.      Tenderness: There is no abdominal tenderness. There is no guarding or rebound.      Hernia: No hernia is present.   Lymphadenopathy:      Cervical: No cervical adenopathy.   Skin:     General: Skin is warm and dry.   Neurological:      Mental Status: She is alert and oriented to person, place, and time.   Psychiatric:         Behavior: Behavior normal.         Thought Content: Thought content normal.         Judgment: Judgment normal.         Assessment/Plan   Brittani was seen today for follow-up.    Diagnoses and all orders for this visit:    Acute cystitis without hematuria  -     POC " Urinalysis Dipstick, Automated  -     Urine Culture - Urine, Urine, Clean Catch  -     CBC & Differential  -     Comprehensive Metabolic Panel    Dizziness  -     CBC & Differential  -     Comprehensive Metabolic Panel    Anemia, unspecified type  -     Iron Profile  -     Vitamin B12  -     Folate  -     Ferritin    Elevated blood sugar  -     Hemoglobin A1c      UA in office negative. Will send for culture to confirm she has cleared infection   Current symptoms consistent with BV. Cover with flagyl and diflucan as noted.   Check additional labs as noted today   Report back to ER if new or worsening symptoms develop  Keep follow up with oncology, concerning dizziness may be secondary to current mets

## 2020-09-22 LAB
ALBUMIN SERPL-MCNC: 4.3 G/DL (ref 3.8–4.9)
ALBUMIN/GLOB SERPL: 2 {RATIO} (ref 1.2–2.2)
ALP SERPL-CCNC: 109 IU/L (ref 39–117)
ALT SERPL-CCNC: 24 IU/L (ref 0–32)
AST SERPL-CCNC: 31 IU/L (ref 0–40)
BASOPHILS # BLD AUTO: 0.1 X10E3/UL (ref 0–0.2)
BASOPHILS NFR BLD AUTO: 1 %
BILIRUB SERPL-MCNC: 0.2 MG/DL (ref 0–1.2)
BUN SERPL-MCNC: 11 MG/DL (ref 8–27)
BUN/CREAT SERPL: 20 (ref 12–28)
CALCIUM SERPL-MCNC: 9.7 MG/DL (ref 8.7–10.3)
CHLORIDE SERPL-SCNC: 101 MMOL/L (ref 96–106)
CO2 SERPL-SCNC: 24 MMOL/L (ref 20–29)
CREAT SERPL-MCNC: 0.54 MG/DL (ref 0.57–1)
EOSINOPHIL # BLD AUTO: 0.1 X10E3/UL (ref 0–0.4)
EOSINOPHIL NFR BLD AUTO: 1 %
ERYTHROCYTE [DISTWIDTH] IN BLOOD BY AUTOMATED COUNT: 12.5 % (ref 11.7–15.4)
FERRITIN SERPL-MCNC: 105 NG/ML (ref 15–150)
FOLATE SERPL-MCNC: >20 NG/ML
GLOBULIN SER CALC-MCNC: 2.1 G/DL (ref 1.5–4.5)
GLUCOSE SERPL-MCNC: 96 MG/DL (ref 65–99)
HBA1C MFR BLD: 4.9 % (ref 4.8–5.6)
HCT VFR BLD AUTO: 40.3 % (ref 34–46.6)
HGB BLD-MCNC: 13.6 G/DL (ref 11.1–15.9)
IMM GRANULOCYTES # BLD AUTO: 0 X10E3/UL (ref 0–0.1)
IMM GRANULOCYTES NFR BLD AUTO: 0 %
IRON SATN MFR SERPL: 24 % (ref 15–55)
IRON SERPL-MCNC: 66 UG/DL (ref 27–159)
LYMPHOCYTES # BLD AUTO: 1.6 X10E3/UL (ref 0.7–3.1)
LYMPHOCYTES NFR BLD AUTO: 21 %
MCH RBC QN AUTO: 33.8 PG (ref 26.6–33)
MCHC RBC AUTO-ENTMCNC: 33.7 G/DL (ref 31.5–35.7)
MCV RBC AUTO: 100 FL (ref 79–97)
MONOCYTES # BLD AUTO: 0.7 X10E3/UL (ref 0.1–0.9)
MONOCYTES NFR BLD AUTO: 9 %
NEUTROPHILS # BLD AUTO: 5.4 X10E3/UL (ref 1.4–7)
NEUTROPHILS NFR BLD AUTO: 68 %
PLATELET # BLD AUTO: 255 X10E3/UL (ref 150–450)
POTASSIUM SERPL-SCNC: 4.6 MMOL/L (ref 3.5–5.2)
PROT SERPL-MCNC: 6.4 G/DL (ref 6–8.5)
RBC # BLD AUTO: 4.02 X10E6/UL (ref 3.77–5.28)
SODIUM SERPL-SCNC: 139 MMOL/L (ref 134–144)
TIBC SERPL-MCNC: 270 UG/DL (ref 250–450)
UIBC SERPL-MCNC: 204 UG/DL (ref 131–425)
VIT B12 SERPL-MCNC: 475 PG/ML (ref 232–1245)
WBC # BLD AUTO: 7.9 X10E3/UL (ref 3.4–10.8)

## 2020-09-23 DIAGNOSIS — Z51.81 THERAPEUTIC DRUG MONITORING: Primary | ICD-10-CM

## 2020-09-23 LAB
BACTERIA UR CULT: NO GROWTH
BACTERIA UR CULT: NORMAL

## 2020-09-24 ENCOUNTER — APPOINTMENT (OUTPATIENT)
Dept: GENERAL RADIOLOGY | Facility: HOSPITAL | Age: 60
End: 2020-09-24

## 2020-09-24 ENCOUNTER — HOSPITAL ENCOUNTER (OUTPATIENT)
Facility: HOSPITAL | Age: 60
Setting detail: OBSERVATION
Discharge: HOME | End: 2020-09-26
Attending: EMERGENCY MEDICINE | Admitting: INTERNAL MEDICINE

## 2020-09-24 DIAGNOSIS — I48.91 ATRIAL FIBRILLATION WITH RVR (HCC): Primary | ICD-10-CM

## 2020-09-24 PROBLEM — N39.0 UTI (URINARY TRACT INFECTION): Status: RESOLVED | Noted: 2020-09-09 | Resolved: 2020-09-24

## 2020-09-24 PROBLEM — I48.19 PERSISTENT ATRIAL FIBRILLATION: Status: ACTIVE | Noted: 2020-09-24

## 2020-09-24 PROBLEM — L03.313 CELLULITIS OF CHEST WALL: Status: RESOLVED | Noted: 2018-10-15 | Resolved: 2020-09-24

## 2020-09-24 PROBLEM — N30.00 ACUTE CYSTITIS WITHOUT HEMATURIA: Status: RESOLVED | Noted: 2018-10-16 | Resolved: 2020-09-24

## 2020-09-24 PROBLEM — Z72.0 TOBACCO ABUSE: Status: RESOLVED | Noted: 2018-01-17 | Resolved: 2020-09-24

## 2020-09-24 PROBLEM — A41.9 SEPSIS: Status: RESOLVED | Noted: 2018-10-15 | Resolved: 2020-09-24

## 2020-09-24 LAB
ALBUMIN SERPL-MCNC: 4.1 G/DL (ref 3.5–5.2)
ALBUMIN/GLOB SERPL: 1.7 G/DL
ALP SERPL-CCNC: 103 U/L (ref 39–117)
ALT SERPL W P-5'-P-CCNC: 20 U/L (ref 1–33)
ANION GAP SERPL CALCULATED.3IONS-SCNC: 14 MMOL/L (ref 5–15)
AST SERPL-CCNC: 30 U/L (ref 1–32)
BASOPHILS # BLD AUTO: 0.08 10*3/MM3 (ref 0–0.2)
BASOPHILS NFR BLD AUTO: 1.1 % (ref 0–1.5)
BILIRUB SERPL-MCNC: 0.3 MG/DL (ref 0–1.2)
BUN SERPL-MCNC: 7 MG/DL (ref 8–23)
BUN/CREAT SERPL: 13.5 (ref 7–25)
CALCIUM SPEC-SCNC: 9.2 MG/DL (ref 8.6–10.5)
CHLORIDE SERPL-SCNC: 102 MMOL/L (ref 98–107)
CO2 SERPL-SCNC: 23 MMOL/L (ref 22–29)
CREAT SERPL-MCNC: 0.52 MG/DL (ref 0.57–1)
DEPRECATED RDW RBC AUTO: 49.9 FL (ref 37–54)
EOSINOPHIL # BLD AUTO: 0.07 10*3/MM3 (ref 0–0.4)
EOSINOPHIL NFR BLD AUTO: 0.9 % (ref 0.3–6.2)
ERYTHROCYTE [DISTWIDTH] IN BLOOD BY AUTOMATED COUNT: 13.1 % (ref 12.3–15.4)
GFR SERPL CREATININE-BSD FRML MDRD: 120 ML/MIN/1.73
GLOBULIN UR ELPH-MCNC: 2.4 GM/DL
GLUCOSE SERPL-MCNC: 92 MG/DL (ref 65–99)
HCT VFR BLD AUTO: 39.8 % (ref 34–46.6)
HGB BLD-MCNC: 12.7 G/DL (ref 12–15.9)
HOLD SPECIMEN: NORMAL
HOLD SPECIMEN: NORMAL
IMM GRANULOCYTES # BLD AUTO: 0.03 10*3/MM3 (ref 0–0.05)
IMM GRANULOCYTES NFR BLD AUTO: 0.4 % (ref 0–0.5)
LYMPHOCYTES # BLD AUTO: 1.28 10*3/MM3 (ref 0.7–3.1)
LYMPHOCYTES NFR BLD AUTO: 17.2 % (ref 19.6–45.3)
MAGNESIUM SERPL-MCNC: 1.6 MG/DL (ref 1.6–2.4)
MAGNESIUM SERPL-MCNC: 2 MG/DL (ref 1.6–2.4)
MCH RBC QN AUTO: 32.9 PG (ref 26.6–33)
MCHC RBC AUTO-ENTMCNC: 31.9 G/DL (ref 31.5–35.7)
MCV RBC AUTO: 103.1 FL (ref 79–97)
MONOCYTES # BLD AUTO: 0.54 10*3/MM3 (ref 0.1–0.9)
MONOCYTES NFR BLD AUTO: 7.2 % (ref 5–12)
NEUTROPHILS NFR BLD AUTO: 5.45 10*3/MM3 (ref 1.7–7)
NEUTROPHILS NFR BLD AUTO: 73.2 % (ref 42.7–76)
NRBC BLD AUTO-RTO: 0 /100 WBC (ref 0–0.2)
NT-PROBNP SERPL-MCNC: 138.6 PG/ML (ref 0–900)
PLATELET # BLD AUTO: 224 10*3/MM3 (ref 140–450)
PMV BLD AUTO: 10.4 FL (ref 6–12)
POTASSIUM SERPL-SCNC: 3.5 MMOL/L (ref 3.5–5.2)
PROT SERPL-MCNC: 6.5 G/DL (ref 6–8.5)
RBC # BLD AUTO: 3.86 10*6/MM3 (ref 3.77–5.28)
SARS-COV-2 RDRP RESP QL NAA+PROBE: NOT DETECTED
SODIUM SERPL-SCNC: 139 MMOL/L (ref 136–145)
TROPONIN T SERPL-MCNC: <0.01 NG/ML (ref 0–0.03)
TSH SERPL DL<=0.05 MIU/L-ACNC: 1.18 UIU/ML (ref 0.27–4.2)
WBC # BLD AUTO: 7.45 10*3/MM3 (ref 3.4–10.8)
WHOLE BLOOD HOLD SPECIMEN: NORMAL
WHOLE BLOOD HOLD SPECIMEN: NORMAL

## 2020-09-24 PROCEDURE — 99285 EMERGENCY DEPT VISIT HI MDM: CPT

## 2020-09-24 PROCEDURE — G0378 HOSPITAL OBSERVATION PER HR: HCPCS

## 2020-09-24 PROCEDURE — 85025 COMPLETE CBC W/AUTO DIFF WBC: CPT | Performed by: EMERGENCY MEDICINE

## 2020-09-24 PROCEDURE — 71045 X-RAY EXAM CHEST 1 VIEW: CPT

## 2020-09-24 PROCEDURE — 80053 COMPREHEN METABOLIC PANEL: CPT | Performed by: EMERGENCY MEDICINE

## 2020-09-24 PROCEDURE — 96375 TX/PRO/DX INJ NEW DRUG ADDON: CPT

## 2020-09-24 PROCEDURE — 96372 THER/PROPH/DIAG INJ SC/IM: CPT

## 2020-09-24 PROCEDURE — 83880 ASSAY OF NATRIURETIC PEPTIDE: CPT | Performed by: EMERGENCY MEDICINE

## 2020-09-24 PROCEDURE — 93005 ELECTROCARDIOGRAM TRACING: CPT | Performed by: EMERGENCY MEDICINE

## 2020-09-24 PROCEDURE — 96365 THER/PROPH/DIAG IV INF INIT: CPT

## 2020-09-24 PROCEDURE — 87635 SARS-COV-2 COVID-19 AMP PRB: CPT | Performed by: INTERNAL MEDICINE

## 2020-09-24 PROCEDURE — 84484 ASSAY OF TROPONIN QUANT: CPT | Performed by: EMERGENCY MEDICINE

## 2020-09-24 PROCEDURE — 96366 THER/PROPH/DIAG IV INF ADDON: CPT

## 2020-09-24 PROCEDURE — 25010000003 MAGNESIUM SULFATE 4 GM/100ML SOLUTION: Performed by: INTERNAL MEDICINE

## 2020-09-24 PROCEDURE — C9803 HOPD COVID-19 SPEC COLLECT: HCPCS

## 2020-09-24 PROCEDURE — 99220 PR INITIAL OBSERVATION CARE/DAY 70 MINUTES: CPT | Performed by: INTERNAL MEDICINE

## 2020-09-24 PROCEDURE — 84443 ASSAY THYROID STIM HORMONE: CPT | Performed by: EMERGENCY MEDICINE

## 2020-09-24 PROCEDURE — 83735 ASSAY OF MAGNESIUM: CPT | Performed by: EMERGENCY MEDICINE

## 2020-09-24 PROCEDURE — 83735 ASSAY OF MAGNESIUM: CPT | Performed by: INTERNAL MEDICINE

## 2020-09-24 PROCEDURE — 25010000002 HEPARIN (PORCINE) PER 1000 UNITS: Performed by: INTERNAL MEDICINE

## 2020-09-24 RX ORDER — ONDANSETRON 2 MG/ML
4 INJECTION INTRAMUSCULAR; INTRAVENOUS EVERY 6 HOURS PRN
Status: DISCONTINUED | OUTPATIENT
Start: 2020-09-24 | End: 2020-09-26 | Stop reason: HOSPADM

## 2020-09-24 RX ORDER — LORAZEPAM 2 MG/ML
2 INJECTION INTRAMUSCULAR
Status: DISCONTINUED | OUTPATIENT
Start: 2020-09-24 | End: 2020-09-26 | Stop reason: HOSPADM

## 2020-09-24 RX ORDER — LORAZEPAM 1 MG/1
2 TABLET ORAL
Status: DISCONTINUED | OUTPATIENT
Start: 2020-09-24 | End: 2020-09-26 | Stop reason: HOSPADM

## 2020-09-24 RX ORDER — ASPIRIN 81 MG/1
81 TABLET ORAL DAILY
Status: DISCONTINUED | OUTPATIENT
Start: 2020-09-24 | End: 2020-09-26 | Stop reason: HOSPADM

## 2020-09-24 RX ORDER — MAGNESIUM SULFATE HEPTAHYDRATE 40 MG/ML
2 INJECTION, SOLUTION INTRAVENOUS AS NEEDED
Status: DISCONTINUED | OUTPATIENT
Start: 2020-09-24 | End: 2020-09-26 | Stop reason: HOSPADM

## 2020-09-24 RX ORDER — DEXAMETHASONE 1 MG
0.5 TABLET ORAL
Status: DISCONTINUED | OUTPATIENT
Start: 2020-09-25 | End: 2020-09-26 | Stop reason: HOSPADM

## 2020-09-24 RX ORDER — METRONIDAZOLE 500 MG/1
500 TABLET ORAL 2 TIMES DAILY
Status: DISCONTINUED | OUTPATIENT
Start: 2020-09-24 | End: 2020-09-26 | Stop reason: HOSPADM

## 2020-09-24 RX ORDER — ASPIRIN 81 MG/1
81 TABLET ORAL DAILY
COMMUNITY
End: 2021-02-25

## 2020-09-24 RX ORDER — MIRTAZAPINE 15 MG/1
15 TABLET, FILM COATED ORAL NIGHTLY
Status: DISCONTINUED | OUTPATIENT
Start: 2020-09-24 | End: 2020-09-26 | Stop reason: HOSPADM

## 2020-09-24 RX ORDER — LORAZEPAM 1 MG/1
1 TABLET ORAL
Status: DISCONTINUED | OUTPATIENT
Start: 2020-09-24 | End: 2020-09-26 | Stop reason: HOSPADM

## 2020-09-24 RX ORDER — THIAMINE MONONITRATE (VIT B1) 100 MG
100 TABLET ORAL DAILY
Status: DISCONTINUED | OUTPATIENT
Start: 2020-09-24 | End: 2020-09-26 | Stop reason: HOSPADM

## 2020-09-24 RX ORDER — HEPARIN SODIUM 5000 [USP'U]/ML
5000 INJECTION, SOLUTION INTRAVENOUS; SUBCUTANEOUS EVERY 8 HOURS SCHEDULED
Status: DISCONTINUED | OUTPATIENT
Start: 2020-09-24 | End: 2020-09-26 | Stop reason: HOSPADM

## 2020-09-24 RX ORDER — ALBUTEROL SULFATE 2.5 MG/3ML
2.5 SOLUTION RESPIRATORY (INHALATION) EVERY 6 HOURS PRN
Status: DISCONTINUED | OUTPATIENT
Start: 2020-09-24 | End: 2020-09-26 | Stop reason: HOSPADM

## 2020-09-24 RX ORDER — MAGNESIUM SULFATE HEPTAHYDRATE 40 MG/ML
4 INJECTION, SOLUTION INTRAVENOUS AS NEEDED
Status: DISCONTINUED | OUTPATIENT
Start: 2020-09-24 | End: 2020-09-26 | Stop reason: HOSPADM

## 2020-09-24 RX ORDER — ARIPIPRAZOLE 5 MG/1
5 TABLET ORAL DAILY
Status: DISCONTINUED | OUTPATIENT
Start: 2020-09-25 | End: 2020-09-26 | Stop reason: HOSPADM

## 2020-09-24 RX ORDER — GABAPENTIN 100 MG/1
100 CAPSULE ORAL 3 TIMES DAILY
Status: DISCONTINUED | OUTPATIENT
Start: 2020-09-24 | End: 2020-09-26 | Stop reason: HOSPADM

## 2020-09-24 RX ORDER — METOPROLOL TARTRATE 5 MG/5ML
5 INJECTION INTRAVENOUS ONCE
Status: COMPLETED | OUTPATIENT
Start: 2020-09-24 | End: 2020-09-24

## 2020-09-24 RX ORDER — SODIUM CHLORIDE 0.9 % (FLUSH) 0.9 %
10 SYRINGE (ML) INJECTION AS NEEDED
Status: DISCONTINUED | OUTPATIENT
Start: 2020-09-24 | End: 2020-09-26 | Stop reason: HOSPADM

## 2020-09-24 RX ORDER — POTASSIUM CHLORIDE 7.45 MG/ML
10 INJECTION INTRAVENOUS
Status: DISCONTINUED | OUTPATIENT
Start: 2020-09-24 | End: 2020-09-26 | Stop reason: HOSPADM

## 2020-09-24 RX ORDER — VENLAFAXINE HYDROCHLORIDE 75 MG/1
150 CAPSULE, EXTENDED RELEASE ORAL DAILY
Status: DISCONTINUED | OUTPATIENT
Start: 2020-09-24 | End: 2020-09-26 | Stop reason: HOSPADM

## 2020-09-24 RX ORDER — OXYCODONE AND ACETAMINOPHEN 7.5; 325 MG/1; MG/1
1 TABLET ORAL EVERY 6 HOURS PRN
Status: DISCONTINUED | OUTPATIENT
Start: 2020-09-24 | End: 2020-09-26 | Stop reason: HOSPADM

## 2020-09-24 RX ORDER — ACETAMINOPHEN 650 MG/1
650 SUPPOSITORY RECTAL EVERY 4 HOURS PRN
Status: DISCONTINUED | OUTPATIENT
Start: 2020-09-24 | End: 2020-09-26 | Stop reason: HOSPADM

## 2020-09-24 RX ORDER — NICOTINE 21 MG/24HR
1 PATCH, TRANSDERMAL 24 HOURS TRANSDERMAL NIGHTLY
Status: DISCONTINUED | OUTPATIENT
Start: 2020-09-24 | End: 2020-09-26 | Stop reason: HOSPADM

## 2020-09-24 RX ORDER — DILTIAZEM HCL IN NACL,ISO-OSM 125 MG/125
5-15 PLASTIC BAG, INJECTION (ML) INTRAVENOUS
Status: DISCONTINUED | OUTPATIENT
Start: 2020-09-24 | End: 2020-09-25

## 2020-09-24 RX ORDER — PRAVASTATIN SODIUM 20 MG
20 TABLET ORAL NIGHTLY
Status: DISCONTINUED | OUTPATIENT
Start: 2020-09-24 | End: 2020-09-26 | Stop reason: HOSPADM

## 2020-09-24 RX ORDER — ACETAMINOPHEN 160 MG/5ML
650 SOLUTION ORAL EVERY 4 HOURS PRN
Status: DISCONTINUED | OUTPATIENT
Start: 2020-09-24 | End: 2020-09-26 | Stop reason: HOSPADM

## 2020-09-24 RX ORDER — ONDANSETRON 4 MG/1
4 TABLET, FILM COATED ORAL EVERY 6 HOURS PRN
Status: DISCONTINUED | OUTPATIENT
Start: 2020-09-24 | End: 2020-09-26 | Stop reason: HOSPADM

## 2020-09-24 RX ORDER — ACETAMINOPHEN 325 MG/1
650 TABLET ORAL EVERY 4 HOURS PRN
Status: DISCONTINUED | OUTPATIENT
Start: 2020-09-24 | End: 2020-09-26 | Stop reason: HOSPADM

## 2020-09-24 RX ORDER — POTASSIUM CHLORIDE 750 MG/1
40 CAPSULE, EXTENDED RELEASE ORAL AS NEEDED
Status: DISCONTINUED | OUTPATIENT
Start: 2020-09-24 | End: 2020-09-26 | Stop reason: HOSPADM

## 2020-09-24 RX ORDER — SODIUM CHLORIDE 0.9 % (FLUSH) 0.9 %
10 SYRINGE (ML) INJECTION EVERY 12 HOURS SCHEDULED
Status: DISCONTINUED | OUTPATIENT
Start: 2020-09-24 | End: 2020-09-26 | Stop reason: HOSPADM

## 2020-09-24 RX ORDER — POTASSIUM CHLORIDE 1.5 G/1.77G
40 POWDER, FOR SOLUTION ORAL AS NEEDED
Status: DISCONTINUED | OUTPATIENT
Start: 2020-09-24 | End: 2020-09-26 | Stop reason: HOSPADM

## 2020-09-24 RX ORDER — PANTOPRAZOLE SODIUM 40 MG/1
40 TABLET, DELAYED RELEASE ORAL EVERY MORNING
Status: DISCONTINUED | OUTPATIENT
Start: 2020-09-25 | End: 2020-09-26 | Stop reason: HOSPADM

## 2020-09-24 RX ORDER — LORAZEPAM 2 MG/ML
1 INJECTION INTRAMUSCULAR
Status: DISCONTINUED | OUTPATIENT
Start: 2020-09-24 | End: 2020-09-26 | Stop reason: HOSPADM

## 2020-09-24 RX ADMIN — SODIUM CHLORIDE, PRESERVATIVE FREE 10 ML: 5 INJECTION INTRAVENOUS at 21:24

## 2020-09-24 RX ADMIN — PRAVASTATIN SODIUM 20 MG: 20 TABLET ORAL at 21:24

## 2020-09-24 RX ADMIN — NICOTINE 1 PATCH: 21 PATCH, EXTENDED RELEASE TRANSDERMAL at 21:52

## 2020-09-24 RX ADMIN — MAGNESIUM SULFATE HEPTAHYDRATE 4 G: 40 INJECTION, SOLUTION INTRAVENOUS at 18:08

## 2020-09-24 RX ADMIN — GABAPENTIN 100 MG: 100 CAPSULE ORAL at 21:24

## 2020-09-24 RX ADMIN — HEPARIN SODIUM 5000 UNITS: 5000 INJECTION INTRAVENOUS; SUBCUTANEOUS at 21:24

## 2020-09-24 RX ADMIN — VENLAFAXINE HYDROCHLORIDE 150 MG: 75 CAPSULE, EXTENDED RELEASE ORAL at 18:07

## 2020-09-24 RX ADMIN — POTASSIUM CHLORIDE 40 MEQ: 10 CAPSULE, COATED, EXTENDED RELEASE ORAL at 21:24

## 2020-09-24 RX ADMIN — SODIUM CHLORIDE, PRESERVATIVE FREE 10 ML: 5 INJECTION INTRAVENOUS at 14:29

## 2020-09-24 RX ADMIN — ASPIRIN 81 MG: 81 TABLET, COATED ORAL at 18:07

## 2020-09-24 RX ADMIN — POTASSIUM CHLORIDE 40 MEQ: 10 CAPSULE, COATED, EXTENDED RELEASE ORAL at 18:08

## 2020-09-24 RX ADMIN — METRONIDAZOLE 500 MG: 500 TABLET ORAL at 21:24

## 2020-09-24 RX ADMIN — METOPROLOL TARTRATE 12.5 MG: 25 TABLET, FILM COATED ORAL at 21:24

## 2020-09-24 RX ADMIN — OXYCODONE HYDROCHLORIDE AND ACETAMINOPHEN 1 TABLET: 7.5; 325 TABLET ORAL at 18:07

## 2020-09-24 RX ADMIN — METOPROLOL TARTRATE 5 MG: 5 INJECTION INTRAVENOUS at 14:28

## 2020-09-24 RX ADMIN — MIRTAZAPINE 15 MG: 15 TABLET, FILM COATED ORAL at 21:23

## 2020-09-24 RX ADMIN — THIAMINE HCL TAB 100 MG 100 MG: 100 TAB at 18:07

## 2020-09-25 ENCOUNTER — APPOINTMENT (OUTPATIENT)
Dept: CARDIOLOGY | Facility: HOSPITAL | Age: 60
End: 2020-09-25

## 2020-09-25 LAB
ANION GAP SERPL CALCULATED.3IONS-SCNC: 8 MMOL/L (ref 5–15)
BH CV ECHO MEAS - AO ROOT AREA (BSA CORRECTED): 1.9
BH CV ECHO MEAS - AO ROOT AREA: 7.1 CM^2
BH CV ECHO MEAS - AO ROOT DIAM: 3 CM
BH CV ECHO MEAS - BSA(HAYCOCK): 1.5 M^2
BH CV ECHO MEAS - BSA: 1.6 M^2
BH CV ECHO MEAS - BZI_BMI: 19.1 KILOGRAMS/M^2
BH CV ECHO MEAS - BZI_METRIC_HEIGHT: 165.1 CM
BH CV ECHO MEAS - BZI_METRIC_WEIGHT: 52.2 KG
BH CV ECHO MEAS - EDV(CUBED): 69.6 ML
BH CV ECHO MEAS - EDV(MOD-SP2): 32 ML
BH CV ECHO MEAS - EDV(MOD-SP4): 33 ML
BH CV ECHO MEAS - EDV(TEICH): 74.8 ML
BH CV ECHO MEAS - EF(CUBED): 60.6 %
BH CV ECHO MEAS - EF(MOD-BP): 65 %
BH CV ECHO MEAS - EF(MOD-SP2): 62.5 %
BH CV ECHO MEAS - EF(MOD-SP4): 66.7 %
BH CV ECHO MEAS - EF(TEICH): 52.6 %
BH CV ECHO MEAS - ESV(CUBED): 27.4 ML
BH CV ECHO MEAS - ESV(MOD-SP2): 12 ML
BH CV ECHO MEAS - ESV(MOD-SP4): 11 ML
BH CV ECHO MEAS - ESV(TEICH): 35.4 ML
BH CV ECHO MEAS - FS: 26.7 %
BH CV ECHO MEAS - IVS/LVPW: 0.81
BH CV ECHO MEAS - IVSD: 0.79 CM
BH CV ECHO MEAS - LA DIMENSION: 3.8 CM
BH CV ECHO MEAS - LA/AO: 1.3
BH CV ECHO MEAS - LAD MAJOR: 4.1 CM
BH CV ECHO MEAS - LAT PEAK E' VEL: 8.2 CM/SEC
BH CV ECHO MEAS - LATERAL E/E' RATIO: 7.6
BH CV ECHO MEAS - LV DIASTOLIC VOL/BSA (35-75): 21.1 ML/M^2
BH CV ECHO MEAS - LV MASS(C)D: 110.8 GRAMS
BH CV ECHO MEAS - LV MASS(C)DI: 70.9 GRAMS/M^2
BH CV ECHO MEAS - LV SYSTOLIC VOL/BSA (12-30): 7 ML/M^2
BH CV ECHO MEAS - LVIDD: 4.1 CM
BH CV ECHO MEAS - LVIDS: 3 CM
BH CV ECHO MEAS - LVLD AP2: 5.1 CM
BH CV ECHO MEAS - LVLD AP4: 5.4 CM
BH CV ECHO MEAS - LVLS AP2: 3.9 CM
BH CV ECHO MEAS - LVLS AP4: 3.8 CM
BH CV ECHO MEAS - LVOT AREA (M): 3.1 CM^2
BH CV ECHO MEAS - LVOT AREA: 3.1 CM^2
BH CV ECHO MEAS - LVOT DIAM: 2 CM
BH CV ECHO MEAS - LVPWD: 0.97 CM
BH CV ECHO MEAS - MED PEAK E' VEL: 6.5 CM/SEC
BH CV ECHO MEAS - MEDIAL E/E' RATIO: 9.6
BH CV ECHO MEAS - MV A MAX VEL: 71.6 CM/SEC
BH CV ECHO MEAS - MV DEC SLOPE: 249.5 CM/SEC^2
BH CV ECHO MEAS - MV DEC TIME: 0.19 SEC
BH CV ECHO MEAS - MV E MAX VEL: 63.7 CM/SEC
BH CV ECHO MEAS - MV E/A: 0.89
BH CV ECHO MEAS - MV P1/2T MAX VEL: 99.9 CM/SEC
BH CV ECHO MEAS - MV P1/2T: 117.3 MSEC
BH CV ECHO MEAS - MVA P1/2T LCG: 2.2 CM^2
BH CV ECHO MEAS - MVA(P1/2T): 1.9 CM^2
BH CV ECHO MEAS - PA ACC SLOPE: 174.7 CM/SEC^2
BH CV ECHO MEAS - PA ACC TIME: 0.23 SEC
BH CV ECHO MEAS - PA PR(ACCEL): -24 MMHG
BH CV ECHO MEAS - RAP SYSTOLE: 3 MMHG
BH CV ECHO MEAS - RVSP: 22 MMHG
BH CV ECHO MEAS - SI(CUBED): 27 ML/M^2
BH CV ECHO MEAS - SI(MOD-SP2): 12.8 ML/M^2
BH CV ECHO MEAS - SI(MOD-SP4): 14.1 ML/M^2
BH CV ECHO MEAS - SI(TEICH): 25.2 ML/M^2
BH CV ECHO MEAS - SV(CUBED): 42.2 ML
BH CV ECHO MEAS - SV(MOD-SP2): 20 ML
BH CV ECHO MEAS - SV(MOD-SP4): 22 ML
BH CV ECHO MEAS - SV(TEICH): 39.4 ML
BH CV ECHO MEAS - TAPSE (>1.6): 1.2 CM
BH CV ECHO MEAS - TR MAX PG: 19 MMHG
BH CV ECHO MEAS - TR MAX VEL: 217 CM/SEC
BH CV ECHO MEASUREMENTS AVERAGE E/E' RATIO: 8.67
BH CV VAS BP RIGHT ARM: NORMAL MMHG
BH CV XLRA - RV BASE: 3.2 CM
BH CV XLRA - RV LENGTH: 4.9 CM
BH CV XLRA - RV MID: 2.6 CM
BH CV XLRA - TDI S': 6.8 CM/SEC
BUN SERPL-MCNC: 7 MG/DL (ref 8–23)
BUN/CREAT SERPL: 12.5 (ref 7–25)
CALCIUM SPEC-SCNC: 9 MG/DL (ref 8.6–10.5)
CHLORIDE SERPL-SCNC: 109 MMOL/L (ref 98–107)
CHOLEST SERPL-MCNC: 203 MG/DL (ref 0–200)
CO2 SERPL-SCNC: 23 MMOL/L (ref 22–29)
CREAT SERPL-MCNC: 0.56 MG/DL (ref 0.57–1)
DEPRECATED RDW RBC AUTO: 50.4 FL (ref 37–54)
ERYTHROCYTE [DISTWIDTH] IN BLOOD BY AUTOMATED COUNT: 13.2 % (ref 12.3–15.4)
GFR SERPL CREATININE-BSD FRML MDRD: 110 ML/MIN/1.73
GLUCOSE SERPL-MCNC: 97 MG/DL (ref 65–99)
HCT VFR BLD AUTO: 38.6 % (ref 34–46.6)
HDLC SERPL-MCNC: 44 MG/DL (ref 40–60)
HGB BLD-MCNC: 12.3 G/DL (ref 12–15.9)
LDLC SERPL CALC-MCNC: 120 MG/DL (ref 0–100)
LDLC/HDLC SERPL: 2.74 {RATIO}
LEFT ATRIUM VOLUME INDEX: 13.4 ML/M^2
LEFT ATRIUM VOLUME: 21 ML
MAGNESIUM SERPL-MCNC: 2.5 MG/DL (ref 1.6–2.4)
MAXIMAL PREDICTED HEART RATE: 160 BPM
MCH RBC QN AUTO: 33.3 PG (ref 26.6–33)
MCHC RBC AUTO-ENTMCNC: 31.9 G/DL (ref 31.5–35.7)
MCV RBC AUTO: 104.6 FL (ref 79–97)
PLATELET # BLD AUTO: 238 10*3/MM3 (ref 140–450)
PMV BLD AUTO: 10 FL (ref 6–12)
POTASSIUM SERPL-SCNC: 4.8 MMOL/L (ref 3.5–5.2)
RBC # BLD AUTO: 3.69 10*6/MM3 (ref 3.77–5.28)
SODIUM SERPL-SCNC: 140 MMOL/L (ref 136–145)
STRESS TARGET HR: 136 BPM
TRIGL SERPL-MCNC: 193 MG/DL (ref 0–150)
VLDLC SERPL-MCNC: 38.6 MG/DL
WBC # BLD AUTO: 5.8 10*3/MM3 (ref 3.4–10.8)

## 2020-09-25 PROCEDURE — 99225 PR SBSQ OBSERVATION CARE/DAY 25 MINUTES: CPT | Performed by: INTERNAL MEDICINE

## 2020-09-25 PROCEDURE — 97166 OT EVAL MOD COMPLEX 45 MIN: CPT

## 2020-09-25 PROCEDURE — 97535 SELF CARE MNGMENT TRAINING: CPT

## 2020-09-25 PROCEDURE — 93306 TTE W/DOPPLER COMPLETE: CPT

## 2020-09-25 PROCEDURE — G0378 HOSPITAL OBSERVATION PER HR: HCPCS

## 2020-09-25 PROCEDURE — 80048 BASIC METABOLIC PNL TOTAL CA: CPT | Performed by: INTERNAL MEDICINE

## 2020-09-25 PROCEDURE — 83735 ASSAY OF MAGNESIUM: CPT | Performed by: INTERNAL MEDICINE

## 2020-09-25 PROCEDURE — 96372 THER/PROPH/DIAG INJ SC/IM: CPT

## 2020-09-25 PROCEDURE — 93306 TTE W/DOPPLER COMPLETE: CPT | Performed by: INTERNAL MEDICINE

## 2020-09-25 PROCEDURE — 85027 COMPLETE CBC AUTOMATED: CPT | Performed by: INTERNAL MEDICINE

## 2020-09-25 PROCEDURE — 97161 PT EVAL LOW COMPLEX 20 MIN: CPT

## 2020-09-25 PROCEDURE — 99244 OFF/OP CNSLTJ NEW/EST MOD 40: CPT | Performed by: INTERNAL MEDICINE

## 2020-09-25 PROCEDURE — 25010000002 HEPARIN (PORCINE) PER 1000 UNITS: Performed by: INTERNAL MEDICINE

## 2020-09-25 PROCEDURE — 80061 LIPID PANEL: CPT | Performed by: INTERNAL MEDICINE

## 2020-09-25 RX ORDER — AMIODARONE HYDROCHLORIDE 200 MG/1
200 TABLET ORAL EVERY 12 HOURS SCHEDULED
Status: DISCONTINUED | OUTPATIENT
Start: 2020-09-25 | End: 2020-09-26 | Stop reason: HOSPADM

## 2020-09-25 RX ADMIN — MIRTAZAPINE 15 MG: 15 TABLET, FILM COATED ORAL at 21:41

## 2020-09-25 RX ADMIN — SODIUM CHLORIDE, PRESERVATIVE FREE 10 ML: 5 INJECTION INTRAVENOUS at 21:50

## 2020-09-25 RX ADMIN — AMIODARONE HYDROCHLORIDE 200 MG: 200 TABLET ORAL at 12:53

## 2020-09-25 RX ADMIN — PANTOPRAZOLE SODIUM 40 MG: 40 TABLET, DELAYED RELEASE ORAL at 06:18

## 2020-09-25 RX ADMIN — HEPARIN SODIUM 5000 UNITS: 5000 INJECTION INTRAVENOUS; SUBCUTANEOUS at 21:40

## 2020-09-25 RX ADMIN — VENLAFAXINE HYDROCHLORIDE 150 MG: 75 CAPSULE, EXTENDED RELEASE ORAL at 08:55

## 2020-09-25 RX ADMIN — METOPROLOL TARTRATE 12.5 MG: 25 TABLET, FILM COATED ORAL at 08:55

## 2020-09-25 RX ADMIN — OXYCODONE HYDROCHLORIDE AND ACETAMINOPHEN 1 TABLET: 7.5; 325 TABLET ORAL at 00:04

## 2020-09-25 RX ADMIN — AMIODARONE HYDROCHLORIDE 200 MG: 200 TABLET ORAL at 21:41

## 2020-09-25 RX ADMIN — OXYCODONE HYDROCHLORIDE AND ACETAMINOPHEN 1 TABLET: 7.5; 325 TABLET ORAL at 18:14

## 2020-09-25 RX ADMIN — METRONIDAZOLE 500 MG: 500 TABLET ORAL at 21:41

## 2020-09-25 RX ADMIN — PRAVASTATIN SODIUM 20 MG: 20 TABLET ORAL at 21:41

## 2020-09-25 RX ADMIN — HEPARIN SODIUM 5000 UNITS: 5000 INJECTION INTRAVENOUS; SUBCUTANEOUS at 06:18

## 2020-09-25 RX ADMIN — DEXAMETHASONE 0.5 MG: 1 TABLET ORAL at 08:56

## 2020-09-25 RX ADMIN — SODIUM CHLORIDE, PRESERVATIVE FREE 10 ML: 5 INJECTION INTRAVENOUS at 08:56

## 2020-09-25 RX ADMIN — ASPIRIN 81 MG: 81 TABLET, COATED ORAL at 08:55

## 2020-09-25 RX ADMIN — OXYCODONE HYDROCHLORIDE AND ACETAMINOPHEN 1 TABLET: 7.5; 325 TABLET ORAL at 06:18

## 2020-09-25 RX ADMIN — METRONIDAZOLE 500 MG: 500 TABLET ORAL at 08:56

## 2020-09-25 RX ADMIN — GABAPENTIN 100 MG: 100 CAPSULE ORAL at 21:41

## 2020-09-25 RX ADMIN — GABAPENTIN 100 MG: 100 CAPSULE ORAL at 08:56

## 2020-09-25 RX ADMIN — THIAMINE HCL TAB 100 MG 100 MG: 100 TAB at 08:56

## 2020-09-25 RX ADMIN — HEPARIN SODIUM 5000 UNITS: 5000 INJECTION INTRAVENOUS; SUBCUTANEOUS at 15:15

## 2020-09-25 RX ADMIN — NICOTINE 1 PATCH: 21 PATCH, EXTENDED RELEASE TRANSDERMAL at 21:00

## 2020-09-25 RX ADMIN — ARIPIPRAZOLE 5 MG: 5 TABLET ORAL at 08:56

## 2020-09-25 RX ADMIN — OXYCODONE HYDROCHLORIDE AND ACETAMINOPHEN 1 TABLET: 7.5; 325 TABLET ORAL at 12:57

## 2020-09-25 RX ADMIN — GABAPENTIN 100 MG: 100 CAPSULE ORAL at 16:09

## 2020-09-26 ENCOUNTER — READMISSION MANAGEMENT (OUTPATIENT)
Dept: CALL CENTER | Facility: HOSPITAL | Age: 60
End: 2020-09-26

## 2020-09-26 VITALS
WEIGHT: 115 LBS | HEART RATE: 69 BPM | TEMPERATURE: 98 F | DIASTOLIC BLOOD PRESSURE: 83 MMHG | BODY MASS INDEX: 19.16 KG/M2 | RESPIRATION RATE: 16 BRPM | SYSTOLIC BLOOD PRESSURE: 117 MMHG | HEIGHT: 65 IN | OXYGEN SATURATION: 95 %

## 2020-09-26 LAB
ANION GAP SERPL CALCULATED.3IONS-SCNC: 8 MMOL/L (ref 5–15)
BUN SERPL-MCNC: 9 MG/DL (ref 8–23)
BUN/CREAT SERPL: 15.8 (ref 7–25)
CALCIUM SPEC-SCNC: 9.2 MG/DL (ref 8.6–10.5)
CHLORIDE SERPL-SCNC: 106 MMOL/L (ref 98–107)
CO2 SERPL-SCNC: 25 MMOL/L (ref 22–29)
CREAT SERPL-MCNC: 0.57 MG/DL (ref 0.57–1)
GFR SERPL CREATININE-BSD FRML MDRD: 108 ML/MIN/1.73
GLUCOSE SERPL-MCNC: 98 MG/DL (ref 65–99)
MAGNESIUM SERPL-MCNC: 2.1 MG/DL (ref 1.6–2.4)
POTASSIUM SERPL-SCNC: 4.6 MMOL/L (ref 3.5–5.2)
SODIUM SERPL-SCNC: 139 MMOL/L (ref 136–145)

## 2020-09-26 PROCEDURE — 83735 ASSAY OF MAGNESIUM: CPT | Performed by: INTERNAL MEDICINE

## 2020-09-26 PROCEDURE — G0008 ADMIN INFLUENZA VIRUS VAC: HCPCS | Performed by: INTERNAL MEDICINE

## 2020-09-26 PROCEDURE — 99217 PR OBSERVATION CARE DISCHARGE MANAGEMENT: CPT | Performed by: INTERNAL MEDICINE

## 2020-09-26 PROCEDURE — 80048 BASIC METABOLIC PNL TOTAL CA: CPT | Performed by: INTERNAL MEDICINE

## 2020-09-26 PROCEDURE — 25010000002 HEPARIN (PORCINE) PER 1000 UNITS: Performed by: INTERNAL MEDICINE

## 2020-09-26 PROCEDURE — 90686 IIV4 VACC NO PRSV 0.5 ML IM: CPT | Performed by: INTERNAL MEDICINE

## 2020-09-26 PROCEDURE — 25010000002 INFLUENZA VAC SPLIT QUAD 0.5 ML SUSPENSION PREFILLED SYRINGE: Performed by: INTERNAL MEDICINE

## 2020-09-26 PROCEDURE — G0378 HOSPITAL OBSERVATION PER HR: HCPCS

## 2020-09-26 PROCEDURE — 96372 THER/PROPH/DIAG INJ SC/IM: CPT

## 2020-09-26 RX ORDER — AMIODARONE HYDROCHLORIDE 200 MG/1
TABLET ORAL
Qty: 56 TABLET | Refills: 1 | Status: SHIPPED | OUTPATIENT
Start: 2020-09-26 | End: 2020-11-08

## 2020-09-26 RX ADMIN — ARIPIPRAZOLE 5 MG: 5 TABLET ORAL at 08:32

## 2020-09-26 RX ADMIN — INFLUENZA VIRUS VACCINE 0.5 ML: 15; 15; 15; 15 SUSPENSION INTRAMUSCULAR at 12:13

## 2020-09-26 RX ADMIN — DEXAMETHASONE 0.5 MG: 1 TABLET ORAL at 08:32

## 2020-09-26 RX ADMIN — AMIODARONE HYDROCHLORIDE 200 MG: 200 TABLET ORAL at 08:26

## 2020-09-26 RX ADMIN — OXYCODONE HYDROCHLORIDE AND ACETAMINOPHEN 1 TABLET: 7.5; 325 TABLET ORAL at 07:11

## 2020-09-26 RX ADMIN — SODIUM CHLORIDE, PRESERVATIVE FREE 10 ML: 5 INJECTION INTRAVENOUS at 08:26

## 2020-09-26 RX ADMIN — METRONIDAZOLE 500 MG: 500 TABLET ORAL at 08:26

## 2020-09-26 RX ADMIN — GABAPENTIN 100 MG: 100 CAPSULE ORAL at 08:26

## 2020-09-26 RX ADMIN — ASPIRIN 81 MG: 81 TABLET, COATED ORAL at 08:26

## 2020-09-26 RX ADMIN — VENLAFAXINE HYDROCHLORIDE 150 MG: 75 CAPSULE, EXTENDED RELEASE ORAL at 08:25

## 2020-09-26 RX ADMIN — HEPARIN SODIUM 5000 UNITS: 5000 INJECTION INTRAVENOUS; SUBCUTANEOUS at 05:45

## 2020-09-26 RX ADMIN — THIAMINE HCL TAB 100 MG 100 MG: 100 TAB at 08:26

## 2020-09-26 RX ADMIN — OXYCODONE HYDROCHLORIDE AND ACETAMINOPHEN 1 TABLET: 7.5; 325 TABLET ORAL at 00:36

## 2020-09-26 RX ADMIN — PANTOPRAZOLE SODIUM 40 MG: 40 TABLET, DELAYED RELEASE ORAL at 05:47

## 2020-09-26 NOTE — OUTREACH NOTE
Prep Survey      Responses   [facility] patient discharged from?  [place]   Is LACE score < 7 ?  No   Eligibility  [facility]   Date of Admission  09/24/20   Date of Discharge  09/26/20   Discharge Disposition  Home or Self Care   Discharge diagnosis  New onset atrial fibrillation 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    COVID-19 Test Status  Negative   Does the patient have one of the following disease processes/diagnoses(primary or secondary)?  Other   Does the patient have Home health ordered?  No   Is there a DME ordered?  No   Prep survey completed?  Yes          Beryl Chan RN

## 2020-09-28 ENCOUNTER — TRANSITIONAL CARE MANAGEMENT TELEPHONE ENCOUNTER (OUTPATIENT)
Dept: CALL CENTER | Facility: HOSPITAL | Age: 60
End: 2020-09-28

## 2020-09-28 NOTE — OUTREACH NOTE
Call Center TCM Note      Responses   [facility] patient discharged from?  [place]   COVID-19 Test Status  Negative   Does the patient have one of the following disease processes/diagnoses(primary or secondary)?  Other   TCM attempt successful?  No   Unsuccessful attempts  Attempt 1          Beryl Malcolm RN    9/28/2020, 14:21 EDT

## 2020-09-28 NOTE — OUTREACH NOTE
Call Center TCM Note      Responses   [facility] patient discharged from?  [place]   COVID-19 Test Status  Negative   Does the patient have one of the following disease processes/diagnoses(primary or secondary)?  Other   TCM attempt successful?  No   Unsuccessful attempts  Attempt 2          Beryl Malcolm RN    9/28/2020, 16:00 EDT

## 2020-09-29 ENCOUNTER — TRANSITIONAL CARE MANAGEMENT TELEPHONE ENCOUNTER (OUTPATIENT)
Dept: CALL CENTER | Facility: HOSPITAL | Age: 60
End: 2020-09-29

## 2020-09-29 NOTE — OUTREACH NOTE
Call Center TCM Note      Responses   [facility] patient discharged from?  [place]   COVID-19 Test Status  Negative   Does the patient have one of the following disease processes/diagnoses(primary or secondary)?  Other   TCM attempt successful?  No   Unsuccessful attempts  Attempt 3 [Verbal release is over a year old. ]          Julissa Peraza RN    9/29/2020, 08:58 EDT

## 2020-09-30 DIAGNOSIS — S69.92XA INJURY OF LEFT WRIST, INITIAL ENCOUNTER: ICD-10-CM

## 2020-09-30 DIAGNOSIS — M25.532 LEFT WRIST PAIN: ICD-10-CM

## 2020-09-30 DIAGNOSIS — G89.3 CANCER ASSOCIATED PAIN: ICD-10-CM

## 2020-09-30 RX ORDER — OXYCODONE AND ACETAMINOPHEN 7.5; 325 MG/1; MG/1
1 TABLET ORAL EVERY 6 HOURS PRN
Qty: 90 TABLET | Refills: 0 | Status: SHIPPED | OUTPATIENT
Start: 2020-09-30 | End: 2020-11-09 | Stop reason: SDUPTHER

## 2020-10-01 RX ORDER — MIRTAZAPINE 30 MG/1
TABLET, FILM COATED ORAL
Qty: 60 TABLET | Refills: 0 | OUTPATIENT
Start: 2020-10-01

## 2020-10-05 DIAGNOSIS — C50.912 CANCER OF LEFT BREAST METASTATIC TO BRAIN (HCC): Primary | ICD-10-CM

## 2020-10-05 DIAGNOSIS — R13.10 DYSPHAGIA, UNSPECIFIED TYPE: ICD-10-CM

## 2020-10-05 DIAGNOSIS — C79.31 CANCER OF LEFT BREAST METASTATIC TO BRAIN (HCC): Primary | ICD-10-CM

## 2020-10-06 RX ORDER — OMEPRAZOLE 20 MG/1
CAPSULE, DELAYED RELEASE ORAL
Qty: 30 CAPSULE | Refills: 5 | Status: SHIPPED | OUTPATIENT
Start: 2020-10-06 | End: 2020-12-10

## 2020-10-07 ENCOUNTER — READMISSION MANAGEMENT (OUTPATIENT)
Dept: CALL CENTER | Facility: HOSPITAL | Age: 60
End: 2020-10-07

## 2020-10-07 NOTE — OUTREACH NOTE
Medical Week 2 Survey      Responses   [facility] patient discharged from?  [place]   Does the patient have one of the following disease processes/diagnoses(primary or secondary)?  Other   Week 2 attempt successful?  No   Unsuccessful attempts  Attempt 2          Melly العراقي RN

## 2020-10-13 ENCOUNTER — READMISSION MANAGEMENT (OUTPATIENT)
Dept: CALL CENTER | Facility: HOSPITAL | Age: 60
End: 2020-10-13

## 2020-10-13 NOTE — OUTREACH NOTE
Medical Week 3 Survey      Responses   [facility] patient discharged from?  [place]   Does the patient have one of the following disease processes/diagnoses(primary or secondary)?  Other   Week 3 attempt successful?  No   Unsuccessful attempts  Attempt 1          Priti Moreno RN

## 2020-10-14 ENCOUNTER — OFFICE VISIT (OUTPATIENT)
Dept: PSYCHIATRY | Facility: CLINIC | Age: 60
End: 2020-10-14

## 2020-10-14 DIAGNOSIS — F41.1 GENERALIZED ANXIETY DISORDER: ICD-10-CM

## 2020-10-14 DIAGNOSIS — G47.9 SLEEP DISTURBANCE: ICD-10-CM

## 2020-10-14 DIAGNOSIS — F33.1 MODERATE EPISODE OF RECURRENT MAJOR DEPRESSIVE DISORDER (HCC): Primary | ICD-10-CM

## 2020-10-14 PROCEDURE — 99214 OFFICE O/P EST MOD 30 MIN: CPT | Performed by: NURSE PRACTITIONER

## 2020-10-14 NOTE — PROGRESS NOTES
"  Subjective   Brittani Lambert is a 60 y.o. female who is here today for medication management follow up. You have chosen to receive care through a telephone visit. Do you consent to use a telephone visit for your medical care today? Yes    TIME IN:1104  TIME OUT:1126    Chief Complaint: MDD, THALIA, sleep disturbance     History of Present Illness Patient reports she had two hospitalizations for atrial fib. She reports she is on medication right now to keep her heart rhythm normal. She has good support from her sister and mother. She has a friend she has re-acquainted with and they talk daily several times. She doesn't know about a cardiology f/u. Problem solved with pt on this. Also discussed her new medication she is on. She rates depression \"just more fatigue now than sad\".  Sleeping about 8-10 hours at night \"which feels great\". Discussed health issues  . Acknowledged and normalized patient's thoughts, feelings, and concerns. Assisted the patient in recognizing  appropriate coping mechanisms with relaxation techniques. Reviewed labs from when she was in hospital with her helping her understand and recall her hospital visit for education purpose. Rare use of alprazolam rates anxiety about a 3-4 most days and depression about the same or less. Denies SI. Denies confusion.   Denies adverse effects from medications.   (Scales based on 0 - 10 with 10 being the worst)        The following portions of the patient's history were reviewed and updated as appropriate: allergies, current medications, past family history, past medical history, past social history, past surgical history and problem list.    Review of Systems  A 14 point review of systems was performed and is negative except as noted above.    Objective   Physical Exam  not currently breastfeeding.    No Known Allergies    Current Medications:   Current Outpatient Medications   Medication Sig Dispense Refill   • albuterol (PROAIR RESPICLICK) 108 (90 Base) " MCG/ACT inhaler Inhale 2 puffs Every 6 (Six) Hours As Needed for Wheezing or Shortness of Air. 1 inhaler 11   • ALPRAZolam (XANAX) 0.5 MG tablet Take 1 tablet by mouth 3 (Three) Times a Day As Needed for Anxiety or Sleep. 90 tablet 0   • amiodarone (Pacerone) 200 MG tablet Take 1 tablet by mouth 2 (Two) Times a Day for 13 days, THEN 1 tablet Daily for 30 days. 56 tablet 1   • ARIPiprazole (ABILIFY) 5 MG tablet Take 1 tablet by mouth Daily. 30 tablet 2   • aspirin 81 MG EC tablet Take 81 mg by mouth Daily.     • dexamethasone (DECADRON) 1 MG tablet Take 0.5 tablets by mouth Daily With Breakfast. (Patient taking differently: Take 1 mg by mouth Daily With Breakfast.) 120 tablet 3   • gabapentin (NEURONTIN) 100 MG capsule Take 100 mg by mouth 3 (Three) Times a Day.     • lidocaine-prilocaine (EMLA) 2.5-2.5 % cream PLEASE SEE ATTACHED FOR DETAILED DIRECTIONS (Patient taking differently: Apply 1 application topically to the appropriate area as directed As Needed. For port access) 30 g 3   • metroNIDAZOLE (Flagyl) 500 MG tablet Take 1 tablet by mouth 2 (Two) Times a Day. (Patient taking differently: Take 500 mg by mouth 2 (Two) Times a Day. 09/21/20--09/26/20) 10 tablet 0   • mirtazapine (REMERON) 15 MG tablet Take 1  tablet at bedtime for sleep nightly 30 tablet 5   • nicotine (NICODERM CQ) 21 MG/24HR patch Place 1 patch on the skin as directed by provider Every Night. 30 patch 0   • omeprazole (priLOSEC) 20 MG capsule TAKE 1 CAPSULE BY MOUTH EVERY DAY 30 capsule 5   • oxyCODONE-acetaminophen (Percocet) 7.5-325 MG per tablet Take 1 tablet by mouth Every 6 (Six) Hours As Needed for Moderate Pain . 90 tablet 0   • pravastatin (PRAVACHOL) 20 MG tablet TAKE 1 TABLET BY MOUTH EVERY DAY AT NIGHT 30 tablet 5   • venlafaxine XR (EFFEXOR-XR) 150 MG 24 hr capsule Take 1 capsule by mouth Daily. 30 capsule 5     No current facility-administered medications for this visit.        Lab Results: reviewed most recent with  pt      Appearance:   Hygiene:  REPORTS good  Cooperation:  Cooperative  Eye Contact:    Psychomotor Behavior:  denies psychomotor agitation/retardation, No EPS, No motor tics  Mood:  within normal limits  Affect:    Hopelessness: Denies  Speech:  Normal  Thought Process:  Linear  Thought Content:  Normal  Concentration: Normal   Suicidal: denies  Homicidal:  None  Hallucinations:  None  Delusion:  None  Memory:  Intact  Orientation:  Person, Place, Time and Situation  Reliability:  good  Insight:  Fair  Judgement: good  Impulse Control: good  Estimated Intelligence: average range    ZECHARIAH REVIEWED NO RED FLAGS    Assessment/Plan   Diagnoses and all orders for this visit:    1. Moderate episode of recurrent major depressive disorder (CMS/HCC) (Primary)    2. Generalized anxiety disorder    3. Sleep disturbance          IMPRESSION: sleeping well with mirtazapine and improved appetite, stressors with Atrial Fib with hospitalization x 2, feeling some better physically  PLAN:   Cont Mirtazpine for sleep  Cont venlafaxine XR 150mg daily for depression and anxiety  Cont alprazolam 0.5mg as needed for panic hasn't needed refill since June 2020  Cont aripiprazole for depression     We discussed risks, benefits, and side effects of the above medications and the patient was agreeable with the plan. Patient was educated on the importance of compliance with treatment and follow-up appointments. .    Assisted patient in identifying risk factors which would indicate the need for higher level of care including thoughts to harm self or others and/or self-harming behavior and encouraged patient to contact this office, call 911, or present to the nearest emergency room should any of these events occur. Discussed crisis intervention services and means to access.  Patient adamantly and convincingly denies current suicidal or homicidal ideation or perceptual disturbance.    Treatment Plan: stabilize mood, patient will stay out of  psychiatric hospital and be at optimal level of functioning with therapy and take all medication as prescribed. Patient verbalized  understanding and agreement to plan.    Instructed to call for questions or concerns and return early if necessary.     Greater than 50% time was spent in coordination of care, and counseling the patient regarding current assessment, symptoms, plan of care going forward, supportive therapy.  Answered any questions patient had regarding medications and plan of care.    Return in about 26 days (around 11/9/2020).

## 2020-10-19 ENCOUNTER — READMISSION MANAGEMENT (OUTPATIENT)
Dept: CALL CENTER | Facility: HOSPITAL | Age: 60
End: 2020-10-19

## 2020-10-19 NOTE — OUTREACH NOTE
Medical Week 2 Survey      Responses   [facility] patient discharged from?  [place]   Does the patient have one of the following disease processes/diagnoses(primary or secondary)?  Other          Yojana Cope RN

## 2020-10-27 ENCOUNTER — OFFICE VISIT (OUTPATIENT)
Dept: CARDIOLOGY | Facility: CLINIC | Age: 60
End: 2020-10-27

## 2020-10-27 VITALS
TEMPERATURE: 97.4 F | HEART RATE: 90 BPM | BODY MASS INDEX: 19.84 KG/M2 | WEIGHT: 112 LBS | HEIGHT: 63 IN | DIASTOLIC BLOOD PRESSURE: 80 MMHG | SYSTOLIC BLOOD PRESSURE: 132 MMHG

## 2020-10-27 DIAGNOSIS — E78.2 MIXED HYPERLIPIDEMIA: ICD-10-CM

## 2020-10-27 DIAGNOSIS — I48.0 PAROXYSMAL ATRIAL FIBRILLATION (HCC): Primary | ICD-10-CM

## 2020-10-27 PROCEDURE — 99213 OFFICE O/P EST LOW 20 MIN: CPT | Performed by: INTERNAL MEDICINE

## 2020-10-27 NOTE — PROGRESS NOTES
[facility]  Office visit  Brittani Lambert  1960  [phone]  There is no work phone number on file.    VISIT DATE:  10/27/2020    PCP: Alla Colon  [address] [place] [address]    CC:  No chief complaint on file.      Previous cardiac studies and procedures:  September 2020 echo  · Left ventricular ejection fraction appears to be 61 - 65%. Left ventricular systolic function is normal.  · Left ventricular diastolic function is consistent with (grade I) impaired relaxation.  · Mild tricuspid valve regurgitation is present.  · Estimated right ventricular systolic pressure from tricuspid regurgitation is normal (<35 mmHg). Calculated right ventricular systolic pressure from tricuspid regurgitation is 22 mmHg.    ASSESSMENT:   Diagnosis Plan   1. Paroxysmal atrial fibrillation (CMS/HCC)     2. Mixed hyperlipidemia         PLAN:  Paroxysmal atrial fibrillation: Chads vas equal to 1.  Continue low-dose aspirin.  Continue amiodarone 200 mg p.o. daily.  I currently do not think that her symptoms of fatigue and gait instability and unsteadiness are related to her amiodarone however if they worsen or persist we may need to discontinue amiodarone to see if the symptoms resolve.  Starting Lopressor 25 mg p.o. twice daily.      Subjective  She has had 2 brief episodes of paroxysmal A. fib since discharge from the hospital over the past month.  Associated with lightheadedness and shortness of breath.  Currently taking amiodarone 200 mg/day.  Does feel unsteady and slightly shaky on her feet.  This does not appear to be temporally correlated with the onset of amiodarone.  Blood pressures are consistently running higher than 140/90 mmHg.  Ongoing treatment for metastatic breast cancer with brain mets.    PHYSICAL EXAMINATION:  Vitals:    10/27/20 1340   BP: 132/80   BP Location: Left arm   Patient Position: Sitting   Pulse: 90   Temp: 97.4 °F (36.3 °C)   Weight: 50.8 kg (112  "lb)   Height: 160 cm (63\")     General Appearance:    Alert, cooperative, no distress, appears stated age   Head:    Normocephalic, without obvious abnormality, atraumatic   Eyes:    conjunctiva/corneas clear   Nose:   Nares normal, septum midline, mucosa normal, no drainage   Throat:   Lips, teeth and gums normal   Neck:   Supple, symmetrical, trachea midline, no carotid    bruit or JVD   Lungs:     Clear to auscultation bilaterally, respirations unlabored   Chest Wall:    No tenderness or deformity    Heart:    Regular rate and rhythm, S1 and S2 normal, no murmur, rub   or gallop, normal carotid impulse bilaterally without bruit.   Abdomen:     Soft, non-tender   Extremities:   Extremities normal, atraumatic, no cyanosis or edema   Pulses:   2+ and symmetric all extremities   Skin:   Skin color, texture, turgor normal, no rashes or lesions       Diagnostic Data:  Procedures  Lab Results   Component Value Date    CHLPL 262 (H) 06/21/2018    TRIG 193 (H) 09/25/2020    HDL 44 09/25/2020     Lab Results   Component Value Date    GLUCOSE 98 09/26/2020    BUN 9 09/26/2020    CREATININE 0.57 09/26/2020     09/26/2020    K 4.6 09/26/2020     09/26/2020    CO2 25.0 09/26/2020     Lab Results   Component Value Date    HGBA1C 4.9 09/21/2020     Lab Results   Component Value Date    WBC 5.80 09/25/2020    HGB 12.3 09/25/2020    HCT 38.6 09/25/2020     09/25/2020       Allergies  No Known Allergies    Current Medications    Current Outpatient Medications:   •  albuterol (PROAIR RESPICLICK) 108 (90 Base) MCG/ACT inhaler, Inhale 2 puffs Every 6 (Six) Hours As Needed for Wheezing or Shortness of Air., Disp: 1 inhaler, Rfl: 11  •  ALPRAZolam (XANAX) 0.5 MG tablet, Take 1 tablet by mouth 3 (Three) Times a Day As Needed for Anxiety or Sleep., Disp: 90 tablet, Rfl: 0  •  amiodarone (Pacerone) 200 MG tablet, Take 1 tablet by mouth 2 (Two) Times a Day for 13 days, THEN 1 tablet Daily for 30 days., Disp: 56 tablet, " Rfl: 1  •  ARIPiprazole (ABILIFY) 5 MG tablet, Take 1 tablet by mouth Daily., Disp: 30 tablet, Rfl: 2  •  aspirin 81 MG EC tablet, Take 81 mg by mouth Daily., Disp: , Rfl:   •  dexamethasone (DECADRON) 1 MG tablet, Take 0.5 tablets by mouth Daily With Breakfast. (Patient taking differently: Take 1 mg by mouth Daily With Breakfast.), Disp: 120 tablet, Rfl: 3  •  gabapentin (NEURONTIN) 100 MG capsule, Take 100 mg by mouth 3 (Three) Times a Day., Disp: , Rfl:   •  lidocaine-prilocaine (EMLA) 2.5-2.5 % cream, PLEASE SEE ATTACHED FOR DETAILED DIRECTIONS (Patient taking differently: Apply 1 application topically to the appropriate area as directed As Needed. For port access), Disp: 30 g, Rfl: 3  •  metroNIDAZOLE (Flagyl) 500 MG tablet, Take 1 tablet by mouth 2 (Two) Times a Day. (Patient taking differently: Take 500 mg by mouth 2 (Two) Times a Day. 09/21/20--09/26/20), Disp: 10 tablet, Rfl: 0  •  mirtazapine (REMERON) 15 MG tablet, Take 1  tablet at bedtime for sleep nightly, Disp: 30 tablet, Rfl: 5  •  nicotine (NICODERM CQ) 21 MG/24HR patch, Place 1 patch on the skin as directed by provider Every Night., Disp: 30 patch, Rfl: 0  •  omeprazole (priLOSEC) 20 MG capsule, TAKE 1 CAPSULE BY MOUTH EVERY DAY, Disp: 30 capsule, Rfl: 5  •  oxyCODONE-acetaminophen (Percocet) 7.5-325 MG per tablet, Take 1 tablet by mouth Every 6 (Six) Hours As Needed for Moderate Pain ., Disp: 90 tablet, Rfl: 0  •  pravastatin (PRAVACHOL) 20 MG tablet, TAKE 1 TABLET BY MOUTH EVERY DAY AT NIGHT, Disp: 30 tablet, Rfl: 5  •  venlafaxine XR (EFFEXOR-XR) 150 MG 24 hr capsule, Take 1 capsule by mouth Daily., Disp: 30 capsule, Rfl: 5          ROS  Review of Systems   Constitution: Positive for malaise/fatigue.   Cardiovascular: Positive for irregular heartbeat and palpitations. Negative for chest pain.   Respiratory: Negative for shortness of breath.          SOCIAL HX  Social History     Socioeconomic History   • Marital status: Single     Spouse name:  N/A   • Number of children: 1   • Years of education: H.S.   • Highest education level: High school graduate   Occupational History   • Occupation: Associate     Employer: [employer]   Tobacco Use   • Smoking status: Current Some Day Smoker     Packs/day: 0.50     Years: 20.00     Pack years: 10.00     Types: Cigarettes   • Smokeless tobacco: Never Used   Substance and Sexual Activity   • Alcohol use: Yes     Alcohol/week: 3.0 standard drinks     Types: 3 Glasses of wine per week     Frequency: 4 or more times a week     Comment: nightly    • Drug use: No   • Sexual activity: Defer     Partners: Male   Social History Narrative    Support from sister and mother.  Enjoys time with nieces and great nieces.       FAMILY HX  Family History   Problem Relation Age of Onset   • Arthritis Mother    • Heart attack Mother    • Osteoporosis Mother    • Liver disease Father    • Celiac disease Other              Carlos Allen III, MD, FACC

## 2020-11-07 ENCOUNTER — APPOINTMENT (OUTPATIENT)
Dept: GENERAL RADIOLOGY | Facility: HOSPITAL | Age: 60
End: 2020-11-07

## 2020-11-07 ENCOUNTER — HOSPITAL ENCOUNTER (OUTPATIENT)
Facility: HOSPITAL | Age: 60
Setting detail: OBSERVATION
Discharge: HOME | End: 2020-11-08
Attending: EMERGENCY MEDICINE | Admitting: INTERNAL MEDICINE

## 2020-11-07 ENCOUNTER — APPOINTMENT (OUTPATIENT)
Dept: CT IMAGING | Facility: HOSPITAL | Age: 60
End: 2020-11-07

## 2020-11-07 ENCOUNTER — NURSE TRIAGE (OUTPATIENT)
Dept: CALL CENTER | Facility: HOSPITAL | Age: 60
End: 2020-11-07

## 2020-11-07 DIAGNOSIS — R79.89 ELEVATED LACTIC ACID LEVEL: ICD-10-CM

## 2020-11-07 DIAGNOSIS — S22.080A COMPRESSION FRACTURE OF T12 VERTEBRA, INITIAL ENCOUNTER (HCC): ICD-10-CM

## 2020-11-07 DIAGNOSIS — R29.6 FREQUENT FALLS: ICD-10-CM

## 2020-11-07 DIAGNOSIS — R53.1 GENERALIZED WEAKNESS: Primary | ICD-10-CM

## 2020-11-07 DIAGNOSIS — R30.0 DYSURIA: ICD-10-CM

## 2020-11-07 PROBLEM — Z86.711 HISTORY OF PULMONARY EMBOLISM: Status: ACTIVE | Noted: 2020-11-07

## 2020-11-07 PROBLEM — E87.20 LACTIC ACIDOSIS: Status: ACTIVE | Noted: 2020-11-07

## 2020-11-07 PROBLEM — Z72.0 TOBACCO ABUSE: Status: ACTIVE | Noted: 2020-11-07

## 2020-11-07 PROBLEM — R29.898 BILATERAL LEG WEAKNESS: Status: ACTIVE | Noted: 2020-11-07

## 2020-11-07 PROBLEM — Z86.79 HISTORY OF ATRIAL FIBRILLATION: Status: ACTIVE | Noted: 2020-11-07

## 2020-11-07 PROBLEM — F10.10 ALCOHOL ABUSE: Status: ACTIVE | Noted: 2020-11-07
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
## 2020-11-07 LAB
ALBUMIN SERPL-MCNC: 4.1 G/DL (ref 3.5–5.2)
ALBUMIN/GLOB SERPL: 1.8 G/DL
ALP SERPL-CCNC: 137 U/L (ref 39–117)
ALT SERPL W P-5'-P-CCNC: 33 U/L (ref 1–33)
ANION GAP SERPL CALCULATED.3IONS-SCNC: 17 MMOL/L (ref 5–15)
AST SERPL-CCNC: 51 U/L (ref 1–32)
BASOPHILS # BLD AUTO: 0.06 10*3/MM3 (ref 0–0.2)
BASOPHILS NFR BLD AUTO: 1.3 % (ref 0–1.5)
BILIRUB SERPL-MCNC: 0.2 MG/DL (ref 0–1.2)
BILIRUB UR QL STRIP: NEGATIVE
BUN SERPL-MCNC: 5 MG/DL (ref 8–23)
BUN/CREAT SERPL: 11.1 (ref 7–25)
CALCIUM SPEC-SCNC: 9 MG/DL (ref 8.6–10.5)
CHLORIDE SERPL-SCNC: 101 MMOL/L (ref 98–107)
CLARITY UR: CLEAR
CO2 SERPL-SCNC: 24 MMOL/L (ref 22–29)
COLOR UR: YELLOW
CREAT SERPL-MCNC: 0.45 MG/DL (ref 0.57–1)
D-LACTATE SERPL-SCNC: 3.2 MMOL/L (ref 0.5–2)
DEPRECATED RDW RBC AUTO: 55.2 FL (ref 37–54)
EOSINOPHIL # BLD AUTO: 0.07 10*3/MM3 (ref 0–0.4)
EOSINOPHIL NFR BLD AUTO: 1.6 % (ref 0.3–6.2)
ERYTHROCYTE [DISTWIDTH] IN BLOOD BY AUTOMATED COUNT: 15 % (ref 12.3–15.4)
ETHANOL BLD-MCNC: 45 MG/DL (ref 0–10)
GFR SERPL CREATININE-BSD FRML MDRD: 142 ML/MIN/1.73
GLOBULIN UR ELPH-MCNC: 2.3 GM/DL
GLUCOSE SERPL-MCNC: 81 MG/DL (ref 65–99)
GLUCOSE UR STRIP-MCNC: NEGATIVE MG/DL
HCT VFR BLD AUTO: 39 % (ref 34–46.6)
HGB BLD-MCNC: 12.8 G/DL (ref 12–15.9)
HGB UR QL STRIP.AUTO: NEGATIVE
IMM GRANULOCYTES # BLD AUTO: 0.02 10*3/MM3 (ref 0–0.05)
IMM GRANULOCYTES NFR BLD AUTO: 0.4 % (ref 0–0.5)
KETONES UR QL STRIP: ABNORMAL
LACTATE HOLD SPECIMEN: NORMAL
LEUKOCYTE ESTERASE UR QL STRIP.AUTO: NEGATIVE
LYMPHOCYTES # BLD AUTO: 1.27 10*3/MM3 (ref 0.7–3.1)
LYMPHOCYTES NFR BLD AUTO: 28.2 % (ref 19.6–45.3)
MCH RBC QN AUTO: 33.2 PG (ref 26.6–33)
MCHC RBC AUTO-ENTMCNC: 32.8 G/DL (ref 31.5–35.7)
MCV RBC AUTO: 101 FL (ref 79–97)
MONOCYTES # BLD AUTO: 0.42 10*3/MM3 (ref 0.1–0.9)
MONOCYTES NFR BLD AUTO: 9.3 % (ref 5–12)
NEUTROPHILS NFR BLD AUTO: 2.67 10*3/MM3 (ref 1.7–7)
NEUTROPHILS NFR BLD AUTO: 59.2 % (ref 42.7–76)
NITRITE UR QL STRIP: NEGATIVE
NRBC BLD AUTO-RTO: 0 /100 WBC (ref 0–0.2)
PH UR STRIP.AUTO: 5.5 [PH] (ref 5–8)
PLATELET # BLD AUTO: 183 10*3/MM3 (ref 140–450)
PMV BLD AUTO: 9.1 FL (ref 6–12)
POTASSIUM SERPL-SCNC: 4 MMOL/L (ref 3.5–5.2)
PROT SERPL-MCNC: 6.4 G/DL (ref 6–8.5)
PROT UR QL STRIP: NEGATIVE
RBC # BLD AUTO: 3.86 10*6/MM3 (ref 3.77–5.28)
SODIUM SERPL-SCNC: 142 MMOL/L (ref 136–145)
SP GR UR STRIP: 1.01 (ref 1–1.03)
TROPONIN T SERPL-MCNC: <0.01 NG/ML (ref 0–0.03)
UROBILINOGEN UR QL STRIP: ABNORMAL
WBC # BLD AUTO: 4.51 10*3/MM3 (ref 3.4–10.8)

## 2020-11-07 PROCEDURE — 93005 ELECTROCARDIOGRAM TRACING: CPT

## 2020-11-07 PROCEDURE — 80053 COMPREHEN METABOLIC PANEL: CPT | Performed by: EMERGENCY MEDICINE

## 2020-11-07 PROCEDURE — G0378 HOSPITAL OBSERVATION PER HR: HCPCS

## 2020-11-07 PROCEDURE — 73502 X-RAY EXAM HIP UNI 2-3 VIEWS: CPT

## 2020-11-07 PROCEDURE — 93005 ELECTROCARDIOGRAM TRACING: CPT | Performed by: EMERGENCY MEDICINE

## 2020-11-07 PROCEDURE — 85025 COMPLETE CBC W/AUTO DIFF WBC: CPT | Performed by: EMERGENCY MEDICINE

## 2020-11-07 PROCEDURE — 71045 X-RAY EXAM CHEST 1 VIEW: CPT

## 2020-11-07 PROCEDURE — 74176 CT ABD & PELVIS W/O CONTRAST: CPT

## 2020-11-07 PROCEDURE — 84484 ASSAY OF TROPONIN QUANT: CPT | Performed by: EMERGENCY MEDICINE

## 2020-11-07 PROCEDURE — P9612 CATHETERIZE FOR URINE SPEC: HCPCS

## 2020-11-07 PROCEDURE — 81003 URINALYSIS AUTO W/O SCOPE: CPT | Performed by: EMERGENCY MEDICINE

## 2020-11-07 PROCEDURE — 80307 DRUG TEST PRSMV CHEM ANLYZR: CPT | Performed by: NURSE PRACTITIONER

## 2020-11-07 PROCEDURE — 99284 EMERGENCY DEPT VISIT MOD MDM: CPT

## 2020-11-07 PROCEDURE — 73501 X-RAY EXAM HIP UNI 1 VIEW: CPT

## 2020-11-07 PROCEDURE — 83605 ASSAY OF LACTIC ACID: CPT | Performed by: EMERGENCY MEDICINE

## 2020-11-07 PROCEDURE — 99220 PR INITIAL OBSERVATION CARE/DAY 70 MINUTES: CPT | Performed by: INTERNAL MEDICINE

## 2020-11-07 PROCEDURE — 87040 BLOOD CULTURE FOR BACTERIA: CPT | Performed by: EMERGENCY MEDICINE

## 2020-11-07 PROCEDURE — 84443 ASSAY THYROID STIM HORMONE: CPT | Performed by: INTERNAL MEDICINE

## 2020-11-07 PROCEDURE — 72131 CT LUMBAR SPINE W/O DYE: CPT

## 2020-11-07 RX ORDER — MORPHINE SULFATE 2 MG/ML
2 INJECTION, SOLUTION INTRAMUSCULAR; INTRAVENOUS
Status: DISCONTINUED | OUTPATIENT
Start: 2020-11-07 | End: 2020-11-08 | Stop reason: HOSPADM

## 2020-11-07 RX ORDER — OXYCODONE AND ACETAMINOPHEN 7.5; 325 MG/1; MG/1
1 TABLET ORAL EVERY 6 HOURS PRN
Status: DISCONTINUED | OUTPATIENT
Start: 2020-11-07 | End: 2020-11-08 | Stop reason: HOSPADM

## 2020-11-07 RX ORDER — SODIUM CHLORIDE 0.9 % (FLUSH) 0.9 %
10 SYRINGE (ML) INJECTION AS NEEDED
Status: DISCONTINUED | OUTPATIENT
Start: 2020-11-07 | End: 2020-11-08 | Stop reason: HOSPADM

## 2020-11-07 RX ORDER — MORPHINE SULFATE 10 MG/5ML
5 SOLUTION ORAL
Status: DISCONTINUED | OUTPATIENT
Start: 2020-11-07 | End: 2020-11-08 | Stop reason: HOSPADM

## 2020-11-07 RX ADMIN — SODIUM CHLORIDE 1000 ML: 9 INJECTION, SOLUTION INTRAVENOUS at 19:17
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
## 2020-11-08 ENCOUNTER — APPOINTMENT (OUTPATIENT)
Dept: MRI IMAGING | Facility: HOSPITAL | Age: 60
End: 2020-11-08

## 2020-11-08 ENCOUNTER — READMISSION MANAGEMENT (OUTPATIENT)
Dept: CALL CENTER | Facility: HOSPITAL | Age: 60
End: 2020-11-08

## 2020-11-08 VITALS
BODY MASS INDEX: 20.02 KG/M2 | RESPIRATION RATE: 16 BRPM | HEART RATE: 64 BPM | SYSTOLIC BLOOD PRESSURE: 133 MMHG | HEIGHT: 63 IN | WEIGHT: 113 LBS | TEMPERATURE: 98 F | OXYGEN SATURATION: 93 % | DIASTOLIC BLOOD PRESSURE: 85 MMHG

## 2020-11-08 LAB
ANION GAP SERPL CALCULATED.3IONS-SCNC: 12 MMOL/L (ref 5–15)
B PARAPERT DNA SPEC QL NAA+PROBE: NOT DETECTED
B PERT DNA SPEC QL NAA+PROBE: NOT DETECTED
BASOPHILS # BLD AUTO: 0.05 10*3/MM3 (ref 0–0.2)
BASOPHILS NFR BLD AUTO: 0.9 % (ref 0–1.5)
BUN SERPL-MCNC: 6 MG/DL (ref 8–23)
BUN/CREAT SERPL: 9.2 (ref 7–25)
C PNEUM DNA NPH QL NAA+NON-PROBE: NOT DETECTED
CALCIUM SPEC-SCNC: 8.8 MG/DL (ref 8.6–10.5)
CHLORIDE SERPL-SCNC: 103 MMOL/L (ref 98–107)
CO2 SERPL-SCNC: 26 MMOL/L (ref 22–29)
CREAT SERPL-MCNC: 0.65 MG/DL (ref 0.57–1)
D-LACTATE SERPL-SCNC: 3.3 MMOL/L (ref 0.5–2)
DEPRECATED RDW RBC AUTO: 57.2 FL (ref 37–54)
EOSINOPHIL # BLD AUTO: 0.1 10*3/MM3 (ref 0–0.4)
EOSINOPHIL NFR BLD AUTO: 1.8 % (ref 0.3–6.2)
ERYTHROCYTE [DISTWIDTH] IN BLOOD BY AUTOMATED COUNT: 15.1 % (ref 12.3–15.4)
FLUAV H1 2009 PAND RNA NPH QL NAA+PROBE: NOT DETECTED
FLUAV H1 HA GENE NPH QL NAA+PROBE: NOT DETECTED
FLUAV H3 RNA NPH QL NAA+PROBE: NOT DETECTED
FLUAV SUBTYP SPEC NAA+PROBE: NOT DETECTED
FLUBV RNA ISLT QL NAA+PROBE: NOT DETECTED
GFR SERPL CREATININE-BSD FRML MDRD: 93 ML/MIN/1.73
GLUCOSE SERPL-MCNC: 86 MG/DL (ref 65–99)
HADV DNA SPEC NAA+PROBE: NOT DETECTED
HCOV 229E RNA SPEC QL NAA+PROBE: NOT DETECTED
HCOV HKU1 RNA SPEC QL NAA+PROBE: NOT DETECTED
HCOV NL63 RNA SPEC QL NAA+PROBE: NOT DETECTED
HCOV OC43 RNA SPEC QL NAA+PROBE: NOT DETECTED
HCT VFR BLD AUTO: 39 % (ref 34–46.6)
HGB BLD-MCNC: 12.4 G/DL (ref 12–15.9)
HMPV RNA NPH QL NAA+NON-PROBE: NOT DETECTED
HPIV1 RNA SPEC QL NAA+PROBE: NOT DETECTED
HPIV2 RNA SPEC QL NAA+PROBE: NOT DETECTED
HPIV3 RNA NPH QL NAA+PROBE: NOT DETECTED
HPIV4 P GENE NPH QL NAA+PROBE: NOT DETECTED
IMM GRANULOCYTES # BLD AUTO: 0.02 10*3/MM3 (ref 0–0.05)
IMM GRANULOCYTES NFR BLD AUTO: 0.4 % (ref 0–0.5)
LYMPHOCYTES # BLD AUTO: 0.96 10*3/MM3 (ref 0.7–3.1)
LYMPHOCYTES NFR BLD AUTO: 17 % (ref 19.6–45.3)
M PNEUMO IGG SER IA-ACNC: NOT DETECTED
MCH RBC QN AUTO: 33 PG (ref 26.6–33)
MCHC RBC AUTO-ENTMCNC: 31.8 G/DL (ref 31.5–35.7)
MCV RBC AUTO: 103.7 FL (ref 79–97)
MONOCYTES # BLD AUTO: 0.45 10*3/MM3 (ref 0.1–0.9)
MONOCYTES NFR BLD AUTO: 8 % (ref 5–12)
NEUTROPHILS NFR BLD AUTO: 4.06 10*3/MM3 (ref 1.7–7)
NEUTROPHILS NFR BLD AUTO: 71.9 % (ref 42.7–76)
NRBC BLD AUTO-RTO: 0 /100 WBC (ref 0–0.2)
PLATELET # BLD AUTO: 143 10*3/MM3 (ref 140–450)
PMV BLD AUTO: 10.5 FL (ref 6–12)
POTASSIUM SERPL-SCNC: 4.2 MMOL/L (ref 3.5–5.2)
RBC # BLD AUTO: 3.76 10*6/MM3 (ref 3.77–5.28)
RHINOVIRUS RNA SPEC NAA+PROBE: NOT DETECTED
RSV RNA NPH QL NAA+NON-PROBE: NOT DETECTED
SARS-COV-2 RNA NPH QL NAA+NON-PROBE: NOT DETECTED
SODIUM SERPL-SCNC: 141 MMOL/L (ref 136–145)
TSH SERPL DL<=0.05 MIU/L-ACNC: 1.93 UIU/ML (ref 0.27–4.2)
VIT B12 BLD-MCNC: 234 PG/ML (ref 211–946)
WBC # BLD AUTO: 5.64 10*3/MM3 (ref 3.4–10.8)

## 2020-11-08 PROCEDURE — 0 GADOBENATE DIMEGLUMINE 529 MG/ML SOLUTION: Performed by: EMERGENCY MEDICINE

## 2020-11-08 PROCEDURE — 72157 MRI CHEST SPINE W/O & W/DYE: CPT

## 2020-11-08 PROCEDURE — 96360 HYDRATION IV INFUSION INIT: CPT

## 2020-11-08 PROCEDURE — 72158 MRI LUMBAR SPINE W/O & W/DYE: CPT

## 2020-11-08 PROCEDURE — 96372 THER/PROPH/DIAG INJ SC/IM: CPT

## 2020-11-08 PROCEDURE — A9577 INJ MULTIHANCE: HCPCS | Performed by: EMERGENCY MEDICINE

## 2020-11-08 PROCEDURE — 83605 ASSAY OF LACTIC ACID: CPT | Performed by: EMERGENCY MEDICINE

## 2020-11-08 PROCEDURE — 82607 VITAMIN B-12: CPT | Performed by: INTERNAL MEDICINE

## 2020-11-08 PROCEDURE — 99217 PR OBSERVATION CARE DISCHARGE MANAGEMENT: CPT | Performed by: INTERNAL MEDICINE

## 2020-11-08 PROCEDURE — G0378 HOSPITAL OBSERVATION PER HR: HCPCS

## 2020-11-08 PROCEDURE — 96361 HYDRATE IV INFUSION ADD-ON: CPT

## 2020-11-08 PROCEDURE — 0202U NFCT DS 22 TRGT SARS-COV-2: CPT | Performed by: INTERNAL MEDICINE

## 2020-11-08 PROCEDURE — 80048 BASIC METABOLIC PNL TOTAL CA: CPT | Performed by: NURSE PRACTITIONER

## 2020-11-08 PROCEDURE — 25010000002 HEPARIN (PORCINE) PER 1000 UNITS: Performed by: NURSE PRACTITIONER

## 2020-11-08 PROCEDURE — 85025 COMPLETE CBC W/AUTO DIFF WBC: CPT | Performed by: NURSE PRACTITIONER

## 2020-11-08 RX ORDER — MIRTAZAPINE 15 MG/1
15 TABLET, FILM COATED ORAL NIGHTLY
Status: DISCONTINUED | OUTPATIENT
Start: 2020-11-08 | End: 2020-11-08 | Stop reason: HOSPADM

## 2020-11-08 RX ORDER — VENLAFAXINE HYDROCHLORIDE 75 MG/1
150 CAPSULE, EXTENDED RELEASE ORAL DAILY
Status: DISCONTINUED | OUTPATIENT
Start: 2020-11-08 | End: 2020-11-08 | Stop reason: HOSPADM

## 2020-11-08 RX ORDER — ALPRAZOLAM 0.5 MG/1
0.5 TABLET ORAL 3 TIMES DAILY PRN
Status: DISCONTINUED | OUTPATIENT
Start: 2020-11-08 | End: 2020-11-08 | Stop reason: HOSPADM

## 2020-11-08 RX ORDER — ACETAMINOPHEN 650 MG/1
650 SUPPOSITORY RECTAL EVERY 4 HOURS PRN
Status: DISCONTINUED | OUTPATIENT
Start: 2020-11-08 | End: 2020-11-08 | Stop reason: HOSPADM

## 2020-11-08 RX ORDER — SODIUM CHLORIDE 9 MG/ML
100 INJECTION, SOLUTION INTRAVENOUS CONTINUOUS
Status: DISCONTINUED | OUTPATIENT
Start: 2020-11-08 | End: 2020-11-08 | Stop reason: HOSPADM

## 2020-11-08 RX ORDER — PRAVASTATIN SODIUM 20 MG
20 TABLET ORAL DAILY
Status: DISCONTINUED | OUTPATIENT
Start: 2020-11-08 | End: 2020-11-08 | Stop reason: HOSPADM

## 2020-11-08 RX ORDER — PANTOPRAZOLE SODIUM 40 MG/1
40 TABLET, DELAYED RELEASE ORAL
Status: DISCONTINUED | OUTPATIENT
Start: 2020-11-08 | End: 2020-11-08 | Stop reason: HOSPADM

## 2020-11-08 RX ORDER — HEPARIN SODIUM 5000 [USP'U]/ML
5000 INJECTION, SOLUTION INTRAVENOUS; SUBCUTANEOUS EVERY 12 HOURS SCHEDULED
Status: DISCONTINUED | OUTPATIENT
Start: 2020-11-08 | End: 2020-11-08 | Stop reason: HOSPADM

## 2020-11-08 RX ORDER — ACETAMINOPHEN 160 MG/5ML
650 SOLUTION ORAL EVERY 4 HOURS PRN
Status: DISCONTINUED | OUTPATIENT
Start: 2020-11-08 | End: 2020-11-08 | Stop reason: HOSPADM

## 2020-11-08 RX ORDER — ASPIRIN 81 MG/1
81 TABLET ORAL DAILY
Status: DISCONTINUED | OUTPATIENT
Start: 2020-11-08 | End: 2020-11-08 | Stop reason: HOSPADM

## 2020-11-08 RX ORDER — GABAPENTIN 100 MG/1
100 CAPSULE ORAL 3 TIMES DAILY
Status: DISCONTINUED | OUTPATIENT
Start: 2020-11-08 | End: 2020-11-08 | Stop reason: HOSPADM

## 2020-11-08 RX ORDER — AMIODARONE HYDROCHLORIDE 200 MG/1
200 TABLET ORAL
Status: DISCONTINUED | OUTPATIENT
Start: 2020-11-08 | End: 2020-11-08 | Stop reason: HOSPADM

## 2020-11-08 RX ORDER — ARIPIPRAZOLE 10 MG/1
5 TABLET ORAL DAILY
Status: DISCONTINUED | OUTPATIENT
Start: 2020-11-08 | End: 2020-11-08 | Stop reason: HOSPADM

## 2020-11-08 RX ORDER — AMIODARONE HYDROCHLORIDE 200 MG/1
200 TABLET ORAL
Status: DISCONTINUED | OUTPATIENT
Start: 2020-11-08 | End: 2020-11-08

## 2020-11-08 RX ORDER — ALBUTEROL SULFATE 2.5 MG/3ML
2.5 SOLUTION RESPIRATORY (INHALATION) EVERY 6 HOURS PRN
Status: DISCONTINUED | OUTPATIENT
Start: 2020-11-08 | End: 2020-11-08 | Stop reason: HOSPADM

## 2020-11-08 RX ORDER — GABAPENTIN 100 MG/1
100 CAPSULE ORAL 3 TIMES DAILY
Start: 2020-11-08 | End: 2020-12-10 | Stop reason: SINTOL

## 2020-11-08 RX ORDER — ACETAMINOPHEN 325 MG/1
650 TABLET ORAL EVERY 4 HOURS PRN
Status: DISCONTINUED | OUTPATIENT
Start: 2020-11-08 | End: 2020-11-08 | Stop reason: HOSPADM

## 2020-11-08 RX ADMIN — VENLAFAXINE HYDROCHLORIDE 150 MG: 75 CAPSULE, EXTENDED RELEASE ORAL at 08:27

## 2020-11-08 RX ADMIN — OXYCODONE HYDROCHLORIDE AND ACETAMINOPHEN 1 TABLET: 7.5; 325 TABLET ORAL at 05:40

## 2020-11-08 RX ADMIN — SODIUM CHLORIDE 100 ML/HR: 9 INJECTION, SOLUTION INTRAVENOUS at 05:40

## 2020-11-08 RX ADMIN — PRAVASTATIN SODIUM 20 MG: 20 TABLET ORAL at 08:28

## 2020-11-08 RX ADMIN — AMIODARONE HYDROCHLORIDE 200 MG: 200 TABLET ORAL at 08:28

## 2020-11-08 RX ADMIN — ASPIRIN 81 MG: 81 TABLET, COATED ORAL at 08:27

## 2020-11-08 RX ADMIN — SODIUM CHLORIDE 100 ML/HR: 9 INJECTION, SOLUTION INTRAVENOUS at 12:42

## 2020-11-08 RX ADMIN — HEPARIN SODIUM 5000 UNITS: 5000 INJECTION INTRAVENOUS; SUBCUTANEOUS at 08:28

## 2020-11-08 RX ADMIN — GADOBENATE DIMEGLUMINE 10 ML: 529 INJECTION, SOLUTION INTRAVENOUS at 01:17

## 2020-11-08 RX ADMIN — OXYCODONE HYDROCHLORIDE AND ACETAMINOPHEN 1 TABLET: 7.5; 325 TABLET ORAL at 00:03

## 2020-11-08 RX ADMIN — PANTOPRAZOLE SODIUM 40 MG: 40 TABLET, DELAYED RELEASE ORAL at 05:40

## 2020-11-08 RX ADMIN — GABAPENTIN 100 MG: 100 CAPSULE ORAL at 05:40

## 2020-11-08 RX ADMIN — ARIPIPRAZOLE 5 MG: 10 TABLET ORAL at 08:27

## 2020-11-08 RX ADMIN — METOPROLOL TARTRATE 25 MG: 25 TABLET, FILM COATED ORAL at 08:28

## 2020-11-08 RX ADMIN — GABAPENTIN 100 MG: 100 CAPSULE ORAL at 13:59
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
## 2020-11-08 NOTE — PLAN OF CARE
Goal Outcome Evaluation:      Pt A & O x4 arrived from ed. With T12 compression fracture and left hip pain.  Rested well with minimal c/o pain.  Pt c/o bilateral hand and bilateral foot/toes numbness. Ciwa 0.  Vss. Will cont to monitor

## 2020-11-08 NOTE — PROGRESS NOTES
"                  Clinical Nutrition     Reason for Visit:   MST score 2+    Patient Name: Brittani Lambert  YOB: 1960  MRN: [id number]  Date of Encounter: 11/08/20 12:49 EST  Admission date: 11/7/2020    Comments: Patient with MST score of 2, 2-13 lb weight loss. Patient reports she may have lost a couple pounds recently but nothing more significant. States she has a good appetite and has been eating well prior to admission/present symptoms. She reports she did have some of breakfast this morning and it was \"excellent.\" Tolerated textures well. She denies any specific preferences at this time. Patient with ~2 lb weight loss over the past 2 months. No significant for nutrition risk.    Weight Weight (kg) Weight (lbs) Weight Method VISIT REPORT   9/9/2020 53.524 kg 118 lb Stated    9/9/2020 52.3 kg 115 lb 4.8 oz Bed scale    9/14/2020 52.164 kg 115 lb Stated    9/21/2020 51.71 kg 114 lb     9/24/2020 51.71 kg 114 lb Stated    9/24/2020 52.164 kg 115 lb Bed scale    9/25/2020 52.164 kg 115 lb     10/27/2020 50.803 kg 112 lb     11/7/2020 51.256 kg 113 lb Stated      RD to follow per protocol    Leonora Florez RDN, LD  Time Spent: 30 minutes    "

## 2020-11-08 NOTE — DISCHARGE SUMMARY
"    [facility] Medicine Services  DISCHARGE SUMMARY    Patient Name: Brittani Lambert  : 1960  MRN: [id number]    Date of Admission: 2020  4:57 PM  Date of Discharge:  2020  Primary Care Physician: Alla Colon DO    Consults     No orders found for last 30 day(s).          Hospital Course     Presenting Problem:   Bilateral leg weakness [R29.898]  Bilateral leg weakness [R29.898]  Generalized weakness [R53.1]  Generalized weakness [R53.1]    Active Hospital Problems    Diagnosis  POA   • **Falls [W19.XXXA]  Yes   • Lactic acidosis [E87.2]  Yes   • T12 compression fracture (CMS/HCC) [S22.080A]  Yes   • History of atrial fibrillation [Z86.79]  Not Applicable   • History of pulmonary embolism [Z86.711]  Yes   • Bilateral leg weakness [R29.898]  Yes   • Tobacco abuse [Z72.0]  Yes   • Alcohol abuse [F10.10]  Yes   • Generalized weakness [R53.1]  Yes   • 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 (CMS/HCC) [C50.912, C79.31]  Yes   • THALIA (generalized anxiety disorder) [F41.1]  Yes   • Mixed hyperlipidemia [E78.2]  Yes      Resolved Hospital Problems   No resolved problems to display.          Hospital Course:  Brittani Lambert is a 60 y.o. 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  Came to ED with left hip pain and recent bilat leg weakness.  Notes she has had increased difficulty walking in grocery store, sometimes needing a walker.  Fell at home prior to admission due to \"tried to get out of bed and legs wouldn't hold me.\"    Neurologic:  Spine was evaluated with lumbar CT then MRIs of thoracic/lumbar.  No cord compression or bone mets were seen.  T12 compression deformity appeared old (discussed with neurosurgeon) and not amenable to vertebroplasty.  Patient noted that her left side is always her 'weak side' since " her brain mets, and endorsed the possibility that deconditioning may be part of the problem.  We discussed SNF and she declined this, saying her sister would move in to help her and that she wants home health instead.  She did not wish to stay for PT evaluation. TSH was normal      Lactic acidosis:  Etiol is unclear.  She got fluids in the ED, had no source of infection found, had a normal appearing liver on CT.  Antibiotics were not started.  WBC remained normal and she had no fever.       Alcohol use:  No evidence of withdrawal during this brief admission.  Notably, she was treated with benzodiazepines in 9/2020 during an inpatient stay.  This may be a contributor to her weakness     metastatic breast cancer:  Diagnosed in 2018; s/p mastectomy and chemo/radiation; leptomeningeal brain mets noted and treated with XRT. Given her functional decline, will ask her to FU with oncology clinic this week.       Anxiety/depression   -continue routine Xanax, Abilify, Remeron and Effexor        Discharge Follow Up Recommendations for outpatient labs/diagnostics:   *FU with oncology clinic as scheduled - she has an appointment tomorrow - for functional decline with negative imaging.      Day of Discharge     HPI: She is very eager to go home.  Very tired.  Was up earlier today with a nurse, walked out to wang with a walker and back.      Describes the fall at home as 'tried to get out of bed, feet slipped out from under me' - felt her legs were too weak to hold her.  Says that increasingly she has required walker eg at the grocery store for progressive weakness.      Bowel/bladder:  Occasional difficulty with bladder incontinence.  Chronic constipation.      Review of Systems    No fever/chills.  Eats fairly well at home - ? 2 lb weight loss recently  No chest pain or palpitaitons  Notes chronic midback pain x months.   No dyspnea  Sees Dr. Patel and has an upcoming appointment.   Still driving     Vital Signs:   Temp:  [97.4  °F (36.3 °C)-98.5 °F (36.9 °C)] 98 °F (36.7 °C)  Heart Rate:  [59-90] 64  Resp:  [16-20] 16  BP: (112-166)/(63-94) 133/85     Physical Exam:  Gen:  WD/WN woman, tired looking, in bed.  NAD.  No visitors present  Neuro: alert and oriented, clear speech, follows commands.  LE strenght:  4+ RLE, 4- LLE, improves with encouragement but thte difference is notable.  DTRs patella trace bilat.  No ankle clonus.   Back - tender over T12-L2 over spine and paraspinal muscles.   HEENT:  NC/AT PERRL, OP benign  Neck:  Supple, no LAD  Heart RRR no murmur, rub, or gallop  Lungs CTA nonlabored  Abd:  Soft, nontender, no rebound or guarding, pos BS  Extrem:  No c/c/e      Pertinent  and/or Most Recent Results     Results from last 7 days   Lab Units 11/08/20  0236 11/07/20  1834   WBC 10*3/mm3 5.64 4.51   HEMOGLOBIN g/dL 12.4 12.8   HEMATOCRIT % 39.0 39.0   PLATELETS 10*3/mm3 143 183   SODIUM mmol/L 141 142   POTASSIUM mmol/L 4.2 4.0   CHLORIDE mmol/L 103 101   CO2 mmol/L 26.0 24.0   BUN mg/dL 6* 5*   CREATININE mg/dL 0.65 0.45*   GLUCOSE mg/dL 86 81   CALCIUM mg/dL 8.8 9.0     Results from last 7 days   Lab Units 11/07/20  1834   BILIRUBIN mg/dL 0.2   ALK PHOS U/L 137*   ALT (SGPT) U/L 33   AST (SGOT) U/L 51*           Invalid input(s): TG, LDLCALC, LDLREALC  Results from last 7 days   Lab Units 11/08/20  0013 11/07/20  1834   TSH uIU/mL  --  1.930   TROPONIN T ng/mL  --  <0.010   LACTATE mmol/L 3.3* 3.2*       Brief Urine Lab Results  (Last result in the past 365 days)      Color   Clarity   Blood   Leuk Est   Nitrite   Protein   CREAT   Urine HCG        11/07/20 1921 Yellow Clear Negative Negative Negative Negative               Microbiology Results Abnormal     Procedure Component Value - Date/Time    COVID PRE-OP / PRE-PROCEDURE SCREENING ORDER (NO ISOLATION) - Swab, Nasopharynx [[id number]]  (Normal) Collected: 11/08/20 0003    Lab Status: Final result Specimen: Swab from Nasopharynx Updated: 11/08/20 0158    Narrative:       The following orders were created for panel order COVID PRE-OP / PRE-PROCEDURE SCREENING ORDER (NO ISOLATION) - Swab, Nasopharynx.  Procedure                               Abnormality         Status                     ---------                               -----------         ------                     Respiratory Panel PCR w/...[[id number]]  Normal              Final result                 Please view results for these tests on the individual orders.    Respiratory Panel PCR w/COVID-19(SARS-CoV-2) HARITHA/AFIA/KATT/PAD/COR/MAD/VICENTE In-House, NP Swab in UTM/VTM, 3-4 HR TAT - Swab, Nasopharynx [[id number]]  (Normal) Collected: 11/08/20 0003    Lab Status: Final result Specimen: Swab from Nasopharynx Updated: 11/08/20 0158     ADENOVIRUS, PCR Not Detected     Coronavirus 229E Not Detected     Coronavirus HKU1 Not Detected     Coronavirus NL63 Not Detected     Coronavirus OC43 Not Detected     COVID19 Not Detected     Human Metapneumovirus Not Detected     Human Rhinovirus/Enterovirus Not Detected     Influenza A PCR Not Detected     Influenza A H1 Not Detected     Influenza A H1 2009 PCR Not Detected     Influenza A H3 Not Detected     Influenza B PCR Not Detected     Parainfluenza Virus 1 Not Detected     Parainfluenza Virus 2 Not Detected     Parainfluenza Virus 3 Not Detected     Parainfluenza Virus 4 Not Detected     RSV, PCR Not Detected     Bordetella pertussis pcr Not Detected     Bordetella parapertussis PCR Not Detected     Chlamydophila pneumoniae PCR Not Detected     Mycoplasma pneumo by PCR Not Detected    Narrative:      Fact sheet for providers: https://docs.[employer]am[employer]/wp-content/uploads/MGC3435-6577-SP3.1-EUA-Provider-Fact-Sheet-3.pdf    Fact sheet for patients: https://docs.[employer]/wp-content/uploads/RRJ8145-8005-KW9.1-EUA-Patient-Fact-Sheet-1.pdf          Imaging Results (All)     Procedure Component Value Units Date/Time    MRI Lumbar Spine With & Without Contrast [[id number]] Collected: 11/08/20  0156     Updated: 11/08/20 1118    Narrative:      MRI Spine Lumbar WO W     INDICATION:    60-year-old female with back pain. Lumbago. Multiple falls. Breast cancer.    Spine:   Redemonstration of subtle wedge deformity of the anterior superior endplate of T12. Very minimal if any marrow edema. No abscess. No intraspinal hemorrhage. No critical central canal stenosis or foraminal stenosis. No suspicious bone lesion.    This is a preliminary wet read by Dr. Memo Tabor at 0152 hours. Final interpretation will be provided by neuroradiology. Please refer to final interpretation for detailed findings and any further recommendations.    Final interpretation:    MRI Spine Lumbar WO W    INDICATION:    Low back pain with multiple falls and evidence of compression fracture T12 on CT lumbar spine from 11/7/2020.    TECHNIQUE:   MRI of the lumbar spine without IV contrast.    COMPARISON:    CT lumbar spine from 11/7/2020.    FINDINGS:  See the separate thoracic spine MRI for description of the mild anterior compression fracture T12.    There is exaggerated lordosis at the lumbosacral junction. The lumbar intervertebral discs are desiccated at L3-4 through L5-S1. Also some disc desiccation at T11-12. The conus medullaris terminates at L1 and is normal. There is mild marrow edema either  side of the L4-5 intervertebral disc consistent with mild type I marrow endplate degenerative change. There are multiple incidentally noted sacral Tarlov's cysts.    At T12-L1, there is no significant abnormality.    At L1-2, there is no significant abnormality.    At L2-3, there is no significant abnormality.    At L3-4, there is mild facet degenerative change bilaterally with a mild posterior disc bulge. There is no canal stenosis or foraminal impingement.    At L4-5, there is mild bilateral facet degenerative change. There is a concentric disc bulge with a superimposed right posterior lateral protrusion/extrusion that extends cephalad from  the disc but remains contiguous with it. It measures about 4 mm AP  dimension by 10 mm SI dimension and is broad in the ML dimension. There is mild to moderate right foraminal narrowing and mild mass effect on the right lateral recess with mild effacement of the thecal sac. Left foramen is patent.    At L5-S1, there is mild to moderate bilateral facet degenerative change and a broad posterior. There is no canal stenosis. There is mild left foraminal narrowing. following contrast administration, there is no pathologic intracanalicular enhancement.  There is no evidence for osseous metastatic disease.      Impression:        1. See separate MRI of thoracic spine for description of the mild likely subacute compression fracture centered at the anterior superior endplate of T12. There is no evidence for osseous metastatic disease. There is no evidence for drop metastatic  disease.  2. There is lumbar degenerative change most prominent at L4-5 where there is a right posterolateral protrusion/extrusion impinging upon expected course of the right L4 root in the foramen. This mild to moderate right foraminal narrowing. There is mild  effacement of the thecal sac otherwise. Details above    Signer Name: Funmilayo Smith MD   Signed: 11/8/2020 11:16 AM   Workstation Name: [email or web address]    [facility]    XR Chest 1 View [[id number]] Collected: 11/07/20 1846     Updated: 11/08/20 1018    Narrative:         EXAMINATION: XR CHEST SINGLE VIEW - 11/07/2020     INDICATION: Fatigue.     COMPARISON: 09/24/2020     FINDINGS: Portable chest reveals cardiac and mediastinal silhouettes  within normal limits. Lung fields are grossly clear. No focal  parenchymal opacification is present. No pleural effusion or  pneumothorax. The pulmonary vascularity is within normal limits.  Degenerative change is seen within the spine.        Impression:      Chronic changes within the lung fields with no evidence of  acute parenchymal  disease.     DICTATED:   11/07/2020  EDITED/ls :   11/07/2020     This report was finalized on 11/8/2020 10:15 AM by Dr. Carolyn Younger MD.       MRI Thoracic Spine With & Without Contrast [[id number]] Collected: 11/08/20 0139     Updated: 11/08/20 0933    Narrative:      MRI Spine Thoracic WO W     INDICATION:    60-year-old female with compression fracture of T12. Breast cancer.    Spine:   The mild compression fracture involving the anterior superior endplate of T12 is again demonstrated. No significant marrow edema. No significant retropulsion. Diffuse degenerative changes with degenerative disc disease at T11-T12 causing mild impression  upon the anterior thecal sac. No distinct evidence of pathologic fracture or suspicious bone lesion or abnormal enhancement. Conus medullaris appears to end at approximately the L1 level.    This is a preliminary wet read by Dr. Memo Tabor at 0137 hours. Final interpretation will be provided by neuroradiology. Please refer to final interpretation for detailed findings and any further recommendations.    Final interpretation:    MRI Spine Thoracic WO W    INDICATION:    Patient with history of breast cancer. Evaluate compression fracture T12. Patient presents with history of multiple falls and back pain. Abnormal lumbar spine CT earlier    TECHNIQUE:   MRI of the thoracic spine prior to and following intravenous administration of 10 cc of MultiHance IV contrast.    COMPARISON:    CT lumbar spine from 11/7/2020.    FINDINGS:  There is mild exaggeration of upper thoracic kyphosis. There is mild compression fracture centered at the anterior superior endplate of T12 again with about 25% loss of anterior vertebral body height. There is minimal marrow endplate edema most  suggestive of subacute fracture. There is nothing to suggest underlying lesion or pathologic compression fracture. Bone marrow signal intensity is otherwise essentially normal. There are small Schmorl's nodes  at T10-11 and T11-12. The thoracic cord is  normal in size and signal intensity. There is no thoracic canal stenosis. Following contrast administration, there is no pathologic intracanalicular enhancement. There are multiple prominent perineural root sleeve cysts. There is no focal disc extrusion.  There is a minimal posterior bulge at T10-11 and left paramedian bulge at T11-12 but there is no associated canal stenosis.      Impression:        1. There is mild anterior compression fracture at T12 with about 25% loss of vertebral body height. There is mild associated marrow edema adjacent to the anterior superior endplate most consistent with a subacute fracture. The fracture is interval since  May 2020. There is no associated canal stenosis. There is nothing to suggest an underlying lesion or pathologic fracture. There is nothing to suggest osseous metastatic disease.  2. There is no evidence for thoracic canal compromise. There is no pathologic intracanalicular enhancement. Relatively mild thoracic degenerative changes are discussed above.    Signer Name: Funmilayo Smith MD   Signed: 11/8/2020 9:31 AM   Workstation Name: [email or web address]    [facility]    XR Hip With or Without Pelvis 2 - 3 View Left [[id number]] Collected: 11/08/20 0012     Updated: 11/08/20 0014    Narrative:      CR Hip Uni Comp Min 2 Vws LT    INDICATION:   Pain after a fall. Weakness.    COMPARISON:   None available.    FINDINGS:  AP and frog-leg lateral view(s) of the left hip.  Minimal degenerative change left hip. No acute fracture or dislocation. No bone erosion or destruction.        Impression:      1. Minimal to mild degenerative change.    Signer Name: Memo Tabor MD   Signed: 11/8/2020 12:12 AM   Workstation Name: [email or web address]    [facility]    CT Abdomen Pelvis Without Contrast [[id number]] Collected: 11/07/20 1954     Updated: 11/07/20 1956    Narrative:      INDICATION:   Left lower quadrant  abdominal pain    TECHNIQUE:   CT of the abdomen and pelvis without contrast. Coronal and sagittal reconstructions were obtained.  Radiation dose reduction techniques included automated exposure control or exposure modulation based on body size. Radiation audit for number of CT and  nuclear cardiology exams performed in the last year: 4.      COMPARISON:   May 2020 abdomen pelvis CT    FINDINGS:  Lung bases: Dependent atelectasis there are coronary artery calcifications    Abdomen: Lack of IV contrast media limits assessment for pathology other than urinary tract calculus disease.    The liver, gallbladder, spleen, adrenal glands and pancreas are unremarkable. There are atherosclerotic vascular calcifications involving the abdominal aorta without evidence for aneurysm. There is no intrarenal calculus. There is a right-sided  extrarenal pelvis. There is no hydronephrosis. No ureteral calculus is suspected.    Pelvis: There is no evidence for bladder calculus. Patient is post hysterectomy. There is a large amount of gas in the mildly distended rectum. There is collapse of the sigmoid colon and ascending colon with possible circumferential wall thickening.  Please correlate for any clinical evidence of colitis. Study is limited by the lack of oral contrast media and the under distention of the bowel. The appendix is not definitely seen. There is no evidence for bowel obstruction. There is no free  intraperitoneal air.      Impression:        1. There is no evidence for urinary tract calculus disease.  2. There is gaseous distention of the rectum. The descending colon and sigmoid colon are collapsed and there may be circumferential wall thickening versus underdistention. Please correlate for clinical evidence of mild colitis.  3. No evidence for bowel obstruction or free air. The appendix is not seen.  4. See the separate CT lumbar spine.        Signer Name: Funmilayo Smith MD   Signed: 11/7/2020 7:54 PM   Workstation  Name: [email or web address]    [facility]    CT Lumbar Spine Without Contrast [[id number]] Collected: 11/07/20 1946     Updated: 11/07/20 1948    Narrative:      INDICATION:    Fell with back pain. ER evaluation.    TECHNIQUE:   CT of the lumbar spine without contrast. Coronal and sagittal reconstructions were obtained.  Radiation dose reduction techniques included automated exposure control or exposure modulation based on body size. Radiation audit for number of CT and nuclear  cardiology exams performed in the last year: 4.      COMPARISON:    CT abdomen pelvis from 5/8/2020 and lumbar spine MRI from May 2020.    FINDINGS:  There is mild exaggeration of lower lumbar lordosis. There is anterior wedging at T12 which is new from May 8 and is likely recent anterior compression fracture centered at the anterior superior endplate and resulting in about 25% loss of anterior  vertebral body height. There is no posterior cortical buckling. There is loss of disc height with vacuum disc formation at L4-5 and L5-S1. There is mild lower lumbar levoconvexed scoliosis. There is a incidental bone island right posterior lateral L2  vertebral body.    At T11-12, no canal or foraminal compromise.    At T12-L1, no significant abnormality.    At L1-2, no significant abnormality.    At L2-3, there is a mild concentric disc bulge and endplate spondylosis. There is no canal or foraminal compromise.    At L3-4, there is mild facet degenerative change bilaterally and a mild concentric disc bulge with mild effacement of the anterior thecal sac. There is no foraminal impingement.    At L4-5, there is mild facet degenerative change with ligament flavum thickening. There is a concentric disc bulge more prominent to the right side posterior laterally. There is mild canal stenosis and mass effect on the right lateral recess where there  is mild right inferior foraminal narrowing.    At L5-S1, there is mild to moderate moderate facet  arthritis bilaterally. There is a concentric disc bulge but no canal stenosis. There is mild to moderate left and mild right foraminal narrowing.    See the separate abdomen pelvis CT report.      Impression:      #1 there is an interval compression fracture at T12 centered at the anterior superior endplate and resulting in 25% loss of anterior vertebral body height. It is likely recent with subtle cortical disruption suspected at the superior endplate. No other  possible acute fracture suspected lumbar spine.  2. There is levoconvexed lower lumbar scoliosis and lumbar degenerative change detailed above.    Signer Name: Funmilayo Smith MD   Signed: 11/7/2020 7:46 PM   Workstation Name: [email or web address]    [facility]                    Results for orders placed during the hospital encounter of 09/24/20   Transthoracic Echo Complete With Contrast if Necessary Per Protocol    Narrative · Left ventricular ejection fraction appears to be 61 - 65%. Left   ventricular systolic function is normal.  · Left ventricular diastolic function is consistent with (grade I)   impaired relaxation.  · Mild tricuspid valve regurgitation is present.  · Estimated right ventricular systolic pressure from tricuspid   regurgitation is normal (<35 mmHg). Calculated right ventricular systolic   pressure from tricuspid regurgitation is 22 mmHg.          Plan for Follow-up of Pending Labs/Results:  I will follow up  Pending Labs     Order Current Status    Blood Culture - Blood, Arm, Left In process    Blood Culture - Blood, Arm, Right In process        Discharge Details        Discharge Medications      Changes to Medications      Instructions Start Date   dexamethasone 1 MG tablet  Commonly known as: DECADRON  What changed: how much to take   0.5 mg, Oral, Daily With Breakfast         Continue These Medications      Instructions Start Date   albuterol 108 (90 Base) MCG/ACT inhaler  Commonly known as: ProAir RespiClick   2 puffs,  Inhalation, Every 6 Hours PRN      ALPRAZolam 0.5 MG tablet  Commonly known as: XANAX   0.5 mg, Oral, 3 Times Daily PRN      amiodarone 200 MG tablet  Commonly known as: Pacerone   Take 1 tablet by mouth 2 (Two) Times a Day for 13 days, THEN 1 tablet Daily for 30 days.   Start Date: September 26, 2020     ARIPiprazole 5 MG tablet  Commonly known as: ABILIFY   5 mg, Oral, Daily      aspirin 81 MG EC tablet   81 mg, Oral, Daily      gabapentin 100 MG capsule  Commonly known as: NEURONTIN   100 mg, Oral, 3 Times Daily      metoprolol tartrate 25 MG tablet  Commonly known as: LOPRESSOR   25 mg, Oral, 2 Times Daily      mirtazapine 15 MG tablet  Commonly known as: REMERON   Take 1  tablet at bedtime for sleep nightly      omeprazole 20 MG capsule  Commonly known as: priLOSEC   TAKE 1 CAPSULE BY MOUTH EVERY DAY      oxyCODONE-acetaminophen 7.5-325 MG per tablet  Commonly known as: Percocet   1 tablet, Oral, Every 6 Hours PRN      pravastatin 20 MG tablet  Commonly known as: PRAVACHOL   TAKE 1 TABLET BY MOUTH EVERY DAY AT NIGHT      venlafaxine  MG 24 hr capsule  Commonly known as: EFFEXOR-XR   150 mg, Oral, Daily         Stop These Medications    lidocaine-prilocaine 2.5-2.5 % cream  Commonly known as: EMLA     Nicotine Step 1 21 MG/24HR patch  Generic drug: nicotine            No Known Allergies      Discharge Disposition:  Home or Self Care    Diet:  Hospital:  Diet Order   Procedures   • Diet Regular; Cardiac       Activity: walk with walker. Home PT.         CODE STATUS:    Code Status and Medical Interventions:   Ordered at: 11/07/20 2137     Level Of Support Discussed With:    Patient     Code Status:    CPR     Medical Interventions (Level of Support Prior to Arrest):    Full       Future Appointments   Date Time Provider Department Center   11/9/2020  2:00 PM Ariana Gonsales APRN [facility]  [facility] None   2/9/2021  2:15 PM Carlos Allen III, MD [facility] None                 Salina Wade  MD  11/08/20      Time Spent on Discharge:  I spent  50  minutes on this discharge activity which included: face-to-face encounter with the patient, reviewing the data in the system, coordination of the care with the nursing staff as well as consultants, documentation, and entering orders.

## 2020-11-09 ENCOUNTER — OFFICE VISIT (OUTPATIENT)
Dept: PSYCHIATRY | Facility: CLINIC | Age: 60
End: 2020-11-09

## 2020-11-09 ENCOUNTER — TRANSITIONAL CARE MANAGEMENT TELEPHONE ENCOUNTER (OUTPATIENT)
Dept: CALL CENTER | Facility: HOSPITAL | Age: 60
End: 2020-11-09

## 2020-11-09 DIAGNOSIS — M25.532 LEFT WRIST PAIN: ICD-10-CM

## 2020-11-09 DIAGNOSIS — G89.3 CANCER ASSOCIATED PAIN: ICD-10-CM

## 2020-11-09 DIAGNOSIS — S69.92XA INJURY OF LEFT WRIST, INITIAL ENCOUNTER: ICD-10-CM

## 2020-11-09 DIAGNOSIS — F41.1 GENERALIZED ANXIETY DISORDER: ICD-10-CM

## 2020-11-09 DIAGNOSIS — G47.9 SLEEP DISTURBANCE: ICD-10-CM

## 2020-11-09 DIAGNOSIS — F33.1 MODERATE EPISODE OF RECURRENT MAJOR DEPRESSIVE DISORDER (HCC): Primary | ICD-10-CM

## 2020-11-09 LAB
QT INTERVAL: 400 MS
QTC INTERVAL: 486 MS

## 2020-11-09 PROCEDURE — 99213 OFFICE O/P EST LOW 20 MIN: CPT | Performed by: NURSE PRACTITIONER

## 2020-11-09 RX ORDER — OXYCODONE AND ACETAMINOPHEN 7.5; 325 MG/1; MG/1
1 TABLET ORAL EVERY 6 HOURS PRN
Qty: 90 TABLET | Refills: 0 | Status: CANCELLED | OUTPATIENT
Start: 2020-11-09

## 2020-11-09 RX ORDER — OXYCODONE AND ACETAMINOPHEN 7.5; 325 MG/1; MG/1
1 TABLET ORAL EVERY 6 HOURS PRN
Qty: 90 TABLET | Refills: 0 | Status: SHIPPED | OUTPATIENT
Start: 2020-11-09 | End: 2020-12-02 | Stop reason: SDUPTHER
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
## 2020-11-09 NOTE — OUTREACH NOTE
Prep Survey      Responses   [facility] patient discharged from?  [place]   Is LACE score < 7 ?  No   Eligibility  [facility]   Date of Admission  11/07/20   Date of Discharge  11/08/20   Discharge Disposition  Home or Self Care   Discharge diagnosis  falls d/t leg weakness, lactic acidosis, tobacco abuse   Does the patient have one of the following disease processes/diagnoses(primary or secondary)?  Other   Does the patient have Home health ordered?  No   Is there a DME ordered?  No   Prep survey completed?  Yes          Pati Gonzalez RN

## 2020-11-09 NOTE — OUTREACH NOTE
Call Center TCM Note      Responses   [facility] patient discharged from?  [place]   Does the patient have one of the following disease processes/diagnoses(primary or secondary)?  Other   TCM attempt successful?  Yes   Discharge diagnosis  falls d/t leg weakness, lactic acidosis, tobacco abuse   Meds reviewed with patient/caregiver?  Yes   Does the patient have all medications ordered at discharge?  N/A   Prescription comments  No new meds prescribed and pt has d/c EMLA cream and Nicotine patch as directed   Is the patient taking all medications as directed (includes completed medication regime)?  Yes   Does the patient have a primary care provider?   Yes   Does the patient have an appointment with their PCP within 7 days of discharge?  Greater than 7 days   Comments regarding PCP  Alla Colon DO   What is preventing the patient from scheduling follow up appointments within 7 days of discharge?  Earlier appointment not available   Has the patient kept scheduled appointments due by today?  Yes   Has home health visited the patient within 72 hours of discharge?  N/A   Did the patient receive a copy of their discharge instructions?  Yes   Nursing interventions  Reviewed instructions with patient   What is the patient's perception of their health status since discharge?  Same   Is the patient/caregiver able to teach back signs and symptoms related to disease process for when to call PCP?  Yes   Is the patient/caregiver able to teach back the hierarchy of who to call/visit for symptoms/problems? PCP, Specialist, Home health nurse, Urgent Care, ED, 911  Yes   TCM call completed?  Yes   Wrap up additional comments  Pt does not feel alot different but no worse. TCM [facility] now sched for 11/19/2020          Beryl Moon MA    11/9/2020, 16:25 EST

## 2020-11-09 NOTE — OUTREACH NOTE
Call Center TCM Note      Responses   [facility] patient discharged from?  [place]   Does the patient have one of the following disease processes/diagnoses(primary or secondary)?  Other   TCM attempt successful?  No   Unsuccessful attempts  Attempt 1   Call Status  Left message          Beryl Moon MA    11/9/2020, 09:43 EST

## 2020-11-09 NOTE — TELEPHONE ENCOUNTER
Caller: JosiyuniorKierstena    Relationship: Self    Best call back number:[phone]     Medication needed:   Requested Prescriptions     Pending Prescriptions Disp Refills   • oxyCODONE-acetaminophen (Percocet) 7.5-325 MG per tablet 90 tablet 0     Sig: Take 1 tablet by mouth Every 6 (Six) Hours As Needed for Moderate Pain .       When do you need the refill by: TODAY    What details did the patient provide when requesting the medication: PT CALLED STATING THAT SHE IS COMPLETELY OUT.  PT STATED THAT SHE SPENT THE WEEKEND IN THE HOSPITAL.    Does the patient have less than a 3 day supply:  [x] Yes  [] No    What is the patient's preferred pharmacy:  [facility]/pharmacy #[id number] [place], [place] - [address] [place] [address] AT [facility] [address] - [phone]  - [phone] FX

## 2020-11-12 LAB
BACTERIA SPEC AEROBE CULT: NORMAL
BACTERIA SPEC AEROBE CULT: NORMAL

## 2020-11-18 ENCOUNTER — READMISSION MANAGEMENT (OUTPATIENT)
Dept: CALL CENTER | Facility: HOSPITAL | Age: 60
End: 2020-11-18

## 2020-11-18 NOTE — OUTREACH NOTE
Medical Week 2 Survey      Responses   [facility] patient discharged from?  [place]   Does the patient have one of the following disease processes/diagnoses(primary or secondary)?  Other   Week 2 attempt successful?  No   Unsuccessful attempts  Attempt 1          Elizabeth Love RN

## 2020-11-25 ENCOUNTER — READMISSION MANAGEMENT (OUTPATIENT)
Dept: CALL CENTER | Facility: HOSPITAL | Age: 60
End: 2020-11-25

## 2020-11-25 NOTE — OUTREACH NOTE
Medical Week 3 Survey      Responses   [facility] patient discharged from?  [place]   Does the patient have one of the following disease processes/diagnoses(primary or secondary)?  Other   Week 3 attempt successful?  No   Unsuccessful attempts  Attempt 1          Natasha Robertson RN

## 2020-12-02 DIAGNOSIS — G89.3 CANCER ASSOCIATED PAIN: ICD-10-CM

## 2020-12-02 DIAGNOSIS — S69.92XA INJURY OF LEFT WRIST, INITIAL ENCOUNTER: ICD-10-CM

## 2020-12-02 DIAGNOSIS — M25.532 LEFT WRIST PAIN: ICD-10-CM

## 2020-12-02 RX ORDER — OXYCODONE AND ACETAMINOPHEN 7.5; 325 MG/1; MG/1
1 TABLET ORAL EVERY 6 HOURS PRN
Qty: 90 TABLET | Refills: 0 | Status: SHIPPED | OUTPATIENT
Start: 2020-12-02 | End: 2020-12-10 | Stop reason: DRUGHIGH

## 2020-12-02 NOTE — TELEPHONE ENCOUNTER
Caller: Brittani Lambert    Relationship: Self    Best call back number: [phone]  Medication needed:   Requested Prescriptions     Pending Prescriptions Disp Refills   • oxyCODONE-acetaminophen (Percocet) 7.5-325 MG per tablet 90 tablet 0     Sig: Take 1 tablet by mouth Every 6 (Six) Hours As Needed for Moderate Pain .       When do you need the refill by: ASAP    What details did the patient provide when requesting the medication:     Does the patient have less than a 3 day supply:  [x] Yes  [] No    What is the patient's preferred pharmacy: [facility]/PHARMACY #[id number] [address] - [phone]  - [phone]

## 2020-12-07 ENCOUNTER — OFFICE VISIT (OUTPATIENT)
Dept: PSYCHIATRY | Facility: CLINIC | Age: 60
End: 2020-12-07

## 2020-12-07 DIAGNOSIS — F33.1 MODERATE EPISODE OF RECURRENT MAJOR DEPRESSIVE DISORDER (HCC): Primary | ICD-10-CM

## 2020-12-07 DIAGNOSIS — F41.1 GENERALIZED ANXIETY DISORDER: ICD-10-CM

## 2020-12-07 DIAGNOSIS — G47.9 SLEEP DISTURBANCE: ICD-10-CM

## 2020-12-07 PROCEDURE — 99443 PR PHYS/QHP TELEPHONE EVALUATION 21-30 MIN: CPT | Performed by: NURSE PRACTITIONER

## 2020-12-07 RX ORDER — ARIPIPRAZOLE 5 MG/1
5 TABLET ORAL DAILY
Qty: 30 TABLET | Refills: 5 | Status: SHIPPED | OUTPATIENT
Start: 2020-12-07 | End: 2021-04-27

## 2020-12-07 RX ORDER — VENLAFAXINE HYDROCHLORIDE 150 MG/1
150 CAPSULE, EXTENDED RELEASE ORAL DAILY
Qty: 30 CAPSULE | Refills: 5 | Status: SHIPPED | OUTPATIENT
Start: 2020-12-07 | End: 2021-02-25

## 2020-12-07 NOTE — PROGRESS NOTES
"  Subjective   Brittani Lambert is a 60 y.o. female who is here today for medication management follow up. You have chosen to receive care through a telephone visit. Do you consent to use a telephone visit for your medical care today? Yes. Patient has stage IV breast cancer with mets to brain. Lives alone in apartment in [place], [place]. Has her sister and mother as support. Sister checks on her daily.     TIME IN:1050am  TIME OUT:1115am    Chief Complaint: THALIA, MDD, sleep disturbance    History of Present Illness Patient reports she is doing pretty well. Has interest and motivation to prepare for [employer]. She stayed home for Thanksgiving because of COVID PANDEMIC however was able to visit safely next day with her sister and niece and really enjoyed it. She states her friend Richard from [place] is coming to visit and stay with her for 5 days over Latonya and really looking forward to that. She reports \"buying latonya presents since June so I'm ready\".  Denies adverse effects from medications. Reports being tired low energy \"all the time\". Sleeping well with mirtazapine \"that really helped\" gets about 8 hours of sleep. Appetite fair \"my stomach hasn't felt well\" going to her PCP this week to be evaluated. Denies confusion, denies syncopal episodes as she has had, denies AFib issues. Rates depression about a 3-4 and anxiety 3. Denies panic, denies agitation or confusion. \"I'm ok , doing ok\".  Acknowledged and normalized patient's thoughts, feelings, and concerns  (Scales based on 0 - 10 with 10 being the worst)        The following portions of the patient's history were reviewed and updated as appropriate: allergies, current medications, past family history, past medical history, past social history, past surgical history and problem list.    Review of Systems  A 14 point review of systems was performed and is negative except as noted above.    Objective   Physical Exam  not currently breastfeeding.    No Known " Allergies    Current Medications:   Current Outpatient Medications   Medication Sig Dispense Refill   • albuterol (PROAIR RESPICLICK) 108 (90 Base) MCG/ACT inhaler Inhale 2 puffs Every 6 (Six) Hours As Needed for Wheezing or Shortness of Air. 1 inhaler 11   • ALPRAZolam (XANAX) 0.5 MG tablet Take 1 tablet by mouth 3 (Three) Times a Day As Needed for Anxiety or Sleep. 90 tablet 0   • ARIPiprazole (ABILIFY) 5 MG tablet Take 1 tablet by mouth Daily. 30 tablet 2   • aspirin 81 MG EC tablet Take 81 mg by mouth Daily.     • dexamethasone (DECADRON) 1 MG tablet Take 0.5 tablets by mouth Daily With Breakfast. (Patient taking differently: Take 1 mg by mouth Daily With Breakfast.) 120 tablet 3   • gabapentin (NEURONTIN) 100 MG capsule Take 1 capsule by mouth 3 (Three) Times a Day.     • metoprolol tartrate (LOPRESSOR) 25 MG tablet Take 1 tablet by mouth 2 (Two) Times a Day. 60 tablet 11   • mirtazapine (REMERON) 15 MG tablet Take 1  tablet at bedtime for sleep nightly 30 tablet 5   • omeprazole (priLOSEC) 20 MG capsule TAKE 1 CAPSULE BY MOUTH EVERY DAY 30 capsule 5   • oxyCODONE-acetaminophen (Percocet) 7.5-325 MG per tablet Take 1 tablet by mouth Every 6 (Six) Hours As Needed for Moderate Pain . 90 tablet 0   • pravastatin (PRAVACHOL) 20 MG tablet TAKE 1 TABLET BY MOUTH EVERY DAY AT NIGHT 30 tablet 5   • venlafaxine XR (EFFEXOR-XR) 150 MG 24 hr capsule Take 1 capsule by mouth Daily. 30 capsule 5     No current facility-administered medications for this visit.          Performed today with pt  THALIA-7:  12/7/2020  Over the last two weeks, how often have you been bothered by the following problems?  Feeling nervous, anxious or on edge: Several days  Not being able to stop or control worrying: Not at all  Worrying too much about different things: Not at all  Trouble Relaxing: Not at all  Being so restless that it is hard to sit still: Not at all  Becoming easily annoyed or irritable: Several days  Feeling afraid as if something  awful might happen: Several days  THALIA 7 Total Score: 3  If you checked any problems, how difficult have these problems made it for you to do your work, take care of things at home, or get along with other people: Somewhat difficult  0-4: Minimal anxiety  5-9: Mild anxiety  10-14: Moderate anxiety  15-21: Severe anxiety    PHQ-9: performed today with pt  PHQ-2/PHQ-9 Depression Screening 12/7/2020   Little interest or pleasure in doing things 1   Feeling down, depressed, or hopeless 1   Trouble falling or staying asleep, or sleeping too much 0   Feeling tired or having little energy 3   Poor appetite or overeating 1   Feeling bad about yourself - or that you are a failure or have let yourself or your family down 1   Trouble concentrating on things, such as reading the newspaper or watching television 2   Moving or speaking so slowly that other people could have noticed. Or the opposite - being so fidgety or restless that you have been moving around a lot more than usual -   Thoughts that you would be better off dead, or of hurting yourself in some way -   Total Score 9   If you checked off any problems, how difficult have these problems made it for you to do your work, take care of things at home, or get along with other people? Somewhat difficult      5-9: Minimal symptoms  10-14: Major depression mild  15-19: Major depression moderate  Greater then 20: Major depression severe    Appearance:   Hygiene:  REPORTS good  Cooperation:  Cooperative  Eye Contact:    Psychomotor Behavior:  denies psychomotor agitation/retardation, No EPS, No motor tics  Mood:  within normal limits  Affect:    Hopelessness: Denies  Speech:  Normal  Thought Process:  Linear  Thought Content:  Normal  Concentration: Normal   Suicidal: denies  Homicidal:  None  Hallucinations:  None  Delusion:  None  Memory:  Intact  Orientation:  Person, Place, Time and Situation  Reliability:  good  Insight:  Fair  Judgement: good  Impulse Control:  good  Estimated Intelligence: average range    ZECHARIAH REVIEWED Today  NO RED FLAGS 12/7/2020    Assessment/Plan   Diagnoses and all orders for this visit:    1. Moderate episode of recurrent major depressive disorder (CMS/HCC) (Primary)    2. Generalized anxiety disorder    3. Sleep disturbance      IMPRESSION: no new concerns, she is tolerating current med management for anxiety, depression and sleep disturbance with good response.     PLAN:   Cont aripiprazole 5mg po one qd for depression  Cont mirtazapine 15mg po one qhs for sleep  Cont velafaxine XR 150mg po one QAM for depression and anxiety  Cont alprazolam 0.5mg as needed for severe anxiety     We discussed risks, benefits, and side effects of the above medications and the patient was agreeable with the plan. Patient was educated on the importance of compliance with treatment and follow-up appointments.     Counseled patient regarding multimodal approach with encouragement of healthy nutrition, healthy sleep, regular physical mobility, social involvement, counseling, and medication compliance.     Assisted patient in identifying risk factors which would indicate the need for higher level of care including thoughts to harm self or others and/or self-harming behavior and encouraged patient to contact this office, call 911, or present to the nearest emergency room should any of these events occur. Discussed crisis intervention services and means to access.  Patient adamantly and convincingly denies current suicidal or homicidal ideation or perceptual disturbance.    Treatment Plan: stabilize mood, patient will stay out of psychiatric hospital and be at optimal level of functioning with therapy and take all medication as prescribed. Patient verbalized  understanding and agreement to plan.    Instructed to call for questions or concerns and return early if necessary.     Greater than 50% time was spent in coordination of care, and counseling the patient regarding  current assessment, symptoms, plan of care going forward, supportive therapy.  Answered any questions patient had regarding medications and plan of care.    Return in about 5 weeks (around 1/11/2021).

## 2020-12-10 ENCOUNTER — OFFICE VISIT (OUTPATIENT)
Dept: FAMILY MEDICINE CLINIC | Facility: CLINIC | Age: 60
End: 2020-12-10

## 2020-12-10 VITALS
SYSTOLIC BLOOD PRESSURE: 132 MMHG | WEIGHT: 112 LBS | RESPIRATION RATE: 14 BRPM | TEMPERATURE: 97.4 F | DIASTOLIC BLOOD PRESSURE: 84 MMHG | HEIGHT: 63 IN | BODY MASS INDEX: 19.84 KG/M2 | OXYGEN SATURATION: 99 % | HEART RATE: 68 BPM

## 2020-12-10 DIAGNOSIS — R53.1 GENERALIZED WEAKNESS: ICD-10-CM

## 2020-12-10 DIAGNOSIS — F33.1 MODERATE EPISODE OF RECURRENT MAJOR DEPRESSIVE DISORDER (HCC): ICD-10-CM

## 2020-12-10 DIAGNOSIS — G89.3 CANCER ASSOCIATED PAIN: ICD-10-CM

## 2020-12-10 DIAGNOSIS — S22.080D COMPRESSION FRACTURE OF T12 VERTEBRA WITH ROUTINE HEALING, SUBSEQUENT ENCOUNTER: Primary | ICD-10-CM

## 2020-12-10 PROCEDURE — 99214 OFFICE O/P EST MOD 30 MIN: CPT | Performed by: FAMILY MEDICINE

## 2020-12-10 RX ORDER — OXYCODONE AND ACETAMINOPHEN 10; 325 MG/1; MG/1
1 TABLET ORAL EVERY 6 HOURS PRN
Qty: 45 TABLET | Refills: 0 | Status: SHIPPED | OUTPATIENT
Start: 2020-12-10 | End: 2021-01-19 | Stop reason: SDUPTHER

## 2020-12-10 NOTE — PROGRESS NOTES
"Amy Lambert is a 60 y.o. female.   Chief Complaint   Patient presents with   •  ER f/u-fell & Afib     got dizzy and fell      History of Present Illness   She was recently diagnosed with atrial fibrillation. She was inpatient at [facility] for treatment during Nov 2020   Currently taking Lopressor 25 mg BID and ASA 81 mg. She has follow up scheduled with Dr. Allen in Feb 2021.   Prior to being diagnosed with afib, she fell at home.States that her legs \"wouldn't hold her\". The fall is what prompted the ER visit.   During the hospital stay, MRI thoracic and lumbar spine completed, which revealed a compression fracture of T12. She was provided gabapentin 100 TID, but was unable to tolerate this, even had hallucination. Still taking Percocet 7.5 mg up to 4 times daily, but this isn't relieving her back pain.   Legs feel weak and give out on her. She recently completed MRI thoracic and lumbar spine, Nov 2020. She currently doesn't receive home health or physical therapy.     Depression has been worse recently, she is asking to change her current treatment.   She recently followed up with [facility] for psychiatric care on 12/7/20. No adjustments were made at that time.   Currently treated with Abilify 5 mg, venlafaxine  mg, Remeron 15 mg, and Xanax 0.5 mg prn for anxiety.     The following portions of the patient's history were reviewed and updated as appropriate: allergies, current medications, past family history, past medical history, past social history, past surgical history and problem list.    Review of Systems   Constitutional: Negative for fever.   Respiratory: Negative for chest tightness and shortness of breath.    Cardiovascular: Negative for chest pain.   Musculoskeletal: Positive for back pain and gait problem (weakness).   Skin: Negative for rash.   Neurological: Positive for weakness. Negative for dizziness, light-headedness, numbness and headache.   Hematological: Negative " for adenopathy.   Psychiatric/Behavioral: Positive for depressed mood.       Objective   Physical Exam  Vitals signs and nursing note reviewed.   Constitutional:       Appearance: She is well-developed.   HENT:      Head: Normocephalic and atraumatic.      Right Ear: External ear normal.      Left Ear: External ear normal.      Nose: Nose normal.   Eyes:      Conjunctiva/sclera: Conjunctivae normal.   Cardiovascular:      Rate and Rhythm: Normal rate and regular rhythm.      Heart sounds: Normal heart sounds.   Pulmonary:      Effort: Pulmonary effort is normal.      Breath sounds: Normal breath sounds. No wheezing.   Musculoskeletal:         General: No swelling.   Skin:     General: Skin is warm and dry.   Neurological:      General: No focal deficit present.      Mental Status: She is alert and oriented to person, place, and time.   Psychiatric:         Mood and Affect: Affect is flat.         Behavior: Behavior normal.           Assessment/Plan   Diagnoses and all orders for this visit:    1. Compression fracture of T12 vertebra with routine healing, subsequent encounter (Primary)  -     oxyCODONE-acetaminophen (Percocet)  MG per tablet; Take 1 tablet by mouth Every 6 (Six) Hours As Needed for Moderate Pain .  Dispense: 45 tablet; Refill: 0    2. Cancer associated pain  -     oxyCODONE-acetaminophen (Percocet)  MG per tablet; Take 1 tablet by mouth Every 6 (Six) Hours As Needed for Moderate Pain .  Dispense: 45 tablet; Refill: 0    3. Generalized weakness  -     Ambulatory Referral to Physical Therapy Evaluate and treat    4. Moderate episode of recurrent major depressive disorder (CMS/HCC)      Will temporarily increase dose of Percocet to improve pain associated with compression fracture. Avoid use if dizzy.   She is seeing another provider for treatment of anxiety and depression. Will reach out to her and notify regarding her current mood and see if any adjustments can be made to her treatment.    Refer to PT to help with weakness and prevent future falls.

## 2021-01-19 DIAGNOSIS — S22.080D COMPRESSION FRACTURE OF T12 VERTEBRA WITH ROUTINE HEALING, SUBSEQUENT ENCOUNTER: ICD-10-CM

## 2021-01-19 DIAGNOSIS — G89.3 CANCER ASSOCIATED PAIN: ICD-10-CM

## 2021-01-19 RX ORDER — OXYCODONE AND ACETAMINOPHEN 10; 325 MG/1; MG/1
1 TABLET ORAL EVERY 6 HOURS PRN
Qty: 45 TABLET | Refills: 0 | Status: SHIPPED | OUTPATIENT
Start: 2021-01-19 | End: 2021-01-29 | Stop reason: SDUPTHER

## 2021-01-19 NOTE — TELEPHONE ENCOUNTER
Caller: Brittani Lambert    Relationship: Self    Best call back number: [phone]  Medication needed:   Requested Prescriptions     Pending Prescriptions Disp Refills   • oxyCODONE-acetaminophen (Percocet)  MG per tablet 45 tablet 0     Sig: Take 1 tablet by mouth Every 6 (Six) Hours As Needed for Moderate Pain .       When do you need the refill by: 01/19/2021  What details did the patient provide when requesting the medication:  PATIENT STATE  THAT SHE ONLY HAS ONE DAY LEFT     Does the patient have less than a 3 day supply:  [x] Yes  [] No    What is the patient's preferred pharmacy: [facility]/PHARMACY #[id number] - [place], [place] - [address] [place] [address] AT [facility] - [phone] [facility] [phone] FX

## 2021-01-29 DIAGNOSIS — S22.080D COMPRESSION FRACTURE OF T12 VERTEBRA WITH ROUTINE HEALING, SUBSEQUENT ENCOUNTER: ICD-10-CM

## 2021-01-29 DIAGNOSIS — G89.3 CANCER ASSOCIATED PAIN: ICD-10-CM

## 2021-01-29 RX ORDER — OXYCODONE AND ACETAMINOPHEN 10; 325 MG/1; MG/1
1 TABLET ORAL EVERY 6 HOURS PRN
Qty: 45 TABLET | Refills: 0 | Status: SHIPPED | OUTPATIENT
Start: 2021-01-29 | End: 2021-02-15 | Stop reason: DRUGHIGH

## 2021-01-29 NOTE — TELEPHONE ENCOUNTER
Caller: Brittani Lambert    Relationship: Self    Best call back number: [phone]    Medication needed:   Requested Prescriptions     Pending Prescriptions Disp Refills   • oxyCODONE-acetaminophen (Percocet)  MG per tablet 45 tablet 0     Sig: Take 1 tablet by mouth Every 6 (Six) Hours As Needed for Moderate Pain .       When do you need the refill by: Monday 2/1    What details did the patient provide when requesting the medication: PATIENT HAS ENOUGH MEDICATION TO GET HER TO Monday.    Does the patient have less than a 3 day supply:  [] Yes  [x] No    What is the patient's preferred pharmacy: [facility]/PHARMACY #[id number] - [place], [place] - [address] [place] [address] AT [facility] 25 - [phone] [facility] [phone] FX

## 2021-02-05 ENCOUNTER — OFFICE VISIT (OUTPATIENT)
Dept: FAMILY MEDICINE CLINIC | Facility: CLINIC | Age: 61
End: 2021-02-05

## 2021-02-05 VITALS
WEIGHT: 107 LBS | TEMPERATURE: 98 F | OXYGEN SATURATION: 99 % | HEART RATE: 90 BPM | RESPIRATION RATE: 14 BRPM | HEIGHT: 63 IN | BODY MASS INDEX: 18.96 KG/M2 | DIASTOLIC BLOOD PRESSURE: 82 MMHG | SYSTOLIC BLOOD PRESSURE: 124 MMHG

## 2021-02-05 DIAGNOSIS — R42 DIZZINESS: ICD-10-CM

## 2021-02-05 DIAGNOSIS — G89.3 CANCER ASSOCIATED PAIN: Primary | ICD-10-CM

## 2021-02-05 PROCEDURE — 99213 OFFICE O/P EST LOW 20 MIN: CPT | Performed by: FAMILY MEDICINE

## 2021-02-05 NOTE — PROGRESS NOTES
"Chief Complaint  6mo f/u hyperlipidemia (stopped meds 3wks ago-'kept falling, couldn't think stright')    Subjective          Brittani Lambert presents to [facility] for   History of Present Illness  She has stopped taking all of her medications except for Percocet 10 mg and Alprazolam 0.5 mg. She thought all of her medications were making her too dizzy and imbalanced, causing her to fall.   Only taking alprazolam 2 to 3 times a week.     She missed her last appointment with Dr. Patel in Oct 2020. She was prescribed Decadron for daily treatment, but has stopped taking this. Last visit with Dr. Patel in June 2020, there was discussion to taper her off from steroid.     She is following with Ariana Gonsales for treatment of anxiety and depression. Most recent visit Dec 2020  Has upcoming appointment with Dr. Allen, treating paroxysmal atrial fibrillation. At this time, she is not taking metoprolol, but is rate controlled currently. Denies heart palpitations.     MRI brain most recently completed Sept 2020, part of evaluation of new onset dizziness and falls  MRI thoracic spine and lumbar spine updated Nov 2020.     Objective   Vital Signs:   /82   Pulse 90   Temp 98 °F (36.7 °C)   Resp 14   Ht 160 cm (63\")   Wt 48.5 kg (107 lb)   SpO2 99%   BMI 18.95 kg/m²     Physical Exam  Vitals signs and nursing note reviewed.   Constitutional:       Appearance: She is well-developed.      Comments: thin   HENT:      Head: Normocephalic and atraumatic.      Right Ear: External ear normal.      Left Ear: External ear normal.      Nose: Nose normal.   Eyes:      Conjunctiva/sclera: Conjunctivae normal.   Cardiovascular:      Rate and Rhythm: Normal rate and regular rhythm.      Heart sounds: Normal heart sounds.   Pulmonary:      Effort: Pulmonary effort is normal.      Breath sounds: Normal breath sounds.   Musculoskeletal:         General: No swelling.   Skin:     General: Skin is warm " and dry.   Neurological:      General: No focal deficit present.      Mental Status: She is alert and oriented to person, place, and time.   Psychiatric:         Behavior: Behavior normal.        Result Review :                 Assessment and Plan    Problem List Items Addressed This Visit     None      Visit Diagnoses     Cancer associated pain    -  Primary    Dizziness          She is not taking alprazolam daily, only 2-3 doses per week. Also, taking Percocet sparingly. She is aware to not combine the 2 medications. Since holding all other medications, she has felt better overall. Balance has improved and no longer falling.   Discussed with her that I plan to decrease dose of Norco back to 7.5 mg when next refill is due. It was temporarily increased due to injury from fall.   Encouraged to not stop medication without discussing with prescriber. Recommended that she resume Metoprolol and follow up with Dr. Allen next week (already scheduled).   Also, will need to discuss anxiety and depression treatment with LONG Santiago. At this moment, feels stable with only prn alprazolam.   Schedule follow up with Dr. Patel.       Follow Up   No follow-ups on file.  Patient was given instructions and counseling regarding her condition or for health maintenance advice. Please see specific information pulled into the AVS if appropriate.

## 2021-02-10 ENCOUNTER — TELEPHONE (OUTPATIENT)
Dept: FAMILY MEDICINE CLINIC | Facility: CLINIC | Age: 61
End: 2021-02-10

## 2021-02-10 DIAGNOSIS — M25.559 HIP PAIN: Primary | ICD-10-CM

## 2021-02-10 NOTE — TELEPHONE ENCOUNTER
ELOY; PT CALLED    PT IS REQUESTING A REFERRAL TO ORTHO DUE TO HIP PAIN.      SHE ALSO STATES SHE WENT TO THE CARDIOLOGIST AND IT WAS A PAIN MGT CLINIC AND SHE DID NOT GO INTO HER APPT.  SHE WAS GIVEN THE PHONE NUMBER OF DR SARA OLSON TO CLARIFY THE APPT.

## 2021-02-15 DIAGNOSIS — S22.080D COMPRESSION FRACTURE OF T12 VERTEBRA WITH ROUTINE HEALING, SUBSEQUENT ENCOUNTER: ICD-10-CM

## 2021-02-15 DIAGNOSIS — G89.3 CANCER ASSOCIATED PAIN: ICD-10-CM

## 2021-02-15 RX ORDER — OXYCODONE AND ACETAMINOPHEN 10; 325 MG/1; MG/1
1 TABLET ORAL EVERY 6 HOURS PRN
Qty: 45 TABLET | Refills: 0 | Status: CANCELLED | OUTPATIENT
Start: 2021-02-15

## 2021-02-15 RX ORDER — OXYCODONE AND ACETAMINOPHEN 7.5; 325 MG/1; MG/1
1 TABLET ORAL EVERY 6 HOURS PRN
Qty: 45 TABLET | Refills: 0 | Status: SHIPPED | OUTPATIENT
Start: 2021-02-15 | End: 2021-02-25 | Stop reason: SDUPTHER

## 2021-02-15 NOTE — TELEPHONE ENCOUNTER
Caller: Brittani Lambert    Relationship: Self    Best call back number: [phone]     Medication needed:   Requested Prescriptions     Pending Prescriptions Disp Refills   • oxyCODONE-acetaminophen (Percocet)  MG per tablet 45 tablet 0     Sig: Take 1 tablet by mouth Every 6 (Six) Hours As Needed for Moderate Pain .       When do you need the refill by: ASAP    What details did the patient provide when requesting the medication: PATIENT IS REQUESTING A REFILL ON ABOVE MEDICATION    Does the patient have less than a 3 day supply:  [x] Yes  [] No    What is the patient's preferred pharmacy: [facility]/PHARMACY #[id number] - [place], [place] - [address] [place] [address] AT [facility] 25 - [phone] [facility] [phone]

## 2021-02-25 ENCOUNTER — OFFICE VISIT (OUTPATIENT)
Dept: ORTHOPEDIC SURGERY | Facility: CLINIC | Age: 61
End: 2021-02-25

## 2021-02-25 VITALS — HEART RATE: 83 BPM | BODY MASS INDEX: 18.95 KG/M2 | OXYGEN SATURATION: 100 % | WEIGHT: 106.92 LBS | HEIGHT: 63 IN

## 2021-02-25 DIAGNOSIS — M47.816 LUMBAR SPONDYLOSIS: Primary | ICD-10-CM

## 2021-02-25 DIAGNOSIS — Z72.0 TOBACCO ABUSE: ICD-10-CM

## 2021-02-25 DIAGNOSIS — S22.080D COMPRESSION FRACTURE OF T12 VERTEBRA WITH ROUTINE HEALING, SUBSEQUENT ENCOUNTER: ICD-10-CM

## 2021-02-25 DIAGNOSIS — G89.3 CANCER ASSOCIATED PAIN: ICD-10-CM

## 2021-02-25 PROCEDURE — 99406 BEHAV CHNG SMOKING 3-10 MIN: CPT | Performed by: ORTHOPAEDIC SURGERY

## 2021-02-25 PROCEDURE — 99243 OFF/OP CNSLTJ NEW/EST LOW 30: CPT | Performed by: ORTHOPAEDIC SURGERY

## 2021-02-25 RX ORDER — OXYCODONE AND ACETAMINOPHEN 7.5; 325 MG/1; MG/1
1 TABLET ORAL EVERY 6 HOURS PRN
Qty: 45 TABLET | Refills: 0 | Status: SHIPPED | OUTPATIENT
Start: 2021-02-25 | End: 2021-03-10 | Stop reason: SDUPTHER

## 2021-02-25 NOTE — TELEPHONE ENCOUNTER
PATIENT CALLING UPSET THAT SHE SAW ORTHO TODAY. STATES SHE'S NOT HAVING HIP PAIN ITS HER BACK. SHE NEEDS REFERRED TO A BACK DOCTOR.    ALSO REQUESTING REFILL ON PERCOCET.

## 2021-02-25 NOTE — TELEPHONE ENCOUNTER
"See phone message from pt on 2/10/21 \"PT IS REQUESTING A REFERRAL TO ORTHO DUE TO HIP PAIN. \" This is why she was referred to ortho.   A referral neurosurgeon has been placed.     Pain med refilled.   "

## 2021-02-25 NOTE — PROGRESS NOTES
Orthopaedic Clinic Note: Hip New Patient    Chief Complaint   Patient presents with   • Left Hip - Pain   • Right Hip - Pain        HPI  Consult from: DO Kiersten Tiannicole Lambert is a 61 y.o. female who presents with bilateral hip pain for 8 month(s). Onset syncope. Pain is localized to gluteal region and lumbar spine and is a 10/10 on the pain scale.Pain is described as stabbing and shooting. Associated symptoms include giving way/buckling.  The pain is worse with walking, standing and climbing stairs; heat and pain medication and/or NSAID improve the pain. Previous treatments have included: cane/walker since symptom onset. Although some transient relief was reported with these interventions, these conservative measures have failed and symptoms have persisted. The patient is limited in daily activities and has had a significant decrease in quality of life as a result. She denies fevers, chills, or constitutional symptoms.  She does smoke on a daily basis.    I have reviewed the following portions of the patient's history:History of Present Illness    Past Medical History:   Diagnosis Date   • Breast CA (CMS/HCC) 10/4/2018   • Depression    • DJD (degenerative joint disease) of cervical spine    • THALIA (generalized anxiety disorder)    • Heart murmur    • Ovarian cancer (CMS/HCC) 1989   • Rt Cellulitis of chest wall 10/15/2018   • Tobacco dependence    • UTI (urinary tract infection) 9/9/2020      Past Surgical History:   Procedure Laterality Date   • APPENDECTOMY     • BREAST BIOPSY Right 2003    DONE IN [place]   • COLONOSCOPY  06/2018   • HYSTERECTOMY  1989   • MASTECTOMY W/ SENTINEL NODE BIOPSY Bilateral 10/4/2018    Procedure: LEFT SKIN SPARING BREAST MASTECTOMY WITH LEFT SENTINEL NODE BIOPSY, RIGHT PROPHYLACTIC SKIN SPARING MASTECTOMY;  Surgeon: Koffi Worthington MD;  Location: [facility];  Service: General   • OOPHORECTOMY  1989      Family History   Problem Relation Age of Onset   • Arthritis  Mother    • Heart attack Mother    • Osteoporosis Mother    • Liver disease Father    • Celiac disease Other      Social History     Socioeconomic History   • Marital status: Single     Spouse name: N/A   • Number of children: 1   • Years of education: H.S.   • Highest education level: High school graduate   Occupational History   • Occupation: Associate     Employer: [employer]   Tobacco Use   • Smoking status: Current Every Day Smoker     Packs/day: 0.50     Years: 25.00     Pack years: 12.50     Types: Cigarettes   • Smokeless tobacco: Never Used   Substance and Sexual Activity   • Alcohol use: Not Currently     Comment: 3 glasses of wine a week    • Drug use: No   • Sexual activity: Defer     Partners: Male   Social History Narrative    Support from sister and mother.  Enjoys time with nieces and great nieces.       Current Outpatient Medications on File Prior to Visit   Medication Sig Dispense Refill   • ALPRAZolam (XANAX) 0.5 MG tablet Take 1 tablet by mouth 3 (Three) Times a Day As Needed for Anxiety or Sleep. 90 tablet 0   • ARIPiprazole (ABILIFY) 5 MG tablet Take 1 tablet by mouth Daily. 30 tablet 5   • oxyCODONE-acetaminophen (Percocet) 7.5-325 MG per tablet Take 1 tablet by mouth Every 6 (Six) Hours As Needed for Moderate Pain . 45 tablet 0   • [DISCONTINUED] aspirin 81 MG EC tablet Take 81 mg by mouth Daily.     • [DISCONTINUED] dexamethasone (DECADRON) 1 MG tablet Take 0.5 tablets by mouth Daily With Breakfast. (Patient taking differently: Take 1 mg by mouth Daily With Breakfast.) 120 tablet 3   • [DISCONTINUED] metoprolol tartrate (LOPRESSOR) 25 MG tablet Take 1 tablet by mouth 2 (Two) Times a Day. 60 tablet 11   • [DISCONTINUED] mirtazapine (REMERON) 15 MG tablet Take 1  tablet at bedtime for sleep nightly 30 tablet 5   • [DISCONTINUED] venlafaxine XR (EFFEXOR-XR) 150 MG 24 hr capsule Take 1 capsule by mouth Daily. 30 capsule 5     No current facility-administered medications on file  "prior to visit.       No Known Allergies     Review of Systems   Constitutional: Positive for activity change and fatigue.   HENT: Negative.    Eyes: Positive for visual disturbance.   Respiratory: Negative.    Cardiovascular: Positive for palpitations and leg swelling.   Gastrointestinal: Negative.    Endocrine: Negative.    Genitourinary: Negative.    Musculoskeletal: Positive for arthralgias and back pain.   Skin: Negative.    Allergic/Immunologic: Negative.    Neurological: Positive for dizziness and numbness.   Hematological: Bruises/bleeds easily.   Psychiatric/Behavioral: Positive for decreased concentration and sleep disturbance. The patient is nervous/anxious.         The patient's Review of Systems was personally reviewed and confirmed as accurate.    The following portions of the patient's history were reviewed and updated as appropriate: allergies, current medications, past family history, past medical history, past social history, past surgical history and problem list.    Physical Exam  Pulse 83, height 160 cm (62.99\"), weight 48.5 kg (106 lb 14.8 oz), SpO2 100 %, not currently breastfeeding.    Body mass index is 18.95 kg/m².    GENERAL APPEARANCE: awake, alert & oriented x 3, in no acute distress and well developed, well nourished  PSYCH: normal affect  LUNGS:  breathing nonlabored  EYES: sclera anicteric  CARDIOVASCULAR: palpable dorsalis pedis, palpable posterior tibial bilaterally. Capillary refill less than 2 seconds  EXTREMITIES: no clubbing, cyanosis  GAIT:  Antalgic           Right Hip Exam:  RANGE OF MOTION:   FLEXION CONTRACTURE: None   FLEXION: 110 degrees   INTERNAL ROTATION: 20 degrees at 90 degrees of flexion   EXTERNAL ROTATION: 40 degrees at 90 degrees of flexion    PAIN WITH HIP MOTION: no  PAIN WITH LOGROLL: no  STINCHFIELD TEST: negative    KNEE EXAM: full knee ROM (0-120 degrees), stable to varus and valgus stress at terminal extension and 30 degrees flexion     STRENGTH:  5/5 hip " adduction, abduction, flexion. 5/5 strength knee flexion, extension. 5/5 strength ankle dorsiflexion and plantarflexion.     GREATER TROCHANTER BURSAL PAIN:  no     REFLEXES:   PATELLAR 2+/4   ACHILLES 2+/4    CLONUS: negative  STRAIGHT LEG TEST:   negative    SENSATION TO LIGHT TOUCH:  DEEP PERONEAL/SUPERFICIAL PERONEAL/SURAL/SAPHENOUS/TIBIAL:  intact    EDEMA:   no  ERYTHEMA:  no  WOUNDS/INCISIONS: no overlying skin problems.      Left Hip Exam:   RANGE OF MOTION:   FLEXION CONTRACTURE: None   FLEXION: 110 degrees   INTERNAL ROTATION: 20 degrees at 90 degrees of flexion   EXTERNAL ROTATION: 40 degrees at 90 degrees of flexion    PAIN WITH HIP MOTION: no  PAIN WITH LOGROLL: no  STINCHFIELD TEST: negative    KNEE EXAM: full knee ROM (0-120 degrees), stable to varus and valgus stress at terminal extension and 30 degrees flexion     STRENGTH:  5/5 hip adduction, abduction, flexion. 5/5 strength knee flexion, extension. 5/5 strength ankle dorsiflexion and plantarflexion.     GREATER TROCHANTER BURSAL PAIN:  no     REFLEXES:   PATELLAR 2+/4   ACHILLES 2+/4    CLONUS: negative  STRAIGHT LEG TEST:   negative    SENSATION TO LIGHT TOUCH:  DEEP PERONEAL/SUPERFICIAL PERONEAL/SURAL/SAPHENOUS/TIBIAL:  intact    EDEMA:   no  ERYTHEMA:  no  WOUNDS/INCISIONS: no overlying skin problems.      ------------------------------------------------------------------    LEG LENGTHS:  equal  _____________________________________________________  _____________________________________________________    RADIOGRAPHIC FINDINGS:   Bilateral hip radiographs from 11/7/2020 were personally reviewed.  Radiographs demonstrate mild arthritic changes of the bilateral hips with preservation of joint spaces.  No acute bony injury or fracture.    Assessment/Plan:   Diagnosis Plan   1. Lumbar spondylosis     2. Tobacco abuse       Patient's hip range of motion is asymptomatic and symmetric on exam today.  She has no pain through hip range of motion.  The  majority of her pain is localized to the lumbar spine.  I discussed with her that this pain is likely generated from back arthritis.  I recommended over-the-counter anti-inflammatories.  In addition to this, home exercise program for core strengthening can be performed.  In addition to this, I recommend referral to a back doctor for further evaluation and treatment options.  She will follow-up with me in 3 to 4 months if her pain fails to improve.    I spent approximately 3-10 minutes counseling the patient regarding the adverse effects of tobacco use.  Included in this counseling session were treatment options for cessation including quitting cold turkey, medication, nicotine step-down programs, and smoking cessation programs.  Furthermore, I explained to the patient the importance of abstaining from nicotine usage as nicotine is known to increase the risk of complications associated with surgery including but not limited to infection, decreased wound healing, slowed soft tissue healing, and increased surgery associated pain.  As a result of this counseling session, the patient will weigh the options and determine how to proceed with achieving tobacco cessation in preparation for surgery. This topic will be revisited upon return to clinic.    Yuval Farr MD  02/25/21  11:22 EST

## 2021-03-10 DIAGNOSIS — S22.080D COMPRESSION FRACTURE OF T12 VERTEBRA WITH ROUTINE HEALING, SUBSEQUENT ENCOUNTER: ICD-10-CM

## 2021-03-10 DIAGNOSIS — G89.3 CANCER ASSOCIATED PAIN: ICD-10-CM

## 2021-03-10 RX ORDER — OXYCODONE AND ACETAMINOPHEN 7.5; 325 MG/1; MG/1
1 TABLET ORAL EVERY 6 HOURS PRN
Qty: 60 TABLET | Refills: 0 | Status: SHIPPED | OUTPATIENT
Start: 2021-03-10 | End: 2021-04-05 | Stop reason: SDUPTHER

## 2021-03-10 NOTE — TELEPHONE ENCOUNTER
PT CALLED REQUESTING A REFILL FOR:  oxyCODONE-acetaminophen (Percocet) 7.5-325 MG per tablet    PT HAS ONE DAYS WORTH    [facility]/pharmacy #[id number] - [place], [place] - [address] [facility] AT [facility] 25 - [phone] [facility] [phone] FX

## 2021-04-05 DIAGNOSIS — S22.080D COMPRESSION FRACTURE OF T12 VERTEBRA WITH ROUTINE HEALING, SUBSEQUENT ENCOUNTER: ICD-10-CM

## 2021-04-05 DIAGNOSIS — G89.3 CANCER ASSOCIATED PAIN: ICD-10-CM

## 2021-04-05 NOTE — TELEPHONE ENCOUNTER
Caller: Brittani Lambert    Relationship: Self    Best call back number: [phone]     Medication needed:   Requested Prescriptions     Pending Prescriptions Disp Refills   • oxyCODONE-acetaminophen (Percocet) 7.5-325 MG per tablet 60 tablet 0     Sig: Take 1 tablet by mouth Every 6 (Six) Hours As Needed for Moderate Pain .       When do you need the refill by: ASAP    What additional details did the patient provide when requesting the medication: PATIENT HAS TWO DAYS LEFT    Does the patient have less than a 3 day supply:  [x] Yes  [] No    What is the patient's preferred pharmacy: [facility]/PHARMACY #[id number] - [place], [place] - [address] [place] [address] AT [facility] 25 - [phone] [facility] [phone]

## 2021-04-06 ENCOUNTER — OFFICE VISIT (OUTPATIENT)
Dept: NEUROSURGERY | Facility: CLINIC | Age: 61
End: 2021-04-06

## 2021-04-06 VITALS
HEART RATE: 58 BPM | BODY MASS INDEX: 18.14 KG/M2 | OXYGEN SATURATION: 100 % | WEIGHT: 102.4 LBS | HEIGHT: 63 IN | SYSTOLIC BLOOD PRESSURE: 108 MMHG | TEMPERATURE: 97.1 F | DIASTOLIC BLOOD PRESSURE: 78 MMHG | RESPIRATION RATE: 16 BRPM

## 2021-04-06 DIAGNOSIS — C79.31 CANCER OF LEFT BREAST METASTATIC TO BRAIN (HCC): Primary | ICD-10-CM

## 2021-04-06 DIAGNOSIS — F33.1 MODERATE EPISODE OF RECURRENT MAJOR DEPRESSIVE DISORDER (HCC): ICD-10-CM

## 2021-04-06 DIAGNOSIS — R53.1 GENERALIZED WEAKNESS: ICD-10-CM

## 2021-04-06 DIAGNOSIS — S22.080A COMPRESSION FRACTURE OF T12 VERTEBRA, INITIAL ENCOUNTER (HCC): ICD-10-CM
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
## 2021-04-06 DIAGNOSIS — C50.912 CANCER OF LEFT BREAST METASTATIC TO BRAIN (HCC): Primary | ICD-10-CM

## 2021-04-06 DIAGNOSIS — R29.898 BILATERAL LEG WEAKNESS: ICD-10-CM

## 2021-04-06 DIAGNOSIS — R62.7 FAILURE TO THRIVE IN ADULT: ICD-10-CM

## 2021-04-06 PROCEDURE — 99215 OFFICE O/P EST HI 40 MIN: CPT | Performed by: PHYSICIAN ASSISTANT

## 2021-04-06 RX ORDER — OXYCODONE AND ACETAMINOPHEN 7.5; 325 MG/1; MG/1
1 TABLET ORAL EVERY 6 HOURS PRN
Qty: 60 TABLET | Refills: 0 | Status: SHIPPED | OUTPATIENT
Start: 2021-04-06 | End: 2021-04-26 | Stop reason: SDUPTHER

## 2021-04-06 NOTE — PROGRESS NOTES
Patient: Brittani Lambert  : 1960  Chart #: [id number]    Date of Service: 2021    Chief Complaint   Patient presents with   • Extremity Weakness     lower, bilateral   • Back Pain       HPI  Is a 61-year-old female who has been sent to see neurosurgery regarding lower extremity weakness and multiple falls.  She complains of back pain.  I have a thoracic and lumbar MRI for review today.    Past Medical History:   Diagnosis Date   • Breast CA (CMS/HCC) 10/4/2018   • Depression    • DJD (degenerative joint disease) of cervical spine    • THALIA (generalized anxiety disorder)    • Heart murmur    • Ovarian cancer (CMS/HCC)    • Rt Cellulitis of chest wall 10/15/2018   • Tobacco dependence    • UTI (urinary tract infection) 2020       No Known Allergies      Current Outpatient Medications:   •  ALPRAZolam (XANAX) 0.5 MG tablet, Take 1 tablet by mouth 3 (Three) Times a Day As Needed for Anxiety or Sleep., Disp: 90 tablet, Rfl: 0  •  ARIPiprazole (ABILIFY) 5 MG tablet, Take 1 tablet by mouth Daily., Disp: 30 tablet, Rfl: 5  •  oxyCODONE-acetaminophen (Percocet) 7.5-325 MG per tablet, Take 1 tablet by mouth Every 6 (Six) Hours As Needed for Moderate Pain ., Disp: 60 tablet, Rfl: 0    Social History     Socioeconomic History   • Marital status: Single     Spouse name: N/A   • Number of children: 1   • Years of education: H.S.   • Highest education level: High school graduate   Tobacco Use   • Smoking status: Current Every Day Smoker     Packs/day: 0.50     Years: 25.00     Pack years: 12.50     Types: Cigarettes   • Smokeless tobacco: Never Used   Vaping Use   • Vaping Use: Never used   Substance and Sexual Activity   • Alcohol use: Not Currently     Comment: 3 glasses of wine a week    • Drug use: No   • Sexual activity: Defer     Partners: Male       Family History   Problem Relation Age of Onset   • Arthritis Mother    • Heart attack Mother    • Osteoporosis Mother    • Liver disease Father    •  Celiac disease Other        Review of Systems   Constitutional: Positive for fatigue. Negative for activity change, appetite change, chills, diaphoresis, fever and unexpected weight change.   HENT: Negative for congestion, dental problem, drooling, ear discharge, ear pain, facial swelling, hearing loss, mouth sores, nosebleeds, postnasal drip, rhinorrhea, sinus pressure, sinus pain, sneezing, sore throat, tinnitus, trouble swallowing and voice change.    Eyes: Positive for redness and itching. Negative for photophobia, pain, discharge and visual disturbance.   Respiratory: Negative for apnea, cough, choking, chest tightness, shortness of breath, wheezing and stridor.    Cardiovascular: Negative for chest pain, palpitations and leg swelling.   Gastrointestinal: Negative for abdominal distention, abdominal pain, anal bleeding, blood in stool, constipation, diarrhea, nausea, rectal pain and vomiting.   Endocrine: Negative for cold intolerance, heat intolerance, polydipsia, polyphagia and polyuria.   Genitourinary: Negative for decreased urine volume, difficulty urinating, dyspareunia, dysuria, enuresis, flank pain, frequency, genital sores, hematuria, menstrual problem, pelvic pain, urgency, vaginal bleeding, vaginal discharge and vaginal pain.   Musculoskeletal: Positive for back pain and gait problem. Negative for arthralgias, joint swelling, myalgias, neck pain and neck stiffness.   Skin: Negative for color change, pallor, rash and wound.   Allergic/Immunologic: Negative for environmental allergies, food allergies and immunocompromised state.   Neurological: Negative for dizziness, tremors, seizures, syncope, facial asymmetry, speech difficulty, weakness, light-headedness, numbness and headaches.   Hematological: Negative for adenopathy. Does not bruise/bleed easily.   Psychiatric/Behavioral: Negative for agitation, behavioral problems, confusion, decreased concentration, dysphoric mood, hallucinations, self-injury,  "sleep disturbance and suicidal ideas. The patient is nervous/anxious. The patient is not hyperactive.        Patient's Body mass index is 18.14 kg/m². BMI is below normal parameters. Recommendations include: referral to primary care.  I have discussed with the patient her diet and weight and based upon what she is telling me she is majorly depressed and considered failure to thrive.  She does report that she has Ensure at home but she has not been utilizing this supplement.       Social History    Tobacco Use      Smoking status: Current Every Day Smoker        Packs/day: 0.50        Years: 25.00        Pack years: 12.5        Types: Cigarettes      Smokeless tobacco: Never Used       Physical examination:  Blood pressure 108/78, pulse 58, temperature 97.1 °F (36.2 °C), resp. rate 16, height 160 cm (63\"), weight 46.4 kg (102 lb 6.4 oz), SpO2 100 %, not currently breastfeeding.  HEENT- normocephalic, atraumatic, sclera clear  Lungs-normal expansion, no wheezing  Heart-regular rate and rhythm  Extremities-positive pulses, no edema    Neurologic Exam    This is a 61-year-old white female currently wearing 102 pounds who looks weak and sickly  A/A/C, speech clear, attention normal, conversant, answers questions appropriately, good historian.  Cranial nerves II through XII are intact  Motor examination does not reveal weakness in the , upper or lower extremities.   Sensation is intact.  Gait is very small sometimes unsteady steps, difficulties with balance at times.  No tremors are noted.  Reflexes are normal plus in the upper extremities except absent in the right bicep, triceps.  Reflexes are intact in the lower extremities but mildly elicited.  Garduno is negative. Clonus is negative.   Palpation of the thoracic and lumbar paraspinal musculature is essentially negative.    Radiographic Imaging:  I have personally reviewed imaging and outside results. I have independently interpreted the imaging and discussed with " the patient the findings and appropriate management.  The lumbar MRI reveals some mild abnormality at L4-5 (the radiologist is reading this as L5-S1), outside of the canal on the right.  The nerve root is free without any foraminal narrowing, there is no bulging disc at L4-5 or L5-S1.  On the lumbar MRI there could be some compression of T12 which I do not see on the thoracic scan.  The thoracic MRI is without cord compression.  The view of the cervical spine does not show cord compression.  I have reviewed a brain MRI scan from 9/9/2020 which did reveal a known right vertex mass, the patient states that she was treated with brain radiation.    Medical Decision Making  Assessment and Plan:  1.  Lower extremity weakness, difficulties with balance, multiple falls  2.  Failure to thrive-the patient reports that she drinks about 8 glasses of water a day.  3.  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  4.  History of ovarian cancer in 1989  5.  Major depression    Bev Crum PA-C      Patient Care Team:  Alla Colon DO as PCP - General (Family Medicine)  Carrie Patel MD as Referring Physician (Hematology and Oncology)  Rod Garcia MD as Consulting Physician (Radiation Oncology)  Alla Colon DO as Referring Physician (Family Medicine)

## 2021-04-12 ENCOUNTER — HOSPITAL ENCOUNTER (OUTPATIENT)
Dept: MRI IMAGING | Facility: HOSPITAL | Age: 61
Discharge: HOME | End: 2021-04-12
Admitting: PHYSICIAN ASSISTANT

## 2021-04-12 ENCOUNTER — HOSPITAL ENCOUNTER (OUTPATIENT)
Dept: NUCLEAR MEDICINE | Facility: HOSPITAL | Age: 61
Discharge: HOME | End: 2021-04-12

## 2021-04-12 DIAGNOSIS — C50.912 CANCER OF LEFT BREAST METASTATIC TO BRAIN (HCC): ICD-10-CM

## 2021-04-12 DIAGNOSIS — C79.31 CANCER OF LEFT BREAST METASTATIC TO BRAIN (HCC): ICD-10-CM

## 2021-04-12 DIAGNOSIS — R29.898 BILATERAL LEG WEAKNESS: ICD-10-CM

## 2021-04-12 DIAGNOSIS — F33.1 MODERATE EPISODE OF RECURRENT MAJOR DEPRESSIVE DISORDER (HCC): ICD-10-CM

## 2021-04-12 DIAGNOSIS — S22.080A COMPRESSION FRACTURE OF T12 VERTEBRA, INITIAL ENCOUNTER (HCC): ICD-10-CM

## 2021-04-12 PROCEDURE — 0 GADOBENATE DIMEGLUMINE 529 MG/ML SOLUTION: Performed by: PHYSICIAN ASSISTANT

## 2021-04-12 PROCEDURE — A9577 INJ MULTIHANCE: HCPCS | Performed by: PHYSICIAN ASSISTANT

## 2021-04-12 PROCEDURE — A9503 TC99M MEDRONATE: HCPCS | Performed by: PHYSICIAN ASSISTANT

## 2021-04-12 PROCEDURE — 78300 BONE IMAGING LIMITED AREA: CPT

## 2021-04-12 PROCEDURE — 0 TECHNETIUM MEDRONATE KIT: Performed by: PHYSICIAN ASSISTANT

## 2021-04-12 PROCEDURE — 70553 MRI BRAIN STEM W/O & W/DYE: CPT

## 2021-04-12 RX ORDER — TC 99M MEDRONATE 20 MG/10ML
22 INJECTION, POWDER, LYOPHILIZED, FOR SOLUTION INTRAVENOUS
Status: COMPLETED | OUTPATIENT
Start: 2021-04-12 | End: 2021-04-12

## 2021-04-12 RX ADMIN — Medication 22 MILLICURIE: at 09:40

## 2021-04-12 RX ADMIN — GADOBENATE DIMEGLUMINE 10 ML: 529 INJECTION, SOLUTION INTRAVENOUS at 12:52

## 2021-04-13 ENCOUNTER — OFFICE VISIT (OUTPATIENT)
Dept: NEUROSURGERY | Facility: CLINIC | Age: 61
End: 2021-04-13

## 2021-04-13 VITALS — HEIGHT: 63 IN | WEIGHT: 108 LBS | BODY MASS INDEX: 19.14 KG/M2

## 2021-04-13 DIAGNOSIS — R62.7 FAILURE TO THRIVE IN ADULT: ICD-10-CM
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
## 2021-04-13 DIAGNOSIS — F32.9 MAJOR DEPRESSIVE DISORDER, REMISSION STATUS UNSPECIFIED, UNSPECIFIED WHETHER RECURRENT: ICD-10-CM

## 2021-04-13 DIAGNOSIS — C50.011 MALIGNANT NEOPLASM OF NIPPLE OF RIGHT BREAST IN FEMALE, UNSPECIFIED ESTROGEN RECEPTOR STATUS (HCC): Primary | ICD-10-CM

## 2021-04-13 DIAGNOSIS — R53.1 GENERALIZED WEAKNESS: ICD-10-CM

## 2021-04-13 PROCEDURE — 99442 PR PHYS/QHP TELEPHONE EVALUATION 11-20 MIN: CPT | Performed by: PHYSICIAN ASSISTANT

## 2021-04-13 NOTE — PROGRESS NOTES
Patient ID: Brittani Lambert is a 61 y.o. female  [id number]    You have chosen to receive care through a telephone visit. Do you consent to use a telephone visit for your medical care today? YES    CC: Back pain, leg weakness    History of Present Illness   This is a 61-year-old female that I saw in the office on 4/6/2021 with back pain and bilateral lower extremity weakness.  She had had multiple falls, she presented with a thoracic and lumbar MRI. 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  We are going to review a brain MRI scan as well as a nuclear medicine bone scan.    Past Medical History:   Diagnosis Date   • Breast CA (CMS/[facility]) 10/4/2018   • Depression    • DJD (degenerative joint disease) of cervical spine    • THALIA (generalized anxiety disorder)    • Heart murmur    • Ovarian cancer (CMS/[facility]) 1989   • Rt Cellulitis of chest wall 10/15/2018   • Tobacco dependence    • UTI (urinary tract infection) 9/9/2020       No Known Allergies    Current Outpatient Medications:   •  ALPRAZolam (XANAX) 0.5 MG tablet, Take 1 tablet by mouth 3 (Three) Times a Day As Needed for Anxiety or Sleep., Disp: 90 tablet, Rfl: 0  •  ARIPiprazole (ABILIFY) 5 MG tablet, Take 1 tablet by mouth Daily., Disp: 30 tablet, Rfl: 5  •  oxyCODONE-acetaminophen (Percocet) 7.5-325 MG per tablet, Take 1 tablet by mouth Every 6 (Six) Hours As Needed for Moderate Pain ., Disp: 60 tablet, Rfl: 0  No current facility-administered medications for this visit.  Social History     Tobacco Use   • Smoking status: Current Every Day Smoker     Packs/day: 0.50     Years: 25.00     Pack years: 12.50     Types: Cigarettes   • Smokeless tobacco: Never Used   Vaping Use   • Vaping Use: Never used   Substance Use Topics   • Alcohol use: Yes     Comment: 3 glasses of wine a week    • Drug use: No     Review of Systems   Constitutional: Positive for  fatigue. Negative for activity change, appetite change, chills, diaphoresis, fever and unexpected weight change.   HENT: Negative for congestion, dental problem, drooling, ear discharge, ear pain, facial swelling, hearing loss, mouth sores, nosebleeds, postnasal drip, rhinorrhea, sinus pressure, sinus pain, sneezing, sore throat, tinnitus, trouble swallowing and voice change.    Eyes: Positive for redness and itching. Negative for photophobia, pain, discharge and visual disturbance.   Respiratory: Negative for apnea, cough, choking, chest tightness, shortness of breath, wheezing and stridor.    Cardiovascular: Negative for chest pain, palpitations and leg swelling.   Gastrointestinal: Negative for abdominal distention, abdominal pain, anal bleeding, blood in stool, constipation, diarrhea, nausea, rectal pain and vomiting.   Endocrine: Negative for cold intolerance, heat intolerance, polydipsia, polyphagia and polyuria.   Genitourinary: Negative for decreased urine volume, difficulty urinating, dyspareunia, dysuria, enuresis, flank pain, frequency, genital sores, hematuria, menstrual problem, pelvic pain, urgency, vaginal bleeding, vaginal discharge and vaginal pain.   Musculoskeletal: Positive for back pain and gait problem. Negative for arthralgias, joint swelling, myalgias, neck pain and neck stiffness.   Skin: Negative for color change, pallor, rash and wound.   Allergic/Immunologic: Negative for environmental allergies, food allergies and immunocompromised state.   Neurological: Negative for dizziness, tremors, seizures, syncope, facial asymmetry, speech difficulty, weakness, light-headedness, numbness and headaches.   Hematological: Negative for adenopathy. Does not bruise/bleed easily.   Psychiatric/Behavioral: Negative for agitation, behavioral problems, confusion, decreased concentration, dysphoric mood, hallucinations, self-injury, sleep disturbance and suicidal ideas. The patient is nervous/anxious. The  "patient is not hyperactive.         Ht 160 cm (63\")   Wt 49 kg (108 lb)   BMI 19.13 kg/m²     PE:  No cough, shortness of breath or wheezing is detected on the telephone.  Neurologic Exam   Per the patient her gait and balance are unchanged.    Independent Review of Radiographic Studies:   MRI of the brain is stable, no evidence of enhancing mass or meningeal enhancement.  There is moderate generalized cerebral atrophy and stable white matter changes, no hydrocephalus.      Medical Decision Making:   Impression:  1.  Degenerative lumbar osteoarthritis  2.  Lower extremity weakness, difficulties with balance and multiple falls  3.  Failure to thrive  4.  History of ovarian cancer as well as breast cancer with prior concern of leptomeningeal carcinomatosis, whole brain radiation 4/24/2019  5.  Major depression    Plan:  There is no indication for neurosurgical intervention at this time.  The patient has not seen her oncologist since June 2020.  I think it would be appropriate for her to be seen by oncology and hopefully the nutritionist can help her.     Patient's Body mass index is 19.13 kg/m². BMI is below normal parameters. Recommendations include: referral to primary care.       Social History    Tobacco Use      Smoking status: Current Every Day Smoker        Packs/day: 0.50        Years: 25.00        Pack years: 12.5        Types: Cigarettes      Smokeless tobacco: Never Used       This visit has been rescheduled as a phone visit to comply with patient safety concerns in accordance with CDC recommendation.  Total time of discussion was 20 minutes.    HUBER Jefferson                  "

## 2021-04-19 ENCOUNTER — TELEMEDICINE (OUTPATIENT)
Dept: FAMILY MEDICINE CLINIC | Facility: CLINIC | Age: 61
End: 2021-04-19

## 2021-04-19 DIAGNOSIS — R29.6 FREQUENT FALLS: ICD-10-CM

## 2021-04-19 DIAGNOSIS — R29.898 LEG WEAKNESS, BILATERAL: Primary | ICD-10-CM

## 2021-04-19 PROCEDURE — 98968 PH1 ASSMT&MGMT NQHP 21-30: CPT | Performed by: FAMILY MEDICINE

## 2021-04-19 NOTE — PROGRESS NOTES
Chief Complaint  Extremity Weakness and Fall  You have chosen to receive care through a telephone visit. Do you consent to use a telephone visit for your medical care today? Yes  Subjective          Brittani Lambert presents to [facility]  History of Present Illness  She is concerned about falls and leg weakness. This has been occurring for months, has completed MRI lumbar, MRI thoracic, and MRI brain since November 2020.   She recently saw neurosurgery office for follow up, no indication for surgical intervention at this time. She continues to have bilateral leg weakness and falls secondary to this. She doesn't have any precursor symptoms, but will be walking and suddenly experience leg weakness and fall.   She does have a history of ovarian and beast cancer, with leptomeningeal carcinomatosis. Completed whole brain radiation 4/2019.   The following portions of the patient's history were reviewed and updated as appropriate: allergies, current medications, past family history, past medical history, past social history, past surgical history and problem list.    Objective   Vital Signs:   There were no vitals taken for this visit.    Physical Exam   Unable to complete due to telephone encounter. She is able to converse normally.     Result Review :                 Assessment and Plan    Diagnoses and all orders for this visit:    1. Leg weakness, bilateral (Primary)  -     Ambulatory Referral to Neurology  -     EMG & Nerve Conduction Test    2. Frequent falls  -     Ambulatory Referral to Neurology  -     EMG & Nerve Conduction Test    She has had a long history of bilateral lower extremity weakness. Has previously completed MRI studies, but has not had EMG NCV or consult with neurologist. I think this would be beneficial with her and she agrees to proceed with this plan.    Encouraged her to use a walker to prevent falls. Consider PT for strengthening, but will consult with  neurology first.   If she is experiencing extremity weakness, advised to avoid driving.     I spent 25 minutes, with 18:22 minutes discussion with patient during 25 minutes caring for Brittani on this date of service. This time includes time spent by me in the following activities:obtaining and/or reviewing a separately obtained history, ordering medications, tests, or procedures, referring and communicating with other health care professionals  and documenting information in the medical record  Follow Up   Return if symptoms worsen or fail to improve.  Patient was given instructions and counseling regarding her condition or for health maintenance advice. Please see specific information pulled into the AVS if appropriate.

## 2021-04-19 NOTE — PATIENT INSTRUCTIONS
Weakness  Weakness is a lack of strength. You may feel weak all over your body (generalized), or you may feel weak in one part of your body (focal). There are many potential causes of weakness. Sometimes, the cause of your weakness may not be known. Some causes of weakness can be serious, so it is important to see your doctor.  Follow these instructions at home:  Activity  · Rest as needed.  · Try to get enough sleep. Most adults need 7-8 hours of sleep each night. Talk to your doctor about how much sleep you need each night.  · Do exercises, such as arm curls and leg raises, for 30 minutes at least 2 days a week or as told by your doctor.  · Think about working with a physical therapist or  to help you get stronger.  General instructions    · Take over-the-counter and prescription medicines only as told by your doctor.  · Eat a healthy, well-balanced diet. This includes:  ? Proteins to build muscles, such as lean meats and fish.  ? Fresh fruits and vegetables.  ? Carbohydrates to boost energy, such as whole grains.  · Drink enough fluid to keep your pee (urine) pale yellow.  · Keep all follow-up visits as told by your doctor. This is important.  Contact a doctor if:  · Your weakness does not get better or it gets worse.  · Your weakness affects your ability to:  ? Think clearly.  ? Do your normal daily activities.  Get help right away if you:  · Have sudden weakness on one side of your face or body.  · Have chest pain.  · Have trouble breathing or shortness of breath.  · Have problems with your vision.  · Have trouble talking or swallowing.  · Have trouble standing or walking.  · Are light-headed.  · Pass out (lose consciousness).  Summary  · Weakness is a lack of strength. You may feel weak all over your body or just in one part of your body.  · There are many potential causes of weakness. Sometimes, the cause of your weakness may not be known.  · Rest as needed, and try to get enough sleep. Most adults  need 7-8 hours of sleep each night.  · Eat a healthy, well-balanced diet.  This information is not intended to replace advice given to you by your health care provider. Make sure you discuss any questions you have with your health care provider.  Document Revised: 07/24/2019 Document Reviewed: 07/24/2019  Elsevier Patient Education © 2021 [employer]

## 2021-04-26 ENCOUNTER — APPOINTMENT (OUTPATIENT)
Dept: CT IMAGING | Facility: HOSPITAL | Age: 61
End: 2021-04-26

## 2021-04-26 ENCOUNTER — APPOINTMENT (OUTPATIENT)
Dept: GENERAL RADIOLOGY | Facility: HOSPITAL | Age: 61
End: 2021-04-26

## 2021-04-26 ENCOUNTER — HOSPITAL ENCOUNTER (EMERGENCY)
Facility: HOSPITAL | Age: 61
Discharge: HOME | End: 2021-04-26
Attending: EMERGENCY MEDICINE | Admitting: EMERGENCY MEDICINE

## 2021-04-26 VITALS
BODY MASS INDEX: 18.61 KG/M2 | HEART RATE: 70 BPM | OXYGEN SATURATION: 98 % | WEIGHT: 105 LBS | RESPIRATION RATE: 24 BRPM | DIASTOLIC BLOOD PRESSURE: 78 MMHG | SYSTOLIC BLOOD PRESSURE: 129 MMHG | HEIGHT: 63 IN | TEMPERATURE: 97.6 F

## 2021-04-26 DIAGNOSIS — G89.3 CANCER ASSOCIATED PAIN: ICD-10-CM

## 2021-04-26 DIAGNOSIS — S20.219A CONTUSION OF STERNUM, INITIAL ENCOUNTER: Primary | ICD-10-CM

## 2021-04-26 DIAGNOSIS — S22.080D COMPRESSION FRACTURE OF T12 VERTEBRA WITH ROUTINE HEALING, SUBSEQUENT ENCOUNTER: ICD-10-CM

## 2021-04-26 LAB
ALBUMIN SERPL-MCNC: 4.4 G/DL (ref 3.5–5.2)
ALBUMIN/GLOB SERPL: 1.5 G/DL
ALP SERPL-CCNC: 167 U/L (ref 39–117)
ALT SERPL W P-5'-P-CCNC: 41 U/L (ref 1–33)
ANION GAP SERPL CALCULATED.3IONS-SCNC: 21 MMOL/L (ref 5–15)
AST SERPL-CCNC: 58 U/L (ref 1–32)
BASOPHILS # BLD AUTO: 0.09 10*3/MM3 (ref 0–0.2)
BASOPHILS NFR BLD AUTO: 0.9 % (ref 0–1.5)
BILIRUB SERPL-MCNC: 0.4 MG/DL (ref 0–1.2)
BUN SERPL-MCNC: 6 MG/DL (ref 8–23)
BUN/CREAT SERPL: 8.7 (ref 7–25)
CALCIUM SPEC-SCNC: 9 MG/DL (ref 8.6–10.5)
CHLORIDE SERPL-SCNC: 94 MMOL/L (ref 98–107)
CO2 SERPL-SCNC: 21 MMOL/L (ref 22–29)
CREAT SERPL-MCNC: 0.69 MG/DL (ref 0.57–1)
DEPRECATED RDW RBC AUTO: 53.1 FL (ref 37–54)
EOSINOPHIL # BLD AUTO: 0.05 10*3/MM3 (ref 0–0.4)
EOSINOPHIL NFR BLD AUTO: 0.5 % (ref 0.3–6.2)
ERYTHROCYTE [DISTWIDTH] IN BLOOD BY AUTOMATED COUNT: 14.1 % (ref 12.3–15.4)
GFR SERPL CREATININE-BSD FRML MDRD: 86 ML/MIN/1.73
GLOBULIN UR ELPH-MCNC: 3 GM/DL
GLUCOSE SERPL-MCNC: 118 MG/DL (ref 65–99)
HCT VFR BLD AUTO: 43.8 % (ref 34–46.6)
HGB BLD-MCNC: 14.8 G/DL (ref 12–15.9)
HOLD SPECIMEN: NORMAL
HOLD SPECIMEN: NORMAL
IMM GRANULOCYTES # BLD AUTO: 0.05 10*3/MM3 (ref 0–0.05)
IMM GRANULOCYTES NFR BLD AUTO: 0.5 % (ref 0–0.5)
INR PPP: 0.99 (ref 0.85–1.16)
LYMPHOCYTES # BLD AUTO: 2.41 10*3/MM3 (ref 0.7–3.1)
LYMPHOCYTES NFR BLD AUTO: 23 % (ref 19.6–45.3)
MAGNESIUM SERPL-MCNC: 1.4 MG/DL (ref 1.6–2.4)
MCH RBC QN AUTO: 34.7 PG (ref 26.6–33)
MCHC RBC AUTO-ENTMCNC: 33.8 G/DL (ref 31.5–35.7)
MCV RBC AUTO: 102.6 FL (ref 79–97)
MONOCYTES # BLD AUTO: 0.79 10*3/MM3 (ref 0.1–0.9)
MONOCYTES NFR BLD AUTO: 7.5 % (ref 5–12)
NEUTROPHILS NFR BLD AUTO: 67.6 % (ref 42.7–76)
NEUTROPHILS NFR BLD AUTO: 7.1 10*3/MM3 (ref 1.7–7)
NRBC BLD AUTO-RTO: 0 /100 WBC (ref 0–0.2)
PLATELET # BLD AUTO: 264 10*3/MM3 (ref 140–450)
PMV BLD AUTO: 9 FL (ref 6–12)
POTASSIUM SERPL-SCNC: 4.1 MMOL/L (ref 3.5–5.2)
PROT SERPL-MCNC: 7.4 G/DL (ref 6–8.5)
PROTHROMBIN TIME: 12.8 SECONDS (ref 11.4–14.4)
RBC # BLD AUTO: 4.27 10*6/MM3 (ref 3.77–5.28)
SODIUM SERPL-SCNC: 136 MMOL/L (ref 136–145)
TROPONIN T SERPL-MCNC: <0.01 NG/ML (ref 0–0.03)
WBC # BLD AUTO: 10.49 10*3/MM3 (ref 3.4–10.8)
WHOLE BLOOD HOLD SPECIMEN: NORMAL
WHOLE BLOOD HOLD SPECIMEN: NORMAL

## 2021-04-26 PROCEDURE — 99284 EMERGENCY DEPT VISIT MOD MDM: CPT

## 2021-04-26 PROCEDURE — 96374 THER/PROPH/DIAG INJ IV PUSH: CPT

## 2021-04-26 PROCEDURE — 83735 ASSAY OF MAGNESIUM: CPT | Performed by: EMERGENCY MEDICINE

## 2021-04-26 PROCEDURE — 84484 ASSAY OF TROPONIN QUANT: CPT | Performed by: EMERGENCY MEDICINE

## 2021-04-26 PROCEDURE — 25010000002 ONDANSETRON PER 1 MG: Performed by: EMERGENCY MEDICINE

## 2021-04-26 PROCEDURE — 96375 TX/PRO/DX INJ NEW DRUG ADDON: CPT

## 2021-04-26 PROCEDURE — 25010000002 HYDROMORPHONE PER 4 MG: Performed by: EMERGENCY MEDICINE

## 2021-04-26 PROCEDURE — 85610 PROTHROMBIN TIME: CPT | Performed by: EMERGENCY MEDICINE

## 2021-04-26 PROCEDURE — 80053 COMPREHEN METABOLIC PANEL: CPT | Performed by: EMERGENCY MEDICINE

## 2021-04-26 PROCEDURE — 93005 ELECTROCARDIOGRAM TRACING: CPT | Performed by: EMERGENCY MEDICINE

## 2021-04-26 PROCEDURE — 71250 CT THORAX DX C-: CPT

## 2021-04-26 PROCEDURE — 71045 X-RAY EXAM CHEST 1 VIEW: CPT

## 2021-04-26 PROCEDURE — 85025 COMPLETE CBC W/AUTO DIFF WBC: CPT | Performed by: EMERGENCY MEDICINE

## 2021-04-26 RX ORDER — OXYCODONE AND ACETAMINOPHEN 7.5; 325 MG/1; MG/1
1 TABLET ORAL EVERY 6 HOURS PRN
Qty: 60 TABLET | Refills: 0 | Status: SHIPPED | OUTPATIENT
Start: 2021-04-26 | End: 2021-05-10 | Stop reason: SDUPTHER

## 2021-04-26 RX ORDER — METHOCARBAMOL 750 MG/1
1500 TABLET, FILM COATED ORAL 3 TIMES DAILY PRN
Qty: 30 TABLET | Refills: 0 | Status: SHIPPED | OUTPATIENT
Start: 2021-04-26 | End: 2021-06-17

## 2021-04-26 RX ORDER — ONDANSETRON 2 MG/ML
4 INJECTION INTRAMUSCULAR; INTRAVENOUS ONCE
Status: COMPLETED | OUTPATIENT
Start: 2021-04-26 | End: 2021-04-26

## 2021-04-26 RX ORDER — DICLOFENAC SODIUM 75 MG/1
75 TABLET, DELAYED RELEASE ORAL 2 TIMES DAILY
Qty: 14 TABLET | Refills: 0 | Status: SHIPPED | OUTPATIENT
Start: 2021-04-26 | End: 2021-06-17

## 2021-04-26 RX ORDER — SODIUM CHLORIDE 0.9 % (FLUSH) 0.9 %
10 SYRINGE (ML) INJECTION AS NEEDED
Status: DISCONTINUED | OUTPATIENT
Start: 2021-04-26 | End: 2021-04-26

## 2021-04-26 RX ORDER — HYDROMORPHONE HYDROCHLORIDE 1 MG/ML
0.5 INJECTION, SOLUTION INTRAMUSCULAR; INTRAVENOUS; SUBCUTANEOUS ONCE
Status: COMPLETED | OUTPATIENT
Start: 2021-04-26 | End: 2021-04-26

## 2021-04-26 RX ORDER — SODIUM CHLORIDE 9 MG/ML
10 INJECTION INTRAVENOUS AS NEEDED
Status: DISCONTINUED | OUTPATIENT
Start: 2021-04-26 | End: 2021-04-27 | Stop reason: HOSPADM

## 2021-04-26 RX ADMIN — HYDROMORPHONE HYDROCHLORIDE 0.5 MG: 1 INJECTION, SOLUTION INTRAMUSCULAR; INTRAVENOUS; SUBCUTANEOUS at 21:20

## 2021-04-26 RX ADMIN — ONDANSETRON 4 MG: 2 INJECTION INTRAMUSCULAR; INTRAVENOUS at 21:20

## 2021-04-26 NOTE — TELEPHONE ENCOUNTER
Caller: Brittani Lambert    Relationship: Self    Best call back number: [phone]    Medication needed:   Requested Prescriptions     Pending Prescriptions Disp Refills   • oxyCODONE-acetaminophen (Percocet) 7.5-325 MG per tablet 60 tablet 0     Sig: Take 1 tablet by mouth Every 6 (Six) Hours As Needed for Moderate Pain .       When do you need the refill by: ASAP    What additional details did the patient provide when requesting the medication: PATIENT HAS TWO DAYS LEFT OF MEDICATION     Does the patient have less than a 3 day supply:  [x] Yes  [] No    What is the patient's preferred pharmacy: [facility] #[id number] - [place], [place] - [address] AT [facility] [address] - [phone] [facility] [phone]

## 2021-04-27 ENCOUNTER — OFFICE VISIT (OUTPATIENT)
Dept: PSYCHIATRY | Facility: CLINIC | Age: 61
End: 2021-04-27

## 2021-04-27 DIAGNOSIS — G47.9 SLEEP DISTURBANCE: ICD-10-CM

## 2021-04-27 DIAGNOSIS — F33.1 MODERATE EPISODE OF RECURRENT MAJOR DEPRESSIVE DISORDER (HCC): Primary | ICD-10-CM

## 2021-04-27 DIAGNOSIS — F41.1 GENERALIZED ANXIETY DISORDER: ICD-10-CM

## 2021-04-27 LAB — HOLD SPECIMEN: NORMAL

## 2021-04-27 PROCEDURE — 99213 OFFICE O/P EST LOW 20 MIN: CPT | Performed by: NURSE PRACTITIONER

## 2021-04-27 RX ORDER — MIRTAZAPINE 15 MG/1
15 TABLET, FILM COATED ORAL NIGHTLY
Qty: 30 TABLET | Refills: 2 | Status: SHIPPED | OUTPATIENT
Start: 2021-04-27 | End: 2021-06-08 | Stop reason: SDUPTHER

## 2021-04-27 RX ORDER — DULOXETIN HYDROCHLORIDE 30 MG/1
30 CAPSULE, DELAYED RELEASE ORAL DAILY
Qty: 30 CAPSULE | Refills: 2 | Status: SHIPPED | OUTPATIENT
Start: 2021-04-27 | End: 2021-06-08 | Stop reason: SDUPTHER

## 2021-04-27 NOTE — PROGRESS NOTES
"  Subjective   Brittani Lambert is a 61 y.o. female who is here today for medication management follow up. You have chosen to receive care through a telephone visit. Do you consent to use a telephone visit for your medical care today? Yes    TIME IN:305  TIME OUT:330    Chief Complaint: MDD, THALIA, sleep disturbance     History of Present Illness Patient presents via telephone reporting that she took herself off all her medications except pain meds because she had been falling and wanted to see if it was medication related.  However she states she continues to fall and feeling more depressed with decreased motivation and interest with anhedonia.  She denies SI HI or AVH.  She reports depression about a 7-8.  She reports good support from her sister who checks on her often.  Patient was in the emergency department last night because of pain in her chest after she had fallen on her commode.  She reports no fractures are further injury she just has to \"heal\" from the soreness.  Patient states anxiety about a 6 stating she worries about her current health and future.  Allowed patient to vent her frustrations and concerns.  Feelings were processed and acknowledged and normalized her thoughts feelings and concerns.  Patient reports poor sleep with getting only about 5 hours that are interrupted.  She also has decreased appetite.  She did see her primary care physician this past week and neurosurgeon.  She has been referred to neurology as well.  Denies adverse effects from medications.   (Scales based on 0 - 10 with 10 being the worst)        The following portions of the patient's history were reviewed and updated as appropriate: allergies, current medications, past family history, past medical history, past social history, past surgical history and problem list.    Review of Systems  A 14 point review of systems was performed and is negative except as noted above.    Objective   Physical Exam  not currently " breastfeeding.    No Known Allergies    Current Medications:   Current Outpatient Medications   Medication Sig Dispense Refill   • diclofenac (VOLTAREN) 75 MG EC tablet Take 1 tablet by mouth 2 (Two) Times a Day. 14 tablet 0   • DULoxetine (CYMBALTA) 30 MG capsule Take 1 capsule by mouth Daily. 30 capsule 2   • methocarbamol (ROBAXIN) 750 MG tablet Take 2 tablets by mouth 3 (Three) Times a Day As Needed for Muscle Spasms. 30 tablet 0   • mirtazapine (REMERON) 15 MG tablet Take 1 tablet by mouth Every Night. 30 tablet 2   • oxyCODONE-acetaminophen (Percocet) 7.5-325 MG per tablet Take 1 tablet by mouth Every 6 (Six) Hours As Needed for Moderate Pain . 60 tablet 0     No current facility-administered medications for this visit.       Appearance: na  Hygiene:  REPORTS good  Cooperation:  Cooperative  Eye Contact:  na  Psychomotor Behavior:  denies psychomotor agitation/retardation, No EPS, No motor tics  Mood:  depressed  Affect:  na  Hopelessness: Denies  Speech:  Normal  Thought Process:  Linear  Thought Content:  Normal  Concentration: Normal   Suicidal: denies  Homicidal:  None  Hallucinations:  None  Delusion:  None  Memory:  Intact  Orientation:  Person, Place, Time and Situation  Reliability:  good  Insight:  Fair  Judgement: good  Impulse Control: good  Estimated Intelligence: average range    ZECHARIAH REVIEWED NO RED FLAGS  4.27.2021    Assessment/Plan   Diagnoses and all orders for this visit:    1. Moderate episode of recurrent major depressive disorder (CMS/HCC) (Primary)    2. Generalized anxiety disorder    3. Sleep disturbance    Other orders  -     DULoxetine (CYMBALTA) 30 MG capsule; Take 1 capsule by mouth Daily.  Dispense: 30 capsule; Refill: 2  -     mirtazapine (REMERON) 15 MG tablet; Take 1 tablet by mouth Every Night.  Dispense: 30 tablet; Refill: 2      IMPRESSION: chronic pain, off antidepressants with increase in depressive symptoms, poor sleep and anxiety    PLAN: Discussed medication  management with the patient for depression and sleep recommendation  Duloxetine 30 mg 1 p.o. daily for depression  Mirtazapine 15 mg 1 p.o. nightly for sleep, appetite and anxiety/mood    We discussed risks, benefits, and side effects of the above medications and the patient was agreeable with the plan. Patient was educated on the importance of compliance with treatment and follow-up appointments.     Patient has not had an appointment with her oncologist in some time she had missed it when she was hospitalized for atrial fib will send message to Dr. Zabala patient care coordinator  Provide Cognitive Behavioral Therapy and Solution Focused Therapy to improve functioning, maintain stability, and avoid decompensation and the need for higher level of care.    Counseled patient regarding multimodal approach with encouragement of healthy nutrition, healthy sleep, regular physical mobility, social involvement, counseling, and medication compliance.     Assisted patient in identifying risk factors which would indicate the need for higher level of care including thoughts to harm self or others and/or self-harming behavior and encouraged patient to contact this office, call 911, or present to the nearest emergency room should any of these events occur. Discussed crisis intervention services and means to access.  Patient adamantly and convincingly denies current suicidal or homicidal ideation or perceptual disturbance.    Treatment Plan: stabilize mood, patient will stay out of psychiatric hospital and be at optimal level of functioning with therapy and take all medication as prescribed. Patient verbalized  understanding and agreement to plan.    Instructed to call for questions or concerns and return early if necessary.     Greater than 50% time was spent in coordination of care, and counseling the patient regarding current assessment, symptoms, plan of care going forward, supportive therapy.  Answered any questions patient  had regarding medications and plan of care.    Return in about 2 weeks (around 5/11/2021).  For medication management and supportive expressive therapy by telephone

## 2021-04-27 NOTE — ED PROVIDER NOTES
Subjective   Pt presents with chest pain.  She says a week ago she tripped and fell, striking the commode with anterior chest.  She has had pain and dyspnea since then.  She did not seek care as she thought it would just go away but yesterday the pain began to worsen.  It is worse with breathing and movement. She has no fever or cough.  She denies other injury.      History provided by:  Patient      Review of Systems   Constitutional: Negative for fever.   Respiratory: Positive for shortness of breath. Negative for cough.    Cardiovascular: Positive for chest pain.   Gastrointestinal: Negative for abdominal pain and vomiting.   All other systems reviewed and are negative.      Past Medical History:   Diagnosis Date   • Breast CA (CMS/HCC) 10/4/2018   • Depression    • DJD (degenerative joint disease) of cervical spine    • THALIA (generalized anxiety disorder)    • Heart murmur    • Ovarian cancer (CMS/HCC) 1989   • Rt Cellulitis of chest wall 10/15/2018   • Tobacco dependence    • UTI (urinary tract infection) 9/9/2020       No Known Allergies    Past Surgical History:   Procedure Laterality Date   • APPENDECTOMY     • BREAST BIOPSY Right 2003    DONE IN [place]   • COLONOSCOPY  06/2018   • HYSTERECTOMY  1989   • MASTECTOMY W/ SENTINEL NODE BIOPSY Bilateral 10/4/2018    Procedure: LEFT SKIN SPARING BREAST MASTECTOMY WITH LEFT SENTINEL NODE BIOPSY, RIGHT PROPHYLACTIC SKIN SPARING MASTECTOMY;  Surgeon: Koffi Worthington MD;  Location: [facility];  Service: General   • OOPHORECTOMY  1989       Family History   Problem Relation Age of Onset   • Arthritis Mother    • Heart attack Mother    • Osteoporosis Mother    • Liver disease Father    • Celiac disease Other        Social History     Socioeconomic History   • Marital status: Single     Spouse name: N/A   • Number of children: 1   • Years of education: H.S.   • Highest education level: High school graduate   Tobacco Use   • Smoking status: Current Every Day Smoker  "    Packs/day: 0.50     Years: 25.00     Pack years: 12.50     Types: Cigarettes   • Smokeless tobacco: Never Used   Vaping Use   • Vaping Use: Never used   Substance and Sexual Activity   • Alcohol use: Yes     Comment: 3 glasses of wine a week    • Drug use: No   • Sexual activity: Defer     Partners: Male           Objective   Physical Exam  Vitals and nursing note reviewed.   Constitutional:       General: She is not in acute distress.     Appearance: Normal appearance. She is not ill-appearing.   HENT:      Head: Normocephalic and atraumatic.      Mouth/Throat:      Mouth: Mucous membranes are moist.   Eyes:      General: No scleral icterus.        Right eye: No discharge.         Left eye: No discharge.      Conjunctiva/sclera: Conjunctivae normal.   Cardiovascular:      Rate and Rhythm: Normal rate and regular rhythm.      Heart sounds: No murmur heard.     Pulmonary:      Effort: Pulmonary effort is normal. No respiratory distress.      Breath sounds: Normal breath sounds. No wheezing.   Chest:      Chest wall: Tenderness present. No deformity or crepitus.   Abdominal:      General: Bowel sounds are normal. There is no distension.      Palpations: Abdomen is soft.      Tenderness: There is no abdominal tenderness. There is no guarding or rebound.   Musculoskeletal:         General: No swelling. Normal range of motion.      Cervical back: Normal range of motion and neck supple.   Skin:     General: Skin is warm and dry.      Findings: No rash.   Neurological:      General: No focal deficit present.      Mental Status: She is alert. Mental status is at baseline.   Psychiatric:         Mood and Affect: Mood normal.         Behavior: Behavior normal.         Thought Content: Thought content normal.         Procedures           ED Course         EMS had reported pt was a \"code STEMI\" but on arrival to the ED her EKG is ST with nonspecific changes.  I reviewed EMS EKG and there are nonspecific changes as well, but " no STEMI criteria.  I discussed with Dr Lassiter and we called off the STEMI alert.    Labs benign.  CXR/CT show no rib or sternal fracture.  Her pain is better after meds.    ZECHARIAH indicates she gets regular pain meds from PCP.  I will add what I can.    HEART 3.    Patient stable on serial rechecks.  Discussed findings, concerns, plan of care, expected course, reasons to return and followup.  Provided the opportunity to ask questions.                                    MDM  Number of Diagnoses or Management Options     Amount and/or Complexity of Data Reviewed  Tests in the radiology section of CPT®: reviewed and ordered  Discuss the patient with other providers: yes  Independent visualization of images, tracings, or specimens: yes        Final diagnoses:   Contusion of sternum, initial encounter       ED Disposition  ED Disposition     ED Disposition Condition Comment    Discharge Stable           Alla Colon DO  [address]  [place]  [facility] [address] [phone]    In 2 days           Medication List      New Prescriptions    diclofenac 75 MG EC tablet  Commonly known as: VOLTAREN  Take 1 tablet by mouth 2 (Two) Times a Day.     methocarbamol 750 MG tablet  Commonly known as: ROBAXIN  Take 2 tablets by mouth 3 (Three) Times a Day As Needed for Muscle Spasms.           Where to Get Your Medications      These medications were sent to [facility]/pharmacy #[id number] - [place], [place] - [address] [place] [address] AT [place] [address] - [phone]  - [phone] [address] [address]    Hours: 24-hours Phone: [phone]   · diclofenac 75 MG EC tablet  · methocarbamol 750 MG tablet  · oxyCODONE-acetaminophen 7.5-325 MG per tablet          Eben Alatorre MD  04/27/21 0019

## 2021-04-28 ENCOUNTER — OFFICE VISIT (OUTPATIENT)
Dept: FAMILY MEDICINE CLINIC | Facility: CLINIC | Age: 61
End: 2021-04-28

## 2021-04-28 VITALS
HEART RATE: 81 BPM | BODY MASS INDEX: 17.68 KG/M2 | RESPIRATION RATE: 20 BRPM | TEMPERATURE: 97.5 F | DIASTOLIC BLOOD PRESSURE: 70 MMHG | HEIGHT: 63 IN | OXYGEN SATURATION: 97 % | SYSTOLIC BLOOD PRESSURE: 125 MMHG | WEIGHT: 99.8 LBS

## 2021-04-28 DIAGNOSIS — S20.219D CONTUSION OF STERNUM, SUBSEQUENT ENCOUNTER: ICD-10-CM
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
## 2021-04-28 PROCEDURE — 99213 OFFICE O/P EST LOW 20 MIN: CPT | Performed by: FAMILY MEDICINE

## 2021-04-28 NOTE — PROGRESS NOTES
"Chief Complaint  Chest Pain (fell at home a week ago and struck toilet )    Subjective          Brittani Lambert presents to [facility]  History of Present Illness  She fell at home on 4/19/21 due to lower extremity weakness. She was in her bathroom when fall occurred and she struck her toilet with her anterior chest. She was sore initially, but as days progressed, pain intensified. She was evaluated at [facility] ED yesterday, both CXR and CT chest completed and no fracture of sternum or rib seen on either image.  Continues to have tenderness of anterior chest wall.  Denies difficulty breathing.  She is applying heat to chest wall, which does help her pain.   She was prescribed muscle relaxer and anti-inflammatory, along with her chronic pain management.  She has not picked up the new prescriptions at her pharmacy to treat acute pain.  She continues to have falls, describes that bilateral legs become suddenly weak and lose strength and sensation.  She has completed MRIs of brain and brain.  She is scheduled with neurology in May 2021, along with nerve conduction study.  Has cane at home, not using today.  She has also been advised to not drive due to lower extremity weakness.  She does have an emergency alert system at home, which notifies her sister.     The following portions of the patient's history were reviewed and updated as appropriate: allergies, current medications, past family history, past medical history, past social history, past surgical history and problem list.    Objective   Vital Signs:   /70   Pulse 81   Temp 97.5 °F (36.4 °C)   Resp 20   Ht 160 cm (62.99\")   Wt 45.3 kg (99 lb 12.8 oz)   SpO2 97%   BMI 17.68 kg/m²     Physical Exam  Vitals and nursing note reviewed.   Constitutional:       Appearance: She is underweight.   HENT:      Head: Normocephalic and atraumatic.      Right Ear: External ear normal.      Left Ear: External ear normal.      Nose: " Nose normal.   Eyes:      Conjunctiva/sclera: Conjunctivae normal.   Cardiovascular:      Rate and Rhythm: Normal rate and regular rhythm.      Heart sounds: Normal heart sounds.   Pulmonary:      Effort: Pulmonary effort is normal.      Breath sounds: Normal breath sounds.   Musculoskeletal:         General: No swelling.   Skin:     General: Skin is warm and dry.   Neurological:      Mental Status: She is alert and oriented to person, place, and time.   Psychiatric:         Behavior: Behavior normal.        Result Review :                 Assessment and Plan    Diagnoses and all orders for this visit:    1. Fall, subsequent encounter (Primary)    2. Contusion of sternum, subsequent encounter    Alternate warm and cold compresses to the affected area for additional pain relief.  She will  prescribed medications from ER visit yesterday and start those as directed.  Keep scheduled appointment with neurology.  Advised her to use cane or walker with ambulation to prevent fall and injury.  Also, needs to avoid driving due to lower extremity weakness.  She is also overdue for routine follow-up with her oncologist.  Will contact their office to schedule.    Follow Up   Return if symptoms worsen or fail to improve.  Patient was given instructions and counseling regarding her condition or for health maintenance advice. Please see specific information pulled into the AVS if appropriate.

## 2021-04-28 NOTE — PATIENT INSTRUCTIONS
Go to the nearest ER or return to clinic if symptoms worsen, fever/chill develop    Understanding Your Risk for Falls  Each year, millions of people have serious injuries from falls. It is important to understand your risk for falling. Talk with your health care provider about your risk and what you can do to lower it. There are actions you can take at home to lower your risk.  If you do have a serious fall, make sure you tell your health care provider. Falling once raises your risk for falling again.  How can falls affect me?  Serious injuries from falls are common. These include:  · Broken bones, such as hip fractures.  · Head injuries, such as traumatic brain injuries (TBI).  Fear of falling can also cause you to avoid activities and stay at home. This can make your muscles weaker and actually raise your risk for a fall.  What can increase my risk?  There are a number of risk factors that increase your risk for falling. The more risk factors you have, the higher your risk for falling. Serious injuries from a fall most often happen to people older than age 65. Children and young adults ages 15-29 are also at higher risk.  Common risk factors include:  · Weakness in the lower body.  · Lack (deficiency) of vitamin D.  · Being generally weak or confused due to long-term (chronic) illness.  · Dizziness or balance problems.  · Poor vision.  · Medicines that cause dizziness or drowsiness. These can include medicines for your blood pressure, heart, anxiety, insomnia, or edema, as well as pain medicines and muscle relaxants.  Other risk factors include:  · Drinking alcohol.  · Having had a fall in the past.  · Having depression.  · Foot pain or improper footwear.  · Working at a dangerous job.  · Having any of the following in your home:  ? Tripping hazards, such as floor clutter or loose rugs.  ? Poor lighting.  ? Pets or clutter.  · Dementia or memory loss.  What actions can I take to lower my risk of falling?          Physical activity  Maintain physical fitness. Do strength and balance exercises. Consider taking a regular class to build strength and balance. Yoga and alan chi are good options.  Vision  Have your eyes checked every year and your vision prescription updated as needed.  Walking aids and footwear  · Wear nonskid shoes. Do not wear high heels.  · Do not walk around the house in socks or slippers.  · Use a cane or walker as told by your health care provider.  Home safety  · Attach secure railings on both sides of your stairs.  · Install grab bars for your tub, shower, and toilet. Use a bath mat in your tub or shower.  · Use good lighting in all rooms. Keep a flashlight near your bed.  · Make sure there is a clear path from your bed to the bathroom. Use night-lights.  · Do not use throw rugs. Make sure all carpeting is taped or tacked down securely.  · Remove all clutter from walkways and stairways, including extension cords.  · Repair uneven or broken steps.  · Avoid walking on icy or slippery surfaces. Walk on the grass instead of on icy or slick sidewalks. Where you can, use ice melt to get rid of ice on walkways.  · Use a cordless phone.  Questions to ask your health care provider  · Can you help me check my risk for a fall?  · Do any of my medicines make me more likely to fall?  · Should I take a vitamin D supplement?  · What exercises can I do to improve my strength and balance?  · Should I make an appointment to have my vision checked?  · Do I need a bone density test to check for weak bones or osteoporosis?  · Would it help to use a cane or a walker?  Where to find more information  · Centers for Disease Control and Prevention, STEADI: www.cdc.gov  · Community-Based Fall Prevention Programs: www.cdc.gov  · National [employer] on Aging: sh4fnfn.maximino.nih.gov  Contact a health care provider if:  · You fall at home.  · You are afraid of falling at home.  · You feel weak, drowsy, or dizzy.  Summary  · People 65 and  older are at high risk for falling. However, older people are not the only ones injured in falls. Children and young adults have a higher-than-normal risk too.  · Talk with your health care provider about your risks for falling and how to lower those risks.  · Taking certain precautions at home can lower your risk for falling.  · If you fall, always tell your health care provider.  This information is not intended to replace advice given to you by your health care provider. Make sure you discuss any questions you have with your health care provider.  Document Revised: 06/24/2020 Document Reviewed: 06/24/2020  Elsevier Patient Education © 2021 Elsevier Inc.

## 2021-05-02 LAB
QT INTERVAL: 342 MS
QTC INTERVAL: 443 MS

## 2021-05-10 DIAGNOSIS — G89.3 CANCER ASSOCIATED PAIN: ICD-10-CM

## 2021-05-10 DIAGNOSIS — S22.080D COMPRESSION FRACTURE OF T12 VERTEBRA WITH ROUTINE HEALING, SUBSEQUENT ENCOUNTER: ICD-10-CM

## 2021-05-10 RX ORDER — OXYCODONE AND ACETAMINOPHEN 7.5; 325 MG/1; MG/1
1 TABLET ORAL EVERY 6 HOURS PRN
Qty: 60 TABLET | Refills: 0 | Status: SHIPPED | OUTPATIENT
Start: 2021-05-10 | End: 2021-05-26 | Stop reason: SDUPTHER

## 2021-05-10 NOTE — TELEPHONE ENCOUNTER
Caller: Brittani Lambert    Relationship: Self    Best call back number: [phone]    Medication needed:   Requested Prescriptions     Pending Prescriptions Disp Refills   • oxyCODONE-acetaminophen (Percocet) 7.5-325 MG per tablet 60 tablet 0     Sig: Take 1 tablet by mouth Every 6 (Six) Hours As Needed for Moderate Pain .       When do you need the refill by: ASAP    What additional details did the patient provide when requesting the medication:     Does the patient have less than a 3 day supply:  [x] Yes  [] No    What is the patient's preferred pharmacy: [id number] [place], [place] - [address] [place] [address] AT [facility] [address] - [phone]  - [phone]

## 2021-05-11 ENCOUNTER — OFFICE VISIT (OUTPATIENT)
Dept: PSYCHIATRY | Facility: CLINIC | Age: 61
End: 2021-05-11

## 2021-05-11 DIAGNOSIS — G47.9 SLEEP DISTURBANCE: ICD-10-CM

## 2021-05-11 DIAGNOSIS — F41.1 GENERALIZED ANXIETY DISORDER: ICD-10-CM

## 2021-05-11 DIAGNOSIS — F33.1 MODERATE EPISODE OF RECURRENT MAJOR DEPRESSIVE DISORDER (HCC): Primary | ICD-10-CM

## 2021-05-11 PROCEDURE — 99442 PR PHYS/QHP TELEPHONE EVALUATION 11-20 MIN: CPT | Performed by: NURSE PRACTITIONER

## 2021-05-11 NOTE — PROGRESS NOTES
"  Subjective   Brittani Lambert is a 61 y.o. female who is here today for medication management follow up. You have chosen to receive care through a telephone visit. Do you consent to use a telephone visit for your medical care today? Yes    TIME IN:3p  TIME OUT:3:20    Chief Complaint: MDD, THALIA, poor sleep     History of Present Illness Patient reports she was having a couple weeks off medications that was difficult, \"my choice I know\". She reports mood much improved and sleeping so much better on mirtazapine.  She states that is a miracle drug for her she takes it in about 1/2-hour she sleeps a good 7 hours even if she has to get up to use the bathroom she can go back to sleep.  Patient denies panic denies suicidal ideations.  Her daughter and family are coming up for July 4 and they spoke on Mother's Day and she got a card from her grandson and meng from her daughter.  She states she is feeling more hopeful and good support from her sister.  Rates depression a 4 and anxiety about the same.  She denies falls since the last 1 few weeks ago and states her chest is not sore anymore from the fall.  Denies adverse effects from medications.   (Scales based on 0 - 10 with 10 being the worst)      The following portions of the patient's history were reviewed and updated as appropriate: allergies, current medications, past family history, past medical history, past social history, past surgical history and problem list.    Review of Systems  A 14 point review of systems was performed and is negative except as noted above.    Objective   Physical Exam  not currently breastfeeding.    No Known Allergies    Current Medications:   Current Outpatient Medications   Medication Sig Dispense Refill   • diclofenac (VOLTAREN) 75 MG EC tablet Take 1 tablet by mouth 2 (Two) Times a Day. 14 tablet 0   • DULoxetine (CYMBALTA) 30 MG capsule Take 1 capsule by mouth Daily. 30 capsule 2   • methocarbamol (ROBAXIN) 750 MG tablet Take 2 " tablets by mouth 3 (Three) Times a Day As Needed for Muscle Spasms. 30 tablet 0   • mirtazapine (REMERON) 15 MG tablet Take 1 tablet by mouth Every Night. 30 tablet 2   • oxyCODONE-acetaminophen (Percocet) 7.5-325 MG per tablet Take 1 tablet by mouth Every 6 (Six) Hours As Needed for Moderate Pain . 60 tablet 0     No current facility-administered medications for this visit.       Appearance: na  Hygiene:  REPORTS good  Cooperation:  Cooperative  Eye Contact:  na  Psychomotor Behavior:  denies psychomotor agitation/retardation, No EPS, No motor tics  Mood:  within normal limits  Affect:  na  Hopelessness: Denies  Speech:  Normal  Thought Process:  Linear  Thought Content:  Normal  Concentration: Normal   Suicidal: denies  Homicidal:  None  Hallucinations:  None  Delusion:  None  Memory:  Intact  Orientation:  Person, Place, Time and Situation  Reliability:  good  Insight:  Fair  Judgement: good  Impulse Control: good  Estimated Intelligence: average range    ZECHARIAH REVIEWED NO RED FLAGS    Assessment/Plan   Diagnoses and all orders for this visit:    1. Moderate episode of recurrent major depressive disorder (CMS/HCC) (Primary)    2. Generalized anxiety disorder    3. Sleep disturbance      IMPRESSION: tolerating medications well with improved sleep and mood improved    PLAN:   Continue duloxetine 30 mg 1 p.o. daily for depression   continue mirtazapine 15 mg 1 p.o. nightly as needed for sleeplessness    We discussed risks, benefits, and side effects of the above medications and the patient was agreeable with the plan. Patient was educated on the importance of compliance with treatment and follow-up appointments.     Provide Cognitive Behavioral Therapy and Solution Focused Therapy to improve functioning, maintain stability, and avoid decompensation and the need for higher level of care.    Counseled patient regarding multimodal approach with encouragement of healthy nutrition, healthy sleep, regular physical  mobility, social involvement, counseling, and medication compliance.     Assisted patient in identifying risk factors which would indicate the need for higher level of care including thoughts to harm self or others and/or self-harming behavior and encouraged patient to contact this office, call 911, or present to the nearest emergency room should any of these events occur. Discussed crisis intervention services and means to access.  Patient adamantly and convincingly denies current suicidal or homicidal ideation or perceptual disturbance.    Treatment Plan: stabilize mood, patient will stay out of psychiatric hospital and be at optimal level of functioning with therapy and take all medication as prescribed. Patient verbalized  understanding and agreement to plan.    Instructed to call for questions or concerns and return early if necessary.     Greater than 50% time was spent in coordination of care, and counseling the patient regarding current assessment, symptoms, plan of care going forward, supportive therapy.  Answered any questions patient had regarding medications and plan of care.    Return in about 4 weeks (around 6/8/2021).  Med check by telephone half hour

## 2021-05-12 ENCOUNTER — OFFICE VISIT (OUTPATIENT)
Dept: ONCOLOGY | Facility: CLINIC | Age: 61
End: 2021-05-12

## 2021-05-12 VITALS
BODY MASS INDEX: 18.07 KG/M2 | SYSTOLIC BLOOD PRESSURE: 110 MMHG | RESPIRATION RATE: 20 BRPM | HEART RATE: 93 BPM | WEIGHT: 102 LBS | TEMPERATURE: 97.3 F | OXYGEN SATURATION: 97 % | HEIGHT: 63 IN | DIASTOLIC BLOOD PRESSURE: 65 MMHG

## 2021-05-12 DIAGNOSIS — C50.011 MALIGNANT NEOPLASM OF NIPPLE OF RIGHT BREAST IN FEMALE, UNSPECIFIED ESTROGEN RECEPTOR STATUS (HCC): Primary | ICD-10-CM

## 2021-05-12 PROCEDURE — 99214 OFFICE O/P EST MOD 30 MIN: CPT | Performed by: INTERNAL MEDICINE

## 2021-05-12 NOTE — PROGRESS NOTES
"      PROBLEM LIST:  1. [id number] (Stage IIA) ER negative, RI positive, Her2 2+ by IHC, negative by FISH invasive ductal carcinoma of the left breast  A) bilateral mastectomy on 10/4/18.  Pathology showed a 2.6 cm high grade IDC with basaloid features.  0/1 SLN involved.  B) adjuvant chemotherapy with ddAC followed by taxol started on 11/14/18  C) admitted to the hospital on 4/10/2019 with left upper extremity weakness.  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  CT chest abdomen pelvis and bone scan on 4/11/2019 showed no other sites of disease.  Completed whole brain radiation on 4/24/2019.  2. Anxiety/depression  3. Hyperlipidemia  4. History of ovarian cancer 1989, s/p BRYN/BSO, no adjuvant therapy    Chief complaint: follow up for breast cancer management       Subjective     HISTORY OF PRESENT ILLNESS:   Brittani Lambert returns for follow-up.   She says she has been falling - her legs will just give out and she falls forward.  Sometimes when she falls it will take her up to 30 minutes to be able to get back up again, because her legs are so weak.  She has been referred to neurology - appointment coming up later this month.     Objective      /65   Pulse 93   Temp 97.3 °F (36.3 °C)   Resp 20   Ht 160 cm (63\")   Wt 46.3 kg (102 lb)   SpO2 97%   BMI 18.07 kg/m²      Performance Status: 0    General: well appearing female in no acute distress  Neuro: alert and oriented.    HEENT: sclera anicteric, oropharynx clear  Lymphatics: no cervical, supraclavicular, or axillary adenopathy  Cardiovascular: regular rate and rhythm, no murmurs  Lungs: clear to auscultation bilaterally  Abdomen: soft, nontender, nondistended.  No palpable organomegaly  Extremeties: no lower extremity edema  Skin: no rashes or  Petechiae.  Bruising on left hand dorsum  Psych: mood and affect appropriate      CT Chest Without Contrast Diagnostic  Narrative: CT Chest " WO    INDICATION:   Chest wall pain, suspect sternal fracture    TECHNIQUE:   CT of the thorax without IV contrast. Coronal and sagittal reconstructions were obtained.  Radiation dose reduction techniques included automated exposure control or exposure modulation based on body size. Count of known CT and cardiac nuc med studies  performed in previous 12 months: 0.     COMPARISON:   None available.    FINDINGS:  There is multivessel coronary artery calcification.. Lung fields are clear. No pneumothorax, effusion or consolidation. Mild dependent atelectasis is seen at the lung bases. Upper abdominal structures appear unremarkable. No definite acute fracture or  subluxation. Specifically, no definite sternal fracture.  Impression: No acute cardiopulmonary abnormality. No evidence of sternal or definite rib fracture. No acute injury.    Signer Name: Sierra Ga MD   Signed: 4/26/2021 11:13 PM   Workstation Name: [id number]    [facility]  XR Chest 1 View  Narrative: CR Chest 1 Vw    INDICATION:   Shortness of breath, breast cancer     COMPARISON:    Chest x-ray from 11/7/2020    FINDINGS:  Single portable AP view(s) of the chest.  The heart and mediastinal contours are normal. The lungs are clear. No pneumothorax or pleural effusion.  Impression: No acute cardiopulmonary findings.    Signer Name: Sierra Ga MD   Signed: 4/26/2021 9:15 PM   Workstation Name: [id number]    [facility]        Assessment/Plan   Brittani Lambert is a 61 y.o. 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     Dysphasia:   Continue omeprazole.    History of pulmonary embolism.  Completed 6 months of xarelto.    Leptomeningeal disease: recent MRI brain unremarkable/stable, now over 2 years out from completing WBRT.  She has done extremely well from this standpoint.  I would recommend continuing to monitor with  imaging - MRI brain q6 months for at least another year.    Metastatic breast cancer: recent CT chest unremarkable, no evidence of recurrent disease.  Will plan to repeat surveillance imaging in 6 months, continue imaging until at least 3 years disease free.        F/u 6 months.      Carrie Patel MD  [facility]    5/12/2021          CC:

## 2021-05-20 ENCOUNTER — TELEPHONE (OUTPATIENT)
Dept: FAMILY MEDICINE CLINIC | Facility: CLINIC | Age: 61
End: 2021-05-20

## 2021-05-20 NOTE — TELEPHONE ENCOUNTER
Caller: Brittani Lambert    Relationship: Self    Best call back number: [phone]    What medication are you requesting: SOMETHING FOR FREQUENT URINATION.    What are your current symptoms: UNABLE TO HOLD URINE, FREQUENT URINATION.  PATIENT STATED NO ITCHING OR BURNING.      How long have you been experiencing symptoms: PAST TWO WEEKS    Have you had these symptoms before:    [] Yes  [x] No    Have you been treated for these symptoms before:   [] Yes  [x] No    If a prescription is needed, what is your preferred pharmacy and phone number:      [id number] - [place], [place] - [address] [facility] [place] [address] - [phone] [facility] [phone]   [phone]        Additional notes:    PATIENT STATED SHE IS NOT AVAILABLE TO SEE ELOY TODAY.    PATIENT CAN DO PHONE APPT.  PLEASE CALL PATIENT IF DOCTOR ELOY WILL ACCEPT A PHONE APPT.    PLEASE ADVISE

## 2021-05-20 NOTE — TELEPHONE ENCOUNTER
Is she able to come by tomorrow and provide urine sample? Need to rule out infection before starting on medication to treat incontinence.

## 2021-05-21 ENCOUNTER — CLINICAL SUPPORT (OUTPATIENT)
Dept: FAMILY MEDICINE CLINIC | Facility: CLINIC | Age: 61
End: 2021-05-21

## 2021-05-21 DIAGNOSIS — R32 URINARY INCONTINENCE, UNSPECIFIED TYPE: Primary | ICD-10-CM

## 2021-05-21 LAB
BILIRUB BLD-MCNC: NEGATIVE MG/DL
CLARITY, POC: CLEAR
COLOR UR: YELLOW
GLUCOSE UR STRIP-MCNC: NEGATIVE MG/DL
KETONES UR QL: NEGATIVE
LEUKOCYTE EST, POC: NEGATIVE
NITRITE UR-MCNC: NEGATIVE MG/ML
PH UR: 6 [PH] (ref 5–8)
PROT UR STRIP-MCNC: NEGATIVE MG/DL
RBC # UR STRIP: NEGATIVE /UL
SP GR UR: 1.01 (ref 1–1.03)
UROBILINOGEN UR QL: NORMAL

## 2021-05-21 PROCEDURE — 81003 URINALYSIS AUTO W/O SCOPE: CPT | Performed by: FAMILY MEDICINE

## 2021-05-21 RX ORDER — OXYBUTYNIN CHLORIDE 5 MG/1
5 TABLET, EXTENDED RELEASE ORAL DAILY
Qty: 90 TABLET | Refills: 1 | Status: SHIPPED | OUTPATIENT
Start: 2021-05-21 | End: 2021-11-12

## 2021-05-26 DIAGNOSIS — G89.3 CANCER ASSOCIATED PAIN: ICD-10-CM

## 2021-05-26 DIAGNOSIS — S22.080D COMPRESSION FRACTURE OF T12 VERTEBRA WITH ROUTINE HEALING, SUBSEQUENT ENCOUNTER: ICD-10-CM

## 2021-05-26 RX ORDER — OXYCODONE AND ACETAMINOPHEN 7.5; 325 MG/1; MG/1
1 TABLET ORAL EVERY 6 HOURS PRN
Qty: 60 TABLET | Refills: 0 | Status: SHIPPED | OUTPATIENT
Start: 2021-05-26 | End: 2021-06-14 | Stop reason: SDUPTHER

## 2021-05-26 NOTE — TELEPHONE ENCOUNTER
Caller: Brittani Lambert    Relationship: Self    Best call back number: [phone]    Medication needed:   Requested Prescriptions     Pending Prescriptions Disp Refills   • oxyCODONE-acetaminophen (Percocet) 7.5-325 MG per tablet 60 tablet 0     Sig: Take 1 tablet by mouth Every 6 (Six) Hours As Needed for Moderate Pain .       When do you need the refill by: ASAP    What additional details did the patient provide when requesting the medication: PATIENT STATES SHE ONLY HAS A FEW LEFT    Does the patient have less than a 3 day supply:  [x] Yes  [] No    What is the patient's preferred pharmacy: [facility]ACY #[id number] - [place], [place] - [address] [place] [address] AT [facility] [address] - [phone] [facility] [phone]

## 2021-06-07 ENCOUNTER — TELEPHONE (OUTPATIENT)
Dept: FAMILY MEDICINE CLINIC | Facility: CLINIC | Age: 61
End: 2021-06-07

## 2021-06-07 NOTE — TELEPHONE ENCOUNTER
Patient calling stating she's having one of her episodes that Dr. Colon knows about. Says it's AFIB and she needs a referral to a cardiologist.

## 2021-06-08 ENCOUNTER — OFFICE VISIT (OUTPATIENT)
Dept: PSYCHIATRY | Facility: CLINIC | Age: 61
End: 2021-06-08

## 2021-06-08 DIAGNOSIS — G47.9 SLEEP DISTURBANCE: ICD-10-CM

## 2021-06-08 DIAGNOSIS — F33.1 MODERATE EPISODE OF RECURRENT MAJOR DEPRESSIVE DISORDER (HCC): Primary | ICD-10-CM

## 2021-06-08 DIAGNOSIS — F41.1 GENERALIZED ANXIETY DISORDER: ICD-10-CM

## 2021-06-08 PROCEDURE — 99442 PR PHYS/QHP TELEPHONE EVALUATION 11-20 MIN: CPT | Performed by: NURSE PRACTITIONER

## 2021-06-08 RX ORDER — DULOXETIN HYDROCHLORIDE 30 MG/1
30 CAPSULE, DELAYED RELEASE ORAL DAILY
Qty: 30 CAPSULE | Refills: 5 | Status: SHIPPED | OUTPATIENT
Start: 2021-06-08 | End: 2021-07-29 | Stop reason: SDUPTHER

## 2021-06-08 RX ORDER — MIRTAZAPINE 15 MG/1
15 TABLET, FILM COATED ORAL NIGHTLY
Qty: 30 TABLET | Refills: 5 | Status: SHIPPED | OUTPATIENT
Start: 2021-06-08 | End: 2021-12-01 | Stop reason: SDUPTHER

## 2021-06-08 NOTE — PROGRESS NOTES
Subjective   Brittani Lambert is a 61 y.o. female who is here today for medication management follow up. You have chosen to receive care through a telephone visit. Do you consent to use a telephone visit for your medical care today? Yes    TIME IN:147p  TIME OUT: 212    Chief Complaint: MDD, THALIA, sleep disturbance     History of Present Illness Patient reports she is doing well mood wise. She reports motivation and interest.  She reports excitement about daughter and grandson coming July 4th 2021. Her sister and she are planting flowers and planters and enjoys this. Sleep cont to be good with mirtazapine and compliant with duloxetine.  She reports reading a book beginners guide for living life and dying well.  She states she is really enjoying it and has read the first 4 chapters and just got it yesterday.  Patient states engaged with others.  She has been having some dizziness and called her primary care and they have her in with her cardiologist next week.  Denies feeling unsafe denies falls denies confusion.  Denies SI HI or AVH.  Rates depression and anxiety about a 4 denies panic.  Denies adverse effects from medications.   (Scales based on 0 - 10 with 10 being the worst)          The following portions of the patient's history were reviewed and updated as appropriate: allergies, current medications, past family history, past medical history, past social history, past surgical history and problem list.    Review of Systems  A 14 point review of systems was performed and is negative except as noted above.    Objective   Physical Exam  not currently breastfeeding.    No Known Allergies    Current Medications:   Current Outpatient Medications   Medication Sig Dispense Refill   • diclofenac (VOLTAREN) 75 MG EC tablet Take 1 tablet by mouth 2 (Two) Times a Day. 14 tablet 0   • DULoxetine (CYMBALTA) 30 MG capsule Take 1 capsule by mouth Daily. 30 capsule 5   • methocarbamol (ROBAXIN) 750 MG tablet Take 2 tablets by  mouth 3 (Three) Times a Day As Needed for Muscle Spasms. 30 tablet 0   • mirtazapine (REMERON) 15 MG tablet Take 1 tablet by mouth Every Night. 30 tablet 5   • oxybutynin XL (DITROPAN-XL) 5 MG 24 hr tablet Take 1 tablet by mouth Daily. 90 tablet 1   • oxyCODONE-acetaminophen (Percocet) 7.5-325 MG per tablet Take 1 tablet by mouth Every 6 (Six) Hours As Needed for Moderate Pain . 60 tablet 0     No current facility-administered medications for this visit.       Appearance: na  Hygiene:  REPORTS good  Cooperation:  Cooperative  Eye Contact:  na  Psychomotor Behavior:  denies psychomotor agitation/retardation, No EPS, No motor tics  Mood:  within normal limits  Affect:  na  Hopelessness: Denies  Speech:  Normal  Thought Process:  Linear  Thought Content:  Normal  Concentration: Normal   Suicidal: denies  Homicidal:  None  Hallucinations:  None  Delusion:  None  Memory:  Intact  Orientation:  Person, Place, Time and Situation  Reliability:  good  Insight:  Fair  Judgement: good  Impulse Control: good  Estimated Intelligence: average range    ZECHARIAH REVIEWED NO RED FLAGS 6.8.21    Assessment/Plan   Diagnoses and all orders for this visit:    1. Moderate episode of recurrent major depressive disorder (CMS/HCC) (Primary)    2. Generalized anxiety disorder    3. Sleep disturbance    Other orders  -     DULoxetine (CYMBALTA) 30 MG capsule; Take 1 capsule by mouth Daily.  Dispense: 30 capsule; Refill: 5  -     mirtazapine (REMERON) 15 MG tablet; Take 1 tablet by mouth Every Night.  Dispense: 30 tablet; Refill: 5          IMPRESSION: Improved mood stability with compliance of medication management.  Continues to sleep well at night    PLAN: Refill duloxetine 30 mg 1 p.o. daily for depression and anxiety  Refill mirtazapine 15 mg 1 p.o. nightly as needed for sleeplessness    We discussed risks, benefits, and side effects of the above medications and the patient was agreeable with the plan. Patient was educated on the importance  of compliance with treatment and follow-up appointments.     Counseled patient regarding multimodal approach with encouragement of healthy nutrition, healthy sleep, regular physical mobility, social involvement, counseling, and medication compliance.     Assisted patient in identifying risk factors which would indicate the need for higher level of care including thoughts to harm self or others and/or self-harming behavior and encouraged patient to contact this office, call 911, or present to the nearest emergency room should any of these events occur. Discussed crisis intervention services and means to access.  Patient adamantly and convincingly denies current suicidal or homicidal ideation or perceptual disturbance.    Treatment Plan: stabilize mood, patient will stay out of psychiatric hospital and be at optimal level of functioning with therapy and take all medication as prescribed. Patient verbalized  understanding and agreement to plan.    Instructed to call for questions or concerns and return early if necessary.     Greater than 50% time was spent in coordination of care, and counseling the patient regarding current assessment, symptoms, plan of care going forward, supportive therapy.  Answered any questions patient had regarding medications and plan of care.    Return in about 3 months (around 9/8/2021).

## 2021-06-14 DIAGNOSIS — G89.3 CANCER ASSOCIATED PAIN: ICD-10-CM

## 2021-06-14 DIAGNOSIS — S22.080D COMPRESSION FRACTURE OF T12 VERTEBRA WITH ROUTINE HEALING, SUBSEQUENT ENCOUNTER: ICD-10-CM

## 2021-06-14 RX ORDER — OXYCODONE AND ACETAMINOPHEN 7.5; 325 MG/1; MG/1
1 TABLET ORAL EVERY 6 HOURS PRN
Qty: 60 TABLET | Refills: 0 | Status: SHIPPED | OUTPATIENT
Start: 2021-06-14 | End: 2021-06-29 | Stop reason: SDUPTHER

## 2021-06-14 NOTE — TELEPHONE ENCOUNTER
Caller: Genarojocelyn Brittani    Relationship: Self    Best call back number:[phone]     Medication needed:   Requested Prescriptions     Pending Prescriptions Disp Refills   • oxyCODONE-acetaminophen (Percocet) 7.5-325 MG per tablet 60 tablet 0     Sig: Take 1 tablet by mouth Every 6 (Six) Hours As Needed for Moderate Pain .       When do you need the refill by: TODAY    What additional details did the patient provide when requesting the medication: PATIENT IS DOWN TO ONE DAY.    Does the patient have less than a 3 day supply:  [x] Yes  [] No    What is the patient's preferred pharmacy: [facility]/PHARMACY #[id number] - [place], [place] - [address] [facility] [address] AT [facility] [address] - [phone] [facility] [phone]

## 2021-06-17 ENCOUNTER — OFFICE VISIT (OUTPATIENT)
Dept: CARDIOLOGY | Facility: CLINIC | Age: 61
End: 2021-06-17

## 2021-06-17 VITALS
WEIGHT: 98 LBS | BODY MASS INDEX: 17.36 KG/M2 | HEIGHT: 63 IN | SYSTOLIC BLOOD PRESSURE: 128 MMHG | OXYGEN SATURATION: 99 % | HEART RATE: 77 BPM | DIASTOLIC BLOOD PRESSURE: 78 MMHG

## 2021-06-17 DIAGNOSIS — I48.0 PAROXYSMAL ATRIAL FIBRILLATION (HCC): Primary | ICD-10-CM

## 2021-06-17 DIAGNOSIS — E78.2 MIXED HYPERLIPIDEMIA: ICD-10-CM

## 2021-06-17 PROCEDURE — 99213 OFFICE O/P EST LOW 20 MIN: CPT | Performed by: INTERNAL MEDICINE

## 2021-06-17 PROCEDURE — 93000 ELECTROCARDIOGRAM COMPLETE: CPT | Performed by: INTERNAL MEDICINE

## 2021-06-17 RX ORDER — ALPRAZOLAM 0.5 MG/1
0.5 TABLET ORAL 3 TIMES DAILY PRN
COMMUNITY
End: 2021-12-01 | Stop reason: SDUPTHER

## 2021-06-17 RX ORDER — ARIPIPRAZOLE 5 MG/1
5 TABLET ORAL DAILY
COMMUNITY
Start: 2021-06-02 | End: 2021-09-01

## 2021-06-17 NOTE — PROGRESS NOTES
[facility] at [facility]  Office visit  Brittani Lambert  1960  [phone]  There is no work phone number on file.    VISIT DATE:  6/17/2021    PCP: Alla Colon  [address] [place] [address]    CC:  Chief Complaint   Patient presents with   • Atrial Fibrillation   • Hyperlipidemia   • DVT       Previous cardiac studies and procedures:  September 2020 echo  · Left ventricular ejection fraction appears to be 61 - 65%. Left ventricular systolic function is normal.  · Left ventricular diastolic function is consistent with (grade I) impaired relaxation.  · Mild tricuspid valve regurgitation is present.  · Estimated right ventricular systolic pressure from tricuspid regurgitation is normal (<35 mmHg). Calculated right ventricular systolic pressure from tricuspid regurgitation is 22 mmHg.    ASSESSMENT:   Diagnosis Plan   1. Paroxysmal atrial fibrillation (CMS/HCC)     2. Mixed hyperlipidemia         PLAN:  Paroxysmal atrial fibrillation: Chads vas equal to 1.  2-week ambulatory ECG monitor pending for symptom rhythm correlation.  Repeat CMP, magnesium level and TSH pending.    Subjective  Ongoing intermittent episodes of transient lightheadedness with associated tachypalpitations which can last anywhere from 30 minutes up to 2 hours.  Has been occurring intermittently over the past 2 years.  Also feels like she cannot feel her legs below her waist and her legs give out, she has had a couple mechanical falls with soft tissue injury.  Upcoming neurology evaluation.  Stopped taking amiodarone approximately 2 months ago, I cannot find that any provider discontinued it, it appears that her prescription ran out and was not refilled.  She is also gradually losing weight, fortunately she was just recently evaluated by oncology with no evidence of recurrent metastatic breast cancer.  Recent laboratory evaluation remarkable for hypomagnesemia.    PHYSICAL EXAMINATION:  Vitals:    06/17/21  "0931   Pulse: 77   SpO2: 99%   Weight: 44.5 kg (98 lb)   Height: 160 cm (62.99\")     General Appearance:    Alert, cooperative, no distress, appears stated age   Head:    Normocephalic, without obvious abnormality, atraumatic   Eyes:    conjunctiva/corneas clear   Nose:   Nares normal, septum midline, mucosa normal, no drainage   Throat:   Lips, teeth and gums normal   Neck:   Supple, symmetrical, trachea midline, no carotid    bruit or JVD   Lungs:     Clear to auscultation bilaterally, respirations unlabored   Chest Wall:    No tenderness or deformity    Heart:    Regular rate and rhythm, S1 and S2 normal, no murmur, rub   or gallop, normal carotid impulse bilaterally without bruit.   Abdomen:     Soft, non-tender   Extremities:   Extremities normal, atraumatic, no cyanosis or edema   Pulses:   2+ and symmetric all extremities   Skin:   Skin color, texture, turgor normal, no rashes or lesions       Diagnostic Data:    ECG 12 Lead    Date/Time: 6/17/2021 9:46 AM  Performed by: Carlos Allen III, MD  Authorized by: Carlos Allen III, MD   Comparison: compared with previous ECG from 4/26/2021  Similar to previous ECG  Rhythm: sinus rhythm    Clinical impression: normal ECG          Lab Results   Component Value Date    CHLPL 262 (H) 06/21/2018    TRIG 193 (H) 09/25/2020    HDL 44 09/25/2020     Lab Results   Component Value Date    GLUCOSE 118 (H) 04/26/2021    BUN 6 (L) 04/26/2021    CREATININE 0.69 04/26/2021     04/26/2021    K 4.1 04/26/2021    CL 94 (L) 04/26/2021    CO2 21.0 (L) 04/26/2021     Lab Results   Component Value Date    HGBA1C 4.9 09/21/2020     Lab Results   Component Value Date    WBC 10.49 04/26/2021    HGB 14.8 04/26/2021    HCT 43.8 04/26/2021     04/26/2021       Allergies  No Known Allergies    Current Medications    Current Outpatient Medications:   •  diclofenac (VOLTAREN) 75 MG EC tablet, Take 1 tablet by mouth 2 (Two) Times a Day., Disp: 14 tablet, Rfl: 0  •  DULoxetine " (CYMBALTA) 30 MG capsule, Take 1 capsule by mouth Daily., Disp: 30 capsule, Rfl: 5  •  methocarbamol (ROBAXIN) 750 MG tablet, Take 2 tablets by mouth 3 (Three) Times a Day As Needed for Muscle Spasms., Disp: 30 tablet, Rfl: 0  •  mirtazapine (REMERON) 15 MG tablet, Take 1 tablet by mouth Every Night., Disp: 30 tablet, Rfl: 5  •  oxybutynin XL (DITROPAN-XL) 5 MG 24 hr tablet, Take 1 tablet by mouth Daily., Disp: 90 tablet, Rfl: 1  •  oxyCODONE-acetaminophen (Percocet) 7.5-325 MG per tablet, Take 1 tablet by mouth Every 6 (Six) Hours As Needed for Moderate Pain ., Disp: 60 tablet, Rfl: 0          ROS  Review of Systems   Constitutional: Positive for malaise/fatigue.   Cardiovascular: Positive for irregular heartbeat and palpitations. Negative for chest pain.   Respiratory: Negative for shortness of breath.          SOCIAL HX  Social History     Socioeconomic History   • Marital status: Single     Spouse name: N/A   • Number of children: 1   • Years of education: H.S.   • Highest education level: High school graduate   Tobacco Use   • Smoking status: Current Every Day Smoker     Packs/day: 0.50     Years: 25.00     Pack years: 12.50     Types: Cigarettes   • Smokeless tobacco: Never Used   Vaping Use   • Vaping Use: Never used   Substance and Sexual Activity   • Alcohol use: Not Currently   • Drug use: No   • Sexual activity: Defer     Partners: Male       FAMILY HX  Family History   Problem Relation Age of Onset   • Arthritis Mother    • Heart attack Mother    • Osteoporosis Mother    • Liver disease Father    • Celiac disease Other              Carlos Allen III, MD, [facility]

## 2021-06-29 DIAGNOSIS — S22.080D COMPRESSION FRACTURE OF T12 VERTEBRA WITH ROUTINE HEALING, SUBSEQUENT ENCOUNTER: ICD-10-CM

## 2021-06-29 DIAGNOSIS — G89.3 CANCER ASSOCIATED PAIN: ICD-10-CM

## 2021-06-29 RX ORDER — OXYCODONE AND ACETAMINOPHEN 7.5; 325 MG/1; MG/1
1 TABLET ORAL EVERY 6 HOURS PRN
Qty: 60 TABLET | Refills: 0 | Status: SHIPPED | OUTPATIENT
Start: 2021-06-29 | End: 2021-07-19 | Stop reason: SDUPTHER

## 2021-06-29 NOTE — TELEPHONE ENCOUNTER
Caller: Brittani Lambert    Relationship: Self    Best call back number: [phone]    Medication needed:   Requested Prescriptions     Pending Prescriptions Disp Refills   • oxyCODONE-acetaminophen (Percocet) 7.5-325 MG per tablet 60 tablet 0     Sig: Take 1 tablet by mouth Every 6 (Six) Hours As Needed for Moderate Pain .       When do you need the refill by: 06/29/2021    What additional details did the patient provide when requesting the medication: PATIENT IS OUT OF THE MEDICATION     Does the patient have less than a 3 day supply:  [x] Yes  [] No    What is the patient's preferred pharmacy: [facility]/PHARMACY #[id number] - [place], [place] - [address] [place] [address] AT [facility] 25 - [phone] [facility] [phone] FX

## 2021-07-19 DIAGNOSIS — S22.080D COMPRESSION FRACTURE OF T12 VERTEBRA WITH ROUTINE HEALING, SUBSEQUENT ENCOUNTER: ICD-10-CM

## 2021-07-19 DIAGNOSIS — G89.3 CANCER ASSOCIATED PAIN: ICD-10-CM

## 2021-07-19 RX ORDER — OXYCODONE AND ACETAMINOPHEN 7.5; 325 MG/1; MG/1
1 TABLET ORAL EVERY 6 HOURS PRN
Qty: 60 TABLET | Refills: 0 | Status: SHIPPED | OUTPATIENT
Start: 2021-07-19 | End: 2021-08-02 | Stop reason: SDUPTHER

## 2021-07-19 NOTE — TELEPHONE ENCOUNTER
Caller: Genarojocelyn Brittani    Relationship: Self    Best call back number: [phone]    Medication needed:   Requested Prescriptions     Pending Prescriptions Disp Refills   • oxyCODONE-acetaminophen (Percocet) 7.5-325 MG per tablet 60 tablet 0     Sig: Take 1 tablet by mouth Every 6 (Six) Hours As Needed for Moderate Pain .       When do you need the refill by: ASAP    What additional details did the patient provide when requesting the medication: PATIENT STATED THAT SHE WAS COMPLETELY OUT AND WOULD NEED REFILL SENT TO [facility] ASAP    Does the patient have less than a 3 day supply:  [x] Yes  [] No    What is the patient's preferred pharmacy: [facility]/PHARMACY #[id number] - [place], [place] - [address] [place] [address] AT [facility] 25 - [phone]  - [phone] FX

## 2021-07-22 ENCOUNTER — TELEPHONE (OUTPATIENT)
Dept: ONCOLOGY | Facility: CLINIC | Age: 61
End: 2021-07-22

## 2021-07-22 NOTE — TELEPHONE ENCOUNTER
Caller: Brittani Lambert    Relationship to patient: Self    Best call back number:  [phone]    Chief complaint: WOULD LIKE TO MAKE A FOLLOW UP  WITH DR RAM, TO SEE SOONER       Type of visit:  FOLLOW UP     Requested date: ANYTIME PAST 8AM     If rescheduling, when is the original appointment: N/A    Additional notes:  STATES DR SAMS RECOMMENDED PATIENT BE SEEN

## 2021-07-29 RX ORDER — DULOXETIN HYDROCHLORIDE 30 MG/1
30 CAPSULE, DELAYED RELEASE ORAL DAILY
Qty: 30 CAPSULE | Refills: 5 | Status: SHIPPED | OUTPATIENT
Start: 2021-07-29 | End: 2021-09-13 | Stop reason: SDUPTHER

## 2021-07-29 NOTE — TELEPHONE ENCOUNTER
PATIENT STATES THAT SHE WILL BE OUT OF THIS IN 4 DAYS AND WANTED TO GO AHEAD AND REQUEST A REFILL NOW. PLEASE ADVISE.

## 2021-08-02 DIAGNOSIS — G89.3 CANCER ASSOCIATED PAIN: ICD-10-CM

## 2021-08-02 DIAGNOSIS — S22.080D COMPRESSION FRACTURE OF T12 VERTEBRA WITH ROUTINE HEALING, SUBSEQUENT ENCOUNTER: ICD-10-CM

## 2021-08-02 RX ORDER — OXYCODONE AND ACETAMINOPHEN 7.5; 325 MG/1; MG/1
1 TABLET ORAL EVERY 6 HOURS PRN
Qty: 60 TABLET | Refills: 0 | Status: SHIPPED | OUTPATIENT
Start: 2021-08-02 | End: 2021-08-20 | Stop reason: SDUPTHER

## 2021-08-02 NOTE — TELEPHONE ENCOUNTER
Caller: Brittani Lambert    Relationship: Self    Best call back number: [phone] (H)    Medication needed:   Requested Prescriptions     Pending Prescriptions Disp Refills   • oxyCODONE-acetaminophen (Percocet) 7.5-325 MG per tablet 60 tablet 0     Sig: Take 1 tablet by mouth Every 6 (Six) Hours As Needed for Moderate Pain .       When do you need the refill by: ASAP    What additional details did the patient provide when requesting the medication: PATIENT WILL BE OUT OF MEDICATION ON Wednesday     Does the patient have less than a 3 day supply:  [x] Yes  [] No    What is the patient's preferred pharmacy: [facility]/PHARMACY #[id number] - [place], [place] - [address] [place] [address] AT [facility] 25 - [phone]  - [phone] FX

## 2021-08-17 ENCOUNTER — TELEPHONE (OUTPATIENT)
Dept: ONCOLOGY | Facility: CLINIC | Age: 61
End: 2021-08-17

## 2021-08-18 ENCOUNTER — TELEMEDICINE (OUTPATIENT)
Dept: ONCOLOGY | Facility: CLINIC | Age: 61
End: 2021-08-18

## 2021-08-18 DIAGNOSIS — R63.4 WEIGHT LOSS: Primary | ICD-10-CM

## 2021-08-18 PROCEDURE — 99442 PR PHYS/QHP TELEPHONE EVALUATION 11-20 MIN: CPT | Performed by: INTERNAL MEDICINE

## 2021-08-18 NOTE — PROGRESS NOTES
You have chosen to receive care through a telephone visit. Do you consent to use a telephone visit for your medical care today? Yes    Telephone visit; patient unable to connect to [facility].  Time of visit 14 min.    PROBLEM LIST:  1. [id number] (Stage IIA) ER negative, MN positive, Her2 2+ by IHC, negative by FISH invasive ductal carcinoma of the left breast  A) bilateral mastectomy on 10/4/18.  Pathology showed a 2.6 cm high grade IDC with basaloid features.  0/1 SLN involved.  B) adjuvant chemotherapy with ddAC followed by taxol started on 11/14/18  C) admitted to the hospital on 4/10/2019 with left upper extremity weakness.  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  CT chest abdomen pelvis and bone scan on 4/11/2019 showed no other sites of disease.  Completed whole brain radiation on 4/24/2019.  2. Anxiety/depression  3. Hyperlipidemia  4. History of ovarian cancer 1989, s/p BRYN/BSO, no adjuvant therapy    Chief complaint: follow up for breast cancer management       Subjective     HISTORY OF PRESENT ILLNESS:   Brittani Lambert returns for follow-up.      She has been feeling dizzy today.  She reports some headaches as well.  She says that her walking difficulties have been getting worse.    Her sister and brother in law currently have covid 19, but it has been 14 days since she was around then.    She says she continues to lose weight.  She things that she is really eating a lot.  She is at 98 lbs.    Objective      There were no vitals taken for this visit.     Performance Status: 0    General:  female in no acute distress  Neuro: alert and oriented.    Psych: mood and affect appropriate            Assessment/Plan   Brittani Lambert is a 61 y.o. 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     History of pulmonary embolism.  Completed 6 months of  xarelto.    Leptomeningeal disease: given worsening dizziness/headaches, will repeat MRI brain now.      Metastatic breast cancer: repeat CT scans now along with MRI, continue imaging until at least 3 years disease free.    Weight loss: check thyroid function.    F/u 3 months, or sooner based on results of scan.      Carrie Patel MD  Psychiatric Hematology and Oncology    8/18/2021          CC:

## 2021-08-20 DIAGNOSIS — S22.080D COMPRESSION FRACTURE OF T12 VERTEBRA WITH ROUTINE HEALING, SUBSEQUENT ENCOUNTER: ICD-10-CM

## 2021-08-20 DIAGNOSIS — G89.3 CANCER ASSOCIATED PAIN: ICD-10-CM

## 2021-08-20 RX ORDER — OXYCODONE AND ACETAMINOPHEN 7.5; 325 MG/1; MG/1
1 TABLET ORAL EVERY 6 HOURS PRN
Qty: 60 TABLET | Refills: 0 | Status: SHIPPED | OUTPATIENT
Start: 2021-08-20 | End: 2021-09-08 | Stop reason: SDUPTHER

## 2021-08-20 NOTE — TELEPHONE ENCOUNTER
Caller: Brittani Lambert    Relationship: Self    Best call back number: [phone]  Medication needed:   Requested Prescriptions     Pending Prescriptions Disp Refills   • oxyCODONE-acetaminophen (Percocet) 7.5-325 MG per tablet 60 tablet 0     Sig: Take 1 tablet by mouth Every 6 (Six) Hours As Needed for Moderate Pain .       When do you need the refill by: 8/21/21    What additional details did the patient provide when requesting the medication:     PATIENT HAS 1 TABLET LEFT.      Does the patient have less than a 3 day supply:  [x] Yes  [] No    What is the patient's preferred pharmacy: [facility]/PHARMACY #[id number] - [place], [place] - [address] [place] [address] AT [facility] 25 - [phone] [facility] [phone]

## 2021-09-01 RX ORDER — ARIPIPRAZOLE 5 MG/1
TABLET ORAL
Qty: 30 TABLET | Refills: 5 | Status: SHIPPED | OUTPATIENT
Start: 2021-09-01 | End: 2022-01-04 | Stop reason: SDUPTHER

## 2021-09-07 ENCOUNTER — APPOINTMENT (OUTPATIENT)
Dept: MRI IMAGING | Facility: HOSPITAL | Age: 61
End: 2021-09-07

## 2021-09-07 ENCOUNTER — HOSPITAL ENCOUNTER (OUTPATIENT)
Dept: CT IMAGING | Facility: HOSPITAL | Age: 61
End: 2021-09-07

## 2021-09-08 DIAGNOSIS — S22.080D COMPRESSION FRACTURE OF T12 VERTEBRA WITH ROUTINE HEALING, SUBSEQUENT ENCOUNTER: ICD-10-CM

## 2021-09-08 DIAGNOSIS — G89.3 CANCER ASSOCIATED PAIN: ICD-10-CM

## 2021-09-08 RX ORDER — OXYCODONE AND ACETAMINOPHEN 7.5; 325 MG/1; MG/1
1 TABLET ORAL EVERY 6 HOURS PRN
Qty: 60 TABLET | Refills: 0 | Status: SHIPPED | OUTPATIENT
Start: 2021-09-08 | End: 2021-09-27 | Stop reason: SDUPTHER

## 2021-09-08 RX ORDER — ARIPIPRAZOLE 5 MG/1
5 TABLET ORAL DAILY
Qty: 30 TABLET | Refills: 5 | OUTPATIENT
Start: 2021-09-08

## 2021-09-08 NOTE — TELEPHONE ENCOUNTER
Caller: Kiersten Lamberta    Relationship: Self    Best call back number: [phone]     Medication needed:   Requested Prescriptions     Pending Prescriptions Disp Refills   • oxyCODONE-acetaminophen (Percocet) 7.5-325 MG per tablet 60 tablet 0     Sig: Take 1 tablet by mouth Every 6 (Six) Hours As Needed for Moderate Pain .   • ARIPiprazole (ABILIFY) 5 MG tablet 30 tablet 5     Sig: Take 1 tablet by mouth Daily.       When do you need the refill by: TODAY     What additional details did the patient provide when requesting the medication:   PATIENT IS OUT OF PERCOCET     Does the patient have less than a 3 day supply:  [x] Yes  [] No    What is the patient's preferred pharmacy: [facility]/PHARMACY #[id number] - [place], [place] - [address] [place] [address] 25 - [phone] [facility] [phone] FX

## 2021-09-13 ENCOUNTER — OFFICE VISIT (OUTPATIENT)
Dept: PSYCHIATRY | Facility: CLINIC | Age: 61
End: 2021-09-13

## 2021-09-13 DIAGNOSIS — F41.1 GENERALIZED ANXIETY DISORDER: ICD-10-CM

## 2021-09-13 DIAGNOSIS — G47.9 SLEEP DISTURBANCE: ICD-10-CM

## 2021-09-13 DIAGNOSIS — F33.1 MODERATE EPISODE OF RECURRENT MAJOR DEPRESSIVE DISORDER (HCC): Primary | ICD-10-CM

## 2021-09-13 PROCEDURE — 99443 PR PHYS/QHP TELEPHONE EVALUATION 21-30 MIN: CPT | Performed by: NURSE PRACTITIONER

## 2021-09-13 RX ORDER — DULOXETIN HYDROCHLORIDE 60 MG/1
60 CAPSULE, DELAYED RELEASE ORAL DAILY
Qty: 30 CAPSULE | Refills: 5 | Status: SHIPPED | OUTPATIENT
Start: 2021-09-13 | End: 2022-01-04 | Stop reason: SDUPTHER

## 2021-09-13 NOTE — PROGRESS NOTES
"  Amy Lambert is a 61 y.o. female who is here today for medication management follow up. You have chosen to receive care through a telephone visit. Do you consent to use a telephone visit for your medical care today? Yes    TIME IN:0800  TIME OUT:0825    Chief Complaint: MDD, THALIA, sleep disturbance    History of Present Illness Patient  reporting she has increased difficulty with motivation and interest.  She states she feels \"lackluster with low energy\" patient denies suicidal ideations.  She states COVID isolation is difficult.  Her sister and brother-in-law had COVID they have been isolating.  Rates depression about a 6-7 patient talks to family on the phone.  Patient reports decreased appetite and has been losing weight.  Venting of frustrations was conducted. Feelings were processed and validated, both negative and positive.Acknowledged and normalized patient's thoughts, feelings, and concerns.  She reports medical oncology is aware denies adverse effects from medications.   (Scales based on 0 - 10 with 10 being the worst)        The following portions of the patient's history were reviewed and updated as appropriate: allergies, current medications, past family history, past medical history, past social history, past surgical history and problem list.    Review of Systems  A 14 point review of systems was performed and is negative except as noted above.    Objective   Physical Exam  not currently breastfeeding.    No Known Allergies    Current Medications:   Current Outpatient Medications   Medication Sig Dispense Refill   • ALPRAZolam (XANAX) 0.5 MG tablet Take 0.5 mg by mouth 3 (Three) Times a Day As Needed for Anxiety.     • ARIPiprazole (ABILIFY) 5 MG tablet TAKE 1 TABLET BY MOUTH EVERY DAY 30 tablet 5   • DULoxetine (CYMBALTA) 60 MG capsule Take 1 capsule by mouth Daily. 30 capsule 5   • mirtazapine (REMERON) 15 MG tablet Take 1 tablet by mouth Every Night. 30 tablet 5   • oxybutynin XL " (DITROPAN-XL) 5 MG 24 hr tablet Take 1 tablet by mouth Daily. 90 tablet 1   • oxyCODONE-acetaminophen (Percocet) 7.5-325 MG per tablet Take 1 tablet by mouth Every 6 (Six) Hours As Needed for Moderate Pain . 60 tablet 0     No current facility-administered medications for this visit.     Appearance: NA  Hygiene:  REPORTS good  Cooperation:  Cooperative  Eye Contact: NA  Psychomotor Behavior:  denies psychomotor agitation/retardation, No EPS, No motor tics  Mood: Depressed  Affect: NA  Hopelessness: Denies  Speech:  Normal  Thought Process:  Linear  Thought Content:  Normal  Concentration: Normal   Suicidal: denies  Homicidal:  None  Hallucinations:  None  Delusion:  None  Memory:  Intact  Orientation:  Person, Place, Time and Situation  Reliability:  good  Insight:  Fair  Judgement: good  Impulse Control: good  Estimated Intelligence: average range    ZECHARIAH REVIEWED NO RED FLAGS    Assessment/Plan   Diagnoses and all orders for this visit:    1. Moderate episode of recurrent major depressive disorder (CMS/HCC) (Primary)    2. Generalized anxiety disorder    3. Sleep disturbance    Other orders  -     DULoxetine (CYMBALTA) 60 MG capsule; Take 1 capsule by mouth Daily.  Dispense: 30 capsule; Refill: 5          IMPRESSION: Increased depressive symptoms, sleeping well, appetite poor    PLAN: Increase duloxetine to 60 mg 1 p.o. daily for depression  Refill mirtazapine 15 mg 1 p.o. nightly for sleeplessness  Continue Abilify 5 mg daily for depression  Does not need a refill on alprazolam 0.5 mg as needed for panic-like symptoms    We discussed risks, benefits, and side effects of the above medications and the patient was agreeable with the plan. Patient was educated on the importance of compliance with treatment and follow-up appointments.     Provide Cognitive Behavioral Therapy and Solution Focused Therapy to improve functioning, maintain stability, and avoid decompensation and the need for higher level of  care.    Counseled patient regarding multimodal approach with encouragement of healthy nutrition, healthy sleep, regular physical mobility, social involvement, counseling, and medication compliance.     Assisted patient in identifying risk factors which would indicate the need for higher level of care including thoughts to harm self or others and/or self-harming behavior and encouraged patient to contact this office, call 911, or present to the nearest emergency room should any of these events occur. Discussed crisis intervention services and means to access.  Patient adamantly and convincingly denies current suicidal or homicidal ideation or perceptual disturbance.    Treatment Plan: stabilize mood, patient will stay out of psychiatric hospital and be at optimal level of functioning with therapy and take all medication as prescribed. Patient verbalized  understanding and agreement to plan.    Instructed to call for questions or concerns and return early if necessary.     Greater than 50% time was spent in coordination of care, and counseling the patient regarding current assessment, symptoms, plan of care going forward, supportive therapy.  Answered any questions patient had regarding medications and plan of care.    Return in about 4 weeks (around 10/11/2021).

## 2021-09-27 DIAGNOSIS — S22.080D COMPRESSION FRACTURE OF T12 VERTEBRA WITH ROUTINE HEALING, SUBSEQUENT ENCOUNTER: ICD-10-CM

## 2021-09-27 DIAGNOSIS — G89.3 CANCER ASSOCIATED PAIN: ICD-10-CM

## 2021-09-27 RX ORDER — OXYCODONE AND ACETAMINOPHEN 7.5; 325 MG/1; MG/1
1 TABLET ORAL EVERY 6 HOURS PRN
Qty: 60 TABLET | Refills: 0 | Status: SHIPPED | OUTPATIENT
Start: 2021-09-27 | End: 2021-10-18 | Stop reason: SDUPTHER

## 2021-09-27 NOTE — TELEPHONE ENCOUNTER
Caller: Brittani Lambert    Relationship: Self      Medication requested (name and dosage): oxyCODONE-acetaminophen (Percocet) 7.5-325 MG per tablet     Pharmacy where request should be sent: [id number] - [place], [place] - [address] [facility] [address] - [phone]  - [phone]      Additional details provided by patient: PATIENT WILL RUN OUT OF MEDICATION TODAY    Best call back number: [phone]    Does the patient have less than a 3 day supply:  [x] Yes  [] No    Rodger Amaro Rep   09/27/21 13:20 EDT

## 2021-10-03 ENCOUNTER — LAB (OUTPATIENT)
Dept: LAB | Facility: HOSPITAL | Age: 61
End: 2021-10-03

## 2021-10-03 ENCOUNTER — HOSPITAL ENCOUNTER (OUTPATIENT)
Dept: MRI IMAGING | Facility: HOSPITAL | Age: 61
Discharge: HOME | End: 2021-10-03

## 2021-10-03 ENCOUNTER — HOSPITAL ENCOUNTER (OUTPATIENT)
Dept: CT IMAGING | Facility: HOSPITAL | Age: 61
End: 2021-10-03

## 2021-10-03 DIAGNOSIS — C50.011 MALIGNANT NEOPLASM OF NIPPLE OF RIGHT BREAST IN FEMALE, UNSPECIFIED ESTROGEN RECEPTOR STATUS (HCC): ICD-10-CM

## 2021-10-03 DIAGNOSIS — R63.4 WEIGHT LOSS: ICD-10-CM

## 2021-10-03 LAB
ALBUMIN SERPL-MCNC: 4.6 G/DL (ref 3.5–5.2)
ALBUMIN/GLOB SERPL: 1.6 G/DL
ALP SERPL-CCNC: 122 U/L (ref 39–117)
ALT SERPL W P-5'-P-CCNC: 7 U/L (ref 1–33)
ANION GAP SERPL CALCULATED.3IONS-SCNC: 13 MMOL/L (ref 5–15)
AST SERPL-CCNC: 15 U/L (ref 1–32)
BASOPHILS # BLD AUTO: 0.07 10*3/MM3 (ref 0–0.2)
BASOPHILS NFR BLD AUTO: 0.7 % (ref 0–1.5)
BILIRUB SERPL-MCNC: 0.3 MG/DL (ref 0–1.2)
BUN SERPL-MCNC: 8 MG/DL (ref 8–23)
BUN/CREAT SERPL: 11.1 (ref 7–25)
CALCIUM SPEC-SCNC: 9.3 MG/DL (ref 8.6–10.5)
CHLORIDE SERPL-SCNC: 99 MMOL/L (ref 98–107)
CO2 SERPL-SCNC: 25 MMOL/L (ref 22–29)
CREAT SERPL-MCNC: 0.72 MG/DL (ref 0.57–1)
DEPRECATED RDW RBC AUTO: 45.1 FL (ref 37–54)
EOSINOPHIL # BLD AUTO: 0.3 10*3/MM3 (ref 0–0.4)
EOSINOPHIL NFR BLD AUTO: 3.1 % (ref 0.3–6.2)
ERYTHROCYTE [DISTWIDTH] IN BLOOD BY AUTOMATED COUNT: 12.7 % (ref 12.3–15.4)
GFR SERPL CREATININE-BSD FRML MDRD: 82 ML/MIN/1.73
GLOBULIN UR ELPH-MCNC: 2.8 GM/DL
GLUCOSE SERPL-MCNC: 108 MG/DL (ref 65–99)
HCT VFR BLD AUTO: 40.4 % (ref 34–46.6)
HGB BLD-MCNC: 13.2 G/DL (ref 12–15.9)
IMM GRANULOCYTES # BLD AUTO: 0.03 10*3/MM3 (ref 0–0.05)
IMM GRANULOCYTES NFR BLD AUTO: 0.3 % (ref 0–0.5)
LYMPHOCYTES # BLD AUTO: 2.32 10*3/MM3 (ref 0.7–3.1)
LYMPHOCYTES NFR BLD AUTO: 23.8 % (ref 19.6–45.3)
MCH RBC QN AUTO: 31.7 PG (ref 26.6–33)
MCHC RBC AUTO-ENTMCNC: 32.7 G/DL (ref 31.5–35.7)
MCV RBC AUTO: 97.1 FL (ref 79–97)
MONOCYTES # BLD AUTO: 0.67 10*3/MM3 (ref 0.1–0.9)
MONOCYTES NFR BLD AUTO: 6.9 % (ref 5–12)
NEUTROPHILS NFR BLD AUTO: 6.36 10*3/MM3 (ref 1.7–7)
NEUTROPHILS NFR BLD AUTO: 65.2 % (ref 42.7–76)
NRBC BLD AUTO-RTO: 0 /100 WBC (ref 0–0.2)
PLATELET # BLD AUTO: 229 10*3/MM3 (ref 140–450)
PMV BLD AUTO: 9.5 FL (ref 6–12)
POTASSIUM SERPL-SCNC: 4.4 MMOL/L (ref 3.5–5.2)
PROT SERPL-MCNC: 7.4 G/DL (ref 6–8.5)
RBC # BLD AUTO: 4.16 10*6/MM3 (ref 3.77–5.28)
SODIUM SERPL-SCNC: 137 MMOL/L (ref 136–145)
TSH SERPL DL<=0.05 MIU/L-ACNC: 1.58 UIU/ML (ref 0.27–4.2)
WBC # BLD AUTO: 9.75 10*3/MM3 (ref 3.4–10.8)

## 2021-10-03 PROCEDURE — 85025 COMPLETE CBC W/AUTO DIFF WBC: CPT

## 2021-10-03 PROCEDURE — 80053 COMPREHEN METABOLIC PANEL: CPT

## 2021-10-03 PROCEDURE — 84443 ASSAY THYROID STIM HORMONE: CPT

## 2021-10-03 PROCEDURE — 36415 COLL VENOUS BLD VENIPUNCTURE: CPT

## 2021-10-07 ENCOUNTER — OFFICE VISIT (OUTPATIENT)
Dept: PSYCHIATRY | Facility: CLINIC | Age: 61
End: 2021-10-07

## 2021-10-07 DIAGNOSIS — F33.1 MODERATE EPISODE OF RECURRENT MAJOR DEPRESSIVE DISORDER (HCC): Primary | ICD-10-CM

## 2021-10-07 DIAGNOSIS — F41.1 GENERALIZED ANXIETY DISORDER: ICD-10-CM

## 2021-10-07 DIAGNOSIS — G47.9 SLEEP DISTURBANCE: ICD-10-CM

## 2021-10-07 PROCEDURE — 99442 PR PHYS/QHP TELEPHONE EVALUATION 11-20 MIN: CPT | Performed by: NURSE PRACTITIONER

## 2021-10-07 NOTE — PROGRESS NOTES
"  Subjective   Brittani Lambert is a 61 y.o. female who is here today for medication management follow up. You have chosen to receive care through a telephone visit. Do you consent to use a telephone visit for your medical care today? Yes    PATIENT AT HER PLACE OF RESIDENCE    PROVIDER AT: [facility]  [address], [address]  [place] [address]    TIME IN: 0900  TIME OUT: 0922    Chief Complaint: MDD, THALIA, sleep disturbance    History of Present Illness Patient reports she is tolerating increase in duloxetine to 60 mg without side effects but isn't noticing an increase in mood. She reports she thinks its her health challenges and pain. Dr. Frankser her PCP in charge of her pain management.  Patient has low energy. She cleans her home and \"I have organized everything and am [place] shopping with my sister\". She talks with her sister and daughter daily. She denies SI. Reports anxiety well managed. Discussed wanting a cat and how that may help having something more than her plants to care for and love on would be of emotional benefit. She reports she fell again and worries the Afib is back. She states her legs just gave out. She sees her cardiologist in 4 days. Feels safe in her home, denies panic. Appetite fair \"not much but I eat\". \"Not being able to drive myself other than 10 miles is not fun but I don't trust myself farther\".  Denies adverse effects from medications.  Acknowledged and normalized patient's thoughts, feelings, and concerns.Venting of frustrations was conducted. Feelings were processed and validated, both negative and positive.      The following portions of the patient's history were reviewed and updated as appropriate: allergies, current medications, past family history, past medical history, past social history, past surgical history and problem list.    Review of Systems  A 14 point review of systems was performed and is negative except as noted " above.    Objective   Physical Exam  not currently breastfeeding.    No Known Allergies    Current Medications:   Current Outpatient Medications   Medication Sig Dispense Refill   • ALPRAZolam (XANAX) 0.5 MG tablet Take 0.5 mg by mouth 3 (Three) Times a Day As Needed for Anxiety.     • ARIPiprazole (ABILIFY) 5 MG tablet TAKE 1 TABLET BY MOUTH EVERY DAY 30 tablet 5   • DULoxetine (CYMBALTA) 60 MG capsule Take 1 capsule by mouth Daily. 30 capsule 5   • mirtazapine (REMERON) 15 MG tablet Take 1 tablet by mouth Every Night. 30 tablet 5   • oxybutynin XL (DITROPAN-XL) 5 MG 24 hr tablet Take 1 tablet by mouth Daily. 90 tablet 1   • oxyCODONE-acetaminophen (Percocet) 7.5-325 MG per tablet Take 1 tablet by mouth Every 6 (Six) Hours As Needed for Moderate Pain . 60 tablet 0     No current facility-administered medications for this visit.       Lab Results:  Lab on 10/03/2021   Component Date Value Ref Range Status   • Glucose 10/03/2021 108* 65 - 99 mg/dL Final   • BUN 10/03/2021 8  8 - 23 mg/dL Final   • Creatinine 10/03/2021 0.72  0.57 - 1.00 mg/dL Final   • Sodium 10/03/2021 137  136 - 145 mmol/L Final   • Potassium 10/03/2021 4.4  3.5 - 5.2 mmol/L Final   • Chloride 10/03/2021 99  98 - 107 mmol/L Final   • CO2 10/03/2021 25.0  22.0 - 29.0 mmol/L Final   • Calcium 10/03/2021 9.3  8.6 - 10.5 mg/dL Final   • Total Protein 10/03/2021 7.4  6.0 - 8.5 g/dL Final   • Albumin 10/03/2021 4.60  3.50 - 5.20 g/dL Final   • ALT (SGPT) 10/03/2021 7  1 - 33 U/L Final   • AST (SGOT) 10/03/2021 15  1 - 32 U/L Final   • Alkaline Phosphatase 10/03/2021 122* 39 - 117 U/L Final   • Total Bilirubin 10/03/2021 0.3  0.0 - 1.2 mg/dL Final   • eGFR Non  Amer 10/03/2021 82  >60 mL/min/1.73 Final   • Globulin 10/03/2021 2.8  gm/dL Final   • A/G Ratio 10/03/2021 1.6  g/dL Final   • BUN/Creatinine Ratio 10/03/2021 11.1  7.0 - 25.0 Final   • Anion Gap 10/03/2021 13.0  5.0 - 15.0 mmol/L Final   • TSH 10/03/2021 1.580  0.270 - 4.200 uIU/mL Final    • WBC 10/03/2021 9.75  3.40 - 10.80 10*3/mm3 Final   • RBC 10/03/2021 4.16  3.77 - 5.28 10*6/mm3 Final   • Hemoglobin 10/03/2021 13.2  12.0 - 15.9 g/dL Final   • Hematocrit 10/03/2021 40.4  34.0 - 46.6 % Final   • MCV 10/03/2021 97.1* 79.0 - 97.0 fL Final   • MCH 10/03/2021 31.7  26.6 - 33.0 pg Final   • MCHC 10/03/2021 32.7  31.5 - 35.7 g/dL Final   • RDW 10/03/2021 12.7  12.3 - 15.4 % Final   • RDW-SD 10/03/2021 45.1  37.0 - 54.0 fl Final   • MPV 10/03/2021 9.5  6.0 - 12.0 fL Final   • Platelets 10/03/2021 229  140 - 450 10*3/mm3 Final   • Neutrophil % 10/03/2021 65.2  42.7 - 76.0 % Final   • Lymphocyte % 10/03/2021 23.8  19.6 - 45.3 % Final   • Monocyte % 10/03/2021 6.9  5.0 - 12.0 % Final   • Eosinophil % 10/03/2021 3.1  0.3 - 6.2 % Final   • Basophil % 10/03/2021 0.7  0.0 - 1.5 % Final   • Immature Grans % 10/03/2021 0.3  0.0 - 0.5 % Final   • Neutrophils, Absolute 10/03/2021 6.36  1.70 - 7.00 10*3/mm3 Final   • Lymphocytes, Absolute 10/03/2021 2.32  0.70 - 3.10 10*3/mm3 Final   • Monocytes, Absolute 10/03/2021 0.67  0.10 - 0.90 10*3/mm3 Final   • Eosinophils, Absolute 10/03/2021 0.30  0.00 - 0.40 10*3/mm3 Final   • Basophils, Absolute 10/03/2021 0.07  0.00 - 0.20 10*3/mm3 Final   • Immature Grans, Absolute 10/03/2021 0.03  0.00 - 0.05 10*3/mm3 Final   • nRBC 10/03/2021 0.0  0.0 - 0.2 /100 WBC Final   Clinical Support on 05/21/2021   Component Date Value Ref Range Status   • Color 05/21/2021 Yellow  Yellow, Straw, Dark Yellow, [place] Final   • Clarity, UA 05/21/2021 Clear  Clear Final   • Specific Gravity  05/21/2021 1.015  1.005 - 1.030 Final   • pH, Urine 05/21/2021 6.0  5.0 - 8.0 Final   • Leukocytes 05/21/2021 Negative  Negative Final   • Nitrite, UA 05/21/2021 Negative  Negative Final   • Protein, POC 05/21/2021 Negative  Negative mg/dL Final   • Glucose, UA 05/21/2021 Negative  Negative, 1000 mg/dL (3+) mg/dL Final   • Ketones, UA 05/21/2021 Negative  Negative Final   • Urobilinogen, UA 05/21/2021 Normal   Normal Final   • Bilirubin 05/21/2021 Negative  Negative Final   • Blood, UA 05/21/2021 Negative  Negative Final   Admission on 04/26/2021, Discharged on 04/26/2021   Component Date Value Ref Range Status   • Extra Tube 04/26/2021 hold for add-on   Final    Auto resulted   • Extra Tube 04/26/2021 Hold for add-ons.   Final    Auto resulted.   • Extra Tube 04/26/2021 hold for add-on   Final    Auto resulted   • Extra Tube 04/26/2021 Hold for add-ons.   Final    Auto resulted.   • Extra Tube 04/26/2021 Hold for add-ons.   Final    Auto resulted.   • QT Interval 04/26/2021 342  ms Final   • QTC Interval 04/26/2021 443  ms Final   • Troponin T 04/26/2021 <0.010  0.000 - 0.030 ng/mL Final   • Magnesium 04/26/2021 1.4* 1.6 - 2.4 mg/dL Final   • Protime 04/26/2021 12.8  11.4 - 14.4 Seconds Final   • INR 04/26/2021 0.99  0.85 - 1.16 Final   • Glucose 04/26/2021 118* 65 - 99 mg/dL Final   • BUN 04/26/2021 6* 8 - 23 mg/dL Final   • Creatinine 04/26/2021 0.69  0.57 - 1.00 mg/dL Final   • Sodium 04/26/2021 136  136 - 145 mmol/L Final   • Potassium 04/26/2021 4.1  3.5 - 5.2 mmol/L Final   • Chloride 04/26/2021 94* 98 - 107 mmol/L Final   • CO2 04/26/2021 21.0* 22.0 - 29.0 mmol/L Final   • Calcium 04/26/2021 9.0  8.6 - 10.5 mg/dL Final   • Total Protein 04/26/2021 7.4  6.0 - 8.5 g/dL Final   • Albumin 04/26/2021 4.40  3.50 - 5.20 g/dL Final   • ALT (SGPT) 04/26/2021 41* 1 - 33 U/L Final   • AST (SGOT) 04/26/2021 58* 1 - 32 U/L Final   • Alkaline Phosphatase 04/26/2021 167* 39 - 117 U/L Final   • Total Bilirubin 04/26/2021 0.4  0.0 - 1.2 mg/dL Final   • eGFR Non African Amer 04/26/2021 86  >60 mL/min/1.73 Final   • Globulin 04/26/2021 3.0  gm/dL Final   • A/G Ratio 04/26/2021 1.5  g/dL Final   • BUN/Creatinine Ratio 04/26/2021 8.7  7.0 - 25.0 Final   • Anion Gap 04/26/2021 21.0* 5.0 - 15.0 mmol/L Final   • WBC 04/26/2021 10.49  3.40 - 10.80 10*3/mm3 Final   • RBC 04/26/2021 4.27  3.77 - 5.28 10*6/mm3 Final   • Hemoglobin  04/26/2021 14.8  12.0 - 15.9 g/dL Final   • Hematocrit 04/26/2021 43.8  34.0 - 46.6 % Final   • MCV 04/26/2021 102.6* 79.0 - 97.0 fL Final   • MCH 04/26/2021 34.7* 26.6 - 33.0 pg Final   • MCHC 04/26/2021 33.8  31.5 - 35.7 g/dL Final   • RDW 04/26/2021 14.1  12.3 - 15.4 % Final   • RDW-SD 04/26/2021 53.1  37.0 - 54.0 fl Final   • MPV 04/26/2021 9.0  6.0 - 12.0 fL Final   • Platelets 04/26/2021 264  140 - 450 10*3/mm3 Final   • Neutrophil % 04/26/2021 67.6  42.7 - 76.0 % Final   • Lymphocyte % 04/26/2021 23.0  19.6 - 45.3 % Final   • Monocyte % 04/26/2021 7.5  5.0 - 12.0 % Final   • Eosinophil % 04/26/2021 0.5  0.3 - 6.2 % Final   • Basophil % 04/26/2021 0.9  0.0 - 1.5 % Final   • Immature Grans % 04/26/2021 0.5  0.0 - 0.5 % Final   • Neutrophils, Absolute 04/26/2021 7.10* 1.70 - 7.00 10*3/mm3 Final   • Lymphocytes, Absolute 04/26/2021 2.41  0.70 - 3.10 10*3/mm3 Final   • Monocytes, Absolute 04/26/2021 0.79  0.10 - 0.90 10*3/mm3 Final   • Eosinophils, Absolute 04/26/2021 0.05  0.00 - 0.40 10*3/mm3 Final   • Basophils, Absolute 04/26/2021 0.09  0.00 - 0.20 10*3/mm3 Final   • Immature Grans, Absolute 04/26/2021 0.05  0.00 - 0.05 10*3/mm3 Final   • nRBC 04/26/2021 0.0  0.0 - 0.2 /100 WBC Final       THALIA-7:  10/7/2021  Over the last two weeks, how often have you been bothered by the following problems?  Feeling nervous, anxious or on edge: Several days  Not being able to stop or control worrying: Not at all  Worrying too much about different things: Several days  Trouble Relaxing: Not at all  Being so restless that it is hard to sit still: Not at all  Becoming easily annoyed or irritable: Several days  Feeling afraid as if something awful might happen: Several days  THALIA 7 Total Score: 4  If you checked any problems, how difficult have these problems made it for you to do your work, take care of things at home, or get along with other people: Somewhat difficult  0-4: Minimal anxiety  5-9: Mild anxiety  10-14: Moderate  anxiety  15-21: Severe anxiety  PHQ-9:  PHQ-2/PHQ-9 Depression Screening 10/7/2021   Little interest or pleasure in doing things 2   Feeling down, depressed, or hopeless 2   Trouble falling or staying asleep, or sleeping too much 0   Feeling tired or having little energy 3   Poor appetite or overeating 1   Feeling bad about yourself - or that you are a failure or have let yourself or your family down 1   Trouble concentrating on things, such as reading the newspaper or watching television 1   Moving or speaking so slowly that other people could have noticed. Or the opposite - being so fidgety or restless that you have been moving around a lot more than usual 1   Thoughts that you would be better off dead, or of hurting yourself in some way 0   Total Score 11   If you checked off any problems, how difficult have these problems made it for you to do your work, take care of things at home, or get along with other people? Somewhat difficult      5-9: Minimal symptoms  10-14: Major depression mild  15-19: Major depression moderate  Greater then 20: Major depression severe    Appearance: na  Hygiene:  REPORTS good  Cooperation:  Cooperative  Eye Contact:  na  Psychomotor Behavior:  denies psychomotor agitation/retardation, No EPS, No motor tics  Mood:  dysthymic   Affect:  na  Hopelessness: Denies  Speech:  Normal  Thought Process:  Linear  Thought Content:  Normal  Concentration: Normal   Suicidal: denies  Homicidal:  None  Hallucinations:  None  Delusion:  None  Memory:  Intact  Orientation:  Person, Place, Time and Situation  Reliability:  good  Insight:  Fair  Judgement: good  Impulse Control: good  Estimated Intelligence: average range    ZECHARIAH REVIEWED NO RED FLAGS  10.7.2021  Assessment/Plan   Diagnoses and all orders for this visit:    1. Moderate episode of recurrent major depressive disorder (HCC) (Primary)    2. Generalized anxiety disorder    3. Sleep disturbance      IMPRESSION: residual depressive symptoms  with Covid and health challenges, sleeping well, anxiety managed     PLAN:   No changes in med management  Cont duloxetine 60 mg one po qd for depression and anxiety  Cont Abilify 5 mg daily for depression   Cont mirtazapine 15 mg at hs for sleeplessness  Isn't requiring alprazolam, will dc off med list     We discussed risks, benefits, and side effects of the above medications and the patient was agreeable with the plan. Patient was educated on the importance of compliance with treatment and follow-up appointments.     Discussed getting a cat and will write her apt complex about how important having a cat would be for her and I recommend it. Letter printed and will be mailed to pt today.    Counseled patient regarding multimodal approach with encouragement of healthy nutrition, healthy sleep, regular physical mobility, social involvement, counseling, and medication compliance.     Assisted patient in identifying risk factors which would indicate the need for higher level of care including thoughts to harm self or others and/or self-harming behavior and encouraged patient to contact this office, call 911, or present to the nearest emergency room should any of these events occur. Discussed crisis intervention services and means to access.  Patient adamantly and convincingly denies current suicidal or homicidal ideation or perceptual disturbance.    Treatment Plan: stabilize mood, patient will stay out of psychiatric hospital and be at optimal level of functioning with therapy and take all medication as prescribed. Patient verbalized  understanding and agreement to plan.    Instructed to call for questions or concerns and return early if necessary.     Greater than 50% time was spent in coordination of care, and counseling the patient regarding current assessment, symptoms, plan of care going forward, supportive therapy.  Answered any questions patient had regarding medications and plan of care.    Return in about 25  days (around 11/1/2021).

## 2021-10-11 ENCOUNTER — OFFICE VISIT (OUTPATIENT)
Dept: CARDIOLOGY | Facility: CLINIC | Age: 61
End: 2021-10-11

## 2021-10-11 VITALS
BODY MASS INDEX: 18.07 KG/M2 | OXYGEN SATURATION: 95 % | HEIGHT: 63 IN | SYSTOLIC BLOOD PRESSURE: 110 MMHG | WEIGHT: 102 LBS | DIASTOLIC BLOOD PRESSURE: 68 MMHG | HEART RATE: 90 BPM

## 2021-10-11 DIAGNOSIS — I48.0 PAROXYSMAL ATRIAL FIBRILLATION (HCC): Primary | ICD-10-CM

## 2021-10-11 DIAGNOSIS — R42 DIZZINESS: ICD-10-CM

## 2021-10-11 DIAGNOSIS — E78.2 MIXED HYPERLIPIDEMIA: ICD-10-CM

## 2021-10-11 PROCEDURE — 99213 OFFICE O/P EST LOW 20 MIN: CPT | Performed by: INTERNAL MEDICINE

## 2021-10-11 RX ORDER — MIDODRINE HYDROCHLORIDE 5 MG/1
5 TABLET ORAL
Qty: 90 TABLET | Refills: 5 | Status: SHIPPED | OUTPATIENT
Start: 2021-10-11 | End: 2022-10-20

## 2021-10-13 ENCOUNTER — TELEPHONE (OUTPATIENT)
Dept: CARDIOLOGY | Facility: CLINIC | Age: 61
End: 2021-10-13

## 2021-10-13 RX ORDER — METOPROLOL TARTRATE 50 MG/1
50 TABLET, FILM COATED ORAL 2 TIMES DAILY
Qty: 60 TABLET | Refills: 5 | Status: SHIPPED | OUTPATIENT
Start: 2021-10-13 | End: 2022-06-15 | Stop reason: SDUPTHER

## 2021-10-13 RX ORDER — FLECAINIDE ACETATE 50 MG/1
50 TABLET ORAL 2 TIMES DAILY
Qty: 60 TABLET | Refills: 5 | Status: SHIPPED | OUTPATIENT
Start: 2021-10-13 | End: 2022-06-15 | Stop reason: SDUPTHER

## 2021-10-13 RX ORDER — ASPIRIN 81 MG/1
81 TABLET ORAL DAILY
Qty: 30 TABLET | Refills: 5 | Status: SHIPPED | OUTPATIENT
Start: 2021-10-13

## 2021-10-13 NOTE — TELEPHONE ENCOUNTER
----- Message from Carlos Allen III, MD sent at 10/13/2021  9:03 AM EDT -----  Let her know that her monitor revealed atrial fibrillation with an associated fast heart rate.  Have her start Lopressor 50 mg p.o. twice daily, flecainide 50 mg p.o. twice daily, and aspirin 81 mg p.o. daily.  ECG in the office in 48 hours.

## 2021-10-15 ENCOUNTER — HOSPITAL ENCOUNTER (OUTPATIENT)
Dept: CARDIOLOGY | Facility: HOSPITAL | Age: 61
Discharge: HOME | End: 2021-10-15
Admitting: INTERNAL MEDICINE

## 2021-10-15 VITALS
WEIGHT: 102 LBS | BODY MASS INDEX: 18.07 KG/M2 | SYSTOLIC BLOOD PRESSURE: 92 MMHG | HEART RATE: 54 BPM | OXYGEN SATURATION: 94 % | DIASTOLIC BLOOD PRESSURE: 56 MMHG | HEIGHT: 63 IN

## 2021-10-15 DIAGNOSIS — R42 DIZZINESS: ICD-10-CM

## 2021-10-15 LAB
MAXIMAL PREDICTED HEART RATE: 159 BPM
STRESS TARGET HR: 135 BPM

## 2021-10-15 PROCEDURE — 93660 TILT TABLE EVALUATION: CPT

## 2021-10-15 PROCEDURE — 93660 TILT TABLE EVALUATION: CPT | Performed by: INTERNAL MEDICINE

## 2021-10-18 DIAGNOSIS — S22.080D COMPRESSION FRACTURE OF T12 VERTEBRA WITH ROUTINE HEALING, SUBSEQUENT ENCOUNTER: ICD-10-CM

## 2021-10-18 DIAGNOSIS — G89.3 CANCER ASSOCIATED PAIN: ICD-10-CM

## 2021-10-18 RX ORDER — OXYCODONE AND ACETAMINOPHEN 7.5; 325 MG/1; MG/1
1 TABLET ORAL EVERY 6 HOURS PRN
Qty: 60 TABLET | Refills: 0 | Status: SHIPPED | OUTPATIENT
Start: 2021-10-18 | End: 2021-11-04 | Stop reason: SDUPTHER

## 2021-11-01 ENCOUNTER — APPOINTMENT (OUTPATIENT)
Dept: MRI IMAGING | Facility: HOSPITAL | Age: 61
End: 2021-11-01

## 2021-11-01 ENCOUNTER — APPOINTMENT (OUTPATIENT)
Dept: CT IMAGING | Facility: HOSPITAL | Age: 61
End: 2021-11-01

## 2021-11-01 ENCOUNTER — OFFICE VISIT (OUTPATIENT)
Dept: PSYCHIATRY | Facility: CLINIC | Age: 61
End: 2021-11-01

## 2021-11-01 DIAGNOSIS — F41.1 GENERALIZED ANXIETY DISORDER: ICD-10-CM

## 2021-11-01 DIAGNOSIS — F33.1 MODERATE EPISODE OF RECURRENT MAJOR DEPRESSIVE DISORDER (HCC): Primary | ICD-10-CM

## 2021-11-01 DIAGNOSIS — G47.9 SLEEP DISTURBANCE: ICD-10-CM

## 2021-11-01 PROCEDURE — 99442 PR PHYS/QHP TELEPHONE EVALUATION 11-20 MIN: CPT | Performed by: NURSE PRACTITIONER

## 2021-11-01 NOTE — PROGRESS NOTES
Subjective   Brittani Lambert is a 61 y.o. female who is here today for medication management follow up. You have chosen to receive care through a telephone visit. Do you consent to use a telephone visit for your medical care today? Yes    TIME IN:1100   TIME OUT: 1120    PATIENT AT: THEIR PLACE OF RESIDENCE    PROVIDER AT:   [facility]   [facility]/BE[facility]AVIORAL [facility]  [address]  [place] [address]        Chief Complaint: MMD, THALIA, sleep disturbance    History of Present Illness Patient presents via telephone reporting she is feeling much better now that she has been placed on a medication she states to help keep her B/P stable. She reports no dizziness or falls in past 2.5 weeks. She is on heart monitor and that will be evaluated for a-fib but none so far. Sleeping well, mood improved with improved confidence in gait stability and not falling . Anxiety about a 3- 4 and same with MDD. Patient talked about current stressors, primarily  dealing with health. Discussed what she is grateful for. Also venting of frustrations was conducted. Feelings were processed and validated, both negative and positive. She is working on finding a cat to adopt and looking forward to that. Sister and mother good support and daughter who lives in other state. She has plans for Thanksgiving Day with family.  Denies adverse effects from medications.   (Scales based on 0 - 10 with 10 being the worst)    The following portions of the patient's history were reviewed and updated as appropriate: allergies, current medications, past family history, past medical history, past social history, past surgical history and problem list.    Review of Systems  A 14 point review of systems was performed and is negative except as noted above.    Objective   Physical Exam  not currently breastfeeding.    No Known Allergies    Current Medications:   Current Outpatient Medications   Medication Sig Dispense Refill    • ALPRAZolam (XANAX) 0.5 MG tablet Take 0.5 mg by mouth 3 (Three) Times a Day As Needed for Anxiety.     • ARIPiprazole (ABILIFY) 5 MG tablet TAKE 1 TABLET BY MOUTH EVERY DAY 30 tablet 5   • aspirin (aspirin) 81 MG EC tablet Take 1 tablet by mouth Daily. 30 tablet 5   • DULoxetine (CYMBALTA) 60 MG capsule Take 1 capsule by mouth Daily. 30 capsule 5   • flecainide (TAMBOCOR) 50 MG tablet Take 1 tablet by mouth 2 (Two) Times a Day. EKG in office in 48 hours of starting this medication. 60 tablet 5   • metoprolol tartrate (LOPRESSOR) 50 MG tablet Take 1 tablet by mouth 2 (Two) Times a Day. 60 tablet 5   • midodrine (PROAMATINE) 5 MG tablet Take 1 tablet by mouth 3 (Three) Times a Day Before Meals. 90 tablet 5   • mirtazapine (REMERON) 15 MG tablet Take 1 tablet by mouth Every Night. 30 tablet 5   • oxybutynin XL (DITROPAN-XL) 5 MG 24 hr tablet Take 1 tablet by mouth Daily. 90 tablet 1   • oxyCODONE-acetaminophen (Percocet) 7.5-325 MG per tablet Take 1 tablet by mouth Every 6 (Six) Hours As Needed for Moderate Pain . 60 tablet 0     No current facility-administered medications for this visit.       Lab Results:  Hospital Outpatient Visit on 10/15/2021   Component Date Value Ref Range Status   • Target HR (85%) 10/15/2021 135  bpm Final   • Max. Pred. HR (100%) 10/15/2021 159  bpm Final   Lab on 10/03/2021   Component Date Value Ref Range Status   • Glucose 10/03/2021 108* 65 - 99 mg/dL Final   • BUN 10/03/2021 8  8 - 23 mg/dL Final   • Creatinine 10/03/2021 0.72  0.57 - 1.00 mg/dL Final   • Sodium 10/03/2021 137  136 - 145 mmol/L Final   • Potassium 10/03/2021 4.4  3.5 - 5.2 mmol/L Final   • Chloride 10/03/2021 99  98 - 107 mmol/L Final   • CO2 10/03/2021 25.0  22.0 - 29.0 mmol/L Final   • Calcium 10/03/2021 9.3  8.6 - 10.5 mg/dL Final   • Total Protein 10/03/2021 7.4  6.0 - 8.5 g/dL Final   • Albumin 10/03/2021 4.60  3.50 - 5.20 g/dL Final   • ALT (SGPT) 10/03/2021 7  1 - 33 U/L Final   • AST (SGOT) 10/03/2021 15  1  - 32 U/L Final   • Alkaline Phosphatase 10/03/2021 122* 39 - 117 U/L Final   • Total Bilirubin 10/03/2021 0.3  0.0 - 1.2 mg/dL Final   • eGFR Non  Amer 10/03/2021 82  >60 mL/min/1.73 Final   • Globulin 10/03/2021 2.8  gm/dL Final   • A/G Ratio 10/03/2021 1.6  g/dL Final   • BUN/Creatinine Ratio 10/03/2021 11.1  7.0 - 25.0 Final   • Anion Gap 10/03/2021 13.0  5.0 - 15.0 mmol/L Final   • TSH 10/03/2021 1.580  0.270 - 4.200 uIU/mL Final   • WBC 10/03/2021 9.75  3.40 - 10.80 10*3/mm3 Final   • RBC 10/03/2021 4.16  3.77 - 5.28 10*6/mm3 Final   • Hemoglobin 10/03/2021 13.2  12.0 - 15.9 g/dL Final   • Hematocrit 10/03/2021 40.4  34.0 - 46.6 % Final   • MCV 10/03/2021 97.1* 79.0 - 97.0 fL Final   • MCH 10/03/2021 31.7  26.6 - 33.0 pg Final   • MCHC 10/03/2021 32.7  31.5 - 35.7 g/dL Final   • RDW 10/03/2021 12.7  12.3 - 15.4 % Final   • RDW-SD 10/03/2021 45.1  37.0 - 54.0 fl Final   • MPV 10/03/2021 9.5  6.0 - 12.0 fL Final   • Platelets 10/03/2021 229  140 - 450 10*3/mm3 Final   • Neutrophil % 10/03/2021 65.2  42.7 - 76.0 % Final   • Lymphocyte % 10/03/2021 23.8  19.6 - 45.3 % Final   • Monocyte % 10/03/2021 6.9  5.0 - 12.0 % Final   • Eosinophil % 10/03/2021 3.1  0.3 - 6.2 % Final   • Basophil % 10/03/2021 0.7  0.0 - 1.5 % Final   • Immature Grans % 10/03/2021 0.3  0.0 - 0.5 % Final   • Neutrophils, Absolute 10/03/2021 6.36  1.70 - 7.00 10*3/mm3 Final   • Lymphocytes, Absolute 10/03/2021 2.32  0.70 - 3.10 10*3/mm3 Final   • Monocytes, Absolute 10/03/2021 0.67  0.10 - 0.90 10*3/mm3 Final   • Eosinophils, Absolute 10/03/2021 0.30  0.00 - 0.40 10*3/mm3 Final   • Basophils, Absolute 10/03/2021 0.07  0.00 - 0.20 10*3/mm3 Final   • Immature Grans, Absolute 10/03/2021 0.03  0.00 - 0.05 10*3/mm3 Final   • nRBC 10/03/2021 0.0  0.0 - 0.2 /100 WBC Final   Clinical Support on 05/21/2021   Component Date Value Ref Range Status   • Color 05/21/2021 Yellow  Yellow, Straw, Dark Yellow, [place]ita Final   • Clarity, UA 05/21/2021 Clear   Clear Final   • Specific Gravity  05/21/2021 1.015  1.005 - 1.030 Final   • pH, Urine 05/21/2021 6.0  5.0 - 8.0 Final   • Leukocytes 05/21/2021 Negative  Negative Final   • Nitrite, UA 05/21/2021 Negative  Negative Final   • Protein, POC 05/21/2021 Negative  Negative mg/dL Final   • Glucose, UA 05/21/2021 Negative  Negative, 1000 mg/dL (3+) mg/dL Final   • Ketones, UA 05/21/2021 Negative  Negative Final   • Urobilinogen, UA 05/21/2021 Normal  Normal Final   • Bilirubin 05/21/2021 Negative  Negative Final   • Blood, UA 05/21/2021 Negative  Negative Final         PHQ-9:  PHQ-2/PHQ-9 Depression Screening 10/7/2021   Little interest or pleasure in doing things 2   Feeling down, depressed, or hopeless 2   Trouble falling or staying asleep, or sleeping too much 0   Feeling tired or having little energy 3   Poor appetite or overeating 1   Feeling bad about yourself - or that you are a failure or have let yourself or your family down 1   Trouble concentrating on things, such as reading the newspaper or watching television 1   Moving or speaking so slowly that other people could have noticed. Or the opposite - being so fidgety or restless that you have been moving around a lot more than usual 1   Thoughts that you would be better off dead, or of hurting yourself in some way 0   Total Score 11   If you checked off any problems, how difficult have these problems made it for you to do your work, take care of things at home, or get along with other people? Somewhat difficult      5-9: Minimal symptoms  10-14: Major depression mild  15-19: Major depression moderate  Greater then 20: Major depression severe    Appearance: na  Hygiene:  REPORTS good  Cooperation:  Cooperative  Eye Contact:  na  Psychomotor Behavior:  denies psychomotor agitation/retardation, No EPS, No motor tics  Mood:  within normal limits  Affect:  na  Hopelessness: Denies  Speech:  Normal  Thought Process:  Linear  Thought Content:  Normal  Concentration:  Normal   Suicidal: denies  Homicidal:  None  Hallucinations:  None  Delusion:  None  Memory:  Intact  Orientation:  Person, Place, Time and Situation  Reliability:  good  Insight:  Fair  Judgement: good  Impulse Control: good  Estimated Intelligence: average range    ZCEHARIAH REVIEWED NO RED FLAGS    Assessment/Plan   Diagnoses and all orders for this visit:    1. Moderate episode of recurrent major depressive disorder (HCC) (Primary)    2. Generalized anxiety disorder    3. Sleep disturbance      IMPRESSION: improved mood     PLAN: cont current med management     We discussed risks, benefits, and side effects of the above medications and the patient was agreeable with the plan. Patient was educated on the importance of compliance with treatment and follow-up appointments.     As needed Provide Cognitive Behavioral Therapy and Solution Focused Therapy to improve functioning, maintain stability, and avoid decompensation and the need for higher level of care.    Counseled patient regarding multimodal approach with encouragement of healthy nutrition, healthy sleep, regular physical mobility, social involvement, counseling, and medication compliance.     Assisted patient in identifying risk factors which would indicate the need for higher level of care including thoughts to harm self or others and/or self-harming behavior and encouraged patient to contact this office, call 911, or present to the nearest emergency room should any of these events occur. Discussed crisis intervention services and means to access.  Patient adamantly and convincingly denies current suicidal or homicidal ideation or perceptual disturbance.    Treatment Plan: stabilize mood, patient will stay out of psychiatric hospital and be at optimal level of functioning with therapy and take all medication as prescribed. Patient verbalized  understanding and agreement to plan.    Instructed to call for questions or concerns and return early if necessary.      Greater than 50% time was spent in coordination of care, and counseling the patient regarding current assessment, symptoms, plan of care going forward, supportive therapy.  Answered any questions patient had regarding medications and plan of care.    Return in about 1 month (around 12/1/2021).

## 2021-11-04 ENCOUNTER — TELEPHONE (OUTPATIENT)
Dept: FAMILY MEDICINE CLINIC | Facility: CLINIC | Age: 61
End: 2021-11-04

## 2021-11-04 DIAGNOSIS — S22.080D COMPRESSION FRACTURE OF T12 VERTEBRA WITH ROUTINE HEALING, SUBSEQUENT ENCOUNTER: ICD-10-CM

## 2021-11-04 DIAGNOSIS — G89.3 CANCER ASSOCIATED PAIN: ICD-10-CM

## 2021-11-04 RX ORDER — OXYCODONE AND ACETAMINOPHEN 7.5; 325 MG/1; MG/1
1 TABLET ORAL EVERY 6 HOURS PRN
Qty: 60 TABLET | Refills: 0 | Status: SHIPPED | OUTPATIENT
Start: 2021-11-04 | End: 2021-11-22 | Stop reason: SDUPTHER

## 2021-11-04 NOTE — TELEPHONE ENCOUNTER
Caller: Brittani Lambert    Relationship: Self    Medication requested (name and dosage):    Requested Prescriptions:   Requested Prescriptions     Pending Prescriptions Disp Refills   • oxyCODONE-acetaminophen (Percocet) 7.5-325 MG per tablet 60 tablet 0     Sig: Take 1 tablet by mouth Every 6 (Six) Hours As Needed for Moderate Pain .      Pharmacy where request should be sent:     [facility] - [place], [place]    TELEPHONE CONTACT:    [phone]    Additional details provided by patient:    PATIENT STATED SHE HAS A (2) DAY SUPPLY LEFT    Best call back number:     [phone]     Does the patient have less than a 3 day supply:  [x] Yes  [] No    Rut Tadeo, Rodger Rep   11/04/21 14:19 EDT     DR LYONS

## 2021-11-12 RX ORDER — OXYBUTYNIN CHLORIDE 5 MG/1
TABLET, EXTENDED RELEASE ORAL
Qty: 90 TABLET | Refills: 0 | Status: SHIPPED | OUTPATIENT
Start: 2021-11-12 | End: 2022-02-08

## 2021-11-18 ENCOUNTER — OFFICE VISIT (OUTPATIENT)
Dept: ONCOLOGY | Facility: CLINIC | Age: 61
End: 2021-11-18

## 2021-11-18 VITALS
SYSTOLIC BLOOD PRESSURE: 146 MMHG | WEIGHT: 112 LBS | TEMPERATURE: 97.7 F | HEIGHT: 63 IN | BODY MASS INDEX: 19.84 KG/M2 | HEART RATE: 62 BPM | RESPIRATION RATE: 18 BRPM | DIASTOLIC BLOOD PRESSURE: 67 MMHG | OXYGEN SATURATION: 98 %

## 2021-11-18 DIAGNOSIS — C50.912 CANCER OF LEFT BREAST METASTATIC TO BRAIN (HCC): ICD-10-CM

## 2021-11-18 DIAGNOSIS — C50.011 MALIGNANT NEOPLASM OF NIPPLE OF RIGHT BREAST IN FEMALE, UNSPECIFIED ESTROGEN RECEPTOR STATUS (HCC): Primary | ICD-10-CM

## 2021-11-18 DIAGNOSIS — C79.31 CANCER OF LEFT BREAST METASTATIC TO BRAIN (HCC): ICD-10-CM

## 2021-11-18 PROCEDURE — 99214 OFFICE O/P EST MOD 30 MIN: CPT | Performed by: INTERNAL MEDICINE

## 2021-11-18 NOTE — PROGRESS NOTES
"  PROBLEM LIST:  1. [id number] (Stage IIA) ER negative, IN positive, Her2 2+ by IHC, negative by FISH invasive ductal carcinoma of the left breast  A) bilateral mastectomy on 10/4/18.  Pathology showed a 2.6 cm high grade IDC with basaloid features.  0/1 SLN involved.  B) adjuvant chemotherapy with ddAC followed by taxol started on 11/14/18  C) admitted to the hospital on 4/10/2019 with left upper extremity weakness.  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  CT chest abdomen pelvis and bone scan on 4/11/2019 showed no other sites of disease.  Completed whole brain radiation on 4/24/2019.  2. Anxiety/depression  3. Hyperlipidemia  4. History of ovarian cancer 1989, s/p BRYN/BSO, no adjuvant therapy    Chief complaint: follow up for breast cancer management       Subjective     HISTORY OF PRESENT ILLNESS:   Brittani Lambert returns for follow-up.  She started to do medicine midodrine for her blood pressure and she has not had any dizzy spells or falls since then.  She also has had an increase in her appetite and has gained about 10 pounds over the past month.  She is pretty pleased with this.  She feels a little stronger as well.  No other new complaints or concerns.  Her back bothers her which is chronic.        Objective      Temp 97.7 °F (36.5 °C) (Infrared)   Resp 18   Ht 160 cm (63\")   Wt 50.8 kg (112 lb)   BMI 19.84 kg/m²      Performance Status: 0    General: well appearing female in no acute distress  Neuro: alert and oriented  HEENT: sclera anicteric, oropharynx clear  Lymphatics: no cervical, supraclavicular, or axillary adenopathy  Cardiovascular: regular rate and rhythm, no murmurs  Lungs: clear to auscultation bilaterally  Abdomen: soft, nontender, nondistended.  No palpable organomegaly  Extremeties: no lower extremity edema  Skin: no rashes, lesions, bruising, or petechiae  Psych: mood and affect appropriate            Assessment/Plan   Brittani " Petra is a 61 y.o. 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     History of pulmonary embolism.  Completed 6 months of xarelto.    Leptomeningeal disease: It has been 2-1/2 years since whole brain radiation with no evidence of disease recurrence.  She may have some long-term effects of whole brain radiation.    Metastatic breast cancer: repeat CT scans now along with MRI.  I would likely repeat scans again another 6 months after that, until it has been a full 3 years since completion of whole brain radiation    F/u 6 months.  We will contact her with scan results.      Carrie Patel MD  [facility]    11/18/2021          CC:

## 2021-11-22 ENCOUNTER — OFFICE VISIT (OUTPATIENT)
Dept: FAMILY MEDICINE CLINIC | Facility: CLINIC | Age: 61
End: 2021-11-22

## 2021-11-22 VITALS
HEART RATE: 52 BPM | SYSTOLIC BLOOD PRESSURE: 100 MMHG | DIASTOLIC BLOOD PRESSURE: 68 MMHG | WEIGHT: 110 LBS | TEMPERATURE: 97.8 F | RESPIRATION RATE: 16 BRPM | BODY MASS INDEX: 19.49 KG/M2 | HEIGHT: 63 IN | OXYGEN SATURATION: 97 %

## 2021-11-22 DIAGNOSIS — Z23 INFLUENZA VACCINE NEEDED: ICD-10-CM

## 2021-11-22 DIAGNOSIS — R82.90 ABNORMAL URINE ODOR: ICD-10-CM

## 2021-11-22 DIAGNOSIS — G89.3 CANCER ASSOCIATED PAIN: ICD-10-CM

## 2021-11-22 DIAGNOSIS — E78.2 MIXED HYPERLIPIDEMIA: ICD-10-CM

## 2021-11-22 DIAGNOSIS — Z51.81 ENCOUNTER FOR THERAPEUTIC DRUG MONITORING: ICD-10-CM

## 2021-11-22 DIAGNOSIS — N30.00 ACUTE CYSTITIS WITHOUT HEMATURIA: Primary | ICD-10-CM

## 2021-11-22 LAB
BILIRUB BLD-MCNC: NEGATIVE MG/DL
CLARITY, POC: ABNORMAL
COLOR UR: YELLOW
EXPIRATION DATE: ABNORMAL
GLUCOSE UR STRIP-MCNC: NEGATIVE MG/DL
KETONES UR QL: NEGATIVE
LEUKOCYTE EST, POC: NEGATIVE
Lab: ABNORMAL
NITRITE UR-MCNC: POSITIVE MG/ML
PH UR: 6.5 [PH] (ref 5–8)
PROT UR STRIP-MCNC: NEGATIVE MG/DL
RBC # UR STRIP: NEGATIVE /UL
SP GR UR: 1.01 (ref 1–1.03)
UROBILINOGEN UR QL: NORMAL

## 2021-11-22 PROCEDURE — 90686 IIV4 VACC NO PRSV 0.5 ML IM: CPT | Performed by: FAMILY MEDICINE

## 2021-11-22 PROCEDURE — G0008 ADMIN INFLUENZA VIRUS VAC: HCPCS | Performed by: FAMILY MEDICINE

## 2021-11-22 PROCEDURE — 99214 OFFICE O/P EST MOD 30 MIN: CPT | Performed by: FAMILY MEDICINE

## 2021-11-22 PROCEDURE — 81003 URINALYSIS AUTO W/O SCOPE: CPT | Performed by: FAMILY MEDICINE

## 2021-11-22 RX ORDER — OXYCODONE AND ACETAMINOPHEN 7.5; 325 MG/1; MG/1
1 TABLET ORAL EVERY 6 HOURS PRN
Qty: 120 TABLET | Refills: 0 | Status: SHIPPED | OUTPATIENT
Start: 2021-11-22 | End: 2022-01-04 | Stop reason: SDUPTHER

## 2021-11-22 RX ORDER — SULFAMETHOXAZOLE AND TRIMETHOPRIM 800; 160 MG/1; MG/1
1 TABLET ORAL 2 TIMES DAILY
Qty: 14 TABLET | Refills: 0 | Status: SHIPPED | OUTPATIENT
Start: 2021-11-22 | End: 2022-03-09

## 2021-11-22 NOTE — PATIENT INSTRUCTIONS
Go to the nearest ER or return to clinic if symptoms worsen, fever/chill develop    Urinary Tract Infection, Adult  A urinary tract infection (UTI) is an infection of any part of the urinary tract. The urinary tract includes:  · The kidneys.  · The ureters.  · The bladder.  · The urethra.  These organs make, store, and get rid of pee (urine) in the body.  What are the causes?  This is caused by germs (bacteria) in your genital area. These germs grow and cause swelling (inflammation) of your urinary tract.  What increases the risk?  You are more likely to develop this condition if:  · You have a small, thin tube (catheter) to drain pee.  · You cannot control when you pee or poop (incontinence).  · You are female, and:  ? You use these methods to prevent pregnancy:  § A medicine that kills sperm (spermicide).  § A device that blocks sperm (diaphragm).  ? You have low levels of a female hormone (estrogen).  ? You are pregnant.  · You have genes that add to your risk.  · You are sexually active.  · You take antibiotic medicines.  · You have trouble peeing because of:  ? A prostate that is bigger than normal, if you are male.  ? A blockage in the part of your body that drains pee from the bladder (urethra).  ? A kidney stone.  ? A nerve condition that affects your bladder (neurogenic bladder).  ? Not getting enough to drink.  ? Not peeing often enough.  · You have other conditions, such as:  ? Diabetes.  ? A weak disease-fighting system (immune system).  ? Sickle cell disease.  ? Gout.  ? Injury of the spine.  What are the signs or symptoms?  Symptoms of this condition include:  · Needing to pee right away (urgently).  · Peeing often.  · Peeing small amounts often.  · Pain or burning when peeing.  · Blood in the pee.  · Pee that smells bad or not like normal.  · Trouble peeing.  · Pee that is cloudy.  · Fluid coming from the vagina, if you are female.  · Pain in the belly or lower back.  Other symptoms  include:  · Throwing up (vomiting).  · No urge to eat.  · Feeling mixed up (confused).  · Being tired and grouchy (irritable).  · A fever.  · Watery poop (diarrhea).  How is this treated?  This condition may be treated with:  · Antibiotic medicine.  · Other medicines.  · Drinking enough water.  Follow these instructions at home:    Medicines  · Take over-the-counter and prescription medicines only as told by your doctor.  · If you were prescribed an antibiotic medicine, take it as told by your doctor. Do not stop taking it even if you start to feel better.  General instructions  · Make sure you:  ? Pee until your bladder is empty.  ? Do not hold pee for a long time.  ? Empty your bladder after sex.  ? Wipe from front to back after pooping if you are a female. Use each tissue one time when you wipe.  · Drink enough fluid to keep your pee pale yellow.  · Keep all follow-up visits as told by your doctor. This is important.  Contact a doctor if:  · You do not get better after 1-2 days.  · Your symptoms go away and then come back.  Get help right away if:  · You have very bad back pain.  · You have very bad pain in your lower belly.  · You have a fever.  · You are sick to your stomach (nauseous).  · You are throwing up.  Summary  · A urinary tract infection (UTI) is an infection of any part of the urinary tract.  · This condition is caused by germs in your genital area.  · There are many risk factors for a UTI. These include having a small, thin tube to drain pee and not being able to control when you pee or poop.  · Treatment includes antibiotic medicines for germs.  · Drink enough fluid to keep your pee pale yellow.  This information is not intended to replace advice given to you by your health care provider. Make sure you discuss any questions you have with your health care provider.  Document Revised: 12/05/2019 Document Reviewed: 06/27/2019  [employer] Patient Education © 2021 [employer]

## 2021-11-22 NOTE — PROGRESS NOTES
"Chief Complaint  6mo f/u (need UDS), Dark urine w/ odor (x 1mo ), and Flu Vaccine    Subjective          Brittani Lambert presents to [facility]  Urinary Tract Infection   This is a new problem. The current episode started 1 to 4 weeks ago. The problem occurs every urination. The patient is experiencing no pain. There has been no fever. Associated symptoms include flank pain, frequency and urgency. Treatments tried: OTC medications. The treatment provided no relief.     Chronic pain  Treated with Percocet 7.5 mg every 6 hours. She does take it 4 times daily, manages pain well overall.   Recently saw Dr. Patel. Routine screening images have been ordered to monitor Metastatic breast cancer.    She continues to follow routinely with psychiatry    Fasting labs are due    The following portions of the patient's history were reviewed and updated as appropriate: allergies, current medications, past family history, past medical history, past social history, past surgical history and problem list.    Objective   Vital Signs:   /68   Pulse 52   Temp 97.8 °F (36.6 °C)   Resp 16   Ht 160 cm (63\")   Wt 49.9 kg (110 lb)   SpO2 97%   BMI 19.49 kg/m²     Physical Exam  Vitals and nursing note reviewed.   Constitutional:       Appearance: Normal appearance. She is well-developed.   HENT:      Head: Normocephalic and atraumatic.      Right Ear: External ear normal.      Left Ear: External ear normal.      Nose: Nose normal.   Eyes:      Conjunctiva/sclera: Conjunctivae normal.   Cardiovascular:      Rate and Rhythm: Normal rate and regular rhythm.      Heart sounds: Normal heart sounds. No murmur heard.      Pulmonary:      Effort: Pulmonary effort is normal.      Breath sounds: Normal breath sounds. No wheezing.   Musculoskeletal:         General: No deformity.      Cervical back: Neck supple.   Skin:     General: Skin is warm and dry.   Neurological:      Mental Status: She is alert " and oriented to person, place, and time.   Psychiatric:         Behavior: Behavior normal.        Result Review :                 Assessment and Plan    Diagnoses and all orders for this visit:    1. Acute cystitis without hematuria (Primary)  -     sulfamethoxazole-trimethoprim (Bactrim DS) 800-160 MG per tablet; Take 1 tablet by mouth 2 (Two) Times a Day.  Dispense: 14 tablet; Refill: 0    2. Abnormal urine odor  -     Urine Culture - Urine, Urine, Clean Catch  -     CBC & Differential; Future  -     Comprehensive Metabolic Panel; Future  -     sulfamethoxazole-trimethoprim (Bactrim DS) 800-160 MG per tablet; Take 1 tablet by mouth 2 (Two) Times a Day.  Dispense: 14 tablet; Refill: 0    3. Mixed hyperlipidemia  -     Lipid Panel With / Chol / HDL Ratio; Future    4. Cancer associated pain  -     oxyCODONE-acetaminophen (Percocet) 7.5-325 MG per tablet; Take 1 tablet by mouth Every 6 (Six) Hours As Needed for Moderate Pain .  Dispense: 120 tablet; Refill: 0  -     CBC & Differential; Future  -     Comprehensive Metabolic Panel; Future    5. Encounter for therapeutic drug monitoring  -     Compliance Drug Analysis, Ur - Urine, Clean Catch  -     CBC & Differential; Future  -     Comprehensive Metabolic Panel; Future    6. Influenza vaccine needed  -     FluLaval/Fluarix/Fluzone >6 Months ([id number])  -     CBC & Differential; Future  -     Comprehensive Metabolic Panel; Future    She will return fasting to update labs  UDS completed today, ZECHARIAH reviewed.   ZECHARIAH query complete. Treatment plan to include course of prescribed controlled substance. Risks including addiction, benefits, and alternatives presented to patient.   Bactrim to treat UTI      Follow Up   Return in about 6 months (around 5/22/2022) for Medicare Wellness.  Patient was given instructions and counseling regarding her condition or for health maintenance advice. Please see specific information pulled into the AVS if appropriate.

## 2021-11-27 LAB
BACTERIA UR CULT: ABNORMAL
BACTERIA UR CULT: ABNORMAL
DRUGS UR: NORMAL
OTHER ANTIBIOTIC SUSC ISLT: ABNORMAL

## 2021-11-29 ENCOUNTER — PATIENT OUTREACH (OUTPATIENT)
Dept: CASE MANAGEMENT | Facility: OTHER | Age: 61
End: 2021-11-29

## 2021-11-29 NOTE — OUTREACH NOTE
Ambulatory Case Management Note    Patient Outreach    RN-ACM patient outreach.  Role of ACM explained to patient, patient agreeable to ACM services. Patient states that she is feeling great, increased strength and weight, eating well. No needs identified at this time. ACM to check on patient in 2 weeks per patient request.    Liliya Traore RN  Ambulatory Case Management    11/29/2021, 15:23 EST

## 2021-12-01 ENCOUNTER — OFFICE VISIT (OUTPATIENT)
Dept: PSYCHIATRY | Facility: CLINIC | Age: 61
End: 2021-12-01

## 2021-12-01 DIAGNOSIS — G47.9 SLEEP DISTURBANCE: ICD-10-CM

## 2021-12-01 DIAGNOSIS — F41.1 GENERALIZED ANXIETY DISORDER: Primary | ICD-10-CM

## 2021-12-01 DIAGNOSIS — F33.1 MODERATE EPISODE OF RECURRENT MAJOR DEPRESSIVE DISORDER (HCC): ICD-10-CM

## 2021-12-01 PROCEDURE — 99442 PR PHYS/QHP TELEPHONE EVALUATION 11-20 MIN: CPT | Performed by: NURSE PRACTITIONER

## 2021-12-01 RX ORDER — MIRTAZAPINE 15 MG/1
15 TABLET, FILM COATED ORAL NIGHTLY
Qty: 30 TABLET | Refills: 5 | Status: SHIPPED | OUTPATIENT
Start: 2021-12-01 | End: 2022-05-18 | Stop reason: SDUPTHER

## 2021-12-01 RX ORDER — ALPRAZOLAM 0.5 MG/1
0.5 TABLET ORAL 3 TIMES DAILY PRN
Qty: 90 TABLET | Refills: 2 | Status: SHIPPED | OUTPATIENT
Start: 2021-12-01 | End: 2023-02-22

## 2021-12-01 NOTE — PROGRESS NOTES
"  Subjective   Brittani Lambert is a 61 y.o. female who is here today for medication management follow up. You have chosen to receive care through a telephone visit. Do you consent to use a telephone visit for your medical care today? Yes    TIME IN:1100  TIME OUT:1120    PATIENT AT: THEIR PLACE OF RESIDENCE    PROVIDER AT:   [facility]   [facility] [place]  [address] [facility] [place], [address]  [place] [address]        Chief Complaint:  THALIA, MDD, sleep disturbance    History of Present Illness Patient presents via telephone reporting she is doing much better health wise than she was last time we spoke. She is still on antibiotic for UTI but feels good. Rates anxiety low and denies depression . Sleeping well on mirtazapine. She reports some days feels down \"but not long\".Utilized motivational interviewing techniques including complex reflections to attempt to assist the patient and focusing on the positive and to maintain and encourage calm outlook.  Acknowledged and normalized patient's thoughts, feelings, and concerns. Denies adverse effects from medications. Had a good Thanksgiving with her sister and mother and has decorated her apartment for [employer]  (Scales based on 0 - 10 with 10 being the worst)      The following portions of the patient's history were reviewed and updated as appropriate: allergies, current medications, past family history, past medical history, past social history, past surgical history and problem list.    Review of Systems  A 14 point review of systems was performed and is negative except as noted above.    Objective   Physical Exam  not currently breastfeeding.    No Known Allergies    Current Medications:   Current Outpatient Medications   Medication Sig Dispense Refill   • ALPRAZolam (XANAX) 0.5 MG tablet Take 1 tablet by mouth 3 (Three) Times a Day As Needed for Anxiety. 90 tablet 2   • ARIPiprazole (ABILIFY) 5 MG tablet TAKE 1 TABLET BY MOUTH EVERY " DAY 30 tablet 5   • aspirin (aspirin) 81 MG EC tablet Take 1 tablet by mouth Daily. 30 tablet 5   • DULoxetine (CYMBALTA) 60 MG capsule Take 1 capsule by mouth Daily. 30 capsule 5   • flecainide (TAMBOCOR) 50 MG tablet Take 1 tablet by mouth 2 (Two) Times a Day. EKG in office in 48 hours of starting this medication. 60 tablet 5   • metoprolol tartrate (LOPRESSOR) 50 MG tablet Take 1 tablet by mouth 2 (Two) Times a Day. 60 tablet 5   • midodrine (PROAMATINE) 5 MG tablet Take 1 tablet by mouth 3 (Three) Times a Day Before Meals. 90 tablet 5   • mirtazapine (REMERON) 15 MG tablet Take 1 tablet by mouth Every Night. 30 tablet 5   • oxybutynin XL (DITROPAN-XL) 5 MG 24 hr tablet TAKE 1 TABLET BY MOUTH EVERY DAY 90 tablet 0   • oxyCODONE-acetaminophen (Percocet) 7.5-325 MG per tablet Take 1 tablet by mouth Every 6 (Six) Hours As Needed for Moderate Pain . 120 tablet 0   • sulfamethoxazole-trimethoprim (Bactrim DS) 800-160 MG per tablet Take 1 tablet by mouth 2 (Two) Times a Day. 14 tablet 0     No current facility-administered medications for this visit.       Appearance: na  Hygiene:  REPORTS good  Cooperation:  Cooperative  Eye Contact: na   Psychomotor Behavior:  denies psychomotor agitation/retardation, No EPS, No motor tics  Mood:  within normal limits  Affect:  na  Hopelessness: Denies  Speech:  Normal  Thought Process:  Linear  Thought Content:  Normal  Concentration: Normal   Suicidal: denies  Homicidal:  None  Hallucinations:  None  Delusion:  None  Memory:  Intact  Orientation:  Person, Place, Time and Situation  Reliability:  good  Insight:  Fair  Judgement: good  Impulse Control: good  Estimated Intelligence: average range    ZECHARIAH REVIEWED NO RED FLAGS    Assessment/Plan   Diagnoses and all orders for this visit:    1. Generalized anxiety disorder (Primary)  -     ALPRAZolam (XANAX) 0.5 MG tablet; Take 1 tablet by mouth 3 (Three) Times a Day As Needed for Anxiety.  Dispense: 90 tablet; Refill: 2    2.  Moderate episode of recurrent major depressive disorder (HCC)    3. Sleep disturbance    Other orders  -     mirtazapine (REMERON) 15 MG tablet; Take 1 tablet by mouth Every Night.  Dispense: 30 tablet; Refill: 5          IMPRESSION: euthymic, sleeping well     PLAN: cont duloxetine 60 mg 1 p.o. daily  Continue Abilify 5 mg 1 p.o. daily  Refill mirtazapine 15 mg 1 p.o. nightly as needed for sleeplessness  Refill alprazolam 0.5 mg as needed for high anxiety    We discussed risks, benefits, and side effects of the above medications and the patient was agreeable with the plan. Patient was educated on the importance of compliance with treatment and follow-up appointments.     Counseled patient regarding multimodal approach with encouragement of healthy nutrition, healthy sleep, regular physical mobility, social involvement, counseling, and medication compliance.     Assisted patient in identifying risk factors which would indicate the need for higher level of care including thoughts to harm self or others and/or self-harming behavior and encouraged patient to contact this office, call 911, or present to the nearest emergency room should any of these events occur. Discussed crisis intervention services and means to access.  Patient adamantly and convincingly denies current suicidal or homicidal ideation or perceptual disturbance.    Treatment Plan: stabilize mood, patient will stay out of psychiatric hospital and be at optimal level of functioning with therapy and take all medication as prescribed. Patient verbalized  understanding and agreement to plan.    Instructed to call for questions or concerns and return early if necessary.     Greater than 50% time was spent in coordination of care, and counseling the patient regarding current assessment, symptoms, plan of care going forward, supportive therapy.  Answered any questions patient had regarding medications and plan of care.    No follow-ups on file.

## 2021-12-16 DIAGNOSIS — F41.1 GENERALIZED ANXIETY DISORDER: ICD-10-CM

## 2021-12-16 RX ORDER — ALPRAZOLAM 0.5 MG/1
0.5 TABLET ORAL 3 TIMES DAILY PRN
Qty: 90 TABLET | Refills: 2 | Status: CANCELLED | OUTPATIENT
Start: 2021-12-16

## 2021-12-17 ENCOUNTER — HOSPITAL ENCOUNTER (OUTPATIENT)
Dept: CT IMAGING | Facility: HOSPITAL | Age: 61
Discharge: HOME | End: 2021-12-17

## 2021-12-17 ENCOUNTER — HOSPITAL ENCOUNTER (OUTPATIENT)
Dept: MRI IMAGING | Facility: HOSPITAL | Age: 61
Discharge: HOME | End: 2021-12-17

## 2021-12-17 DIAGNOSIS — C50.011 MALIGNANT NEOPLASM OF NIPPLE OF RIGHT BREAST IN FEMALE, UNSPECIFIED ESTROGEN RECEPTOR STATUS (HCC): ICD-10-CM

## 2021-12-17 LAB — CREAT BLDA-MCNC: 0.7 MG/DL (ref 0.6–1.3)

## 2021-12-17 PROCEDURE — A9577 INJ MULTIHANCE: HCPCS | Performed by: INTERNAL MEDICINE

## 2021-12-17 PROCEDURE — 25010000002 IOPAMIDOL 61 % SOLUTION: Performed by: INTERNAL MEDICINE

## 2021-12-17 PROCEDURE — 0 GADOBENATE DIMEGLUMINE 529 MG/ML SOLUTION: Performed by: INTERNAL MEDICINE

## 2021-12-17 PROCEDURE — 70553 MRI BRAIN STEM W/O & W/DYE: CPT

## 2021-12-17 PROCEDURE — 74177 CT ABD & PELVIS W/CONTRAST: CPT

## 2021-12-17 PROCEDURE — 82565 ASSAY OF CREATININE: CPT

## 2021-12-17 PROCEDURE — 71260 CT THORAX DX C+: CPT

## 2021-12-17 RX ADMIN — IOPAMIDOL 85 ML: 612 INJECTION, SOLUTION INTRAVENOUS at 13:57

## 2021-12-17 RX ADMIN — GADOBENATE DIMEGLUMINE 10 ML: 529 INJECTION, SOLUTION INTRAVENOUS at 15:19

## 2021-12-17 NOTE — TELEPHONE ENCOUNTER
PER PROVIDER, PATIENT INFORMED THAT REFILLS ARE AT THE PHARMACY. PT VERBALIZED UNDERSTANDING AND WILL CONTACT PHARM.

## 2021-12-20 ENCOUNTER — TELEPHONE (OUTPATIENT)
Dept: ONCOLOGY | Facility: CLINIC | Age: 61
End: 2021-12-20

## 2021-12-20 DIAGNOSIS — C79.31 CANCER OF LEFT BREAST METASTATIC TO BRAIN (HCC): ICD-10-CM

## 2021-12-20 DIAGNOSIS — C50.011 MALIGNANT NEOPLASM OF NIPPLE OF RIGHT BREAST IN FEMALE, UNSPECIFIED ESTROGEN RECEPTOR STATUS (HCC): Primary | ICD-10-CM

## 2021-12-20 DIAGNOSIS — C50.912 CANCER OF LEFT BREAST METASTATIC TO BRAIN (HCC): ICD-10-CM

## 2021-12-20 NOTE — TELEPHONE ENCOUNTER
Caller: Brittani Lambert    Relationship: Self    Best call back number: [phone]    What is the best time to reach you: ANYTIME     Who are you requesting to speak with (clinical staff, provider,  specific staff member): NURSE    What was the call regarding: PATIENT STATES THAT SHE JUST MISSED A CALL FROM OUR OFFICE.  SHE THINKS IT MAY HAVE BEEN REGARDING HER TEST RESULTS.  UNABLE TO W/T    Do you require a callback: YES

## 2021-12-20 NOTE — TELEPHONE ENCOUNTER
----- Message from Carrie Patel MD sent at 12/18/2021  1:58 PM EST -----  Please let her know Mri brain and ct scan all stable - no evidence disease recurrence.    Please schedule her for repeat ct c/a/p with IV contrast and MRI brain w/wo contrast prior to return in 6 months.    Thx.      ----- Message -----  From: Interface, Rad Results [place] In  Sent: 12/17/2021   5:16 PM EST  To: Carrie Patel MD

## 2022-01-04 ENCOUNTER — OFFICE VISIT (OUTPATIENT)
Dept: PSYCHIATRY | Facility: CLINIC | Age: 62
End: 2022-01-04

## 2022-01-04 DIAGNOSIS — F41.1 GENERALIZED ANXIETY DISORDER: ICD-10-CM

## 2022-01-04 DIAGNOSIS — G89.3 CANCER ASSOCIATED PAIN: ICD-10-CM

## 2022-01-04 DIAGNOSIS — G47.9 SLEEP DISTURBANCE: ICD-10-CM

## 2022-01-04 DIAGNOSIS — F33.1 MODERATE EPISODE OF RECURRENT MAJOR DEPRESSIVE DISORDER: Primary | ICD-10-CM

## 2022-01-04 PROCEDURE — 99442 PR PHYS/QHP TELEPHONE EVALUATION 11-20 MIN: CPT | Performed by: NURSE PRACTITIONER

## 2022-01-04 RX ORDER — DULOXETIN HYDROCHLORIDE 60 MG/1
60 CAPSULE, DELAYED RELEASE ORAL DAILY
Qty: 30 CAPSULE | Refills: 5 | Status: SHIPPED | OUTPATIENT
Start: 2022-01-04 | End: 2022-05-18 | Stop reason: SDUPTHER

## 2022-01-04 RX ORDER — ARIPIPRAZOLE 5 MG/1
5 TABLET ORAL DAILY
Qty: 30 TABLET | Refills: 5 | Status: SHIPPED | OUTPATIENT
Start: 2022-01-04 | End: 2022-05-18 | Stop reason: SDUPTHER

## 2022-01-04 RX ORDER — OXYCODONE AND ACETAMINOPHEN 7.5; 325 MG/1; MG/1
1 TABLET ORAL EVERY 6 HOURS PRN
Qty: 120 TABLET | Refills: 0 | Status: SHIPPED | OUTPATIENT
Start: 2022-01-04 | End: 2022-02-10 | Stop reason: SDUPTHER

## 2022-01-04 NOTE — TELEPHONE ENCOUNTER
Caller: Kiersten Lamberta    Relationship: Self    Best call back number:[phone]    Requested Prescriptions:   Requested Prescriptions     Pending Prescriptions Disp Refills   • oxyCODONE-acetaminophen (Percocet) 7.5-325 MG per tablet 120 tablet 0     Sig: Take 1 tablet by mouth Every 6 (Six) Hours As Needed for Moderate Pain .        Pharmacy where request should be sent: [facility]/PHARMACY #[id number] - [place], [place] - [address] [place] [address] AT [place] [address] - [phone] [facility] [phone]      Additional details provided by patient: COMPLETELY OUT    Does the patient have less than a 3 day supply:  [x] Yes  [] No    Rodger Yancey Rep   01/04/22 09:38 EST

## 2022-01-04 NOTE — PROGRESS NOTES
Subjective   Brittani Lambert is a 61 y.o. female who is here today for medication management follow up. You have chosen to receive care through a telephone visit. Do you consent to use a telephone visit for your medical care today? Yes    TIME IN:1100  TIME OUT:1120    PATIENT AT: THEIR PLACE OF RESIDENCE    PROVIDER AT:   [facility]   CANCER [facility]/BEHAVIORAL HEALTH  [address], [address]  [place] [id number]        Chief Complaint: MDD, THALIA, sleep disturbance     History of Present Illness Patient presents via telephone reports she is doing well and sleeping well. Denies depression, denies high anxiety rates depression 3 and same with anxiety. Denies falls, had only one in past 3 months. Denies new concerns. Stays engaged with family and had good holiday .  Denies adverse effects from medications.   (Scales based on 0 - 10 with 10 being the worst)        The following portions of the patient's history were reviewed and updated as appropriate: allergies, current medications, past family history, past medical history, past social history, past surgical history and problem list.    Review of Systems  A 14 point review of systems was performed and is negative except as noted above.    Objective   Physical Exam  not currently breastfeeding.    No Known Allergies    Current Medications:   Current Outpatient Medications   Medication Sig Dispense Refill   • ALPRAZolam (XANAX) 0.5 MG tablet Take 1 tablet by mouth 3 (Three) Times a Day As Needed for Anxiety. 90 tablet 2   • ARIPiprazole (ABILIFY) 5 MG tablet Take 1 tablet by mouth Daily. 30 tablet 5   • aspirin (aspirin) 81 MG EC tablet Take 1 tablet by mouth Daily. 30 tablet 5   • DULoxetine (CYMBALTA) 60 MG capsule Take 1 capsule by mouth Daily. 30 capsule 5   • flecainide (TAMBOCOR) 50 MG tablet Take 1 tablet by mouth 2 (Two) Times a Day. EKG in office in 48 hours of starting this medication. 60 tablet 5   • metoprolol tartrate  (LOPRESSOR) 50 MG tablet Take 1 tablet by mouth 2 (Two) Times a Day. 60 tablet 5   • midodrine (PROAMATINE) 5 MG tablet Take 1 tablet by mouth 3 (Three) Times a Day Before Meals. 90 tablet 5   • mirtazapine (REMERON) 15 MG tablet Take 1 tablet by mouth Every Night. 30 tablet 5   • oxybutynin XL (DITROPAN-XL) 5 MG 24 hr tablet TAKE 1 TABLET BY MOUTH EVERY DAY 90 tablet 0   • oxyCODONE-acetaminophen (Percocet) 7.5-325 MG per tablet Take 1 tablet by mouth Every 6 (Six) Hours As Needed for Moderate Pain . 120 tablet 0   • sulfamethoxazole-trimethoprim (Bactrim DS) 800-160 MG per tablet Take 1 tablet by mouth 2 (Two) Times a Day. 14 tablet 0     No current facility-administered medications for this visit.       Appearance: na  Hygiene:  REPORTS good  Cooperation:  Cooperative  Eye Contact:  na  Psychomotor Behavior:  denies psychomotor agitation/retardation, No EPS, No motor tics  Mood:  within normal limits  Affect:  na  Hopelessness: Denies  Speech:  Normal  Thought Process:  Linear  Thought Content:  Normal  Concentration: Normal   Suicidal: denies  Homicidal:  None  Hallucinations:  None  Delusion:  None  Memory:  Intact  Orientation:  Person, Place, Time and Situation  Reliability:  good  Insight:  Fair  Judgement: good  Impulse Control: good  Estimated Intelligence: average range    ZECHARIAH REVIEWED NO RED FLAGS    Assessment/Plan   Diagnoses and all orders for this visit:    1. Moderate episode of recurrent major depressive disorder (HCC) (Primary)    2. Generalized anxiety disorder    3. Sleep disturbance    Other orders  -     ARIPiprazole (ABILIFY) 5 MG tablet; Take 1 tablet by mouth Daily.  Dispense: 30 tablet; Refill: 5  -     DULoxetine (CYMBALTA) 60 MG capsule; Take 1 capsule by mouth Daily.  Dispense: 30 capsule; Refill: 5          IMPRESSION: stable mood and sleep on current med management    PLAN:  Refill Abilify 5mg po one QD for adjuvant MDD treatment  Refill duloxetine 60 mg po one QD for depression  and anxiety   Cont mirtazapine 15mg po one QHS for sleeplessness  Cont alprazolam 0.5 mg as needed for high anxiety     We discussed risks, benefits, and side effects of the above medications and the patient was agreeable with the plan. Patient was educated on the importance of compliance with treatment and follow-up appointments.     Provide as needed Cognitive Behavioral Therapy and Solution Focused Therapy to improve functioning, maintain stability, and avoid decompensation and the need for higher level of care.    Counseled patient regarding multimodal approach with encouragement of healthy nutrition, healthy sleep, regular physical mobility, social involvement, counseling, and medication compliance.     Assisted patient in identifying risk factors which would indicate the need for higher level of care including thoughts to harm self or others and/or self-harming behavior and encouraged patient to contact this office, call 911, or present to the nearest emergency room should any of these events occur. Discussed crisis intervention services and means to access.  Patient adamantly and convincingly denies current suicidal or homicidal ideation or perceptual disturbance.    Treatment Plan: stabilize mood, patient will stay out of psychiatric hospital and be at optimal level of functioning with therapy and take all medication as prescribed. Patient verbalized  understanding and agreement to plan.    Instructed to call for questions or concerns and return early if necessary.     Greater than 50% time was spent in coordination of care, and counseling the patient regarding current assessment, symptoms, plan of care going forward, supportive therapy.  Answered any questions patient had regarding medications and plan of care.    Return in about 4 weeks (around 2/1/2022).

## 2022-02-01 ENCOUNTER — OFFICE VISIT (OUTPATIENT)
Dept: PSYCHIATRY | Facility: CLINIC | Age: 62
End: 2022-02-01

## 2022-02-01 DIAGNOSIS — G47.9 SLEEP DISTURBANCE: ICD-10-CM

## 2022-02-01 DIAGNOSIS — F33.1 MODERATE EPISODE OF RECURRENT MAJOR DEPRESSIVE DISORDER: Primary | ICD-10-CM

## 2022-02-01 DIAGNOSIS — F41.1 GENERALIZED ANXIETY DISORDER: ICD-10-CM

## 2022-02-01 PROCEDURE — 99442 PR PHYS/QHP TELEPHONE EVALUATION 11-20 MIN: CPT | Performed by: NURSE PRACTITIONER

## 2022-02-01 NOTE — PROGRESS NOTES
"  Subjective   Brittani Lambert is a 62 y.o. female who is here today for medication management follow up. You have chosen to receive care through a telephone visit. Do you consent to use a telephone visit for your medical care today? Yes    TIME IN:1056  TIME OUT: 1120    PATIENT AT: THEIR PLACE OF RESIDENCE    PROVIDER AT:   [facility]   [facility]/BEHAVIORAL HEALTH  [address] , [address]  [place] [address]        Chief Complaint: THALIA, MDD, sleep disturbance     History of Present Illness Patient presents by herself via telephone reporting that her family has covid so she is on her own in her apartment and with [place] being so cold it's been more emotional struggle. She still has motivation and interest. Her CT scans and MRI brain were stable and thankful for that. She is planning to get new patio furniture when weather is warmer end of March, April. She can't get a dog because of apartment rules and manager is \"such a prick I don't want to hassle with him\". Talks with her sister. Her mom has covid so that's been a concern. No other issues. Sleeping and eating well. Compliant with med management she reports . \"some days I go ninety miles and hour and get so much done and other days gela to get dressed\".  Denies adverse effects from medications.     The following portions of the patient's history were reviewed and updated as appropriate: allergies, current medications, past family history, past medical history, past social history, past surgical history and problem list.    Review of Systems  A 14 point review of systems was performed and is negative except as noted above.    Objective   Physical Exam  not currently breastfeeding.    No Known Allergies    Current Medications:   Current Outpatient Medications   Medication Sig Dispense Refill   • ALPRAZolam (XANAX) 0.5 MG tablet Take 1 tablet by mouth 3 (Three) Times a Day As Needed for Anxiety. 90 tablet 2   • ARIPiprazole (ABILIFY) " 5 MG tablet Take 1 tablet by mouth Daily. 30 tablet 5   • aspirin (aspirin) 81 MG EC tablet Take 1 tablet by mouth Daily. 30 tablet 5   • DULoxetine (CYMBALTA) 60 MG capsule Take 1 capsule by mouth Daily. 30 capsule 5   • flecainide (TAMBOCOR) 50 MG tablet Take 1 tablet by mouth 2 (Two) Times a Day. EKG in office in 48 hours of starting this medication. 60 tablet 5   • metoprolol tartrate (LOPRESSOR) 50 MG tablet Take 1 tablet by mouth 2 (Two) Times a Day. 60 tablet 5   • midodrine (PROAMATINE) 5 MG tablet Take 1 tablet by mouth 3 (Three) Times a Day Before Meals. 90 tablet 5   • mirtazapine (REMERON) 15 MG tablet Take 1 tablet by mouth Every Night. 30 tablet 5   • oxybutynin XL (DITROPAN-XL) 5 MG 24 hr tablet TAKE 1 TABLET BY MOUTH EVERY DAY 90 tablet 0   • oxyCODONE-acetaminophen (Percocet) 7.5-325 MG per tablet Take 1 tablet by mouth Every 6 (Six) Hours As Needed for Moderate Pain . 120 tablet 0   • sulfamethoxazole-trimethoprim (Bactrim DS) 800-160 MG per tablet Take 1 tablet by mouth 2 (Two) Times a Day. 14 tablet 0     No current facility-administered medications for this visit.       Lab Results: most recent reviewed      THALIA-7:       0-4: Minimal anxiety  5-9: Mild anxiety  10-14: Moderate anxiety  15-21: Severe anxiety  PHQ-9:  PHQ-2/PHQ-9 Depression Screening 10/7/2021   Little interest or pleasure in doing things 2   Feeling down, depressed, or hopeless 2   Trouble falling or staying asleep, or sleeping too much 0   Feeling tired or having little energy 3   Poor appetite or overeating 1   Feeling bad about yourself - or that you are a failure or have let yourself or your family down 1   Trouble concentrating on things, such as reading the newspaper or watching television 1   Moving or speaking so slowly that other people could have noticed. Or the opposite - being so fidgety or restless that you have been moving around a lot more than usual 1   Thoughts that you would be better off dead, or of hurting  yourself in some way 0   Total Score 11   If you checked off any problems, how difficult have these problems made it for you to do your work, take care of things at home, or get along with other people? Somewhat difficult      5-9: Minimal symptoms  10-14: Major depression mild  15-19: Major depression moderate  Greater then 20: Major depression severe    Appearance: na  Hygiene:  REPORTS good  Cooperation:  Cooperative  Eye Contact:  na  Psychomotor Behavior:  denies psychomotor agitation/retardation, No EPS, No motor tics  Mood:  within normal limits  Affect:  na  Hopelessness: Denies  Speech:  Normal  Thought Process:  Linear  Thought Content:  Normal  Concentration: Normal   Suicidal: denies  Homicidal:  None  Hallucinations:  None  Delusion:  None  Memory:  Intact  Orientation:  Person, Place, Time and Situation  Reliability:  good  Insight:  Fair  Judgement: good  Impulse Control: good  Estimated Intelligence: average range    ZECHARIAH REVIEWED NO RED FLAGS    Assessment/Plan   Diagnoses and all orders for this visit:    1. Moderate episode of recurrent major depressive disorder (HCC) (Primary)    2. Generalized anxiety disorder    3. Sleep disturbance      IMPRESSION: season difficult for her emotionally, not being able to get out, however on whole doing well    PLAN:   Continue current medication management she does not need any refills  Alprazolam 0.5 mg p.o. as needed for severe anxiety  Abilify 5 mg 1 p.o. daily for depression  Duloxetine 60 mg 1 p.o. daily for depression and anxiety  Mirtazapine 15 mg 1 p.o. nightly for sleep and appetite    We discussed risks, benefits, and side effects of the above medications and the patient was agreeable with the plan. Patient was educated on the importance of compliance with treatment and follow-up appointments.     As needed provide Cognitive Behavioral Therapy and Solution Focused Therapy to improve functioning, maintain stability, and avoid decompensation and the  need for higher level of care.    Counseled patient regarding multimodal approach with encouragement of healthy nutrition, healthy sleep, regular physical mobility, social involvement, counseling, and medication compliance.     Assisted patient in identifying risk factors which would indicate the need for higher level of care including thoughts to harm self or others and/or self-harming behavior and encouraged patient to contact this office, call 911, or present to the nearest emergency room should any of these events occur. Discussed crisis intervention services and means to access.  Patient adamantly and convincingly denies current suicidal or homicidal ideation or perceptual disturbance.    Treatment Plan: stabilize mood, patient will stay out of psychiatric hospital and be at optimal level of functioning with therapy and take all medication as prescribed. Patient verbalized  understanding and agreement to plan.    Instructed to call for questions or concerns and return early if necessary.     Greater than 50% time was spent in coordination of care, and counseling the patient regarding current assessment, symptoms, plan of care going forward, supportive therapy.  Answered any questions patient had regarding medications and plan of care.    No follow-ups on file.

## 2022-02-08 RX ORDER — OXYBUTYNIN CHLORIDE 5 MG/1
TABLET, EXTENDED RELEASE ORAL
Qty: 90 TABLET | Refills: 0 | Status: SHIPPED | OUTPATIENT
Start: 2022-02-08 | End: 2022-05-09

## 2022-02-09 ENCOUNTER — PATIENT OUTREACH (OUTPATIENT)
Dept: CASE MANAGEMENT | Facility: OTHER | Age: 62
End: 2022-02-09

## 2022-02-09 NOTE — OUTREACH NOTE
Ambulatory Case Management Note    Care Evaluation    RN-ACM patient outreach.  Patient states she is doing well.  Reports increase in appetite with weight up to 117#.  Denies episodes of dizziness or headaches.  Patient has noticed a decline in memory. Discussed making lists and notes, setting timers, and using calendars.  Patient agreeable.  Will continue to follow  To anticipate care needs.    Liliya Traore RN  Ambulatory Case Management    2/9/2022, 12:01 EST

## 2022-02-10 DIAGNOSIS — G89.3 CANCER ASSOCIATED PAIN: ICD-10-CM

## 2022-02-10 RX ORDER — OXYCODONE AND ACETAMINOPHEN 7.5; 325 MG/1; MG/1
1 TABLET ORAL EVERY 6 HOURS PRN
Qty: 120 TABLET | Refills: 0 | Status: SHIPPED | OUTPATIENT
Start: 2022-02-10 | End: 2022-03-18 | Stop reason: SDUPTHER

## 2022-02-10 NOTE — TELEPHONE ENCOUNTER
Caller: Genarojocelyn Brittani    Relationship: Self    Best call back number: [phone]    Requested Prescriptions:   Requested Prescriptions     Pending Prescriptions Disp Refills   • oxyCODONE-acetaminophen (Percocet) 7.5-325 MG per tablet 120 tablet 0     Sig: Take 1 tablet by mouth Every 6 (Six) Hours As Needed for Moderate Pain .        Pharmacy where request should be sent: [facility]/PHARMACY #[id number] - [place], [place] - [address] [place] [address] AT [place] [address] - [phone] [facility] [phone]      Additional details provided by patient:     Does the patient have less than a 3 day supply:  [x] Yes  [] No    Rodger Torres Rep   02/10/22 09:21 EST

## 2022-03-09 ENCOUNTER — OFFICE VISIT (OUTPATIENT)
Dept: FAMILY MEDICINE CLINIC | Facility: CLINIC | Age: 62
End: 2022-03-09

## 2022-03-09 ENCOUNTER — HOSPITAL ENCOUNTER (OUTPATIENT)
Dept: GENERAL RADIOLOGY | Facility: HOSPITAL | Age: 62
Discharge: HOME | End: 2022-03-09
Admitting: FAMILY MEDICINE

## 2022-03-09 VITALS
WEIGHT: 116.6 LBS | HEART RATE: 55 BPM | BODY MASS INDEX: 20.66 KG/M2 | DIASTOLIC BLOOD PRESSURE: 54 MMHG | SYSTOLIC BLOOD PRESSURE: 92 MMHG | OXYGEN SATURATION: 98 % | RESPIRATION RATE: 18 BRPM | TEMPERATURE: 97.1 F | HEIGHT: 63 IN

## 2022-03-09 DIAGNOSIS — R60.0 BILATERAL LOWER EXTREMITY EDEMA: ICD-10-CM

## 2022-03-09 DIAGNOSIS — R06.01 ORTHOPNEA: Primary | ICD-10-CM

## 2022-03-09 PROCEDURE — 71046 X-RAY EXAM CHEST 2 VIEWS: CPT

## 2022-03-09 PROCEDURE — 99214 OFFICE O/P EST MOD 30 MIN: CPT | Performed by: FAMILY MEDICINE

## 2022-03-09 NOTE — PROGRESS NOTES
"Chief Complaint  Joint Swelling (Both ankles are swollen, started 3-4 days ago, burning sensation)    Amy Lambert presents to [facility]  History of Present Illness  B/l feet and ankle edema x 4 days  Edema has progressed up to calves  Denies new medications or new dietary intake  She has been elevating her legs without improvement  Denies cough, but orthopnea is present     The following portions of the patient's history were reviewed and updated as appropriate: allergies, current medications, past family history, past medical history, past social history, past surgical history and problem list.    Objective   Vital Signs:   BP 92/54   Pulse 55   Temp 97.1 °F (36.2 °C)   Resp 18   Ht 160 cm (62.99\")   Wt 52.9 kg (116 lb 9.6 oz)   SpO2 98%   BMI 20.66 kg/m²     Physical Exam  Vitals and nursing note reviewed.   Constitutional:       Appearance: She is well-developed.   HENT:      Head: Normocephalic and atraumatic.      Nose: Nose normal.   Eyes:      Conjunctiva/sclera: Conjunctivae normal.   Cardiovascular:      Rate and Rhythm: Normal rate and regular rhythm.      Heart sounds: Normal heart sounds. No murmur heard.  Pulmonary:      Effort: Pulmonary effort is normal. No respiratory distress.      Breath sounds: Normal breath sounds.   Musculoskeletal:         General: Swelling (b/l ankle 1+ pitting) present. No deformity.   Skin:     General: Skin is warm and dry.   Neurological:      General: No focal deficit present.      Mental Status: She is alert and oriented to person, place, and time.   Psychiatric:         Behavior: Behavior normal.        Result Review :                 Assessment and Plan    Diagnoses and all orders for this visit:    1. Orthopnea (Primary)  -     Comprehensive Metabolic Panel  -     BNP ([facility] Only)  -     XR Chest PA & Lateral  -     Adult Transthoracic Echo Complete W/ Cont if Necessary Per Protocol; Future    2. " Bilateral lower extremity edema  -     Comprehensive Metabolic Panel  -     BNP ([facility] Only)  -     XR Chest PA & Lateral  -     Adult Transthoracic Echo Complete W/ Cont if Necessary Per Protocol; Future    Encourage low-sodium diet, elevate lower extremities while sitting/laying down  Wear compression stockings to improve edema  Will check chest x-ray today to rule out CHF, along with labs. Update echocardiogram  Due to hypotension, will avoid adding diuretic at this time.  May need to consult cardiology regarding treatment options.    Follow Up   No follow-ups on file.  Patient was given instructions and counseling regarding her condition or for health maintenance advice. Please see specific information pulled into the AVS if appropriate.

## 2022-03-10 LAB
ALBUMIN SERPL-MCNC: 4.4 G/DL (ref 3.8–4.8)
ALBUMIN/GLOB SERPL: 1.8 {RATIO} (ref 1.2–2.2)
ALP SERPL-CCNC: 127 IU/L (ref 44–121)
ALT SERPL-CCNC: 7 IU/L (ref 0–32)
AST SERPL-CCNC: 16 IU/L (ref 0–40)
BILIRUB SERPL-MCNC: <0.2 MG/DL (ref 0–1.2)
BNP SERPL-MCNC: 64.7 PG/ML (ref 0–100)
BUN SERPL-MCNC: 9 MG/DL (ref 8–27)
BUN/CREAT SERPL: 14 (ref 12–28)
CALCIUM SERPL-MCNC: 9.4 MG/DL (ref 8.7–10.3)
CHLORIDE SERPL-SCNC: 99 MMOL/L (ref 96–106)
CO2 SERPL-SCNC: 24 MMOL/L (ref 20–29)
CREAT SERPL-MCNC: 0.66 MG/DL (ref 0.57–1)
EGFR GENE MUT ANL BLD/T: 99 ML/MIN/1.73
GLOBULIN SER CALC-MCNC: 2.4 G/DL (ref 1.5–4.5)
GLUCOSE SERPL-MCNC: 90 MG/DL (ref 65–99)
POTASSIUM SERPL-SCNC: 4.7 MMOL/L (ref 3.5–5.2)
PROT SERPL-MCNC: 6.8 G/DL (ref 6–8.5)
SODIUM SERPL-SCNC: 140 MMOL/L (ref 134–144)

## 2022-03-17 ENCOUNTER — TELEPHONE (OUTPATIENT)
Dept: FAMILY MEDICINE CLINIC | Facility: CLINIC | Age: 62
End: 2022-03-17

## 2022-03-17 DIAGNOSIS — G89.3 CANCER ASSOCIATED PAIN: ICD-10-CM

## 2022-03-17 NOTE — TELEPHONE ENCOUNTER
Pt was informed Dr. Colon is out until next Wed.    She is requesting something for the bilateral leg edema for the past week and a refill on her pain medication too.

## 2022-03-17 NOTE — TELEPHONE ENCOUNTER
Caller: Brittani Lambert    Relationship: Self    Best call back number: [phone]    What medication are you requesting: PAIN MEDICATION    What are your current symptoms: LEG SWELLING    How long have you been experiencing symptoms: A WEEK    Have you had these symptoms before:    [x] Yes  [] No    Have you been treated for these symptoms before:   [x] Yes  [] No    If a prescription is needed, what is your preferred pharmacy and phone number: [id number] - [place], [place] - [address] [place] [address] - [phone] [facility] [phone]      Additional notes:

## 2022-03-18 RX ORDER — OXYCODONE AND ACETAMINOPHEN 7.5; 325 MG/1; MG/1
1 TABLET ORAL EVERY 6 HOURS PRN
Qty: 28 TABLET | Refills: 0 | Status: SHIPPED | OUTPATIENT
Start: 2022-03-18 | End: 2022-04-01 | Stop reason: SDUPTHER

## 2022-03-18 NOTE — TELEPHONE ENCOUNTER
Informed pt and she did not get the U/S done because she did not have a ride. I gave her central scheduling's number to contact them. Pt voiced understanding.

## 2022-03-18 NOTE — TELEPHONE ENCOUNTER
Please call patient.  I sent in a 1 week supply of her pain medication until Dr. Colon is back.    In addition, I reviewed her last note and she had mentioned to avoid any water pills given her low blood pressure and in addition, she had set up an ultrasound of her heart to be done which was scheduled on March 13, 2022 but looks like she did not complete that.  It is important that she completes the ultrasound of her heart to see if she has heart failure or not.    If she is having increasing shortness of breath and increasing swelling, or develops chest pain, she needs to go to the ER for evaluation.

## 2022-03-20 NOTE — TELEPHONE ENCOUNTER
Recommend compression stocking and elevating her legs to help with edema. Complete ordered echocardiogram.  Advise her to contact her cardiologist about edema and orthopnea, too.   CXR 3/9/22 didn't show changes to suggest acute CHF.   During her visit on 3/9/22, BP was 92/54, which is why I'm avoiding adding a diuretic which could lower blood pressure even more.   If symptoms worsen, go to ER.

## 2022-04-01 ENCOUNTER — TELEPHONE (OUTPATIENT)
Dept: ONCOLOGY | Facility: CLINIC | Age: 62
End: 2022-04-01

## 2022-04-01 ENCOUNTER — PATIENT OUTREACH (OUTPATIENT)
Dept: CASE MANAGEMENT | Facility: OTHER | Age: 62
End: 2022-04-01

## 2022-04-01 ENCOUNTER — TELEPHONE (OUTPATIENT)
Dept: FAMILY MEDICINE CLINIC | Facility: CLINIC | Age: 62
End: 2022-04-01

## 2022-04-01 DIAGNOSIS — G89.3 CANCER ASSOCIATED PAIN: ICD-10-CM

## 2022-04-01 RX ORDER — OXYCODONE AND ACETAMINOPHEN 7.5; 325 MG/1; MG/1
1 TABLET ORAL EVERY 6 HOURS PRN
Qty: 120 TABLET | Refills: 0 | Status: SHIPPED | OUTPATIENT
Start: 2022-04-01 | End: 2022-05-16 | Stop reason: SDUPTHER

## 2022-04-01 NOTE — TELEPHONE ENCOUNTER
I wrote Liliya back and told her that we could schedule patient to be seen by Oneida ALVES on Friday April 8 at 2pm.

## 2022-04-01 NOTE — OUTREACH NOTE
AMBULATORY CASE MANAGEMENT NOTE    Name and Relationship of Patient/Support Person: Brittani Lambert - Self    Patient Outreach    Patient requesting appointment with oncology provider, has appointment but would like to be seen sooner. Patient seen by PCP on 3/9/2022 for bilateral lower extremity edema and orthopnea.  Patient denies other symptoms, very concerned this is disease progression.  Message sent to clinical pool requesting appointment.  2:10 pm  Patient notified of new appointment date and time.    BRAEDEN LOGAN  Ambulatory Case Management    4/1/2022, 12:52 EDT

## 2022-04-01 NOTE — TELEPHONE ENCOUNTER
Caller: Kiersten Lamberta    Relationship: Self    Best call back number: [phone]    Requested Prescriptions:   Requested Prescriptions     Pending Prescriptions Disp Refills   • oxyCODONE-acetaminophen (Percocet) 7.5-325 MG per tablet 28 tablet 0     Sig: Take 1 tablet by mouth Every 6 (Six) Hours As Needed for Moderate Pain .        Pharmacy where request should be sent: [id number] - [address] AT [place] [address] - [phone]  - [phone]      Additional details provided by patient: ONE AND A HALF DAYS LEFT     Does the patient have less than a 3 day supply:  [x] Yes  [] No    Rodger Littlejohn Rep   04/01/22 12:36 EDT

## 2022-04-01 NOTE — TELEPHONE ENCOUNTER
Please call.  I sent in the usual amount of pain medication.  Follow-up with Dr. Colon as she requested for May of this year.

## 2022-04-01 NOTE — TELEPHONE ENCOUNTER
----- Message from Liliya Traore RN sent at 4/1/2022  2:07 PM EDT -----  Regarding: RE: appointment  Yes.  Thank you!  ----- Message -----  From: Justina Quigley RN  Sent: 4/1/2022  12:59 PM EDT  To: Liliya Traore RN  Subject: RE: appointment                                  We will change her appt with Oneida Levy to April 8 at 2pm.  Can you let patient know?  ----- Message -----  From: Liliya Traore RN  Sent: 4/1/2022  12:47 PM EDT  To: [facility]  Subject: appointment                                      Received call from above patient requesting appointment with oncology provider regarding leg swelling, has appointment in May but would like to be seen sooner. Concerned it is cancer related.  Thank you

## 2022-04-04 NOTE — TELEPHONE ENCOUNTER
Pt called in requesting rf. Informed her this was taken care of Friday. She stated she wasn't aware since [facility] never notified her.

## 2022-04-06 ENCOUNTER — TELEPHONE (OUTPATIENT)
Dept: ONCOLOGY | Facility: CLINIC | Age: 62
End: 2022-04-06

## 2022-04-06 NOTE — TELEPHONE ENCOUNTER
----- Message from Rodger Cannon sent at 4/6/2022 12:58 PM EDT -----  Regarding: RE: Appt Friday 4/8  Spoke with patient and she is aware.  ----- Message -----  From: Birgit Mendiola MA  Sent: 4/6/2022  12:54 PM EDT  To: e Onc [place] Referral Coordinator Pool  Subject: FW: Appt Friday 4/8                              Please R/S patient's follow up appt on Friday until after scans are done on 13May22, and call PT with new date and time.    Thank you,     ~Birgit    ----- Message -----  From: Pao Levy APRN  Sent: 4/6/2022  12:34 PM EDT  To: Birgit Mendiola MA  Subject: RE: Appt Friday 4/8                              Yes please  ----- Message -----  From: Birgit Mendiola MA  Sent: 4/6/2022  12:22 PM EDT  To: LONG Navarro  Subject: Appt Friday 4/8                                  PT is scheduled to see you @ 1400 for a 6 month follow up, but doesn't have her CT and MRI until 13May22. Do you want to R/S appt until after scans?    Thanks,     ~Birgit

## 2022-04-20 ENCOUNTER — OFFICE VISIT (OUTPATIENT)
Dept: PSYCHIATRY | Facility: CLINIC | Age: 62
End: 2022-04-20

## 2022-04-20 DIAGNOSIS — G47.9 SLEEP DISTURBANCE: ICD-10-CM

## 2022-04-20 DIAGNOSIS — F33.1 MODERATE EPISODE OF RECURRENT MAJOR DEPRESSIVE DISORDER: Primary | ICD-10-CM

## 2022-04-20 DIAGNOSIS — F41.1 GENERALIZED ANXIETY DISORDER: ICD-10-CM

## 2022-04-20 PROCEDURE — 99443 PR PHYS/QHP TELEPHONE EVALUATION 21-30 MIN: CPT | Performed by: NURSE PRACTITIONER

## 2022-04-20 NOTE — PROGRESS NOTES
"  Subjective   Brittani Lambert is a 62 y.o. female who is here today for medication management follow up. You have chosen to receive care through a telephone visit. Do you consent to use a telephone visit for your medical care today? Yes    TIME IN:1059  TIME OUT:1120    I spent 22 minutes in patient care: reviewing records prior to the visit, assessing the patient, entering orders and documentation.    PATIENT AT: THEIR PLACE OF RESIDENCE    PROVIDER AT:   [facility]  [address] , [address]  [place] [address]        Chief Complaint: MDD, THALIA, sleep disturbance     History of Present Illness Patient presents via telephone reports she has been in a depression for several weeks with poor motivation and interest \"just feeling down in the dumps and not sure why\". Denies a trigger or reason for this situationally. She reports now with weather improving she has more interest. She had a good Easter with her family. A friend is coming over today. She is planning on purchasing plants for her patio and redecorating her apartment so can feel herself pulling out of it. Denies increased pain or heart problems. She reports eating very well. Sleep has been a challenge past two weeks, she falls asleep but awakens at 2:30 am and can't go back to sleep for hours.  Denies adverse effects from medications.   (Scales based on 0 - 10 with 10 being the worst)    The following portions of the patient's history were reviewed and updated as appropriate: allergies, current medications, past family history, past medical history, past social history, past surgical history and problem list.    Review of Systems  A 14 point review of systems was performed and is negative except as noted above.    Objective   Physical Exam  not currently breastfeeding.    No Known Allergies    Current Medications:   Current Outpatient Medications   Medication Sig Dispense Refill   • ALPRAZolam " (XANAX) 0.5 MG tablet Take 1 tablet by mouth 3 (Three) Times a Day As Needed for Anxiety. 90 tablet 2   • ARIPiprazole (ABILIFY) 5 MG tablet Take 1 tablet by mouth Daily. 30 tablet 5   • aspirin (aspirin) 81 MG EC tablet Take 1 tablet by mouth Daily. 30 tablet 5   • DULoxetine (CYMBALTA) 60 MG capsule Take 1 capsule by mouth Daily. 30 capsule 5   • flecainide (TAMBOCOR) 50 MG tablet Take 1 tablet by mouth 2 (Two) Times a Day. EKG in office in 48 hours of starting this medication. 60 tablet 5   • metoprolol tartrate (LOPRESSOR) 50 MG tablet Take 1 tablet by mouth 2 (Two) Times a Day. 60 tablet 5   • midodrine (PROAMATINE) 5 MG tablet Take 1 tablet by mouth 3 (Three) Times a Day Before Meals. 90 tablet 5   • mirtazapine (REMERON) 15 MG tablet Take 1 tablet by mouth Every Night. 30 tablet 5   • oxybutynin XL (DITROPAN-XL) 5 MG 24 hr tablet TAKE 1 TABLET BY MOUTH EVERY DAY 90 tablet 0   • oxyCODONE-acetaminophen (Percocet) 7.5-325 MG per tablet Take 1 tablet by mouth Every 6 (Six) Hours As Needed for Moderate Pain . 120 tablet 0     No current facility-administered medications for this visit.       Appearance: na  Hygiene:  REPORTS good  Cooperation:  Cooperative  Eye Contact:  na  Psychomotor Behavior:  denies psychomotor agitation/retardation, No EPS, No motor tics  Mood: depression   Affect: na   Hopelessness: Denies  Speech:  Normal  Thought Process:  Linear  Thought Content:  Normal  Concentration: Normal   Suicidal: denies  Homicidal:  None  Hallucinations:  None  Delusion:  None  Memory:  Intact  Orientation:  Person, Place, Time and Situation  Reliability:  good  Insight:  Fair  Judgement: good  Impulse Control: good  Estimated Intelligence: average range    ZECHARIAH REVIEWED NO RED FLAGS    Assessment/Plan   Diagnoses and all orders for this visit:    1. Moderate episode of recurrent major depressive disorder (HCC) (Primary)    2. Generalized anxiety disorder    3. Sleep disturbance          IMPRESSION:  Has  several weeks of increased depressive symptoms and sleep disturbance    PLAN:  For depression discussed behavioral modifications , ie going to nurseries, arboretum, engaing with others  For sleep disturbance may try 1.5 tabs of mirtazapine and see if that helps sustain sleep, call 5 days if not successful  Cont alprazolam 0.5mg tid as needed for anxiety  duloxetine 60 g daily for depression   Mirtazapine 15mg  1.5 tabs at hs for sleeplessness         We discussed risks, benefits, and side effects of the above medications and the patient was agreeable with the plan. Patient was educated on the importance of compliance with treatment and follow-up appointments.     Counseled patient regarding multimodal approach with encouragement of healthy nutrition, healthy sleep, regular physical mobility, social involvement, counseling, and medication compliance.     Assisted patient in identifying risk factors which would indicate the need for higher level of care including thoughts to harm self or others and/or self-harming behavior and encouraged patient to contact this office, call 911, or present to the nearest emergency room should any of these events occur. Discussed crisis intervention services and means to access.  Patient adamantly and convincingly denies current suicidal or homicidal ideation or perceptual disturbance.    Treatment Plan: stabilize mood, patient will stay out of psychiatric hospital and be at optimal level of functioning with therapy and take all medication as prescribed. Patient verbalized  understanding and agreement to plan.    Instructed to call for questions or concerns and return early if necessary.     Greater than 50% time was spent in coordination of care, and counseling the patient regarding current assessment, symptoms, plan of care going forward, supportive therapy.  Answered any questions patient had regarding medications and plan of care.    Return in about 4 weeks (around 5/18/2022).

## 2022-04-28 ENCOUNTER — PATIENT OUTREACH (OUTPATIENT)
Dept: CASE MANAGEMENT | Facility: OTHER | Age: 62
End: 2022-04-28

## 2022-04-28 NOTE — OUTREACH NOTE
AMBULATORY CASE MANAGEMENT NOTE    Name and Relationship of Patient/Support Person: Brittani Lambert - Self    Patient Outreach    Patient reports she experiences back pain everyday, taking oxycodone-acetaminophen 7.5-325 one table every 4 hours with adequate relief.  Discussed with patient that her prescription was written for one every 6 hours and to let her PCP know of dose increase otherwise pharmacy will say it is too soon to fill.  Patient voiced understanding.  Patient also reports her breathing is good, she is eating and hydrating well, and anxiety controlled with current medication.        BRAEDEN LOGAN  Ambulatory Case Management    4/28/2022, 10:38 EDT

## 2022-05-09 RX ORDER — OXYBUTYNIN CHLORIDE 5 MG/1
TABLET, EXTENDED RELEASE ORAL
Qty: 90 TABLET | Refills: 0 | Status: SHIPPED | OUTPATIENT
Start: 2022-05-09 | End: 2022-08-11

## 2022-05-13 ENCOUNTER — HOSPITAL ENCOUNTER (OUTPATIENT)
Dept: MRI IMAGING | Facility: HOSPITAL | Age: 62
Discharge: HOME | End: 2022-05-13

## 2022-05-13 ENCOUNTER — HOSPITAL ENCOUNTER (OUTPATIENT)
Dept: CT IMAGING | Facility: HOSPITAL | Age: 62
Discharge: HOME | End: 2022-05-13

## 2022-05-13 DIAGNOSIS — C50.912 CANCER OF LEFT BREAST METASTATIC TO BRAIN: ICD-10-CM

## 2022-05-13 DIAGNOSIS — C79.31 CANCER OF LEFT BREAST METASTATIC TO BRAIN: ICD-10-CM

## 2022-05-13 PROCEDURE — 0 GADOBENATE DIMEGLUMINE 529 MG/ML SOLUTION: Performed by: INTERNAL MEDICINE

## 2022-05-13 PROCEDURE — A9577 INJ MULTIHANCE: HCPCS | Performed by: INTERNAL MEDICINE

## 2022-05-13 PROCEDURE — 82565 ASSAY OF CREATININE: CPT

## 2022-05-13 PROCEDURE — 71260 CT THORAX DX C+: CPT

## 2022-05-13 PROCEDURE — 74177 CT ABD & PELVIS W/CONTRAST: CPT

## 2022-05-13 PROCEDURE — 25010000002 IOPAMIDOL 61 % SOLUTION: Performed by: INTERNAL MEDICINE

## 2022-05-13 PROCEDURE — 70553 MRI BRAIN STEM W/O & W/DYE: CPT

## 2022-05-13 RX ADMIN — IOPAMIDOL 100 ML: 612 INJECTION, SOLUTION INTRAVENOUS at 13:07

## 2022-05-13 RX ADMIN — GADOBENATE DIMEGLUMINE 10 ML: 529 INJECTION, SOLUTION INTRAVENOUS at 14:04

## 2022-05-16 ENCOUNTER — OFFICE VISIT (OUTPATIENT)
Dept: ONCOLOGY | Facility: CLINIC | Age: 62
End: 2022-05-16

## 2022-05-16 ENCOUNTER — TELEPHONE (OUTPATIENT)
Dept: ONCOLOGY | Facility: CLINIC | Age: 62
End: 2022-05-16

## 2022-05-16 VITALS — BODY MASS INDEX: 20.65 KG/M2 | HEIGHT: 63 IN

## 2022-05-16 DIAGNOSIS — G89.3 CANCER ASSOCIATED PAIN: ICD-10-CM

## 2022-05-16 DIAGNOSIS — C79.31 CANCER OF LEFT BREAST METASTATIC TO BRAIN: Primary | ICD-10-CM

## 2022-05-16 DIAGNOSIS — C50.912 CANCER OF LEFT BREAST METASTATIC TO BRAIN: Primary | ICD-10-CM

## 2022-05-16 PROCEDURE — 99212 OFFICE O/P EST SF 10 MIN: CPT | Performed by: NURSE PRACTITIONER

## 2022-05-16 RX ORDER — DULOXETIN HYDROCHLORIDE 30 MG/1
30 CAPSULE, DELAYED RELEASE ORAL DAILY
COMMUNITY
Start: 2022-03-26

## 2022-05-16 NOTE — TELEPHONE ENCOUNTER
Caller: Kiersten Lamberta    Relationship: Self    Best call back number:[phone]   Requested Prescriptions:   Requested Prescriptions     Pending Prescriptions Disp Refills   • oxyCODONE-acetaminophen (Percocet) 7.5-325 MG per tablet 120 tablet 0     Sig: Take 1 tablet by mouth Every 6 (Six) Hours As Needed for Moderate Pain .        Pharmacy where request should be sent: [id number] - [place], [place] - [address] [place] [address] AT [place] [address] - [phone] [facility] [phone]      Additional details provided by patient:    Does the patient have less than a 3 day supply:  [x] Yes  [] No    Rodger HEATON Rep   05/16/22 10:16 EDT

## 2022-05-16 NOTE — TELEPHONE ENCOUNTER
Caller: Brittani Lambert    Relationship to patient: Self    Best call back number: [phone]    Chief complaint: CAN TODAY'S APPT. BE CHANGED TO A TELEPHONE VISIT?  PATIENT IS IN GREAT PAIN & HAS NOT RIDE.    Type of visit: FOLLOW UP 2    Requested date: SAME DATE & TIME TODAY    When is the original appointment: TODAY - 5/16/2022.

## 2022-05-16 NOTE — PROGRESS NOTES
"You have chosen to receive care through a telephone visit. Do you consent to use a telephone visit for your medical care today? Yes     Telephone visit; patient unable to connect to [employer].  Time of visit 10 min.      PROBLEM LIST:  1. [id number] (Stage IIA) ER negative, TN positive, Her2 2+ by IHC, negative by FISH invasive ductal carcinoma of the left breast  A) bilateral mastectomy on 10/4/18.  Pathology showed a 2.6 cm high grade IDC with basaloid features.  0/1 SLN involved.  B) adjuvant chemotherapy with ddAC followed by taxol started on 11/14/18  C) admitted to the hospital on 4/10/2019 with left upper extremity weakness.  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  CT chest abdomen pelvis and bone scan on 4/11/2019 showed no other sites of disease.  Completed whole brain radiation on 4/24/2019.  2. Anxiety/depression  3. Hyperlipidemia  4. History of ovarian cancer 1989, s/p BRYN/BSO, no adjuvant therapy    Chief complaint: follow up for breast cancer management       Subjective     HISTORY OF PRESENT ILLNESS:   Brittani Lambert returns for follow-up.  Overall she has been doing better over the last few months.  She denies any headaches or dizziness in the past few months.  She has been active planting 4 hours for her patio and her back and legs are sore today.  She did not have anyone to drive her to her doctor's appointment today so she needed to make it a telephone visit.    She is eating well with no nausea or vomiting.  No fevers or recurring infections.  She is constipated frequently and has to use fleets enemas occasionally.  No hematuria or dysuria.  No cough or shortness of air.          Objective      Ht 160 cm (63\")   BMI 20.65 kg/m²    Vitals:    05/16/22 1452   PainSc:   5   PainLoc: Back  Comment: and legs             Performance Status: 0      Neuro: alert and oriented  Psych: mood and affect appropriate            Assessment & Plan "   Brittani Lambert is a 62 y.o. year old female with a ER negative HER-2 negative breast cancer with leptomeningeal disease, status post whole brain radiation treatment 4/24/2019.      History of pulmonary embolism.  Completed 6 months of xarelto.    Leptomeningeal disease: CT scan of chest, abdomen, and pelvis from 5/13/2022 show: Stable exam with no evidence of thoracic or abdominopelvic metastatic disease.  MRI of brain from 5/13/2022 showed: No evidence of parenchymal metastatic disease. Stable contrast-enhanced MRI of the brain including area of nonenhancing gliotic change and minimal susceptibility artifact in the right precentral region.  Clinically she is doing well with no new symptoms.  We will plan to repeat scans in 6 months.  After those scans we will look at stretching around timing in between scanning.    Metastatic breast cancer: We will plan to repeat CT scans of chest, abdomen, and pelvis and MRI of the brain in 6 months.      F/u 6 months.       This visit has been rescheduled as a phone visit to comply with patient safety concerns in accordance with CDC recommendations. Total time of discussion was 10 minutes.          LONG Sheikh  [facility]    5/16/2022          CC:

## 2022-05-17 RX ORDER — OXYCODONE AND ACETAMINOPHEN 7.5; 325 MG/1; MG/1
1 TABLET ORAL EVERY 6 HOURS PRN
Qty: 120 TABLET | Refills: 0 | Status: SHIPPED | OUTPATIENT
Start: 2022-05-17 | End: 2022-07-01 | Stop reason: SDUPTHER

## 2022-05-18 ENCOUNTER — OFFICE VISIT (OUTPATIENT)
Dept: PSYCHIATRY | Facility: CLINIC | Age: 62
End: 2022-05-18

## 2022-05-18 DIAGNOSIS — G47.9 SLEEP DISTURBANCE: ICD-10-CM

## 2022-05-18 DIAGNOSIS — F33.1 MODERATE EPISODE OF RECURRENT MAJOR DEPRESSIVE DISORDER: Primary | ICD-10-CM

## 2022-05-18 DIAGNOSIS — F41.1 GENERALIZED ANXIETY DISORDER: ICD-10-CM

## 2022-05-18 PROCEDURE — 99442 PR PHYS/QHP TELEPHONE EVALUATION 11-20 MIN: CPT | Performed by: NURSE PRACTITIONER

## 2022-05-18 RX ORDER — ARIPIPRAZOLE 5 MG/1
5 TABLET ORAL DAILY
Qty: 30 TABLET | Refills: 5 | Status: SHIPPED | OUTPATIENT
Start: 2022-05-18

## 2022-05-18 RX ORDER — DULOXETIN HYDROCHLORIDE 60 MG/1
60 CAPSULE, DELAYED RELEASE ORAL DAILY
Qty: 30 CAPSULE | Refills: 5 | Status: SHIPPED | OUTPATIENT
Start: 2022-05-18

## 2022-05-18 RX ORDER — MIRTAZAPINE 15 MG/1
15 TABLET, FILM COATED ORAL NIGHTLY
Qty: 30 TABLET | Refills: 5 | Status: SHIPPED | OUTPATIENT
Start: 2022-05-18 | End: 2023-03-13

## 2022-05-18 NOTE — PROGRESS NOTES
Subjective   Brittani Lambert is a 62 y.o. female who is here today for medication management follow up. You have chosen to receive care through a telephone visit. Do you consent to use a telephone visit for your medical care today? Yes    TIME IN:1059  TIME OUT:1120    I spent 20  minutes in patient care: reviewing records prior to the visit, assessing the patient, entering orders and documentation.    PATIENT AT: THEIR PLACE OF RESIDENCE    PROVIDER AT:   [facility]  [address], [address]  [place] [address]        Chief Complaint: MDD, THALIA, sleep disturbance    History of Present Illness Patient presents via telephone reporting she has had difficult past two weeks with rent going up $100/month,  not working and her PCP is on vacation and she was unable to get her pain medication refilled. She has been out for 4 days and is irritable, edgy and in pain. She will speak with them today as her PCP is back in office today. Pt reports sleeping well, denies panic and mood prior to rent going up and out of pain pills was doing well. She has purchased flowers for her patio containers and loves watching the birds out her window .  Denies adverse effects from medications.   (Scales based on 0 - 10 with 10 being the worst)    The following portions of the patient's history were reviewed and updated as appropriate: allergies, current medications, past family history, past medical history, past social history, past surgical history and problem list.    Review of Systems  A 14 point review of systems was performed and is negative except as noted above.    Objective   Physical Exam  not currently breastfeeding.    No Known Allergies    Current Medications:   Current Outpatient Medications   Medication Sig Dispense Refill   • ARIPiprazole (ABILIFY) 5 MG tablet Take 1 tablet by mouth Daily. 30 tablet 5   • DULoxetine (CYMBALTA) 60 MG capsule Take 1  capsule by mouth Daily. 30 capsule 5   • mirtazapine (REMERON) 15 MG tablet Take 1 tablet by mouth Every Night. 30 tablet 5   • ALPRAZolam (XANAX) 0.5 MG tablet Take 1 tablet by mouth 3 (Three) Times a Day As Needed for Anxiety. 90 tablet 2   • aspirin (aspirin) 81 MG EC tablet Take 1 tablet by mouth Daily. 30 tablet 5   • DULoxetine (CYMBALTA) 30 MG capsule Take 30 mg by mouth Daily.     • flecainide (TAMBOCOR) 50 MG tablet Take 1 tablet by mouth 2 (Two) Times a Day. EKG in office in 48 hours of starting this medication. 60 tablet 5   • metoprolol tartrate (LOPRESSOR) 50 MG tablet Take 1 tablet by mouth 2 (Two) Times a Day. 60 tablet 5   • midodrine (PROAMATINE) 5 MG tablet Take 1 tablet by mouth 3 (Three) Times a Day Before Meals. 90 tablet 5   • oxybutynin XL (DITROPAN-XL) 5 MG 24 hr tablet TAKE 1 TABLET BY MOUTH EVERY DAY 90 tablet 0   • oxyCODONE-acetaminophen (Percocet) 7.5-325 MG per tablet Take 1 tablet by mouth Every 6 (Six) Hours As Needed for Moderate Pain . 120 tablet 0     No current facility-administered medications for this visit.     Appearance: na  Hygiene:  REPORTS good  Cooperation:  Cooperative  Eye Contact:  na  Psychomotor Behavior:  denies psychomotor agitation/retardation, No EPS, No motor tics  Mood:  Irritable   Affect:  na  Hopelessness: Denies  Speech:  Normal  Thought Process:  Linear  Thought Content:  Normal  Concentration: Normal   Suicidal: denies  Homicidal:  None  Hallucinations:  None  Delusion:  None  Memory:  Intact  Orientation:  Person, Place, Time and Situation  Reliability:  good  Insight:  Fair  Judgement: good  Impulse Control: good  Estimated Intelligence: average range    ZECHARIAH REVIEWED NO RED FLAGS    Assessment & Plan   Diagnoses and all orders for this visit:    1. Moderate episode of recurrent major depressive disorder (HCC) (Primary)    2. Generalized anxiety disorder    3. Sleep disturbance    Other orders  -     ARIPiprazole (ABILIFY) 5 MG tablet; Take 1 tablet  by mouth Daily.  Dispense: 30 tablet; Refill: 5  -     DULoxetine (CYMBALTA) 60 MG capsule; Take 1 capsule by mouth Daily.  Dispense: 30 capsule; Refill: 5  -     mirtazapine (REMERON) 15 MG tablet; Take 1 tablet by mouth Every Night.  Dispense: 30 tablet; Refill: 5          IMPRESSION: irritable out of pain med for 4 days, she will get today, told her to call back if PCP not back and maybe oncology could refill for a few days    PLAN: cont current med management  MDD/THALIA/panic  Duloxetine 60 mg daily  Insomnia: mirtazapine 15 mg at hs   MDD: abilify 5 mg daily     We discussed risks, benefits, and side effects of the above medications and the patient was agreeable with the plan. Patient was educated on the importance of compliance with treatment and follow-up appointments.     Counseled patient regarding multimodal approach with encouragement of healthy nutrition, healthy sleep, regular physical mobility, social involvement, counseling, and medication compliance.     Assisted patient in identifying risk factors which would indicate the need for higher level of care including thoughts to harm self or others and/or self-harming behavior and encouraged patient to contact this office, call 911, or present to the nearest emergency room should any of these events occur. Discussed crisis intervention services and means to access.  Patient adamantly and convincingly denies current suicidal or homicidal ideation or perceptual disturbance.    Treatment Plan: stabilize mood, patient will stay out of psychiatric hospital and be at optimal level of functioning with therapy and take all medication as prescribed. Patient verbalized  understanding and agreement to plan.    Instructed to call for questions or concerns and return early if necessary.     Greater than 50% time was spent in coordination of care, and counseling the patient regarding current assessment, symptoms, plan of care going forward, supportive therapy.  Answered  any questions patient had regarding medications and plan of care.    Return in about 8 weeks (around 7/13/2022).

## 2022-06-11 LAB — CREAT BLDA-MCNC: 0.6 MG/DL (ref 0.6–1.3)

## 2022-06-13 RX ORDER — METOPROLOL TARTRATE 50 MG/1
TABLET, FILM COATED ORAL
Qty: 60 TABLET | Refills: 5 | OUTPATIENT
Start: 2022-06-13

## 2022-06-13 RX ORDER — FLECAINIDE ACETATE 50 MG/1
50 TABLET ORAL 2 TIMES DAILY
Qty: 60 TABLET | Refills: 5 | OUTPATIENT
Start: 2022-06-13

## 2022-06-15 ENCOUNTER — TELEPHONE (OUTPATIENT)
Dept: CARDIOLOGY | Facility: CLINIC | Age: 62
End: 2022-06-15

## 2022-06-15 RX ORDER — METOPROLOL TARTRATE 50 MG/1
50 TABLET, FILM COATED ORAL 2 TIMES DAILY
Qty: 60 TABLET | Refills: 0 | Status: SHIPPED | OUTPATIENT
Start: 2022-06-15 | End: 2022-07-18

## 2022-06-15 RX ORDER — FLECAINIDE ACETATE 50 MG/1
50 TABLET ORAL 2 TIMES DAILY
Qty: 60 TABLET | Refills: 0 | Status: SHIPPED | OUTPATIENT
Start: 2022-06-15 | End: 2022-07-18

## 2022-06-15 NOTE — TELEPHONE ENCOUNTER
Notified of refills sent in and needing f/u appt and ekg. Verbalized understanding. Message sent to our .

## 2022-06-15 NOTE — TELEPHONE ENCOUNTER
Refill request came over for Flecainide on 6/13/22. Noticed that she never had her EKG done from 10/13/21 when she started the Flecainide 50 mg. Called her to see about her refills and EKG. Stated did not know she needed one. Asked her if she could have one this week. Stated was in the grocery store and would have to call back. Our  has been trying to contact her for f/u appt. Do you want the refills sent in. Please advise.

## 2022-06-20 ENCOUNTER — OFFICE VISIT (OUTPATIENT)
Dept: CARDIOLOGY | Facility: CLINIC | Age: 62
End: 2022-06-20

## 2022-06-20 VITALS
DIASTOLIC BLOOD PRESSURE: 66 MMHG | SYSTOLIC BLOOD PRESSURE: 114 MMHG | BODY MASS INDEX: 23.22 KG/M2 | HEART RATE: 62 BPM | HEIGHT: 61 IN | OXYGEN SATURATION: 97 % | WEIGHT: 123 LBS

## 2022-06-20 DIAGNOSIS — I48.0 PAROXYSMAL ATRIAL FIBRILLATION: Primary | ICD-10-CM

## 2022-06-20 DIAGNOSIS — E78.2 MIXED HYPERLIPIDEMIA: ICD-10-CM

## 2022-06-20 PROCEDURE — 99213 OFFICE O/P EST LOW 20 MIN: CPT | Performed by: INTERNAL MEDICINE

## 2022-06-20 RX ORDER — FUROSEMIDE 20 MG/1
20 TABLET ORAL DAILY PRN
Qty: 30 TABLET | Refills: 11 | Status: SHIPPED | OUTPATIENT
Start: 2022-06-20

## 2022-06-20 RX ORDER — POTASSIUM CHLORIDE 750 MG/1
10 TABLET, FILM COATED, EXTENDED RELEASE ORAL DAILY PRN
Qty: 30 TABLET | Refills: 11 | Status: SHIPPED | OUTPATIENT
Start: 2022-06-20 | End: 2022-10-20

## 2022-06-20 NOTE — PROGRESS NOTES
[facility]  Office visit  Brittani Lambert  1960  [phone]  There is no work phone number on file.    VISIT DATE:  6/20/2022    PCP: Alla Colon  [address] [place] [address]    CC:  Chief Complaint   Patient presents with   • Atrial Fibrillation       Previous cardiac studies and procedures:  September 2020 echo  · Left ventricular ejection fraction appears to be 61 - 65%. Left ventricular systolic function is normal.  · Left ventricular diastolic function is consistent with (grade I) impaired relaxation.  · Mild tricuspid valve regurgitation is present.  · Estimated right ventricular systolic pressure from tricuspid regurgitation is normal (<35 mmHg). Calculated right ventricular systolic pressure from tricuspid regurgitation is 22 mmHg.    July 2021 2-week ambulatory ECG monitor: Normal    Oct/November 2021   Tilt table test  · SB w/ PAC noted  · Findings consistent with mild initial classic orthostasis and delayed orthostasis with underlying chronotropic incompetence.  30-day monitor: Short bursts of A. fib with RVR.    ASSESSMENT:   Diagnosis Plan   1. Paroxysmal atrial fibrillation (HCC)     2. Mixed hyperlipidemia         PLAN:  Paroxysmal atrial fibrillation: Chads vas equal to 1.  Continue combination of metoprolol and flecainide.  Reasonable control of arrhythmia.  Continue low-dose aspirin, not recommending anticoagulation at this time.  Previous monitoring suggestive of underlying tachybradycardia physiology, currently no indications for permanent pacing or AV node ablation.    Dependent edema: Low-dose Lasix with potassium supplementation as needed.  Discussed use of compression stockings, limiting sodium intake and elevating feet when possible during the day.    Subjective  No further episodes of presyncope.  Blood pressures running less than 120/80 mmHg.  Episodes of tachypalpitations are rare and lasting less than 30 minutes.  No associated  "symptoms.  She is compliant with medical therapy.  Intermittent bilateral lower extremity edema.    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      PHYSICAL EXAMINATION:  Vitals:    06/20/22 1328   BP: 114/66   BP Location: Left arm   Patient Position: Sitting   Pulse: 62   SpO2: 97%   Weight: 55.8 kg (123 lb)   Height: 154.9 cm (61\")     General Appearance:    Alert, cooperative, no distress, appears stated age   Head:    Normocephalic, without obvious abnormality, atraumatic   Eyes:    conjunctiva/corneas clear   Nose:   Nares normal, septum midline, mucosa normal, no drainage   Throat:   Lips, teeth and gums normal   Neck:   Supple, symmetrical, trachea midline, no carotid    bruit or JVD   Lungs:     Clear to auscultation bilaterally, respirations unlabored   Chest Wall:    No tenderness or deformity    Heart:    Regular rate and rhythm, S1 and S2 normal, no murmur, rub   or gallop, normal carotid impulse bilaterally without bruit.   Abdomen:     Soft, non-tender   Extremities:   Extremities normal, atraumatic, no cyanosis.  2+ bilateral pedal edema.   Pulses:   2+ and symmetric all extremities   Skin:   Skin color, texture, turgor normal, no rashes or lesions       Diagnostic Data:  Procedures  Lab Results   Component Value Date    CHLPL 262 (H) 06/21/2018    TRIG 193 (H) 09/25/2020    HDL 44 09/25/2020     Lab Results   Component Value Date    GLUCOSE 90 03/09/2022    BUN 9 03/09/2022    CREATININE 0.60 05/13/2022     03/09/2022    K 4.7 03/09/2022    CL 99 03/09/2022    CO2 24 03/09/2022     Lab Results   Component Value Date    HGBA1C 4.9 09/21/2020     Lab Results   Component Value Date    WBC 9.75 10/03/2021    HGB 13.2 10/03/2021    HCT 40.4 10/03/2021     10/03/2021       Allergies  No Known Allergies    Current Medications    Current Outpatient Medications:   •  ALPRAZolam (XANAX) 0.5 MG tablet, Take 1 tablet " by mouth 3 (Three) Times a Day As Needed for Anxiety., Disp: 90 tablet, Rfl: 2  •  ARIPiprazole (ABILIFY) 5 MG tablet, Take 1 tablet by mouth Daily., Disp: 30 tablet, Rfl: 5  •  aspirin (aspirin) 81 MG EC tablet, Take 1 tablet by mouth Daily., Disp: 30 tablet, Rfl: 5  •  DULoxetine (CYMBALTA) 30 MG capsule, Take 30 mg by mouth Daily., Disp: , Rfl:   •  DULoxetine (CYMBALTA) 60 MG capsule, Take 1 capsule by mouth Daily., Disp: 30 capsule, Rfl: 5  •  flecainide (TAMBOCOR) 50 MG tablet, Take 1 tablet by mouth 2 (Two) Times a Day. EKG in office in 48 hours of starting this medication., Disp: 60 tablet, Rfl: 0  •  metoprolol tartrate (LOPRESSOR) 50 MG tablet, Take 1 tablet by mouth 2 (Two) Times a Day., Disp: 60 tablet, Rfl: 0  •  midodrine (PROAMATINE) 5 MG tablet, Take 1 tablet by mouth 3 (Three) Times a Day Before Meals., Disp: 90 tablet, Rfl: 5  •  mirtazapine (REMERON) 15 MG tablet, Take 1 tablet by mouth Every Night., Disp: 30 tablet, Rfl: 5  •  oxybutynin XL (DITROPAN-XL) 5 MG 24 hr tablet, TAKE 1 TABLET BY MOUTH EVERY DAY, Disp: 90 tablet, Rfl: 0  •  oxyCODONE-acetaminophen (Percocet) 7.5-325 MG per tablet, Take 1 tablet by mouth Every 6 (Six) Hours As Needed for Moderate Pain ., Disp: 120 tablet, Rfl: 0          ROS  Review of Systems   Constitutional: Positive for malaise/fatigue.   Cardiovascular: Positive for irregular heartbeat and palpitations. Negative for chest pain.   Respiratory: Negative for shortness of breath.          SOCIAL HX  Social History     Socioeconomic History   • Marital status: Single     Spouse name: N/A   • Number of children: 1   • Years of education: H.S.   • Highest education level: High school graduate   Tobacco Use   • Smoking status: Current Every Day Smoker     Packs/day: 0.50     Years: 25.00     Pack years: 12.50     Types: Cigarettes   • Smokeless tobacco: Never Used   Vaping Use   • Vaping Use: Never used   Substance and Sexual Activity   • Alcohol use: Not Currently   • Drug  use: No   • Sexual activity: Defer     Partners: Male       FAMILY HX  Family History   Problem Relation Age of Onset   • Arthritis Mother    • Heart attack Mother    • Osteoporosis Mother    • Liver disease Father    • Celiac disease Other              Calros Allen III, MD, FACC

## 2022-07-01 DIAGNOSIS — G89.3 CANCER ASSOCIATED PAIN: ICD-10-CM

## 2022-07-01 NOTE — TELEPHONE ENCOUNTER
Caller: Brittani Lambert    Relationship: Self    Best call back number:[phone]    Requested Prescriptions:   Requested Prescriptions     Pending Prescriptions Disp Refills   • oxyCODONE-acetaminophen (Percocet) 7.5-325 MG per tablet 120 tablet 0     Sig: Take 1 tablet by mouth Every 6 (Six) Hours As Needed for Moderate Pain .        Pharmacy where request should be sent: [facility]/PHARMACY #[id number] - [address] AT [place] [address] - [phone] [facility] [phone]      Additional details provided by patient: PATIENT HAS 1 DAY REMAINING    Does the patient have less than a 3 day supply:  [x] Yes  [] No    Rodger Ye Rep   07/01/22 11:38 EDT

## 2022-07-03 RX ORDER — OXYCODONE AND ACETAMINOPHEN 7.5; 325 MG/1; MG/1
1 TABLET ORAL EVERY 6 HOURS PRN
Qty: 120 TABLET | Refills: 0 | Status: SHIPPED | OUTPATIENT
Start: 2022-07-03 | End: 2022-08-10 | Stop reason: SDUPTHER

## 2022-07-18 RX ORDER — METOPROLOL TARTRATE 50 MG/1
TABLET, FILM COATED ORAL
Qty: 60 TABLET | Refills: 4 | Status: SHIPPED | OUTPATIENT
Start: 2022-07-18

## 2022-07-18 RX ORDER — FLECAINIDE ACETATE 50 MG/1
50 TABLET ORAL 2 TIMES DAILY
Qty: 60 TABLET | Refills: 4 | Status: SHIPPED | OUTPATIENT
Start: 2022-07-18

## 2022-08-10 ENCOUNTER — TELEPHONE (OUTPATIENT)
Dept: FAMILY MEDICINE CLINIC | Facility: CLINIC | Age: 62
End: 2022-08-10

## 2022-08-10 DIAGNOSIS — G89.3 CANCER ASSOCIATED PAIN: ICD-10-CM

## 2022-08-10 RX ORDER — OXYCODONE AND ACETAMINOPHEN 7.5; 325 MG/1; MG/1
1 TABLET ORAL EVERY 6 HOURS PRN
Qty: 120 TABLET | Refills: 0 | Status: SHIPPED | OUTPATIENT
Start: 2022-08-10 | End: 2022-09-19 | Stop reason: SDUPTHER

## 2022-08-10 NOTE — TELEPHONE ENCOUNTER
Caller: Brittani Lambert    Relationship: Self    Best call back number: [phone]    Requested Prescriptions:   Requested Prescriptions     Pending Prescriptions Disp Refills   • oxyCODONE-acetaminophen (Percocet) 7.5-325 MG per tablet 120 tablet 0     Sig: Take 1 tablet by mouth Every 6 (Six) Hours As Needed for Moderate Pain .        Pharmacy where request should be sent: [facility]/PHARMACY #[id number] - [place], [place] - [address] [place] [address] AT [address] [phone] [facility] [phone]      Additional details provided by patient: PATIENT HAS 2 DAY SUPPLY LEFT     Does the patient have less than a 3 day supply:  [x] Yes  [] No    Rodger Oswald Rep   08/10/22 09:44 EDT

## 2022-08-11 RX ORDER — OXYBUTYNIN CHLORIDE 5 MG/1
TABLET, EXTENDED RELEASE ORAL
Qty: 90 TABLET | Refills: 0 | Status: SHIPPED | OUTPATIENT
Start: 2022-08-11 | End: 2022-11-07

## 2022-08-15 RX ORDER — OXYBUTYNIN CHLORIDE 5 MG/1
TABLET, EXTENDED RELEASE ORAL
Qty: 90 TABLET | Refills: 0 | OUTPATIENT
Start: 2022-08-15

## 2022-09-15 ENCOUNTER — APPOINTMENT (OUTPATIENT)
Dept: CT IMAGING | Facility: HOSPITAL | Age: 62
End: 2022-09-15

## 2022-09-19 DIAGNOSIS — G89.3 CANCER ASSOCIATED PAIN: ICD-10-CM

## 2022-09-19 RX ORDER — OXYCODONE AND ACETAMINOPHEN 7.5; 325 MG/1; MG/1
1 TABLET ORAL EVERY 6 HOURS PRN
Qty: 120 TABLET | Refills: 0 | Status: SHIPPED | OUTPATIENT
Start: 2022-09-19 | End: 2022-10-20 | Stop reason: SDUPTHER

## 2022-09-19 NOTE — TELEPHONE ENCOUNTER
Caller: Brittani Lambert    Relationship: Self    Best call back number: [phone]    Requested Prescriptions:   Requested Prescriptions     Pending Prescriptions Disp Refills   • oxyCODONE-acetaminophen (Percocet) 7.5-325 MG per tablet 120 tablet 0     Sig: Take 1 tablet by mouth Every 6 (Six) Hours As Needed for Moderate Pain.        Pharmacy where request should be sent: [facility]/PHARMACY #[id number] - [place], [place] - [address] [place] [address] AT [place] [address] - [phone]  - [phone]      Additional details provided by patient: PATIENT OUT OF MEDICATION    Does the patient have less than a 3 day supply:  [x] Yes  [] No    Rodger Fam Rep   09/19/22 10:12 EDT

## 2022-10-20 ENCOUNTER — OFFICE VISIT (OUTPATIENT)
Dept: FAMILY MEDICINE CLINIC | Facility: CLINIC | Age: 62
End: 2022-10-20

## 2022-10-20 VITALS
WEIGHT: 119 LBS | HEIGHT: 61 IN | SYSTOLIC BLOOD PRESSURE: 104 MMHG | BODY MASS INDEX: 22.47 KG/M2 | HEART RATE: 48 BPM | DIASTOLIC BLOOD PRESSURE: 64 MMHG | OXYGEN SATURATION: 98 % | RESPIRATION RATE: 12 BRPM | TEMPERATURE: 98.6 F

## 2022-10-20 DIAGNOSIS — G89.3 CHRONIC PAIN AFTER TREATMENT FOR MALIGNANT NEOPLASM: ICD-10-CM

## 2022-10-20 DIAGNOSIS — M47.816 LUMBAR SPONDYLOSIS: Primary | ICD-10-CM

## 2022-10-20 DIAGNOSIS — Z23 NEED FOR INFLUENZA VACCINATION: ICD-10-CM

## 2022-10-20 DIAGNOSIS — E78.2 MIXED HYPERLIPIDEMIA: ICD-10-CM

## 2022-10-20 DIAGNOSIS — Z51.81 ENCOUNTER FOR THERAPEUTIC DRUG MONITORING: ICD-10-CM

## 2022-10-20 PROCEDURE — 90686 IIV4 VACC NO PRSV 0.5 ML IM: CPT | Performed by: FAMILY MEDICINE

## 2022-10-20 PROCEDURE — 99214 OFFICE O/P EST MOD 30 MIN: CPT | Performed by: FAMILY MEDICINE

## 2022-10-20 PROCEDURE — G0008 ADMIN INFLUENZA VIRUS VAC: HCPCS | Performed by: FAMILY MEDICINE

## 2022-10-20 RX ORDER — OXYCODONE AND ACETAMINOPHEN 7.5; 325 MG/1; MG/1
1 TABLET ORAL EVERY 6 HOURS PRN
Qty: 120 TABLET | Refills: 0 | Status: SHIPPED | OUTPATIENT
Start: 2022-10-20 | End: 2022-11-30 | Stop reason: SDUPTHER

## 2022-10-20 RX ORDER — CYCLOBENZAPRINE HCL 5 MG
5 TABLET ORAL 2 TIMES DAILY PRN
Qty: 30 TABLET | Refills: 2 | Status: SHIPPED | OUTPATIENT
Start: 2022-10-20

## 2022-10-20 NOTE — PROGRESS NOTES
"Chief Complaint  6 month f/u and Flu Vaccine    Subjective          Brittani Lambert presents to [facility]  History of Present Illness  Chronic pain, mainly in her back. Treatment initially started to treat chronic pain related to cancer and mastectomy, but have continued due to overall chronic pain. She is still seeing oncology, completed whole brain radiation April 2019.  She is due to repeat CT chest, CT abdomen pelvis, and MRI brain in November 2022.  Taking Percocet 7.5 mg.  She has been experiencing increased back and leg pain, feels muscular. Describes as \"it catches\", denies tingling, numbness in extremities.  Bending over repetitively makes back pain worse.   She is up to date with oncology f/u, will see Dr. Patel in November.    Fasting labs are due.    The following portions of the patient's history were reviewed and updated as appropriate: allergies, current medications, past family history, past medical history, past social history, past surgical history and problem list.    Objective      Physical Exam  Vitals reviewed.   Constitutional:       General: She is not in acute distress.     Appearance: Normal appearance. She is well-developed.   HENT:      Head: Normocephalic and atraumatic.   Eyes:      Conjunctiva/sclera: Conjunctivae normal.   Neck:      Thyroid: No thyromegaly.      Trachea: No tracheal deviation.   Cardiovascular:      Rate and Rhythm: Normal rate and regular rhythm.      Heart sounds: Normal heart sounds. No murmur heard.  Pulmonary:      Effort: Pulmonary effort is normal.      Breath sounds: Normal breath sounds.   Musculoskeletal:         General: Normal range of motion.      Lumbar back: No tenderness.      Comments: Ambulates without difficulty   Skin:     General: Skin is warm and dry.      Findings: No rash.   Neurological:      General: No focal deficit present.      Mental Status: She is alert and oriented to person, place, and time. "   Psychiatric:         Behavior: Behavior normal.        Result Review :                Assessment and Plan    Diagnoses and all orders for this visit:    1. Lumbar spondylosis (Primary)  -     cyclobenzaprine (FLEXERIL) 5 MG tablet; Take 1 tablet by mouth 2 (Two) Times a Day As Needed for Muscle Spasms.  Dispense: 30 tablet; Refill: 2  -     oxyCODONE-acetaminophen (Percocet) 7.5-325 MG per tablet; Take 1 tablet by mouth Every 6 (Six) Hours As Needed for Moderate Pain.  Dispense: 120 tablet; Refill: 0  -     Comprehensive Metabolic Panel; Future  -     Lipid Panel With / Chol / HDL Ratio; Future    2. Mixed hyperlipidemia  -     Comprehensive Metabolic Panel; Future  -     Lipid Panel With / Chol / HDL Ratio; Future    3. Chronic pain after treatment for malignant neoplasm  -     oxyCODONE-acetaminophen (Percocet) 7.5-325 MG per tablet; Take 1 tablet by mouth Every 6 (Six) Hours As Needed for Moderate Pain.  Dispense: 120 tablet; Refill: 0  -     Comprehensive Metabolic Panel; Future  -     Lipid Panel With / Chol / HDL Ratio; Future    4. Need for influenza vaccination  -     FluLaval/Fluzone >6 mos (2846-4396)    5. Encounter for therapeutic drug monitoring  -     Compliance Drug Analysis, Ur - Urine, Clean Catch    Drug screen updated today.  Return fasting to update labs  Continue current pain management.  Muscle relaxer added for additional relief of muscular pain.  If symptoms do not improve or worsen, follow-up sooner and will order imaging.    Follow Up   Return in about 4 months (around 2/20/2023).  Patient was given instructions and counseling regarding her condition or for health maintenance advice. Please see specific information pulled into the AVS if appropriate.

## 2022-10-28 LAB — DRUGS UR: NORMAL

## 2022-10-30 NOTE — PROGRESS NOTES
"Brittani Prateryunior  1960  [id number]    Surgery Progress Note    Date of visit: 10/5/2018    Subjective:complaining of chest wall soreness  Has been asking for narcotics with history of chronic narcotic use at home    Objective:    BP 96/52   Pulse 55   Temp 97.6 °F (36.4 °C) (Oral)   Resp 16   Ht 162.6 cm (64.02\")   Wt 51.7 kg (113 lb 15.7 oz)   SpO2 98%   Breastfeeding? No   BMI 19.55 kg/m²     Intake/Output Summary (Last 24 hours) at 10/05/18 0719  Last data filed at 10/05/18 0600   Gross per 24 hour   Intake             1480 ml   Output              865 ml   Net              615 ml       CV: Regular rate and rhythm  L: normal air entry  BREAST:Dressings are intact and dry with no swelling  Dash-Stephens drainage is adequate        LABS:      Results from last 7 days  Lab Units 10/01/18  1227   WBC 10*3/mm3 9.44   HEMOGLOBIN g/dL 12.6   HEMATOCRIT % 39.0   PLATELETS 10*3/mm3 211       Results from last 7 days  Lab Units 10/01/18  1227   SODIUM mmol/L 140   POTASSIUM mmol/L 4.4   CHLORIDE mmol/L 106   CO2 mmol/L 28.0   BUN mg/dL 15   CREATININE mg/dL 0.87   CALCIUM mg/dL 8.9   BILIRUBIN mg/dL 0.4   ALK PHOS U/L 85   ALT (SGPT) U/L 8   AST (SGOT) U/L 17   GLUCOSE mg/dL 130*       Results from last 7 days  Lab Units 10/01/18  1227   SODIUM mmol/L 140   POTASSIUM mmol/L 4.4   CHLORIDE mmol/L 106   CO2 mmol/L 28.0   BUN mg/dL 15   CREATININE mg/dL 0.87   GLUCOSE mg/dL 130*   CALCIUM mg/dL 8.9     No results found for: LIPASE      Assessment/ Plan: stable course status post bilateral skin sparing mastectomies and left sentinel lymph node biopsy  Patient is progressing well  Instructions were given to the patient and her family   patient has Norco at home  Follow-up next week    Problem List Items Addressed This Visit     Breast CA (CMS/HCC)    Relevant Orders    Tissue Pathology Exam (Completed)            Koffi Worthington MD  10/5/2018  7:19 AM    "
steady

## 2022-11-07 RX ORDER — OXYBUTYNIN CHLORIDE 5 MG/1
TABLET, EXTENDED RELEASE ORAL
Qty: 90 TABLET | Refills: 0 | Status: SHIPPED | OUTPATIENT
Start: 2022-11-07

## 2022-11-30 DIAGNOSIS — M47.816 LUMBAR SPONDYLOSIS: ICD-10-CM

## 2022-11-30 DIAGNOSIS — G89.3 CHRONIC PAIN AFTER TREATMENT FOR MALIGNANT NEOPLASM: ICD-10-CM

## 2022-11-30 RX ORDER — OXYCODONE AND ACETAMINOPHEN 7.5; 325 MG/1; MG/1
1 TABLET ORAL EVERY 6 HOURS PRN
Qty: 120 TABLET | Refills: 0 | Status: SHIPPED | OUTPATIENT
Start: 2022-11-30 | End: 2022-12-29 | Stop reason: SDUPTHER

## 2022-11-30 NOTE — TELEPHONE ENCOUNTER
Caller: Brittani Lambert    Relationship: Self    Best call back number: [phone]    Requested Prescriptions:   Requested Prescriptions     Pending Prescriptions Disp Refills   • oxyCODONE-acetaminophen (Percocet) 7.5-325 MG per tablet 120 tablet 0     Sig: Take 1 tablet by mouth Every 6 (Six) Hours As Needed for Moderate Pain.        Pharmacy where request should be sent: [facility]/PHARMACY #[id number] - [address] AT [place] [address] - [phone] [facility] [phone]      Additional details provided by patient: COMPLETELY OUT    Does the patient have less than a 3 day supply:  [x] Yes  [] No    Would you like a call back once the refill request has been completed: [x] Yes [] No    If the office needs to give you a call back, can they leave a voicemail: [x] Yes [] No    Rodger Yancey Rep   11/30/22 09:48 EST

## 2022-12-29 ENCOUNTER — TELEPHONE (OUTPATIENT)
Dept: FAMILY MEDICINE CLINIC | Facility: CLINIC | Age: 62
End: 2022-12-29

## 2022-12-29 DIAGNOSIS — M47.816 LUMBAR SPONDYLOSIS: ICD-10-CM

## 2022-12-29 DIAGNOSIS — G89.3 CHRONIC PAIN AFTER TREATMENT FOR MALIGNANT NEOPLASM: ICD-10-CM

## 2022-12-29 NOTE — TELEPHONE ENCOUNTER
Caller: Brittani Lambert    Relationship: Self    Best call back number: [phone]    Requested Prescriptions:   Requested Prescriptions     Pending Prescriptions Disp Refills   • oxyCODONE-acetaminophen (Percocet) 7.5-325 MG per tablet 120 tablet 0     Sig: Take 1 tablet by mouth Every 6 (Six) Hours As Needed for Moderate Pain.        Pharmacy where request should be sent: [facility]/PHARMACY #[id number] - [address] AT [place] [address] - [phone] [facility] [phone]      Additional details provided by patient: PATIENT HAS 2 DAYS OF MEDICATION LEFT.      Does the patient have less than a 3 day supply:  [x] Yes  [] No    Would you like a call back once the refill request has been completed: [x] Yes [] No    If the office needs to give you a call back, can they leave a voicemail: [x] Yes [] No    Janice Cueto   12/29/22 11:29 EST

## 2022-12-30 RX ORDER — OXYCODONE AND ACETAMINOPHEN 7.5; 325 MG/1; MG/1
1 TABLET ORAL EVERY 6 HOURS PRN
Qty: 30 TABLET | Refills: 0 | Status: SHIPPED | OUTPATIENT
Start: 2022-12-30 | End: 2023-01-04 | Stop reason: SDUPTHER

## 2023-01-04 RX ORDER — OXYCODONE AND ACETAMINOPHEN 7.5; 325 MG/1; MG/1
1 TABLET ORAL EVERY 6 HOURS PRN
Qty: 120 TABLET | Refills: 0 | Status: SHIPPED | OUTPATIENT
Start: 2023-01-04 | End: 2023-02-17 | Stop reason: SDUPTHER

## 2023-01-13 ENCOUNTER — TELEPHONE (OUTPATIENT)
Dept: FAMILY MEDICINE CLINIC | Facility: CLINIC | Age: 63
End: 2023-01-13
Payer: MEDICARE

## 2023-01-13 NOTE — TELEPHONE ENCOUNTER
Pt called stating the pharmacy ([facility]) did not receive her oxycodone rx. Verified the rx was sent to [facility] and French stated there was a rx showing for 1/04, and that he would have the rx ready in a couple of hours.

## 2023-02-17 DIAGNOSIS — M47.816 LUMBAR SPONDYLOSIS: ICD-10-CM

## 2023-02-17 DIAGNOSIS — G89.3 CHRONIC PAIN AFTER TREATMENT FOR MALIGNANT NEOPLASM: ICD-10-CM

## 2023-02-17 NOTE — TELEPHONE ENCOUNTER
Caller: Brittani Lambert    Relationship: Self    Best call back number: [phone]    Requested Prescriptions:   Requested Prescriptions     Pending Prescriptions Disp Refills   • oxyCODONE-acetaminophen (Percocet) 7.5-325 MG per tablet 120 tablet 0     Sig: Take 1 tablet by mouth Every 6 (Six) Hours As Needed for Moderate Pain.        Pharmacy where request should be sent: [facility]/PHARMACY #[id number] - [place], [place] - [address] [place] [address] AT [place] [address] - [phone] [facility] [phone] FX       Does the patient have less than a 3 day supply:  [x] Yes  [] No    Would you like a call back once the refill request has been completed: [x] Yes [] No    If the office needs to give you a call back, can they leave a voicemail: [x] Yes [] No    Rodger Alvarado Rep   02/17/23 12:40 EST

## 2023-02-20 RX ORDER — OXYCODONE AND ACETAMINOPHEN 7.5; 325 MG/1; MG/1
1 TABLET ORAL EVERY 6 HOURS PRN
Qty: 120 TABLET | Refills: 0 | Status: SHIPPED | OUTPATIENT
Start: 2023-02-20 | End: 2023-03-14 | Stop reason: SDUPTHER

## 2023-02-22 ENCOUNTER — OFFICE VISIT (OUTPATIENT)
Dept: PSYCHIATRY | Facility: CLINIC | Age: 63
End: 2023-02-22
Payer: MEDICARE

## 2023-02-22 DIAGNOSIS — F33.1 MODERATE EPISODE OF RECURRENT MAJOR DEPRESSIVE DISORDER: Primary | ICD-10-CM

## 2023-02-22 DIAGNOSIS — G47.9 SLEEP DISTURBANCE: ICD-10-CM

## 2023-02-22 PROCEDURE — 99442 PR PHYS/QHP TELEPHONE EVALUATION 11-20 MIN: CPT | Performed by: NURSE PRACTITIONER

## 2023-02-22 NOTE — PROGRESS NOTES
"  Subjective   Brittani Lambert is a 63 y.o. female who is here today for medication management follow up. You have chosen to receive care through a telephone visit. Do you consent to use a telephone visit for your medical care today? Yes    TIME IN:  TIME OUT: 10 10     I spent 19  minutes in patient care: reviewing records prior to the visit, assessing the patient, entering orders and documentation.    PATIENT AT: THEIR PLACE OF RESIDENCE    PROVIDER AT:   [facility]  [address] , [address]  [place] [address]        Chief Complaint: MDD, sleep disturbance    History of Present Illness Patient presents via telephone and reports she was having a lot of depressive symptoms over past three weeks but is starting to \"come out of it\". She reports had poor motivation or interest. She was thinking about her son who is  a lot. She reports good relationships with her sisters, mother and daughter, grandson, niece. She states she has girl friends she talks to. Currently is more motivated, has windows doors open with warmer weather, got patio furniture and new cushions so when it is time \"I'll be out there enjoying it\". Denies SI/HI or AVH. Discussed it has been awhile since she saw Dr. Patel her medical oncologist and encouraged f/u appt, she agreed. Denies panic, denies nightmares. Is sleeping better now that she got her mirtazapine refilled. She is taking all medications for mood listed. She is not on alprazolam anymore. She reports still taking Cymbalta, Abilify, mirtazapine and gets refills now through her PCP .  Denies adverse effects from medications. Feelings were processed and validated, both negative and positive. Utilized motivational interviewing techniques including complex reflections to attempt to assist the patient and focusing on the positive and to maintain and encourage calm outlook.  (Scales based on 0 - 10 with 10 being the " worst)      The following portions of the patient's history were reviewed and updated as appropriate: allergies, current medications, past family history, past medical history, past social history, past surgical history and problem list.    Review of Systems  A 14 point review of systems was performed and is negative except as noted above.    Objective   Physical Exam  not currently breastfeeding.    No Known Allergies    Current Medications:   Current Outpatient Medications   Medication Sig Dispense Refill   • oxyCODONE-acetaminophen (Percocet) 7.5-325 MG per tablet Take 1 tablet by mouth Every 6 (Six) Hours As Needed for Moderate Pain. 120 tablet 0   • ARIPiprazole (ABILIFY) 5 MG tablet Take 1 tablet by mouth Daily. 30 tablet 5   • aspirin (aspirin) 81 MG EC tablet Take 1 tablet by mouth Daily. 30 tablet 5   • cyclobenzaprine (FLEXERIL) 5 MG tablet Take 1 tablet by mouth 2 (Two) Times a Day As Needed for Muscle Spasms. 30 tablet 2   • DULoxetine (CYMBALTA) 30 MG capsule Take 30 mg by mouth Daily.     • DULoxetine (CYMBALTA) 60 MG capsule Take 1 capsule by mouth Daily. 30 capsule 5   • flecainide (TAMBOCOR) 50 MG tablet Take 1 tablet by mouth 2 (Two) Times a Day. 60 tablet 4   • furosemide (LASIX) 20 MG tablet Take 1 tablet by mouth Daily As Needed (edema). 30 tablet 11   • metoprolol tartrate (LOPRESSOR) 50 MG tablet TAKE 1 TABLET BY MOUTH TWICE A DAY 60 tablet 4   • mirtazapine (REMERON) 15 MG tablet Take 1 tablet by mouth Every Night. 30 tablet 5   • oxybutynin XL (DITROPAN-XL) 5 MG 24 hr tablet TAKE 1 TABLET BY MOUTH EVERY DAY 90 tablet 0     No current facility-administered medications for this visit.       Lab Results:  Office Visit on 10/20/2022   Component Date Value Ref Range Status   • Report Summary 10/20/2022 FINAL   Final    Comment: ====================================================================  [facility] COMP DRUG  ANALYSIS,UR  ====================================================================  Test                             Result       Flag       Units  Drug Present    Carboxy-THC                    195                     ng/mg creat     Carboxy-THC is a metabolite of tetrahydrocannabinol (THC). Source     of THC is most commonly herbal marijuana or marijuana-based     products, but THC is also present in a scheduled prescription     medication. Trace amounts of THC can be present in hemp and     cannabidiol (CBD) products. This test is not intended to     distinguish between delta-9-tetrahydrocannabinol, the predominant     form of THC in most herbal or marijuana-based products, and     delta-8-tetrahydrocannabinol.    Oxycodone                      1789                    ng/mg creat    Oxymorphone                    1022                    ng/mg creat    Noroxycodone                   6478                                               ng/mg creat     Sources of oxycodone include scheduled prescription medications.     Oxymorphone and noroxycodone are expected metabolites of     oxycodone. Oxymorphone is also available as a scheduled     prescription medication.    Duloxetine                     PRESENT    Mirtazapine                    PRESENT    Aripiprazole                   PRESENT    Acetaminophen                  PRESENT    Metoprolol                     PRESENT  ====================================================================  Test                      Result    Flag   Units      Ref Range    Creatinine              37               mg/dL      >=20  ====================================================================  Declared Medications:   Medication list was not provided.  ====================================================================  For clinical consultation, please call [phone].  ====================================================================           Appearance: na   Hygiene:   REPORTS good  Cooperation:  Cooperative  Eye Contact:  na  Psychomotor Behavior:  denies psychomotor agitation/retardation, No EPS, No motor tics  Mood:  Low mood  Affect:  na  Hopelessness: Denies  Speech:  Normal  Thought Process:  Linear  Thought Content:  Normal  Concentration: Normal   Suicidal: denies  Homicidal:  None  Hallucinations:  None  Delusion:  None  Memory:  Intact  Orientation:  Person, Place, Time and Situation  Reliability:  good  Insight:  Fair  Judgement: good  Impulse Control: good  Estimated Intelligence: average range    ZCEHARIAH REVIEWED NO RED FLAGS    Assessment & Plan   Diagnoses and all orders for this visit:    1. Moderate episode of recurrent major depressive disorder (HCC) (Primary)    2. Sleep disturbance        PLAN:  Cont all medications her PCP refills  Will see on as needed basis  Cont healthy coping skills and we discussed end of winter blues, she states longer sunlight is very helpful    Counseled patient regarding multimodal approach with encouragement of healthy nutrition, healthy sleep, regular physical mobility, social involvement, counseling, and medication compliance.     Assisted patient in identifying risk factors which would indicate the need for higher level of care including thoughts to harm self or others and/or self-harming behavior and encouraged patient to contact this office, call 911, or present to the nearest emergency room should any of these events occur. Discussed crisis intervention services and means to access.  Patient adamantly and convincingly denies current suicidal or homicidal ideation or perceptual disturbance.    Treatment Plan: stabilize mood, patient will stay out of psychiatric hospital and be at optimal level of functioning with therapy and take all medication as prescribed. Patient verbalized  understanding and agreement to plan.    Instructed to call for questions or concerns and return early if necessary.     Greater than 50% time was spent  in coordination of care, and counseling the patient regarding current assessment, symptoms, plan of care going forward, supportive therapy.  Answered any questions patient had regarding medications and plan of care.    Return if symptoms worsen or fail to improve.

## 2023-02-24 ENCOUNTER — TELEPHONE (OUTPATIENT)
Dept: ONCOLOGY | Facility: CLINIC | Age: 63
End: 2023-02-24
Payer: MEDICARE

## 2023-03-01 NOTE — TELEPHONE ENCOUNTER
CALLED AND INFORMED PATIENT THAT PER ALEX STOVALL, PATIENT WOULD NEED TO REQUEST REFILLS THROUGH PCP AS IT'S BEEN OVER A YEAR. PATIENT VERBALIZED UNDERSTANDING AND STATED THAT SHE WOULD REACH OUT TO PCP. PLEASE ADVISE.

## 2023-03-13 RX ORDER — MIRTAZAPINE 15 MG/1
TABLET, FILM COATED ORAL
Qty: 30 TABLET | Refills: 5 | Status: SHIPPED | OUTPATIENT
Start: 2023-03-13

## 2023-03-14 ENCOUNTER — OFFICE VISIT (OUTPATIENT)
Dept: FAMILY MEDICINE CLINIC | Facility: CLINIC | Age: 63
End: 2023-03-14
Payer: MEDICARE

## 2023-03-14 VITALS
BODY MASS INDEX: 21.18 KG/M2 | SYSTOLIC BLOOD PRESSURE: 122 MMHG | HEIGHT: 61 IN | DIASTOLIC BLOOD PRESSURE: 76 MMHG | TEMPERATURE: 97.8 F | OXYGEN SATURATION: 98 % | RESPIRATION RATE: 18 BRPM | WEIGHT: 112.2 LBS | HEART RATE: 69 BPM

## 2023-03-14 DIAGNOSIS — G89.3 CHRONIC PAIN AFTER TREATMENT FOR MALIGNANT NEOPLASM: ICD-10-CM

## 2023-03-14 DIAGNOSIS — M47.816 LUMBAR SPONDYLOSIS: ICD-10-CM

## 2023-03-14 DIAGNOSIS — H61.892 IRRITATION OF EXTERNAL EAR CANAL, LEFT: ICD-10-CM

## 2023-03-14 DIAGNOSIS — C50.919 METASTATIC BREAST CANCER: ICD-10-CM

## 2023-03-14 DIAGNOSIS — M79.89 RIGHT LEG SWELLING: ICD-10-CM

## 2023-03-14 DIAGNOSIS — M79.604 RIGHT LEG PAIN: Primary | ICD-10-CM

## 2023-03-14 RX ORDER — OXYCODONE AND ACETAMINOPHEN 7.5; 325 MG/1; MG/1
1 TABLET ORAL EVERY 6 HOURS PRN
Qty: 120 TABLET | Refills: 0 | Status: SHIPPED | OUTPATIENT
Start: 2023-03-14

## 2023-03-14 RX ORDER — NEOMYCIN SULFATE, POLYMYXIN B SULFATE AND HYDROCORTISONE 10; 3.5; 1 MG/ML; MG/ML; [USP'U]/ML
3 SUSPENSION/ DROPS AURICULAR (OTIC) 4 TIMES DAILY
Qty: 10 ML | Refills: 0 | Status: SHIPPED | OUTPATIENT
Start: 2023-03-14 | End: 2023-03-19

## 2023-03-17 DIAGNOSIS — F41.1 GENERALIZED ANXIETY DISORDER: ICD-10-CM

## 2023-03-17 RX ORDER — ALPRAZOLAM 0.5 MG/1
TABLET ORAL
Qty: 90 TABLET | OUTPATIENT
Start: 2023-03-17

## 2023-03-25 NOTE — PROGRESS NOTES
"Subjective   Brittani Lambert is a 63 y.o. female.     History of Present Illness   Pt presents with CC of right leg pain. Feels like a deep ache   No known injury   Pt has hx of metastatic breast cancer   No significant swelling or hx of clots       The following portions of the patient's history were reviewed and updated as appropriate: allergies, current medications, past family history, past medical history, past social history, past surgical history and problem list.    Review of Systems  As noted in HPI     Objective    Blood pressure 122/76, pulse 69, temperature 97.8 °F (36.6 °C), resp. rate 18, height 154.9 cm (61\"), weight 50.9 kg (112 lb 3.2 oz), SpO2 98 %, not currently breastfeeding.     Physical Exam  Vitals and nursing note reviewed.   Constitutional:       Appearance: Normal appearance.   HENT:      Left Ear: Tenderness present.   Cardiovascular:      Rate and Rhythm: Normal rate.   Pulmonary:      Effort: Pulmonary effort is normal.   Musculoskeletal:      Right lower leg: Tenderness and bony tenderness present. No swelling. No edema.   Skin:     General: Skin is warm and dry.   Neurological:      Mental Status: She is alert and oriented to person, place, and time.   Psychiatric:         Mood and Affect: Mood normal.         Behavior: Behavior normal.         Assessment & Plan   Diagnoses and all orders for this visit:    1. Right leg pain (Primary)  -     Duplex Venous Lower Extremity - Right CAR  -     XR femur 2 vw right  -     XR tibia fibula 2 vw right    2. Right leg swelling  -     Duplex Venous Lower Extremity - Right CAR    3. Metastatic breast cancer  -     XR femur 2 vw right  -     XR tibia fibula 2 vw right    4. Irritation of external ear canal, left  -     neomycin-polymyxin-hydrocortisone (CORTISPORIN) 3.5-97230-7 otic suspension; Administer 3 drops into the left ear 4 (Four) Times a Day for 5 days.  Dispense: 10 mL; Refill: 0      Work up for leg pain as outlined in plan. Increased " risk factors due to cancer hx. Report to ER if new or worsening symptoms develop    Cortisporin for ear canal irritation

## 2023-04-17 RX ORDER — ARIPIPRAZOLE 5 MG/1
TABLET ORAL
Qty: 30 TABLET | Refills: 5 | Status: SHIPPED | OUTPATIENT
Start: 2023-04-17

## 2023-04-24 DIAGNOSIS — M47.816 LUMBAR SPONDYLOSIS: ICD-10-CM

## 2023-04-24 DIAGNOSIS — G89.3 CHRONIC PAIN AFTER TREATMENT FOR MALIGNANT NEOPLASM: ICD-10-CM

## 2023-04-24 RX ORDER — OXYCODONE AND ACETAMINOPHEN 7.5; 325 MG/1; MG/1
1 TABLET ORAL EVERY 6 HOURS PRN
Qty: 120 TABLET | Refills: 0 | Status: SHIPPED | OUTPATIENT
Start: 2023-04-24

## 2023-04-24 NOTE — TELEPHONE ENCOUNTER
REFILL    OXYCODONE 7.5-325 MG     [facility]     PT ONLY HAS ENOUGH FOR TODAY AND ASKS THIS BE SENT IN ASAP SHE HAD A FALL OVER THE WEEKEND.

## 2023-04-25 NOTE — TELEPHONE ENCOUNTER
LVM FOR PT TO CALL OFFICE TO SCHEDULE APPOINTMENT WITH DR LYONS IN MAY OR June HUB CAN SCHEDULE AND READ PLEASE THANK YOU

## 2023-05-08 ENCOUNTER — OFFICE VISIT (OUTPATIENT)
Dept: FAMILY MEDICINE CLINIC | Facility: CLINIC | Age: 63
End: 2023-05-08
Payer: MEDICARE

## 2023-05-08 VITALS
RESPIRATION RATE: 16 BRPM | TEMPERATURE: 97.3 F | DIASTOLIC BLOOD PRESSURE: 72 MMHG | SYSTOLIC BLOOD PRESSURE: 118 MMHG | HEART RATE: 56 BPM | BODY MASS INDEX: 20.32 KG/M2 | WEIGHT: 107.6 LBS | HEIGHT: 61 IN

## 2023-05-08 DIAGNOSIS — M47.816 LUMBAR SPONDYLOSIS: Primary | ICD-10-CM

## 2023-05-08 DIAGNOSIS — E78.2 MIXED HYPERLIPIDEMIA: ICD-10-CM

## 2023-05-08 DIAGNOSIS — Z79.891 CHRONIC PRESCRIPTION OPIATE USE: ICD-10-CM

## 2023-05-08 LAB
BILIRUB BLD-MCNC: NEGATIVE MG/DL
CLARITY, POC: CLEAR
COLOR UR: YELLOW
GLUCOSE UR STRIP-MCNC: NEGATIVE MG/DL
KETONES UR QL: ABNORMAL
LEUKOCYTE EST, POC: NEGATIVE
NITRITE UR-MCNC: NEGATIVE MG/ML
PH UR: 6 [PH] (ref 5–8)
PROT UR STRIP-MCNC: NEGATIVE MG/DL
RBC # UR STRIP: NEGATIVE /UL
SP GR UR: 1.01 (ref 1–1.03)
UROBILINOGEN UR QL: ABNORMAL

## 2023-05-08 RX ORDER — CYCLOBENZAPRINE HCL 5 MG
5 TABLET ORAL 2 TIMES DAILY PRN
Qty: 30 TABLET | Refills: 5 | Status: SHIPPED | OUTPATIENT
Start: 2023-05-08

## 2023-05-08 NOTE — PROGRESS NOTES
Chief Complaint  Medication f/u and Back Pain (Lower back x 3 weeks)    Subjective          Brittani Lambert presents to [facility]  Back Pain  This is a recurrent problem. The current episode started more than 1 year ago. The problem occurs daily. The problem has been waxing and waning since onset. The pain is present in the sacro-iliac. The quality of the pain is described as aching. The pain is at a severity of 9/10. The pain is worse during the day. The symptoms are aggravated by bending and position. Stiffness is present in the morning and all day. Associated symptoms include bowel incontinence, headaches, leg pain, numbness, pelvic pain, weakness and weight loss. Pertinent negatives include no abdominal pain, bladder incontinence, chest pain, dysuria, fever, paresis, paresthesias, perianal numbness or tingling. Risk factors include history of cancer and lack of exercise.   She tripped 3 weeks ago, reached out with right arm to brace herself. She felt her low back pull and instant pain. Symptoms gradually improving. Alternating warm and cold, topical freeze spray, stretching.     Labs are overdue, didn't return to complete    She is also overdue for follow up with oncology and cardiology.     Answers for HPI/ROS submitted by the patient on 5/1/2023  What is the primary reason for your visit?: Back Pain    The following portions of the patient's history were reviewed and updated as appropriate: allergies, current medications, past family history, past medical history, past social history, past surgical history and problem list.    Objective      Physical Exam  Vitals reviewed.   Constitutional:       Appearance: She is well-developed.   Cardiovascular:      Rate and Rhythm: Normal rate and regular rhythm.      Heart sounds: Normal heart sounds.   Pulmonary:      Effort: Pulmonary effort is normal.      Breath sounds: Normal breath sounds.   Musculoskeletal:      Lumbar back:  Tenderness (midline lower lumbar spine) present.   Neurological:      Mental Status: She is alert.   Psychiatric:         Behavior: Behavior normal.        Result Review :                Assessment and Plan    Diagnoses and all orders for this visit:    1. Lumbar spondylosis (Primary)  -     POC Urinalysis Dipstick  -     cyclobenzaprine (FLEXERIL) 5 MG tablet; Take 1 tablet by mouth 2 (Two) Times a Day As Needed for Muscle Spasms.  Dispense: 30 tablet; Refill: 5  -     CBC & Differential  -     Comprehensive Metabolic Panel  -     TSH  -     Lipid Panel With / Chol / HDL Ratio    2. Chronic prescription opiate use  -     Compliance Drug Analysis, Ur - Urine, Clean Catch  -     CBC & Differential  -     Comprehensive Metabolic Panel  -     TSH  -     Lipid Panel With / Chol / HDL Ratio    3. Mixed hyperlipidemia  -     CBC & Differential  -     Comprehensive Metabolic Panel  -     TSH  -     Lipid Panel With / Chol / HDL Ratio    ZECHARIAH query complete. Treatment plan to include limited course of prescribed controlled substance. Risks including addiction, benefits, and alternatives presented to patient.         Follow Up   Return in about 4 months (around 9/8/2023) for Medicare Wellness.  Patient was given instructions and counseling regarding her condition or for health maintenance advice. Please see specific information pulled into the AVS if appropriate.

## 2023-05-09 LAB
ALBUMIN SERPL-MCNC: 5.2 G/DL (ref 3.8–4.8)
ALBUMIN/GLOB SERPL: 1.9 {RATIO} (ref 1.2–2.2)
ALP SERPL-CCNC: 98 IU/L (ref 44–121)
ALT SERPL-CCNC: 11 IU/L (ref 0–32)
AST SERPL-CCNC: 21 IU/L (ref 0–40)
BASOPHILS # BLD AUTO: 0.1 X10E3/UL (ref 0–0.2)
BASOPHILS NFR BLD AUTO: 1 %
BILIRUB SERPL-MCNC: 0.5 MG/DL (ref 0–1.2)
BUN SERPL-MCNC: 15 MG/DL (ref 8–27)
BUN/CREAT SERPL: 19 (ref 12–28)
CALCIUM SERPL-MCNC: 10.2 MG/DL (ref 8.7–10.3)
CHLORIDE SERPL-SCNC: 95 MMOL/L (ref 96–106)
CHOLEST SERPL-MCNC: 325 MG/DL (ref 100–199)
CHOLEST/HDLC SERPL: 7.1 RATIO (ref 0–4.4)
CO2 SERPL-SCNC: 21 MMOL/L (ref 20–29)
CREAT SERPL-MCNC: 0.79 MG/DL (ref 0.57–1)
EGFRCR SERPLBLD CKD-EPI 2021: 84 ML/MIN/1.73
EOSINOPHIL # BLD AUTO: 0.1 X10E3/UL (ref 0–0.4)
EOSINOPHIL NFR BLD AUTO: 1 %
ERYTHROCYTE [DISTWIDTH] IN BLOOD BY AUTOMATED COUNT: 12.6 % (ref 11.7–15.4)
GLOBULIN SER CALC-MCNC: 2.7 G/DL (ref 1.5–4.5)
GLUCOSE SERPL-MCNC: 117 MG/DL (ref 70–99)
HCT VFR BLD AUTO: 43.4 % (ref 34–46.6)
HDLC SERPL-MCNC: 46 MG/DL
HGB BLD-MCNC: 14.9 G/DL (ref 11.1–15.9)
IMM GRANULOCYTES # BLD AUTO: 0 X10E3/UL (ref 0–0.1)
IMM GRANULOCYTES NFR BLD AUTO: 0 %
LABORATORY COMMENT REPORT: ABNORMAL
LDLC SERPL CALC-MCNC: 223 MG/DL (ref 0–99)
LYMPHOCYTES # BLD AUTO: 1.8 X10E3/UL (ref 0.7–3.1)
LYMPHOCYTES NFR BLD AUTO: 19 %
MCH RBC QN AUTO: 31.5 PG (ref 26.6–33)
MCHC RBC AUTO-ENTMCNC: 34.3 G/DL (ref 31.5–35.7)
MCV RBC AUTO: 92 FL (ref 79–97)
MONOCYTES # BLD AUTO: 0.7 X10E3/UL (ref 0.1–0.9)
MONOCYTES NFR BLD AUTO: 7 %
NEUTROPHILS # BLD AUTO: 6.5 X10E3/UL (ref 1.4–7)
NEUTROPHILS NFR BLD AUTO: 72 %
PLATELET # BLD AUTO: 347 X10E3/UL (ref 150–450)
POTASSIUM SERPL-SCNC: 5.2 MMOL/L (ref 3.5–5.2)
PROT SERPL-MCNC: 7.9 G/DL (ref 6–8.5)
RBC # BLD AUTO: 4.73 X10E6/UL (ref 3.77–5.28)
SODIUM SERPL-SCNC: 135 MMOL/L (ref 134–144)
TRIGL SERPL-MCNC: 272 MG/DL (ref 0–149)
TSH SERPL DL<=0.005 MIU/L-ACNC: 1.44 UIU/ML (ref 0.45–4.5)
VLDLC SERPL CALC-MCNC: 56 MG/DL (ref 5–40)
WBC # BLD AUTO: 9.2 X10E3/UL (ref 3.4–10.8)

## 2023-05-12 DIAGNOSIS — E78.2 MIXED HYPERLIPIDEMIA: Primary | ICD-10-CM

## 2023-05-12 DIAGNOSIS — R73.9 HYPERGLYCEMIA: ICD-10-CM

## 2023-05-14 LAB — DRUGS UR: NORMAL

## 2023-05-22 DIAGNOSIS — M47.816 LUMBAR SPONDYLOSIS: ICD-10-CM

## 2023-05-22 DIAGNOSIS — G89.3 CHRONIC PAIN AFTER TREATMENT FOR MALIGNANT NEOPLASM: ICD-10-CM

## 2023-05-22 RX ORDER — DULOXETIN HYDROCHLORIDE 60 MG/1
CAPSULE, DELAYED RELEASE ORAL
Qty: 30 CAPSULE | Refills: 5 | Status: SHIPPED | OUTPATIENT
Start: 2023-05-22

## 2023-05-22 RX ORDER — OXYCODONE AND ACETAMINOPHEN 7.5; 325 MG/1; MG/1
1 TABLET ORAL EVERY 6 HOURS PRN
Qty: 120 TABLET | Refills: 0 | Status: SHIPPED | OUTPATIENT
Start: 2023-05-22

## 2023-05-31 ENCOUNTER — OFFICE VISIT (OUTPATIENT)
Dept: PSYCHIATRY | Facility: CLINIC | Age: 63
End: 2023-05-31

## 2023-05-31 DIAGNOSIS — F33.1 MODERATE EPISODE OF RECURRENT MAJOR DEPRESSIVE DISORDER: ICD-10-CM

## 2023-05-31 DIAGNOSIS — F41.1 GENERALIZED ANXIETY DISORDER: Primary | ICD-10-CM

## 2023-05-31 DIAGNOSIS — G47.9 SLEEP DISTURBANCE: ICD-10-CM

## 2023-05-31 PROCEDURE — 1159F MED LIST DOCD IN RCRD: CPT | Performed by: NURSE PRACTITIONER

## 2023-05-31 PROCEDURE — 99443 PR PHYS/QHP TELEPHONE EVALUATION 21-30 MIN: CPT | Performed by: NURSE PRACTITIONER

## 2023-05-31 PROCEDURE — 1160F RVW MEDS BY RX/DR IN RCRD: CPT | Performed by: NURSE PRACTITIONER

## 2023-05-31 RX ORDER — HYDROXYZINE HYDROCHLORIDE 25 MG/1
25 TABLET, FILM COATED ORAL 3 TIMES DAILY PRN
Qty: 90 TABLET | Refills: 2 | Status: SHIPPED | OUTPATIENT
Start: 2023-05-31

## 2023-05-31 RX ORDER — MIRTAZAPINE 30 MG/1
30 TABLET, FILM COATED ORAL NIGHTLY
Qty: 30 TABLET | Refills: 2 | Status: SHIPPED | OUTPATIENT
Start: 2023-05-31

## 2023-05-31 NOTE — PROGRESS NOTES
"  Subjective   Brittani Lambert is a 63 y.o. female who is here today for medication management follow up. You have chosen to receive care through a telephone visit. Do you consent to use a telephone visit for your medical care today? Yes    TIME IN:1005  TIME OUT:1036    I spent 30 minutes in patient care: reviewing records prior to the visit, assessing the patient, entering orders and documentation.    PATIENT AT: THEIR PLACE OF RESIDENCE    PROVIDER AT:   [facility]  [address] , [address]  [place] [address]        Chief Complaint: THALIA, MDD, sleep disturbance     History of Present Illness Patient reports she has had poor sleep for past 5 weeks getting two hours then awakens can't go back to sleep, then \"finally\" go back for a couple more hours, feels tired \"mind won't shut off\". States she has increase anxious symptoms and depression past few weeks. She reports she fell three weeks ago, tripped on her patio causing her back pain \"pulled muscle which is getting a lot better\".   She states she was very anxious yesterday \"it was awful and haven't had any alprazolam for a long time\".  .  Denies adverse effects from medications. Discussed she hasn't seen her oncology provider in a year. \"Yes I'm supposed to be seeing them every 6 months I need to make an appt\". She has been seeing her PCP Dr. Colon and hasn't seen  her cardiologist Dr. Allen in almost a year, no showing. She reports she needs to go ahead and make those appts and denies barriers to making those appts. Has her best friend coming next week to visit her from [place] for 10 days. Her niece has moved to [place] and is excited about that. She and her sister and mother get together, they went to a nursery and she planted her pots on her patio. \"I feel like this will be an up swing with things planned\". Acknowledged and normalized patient's thoughts, feelings, and concerns. Flushing out worries " and concerns was conducted in order to diminish emotional tension. Feelings were processed and validated, both negative and positive. Utilized motivational interviewing techniques including complex reflections to attempt to assist the patient and focusing on the positive and to maintain and encourage calm outlook.  (Scales based on 0 - 10 with 10 being the worst)      The following portions of the patient's history were reviewed and updated as appropriate: allergies, current medications, past family history, past medical history, past social history, past surgical history and problem list.    Review of Systems  A 14 point review of systems was performed and is negative except as noted above.    Objective   Physical Exam  not currently breastfeeding.    No Known Allergies    Current Medications:   Current Outpatient Medications   Medication Sig Dispense Refill   • ARIPiprazole (ABILIFY) 5 MG tablet TAKE 1 TABLET BY MOUTH EVERY DAY 30 tablet 5   • aspirin (aspirin) 81 MG EC tablet Take 1 tablet by mouth Daily. 30 tablet 5   • cyclobenzaprine (FLEXERIL) 5 MG tablet Take 1 tablet by mouth 2 (Two) Times a Day As Needed for Muscle Spasms. 30 tablet 5   • DULoxetine (CYMBALTA) 60 MG capsule TAKE 1 CAPSULE BY MOUTH EVERY DAY 30 capsule 5   • flecainide (TAMBOCOR) 50 MG tablet Take 1 tablet by mouth 2 (Two) Times a Day. 60 tablet 4   • furosemide (LASIX) 20 MG tablet Take 1 tablet by mouth Daily As Needed (edema). 30 tablet 11   • hydrOXYzine (ATARAX) 25 MG tablet Take 1 tablet by mouth 3 (Three) Times a Day As Needed for Anxiety. 90 tablet 2   • metoprolol tartrate (LOPRESSOR) 50 MG tablet TAKE 1 TABLET BY MOUTH TWICE A DAY 60 tablet 4   • mirtazapine (REMERON) 30 MG tablet Take 1 tablet by mouth Every Night. 30 tablet 2   • oxybutynin XL (DITROPAN-XL) 5 MG 24 hr tablet TAKE 1 TABLET BY MOUTH EVERY DAY 90 tablet 0   • oxyCODONE-acetaminophen (Percocet) 7.5-325 MG per tablet Take 1 tablet by mouth Every 6 (Six) Hours As  Needed for Moderate Pain. 120 tablet 0     No current facility-administered medications for this visit.       Lab Results:  Office Visit on 05/08/2023   Component Date Value Ref Range Status   • Report Summary 05/08/2023 FINAL   Final    Comment: ====================================================================  [facility] COMP DRUG ANALYSIS,UR  ====================================================================  Test                             Result       Flag       Units  Drug Present    Carboxy-THC                    171                     ng/mg creat     Carboxy-THC is a metabolite of tetrahydrocannabinol (THC). Source     of THC is most commonly herbal marijuana or marijuana-based     products, but THC is also present in a scheduled prescription     medication. Trace amounts of THC can be present in hemp and     cannabidiol (CBD) products. This test is not intended to     distinguish between delta-9-tetrahydrocannabinol, the predominant     form of THC in most herbal or marijuana-based products, and     delta-8-tetrahydrocannabinol.    Oxycodone                      1132                    ng/mg creat    Oxymorphone                    1704                    ng/mg creat    Noroxycodone                   6701                                               ng/mg creat    Noroxymorphone                 171                     ng/mg creat     Sources of oxycodone are scheduled prescription medications.     Oxymorphone, noroxycodone, and noroxymorphone are expected     metabolites of oxycodone. Oxymorphone is also available as a     scheduled prescription medication.    Aripiprazole                   PRESENT    Acetaminophen                  PRESENT    Metoprolol                     PRESENT  ====================================================================  Test                      Result    Flag   Units      Ref Range    Creatinine              100              mg/dL       >=20  ====================================================================  Declared Medications:   Medication list was not provided.  ====================================================================  For clinical consultation, please call [phone].  ====================================================================     • Color 05/08/2023 Yellow  Yellow, Straw, Dark Yellow, Nakita Final   • Clarity, UA 05/08/2023 Clear  Clear Final   • Glucose, UA 05/08/2023 Negative  Negative mg/dL Final   • Bilirubin 05/08/2023 Negative  Negative Final   • Ketones, UA 05/08/2023 15 mg/dL (A)  Negative Final   • Specific Gravity  05/08/2023 1.015  1.005 - 1.030 Final   • Blood, UA 05/08/2023 Negative  Negative Final   • pH, Urine 05/08/2023 6.0  5.0 - 8.0 Final   • Protein, POC 05/08/2023 Negative  Negative mg/dL Final   • Urobilinogen, UA 05/08/2023 0.2 E.U./dL  Normal, 0.2 E.U./dL Final   • Leukocytes 05/08/2023 Negative  Negative Final   • Nitrite, UA 05/08/2023 Negative  Negative Final   • WBC 05/08/2023 9.2  3.4 - 10.8 x10E3/uL Final   • RBC 05/08/2023 4.73  3.77 - 5.28 x10E6/uL Final   • Hemoglobin 05/08/2023 14.9  11.1 - 15.9 g/dL Final   • Hematocrit 05/08/2023 43.4  34.0 - 46.6 % Final   • MCV 05/08/2023 92  79 - 97 fL Final   • MCH 05/08/2023 31.5  26.6 - 33.0 pg Final   • MCHC 05/08/2023 34.3  31.5 - 35.7 g/dL Final   • RDW 05/08/2023 12.6  11.7 - 15.4 % Final   • Platelets 05/08/2023 347  150 - 450 x10E3/uL Final   • Neutrophil Rel % 05/08/2023 72  Not Estab. % Final   • Lymphocyte Rel % 05/08/2023 19  Not Estab. % Final   • Monocyte Rel % 05/08/2023 7  Not Estab. % Final   • Eosinophil Rel % 05/08/2023 1  Not Estab. % Final   • Basophil Rel % 05/08/2023 1  Not Estab. % Final   • Neutrophils Absolute 05/08/2023 6.5  1.4 - 7.0 x10E3/uL Final   • Lymphocytes Absolute 05/08/2023 1.8  0.7 - 3.1 x10E3/uL Final   • Monocytes Absolute 05/08/2023 0.7  0.1 - 0.9 x10E3/uL Final   • Eosinophils Absolute 05/08/2023 0.1   0.0 - 0.4 x10E3/uL Final   • Basophils Absolute 05/08/2023 0.1  0.0 - 0.2 x10E3/uL Final   • Immature Granulocyte Rel % 05/08/2023 0  Not Estab. % Final   • Immature Grans Absolute 05/08/2023 0.0  0.0 - 0.1 x10E3/uL Final   • Glucose 05/08/2023 117 (H)  70 - 99 mg/dL Final   • BUN 05/08/2023 15  8 - 27 mg/dL Final   • Creatinine 05/08/2023 0.79  0.57 - 1.00 mg/dL Final   • EGFR Result 05/08/2023 84  >59 mL/min/1.73 Final   • BUN/Creatinine Ratio 05/08/2023 19  12 - 28 Final   • Sodium 05/08/2023 135  134 - 144 mmol/L Final   • Potassium 05/08/2023 5.2  3.5 - 5.2 mmol/L Final   • Chloride 05/08/2023 95 (L)  96 - 106 mmol/L Final   • Total CO2 05/08/2023 21  20 - 29 mmol/L Final   • Calcium 05/08/2023 10.2  8.7 - 10.3 mg/dL Final   • Total Protein 05/08/2023 7.9  6.0 - 8.5 g/dL Final   • Albumin 05/08/2023 5.2 (H)  3.8 - 4.8 g/dL Final   • Globulin 05/08/2023 2.7  1.5 - 4.5 g/dL Final   • A/G Ratio 05/08/2023 1.9  1.2 - 2.2 Final   • Total Bilirubin 05/08/2023 0.5  0.0 - 1.2 mg/dL Final   • Alkaline Phosphatase 05/08/2023 98  44 - 121 IU/L Final   • AST (SGOT) 05/08/2023 21  0 - 40 IU/L Final   • ALT (SGPT) 05/08/2023 11  0 - 32 IU/L Final   • TSH 05/08/2023 1.440  0.450 - 4.500 uIU/mL Final   • Total Cholesterol 05/08/2023 325 (H)  100 - 199 mg/dL Final   • Triglycerides 05/08/2023 272 (H)  0 - 149 mg/dL Final   • HDL Cholesterol 05/08/2023 46  >39 mg/dL Final   • VLDL Cholesterol Lamberto 05/08/2023 56 (H)  5 - 40 mg/dL Final   • LDL Chol Calc (NIH) 05/08/2023 223 (H)  0 - 99 mg/dL Final   • Comment 05/08/2023 Comment   Final    Comment: Possible Familial Hypercholesterolemia. FH should be suspected when  fasting LDL cholesterol is above 189 mg/dL or non-HDL cholesterol  is above 219 mg/dL. A family history of high cholesterol and heart  disease in 1st degree relatives should be collected. J Clin Lipidol  2011;5:133-140     • Chol/HDL Ratio 05/08/2023 7.1 (H)  0.0 - 4.4 ratio Final    Comment:                                    T. Chol/HDL Ratio                                              Men  Women                                1/2 Avg.Risk  3.4    3.3                                    Avg.Risk  5.0    4.4                                 2X Avg.Risk  9.6    7.1                                 3X Avg.Risk 23.4   11.0         THALIA-7:    Over the last two weeks, how often have you been bothered by the following problems?  Feeling nervous, anxious or on edge: More than half the days  Not being able to stop or control worrying: More than half the days  Worrying too much about different things: More than half the days  Trouble Relaxing: More than half the days  Being so restless that it is hard to sit still: Several days  Becoming easily annoyed or irritable: More than half the days  Feeling afraid as if something awful might happen: Several days  THALIA 7 Total Score: 12  If you checked any problems, how difficult have these problems made it for you to do your work, take care of things at home, or get along with other people: Somewhat difficult  0-4: Minimal anxiety  5-9: Mild anxiety  10-14: Moderate anxiety  15-21: Severe anxiety  PHQ-9:      3/14/2023     4:08 PM   PHQ-2/PHQ-9 Depression Screening   Little Interest or Pleasure in Doing Things 2-->more than half the days   Feeling Down, Depressed or Hopeless 1-->several days   Trouble Falling or Staying Asleep, or Sleeping Too Much 3-->nearly every day   Feeling Tired or Having Little Energy 3-->nearly every day   Poor Appetite or Overeating 0-->not at all   Feeling Bad about Yourself - or that You are a Failure or Have Let Yourself or Your Family Down 0-->not at all   Trouble Concentrating on Things, Such as Reading the Newspaper or Watching Television 1-->several days   Moving or Speaking So Slowly that Other People Could Have Noticed? Or the Opposite - Being So Fidgety 2-->more than half the days   Thoughts that You Would be Better Off Dead or of Hurting Yourself in Some Way 0-->not  at all   PHQ-9: Brief Depression Severity Measure Score 12   If You Checked Off Any Problems, How Difficult Have These Problems Made It For You to Do Your Work, Take Care of Things at Home, or Get Along with Other People? somewhat difficult      5-9: Minimal symptoms  10-14: Major depression mild  15-19: Major depression moderate  Greater then 20: Major depression severe    Appearance: na  Hygiene:  REPORTS good  Cooperation:  Cooperative  Eye Contact:  na  Psychomotor Behavior:  denies psychomotor agitation/retardation, No EPS, No motor tics  Mood:  Anxious sad,   Not sleeping well  Affect:  na  Hopelessness: Denies  Speech:  Normal  Thought Process:  Linear  Thought Content:  Normal  Concentration: Normal   Suicidal: denies  Homicidal:  None  Hallucinations:  None  Delusion:  None  Memory:  Intact  Orientation:  Person, Place, Time and Situation  Reliability:  good  Insight:  Fair  Judgement: good  Impulse Control: good  Estimated Intelligence: average range    ZECHARIAH REVIEWED NO RED FLAGS    Assessment & Plan   Diagnoses and all orders for this visit:    1. Generalized anxiety disorder (Primary)    2. Moderate episode of recurrent major depressive disorder    3. Sleep disturbance    Other orders  -     mirtazapine (REMERON) 30 MG tablet; Take 1 tablet by mouth Every Night.  Dispense: 30 tablet; Refill: 2  -     hydrOXYzine (ATARAX) 25 MG tablet; Take 1 tablet by mouth 3 (Three) Times a Day As Needed for Anxiety.  Dispense: 90 tablet; Refill: 2        PLAN:     1. THALIA  Add hydroxyzine 25 mg one tablet as needed for anxiety (off alprazolam for months now)    2.  MDD  Cont duloxetine 60 mg one capsule daily  Cont Abilify 5 mg daily    3. Sleep disturbance  Increase mirtazapine to 30 mg at hs for sleep    We discussed risks, benefits, and side effects of the above medications and the patient was agreeable with the plan. Patient was educated on the importance of compliance with treatment and follow-up appointments.      Provide Cognitive Behavioral Therapy and Solution Focused Therapy to improve functioning, maintain stability, and avoid decompensation and the need for higher level of care.    Counseled patient regarding multimodal approach with encouragement of healthy nutrition, healthy sleep, regular physical mobility, social involvement, counseling, and medication compliance.     Assisted patient in identifying risk factors which would indicate the need for higher level of care including thoughts to harm self or others and/or self-harming behavior and encouraged patient to contact this office, call 911, or present to the nearest emergency room should any of these events occur. Discussed crisis intervention services and means to access.  Patient adamantly and convincingly denies current suicidal or homicidal ideation or perceptual disturbance.    Treatment Plan: stabilize mood, patient will stay out of psychiatric hospital and be at optimal level of functioning with therapy and take all medication as prescribed. Patient verbalized  understanding and agreement to plan.    Instructed to call for questions or concerns and return early if necessary.     Greater than 50% time was spent in coordination of care, and counseling the patient regarding current assessment, symptoms, plan of care going forward, supportive therapy.  Answered any questions patient had regarding medications and plan of care.    Return in about 26 days (around 6/26/2023). [facility] telephone

## 2023-08-01 DIAGNOSIS — G89.3 CHRONIC PAIN AFTER TREATMENT FOR MALIGNANT NEOPLASM: ICD-10-CM

## 2023-08-01 DIAGNOSIS — M47.816 LUMBAR SPONDYLOSIS: ICD-10-CM

## 2023-08-01 RX ORDER — OXYCODONE AND ACETAMINOPHEN 7.5; 325 MG/1; MG/1
1 TABLET ORAL EVERY 6 HOURS PRN
Qty: 120 TABLET | Refills: 0 | Status: SHIPPED | OUTPATIENT
Start: 2023-08-01

## 2023-08-01 RX ORDER — ALBUTEROL SULFATE 90 UG/1
2 AEROSOL, METERED RESPIRATORY (INHALATION) EVERY 4 HOURS PRN
Qty: 8 G | Refills: 0 | Status: SHIPPED | OUTPATIENT
Start: 2023-08-01

## 2023-08-25 ENCOUNTER — OFFICE VISIT (OUTPATIENT)
Dept: FAMILY MEDICINE CLINIC | Facility: CLINIC | Age: 63
End: 2023-08-25
Payer: MEDICARE

## 2023-08-25 VITALS
WEIGHT: 106.6 LBS | BODY MASS INDEX: 20.12 KG/M2 | TEMPERATURE: 96.9 F | DIASTOLIC BLOOD PRESSURE: 82 MMHG | HEIGHT: 61 IN | HEART RATE: 68 BPM | RESPIRATION RATE: 14 BRPM | SYSTOLIC BLOOD PRESSURE: 136 MMHG

## 2023-08-25 DIAGNOSIS — R51.9 INCREASED FREQUENCY OF HEADACHES: ICD-10-CM

## 2023-08-25 DIAGNOSIS — Z85.89 HISTORY OF MALIGNANT NEOPLASM METASTATIC TO BRAIN: ICD-10-CM

## 2023-08-25 DIAGNOSIS — G43.119 INTRACTABLE MIGRAINE WITH AURA WITHOUT STATUS MIGRAINOSUS: Primary | ICD-10-CM

## 2023-08-25 PROCEDURE — 99214 OFFICE O/P EST MOD 30 MIN: CPT | Performed by: FAMILY MEDICINE

## 2023-08-25 PROCEDURE — 1159F MED LIST DOCD IN RCRD: CPT | Performed by: FAMILY MEDICINE

## 2023-08-25 PROCEDURE — 1160F RVW MEDS BY RX/DR IN RCRD: CPT | Performed by: FAMILY MEDICINE

## 2023-08-25 RX ORDER — SUMATRIPTAN 100 MG/1
TABLET, FILM COATED ORAL
Qty: 10 TABLET | Refills: 2 | Status: SHIPPED | OUTPATIENT
Start: 2023-08-25

## 2023-08-25 NOTE — PROGRESS NOTES
"Chief Complaint  Headache (Worsening headaches on left side, x1mo. Has several.)    Subjective          Brittani Lambert presents to [facility]  Headache  Headaches x 1 month, intermittent  Headache located in left superior head. She reports nausea, photosensitivity, and \"squiggly line\" in her vision during headache.   She had migraines years ago.   Treating headaches with Percocet, but has no been helpful.  Denies increased stress, but her sleep pattern has not been well recently.   Eye exam is not up to date  She has history of brain metastasis from breast cancer. Most recent MRI brain completed May 2022. Last follow up with oncology May 2022, needs to schedule follow up.    The following portions of the patient's history were reviewed and updated as appropriate: allergies, current medications, past family history, past medical history, past social history, past surgical history, and problem list.    Objective      Physical Exam  Vitals reviewed.   Cardiovascular:      Rate and Rhythm: Normal rate.      Heart sounds: Normal heart sounds.   Pulmonary:      Effort: Pulmonary effort is normal.      Breath sounds: Normal breath sounds.   Neurological:      Mental Status: She is alert.      Cranial Nerves: No cranial nerve deficit.      Result Review :                Assessment and Plan    Diagnoses and all orders for this visit:    1. Intractable migraine with aura without status migrainosus (Primary)  -     MRI Brain With & Without Contrast; Future  -     SUMAtriptan (Imitrex) 100 MG tablet; Take one tablet at onset of headache. May repeat dose one time in 2 hours if headache not relieved.  Dispense: 10 tablet; Refill: 2    2. Increased frequency of headaches  -     MRI Brain With & Without Contrast; Future    3. History of malignant neoplasm metastatic to brain  -     MRI Brain With & Without Contrast; Future    Imitrex to relieve migraine. With her history of brain metastasis, will " update MRI brain.   She will contact oncology to schedule her routine follow up, along with updating routine eye exam.       Follow Up   Return if symptoms worsen or fail to improve.  Patient was given instructions and counseling regarding her condition or for health maintenance advice. Please see specific information pulled into the AVS if appropriate.

## 2023-08-30 RX ORDER — MIRTAZAPINE 30 MG/1
TABLET, FILM COATED ORAL
Qty: 30 TABLET | Refills: 5 | Status: SHIPPED | OUTPATIENT
Start: 2023-08-30

## 2023-08-30 RX ORDER — HYDROXYZINE HYDROCHLORIDE 25 MG/1
TABLET, FILM COATED ORAL
Qty: 90 TABLET | Refills: 2 | Status: SHIPPED | OUTPATIENT
Start: 2023-08-30

## 2023-08-31 DIAGNOSIS — M47.816 LUMBAR SPONDYLOSIS: ICD-10-CM

## 2023-08-31 DIAGNOSIS — G89.3 CHRONIC PAIN AFTER TREATMENT FOR MALIGNANT NEOPLASM: ICD-10-CM

## 2023-08-31 NOTE — TELEPHONE ENCOUNTER
Caller: Brittani Lambert    Relationship: Self    Best call back number: [phone]     Requested Prescriptions:   Requested Prescriptions     Pending Prescriptions Disp Refills    oxyCODONE-acetaminophen (Percocet) 7.5-325 MG per tablet 120 tablet 0     Sig: Take 1 tablet by mouth Every 6 (Six) Hours As Needed for Moderate Pain.        Pharmacy where request should be sent: [facility]/PHARMACY #[id number] - [place], [place] - [address] [place] [address] AT [place] [address] - [phone] [facility] [phone] FX     Last office visit with prescribing clinician: 8/25/2023   Last telemedicine visit with prescribing clinician: Visit date not found   Next office visit with prescribing clinician: 12/4/2023     Additional details provided by patient: PATIENT HAS 1 REMAINING     Does the patient have less than a 3 day supply:  [x] Yes  [] No    Would you like a call back once the refill request has been completed: [x] Yes [] No    If the office needs to give you a call back, can they leave a voicemail: [x] Yes [] No    Rodger Ugalde Rep   08/31/23 16:49 EDT

## 2023-09-01 RX ORDER — OXYCODONE AND ACETAMINOPHEN 7.5; 325 MG/1; MG/1
1 TABLET ORAL EVERY 6 HOURS PRN
Qty: 120 TABLET | Refills: 0 | Status: SHIPPED | OUTPATIENT
Start: 2023-09-01

## 2023-10-04 ENCOUNTER — HOSPITAL ENCOUNTER (OUTPATIENT)
Dept: MRI IMAGING | Facility: HOSPITAL | Age: 63
Discharge: HOME | End: 2023-10-04
Admitting: FAMILY MEDICINE
Payer: MEDICARE

## 2023-10-04 DIAGNOSIS — R51.9 INCREASED FREQUENCY OF HEADACHES: ICD-10-CM

## 2023-10-04 DIAGNOSIS — Z85.89 HISTORY OF MALIGNANT NEOPLASM METASTATIC TO BRAIN: ICD-10-CM

## 2023-10-04 DIAGNOSIS — G43.119 INTRACTABLE MIGRAINE WITH AURA WITHOUT STATUS MIGRAINOSUS: ICD-10-CM

## 2023-10-04 LAB — CREAT BLDA-MCNC: 0.7 MG/DL (ref 0.6–1.3)

## 2023-10-04 PROCEDURE — 0 GADOBENATE DIMEGLUMINE 529 MG/ML SOLUTION: Performed by: FAMILY MEDICINE

## 2023-10-04 PROCEDURE — 82565 ASSAY OF CREATININE: CPT

## 2023-10-04 PROCEDURE — 70553 MRI BRAIN STEM W/O & W/DYE: CPT

## 2023-10-04 PROCEDURE — A9577 INJ MULTIHANCE: HCPCS | Performed by: FAMILY MEDICINE

## 2023-10-04 RX ADMIN — GADOBENATE DIMEGLUMINE 9 ML: 529 INJECTION, SOLUTION INTRAVENOUS at 11:06

## 2023-10-06 ENCOUNTER — TELEPHONE (OUTPATIENT)
Dept: FAMILY MEDICINE CLINIC | Facility: CLINIC | Age: 63
End: 2023-10-06
Payer: MEDICARE

## 2023-10-06 DIAGNOSIS — M47.816 LUMBAR SPONDYLOSIS: ICD-10-CM

## 2023-10-06 DIAGNOSIS — G89.3 CHRONIC PAIN AFTER TREATMENT FOR MALIGNANT NEOPLASM: ICD-10-CM

## 2023-10-06 RX ORDER — OXYCODONE AND ACETAMINOPHEN 7.5; 325 MG/1; MG/1
1 TABLET ORAL EVERY 6 HOURS PRN
Qty: 120 TABLET | Refills: 0 | Status: SHIPPED | OUTPATIENT
Start: 2023-10-06

## 2023-10-06 NOTE — TELEPHONE ENCOUNTER
Caller: ELVIRA RIVERA     Relationship: PATIENT    Best call back number:  A MESSAGE CAN BE LEFT.    Caller requesting test results: PATIENT    What test was performed: MRI    When was the test performed: 10/04/23    Where was the test performed: [facility]    Additional notes: PLEASE CALL WITH RESULTS

## 2023-10-06 NOTE — TELEPHONE ENCOUNTER
Caller: Brittani Lambert    Relationship: Self    Best call back number: [phone]     Requested Prescriptions:   Requested Prescriptions     Pending Prescriptions Disp Refills    oxyCODONE-acetaminophen (Percocet) 7.5-325 MG per tablet 120 tablet 0     Sig: Take 1 tablet by mouth Every 6 (Six) Hours As Needed for Moderate Pain.        Pharmacy where request should be sent: [facility]/PHARMACY #[id number] - [place], [place] - [address] [place] [address] AT [place] [address] - [phone] [facility] [phone] FX     Last office visit with prescribing clinician: 8/25/2023   Last telemedicine visit with prescribing clinician: Visit date not found   Next office visit with prescribing clinician: 12/4/2023     Additional details provided by patient: HAS 1 1/2 DAYS LEFT    Does the patient have less than a 3 day supply:  [x] Yes  [] No    Would you like a call back once the refill request has been completed: [x] Yes [] No    If the office needs to give you a call back, can they leave a voicemail: [x] Yes [] No    Rodger Bishop Rep   10/06/23 09:36 EDT

## 2023-11-14 DIAGNOSIS — G89.3 CHRONIC PAIN AFTER TREATMENT FOR MALIGNANT NEOPLASM: ICD-10-CM

## 2023-11-14 DIAGNOSIS — M47.816 LUMBAR SPONDYLOSIS: ICD-10-CM

## 2023-11-14 RX ORDER — OXYCODONE AND ACETAMINOPHEN 7.5; 325 MG/1; MG/1
1 TABLET ORAL EVERY 6 HOURS PRN
Qty: 120 TABLET | Refills: 0 | Status: SHIPPED | OUTPATIENT
Start: 2023-11-14

## 2023-11-14 NOTE — TELEPHONE ENCOUNTER
Caller: Brittani Lambert    Relationship: Self    Best call back number: [phone]     Requested Prescriptions:   Requested Prescriptions     Pending Prescriptions Disp Refills    oxyCODONE-acetaminophen (Percocet) 7.5-325 MG per tablet 120 tablet 0     Sig: Take 1 tablet by mouth Every 6 (Six) Hours As Needed for Moderate Pain.        Pharmacy where request should be sent: [facility]/PHARMACY #[id number] - [place], [place] - [address] [place] [address] AT [place] [address] - [phone] [facility] [phone] FX     Last office visit with prescribing clinician: 8/25/2023   Last telemedicine visit with prescribing clinician: Visit date not found   Next office visit with prescribing clinician: 12/4/2023     Additional details provided by patient:     Does the patient have less than a 3 day supply:  [] Yes  [x] No    Would you like a call back once the refill request has been completed: [] Yes [x] No    If the office needs to give you a call back, can they leave a voicemail: [] Yes [x] No    Rodger Robert Rep   11/14/23 16:05 EST

## 2023-11-16 NOTE — TELEPHONE ENCOUNTER
Dr. Cooley responded to secure chat that controlled substance medication are not addressed after hours.  Patient will need to call Monday.  Patient will be notified.    Left message for patient to call back.

## 2023-12-14 DIAGNOSIS — M47.816 LUMBAR SPONDYLOSIS: ICD-10-CM

## 2023-12-14 DIAGNOSIS — G89.3 CHRONIC PAIN AFTER TREATMENT FOR MALIGNANT NEOPLASM: ICD-10-CM

## 2023-12-14 RX ORDER — OXYCODONE AND ACETAMINOPHEN 7.5; 325 MG/1; MG/1
1 TABLET ORAL EVERY 6 HOURS PRN
Qty: 120 TABLET | Refills: 0 | Status: SHIPPED | OUTPATIENT
Start: 2023-12-14

## 2023-12-14 NOTE — TELEPHONE ENCOUNTER
Caller: Brittani Lambert    Relationship: Self    Best call back number:     [phone] (Mobile)     Requested Prescriptions:   Requested Prescriptions     Pending Prescriptions Disp Refills    oxyCODONE-acetaminophen (Percocet) 7.5-325 MG per tablet 120 tablet 0     Sig: Take 1 tablet by mouth Every 6 (Six) Hours As Needed for Moderate Pain.      Pharmacy where request should be sent:     [facility]/PHARMACY #[id number] - [address] AT [place] [address] - [phone] [facility] [phone] FX     Last office visit with prescribing clinician: 8/25/2023   Last telemedicine visit with prescribing clinician: Visit date not found   Next office visit with prescribing clinician: Visit date not found     Additional details provided by patient:     PATIENT STATED SHE HAS A (2) DAY SUPPLY OF MEDICATION LEFT    Does the patient have less than a 3 day supply:  [x] Yes  [] No    Would you like a call back once the refill request has been completed: [] Yes [] No    If the office needs to give you a call back, can they leave a voicemail: [] Yes [] No    Rodger Durand Rep   12/14/23 10:16 EST

## 2024-01-15 DIAGNOSIS — M47.816 LUMBAR SPONDYLOSIS: ICD-10-CM

## 2024-01-15 DIAGNOSIS — G89.3 CHRONIC PAIN AFTER TREATMENT FOR MALIGNANT NEOPLASM: ICD-10-CM

## 2024-01-15 RX ORDER — OXYCODONE AND ACETAMINOPHEN 7.5; 325 MG/1; MG/1
1 TABLET ORAL EVERY 6 HOURS PRN
Qty: 120 TABLET | Refills: 0 | Status: SHIPPED | OUTPATIENT
Start: 2024-01-15

## 2024-01-15 NOTE — TELEPHONE ENCOUNTER
Caller: Brittani Lambert    Relationship: Self    Best call back number: [phone]     Requested Prescriptions:   Requested Prescriptions     Pending Prescriptions Disp Refills    oxyCODONE-acetaminophen (Percocet) 7.5-325 MG per tablet 120 tablet 0     Sig: Take 1 tablet by mouth Every 6 (Six) Hours As Needed for Moderate Pain.        Pharmacy where request should be sent: [facility]/PHARMACY #[id number] - [place], [place] - [address] [place] [address] AT [facility] [address] - [phone] [facility] [phone] FX     Last office visit with prescribing clinician: 8/25/2023   Last telemedicine visit with prescribing clinician: Visit date not found   Next office visit with prescribing clinician: Visit date not found     Additional details provided by patient: 1 DAY LEFT OF MEDICATION.      Does the patient have less than a 3 day supply:  [x] Yes  [] No    Would you like a call back once the refill request has been completed: [] Yes [x] No    If the office needs to give you a call back, can they leave a voicemail: [] Yes [x] No    Janice Cueto   01/15/24 13:07 EST

## 2024-02-21 DIAGNOSIS — G89.3 CHRONIC PAIN AFTER TREATMENT FOR MALIGNANT NEOPLASM: ICD-10-CM

## 2024-02-21 DIAGNOSIS — M47.816 LUMBAR SPONDYLOSIS: ICD-10-CM

## 2024-02-21 RX ORDER — HYDROXYZINE HYDROCHLORIDE 25 MG/1
25 TABLET, FILM COATED ORAL 3 TIMES DAILY PRN
Qty: 90 TABLET | Refills: 2 | Status: SHIPPED | OUTPATIENT
Start: 2024-02-21

## 2024-02-21 RX ORDER — OXYCODONE AND ACETAMINOPHEN 7.5; 325 MG/1; MG/1
1 TABLET ORAL EVERY 6 HOURS PRN
Qty: 120 TABLET | Refills: 0 | Status: SHIPPED | OUTPATIENT
Start: 2024-02-21

## 2024-02-21 RX ORDER — ALBUTEROL SULFATE 90 UG/1
2 AEROSOL, METERED RESPIRATORY (INHALATION) EVERY 4 HOURS PRN
Qty: 6.7 G | Refills: 1 | Status: SHIPPED | OUTPATIENT
Start: 2024-02-21

## 2024-02-21 NOTE — TELEPHONE ENCOUNTER
Caller: Brittani Lambert    Relationship: Self    Best call back number: [phone]     Requested Prescriptions:   Requested Prescriptions     Pending Prescriptions Disp Refills    oxyCODONE-acetaminophen (Percocet) 7.5-325 MG per tablet 120 tablet 0     Sig: Take 1 tablet by mouth Every 6 (Six) Hours As Needed for Moderate Pain.    hydrOXYzine (ATARAX) 25 MG tablet 90 tablet 2     Sig: Take 1 tablet by mouth 3 (Three) Times a Day As Needed for Anxiety.        Pharmacy where request should be sent: [facility]/PHARMACY #[id number] [place], [place] - [address] [place] [address] 25 - [phone] [facility] [phone] FX     Last office visit with prescribing clinician: 8/25/2023   Last telemedicine visit with prescribing clinician: Visit date not found   Next office visit with prescribing clinician: 2/26/2024     Does the patient have less than a 3 day supply:  [x] Yes  [] No    Would you like a call back once the refill request has been completed: [x] Yes [] No    If the office needs to give you a call back, can they leave a voicemail: [x] Yes [] No    Rodger Guzman Rep   02/21/24 10:54 EST

## 2024-02-21 NOTE — TELEPHONE ENCOUNTER
Caller: Brittani Lambert    Relationship: Self    Best call back number: [phone]     Requested Prescriptions:   Requested Prescriptions     Pending Prescriptions Disp Refills    oxyCODONE-acetaminophen (Percocet) 7.5-325 MG per tablet 120 tablet 0     Sig: Take 1 tablet by mouth Every 6 (Six) Hours As Needed for Moderate Pain.        Pharmacy where request should be sent: [facility]/PHARMACY #[id number] - [place], [place] - [address] [place] [address] AT [place] [id number] - [phone] [facility] [phone] FX     Last office visit with prescribing clinician: 8/25/2023   Last telemedicine visit with prescribing clinician: Visit date not found   Next office visit with prescribing clinician: 2/26/2024     Does the patient have less than a 3 day supply:  [x] Yes  [] No    Would you like a call back once the refill request has been completed: [x] Yes [] No    If the office needs to give you a call back, can they leave a voicemail: [x] Yes [] No    Rodger Guzman Rep   02/21/24 10:52 EST

## 2024-02-26 ENCOUNTER — HOSPITAL ENCOUNTER (OUTPATIENT)
Dept: GENERAL RADIOLOGY | Facility: HOSPITAL | Age: 64
Discharge: HOME | End: 2024-02-26
Admitting: FAMILY MEDICINE
Payer: MEDICARE

## 2024-02-26 ENCOUNTER — OFFICE VISIT (OUTPATIENT)
Dept: FAMILY MEDICINE CLINIC | Facility: CLINIC | Age: 64
End: 2024-02-26
Payer: MEDICARE

## 2024-02-26 VITALS
RESPIRATION RATE: 16 BRPM | SYSTOLIC BLOOD PRESSURE: 108 MMHG | HEART RATE: 90 BPM | BODY MASS INDEX: 19.45 KG/M2 | HEIGHT: 61 IN | OXYGEN SATURATION: 97 % | TEMPERATURE: 97.1 F | WEIGHT: 103 LBS | DIASTOLIC BLOOD PRESSURE: 70 MMHG

## 2024-02-26 DIAGNOSIS — M25.552 LEFT HIP PAIN: Primary | ICD-10-CM

## 2024-02-26 DIAGNOSIS — G89.3 CHRONIC PAIN AFTER TREATMENT FOR MALIGNANT NEOPLASM: ICD-10-CM

## 2024-02-26 DIAGNOSIS — F51.01 PRIMARY INSOMNIA: ICD-10-CM

## 2024-02-26 DIAGNOSIS — M25.552 LEFT HIP PAIN: ICD-10-CM

## 2024-02-26 DIAGNOSIS — Z79.891 CHRONIC PRESCRIPTION OPIATE USE: ICD-10-CM

## 2024-02-26 DIAGNOSIS — M47.816 LUMBAR SPONDYLOSIS: ICD-10-CM

## 2024-02-26 PROCEDURE — 99214 OFFICE O/P EST MOD 30 MIN: CPT | Performed by: FAMILY MEDICINE

## 2024-02-26 PROCEDURE — 73502 X-RAY EXAM HIP UNI 2-3 VIEWS: CPT

## 2024-02-26 PROCEDURE — 1160F RVW MEDS BY RX/DR IN RCRD: CPT | Performed by: FAMILY MEDICINE

## 2024-02-26 PROCEDURE — 72110 X-RAY EXAM L-2 SPINE 4/>VWS: CPT

## 2024-02-26 PROCEDURE — 1159F MED LIST DOCD IN RCRD: CPT | Performed by: FAMILY MEDICINE

## 2024-02-26 RX ORDER — TRAZODONE HYDROCHLORIDE 100 MG/1
50-100 TABLET ORAL NIGHTLY
Qty: 90 TABLET | Refills: 1 | Status: SHIPPED | OUTPATIENT
Start: 2024-02-26

## 2024-02-26 NOTE — PROGRESS NOTES
Chief Complaint  Med Refill    Subjective          Brittani Lambert presents to [facility]  History of Present Illness    Left hip is locking up on her a few times a week. Walking and standing makes pain worse.   Pain located in posterior hip  While sitting, if she rocks back and forth, it helps to ease pain.  Treating with her chronic pain medication, Percocet    She has stopped Abilify, duloxetine, and Remeron. Stating that they were doing nothing for her. She is not sleeping well, usually about 4 hours per night.   Sleep is interrupted. Not taking otc sleep aids.     She has atrial fibrillation. She stopped metoprolol, flecaiide, and furosemide. Last saw Dr. Allen in 2022. Denies chest pain and palpitations.    The following portions of the patient's history were reviewed and updated as appropriate: allergies, current medications, past family history, past medical history, past social history, past surgical history, and problem list.    Objective      Physical Exam  Vitals reviewed.   Cardiovascular:      Rate and Rhythm: Normal rate.      Heart sounds: Normal heart sounds.   Pulmonary:      Effort: Pulmonary effort is normal.      Breath sounds: Normal breath sounds.   Musculoskeletal:      Lumbar back: Tenderness (left SI) present.      Right hip: No tenderness. Normal range of motion.   Neurological:      Mental Status: She is alert.        Result Review :                Assessment and Plan    Diagnoses and all orders for this visit:    1. Left hip pain (Primary)  -     XR Hip With or Without Pelvis 2 - 3 View Left; Future  -     XR Spine Lumbar 4+ View; Future    2. Primary insomnia  -     traZODone (DESYREL) 100 MG tablet; Take 0.5-1 tablets by mouth Every Night.  Dispense: 90 tablet; Refill: 1    3. Lumbar spondylosis  -     XR Spine Lumbar 4+ View; Future    4. Chronic pain after treatment for malignant neoplasm  -     Compliance Drug Analysis, Ur - Urine, Clean Catch    5.  Chronic prescription opiate use  -     Compliance Drug Analysis, Ur - Urine, Clean Catch    Xray to evaluate left hip and SI pain. Consider physical therapy   Trazodone to improve insomnia.       Follow Up   Return in about 3 months (around 5/26/2024) for Medicare Wellness.  Patient was given instructions and counseling regarding her condition or for health maintenance advice. Please see specific information pulled into the AVS if appropriate.

## 2024-02-29 DIAGNOSIS — M47.816 LUMBAR SPONDYLOSIS: ICD-10-CM

## 2024-02-29 DIAGNOSIS — M25.552 LEFT HIP PAIN: Primary | ICD-10-CM

## 2024-03-01 ENCOUNTER — TELEPHONE (OUTPATIENT)
Dept: FAMILY MEDICINE CLINIC | Facility: CLINIC | Age: 64
End: 2024-03-01
Payer: MEDICARE

## 2024-03-01 NOTE — TELEPHONE ENCOUNTER
Hub staff attempted to follow warm transfer process and was unsuccessful     Caller: Brittani Lambert    Relationship to patient: Self    Best call back number: [phone]    Patient is needing: AN UPDATE ON RESULTS OF XRAY FROM MONDAY 022524

## 2024-03-01 NOTE — TELEPHONE ENCOUNTER
Caller: Brittani Lambert    Relationship: Self    Best call back number: [phone]     What test was performed: XR SPINE LUMBER AND XR HIP     When was the test performed: 02/26/2024     Additional notes:   PATIENT WOULD LIKE A CALL BACK TO BE INFORMED OF THE RESULTS OF HER XRAY'S TAKEN ON 02/26/2024

## 2024-03-05 LAB — DRUGS UR: NORMAL

## 2024-03-25 DIAGNOSIS — G89.3 CHRONIC PAIN AFTER TREATMENT FOR MALIGNANT NEOPLASM: ICD-10-CM

## 2024-03-25 DIAGNOSIS — M47.816 LUMBAR SPONDYLOSIS: ICD-10-CM

## 2024-03-25 RX ORDER — OXYCODONE AND ACETAMINOPHEN 7.5; 325 MG/1; MG/1
1 TABLET ORAL EVERY 6 HOURS PRN
Qty: 120 TABLET | Refills: 0 | Status: SHIPPED | OUTPATIENT
Start: 2024-03-25

## 2024-03-25 NOTE — TELEPHONE ENCOUNTER
Caller: Josiyunior Brittani    Relationship: Self    Best call back number: [phone]    Requested Prescriptions:   Requested Prescriptions     Pending Prescriptions Disp Refills    oxyCODONE-acetaminophen (Percocet) 7.5-325 MG per tablet 120 tablet 0     Sig: Take 1 tablet by mouth Every 6 (Six) Hours As Needed for Moderate Pain.        Pharmacy where request should be sent: [facility]/PHARMACY #[id number] - [place], [place] - [address] [place] [address] AT [facility] 25 - [phone] [facility] [phone] FX     Last office visit with prescribing clinician: 2/26/2024   Last telemedicine visit with prescribing clinician: Visit date not found   Next office visit with prescribing clinician: 5/30/2024     Additional details provided by patient:     Rodger Powell Rep   03/25/24 11:33 EDT

## 2024-04-26 DIAGNOSIS — M47.816 LUMBAR SPONDYLOSIS: ICD-10-CM

## 2024-04-26 DIAGNOSIS — G89.3 CHRONIC PAIN AFTER TREATMENT FOR MALIGNANT NEOPLASM: ICD-10-CM

## 2024-04-26 RX ORDER — OXYCODONE AND ACETAMINOPHEN 7.5; 325 MG/1; MG/1
1 TABLET ORAL EVERY 6 HOURS PRN
Qty: 120 TABLET | Refills: 0 | Status: SHIPPED | OUTPATIENT
Start: 2024-04-26

## 2024-04-26 NOTE — TELEPHONE ENCOUNTER
Caller: Brittani Lambert    Relationship: Self    Best call back number: [phone]     Requested Prescriptions:   Requested Prescriptions     Pending Prescriptions Disp Refills    oxyCODONE-acetaminophen (Percocet) 7.5-325 MG per tablet 120 tablet 0     Sig: Take 1 tablet by mouth Every 6 (Six) Hours As Needed for Moderate Pain.      Pharmacy where request should be sent: [facility]/PHARMACY #[id number] - [place], [place] - [address] [place] [address] AT [place] [address] - [phone] [facility] [phone] FX     Last office visit with prescribing clinician: 2/26/2024   Last telemedicine visit with prescribing clinician: Visit date not found   Next office visit with prescribing clinician: 5/30/2024     Additional details provided by patient: PATIENT HAS ONE DAY LEFT    Does the patient have less than a 3 day supply:  [x] Yes  [] No    Would you like a call back once the refill request has been completed: [] Yes [x] No    If the office needs to give you a call back, can they leave a voicemail: [] Yes [x] No    Rodger Brady   04/26/24 17:01 EDT

## 2024-06-03 DIAGNOSIS — M47.816 LUMBAR SPONDYLOSIS: ICD-10-CM

## 2024-06-03 DIAGNOSIS — G89.3 CHRONIC PAIN AFTER TREATMENT FOR MALIGNANT NEOPLASM: ICD-10-CM

## 2024-06-03 NOTE — TELEPHONE ENCOUNTER
Caller: Brittani Lambert    Relationship: Self    Best call back number: [phone]     Requested Prescriptions:   Requested Prescriptions     Pending Prescriptions Disp Refills    oxyCODONE-acetaminophen (Percocet) 7.5-325 MG per tablet 120 tablet 0     Sig: Take 1 tablet by mouth Every 6 (Six) Hours As Needed for Moderate Pain.        Pharmacy where request should be sent: [facility]/PHARMACY #[id number] - [place], [place] - [address] [place] [address] AT [place] [phone] - [phone] [facility] [phone] FX     Last office visit with prescribing clinician: 2/26/2024   Last telemedicine visit with prescribing clinician: Visit date not found   Next office visit with prescribing clinician: Visit date not found     Additional details provided by patient:     Does the patient have less than a 3 day supply:  [x] Yes  [] No    Would you like a call back once the refill request has been completed: [] Yes [x] No    If the office needs to give you a call back, can they leave a voicemail: [] Yes [x] No    Rodger Robert   06/03/24 09:08 EDT

## 2024-06-04 RX ORDER — OXYCODONE AND ACETAMINOPHEN 7.5; 325 MG/1; MG/1
1 TABLET ORAL EVERY 6 HOURS PRN
Qty: 120 TABLET | Refills: 0 | Status: SHIPPED | OUTPATIENT
Start: 2024-06-04

## 2024-06-04 NOTE — TELEPHONE ENCOUNTER
SPOKE WITH PATIENT, SHE STATED THAT NOW IS NOT A GOOD TIME AND THAT SHE WOULD CALL BACK AND SCHEDULE. ALSO ASKED TO HAVE THE FOR MENTIONED MEDICATION FILLED.      PLEASE ADVISE

## 2024-06-18 ENCOUNTER — OFFICE VISIT (OUTPATIENT)
Dept: PSYCHIATRY | Facility: CLINIC | Age: 64
End: 2024-06-18
Payer: MEDICARE

## 2024-06-18 DIAGNOSIS — G47.9 SLEEP DISTURBANCE: ICD-10-CM

## 2024-06-18 DIAGNOSIS — F33.1 MODERATE EPISODE OF RECURRENT MAJOR DEPRESSIVE DISORDER: Primary | ICD-10-CM

## 2024-06-18 DIAGNOSIS — F41.1 GENERALIZED ANXIETY DISORDER: ICD-10-CM

## 2024-06-18 PROCEDURE — 1159F MED LIST DOCD IN RCRD: CPT | Performed by: NURSE PRACTITIONER

## 2024-06-18 PROCEDURE — 99214 OFFICE O/P EST MOD 30 MIN: CPT | Performed by: NURSE PRACTITIONER

## 2024-06-18 PROCEDURE — 1160F RVW MEDS BY RX/DR IN RCRD: CPT | Performed by: NURSE PRACTITIONER

## 2024-06-18 RX ORDER — BUSPIRONE HYDROCHLORIDE 5 MG/1
5 TABLET ORAL 3 TIMES DAILY
Qty: 90 TABLET | Refills: 1 | Status: SHIPPED | OUTPATIENT
Start: 2024-06-18

## 2024-06-18 RX ORDER — BUPROPION HYDROCHLORIDE 100 MG/1
TABLET, EXTENDED RELEASE ORAL
Qty: 30 TABLET | Refills: 1 | Status: SHIPPED | OUTPATIENT
Start: 2024-06-18

## 2024-06-18 NOTE — PROGRESS NOTES
"  Subjective   Brittani Lambert is a 64 y.o. female who is here today for medication management follow up. IN PERSON FACE TO FACE    TIME IN:1015  TIME OUT: 1045    I spent 30 minutes in patient care: reviewing records prior to the visit, assessing the patient, entering orders and documentation.    Chief Complaint: depression, anxiety, sleep disturbance     History of Present Illness The patient reports depressive symptoms including depressed mood, crying spells, decreased appetite, anhedonia, feelings of guilt, feelings of hopelessness, feelings of helplessness, feelings of worthlessness, low energy, and difficulty concentrating, present on most days for the past 12+ month(s)  and have caused impairment in important areas of functioning. Depression rated 8/10 with 10 being the worst. Denies SI/HI or AVH. Stopped taking all her medications about eight months ago. She hasn't showed for appointments and hasn't continued surveillance with her oncologist. She has episodic migraines about every 6 weeks. She reports seeing her PCP Dr. Colon and has had MRI of brain, DAMI and scans of hips and spine because of chronic pain and showed DDD and bone loss. The patient reports the following symptoms of generalized anxiety: constant anxiety/worry, restlessness/on edge, difficulty concentrating, mind goes blank, irritability, muscle tension, and sleep disturbance. The symptoms have been present for at least 12+ month(s) and have caused impairment in important areas of functioning.  She reports buspar being effective for anxiety in \"way past\" and has had bupropion in past that helped with depression and willing to try that as well. Sleep disturbance initiation and sustaining however Trazodone 100 mg nightly helps \"a great deal\". Sees her mother and sister occasionally but  fairing isolating. Has her apartment and put flowers in her pots on patio. Reports plan to go see her daughter in September and visit with her grandson. Patient " under financial stress.  Venting of frustrations was conducted. Feelings were processed and validated, both negative and positive. Flushing out worries and concerns was conducted in order to diminish emotional tension.  Utilized motivational interviewing techniques including complex reflections to attempt to assist the patient and focusing on the positive and to maintain and encourage calm outlook.  Denies adverse effects from medications.   (Scales based on 0 - 10 with 10 being the worst)        The following portions of the patient's history were reviewed and updated as appropriate: allergies, current medications, past family history, past medical history, past social history, past surgical history, and problem list.    Review of Systems  A 14 point review of systems was performed and is negative except as noted above.    Objective   Physical Exam  not currently breastfeeding.    No Known Allergies    Current Medications:   Current Outpatient Medications   Medication Sig Dispense Refill    aspirin (aspirin) 81 MG EC tablet Take 1 tablet by mouth Daily. (Patient not taking: Reported on 8/25/2023) 30 tablet 5    buPROPion SR (Wellbutrin SR) 100 MG 12 hr tablet TAKE ONE TABLET IN AM DAILY 30 tablet 1    busPIRone (BUSPAR) 5 MG tablet Take 1 tablet by mouth 3 (Three) Times a Day. 90 tablet 1    hydrOXYzine (ATARAX) 25 MG tablet Take 1 tablet by mouth 3 (Three) Times a Day As Needed for Anxiety. 90 tablet 2    oxyCODONE-acetaminophen (Percocet) 7.5-325 MG per tablet Take 1 tablet by mouth Every 6 (Six) Hours As Needed for Moderate Pain. 120 tablet 0    traZODone (DESYREL) 100 MG tablet Take 0.5-1 tablets by mouth Every Night. 90 tablet 1     No current facility-administered medications for this visit.       Lab Results:  Office Visit on 02/26/2024   Component Date Value Ref Range Status    Report Summary 02/26/2024 FINAL   Final    Comment: ====================================================================  [facility]  COMP DRUG ANALYSIS,UR  ====================================================================  Test                             Result       Flag       Units  Drug Present    Carboxy-THC                    12                      ng/mg creat     Carboxy-THC is a metabolite of tetrahydrocannabinol (THC). Source     of THC is most commonly herbal marijuana or marijuana-based     products, but THC is also present in a scheduled prescription     medication. Trace amounts of THC can be present in hemp and     cannabidiol (CBD) products. This test is not intended to     distinguish between delta-9-tetrahydrocannabinol, the predominant     form of THC in most herbal or marijuana-based products, and     delta-8-tetrahydrocannabinol.    Oxycodone                      3294                    ng/mg creat    Oxymorphone                    1692                    ng/mg creat    Noroxycodone                   >9259                                              ng/mg creat    Noroxymorphone                 324                     ng/mg creat     Sources of oxycodone are scheduled prescription medications.     Oxymorphone, noroxycodone, and noroxymorphone are expected     metabolites of oxycodone. Oxymorphone is also available as a     scheduled prescription medication.    Acetaminophen                  PRESENT    Hydroxyzine                    PRESENT  ====================================================================  Test                      Result    Flag   Units      Ref Range    Creatinine              108              mg/dL      >=20  ====================================================================  Declared Medications:   Medication list was not provided.  ====================================================================  For clinical consultation, please call [phone].  ====================================================================         THALIA-7:    Over the last two weeks, how often have you been  bothered by the following problems?  Feeling nervous, anxious or on edge: Nearly every day  Not being able to stop or control worrying: Nearly every day  Worrying too much about different things: Nearly every day  Trouble Relaxing: More than half the days  Being so restless that it is hard to sit still: Not at all  Becoming easily annoyed or irritable: Several days  Feeling afraid as if something awful might happen: Several days  THALIA 7 Total Score: 13  If you checked any problems, how difficult have these problems made it for you to do your work, take care of things at home, or get along with other people: Very difficult  0-4: Minimal anxiety  5-9: Mild anxiety  10-14: Moderate anxiety  15-21: Severe anxiety  PHQ-9:      6/18/2024    10:00 AM   PHQ-2/PHQ-9 Depression Screening   Little Interest or Pleasure in Doing Things 3-->nearly every day   Feeling Down, Depressed or Hopeless 3-->nearly every day   Trouble Falling or Staying Asleep, or Sleeping Too Much 2-->more than half the days   Feeling Tired or Having Little Energy 3-->nearly every day   Poor Appetite or Overeating 3-->nearly every day   Feeling Bad about Yourself - or that You are a Failure or Have Let Yourself or Your Family Down 3-->nearly every day   Trouble Concentrating on Things, Such as Reading the Newspaper or Watching Television 3-->nearly every day   Moving or Speaking So Slowly that Other People Could Have Noticed? Or the Opposite - Being So Fidgety 2-->more than half the days   PHQ-9: Brief Depression Severity Measure Score 22   If You Checked Off Any Problems, How Difficult Have These Problems Made It For You to Do Your Work, Take Care of Things at Home, or Get Along with Other People? very difficult      5-9: Minimal symptoms  10-14: Major depression mild  15-19: Major depression moderate  Greater then 20: Major depression severe      Appearance: WNL, dressed nicely,  wig on   Hygiene:  good  Cooperation:  Cooperative  Eye Contact:   direct  Psychomotor Behavior:  denies psychomotor agitation/retardation, No EPS, No motor tics  Mood: depressed  Affect:  blunted  Hopelessness: some  Speech:  Normal rate, lower volume   Thought Process:  Linear  Thought Content:  Normal  Concentration: Normal   Suicidal: denies  Homicidal:  None  Hallucinations:  None  Delusion:  None  Memory:  Intact  Orientation:  Person, Place, Time and Situation  Reliability:  good  Insight:  Fair  Judgement: good  Impulse Control: good  Estimated Intelligence: average range    ZECHARIAH REVIEWED NO RED FLAGS    Assessment & Plan   Diagnoses and all orders for this visit:    1. Moderate episode of recurrent major depressive disorder (Primary)    2. Generalized anxiety disorder    3. Sleep disturbance    Other orders  -     buPROPion SR (Wellbutrin SR) 100 MG 12 hr tablet; TAKE ONE TABLET IN AM DAILY  Dispense: 30 tablet; Refill: 1  -     busPIRone (BUSPAR) 5 MG tablet; Take 1 tablet by mouth 3 (Three) Times a Day.  Dispense: 90 tablet; Refill: 1          PLAN: ADD bupropion SR to start out 100 mg daily in mornings for MDD  ADD buspirone 5 mg TID, begin with nightly for several nights then twice daily for anxiety IF needed can take a third tablet mid day,  may cut tablets in half dependent on any side effects, knows to call for questions. Wrote out for pt.     We discussed risks, benefits, and side effects of the above medications and the patient was agreeable with the plan. Patient was educated on the importance of compliance with treatment and follow-up appointments.     Provide Cognitive Behavioral Therapy and Solution Focused Therapy to improve functioning, maintain stability, and avoid decompensation and the need for higher level of care.    Counseled patient regarding multimodal approach with encouragement of healthy nutrition, healthy sleep, regular physical mobility, social involvement, counseling, and medication compliance.     Assisted patient in identifying risk factors  which would indicate the need for higher level of care including thoughts to harm self or others and/or self-harming behavior and encouraged patient to contact this office, call 911, or present to the nearest emergency room should any of these events occur. Discussed crisis intervention services and means to access.  Patient adamantly and convincingly denies current suicidal or homicidal ideation or perceptual disturbance.    Treatment Plan: stabilize mood, patient will stay out of psychiatric hospital and be at optimal level of functioning with therapy and take all medication as prescribed. Patient verbalized  understanding and agreement to plan.    Instructed to call for questions or concerns and return early if necessary.     Greater than 50% time was spent in coordination of care, and counseling the patient regarding current assessment, symptoms, plan of care going forward, supportive therapy.  Answered any questions patient had regarding medications and plan of care.    Return in about 4 weeks (around 7/16/2024).

## 2024-06-28 ENCOUNTER — OFFICE VISIT (OUTPATIENT)
Dept: FAMILY MEDICINE CLINIC | Facility: CLINIC | Age: 64
End: 2024-06-28
Payer: MEDICARE

## 2024-06-28 VITALS
SYSTOLIC BLOOD PRESSURE: 140 MMHG | TEMPERATURE: 96.9 F | HEART RATE: 69 BPM | DIASTOLIC BLOOD PRESSURE: 82 MMHG | OXYGEN SATURATION: 99 % | RESPIRATION RATE: 14 BRPM | WEIGHT: 96.9 LBS | BODY MASS INDEX: 18.29 KG/M2 | HEIGHT: 61 IN

## 2024-06-28 DIAGNOSIS — R53.82 CHRONIC FATIGUE: ICD-10-CM

## 2024-06-28 DIAGNOSIS — G89.29 CHRONIC LEFT HIP PAIN: Primary | ICD-10-CM

## 2024-06-28 DIAGNOSIS — M25.552 CHRONIC LEFT HIP PAIN: Primary | ICD-10-CM

## 2024-06-28 DIAGNOSIS — R29.898 BILATERAL LEG WEAKNESS: ICD-10-CM

## 2024-06-28 DIAGNOSIS — C50.011 MALIGNANT NEOPLASM OF NIPPLE OF RIGHT BREAST IN FEMALE, UNSPECIFIED ESTROGEN RECEPTOR STATUS: ICD-10-CM

## 2024-06-28 DIAGNOSIS — R63.4 WEIGHT LOSS: ICD-10-CM

## 2024-06-28 PROCEDURE — 1159F MED LIST DOCD IN RCRD: CPT | Performed by: NURSE PRACTITIONER

## 2024-06-28 PROCEDURE — 1160F RVW MEDS BY RX/DR IN RCRD: CPT | Performed by: NURSE PRACTITIONER

## 2024-06-28 PROCEDURE — 1125F AMNT PAIN NOTED PAIN PRSNT: CPT | Performed by: NURSE PRACTITIONER

## 2024-06-28 PROCEDURE — 99214 OFFICE O/P EST MOD 30 MIN: CPT | Performed by: NURSE PRACTITIONER

## 2024-06-28 NOTE — Clinical Note
June 28, 2024     Patient: Brittani Lambert   YOB: 1960   Date of Visit: 6/28/2024       To Whom it May Concern:    Brittani Lambert was seen in my clinic on 6/28/2024. She  may return to school in one day.           Sincerely,          LONG Bolden        CC: No Recipients

## 2024-06-28 NOTE — PROGRESS NOTES
Date: 2024   Patient Name: Brittani Lambert  : 1960   MRN: [id number]     Chief Complaint:    Chief Complaint   Patient presents with    Pain     Hip pain for years. She fell esday on left hip after her legs went stiff on her.        History of Present Illness: Brittani Lambert is a 64 y.o. female who is here today for Left hip pain after a fall. She reports that her legs gave out on her and she fell on her left hip. She has been having chronic left hip pain. She reports she is feeling mildly better today. She is taking tylenol. Last X-rays of hips were completed in February, was found to have degenerative disease. She is a cancer survivor of ovarian ca, breast ca that mets to brain. She is also concerned that she has also lost weight, since her 2024 visit she has lost 7lbs.    Pain  Associated symptoms include arthralgias (lefft hip).            Review of Systems:   Review of Systems   Musculoskeletal:  Positive for arthralgias (lefft hip).       Past Medical History:   Past Medical History:   Diagnosis Date    Breast CA 10/4/2018    Depression     DJD (degenerative joint disease) of cervical spine     THALIA (generalized anxiety disorder)     Heart murmur     Ovarian cancer     Rt Cellulitis of chest wall 10/15/2018    Tobacco dependence     UTI (urinary tract infection) 2020       Past Surgical History:   Past Surgical History:   Procedure Laterality Date    APPENDECTOMY      BREAST BIOPSY Right     DONE IN [place]    COLONOSCOPY  2018    HYSTERECTOMY      MASTECTOMY W/ SENTINEL NODE BIOPSY Bilateral 10/4/2018    Procedure: LEFT SKIN SPARING BREAST MASTECTOMY WITH LEFT SENTINEL NODE BIOPSY, RIGHT PROPHYLACTIC SKIN SPARING MASTECTOMY;  Surgeon: Koffi Worthington MD;  Location: [facility];  Service: General    OOPHORECTOMY         Family History:   Family History   Problem Relation Age of Onset    Arthritis Mother     Heart attack Mother     Osteoporosis Mother      "Liver disease Father     Celiac disease Other        Social History:   Social History     Socioeconomic History    Marital status: Single     Spouse name: N/A    Number of children: 1    Years of education: H.S.    Highest education level: High school graduate   Tobacco Use    Smoking status: Every Day     Current packs/day: 0.50     Average packs/day: 0.5 packs/day for 25.0 years (12.5 ttl pk-yrs)     Types: Cigarettes    Smokeless tobacco: Never   Vaping Use    Vaping status: Never Used   Substance and Sexual Activity    Alcohol use: Not Currently    Drug use: No    Sexual activity: Defer     Partners: Male       Medications:     Current Outpatient Medications:     aspirin (aspirin) 81 MG EC tablet, Take 1 tablet by mouth Daily., Disp: 30 tablet, Rfl: 5    buPROPion SR (Wellbutrin SR) 100 MG 12 hr tablet, TAKE ONE TABLET IN AM DAILY, Disp: 30 tablet, Rfl: 1    busPIRone (BUSPAR) 5 MG tablet, Take 1 tablet by mouth 3 (Three) Times a Day., Disp: 90 tablet, Rfl: 1    hydrOXYzine (ATARAX) 25 MG tablet, Take 1 tablet by mouth 3 (Three) Times a Day As Needed for Anxiety., Disp: 90 tablet, Rfl: 2    oxyCODONE-acetaminophen (Percocet) 7.5-325 MG per tablet, Take 1 tablet by mouth Every 6 (Six) Hours As Needed for Moderate Pain., Disp: 120 tablet, Rfl: 0    traZODone (DESYREL) 100 MG tablet, Take 0.5-1 tablets by mouth Every Night., Disp: 90 tablet, Rfl: 1    Allergies:   No Known Allergies      Physical Exam:  Vital Signs:   Vitals:    06/28/24 1425   BP: 140/82   Pulse: 69   Resp: 14   Temp: 96.9 °F (36.1 °C)   SpO2: 99%   Weight: 44 kg (96 lb 14.4 oz)   Height: 154.9 cm (61\")     Body mass index is 18.31 kg/m².     Physical Exam  Vitals and nursing note reviewed.   Constitutional:       Appearance: Normal appearance.   HENT:      Head: Normocephalic and atraumatic.   Cardiovascular:      Rate and Rhythm: Normal rate and regular rhythm.   Pulmonary:      Effort: Pulmonary effort is normal.      Breath sounds: Normal " breath sounds.   Abdominal:      General: Bowel sounds are normal.   Musculoskeletal:      Cervical back: Normal.      Thoracic back: Normal.      Lumbar back: No tenderness. Normal range of motion.      Right hip: Tenderness and bony tenderness present. Decreased range of motion.      Left hip: Normal.   Skin:     General: Skin is warm.   Neurological:      General: No focal deficit present.      Mental Status: She is alert and oriented to person, place, and time.   Psychiatric:         Mood and Affect: Mood normal.           Assessment/Plan:   Diagnoses and all orders for this visit:    1. Chronic left hip pain (Primary)  -     CT lower extremity bilateral w contrast; Future  -     Comprehensive Metabolic Panel  -     CBC Auto Differential    2. Malignant neoplasm of nipple of right breast in female, unspecified estrogen receptor status  -     CT lower extremity bilateral w contrast; Future  -     Comprehensive Metabolic Panel  -     CBC Auto Differential    3. Bilateral leg weakness  -     CT lower extremity bilateral w contrast; Future    4. Chronic fatigue  -     Comprehensive Metabolic Panel  -     CBC Auto Differential  -     TSH    5. Weight loss  -     Comprehensive Metabolic Panel  -     CBC Auto Differential  -     TSH       Ordering ct of hip to r/o fracture, and with hx of cancer, r/o tumor  Repeating labs today  Monitor for worsening symptoms  Go to the ER with severe symptoms      Follow Up:   Return if symptoms worsen or fail to improve, for Next scheduled follow up.    Leonora White. LONG   [facility]

## 2024-06-29 LAB
ALBUMIN SERPL-MCNC: 4.5 G/DL (ref 3.9–4.9)
ALP SERPL-CCNC: 84 IU/L (ref 44–121)
ALT SERPL-CCNC: 9 IU/L (ref 0–32)
AST SERPL-CCNC: 16 IU/L (ref 0–40)
BASOPHILS # BLD AUTO: 0 X10E3/UL (ref 0–0.2)
BASOPHILS NFR BLD AUTO: 1 %
BILIRUB SERPL-MCNC: 0.2 MG/DL (ref 0–1.2)
BUN SERPL-MCNC: 13 MG/DL (ref 8–27)
BUN/CREAT SERPL: 18 (ref 12–28)
CALCIUM SERPL-MCNC: 9.6 MG/DL (ref 8.7–10.3)
CHLORIDE SERPL-SCNC: 96 MMOL/L (ref 96–106)
CO2 SERPL-SCNC: 25 MMOL/L (ref 20–29)
CREAT SERPL-MCNC: 0.73 MG/DL (ref 0.57–1)
EGFRCR SERPLBLD CKD-EPI 2021: 92 ML/MIN/1.73
EOSINOPHIL # BLD AUTO: 0.2 X10E3/UL (ref 0–0.4)
EOSINOPHIL NFR BLD AUTO: 2 %
ERYTHROCYTE [DISTWIDTH] IN BLOOD BY AUTOMATED COUNT: 13.1 % (ref 11.7–15.4)
GLOBULIN SER CALC-MCNC: 2.5 G/DL (ref 1.5–4.5)
GLUCOSE SERPL-MCNC: 88 MG/DL (ref 70–99)
HCT VFR BLD AUTO: 35.7 % (ref 34–46.6)
HGB BLD-MCNC: 11.7 G/DL (ref 11.1–15.9)
IMM GRANULOCYTES # BLD AUTO: 0 X10E3/UL (ref 0–0.1)
IMM GRANULOCYTES NFR BLD AUTO: 0 %
LYMPHOCYTES # BLD AUTO: 2.5 X10E3/UL (ref 0.7–3.1)
LYMPHOCYTES NFR BLD AUTO: 32 %
MCH RBC QN AUTO: 30.5 PG (ref 26.6–33)
MCHC RBC AUTO-ENTMCNC: 32.8 G/DL (ref 31.5–35.7)
MCV RBC AUTO: 93 FL (ref 79–97)
MONOCYTES # BLD AUTO: 0.6 X10E3/UL (ref 0.1–0.9)
MONOCYTES NFR BLD AUTO: 8 %
NEUTROPHILS # BLD AUTO: 4.5 X10E3/UL (ref 1.4–7)
NEUTROPHILS NFR BLD AUTO: 57 %
PLATELET # BLD AUTO: 246 X10E3/UL (ref 150–450)
POTASSIUM SERPL-SCNC: 4.4 MMOL/L (ref 3.5–5.2)
PROT SERPL-MCNC: 7 G/DL (ref 6–8.5)
RBC # BLD AUTO: 3.84 X10E6/UL (ref 3.77–5.28)
SODIUM SERPL-SCNC: 136 MMOL/L (ref 134–144)
TSH SERPL DL<=0.005 MIU/L-ACNC: 0.69 UIU/ML (ref 0.45–4.5)
WBC # BLD AUTO: 7.9 X10E3/UL (ref 3.4–10.8)

## 2024-07-16 ENCOUNTER — HOSPITAL ENCOUNTER (OUTPATIENT)
Dept: CT IMAGING | Facility: HOSPITAL | Age: 64
Discharge: HOME | End: 2024-07-16
Admitting: NURSE PRACTITIONER
Payer: MEDICARE

## 2024-07-16 DIAGNOSIS — M25.552 CHRONIC LEFT HIP PAIN: ICD-10-CM

## 2024-07-16 DIAGNOSIS — C50.011 MALIGNANT NEOPLASM OF NIPPLE OF RIGHT BREAST IN FEMALE, UNSPECIFIED ESTROGEN RECEPTOR STATUS: ICD-10-CM

## 2024-07-16 DIAGNOSIS — R29.898 BILATERAL LEG WEAKNESS: ICD-10-CM

## 2024-07-16 DIAGNOSIS — G89.29 CHRONIC LEFT HIP PAIN: ICD-10-CM

## 2024-07-16 PROCEDURE — 25510000001 IOPAMIDOL 61 % SOLUTION: Performed by: NURSE PRACTITIONER

## 2024-07-16 PROCEDURE — 73701 CT LOWER EXTREMITY W/DYE: CPT

## 2024-07-16 RX ADMIN — IOPAMIDOL 85 ML: 612 INJECTION, SOLUTION INTRAVENOUS at 14:03

## 2024-07-22 DIAGNOSIS — M47.816 LUMBAR SPONDYLOSIS: ICD-10-CM

## 2024-07-22 DIAGNOSIS — G89.3 CHRONIC PAIN AFTER TREATMENT FOR MALIGNANT NEOPLASM: ICD-10-CM

## 2024-07-22 RX ORDER — BUPROPION HYDROCHLORIDE 100 MG/1
TABLET, EXTENDED RELEASE ORAL
Qty: 30 TABLET | Refills: 1 | OUTPATIENT
Start: 2024-07-22

## 2024-07-22 RX ORDER — OXYCODONE AND ACETAMINOPHEN 7.5; 325 MG/1; MG/1
1 TABLET ORAL EVERY 6 HOURS PRN
Qty: 120 TABLET | Refills: 0 | Status: SHIPPED | OUTPATIENT
Start: 2024-07-22

## 2024-07-24 DIAGNOSIS — G89.3 CHRONIC PAIN AFTER TREATMENT FOR MALIGNANT NEOPLASM: ICD-10-CM

## 2024-07-24 DIAGNOSIS — M47.816 LUMBAR SPONDYLOSIS: ICD-10-CM

## 2024-07-24 RX ORDER — OXYCODONE AND ACETAMINOPHEN 7.5; 325 MG/1; MG/1
1 TABLET ORAL EVERY 6 HOURS PRN
Qty: 120 TABLET | Refills: 0 | OUTPATIENT
Start: 2024-07-24

## 2024-07-24 NOTE — TELEPHONE ENCOUNTER
Caller: Brittani Lambert    Relationship: Self    Best call back number: [phone]    Requested Prescriptions:   Requested Prescriptions     Pending Prescriptions Disp Refills    oxyCODONE-acetaminophen (Percocet) 7.5-325 MG per tablet 120 tablet 0     Sig: Take 1 tablet by mouth Every 6 (Six) Hours As Needed for Moderate Pain.        Pharmacy where request should be sent: [id number] - [place], [place] - [address] [place] [address] AT [place] [address] - [phone] [facility] [phone] FX     Last office visit with prescribing clinician: 2/26/2024   Last telemedicine visit with prescribing clinician: Visit date not found   Next office visit with prescribing clinician: Visit date not found     Additional details provided by patient:  PATIENT IS COMPLETELY OUT      Does the patient have less than a 3 day supply:  [x] Yes  [] No    Would you like a call back once the refill request has been completed: [] Yes [x] No    If the office needs to give you a call back, can they leave a voicemail: [] Yes [x] No    Rodger Yancey Rep   07/24/24 09:52 EDT

## 2024-08-09 ENCOUNTER — OFFICE VISIT (OUTPATIENT)
Dept: FAMILY MEDICINE CLINIC | Facility: CLINIC | Age: 64
End: 2024-08-09
Payer: MEDICARE

## 2024-08-09 VITALS
HEIGHT: 61 IN | DIASTOLIC BLOOD PRESSURE: 64 MMHG | WEIGHT: 94 LBS | SYSTOLIC BLOOD PRESSURE: 118 MMHG | HEART RATE: 86 BPM | RESPIRATION RATE: 16 BRPM | TEMPERATURE: 98 F | BODY MASS INDEX: 17.75 KG/M2 | OXYGEN SATURATION: 98 %

## 2024-08-09 DIAGNOSIS — R26.89 IMBALANCE: ICD-10-CM

## 2024-08-09 DIAGNOSIS — C50.912 CANCER OF LEFT BREAST METASTATIC TO BRAIN: ICD-10-CM

## 2024-08-09 DIAGNOSIS — G89.29 CHRONIC LEFT HIP PAIN: ICD-10-CM

## 2024-08-09 DIAGNOSIS — M47.816 LUMBAR SPONDYLOSIS: ICD-10-CM

## 2024-08-09 DIAGNOSIS — M25.552 CHRONIC LEFT HIP PAIN: ICD-10-CM

## 2024-08-09 DIAGNOSIS — G89.3 CHRONIC PAIN AFTER TREATMENT FOR MALIGNANT NEOPLASM: ICD-10-CM

## 2024-08-09 DIAGNOSIS — R63.4 UNINTENTIONAL WEIGHT LOSS: Primary | ICD-10-CM

## 2024-08-09 DIAGNOSIS — C79.31 CANCER OF LEFT BREAST METASTATIC TO BRAIN: ICD-10-CM

## 2024-08-09 PROCEDURE — 99214 OFFICE O/P EST MOD 30 MIN: CPT | Performed by: FAMILY MEDICINE

## 2024-08-09 PROCEDURE — 1125F AMNT PAIN NOTED PAIN PRSNT: CPT | Performed by: FAMILY MEDICINE

## 2024-08-09 RX ORDER — HYDROCODONE BITARTRATE AND ACETAMINOPHEN 5; 325 MG/1; MG/1
1 TABLET ORAL EVERY 8 HOURS PRN
Qty: 90 TABLET | Refills: 0 | Status: CANCELLED | OUTPATIENT
Start: 2024-08-09

## 2024-08-09 NOTE — PROGRESS NOTES
Chief Complaint  Weight Loss (Lost 7 lbs in 2 wks, unintentional. Balance has been more off the last 2 wks.)    Subjective          Brittani Lambert presents to [facility]  History of Present Illness  She states that she had lost 10 lbs in 2 weeks.   She has also been imbalanced, had a fall at home.   Due to hip pain and back pain, she has experienced sudden onset of being unable to walk due to pain.     She has history of breast cancer with brain metastasis. Last oncology follow up was May 2022 with instructions to follow up in 6 months.   Most recent MRI brain Oct 2023.    The following portions of the patient's history were reviewed and updated as appropriate: allergies, current medications, past family history, past medical history, past social history, past surgical history, and problem list.    Objective      Physical Exam  Vitals reviewed.   Cardiovascular:      Rate and Rhythm: Normal rate.      Heart sounds: Normal heart sounds.   Pulmonary:      Effort: Pulmonary effort is normal.      Breath sounds: Normal breath sounds.   Musculoskeletal:      Cervical back: Neck supple.      Left hip: Tenderness (posterior) present.   Lymphadenopathy:      Cervical: No cervical adenopathy.   Neurological:      Mental Status: She is alert.        Result Review :                Assessment and Plan    Diagnoses and all orders for this visit:    1. Unintentional weight loss (Primary)  -     CBC & Differential  -     Comprehensive Metabolic Panel  -     TSH  -     Vitamin B12    2. Imbalance  -     CBC & Differential  -     Comprehensive Metabolic Panel  -     TSH  -     Vitamin B12    3. 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  -     Ambulatory Referral to Oncology  -     CBC & Differential  -     Comprehensive Metabolic Panel  -     TSH  -     Vitamin B12  -     MRI Brain With & Without Contrast; Future  -     CT Abdomen Pelvis With Contrast; Future  -     CT Chest With Contrast;  Future    4. Chronic pain after treatment for malignant neoplasm  -     Ambulatory Referral to Pain Management  -     CBC & Differential  -     Comprehensive Metabolic Panel  -     TSH  -     Vitamin B12  -     HYDROcodone-acetaminophen (Norco) 7.5-325 MG per tablet; Take 1 tablet by mouth Every 6 (Six) Hours As Needed for Moderate Pain.  Dispense: 120 tablet; Refill: 0    5. Chronic left hip pain  -     Ambulatory Referral to Pain Management  -     CBC & Differential  -     Comprehensive Metabolic Panel  -     TSH  -     Vitamin B12  -     HYDROcodone-acetaminophen (Norco) 7.5-325 MG per tablet; Take 1 tablet by mouth Every 6 (Six) Hours As Needed for Moderate Pain.  Dispense: 120 tablet; Refill: 0    6. Lumbar spondylosis  -     Ambulatory Referral to Pain Management  -     CBC & Differential  -     Comprehensive Metabolic Panel  -     TSH  -     Vitamin B12  -     HYDROcodone-acetaminophen (Norco) 7.5-325 MG per tablet; Take 1 tablet by mouth Every 6 (Six) Hours As Needed for Moderate Pain.  Dispense: 120 tablet; Refill: 0    She continues to have persistent left hip pain. Will place referral to pain management.  It has been over 2 years since she last saw oncology.  She was advised August 2023 to schedule follow-up appointment.  New referral placed to help with scheduling process.  Due to recent weight loss and imbalance, will update imaging and reduce opioid prescription.    Follow Up   No follow-ups on file.  Patient was given instructions and counseling regarding her condition or for health maintenance advice. Please see specific information pulled into the AVS if appropriate.

## 2024-08-10 LAB
ALBUMIN SERPL-MCNC: 4.5 G/DL (ref 3.9–4.9)
ALP SERPL-CCNC: 78 IU/L (ref 44–121)
ALT SERPL-CCNC: 15 IU/L (ref 0–32)
AST SERPL-CCNC: 30 IU/L (ref 0–40)
BASOPHILS # BLD AUTO: 0.1 X10E3/UL (ref 0–0.2)
BASOPHILS NFR BLD AUTO: 1 %
BILIRUB SERPL-MCNC: <0.2 MG/DL (ref 0–1.2)
BUN SERPL-MCNC: 8 MG/DL (ref 8–27)
BUN/CREAT SERPL: 12 (ref 12–28)
CALCIUM SERPL-MCNC: 9.6 MG/DL (ref 8.7–10.3)
CHLORIDE SERPL-SCNC: 94 MMOL/L (ref 96–106)
CO2 SERPL-SCNC: 21 MMOL/L (ref 20–29)
CREAT SERPL-MCNC: 0.66 MG/DL (ref 0.57–1)
EGFRCR SERPLBLD CKD-EPI 2021: 98 ML/MIN/1.73
EOSINOPHIL # BLD AUTO: 0.2 X10E3/UL (ref 0–0.4)
EOSINOPHIL NFR BLD AUTO: 3 %
ERYTHROCYTE [DISTWIDTH] IN BLOOD BY AUTOMATED COUNT: 12.7 % (ref 11.7–15.4)
GLOBULIN SER CALC-MCNC: 2.6 G/DL (ref 1.5–4.5)
GLUCOSE SERPL-MCNC: 101 MG/DL (ref 70–99)
HCT VFR BLD AUTO: 36.7 % (ref 34–46.6)
HGB BLD-MCNC: 11.7 G/DL (ref 11.1–15.9)
IMM GRANULOCYTES # BLD AUTO: 0 X10E3/UL (ref 0–0.1)
IMM GRANULOCYTES NFR BLD AUTO: 0 %
LYMPHOCYTES # BLD AUTO: 2.1 X10E3/UL (ref 0.7–3.1)
LYMPHOCYTES NFR BLD AUTO: 38 %
MCH RBC QN AUTO: 30.5 PG (ref 26.6–33)
MCHC RBC AUTO-ENTMCNC: 31.9 G/DL (ref 31.5–35.7)
MCV RBC AUTO: 96 FL (ref 79–97)
MONOCYTES # BLD AUTO: 0.4 X10E3/UL (ref 0.1–0.9)
MONOCYTES NFR BLD AUTO: 8 %
NEUTROPHILS # BLD AUTO: 2.9 X10E3/UL (ref 1.4–7)
NEUTROPHILS NFR BLD AUTO: 50 %
PLATELET # BLD AUTO: 277 X10E3/UL (ref 150–450)
POTASSIUM SERPL-SCNC: 5.1 MMOL/L (ref 3.5–5.2)
PROT SERPL-MCNC: 7.1 G/DL (ref 6–8.5)
RBC # BLD AUTO: 3.84 X10E6/UL (ref 3.77–5.28)
SODIUM SERPL-SCNC: 133 MMOL/L (ref 134–144)
TSH SERPL DL<=0.005 MIU/L-ACNC: 0.34 UIU/ML (ref 0.45–4.5)
VIT B12 SERPL-MCNC: 379 PG/ML (ref 232–1245)
WBC # BLD AUTO: 5.7 X10E3/UL (ref 3.4–10.8)

## 2024-08-13 DIAGNOSIS — R79.89 ABNORMAL TSH: Primary | ICD-10-CM

## 2024-08-13 DIAGNOSIS — R63.4 WEIGHT LOSS, UNINTENTIONAL: ICD-10-CM

## 2024-08-14 DIAGNOSIS — F51.01 PRIMARY INSOMNIA: ICD-10-CM

## 2024-08-14 RX ORDER — TRAZODONE HYDROCHLORIDE 100 MG/1
50-100 TABLET ORAL NIGHTLY
Qty: 90 TABLET | Refills: 1 | Status: SHIPPED | OUTPATIENT
Start: 2024-08-14

## 2024-08-14 RX ORDER — BUPROPION HYDROCHLORIDE 100 MG/1
TABLET, EXTENDED RELEASE ORAL
Qty: 30 TABLET | Refills: 1 | Status: SHIPPED | OUTPATIENT
Start: 2024-08-14

## 2024-08-14 RX ORDER — BUSPIRONE HYDROCHLORIDE 5 MG/1
5 TABLET ORAL 3 TIMES DAILY
Qty: 90 TABLET | Refills: 1 | Status: SHIPPED | OUTPATIENT
Start: 2024-08-14

## 2024-08-15 ENCOUNTER — OFFICE VISIT (OUTPATIENT)
Dept: FAMILY MEDICINE CLINIC | Facility: CLINIC | Age: 64
End: 2024-08-15
Payer: MEDICARE

## 2024-08-15 VITALS
TEMPERATURE: 96.9 F | WEIGHT: 94.4 LBS | HEIGHT: 61 IN | BODY MASS INDEX: 17.82 KG/M2 | OXYGEN SATURATION: 98 % | SYSTOLIC BLOOD PRESSURE: 122 MMHG | DIASTOLIC BLOOD PRESSURE: 70 MMHG | RESPIRATION RATE: 18 BRPM | HEART RATE: 76 BPM

## 2024-08-15 DIAGNOSIS — R79.89 ABNORMAL TSH: Primary | ICD-10-CM

## 2024-08-15 DIAGNOSIS — R63.4 WEIGHT LOSS, UNINTENTIONAL: ICD-10-CM

## 2024-08-15 PROCEDURE — 1160F RVW MEDS BY RX/DR IN RCRD: CPT | Performed by: FAMILY MEDICINE

## 2024-08-15 PROCEDURE — 1159F MED LIST DOCD IN RCRD: CPT | Performed by: FAMILY MEDICINE

## 2024-08-15 PROCEDURE — 1125F AMNT PAIN NOTED PAIN PRSNT: CPT | Performed by: FAMILY MEDICINE

## 2024-08-15 PROCEDURE — 99213 OFFICE O/P EST LOW 20 MIN: CPT | Performed by: FAMILY MEDICINE

## 2024-08-15 NOTE — PROGRESS NOTES
Chief Complaint  Follow-up (Labs)    Subjective          Brittani Lambert presents to [facility]  Follow-up  She recently completed labs to evaluate weight loss. TSH was low, no prior abnormal TSH. She does report feeling weak, increased heart rate at times, unexplained weight loss. She is also overdue for routinely follow up with oncology. We addressed this at last appt and she is scheduled to see Dr. Patel later this month. She is overdue on routine imaging, also. This was ordered during prior visit.       The following portions of the patient's history were reviewed and updated as appropriate: allergies, current medications, past family history, past medical history, past social history, past surgical history, and problem list.    Objective      Physical Exam  Vitals reviewed.   Cardiovascular:      Rate and Rhythm: Normal rate.      Heart sounds: Normal heart sounds.   Pulmonary:      Effort: Pulmonary effort is normal.      Breath sounds: Normal breath sounds.   Neurological:      Mental Status: She is alert.        Result Review :                Assessment and Plan    Diagnoses and all orders for this visit:    1. Abnormal TSH (Primary)  -     TSH+Free T4  -     Thyroid Peroxidase Antibody  -     Thyroid Stimulating Immunoglobulin  -     Thyrotropin Receptor Antibody    2. Weight loss, unintentional  -     TSH+Free T4  -     Thyroid Peroxidase Antibody  -     Thyroid Stimulating Immunoglobulin  -     Thyrotropin Receptor Antibody    Recheck labs today, concern for Grave's disease.   Will follow up with her, consider seeing endocrinology.       Follow Up   Return in about 3 months (around 11/15/2024) for Medicare Wellness.  Patient was given instructions and counseling regarding her condition or for health maintenance advice. Please see specific information pulled into the AVS if appropriate.

## 2024-08-17 LAB
T4 FREE SERPL-MCNC: 1.07 NG/DL (ref 0.82–1.77)
THYROPEROXIDASE AB SERPL-ACNC: <9 IU/ML (ref 0–34)
TSH RECEP AB SER-ACNC: <1.1 IU/L (ref 0–1.75)
TSH SERPL DL<=0.005 MIU/L-ACNC: 0.53 UIU/ML (ref 0.45–4.5)
TSI SER-ACNC: <0.1 IU/L (ref 0–0.55)

## 2024-08-18 RX ORDER — HYDROCODONE BITARTRATE AND ACETAMINOPHEN 7.5; 325 MG/1; MG/1
1 TABLET ORAL EVERY 6 HOURS PRN
Qty: 120 TABLET | Refills: 0 | Status: SHIPPED | OUTPATIENT
Start: 2024-08-18

## 2024-08-28 ENCOUNTER — OFFICE VISIT (OUTPATIENT)
Dept: ONCOLOGY | Facility: CLINIC | Age: 64
End: 2024-08-28
Payer: MEDICARE

## 2024-08-28 VITALS
WEIGHT: 93 LBS | HEIGHT: 61 IN | DIASTOLIC BLOOD PRESSURE: 84 MMHG | HEART RATE: 78 BPM | BODY MASS INDEX: 17.56 KG/M2 | TEMPERATURE: 97.6 F | OXYGEN SATURATION: 98 % | SYSTOLIC BLOOD PRESSURE: 152 MMHG | RESPIRATION RATE: 14 BRPM

## 2024-08-28 DIAGNOSIS — C50.912 INVASIVE DUCTAL CARCINOMA OF LEFT BREAST: Primary | ICD-10-CM

## 2024-08-28 PROCEDURE — 1126F AMNT PAIN NOTED NONE PRSNT: CPT | Performed by: INTERNAL MEDICINE

## 2024-08-28 PROCEDURE — 99214 OFFICE O/P EST MOD 30 MIN: CPT | Performed by: INTERNAL MEDICINE

## 2024-08-28 NOTE — PROGRESS NOTES
"  PROBLEM LIST:  1. [id number] (Stage IIA) ER negative, ME positive, Her2 2+ by IHC, negative by FISH invasive ductal carcinoma of the left breast  A) bilateral mastectomy on 10/4/18.  Pathology showed a 2.6 cm high grade IDC with basaloid features.  0/1 SLN involved.  B) adjuvant chemotherapy with ddAC followed by taxol started on 11/14/18  C) admitted to the hospital on 4/10/2019 with left upper extremity weakness.  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  CT chest abdomen pelvis and bone scan on 4/11/2019 showed no other sites of disease.  Completed whole brain radiation on 4/24/2019.  2. Anxiety/depression  3. Hyperlipidemia  4. History of ovarian cancer 1989, s/p BRYN/BSO, no adjuvant therapy    Chief complaint: follow up for breast cancer management       Subjective     HISTORY OF PRESENT ILLNESS:   Brittani Lambert returns for follow-up.   We last saw her about 2 years ago.  She says that she has been losing weight over the past several months and also has had episodes of dizziness.  She has passed out a few times.  This seems to happen without any trigger.  She does not associate it with changes in position.  Her PCP has ordered an MRI of the brain as well as CT scans.  She is reluctant to do these because of the cost.        Objective      /84   Pulse 78   Temp 97.6 °F (36.4 °C)   Resp 14   Ht 154.9 cm (61\")   Wt 42.2 kg (93 lb)   SpO2 98%   BMI 17.57 kg/m²      Performance Status: 0    General: well appearing female in no acute distress  Neuro: alert and oriented  HEENT: sclera anicteric, oropharynx clear  Extremeties: no lower extremity edema  Skin: no rashes, lesions, bruising, or petechiae  Psych: mood and affect appropriate            Assessment & Plan   Brittani Lambert is a 64 y.o. 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     History of " pulmonary embolism.  Completed 6 months of xarelto.    Leptomeningeal disease: It has been over 5 years since whole brain radiation with no evidence of disease recurrence.  She may have some long-term effects of whole brain radiation.  I told her that I did recommend an MRI of the evaluation of her dizziness but she does not wish to proceed with MRI imaging at this time.    Metastatic breast cancer: She is willing to do CT scans for evaluation of her weight loss.  I will follow-up on results.  She does not wish to schedule follow-up at this time.          Carrie Patel MD  [facility]    8/28/2024          CC:

## 2024-09-06 ENCOUNTER — HOSPITAL ENCOUNTER (OUTPATIENT)
Dept: CT IMAGING | Facility: HOSPITAL | Age: 64
Discharge: HOME | End: 2024-09-06
Payer: MEDICARE

## 2024-09-06 DIAGNOSIS — C79.31 CANCER OF LEFT BREAST METASTATIC TO BRAIN: ICD-10-CM

## 2024-09-06 DIAGNOSIS — C50.912 CANCER OF LEFT BREAST METASTATIC TO BRAIN: ICD-10-CM

## 2024-09-06 PROCEDURE — 71260 CT THORAX DX C+: CPT

## 2024-09-06 PROCEDURE — 74177 CT ABD & PELVIS W/CONTRAST: CPT

## 2024-09-06 PROCEDURE — 25510000001 IOPAMIDOL 61 % SOLUTION: Performed by: FAMILY MEDICINE

## 2024-09-06 RX ORDER — IOPAMIDOL 612 MG/ML
85 INJECTION, SOLUTION INTRAVASCULAR
Status: COMPLETED | OUTPATIENT
Start: 2024-09-06 | End: 2024-09-06

## 2024-09-06 RX ADMIN — IOPAMIDOL 85 ML: 612 INJECTION, SOLUTION INTRAVENOUS at 13:33

## 2024-09-11 ENCOUNTER — TELEPHONE (OUTPATIENT)
Dept: FAMILY MEDICINE CLINIC | Facility: CLINIC | Age: 64
End: 2024-09-11
Payer: MEDICARE

## 2024-09-11 DIAGNOSIS — R93.5 ABNORMAL CT OF THE ABDOMEN: Primary | ICD-10-CM

## 2024-09-11 DIAGNOSIS — R63.4 WEIGHT LOSS, UNINTENTIONAL: ICD-10-CM

## 2024-09-14 DIAGNOSIS — R91.1 LUNG NODULE SEEN ON IMAGING STUDY: Primary | ICD-10-CM

## 2024-09-16 RX ORDER — HYDROXYZINE HYDROCHLORIDE 25 MG/1
25 TABLET, FILM COATED ORAL 3 TIMES DAILY PRN
Qty: 90 TABLET | Refills: 2 | Status: SHIPPED | OUTPATIENT
Start: 2024-09-16

## 2024-10-01 ENCOUNTER — HOSPITAL ENCOUNTER (OUTPATIENT)
Dept: MRI IMAGING | Facility: HOSPITAL | Age: 64
Discharge: HOME | End: 2024-10-01
Admitting: FAMILY MEDICINE
Payer: MEDICARE

## 2024-10-01 DIAGNOSIS — C79.31 CANCER OF LEFT BREAST METASTATIC TO BRAIN: ICD-10-CM

## 2024-10-01 DIAGNOSIS — C50.912 CANCER OF LEFT BREAST METASTATIC TO BRAIN: ICD-10-CM

## 2024-10-01 PROCEDURE — 0 GADOBENATE DIMEGLUMINE 529 MG/ML SOLUTION: Performed by: FAMILY MEDICINE

## 2024-10-01 PROCEDURE — 70553 MRI BRAIN STEM W/O & W/DYE: CPT

## 2024-10-01 PROCEDURE — A9577 INJ MULTIHANCE: HCPCS | Performed by: FAMILY MEDICINE

## 2024-10-01 RX ADMIN — GADOBENATE DIMEGLUMINE 8 ML: 529 INJECTION, SOLUTION INTRAVENOUS at 11:59

## 2024-10-04 ENCOUNTER — TELEPHONE (OUTPATIENT)
Dept: ONCOLOGY | Facility: CLINIC | Age: 64
End: 2024-10-04
Payer: MEDICARE

## 2024-10-04 NOTE — TELEPHONE ENCOUNTER
Caller: Brittani Lambert    Relationship: Self    Best call back number: [phone]    Caller requesting test results: SELF    What test was performed: MRI OF BRAIN     When was the test performed: 10/01    Where was the test performed:  [facility]     Additional notes: WANTED TO HAVE DR RAM OR NURSE TO CALL TO GO OVER THE RESULTS

## 2024-10-04 NOTE — TELEPHONE ENCOUNTER
I called patient per nurse practitioner and told her that her MRI was stable.  I did ask patient if she would make a followup with Dr. Patel and she said we could do that now.  I gave patient appt date/time of Feb. 6 at 1:45.  Pt verbalized understanding.

## 2024-10-09 RX ORDER — SODIUM, POTASSIUM,MAG SULFATES 17.5-3.13G
SOLUTION, RECONSTITUTED, ORAL ORAL
Qty: 354 ML | Refills: 0 | Status: SHIPPED | OUTPATIENT
Start: 2024-10-09

## 2024-10-14 RX ORDER — BUPROPION HYDROCHLORIDE 100 MG/1
TABLET, EXTENDED RELEASE ORAL
Qty: 90 TABLET | Refills: 1 | Status: SHIPPED | OUTPATIENT
Start: 2024-10-14

## 2024-10-14 RX ORDER — BUSPIRONE HYDROCHLORIDE 5 MG/1
5 TABLET ORAL 3 TIMES DAILY
Qty: 270 TABLET | Refills: 1 | Status: SHIPPED | OUTPATIENT
Start: 2024-10-14

## 2024-10-17 ENCOUNTER — TELEPHONE (OUTPATIENT)
Dept: PSYCHIATRY | Facility: CLINIC | Age: 64
End: 2024-10-17
Payer: MEDICARE

## 2024-10-17 NOTE — TELEPHONE ENCOUNTER
PATIENT CALLED AND STATED THAT SHE WOULD LIKE TO GET A MESSAGE TO HUA STOVALL STATING THAT SHE WOULD LIKE ANOTHER ANTI-DEPRESSANT BECAUSE SHE'S BEEN DEPRESSED LATELY AND THE ANTI-DEPRESSANT THAT SHE'S TAKING IS NOT HELPING. OFFERED TO SCHEDULE PATIENT AN APPT WITH JENNIFER STOVALL AND SHE DECLINED. PLEASE ADVISE.

## 2024-10-22 ENCOUNTER — APPOINTMENT (OUTPATIENT)
Dept: CT IMAGING | Facility: HOSPITAL | Age: 64
End: 2024-10-22
Payer: MEDICARE

## 2024-10-22 ENCOUNTER — HOSPITAL ENCOUNTER (EMERGENCY)
Facility: HOSPITAL | Age: 64
Discharge: HOME | End: 2024-10-22
Attending: EMERGENCY MEDICINE | Admitting: EMERGENCY MEDICINE
Payer: MEDICARE

## 2024-10-22 ENCOUNTER — APPOINTMENT (OUTPATIENT)
Dept: GENERAL RADIOLOGY | Facility: HOSPITAL | Age: 64
End: 2024-10-22
Payer: MEDICARE

## 2024-10-22 ENCOUNTER — APPOINTMENT (OUTPATIENT)
Dept: OTHER | Facility: HOSPITAL | Age: 64
End: 2024-10-22
Payer: MEDICARE

## 2024-10-22 VITALS
OXYGEN SATURATION: 99 % | RESPIRATION RATE: 16 BRPM | TEMPERATURE: 98.6 F | SYSTOLIC BLOOD PRESSURE: 118 MMHG | HEIGHT: 62 IN | WEIGHT: 93 LBS | DIASTOLIC BLOOD PRESSURE: 68 MMHG | HEART RATE: 66 BPM | BODY MASS INDEX: 17.11 KG/M2

## 2024-10-22 DIAGNOSIS — M25.552 LEFT HIP PAIN: Primary | ICD-10-CM

## 2024-10-22 DIAGNOSIS — G89.29 CHRONIC LEFT HIP PAIN: ICD-10-CM

## 2024-10-22 DIAGNOSIS — M16.10 HIP ARTHRITIS: ICD-10-CM

## 2024-10-22 DIAGNOSIS — M25.552 CHRONIC LEFT HIP PAIN: ICD-10-CM

## 2024-10-22 DIAGNOSIS — M47.816 LUMBAR SPONDYLOSIS: ICD-10-CM

## 2024-10-22 DIAGNOSIS — G89.3 CHRONIC PAIN AFTER TREATMENT FOR MALIGNANT NEOPLASM: ICD-10-CM

## 2024-10-22 LAB
ALBUMIN SERPL-MCNC: 4 G/DL (ref 3.5–5.2)
ALBUMIN/GLOB SERPL: 1.7 G/DL
ALP SERPL-CCNC: 210 U/L (ref 39–117)
ALT SERPL W P-5'-P-CCNC: 393 U/L (ref 1–33)
ANION GAP SERPL CALCULATED.3IONS-SCNC: 14 MMOL/L (ref 5–15)
APTT PPP: 20.3 SECONDS (ref 60–90)
AST SERPL-CCNC: 391 U/L (ref 1–32)
BACTERIA UR QL AUTO: NORMAL /HPF
BASOPHILS # BLD AUTO: 0.02 10*3/MM3 (ref 0–0.2)
BASOPHILS NFR BLD AUTO: 0.3 % (ref 0–1.5)
BILIRUB SERPL-MCNC: 0.5 MG/DL (ref 0–1.2)
BILIRUB UR QL STRIP: NEGATIVE
BUN SERPL-MCNC: 16 MG/DL (ref 8–23)
BUN/CREAT SERPL: 21.3 (ref 7–25)
CALCIUM SPEC-SCNC: 8.7 MG/DL (ref 8.6–10.5)
CHLORIDE SERPL-SCNC: 95 MMOL/L (ref 98–107)
CLARITY UR: CLEAR
CO2 SERPL-SCNC: 22 MMOL/L (ref 22–29)
COLOR UR: YELLOW
CREAT SERPL-MCNC: 0.75 MG/DL (ref 0.57–1)
DEPRECATED RDW RBC AUTO: 44.4 FL (ref 37–54)
EGFRCR SERPLBLD CKD-EPI 2021: 89 ML/MIN/1.73
EOSINOPHIL # BLD AUTO: 0.02 10*3/MM3 (ref 0–0.4)
EOSINOPHIL NFR BLD AUTO: 0.3 % (ref 0.3–6.2)
ERYTHROCYTE [DISTWIDTH] IN BLOOD BY AUTOMATED COUNT: 13.1 % (ref 12.3–15.4)
GLOBULIN UR ELPH-MCNC: 2.4 GM/DL
GLUCOSE SERPL-MCNC: 76 MG/DL (ref 65–99)
GLUCOSE UR STRIP-MCNC: NEGATIVE MG/DL
HCT VFR BLD AUTO: 33.2 % (ref 34–46.6)
HGB BLD-MCNC: 11.5 G/DL (ref 12–15.9)
HGB UR QL STRIP.AUTO: NEGATIVE
HYALINE CASTS UR QL AUTO: NORMAL /LPF
IMM GRANULOCYTES # BLD AUTO: 0.02 10*3/MM3 (ref 0–0.05)
IMM GRANULOCYTES NFR BLD AUTO: 0.3 % (ref 0–0.5)
INR PPP: 1.09 (ref 0.89–1.12)
KETONES UR QL STRIP: ABNORMAL
LEUKOCYTE ESTERASE UR QL STRIP.AUTO: ABNORMAL
LIPASE SERPL-CCNC: 28 U/L (ref 13–60)
LYMPHOCYTES # BLD AUTO: 1.26 10*3/MM3 (ref 0.7–3.1)
LYMPHOCYTES NFR BLD AUTO: 19.7 % (ref 19.6–45.3)
MCH RBC QN AUTO: 31.8 PG (ref 26.6–33)
MCHC RBC AUTO-ENTMCNC: 34.6 G/DL (ref 31.5–35.7)
MCV RBC AUTO: 91.7 FL (ref 79–97)
MONOCYTES # BLD AUTO: 0.76 10*3/MM3 (ref 0.1–0.9)
MONOCYTES NFR BLD AUTO: 11.9 % (ref 5–12)
NEUTROPHILS NFR BLD AUTO: 4.33 10*3/MM3 (ref 1.7–7)
NEUTROPHILS NFR BLD AUTO: 67.5 % (ref 42.7–76)
NITRITE UR QL STRIP: NEGATIVE
NRBC BLD AUTO-RTO: 0 /100 WBC (ref 0–0.2)
PH UR STRIP.AUTO: 6 [PH] (ref 5–8)
PLATELET # BLD AUTO: 222 10*3/MM3 (ref 140–450)
PMV BLD AUTO: 9.3 FL (ref 6–12)
POTASSIUM SERPL-SCNC: 4.5 MMOL/L (ref 3.5–5.2)
PROT SERPL-MCNC: 6.4 G/DL (ref 6–8.5)
PROT UR QL STRIP: NEGATIVE
PROTHROMBIN TIME: 14.2 SECONDS (ref 12.2–14.5)
RBC # BLD AUTO: 3.62 10*6/MM3 (ref 3.77–5.28)
RBC # UR STRIP: NORMAL /HPF
REF LAB TEST METHOD: NORMAL
SODIUM SERPL-SCNC: 131 MMOL/L (ref 136–145)
SP GR UR STRIP: 1.01 (ref 1–1.03)
SQUAMOUS #/AREA URNS HPF: NORMAL /HPF
UROBILINOGEN UR QL STRIP: ABNORMAL
WBC # UR STRIP: NORMAL /HPF
WBC NRBC COR # BLD AUTO: 6.41 10*3/MM3 (ref 3.4–10.8)

## 2024-10-22 PROCEDURE — 85730 THROMBOPLASTIN TIME PARTIAL: CPT

## 2024-10-22 PROCEDURE — 96374 THER/PROPH/DIAG INJ IV PUSH: CPT

## 2024-10-22 PROCEDURE — 80053 COMPREHEN METABOLIC PANEL: CPT

## 2024-10-22 PROCEDURE — 85610 PROTHROMBIN TIME: CPT

## 2024-10-22 PROCEDURE — 25010000002 MORPHINE PER 10 MG: Performed by: EMERGENCY MEDICINE

## 2024-10-22 PROCEDURE — 70486 CT MAXILLOFACIAL W/O DYE: CPT

## 2024-10-22 PROCEDURE — 25810000003 SODIUM CHLORIDE 0.9 % SOLUTION: Performed by: EMERGENCY MEDICINE

## 2024-10-22 PROCEDURE — 36415 COLL VENOUS BLD VENIPUNCTURE: CPT

## 2024-10-22 PROCEDURE — 85025 COMPLETE CBC W/AUTO DIFF WBC: CPT

## 2024-10-22 PROCEDURE — 73502 X-RAY EXAM HIP UNI 2-3 VIEWS: CPT

## 2024-10-22 PROCEDURE — P9612 CATHETERIZE FOR URINE SPEC: HCPCS

## 2024-10-22 PROCEDURE — 81001 URINALYSIS AUTO W/SCOPE: CPT

## 2024-10-22 PROCEDURE — 83690 ASSAY OF LIPASE: CPT

## 2024-10-22 PROCEDURE — 71045 X-RAY EXAM CHEST 1 VIEW: CPT

## 2024-10-22 PROCEDURE — 93005 ELECTROCARDIOGRAM TRACING: CPT

## 2024-10-22 PROCEDURE — 25010000002 KETOROLAC TROMETHAMINE PER 15 MG

## 2024-10-22 PROCEDURE — 70450 CT HEAD/BRAIN W/O DYE: CPT

## 2024-10-22 PROCEDURE — 96375 TX/PRO/DX INJ NEW DRUG ADDON: CPT

## 2024-10-22 PROCEDURE — 99284 EMERGENCY DEPT VISIT MOD MDM: CPT

## 2024-10-22 RX ORDER — KETOROLAC TROMETHAMINE 15 MG/ML
15 INJECTION, SOLUTION INTRAMUSCULAR; INTRAVENOUS ONCE
Status: COMPLETED | OUTPATIENT
Start: 2024-10-22 | End: 2024-10-22

## 2024-10-22 RX ORDER — MORPHINE SULFATE 4 MG/ML
4 INJECTION, SOLUTION INTRAMUSCULAR; INTRAVENOUS ONCE
Status: COMPLETED | OUTPATIENT
Start: 2024-10-22 | End: 2024-10-22

## 2024-10-22 RX ORDER — HYDROCODONE BITARTRATE AND ACETAMINOPHEN 7.5; 325 MG/1; MG/1
1 TABLET ORAL EVERY 6 HOURS PRN
Qty: 120 TABLET | Refills: 0 | Status: SHIPPED | OUTPATIENT
Start: 2024-10-22

## 2024-10-22 RX ADMIN — MORPHINE SULFATE 4 MG: 4 INJECTION, SOLUTION INTRAMUSCULAR; INTRAVENOUS at 10:51

## 2024-10-22 RX ADMIN — KETOROLAC TROMETHAMINE 15 MG: 15 INJECTION, SOLUTION INTRAMUSCULAR; INTRAVENOUS at 15:14

## 2024-10-22 RX ADMIN — SODIUM CHLORIDE 500 ML: 9 INJECTION, SOLUTION INTRAVENOUS at 12:37

## 2024-10-22 NOTE — DISCHARGE INSTRUCTIONS
Follow-up with your primary care provider, also with your oncologist Dr. Patel as needed.  You were supposed to have serial CT follow-ups for the nodule in the right middle lobe of your lung which was new as of September 6.  Also you have a nodule in the descending colon that looks like a polyp that could be further evaluated with a colonoscopy, so please call a gastroenterologist for example Dr. Brunner.  Your primary care provider to order recheck of your liver enzyme labs.

## 2024-10-22 NOTE — ED PROVIDER NOTES
Subjective   History of Present Illness patient is a 64-year-old female who presents via EMS reporting a fall on Sunday night at her residence where she lives alone.  She reported her legs just gave out on her falling, complains of severe left hip pain, appears to have some shortening of the left lower extremity, and increased pain with weightbearing she reports.  She also endorses some discomfort in her left cheekbone as she hit her face and head in the fall.  She denies loss of consciousness during that episode.  Has a history of breast cancer with brain metastasis, that is stable and follows with [facility] oncology.  She appears lethargic, but oriented to surroundings.    Review of Systems   Constitutional: Negative.    HENT:  Positive for facial swelling.    Respiratory: Negative.     Cardiovascular: Negative.    Gastrointestinal: Negative.    Genitourinary: Negative.    Musculoskeletal:  Positive for arthralgias.   Skin: Negative.    Neurological:  Positive for weakness.       Past Medical History:   Diagnosis Date    Breast CA 10/4/2018    Depression     DJD (degenerative joint disease) of cervical spine     THALIA (generalized anxiety disorder)     Heart murmur     Ovarian cancer 1989    Rt Cellulitis of chest wall 10/15/2018    Tobacco dependence     UTI (urinary tract infection) 9/9/2020       No Known Allergies    Past Surgical History:   Procedure Laterality Date    APPENDECTOMY      BREAST BIOPSY Right 2003    DONE IN [place]    COLONOSCOPY  06/2018    HYSTERECTOMY  1989    MASTECTOMY W/ SENTINEL NODE BIOPSY Bilateral 10/4/2018    Procedure: LEFT SKIN SPARING BREAST MASTECTOMY WITH LEFT SENTINEL NODE BIOPSY, RIGHT PROPHYLACTIC SKIN SPARING MASTECTOMY;  Surgeon: Koffi Worthington MD;  Location: [facility];  Service: General    OOPHORECTOMY  1989       Family History   Problem Relation Age of Onset    Arthritis Mother     Heart attack Mother     Osteoporosis Mother     Liver disease Father     Celiac  disease Other        Social History     Socioeconomic History    Marital status: Single     Spouse name: N/A    Number of children: 1    Years of education: H.S.    Highest education level: High school graduate   Tobacco Use    Smoking status: Every Day     Current packs/day: 0.50     Average packs/day: 0.5 packs/day for 25.0 years (12.5 ttl pk-yrs)     Types: Cigarettes    Smokeless tobacco: Never   Vaping Use    Vaping status: Never Used   Substance and Sexual Activity    Alcohol use: Not Currently    Drug use: No    Sexual activity: Defer     Partners: Male           Objective   Physical Exam  Constitutional:       Appearance: Normal appearance. She is ill-appearing.   HENT:      Head: Normocephalic and atraumatic.      Right Ear: External ear normal.      Left Ear: External ear normal.   Eyes:      Extraocular Movements: Extraocular movements intact.      Conjunctiva/sclera: Conjunctivae normal.      Pupils: Pupils are equal, round, and reactive to light.   Cardiovascular:      Rate and Rhythm: Normal rate.   Pulmonary:      Effort: Pulmonary effort is normal.   Abdominal:      General: Abdomen is flat.      Palpations: Abdomen is soft.   Musculoskeletal:         General: Tenderness and signs of injury present. Normal range of motion.      Cervical back: Normal range of motion and neck supple.      Left hip: Tenderness and bony tenderness present. Decreased range of motion.        Legs:    Skin:     General: Skin is warm and dry.      Capillary Refill: Capillary refill takes 2 to 3 seconds.   Neurological:      General: No focal deficit present.      Mental Status: She is alert and oriented to person, place, and time.      Motor: Weakness present.         Procedures           ED Course  ED Course as of 10/22/24 1632   Tue Oct 22, 2024   0937 Initial evaluation the patient ED bed 12. []   1215 Imaging of the patient's chest, as well as left hip with pelvis did not demonstrate any new acute bony abnormality.  CT  imaging of the head as well as facial bones likewise have no acute abnormality. []   1215 CBC nonactionable, serum chemistry elevated transaminases, urine negative. []   1501 I have returned to communicate to the patient regarding other follow-up request, she was unaware she had a lung nodule that needed serial CT follow-up, as well as a colon polyp. []      ED Course User Index  [] Lorne Mejia APRN                                               Medical Decision Making  The patient's complaints, reported mechanism of injury, although low energy but with high risk to patient with osteoporosis, significant previous medical history, and my exam, differential cannot exclude bony deformity including fracture, avulsion, subluxation, pathologic fracture or bony lesion at the left hip or pelvis, as well as intracranial abnormality including hemorrhage, stroke, fracture, facial bone fracture, tract infection, other electrolyte derangement also within the differential.  Patient was CT imaging of the head, facial bones, plain film imaging of the chest, pelvis and hip, as well as serum screening labs and urinalysis via catheterization.  Will administer analgesics, reevaluate for improvement of symptoms and plan for disposition after communicating workup results.  She is agreeable to this plan.    Amount and/or Complexity of Data Reviewed  Labs: ordered.  Radiology: ordered.  ECG/medicine tests: ordered.        Final diagnoses:   Left hip pain   Hip arthritis       ED Disposition  ED Disposition       ED Disposition   Discharge    Condition   Stable    Comment   --               Alla Colon,   [address]  [place] [address] [phone]      As needed    Carrie Patel MD  [address]  [place] [id number]  [phone]          Brunner, Mark I, MD  [address]  [place] [id number]  [phone]               Medication List      No changes were made to your  prescriptions during this visit.            Lorne Mejia, APRN  10/22/24 [id number]

## 2024-10-23 LAB
QT INTERVAL: 440 MS
QTC INTERVAL: 464 MS

## 2024-11-07 ENCOUNTER — PATIENT OUTREACH (OUTPATIENT)
Dept: CASE MANAGEMENT | Facility: OTHER | Age: 64
End: 2024-11-07
Payer: MEDICARE

## 2024-11-07 NOTE — OUTREACH NOTE
Patient Outreach    Received call from patient requesting assistance with [employer] housing.  Patient states she is on the wait list and received a 7 day eviction notice yesterday.  Patient no longer in oncology case management program - currently no evidence of disease. Patient advised that RN-ACM will reach out to  to assist her with immediate housing need.    Liliya Traore  Oncology    11/07/2024  10:30 am

## 2024-11-11 ENCOUNTER — PATIENT OUTREACH (OUTPATIENT)
Age: 64
End: 2024-11-11
Payer: MEDICARE

## 2024-11-11 NOTE — OUTREACH NOTE
Social Work Assessment  Questions/Answers      Flowsheet Row Most Recent Value   Referral Source physician   Reason for Consult community resources   Preferred Language English   Advance Care Planning Reviewed no concerns identified   In the past 12 months has the electric, gas, oil, or water company threatened to shut off services in your home? No   Employment Status disabled   Source of Income social security   Medications independent   Meal Preparation independent   Housekeeping independent   Laundry independent   Shopping independent   Do you want help finding or keeping work or a job? I do not need or want help        SDOH updated and reviewed with the patient during this program:  --      Disabilities      Financial Resource Strain: High Risk (11/11/2024)    Overall Financial Resource Strain (CARDIA)     Difficulty of Paying Living Expenses: Hard      --     Food Insecurity: No Food Insecurity (11/11/2024)    Hunger Vital Sign     Worried About Running Out of Food in the Last Year: Never true     Ran Out of Food in the Last Year: Never true      --     Housing Stability: Low Risk  (11/11/2024)    Housing Stability Vital Sign     Unable to Pay for Housing in the Last Year: No     Number of Times Moved in the Last Year: 1     Homeless in the Last Year: No      --     Transportation Needs: No Transportation Needs (11/11/2024)    PRAPARE - Transportation     Lack of Transportation (Medical): No     Lack of Transportation (Non-Medical): No      --     Utilities: Not At Risk (11/11/2024)    [facility] Utilities     Threatened with loss of utilities: No      [facility]   Community & DME    OF THE [place] [id number]    Phone: [phone]    Request Status: Pending - No Request Sent   Patient Outreach    SW contacted pt after receiving a provider referral. Pt received a seven day notice at her apartment for non-payment. Discussed eviction process and encouraged her to contact  . Pt has already contacted low income apartments in her area and is on the section 8 waitlist. She denies other needs at this time.    Rut ADEN -   Ambulatory Case Management    11/11/2024, 14:38 EST

## 2024-11-18 ENCOUNTER — PATIENT OUTREACH (OUTPATIENT)
Age: 64
End: 2024-11-18
Payer: MEDICARE

## 2024-11-18 NOTE — OUTREACH NOTE
Patient Outreach    ZAHRA contacted pt for follow up. She has not heard back from any apartments nor has she received any additional notices from her landlord. She has some of her past due rent and she is hoping they will work with her to prevent eviction. ZAHRA provided additional contacts for community agencies that may be able to help, including the [facility], [facility], and [employer]. ZAHRA will continue to follow.  Rut ADEN -   Ambulatory Case Management    11/18/2024, 16:20 EST

## 2024-12-02 ENCOUNTER — PATIENT OUTREACH (OUTPATIENT)
Age: 64
End: 2024-12-02
Payer: MEDICARE

## 2024-12-02 ENCOUNTER — PATIENT OUTREACH (OUTPATIENT)
Dept: CASE MANAGEMENT | Facility: OTHER | Age: 64
End: 2024-12-02
Payer: MEDICARE

## 2024-12-02 ENCOUNTER — TELEPHONE (OUTPATIENT)
Dept: FAMILY MEDICINE CLINIC | Facility: CLINIC | Age: 64
End: 2024-12-02
Payer: MEDICARE

## 2024-12-02 NOTE — OUTREACH NOTE
Patient Outreach    SW contacted pt for follow up. She was not able to get help with rent, but was able to make a payment to stay there. She is focusing on finding a cheaper apartment. Her rent has steadily increased to the point where she cannot afford it. She has contacted the [employer] based apartments in her area and has applied for section 8. No other needs at this time.  Rut ADEN -   Ambulatory Case Management    12/2/2024, 14:14 EST

## 2024-12-02 NOTE — TELEPHONE ENCOUNTER
FYI     PATIENT SUPPOSE TO HAVE 6 MONTH FUP IMAGING     PER SCHEDULING - CALLED PT TO SCHEDULE TEST. SHE SAID SHE DID NOT NEED TO SCHEDULE

## 2024-12-03 ENCOUNTER — TELEPHONE (OUTPATIENT)
Dept: PSYCHIATRY | Facility: CLINIC | Age: 64
End: 2024-12-03
Payer: MEDICARE

## 2024-12-03 DIAGNOSIS — F41.1 GENERALIZED ANXIETY DISORDER: ICD-10-CM

## 2024-12-03 DIAGNOSIS — F33.1 MODERATE EPISODE OF RECURRENT MAJOR DEPRESSIVE DISORDER: Primary | ICD-10-CM

## 2024-12-03 DIAGNOSIS — G47.9 SLEEP DISTURBANCE: ICD-10-CM

## 2024-12-03 RX ORDER — BUPROPION HYDROCHLORIDE 150 MG/1
150 TABLET ORAL DAILY
Qty: 30 TABLET | Refills: 1 | Status: SHIPPED | OUTPATIENT
Start: 2024-12-03

## 2024-12-03 NOTE — PROGRESS NOTES
Called pt at 5:35 and call ended at 5:48 pm. With future appt made for in two weeks, Dec 17th at 1pm by telehealth. She reports she is out of medication and is very depressed. Doesn't have money to pay rent and is being evicted. She reports she called [employer] yesterday and was told there wasn't anything they could do now. Patient reports she was at her sisters for Thanksgiving but didn't bring up her rental insecurity. Pt has talked with social workers she states. She reports she can go to her mom's home but didn't want to be a burden. Discussed importance of family and how they would want her safe and cared for. She reports her mom will help her and she will call her and stay with her. Discussed good steps problem solving to start with her mom. She is on [employer] list. Reports she called for refill to her pharmacy on [address]. Discussed increasing her bupropion to  mg for depression and that has been sent too. She denies alcohol or illicit drug intake. Reviewed she missed the past two appts we had made, she states she didn't know we had them , encouraged her to write down now new appt I want to talk with her after she is with her mom and has been taking medications again, could be in withdrawal not taking them and would make her feel sad and anxious. Taking meds will greatly help relieve w/d symptoms and improve mood. Encouraged her to write appt down appt and that she can call anytime if she needs further assistance. She denies SI. Encouraged strengths she  does have with sister and mom. [facility] Feeling unsafe go to ED if having unsafe thoughts can also call crisis line , she verbalized her understanding.

## 2024-12-03 NOTE — TELEPHONE ENCOUNTER
PATIENT CALLED AND REQUESTED A CALL BACK ASAP FROM JENNIFER STOVALL REGARDING ANXIETY. PATIENT STATED THAT SHE'S HAD HIGH ANXIETY AND DEPRESSION AND IS GETTING EVICTED FROM HER HOME AND NEEDS TO SPEAK WITH JENNIFER. INFORMED PATIENT THAT A MESSAGE WOULD BE SENT TO JENNIFER STOVALL. PLEASE ADVISE.

## 2024-12-12 DIAGNOSIS — F51.01 PRIMARY INSOMNIA: ICD-10-CM

## 2024-12-12 RX ORDER — TRAZODONE HYDROCHLORIDE 100 MG/1
50-100 TABLET ORAL NIGHTLY
Qty: 90 TABLET | Refills: 1 | OUTPATIENT
Start: 2024-12-12

## 2024-12-16 ENCOUNTER — HOSPITAL ENCOUNTER (INPATIENT)
Facility: HOSPITAL | Age: 64
LOS: 7 days | Discharge: HOME | End: 2024-12-26
Attending: EMERGENCY MEDICINE | Admitting: INTERNAL MEDICINE
Payer: MEDICARE

## 2024-12-16 ENCOUNTER — APPOINTMENT (OUTPATIENT)
Dept: GENERAL RADIOLOGY | Facility: HOSPITAL | Age: 64
End: 2024-12-16
Payer: MEDICARE

## 2024-12-16 ENCOUNTER — APPOINTMENT (OUTPATIENT)
Dept: CT IMAGING | Facility: HOSPITAL | Age: 64
End: 2024-12-16
Payer: MEDICARE

## 2024-12-16 DIAGNOSIS — R29.898 BILATERAL LEG WEAKNESS: ICD-10-CM

## 2024-12-16 DIAGNOSIS — E87.20 LACTIC ACIDOSIS: ICD-10-CM

## 2024-12-16 DIAGNOSIS — C79.31 CANCER OF RIGHT BREAST METASTATIC TO BRAIN: ICD-10-CM

## 2024-12-16 DIAGNOSIS — R29.6 FALLS: ICD-10-CM

## 2024-12-16 DIAGNOSIS — T07.XXXA MULTIPLE ABRASIONS: ICD-10-CM

## 2024-12-16 DIAGNOSIS — C50.911 CANCER OF RIGHT BREAST METASTATIC TO BRAIN: ICD-10-CM

## 2024-12-16 DIAGNOSIS — G96.08: ICD-10-CM

## 2024-12-16 DIAGNOSIS — S00.33XA CONTUSION OF NOSE, INITIAL ENCOUNTER: ICD-10-CM

## 2024-12-16 DIAGNOSIS — E22.2 SIADH (SYNDROME OF INAPPROPRIATE ADH PRODUCTION): ICD-10-CM

## 2024-12-16 DIAGNOSIS — S22.080A COMPRESSION FRACTURE OF T12 VERTEBRA, INITIAL ENCOUNTER: ICD-10-CM

## 2024-12-16 DIAGNOSIS — S09.90XA INJURY OF HEAD, INITIAL ENCOUNTER: ICD-10-CM

## 2024-12-16 DIAGNOSIS — N39.0 ACUTE UTI: ICD-10-CM

## 2024-12-16 DIAGNOSIS — S16.1XXA STRAIN OF NECK MUSCLE, INITIAL ENCOUNTER: ICD-10-CM

## 2024-12-16 DIAGNOSIS — C50.011 MALIGNANT NEOPLASM OF NIPPLE OF RIGHT BREAST IN FEMALE, UNSPECIFIED ESTROGEN RECEPTOR STATUS: ICD-10-CM

## 2024-12-16 DIAGNOSIS — G89.4 CHRONIC PAIN SYNDROME: ICD-10-CM

## 2024-12-16 DIAGNOSIS — S80.02XA CONTUSION OF LEFT KNEE, INITIAL ENCOUNTER: ICD-10-CM
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
## 2024-12-16 DIAGNOSIS — E87.1 HYPONATREMIA: ICD-10-CM

## 2024-12-16 DIAGNOSIS — Z90.13 HISTORY OF BILATERAL MASTECTOMY: ICD-10-CM

## 2024-12-16 LAB
ALBUMIN SERPL-MCNC: 4 G/DL (ref 3.5–5.2)
ALBUMIN/GLOB SERPL: 1.6 G/DL
ALP SERPL-CCNC: 89 U/L (ref 39–117)
ALT SERPL W P-5'-P-CCNC: 10 U/L (ref 1–33)
ANION GAP SERPL CALCULATED.3IONS-SCNC: 14 MMOL/L (ref 5–15)
AST SERPL-CCNC: 25 U/L (ref 1–32)
BACTERIA UR QL AUTO: ABNORMAL /HPF
BASOPHILS # BLD AUTO: 0.08 10*3/MM3 (ref 0–0.2)
BASOPHILS NFR BLD AUTO: 1 % (ref 0–1.5)
BILIRUB SERPL-MCNC: 0.5 MG/DL (ref 0–1.2)
BILIRUB UR QL STRIP: NEGATIVE
BUN BLDA-MCNC: 14 MG/DL (ref 8–26)
BUN SERPL-MCNC: 12 MG/DL (ref 8–23)
BUN/CREAT SERPL: 17.9 (ref 7–25)
CA-I BLDA-SCNC: 1.24 MMOL/L (ref 1.2–1.32)
CALCIUM SPEC-SCNC: 8.8 MG/DL (ref 8.6–10.5)
CHLORIDE BLDA-SCNC: 100 MMOL/L (ref 98–109)
CHLORIDE SERPL-SCNC: 96 MMOL/L (ref 98–107)
CK SERPL-CCNC: 77 U/L (ref 20–180)
CLARITY UR: ABNORMAL
CO2 BLDA-SCNC: 24 MMOL/L (ref 24–29)
CO2 SERPL-SCNC: 22 MMOL/L (ref 22–29)
COLOR UR: YELLOW
CREAT BLDA-MCNC: 0.7 MG/DL (ref 0.6–1.3)
CREAT SERPL-MCNC: 0.67 MG/DL (ref 0.57–1)
DEPRECATED RDW RBC AUTO: 49.3 FL (ref 37–54)
EGFRCR SERPLBLD CKD-EPI 2021: 96.7 ML/MIN/1.73
EGFRCR SERPLBLD CKD-EPI 2021: 97.7 ML/MIN/1.73
EOSINOPHIL # BLD AUTO: 0.07 10*3/MM3 (ref 0–0.4)
EOSINOPHIL NFR BLD AUTO: 0.9 % (ref 0.3–6.2)
ERYTHROCYTE [DISTWIDTH] IN BLOOD BY AUTOMATED COUNT: 12.9 % (ref 12.3–15.4)
GEN 5 1HR TROPONIN T REFLEX: 7 NG/L
GLOBULIN UR ELPH-MCNC: 2.5 GM/DL
GLUCOSE BLDC GLUCOMTR-MCNC: 97 MG/DL (ref 70–130)
GLUCOSE SERPL-MCNC: 104 MG/DL (ref 65–99)
GLUCOSE UR STRIP-MCNC: NEGATIVE MG/DL
HCT VFR BLD AUTO: 35 % (ref 34–46.6)
HCT VFR BLDA CALC: 16 % (ref 38–51)
HGB BLD-MCNC: 10.8 G/DL (ref 12–15.9)
HGB BLDA-MCNC: 5.4 G/DL (ref 12–17)
HGB UR QL STRIP.AUTO: NEGATIVE
HOLD SPECIMEN: NORMAL
HYALINE CASTS UR QL AUTO: ABNORMAL /LPF
IMM GRANULOCYTES # BLD AUTO: 0.05 10*3/MM3 (ref 0–0.05)
IMM GRANULOCYTES NFR BLD AUTO: 0.6 % (ref 0–0.5)
KETONES UR QL STRIP: NEGATIVE
LEUKOCYTE ESTERASE UR QL STRIP.AUTO: ABNORMAL
LYMPHOCYTES # BLD AUTO: 1.44 10*3/MM3 (ref 0.7–3.1)
LYMPHOCYTES NFR BLD AUTO: 18.3 % (ref 19.6–45.3)
MAGNESIUM SERPL-MCNC: 1.8 MG/DL (ref 1.6–2.4)
MCH RBC QN AUTO: 32.2 PG (ref 26.6–33)
MCHC RBC AUTO-ENTMCNC: 30.9 G/DL (ref 31.5–35.7)
MCV RBC AUTO: 104.5 FL (ref 79–97)
MONOCYTES # BLD AUTO: 0.71 10*3/MM3 (ref 0.1–0.9)
MONOCYTES NFR BLD AUTO: 9 % (ref 5–12)
NEUTROPHILS NFR BLD AUTO: 5.51 10*3/MM3 (ref 1.7–7)
NEUTROPHILS NFR BLD AUTO: 70.2 % (ref 42.7–76)
NITRITE UR QL STRIP: NEGATIVE
NRBC BLD AUTO-RTO: 0 /100 WBC (ref 0–0.2)
PH UR STRIP.AUTO: 7.5 [PH] (ref 5–8)
PLATELET # BLD AUTO: 174 10*3/MM3 (ref 140–450)
PMV BLD AUTO: 9.5 FL (ref 6–12)
POTASSIUM BLDA-SCNC: 4.3 MMOL/L (ref 3.5–4.9)
POTASSIUM SERPL-SCNC: 3.9 MMOL/L (ref 3.5–5.2)
PROT SERPL-MCNC: 6.5 G/DL (ref 6–8.5)
PROT UR QL STRIP: ABNORMAL
RBC # BLD AUTO: 3.35 10*6/MM3 (ref 3.77–5.28)
RBC # UR STRIP: ABNORMAL /HPF
REF LAB TEST METHOD: ABNORMAL
SODIUM BLD-SCNC: 132 MMOL/L (ref 138–146)
SODIUM SERPL-SCNC: 132 MMOL/L (ref 136–145)
SP GR UR STRIP: 1.02 (ref 1–1.03)
SQUAMOUS #/AREA URNS HPF: ABNORMAL /HPF
TROPONIN T NUMERIC DELTA: 0 NG/L
TROPONIN T SERPL HS-MCNC: 7 NG/L
UROBILINOGEN UR QL STRIP: ABNORMAL
WBC # UR STRIP: ABNORMAL /HPF
WBC NRBC COR # BLD AUTO: 7.86 10*3/MM3 (ref 3.4–10.8)
WHOLE BLOOD HOLD COAG: NORMAL
WHOLE BLOOD HOLD SPECIMEN: NORMAL

## 2024-12-16 PROCEDURE — 72125 CT NECK SPINE W/O DYE: CPT

## 2024-12-16 PROCEDURE — 80047 BASIC METABLC PNL IONIZED CA: CPT

## 2024-12-16 PROCEDURE — 25810000003 SODIUM CHLORIDE 0.9 % SOLUTION: Performed by: EMERGENCY MEDICINE

## 2024-12-16 PROCEDURE — 84484 ASSAY OF TROPONIN QUANT: CPT | Performed by: EMERGENCY MEDICINE

## 2024-12-16 PROCEDURE — 90715 TDAP VACCINE 7 YRS/> IM: CPT | Performed by: EMERGENCY MEDICINE

## 2024-12-16 PROCEDURE — 93005 ELECTROCARDIOGRAM TRACING: CPT | Performed by: EMERGENCY MEDICINE

## 2024-12-16 PROCEDURE — 82550 ASSAY OF CK (CPK): CPT | Performed by: EMERGENCY MEDICINE

## 2024-12-16 PROCEDURE — 36415 COLL VENOUS BLD VENIPUNCTURE: CPT

## 2024-12-16 PROCEDURE — 70450 CT HEAD/BRAIN W/O DYE: CPT

## 2024-12-16 PROCEDURE — 71260 CT THORAX DX C+: CPT

## 2024-12-16 PROCEDURE — 73560 X-RAY EXAM OF KNEE 1 OR 2: CPT

## 2024-12-16 PROCEDURE — P9612 CATHETERIZE FOR URINE SPEC: HCPCS

## 2024-12-16 PROCEDURE — 25010000002 TETANUS-DIPHTH-ACELL PERTUSSIS 5-2.5-18.5 LF-MCG/0.5 SUSPENSION PREFILLED SYRINGE: Performed by: EMERGENCY MEDICINE

## 2024-12-16 PROCEDURE — 85014 HEMATOCRIT: CPT

## 2024-12-16 PROCEDURE — 87186 SC STD MICRODIL/AGAR DIL: CPT | Performed by: EMERGENCY MEDICINE

## 2024-12-16 PROCEDURE — 90471 IMMUNIZATION ADMIN: CPT | Performed by: EMERGENCY MEDICINE

## 2024-12-16 PROCEDURE — 81001 URINALYSIS AUTO W/SCOPE: CPT | Performed by: EMERGENCY MEDICINE

## 2024-12-16 PROCEDURE — 25010000002 CEFTRIAXONE PER 250 MG: Performed by: PHYSICIAN ASSISTANT

## 2024-12-16 PROCEDURE — 25510000001 IOPAMIDOL 61 % SOLUTION: Performed by: EMERGENCY MEDICINE

## 2024-12-16 PROCEDURE — 71045 X-RAY EXAM CHEST 1 VIEW: CPT

## 2024-12-16 PROCEDURE — 99285 EMERGENCY DEPT VISIT HI MDM: CPT

## 2024-12-16 PROCEDURE — 80053 COMPREHEN METABOLIC PANEL: CPT | Performed by: EMERGENCY MEDICINE

## 2024-12-16 PROCEDURE — 85025 COMPLETE CBC W/AUTO DIFF WBC: CPT | Performed by: EMERGENCY MEDICINE

## 2024-12-16 PROCEDURE — 83735 ASSAY OF MAGNESIUM: CPT | Performed by: EMERGENCY MEDICINE

## 2024-12-16 PROCEDURE — 87077 CULTURE AEROBIC IDENTIFY: CPT | Performed by: EMERGENCY MEDICINE

## 2024-12-16 PROCEDURE — 87086 URINE CULTURE/COLONY COUNT: CPT | Performed by: EMERGENCY MEDICINE

## 2024-12-16 PROCEDURE — 70486 CT MAXILLOFACIAL W/O DYE: CPT

## 2024-12-16 RX ORDER — SODIUM CHLORIDE 0.9 % (FLUSH) 0.9 %
10 SYRINGE (ML) INJECTION AS NEEDED
Status: DISCONTINUED | OUTPATIENT
Start: 2024-12-16 | End: 2024-12-17 | Stop reason: SDUPTHER

## 2024-12-16 RX ORDER — MORPHINE SULFATE 2 MG/ML
2 INJECTION, SOLUTION INTRAMUSCULAR; INTRAVENOUS ONCE
Status: COMPLETED | OUTPATIENT
Start: 2024-12-16 | End: 2024-12-17

## 2024-12-16 RX ORDER — ONDANSETRON 2 MG/ML
4 INJECTION INTRAMUSCULAR; INTRAVENOUS ONCE
Status: COMPLETED | OUTPATIENT
Start: 2024-12-16 | End: 2024-12-17

## 2024-12-16 RX ORDER — OXYCODONE AND ACETAMINOPHEN 5; 325 MG/1; MG/1
1 TABLET ORAL ONCE
Status: COMPLETED | OUTPATIENT
Start: 2024-12-16 | End: 2024-12-16

## 2024-12-16 RX ORDER — IOPAMIDOL 612 MG/ML
75 INJECTION, SOLUTION INTRAVASCULAR
Status: COMPLETED | OUTPATIENT
Start: 2024-12-16 | End: 2024-12-16

## 2024-12-16 RX ORDER — SODIUM CHLORIDE 0.9 % (FLUSH) 0.9 %
10 SYRINGE (ML) INJECTION AS NEEDED
Status: DISCONTINUED | OUTPATIENT
Start: 2024-12-16 | End: 2024-12-22

## 2024-12-16 RX ADMIN — SODIUM CHLORIDE 1000 MG: 900 INJECTION INTRAVENOUS at 23:03

## 2024-12-16 RX ADMIN — SODIUM CHLORIDE 1000 ML: 9 INJECTION, SOLUTION INTRAVENOUS at 21:32

## 2024-12-16 RX ADMIN — IOPAMIDOL 75 ML: 612 INJECTION, SOLUTION INTRAVENOUS at 21:51

## 2024-12-16 RX ADMIN — TETANUS TOXOID, REDUCED DIPHTHERIA TOXOID AND ACELLULAR PERTUSSIS VACCINE, ADSORBED 0.5 ML: 5; 2.5; 8; 8; 2.5 SUSPENSION INTRAMUSCULAR at 21:34

## 2024-12-16 RX ADMIN — OXYCODONE HYDROCHLORIDE AND ACETAMINOPHEN 1 TABLET: 5; 325 TABLET ORAL at 20:04

## 2024-12-17 ENCOUNTER — APPOINTMENT (OUTPATIENT)
Dept: GENERAL RADIOLOGY | Facility: HOSPITAL | Age: 64
End: 2024-12-17
Payer: MEDICARE

## 2024-12-17 ENCOUNTER — APPOINTMENT (OUTPATIENT)
Dept: MRI IMAGING | Facility: HOSPITAL | Age: 64
End: 2024-12-17
Payer: MEDICARE

## 2024-12-17 PROBLEM — G96.08 SUBDURAL HYGROMA: Status: ACTIVE | Noted: 2024-12-17

## 2024-12-17 PROBLEM — S06.5XAA SUBDURAL HEMATOMA: Status: ACTIVE | Noted: 2024-12-17

## 2024-12-17 LAB
PREALB SERPL-MCNC: 17.5 MG/DL (ref 20–40)
TSH SERPL DL<=0.05 MIU/L-ACNC: 1.55 UIU/ML (ref 0.27–4.2)

## 2024-12-17 PROCEDURE — G0378 HOSPITAL OBSERVATION PER HR: HCPCS

## 2024-12-17 PROCEDURE — 25010000002 MORPHINE PER 10 MG: Performed by: INTERNAL MEDICINE

## 2024-12-17 PROCEDURE — 25010000002 ONDANSETRON PER 1 MG: Performed by: EMERGENCY MEDICINE

## 2024-12-17 PROCEDURE — A9577 INJ MULTIHANCE: HCPCS | Performed by: INTERNAL MEDICINE

## 2024-12-17 PROCEDURE — 84134 ASSAY OF PREALBUMIN: CPT | Performed by: INTERNAL MEDICINE

## 2024-12-17 PROCEDURE — 99203 OFFICE O/P NEW LOW 30 MIN: CPT | Performed by: PHYSICIAN ASSISTANT

## 2024-12-17 PROCEDURE — 25510000002 GADOBENATE DIMEGLUMINE 529 MG/ML SOLUTION: Performed by: INTERNAL MEDICINE

## 2024-12-17 PROCEDURE — 99222 1ST HOSP IP/OBS MODERATE 55: CPT | Performed by: STUDENT IN AN ORGANIZED HEALTH CARE EDUCATION/TRAINING PROGRAM

## 2024-12-17 PROCEDURE — 25010000002 MORPHINE PER 10 MG: Performed by: EMERGENCY MEDICINE

## 2024-12-17 PROCEDURE — 84443 ASSAY THYROID STIM HORMONE: CPT | Performed by: INTERNAL MEDICINE

## 2024-12-17 PROCEDURE — 74018 RADEX ABDOMEN 1 VIEW: CPT

## 2024-12-17 PROCEDURE — 70553 MRI BRAIN STEM W/O & W/DYE: CPT

## 2024-12-17 PROCEDURE — 25810000003 SODIUM CHLORIDE 0.9 % SOLUTION: Performed by: INTERNAL MEDICINE

## 2024-12-17 PROCEDURE — 25810000003 LACTATED RINGERS PER 1000 ML: Performed by: STUDENT IN AN ORGANIZED HEALTH CARE EDUCATION/TRAINING PROGRAM

## 2024-12-17 RX ORDER — POLYETHYLENE GLYCOL 3350 17 G/17G
17 POWDER, FOR SOLUTION ORAL DAILY PRN
Status: DISCONTINUED | OUTPATIENT
Start: 2024-12-17 | End: 2024-12-21

## 2024-12-17 RX ORDER — BISACODYL 5 MG/1
5 TABLET, DELAYED RELEASE ORAL DAILY PRN
Status: DISCONTINUED | OUTPATIENT
Start: 2024-12-17 | End: 2024-12-21

## 2024-12-17 RX ORDER — NICOTINE 21 MG/24HR
1 PATCH, TRANSDERMAL 24 HOURS TRANSDERMAL DAILY PRN
Status: DISCONTINUED | OUTPATIENT
Start: 2024-12-17 | End: 2024-12-26 | Stop reason: HOSPADM

## 2024-12-17 RX ORDER — AMOXICILLIN 250 MG
2 CAPSULE ORAL 2 TIMES DAILY PRN
Status: DISCONTINUED | OUTPATIENT
Start: 2024-12-17 | End: 2024-12-21

## 2024-12-17 RX ORDER — LIDOCAINE 4 G/G
1 PATCH TOPICAL
Status: DISCONTINUED | OUTPATIENT
Start: 2024-12-17 | End: 2024-12-26 | Stop reason: HOSPADM

## 2024-12-17 RX ORDER — OXYCODONE HYDROCHLORIDE 5 MG/1
5 TABLET ORAL EVERY 4 HOURS PRN
Status: DISCONTINUED | OUTPATIENT
Start: 2024-12-17 | End: 2024-12-17

## 2024-12-17 RX ORDER — SODIUM CHLORIDE 9 MG/ML
40 INJECTION, SOLUTION INTRAVENOUS AS NEEDED
Status: DISCONTINUED | OUTPATIENT
Start: 2024-12-17 | End: 2024-12-23

## 2024-12-17 RX ORDER — SODIUM CHLORIDE 9 MG/ML
100 INJECTION, SOLUTION INTRAVENOUS CONTINUOUS
Status: ACTIVE | OUTPATIENT
Start: 2024-12-17 | End: 2024-12-18

## 2024-12-17 RX ORDER — SODIUM CHLORIDE 0.9 % (FLUSH) 0.9 %
10 SYRINGE (ML) INJECTION AS NEEDED
Status: DISCONTINUED | OUTPATIENT
Start: 2024-12-17 | End: 2024-12-26 | Stop reason: HOSPADM

## 2024-12-17 RX ORDER — BUSPIRONE HYDROCHLORIDE 5 MG/1
5 TABLET ORAL 3 TIMES DAILY
Status: DISCONTINUED | OUTPATIENT
Start: 2024-12-17 | End: 2024-12-22

## 2024-12-17 RX ORDER — MORPHINE SULFATE 2 MG/ML
2 INJECTION, SOLUTION INTRAMUSCULAR; INTRAVENOUS EVERY 4 HOURS PRN
Status: DISCONTINUED | OUTPATIENT
Start: 2024-12-17 | End: 2024-12-20

## 2024-12-17 RX ORDER — BUPROPION HYDROCHLORIDE 150 MG/1
150 TABLET ORAL DAILY
Status: DISCONTINUED | OUTPATIENT
Start: 2024-12-17 | End: 2024-12-22

## 2024-12-17 RX ORDER — BISACODYL 10 MG
10 SUPPOSITORY, RECTAL RECTAL DAILY PRN
Status: DISCONTINUED | OUTPATIENT
Start: 2024-12-17 | End: 2024-12-21

## 2024-12-17 RX ORDER — HYDROXYZINE HYDROCHLORIDE 25 MG/1
25 TABLET, FILM COATED ORAL 3 TIMES DAILY PRN
Status: DISCONTINUED | OUTPATIENT
Start: 2024-12-17 | End: 2024-12-22

## 2024-12-17 RX ORDER — SODIUM CHLORIDE, SODIUM LACTATE, POTASSIUM CHLORIDE, CALCIUM CHLORIDE 600; 310; 30; 20 MG/100ML; MG/100ML; MG/100ML; MG/100ML
75 INJECTION, SOLUTION INTRAVENOUS CONTINUOUS
Status: ACTIVE | OUTPATIENT
Start: 2024-12-17 | End: 2024-12-17

## 2024-12-17 RX ORDER — ACETAMINOPHEN 325 MG/1
650 TABLET ORAL EVERY 4 HOURS PRN
Status: DISCONTINUED | OUTPATIENT
Start: 2024-12-17 | End: 2024-12-21

## 2024-12-17 RX ORDER — SODIUM CHLORIDE 0.9 % (FLUSH) 0.9 %
10 SYRINGE (ML) INJECTION EVERY 12 HOURS SCHEDULED
Status: DISCONTINUED | OUTPATIENT
Start: 2024-12-17 | End: 2024-12-23

## 2024-12-17 RX ORDER — LUBIPROSTONE 8 UG/1
8 CAPSULE ORAL 2 TIMES DAILY WITH MEALS
Status: COMPLETED | OUTPATIENT
Start: 2024-12-17 | End: 2024-12-20

## 2024-12-17 RX ORDER — OXYCODONE HYDROCHLORIDE 10 MG/1
10 TABLET ORAL EVERY 4 HOURS PRN
Status: DISCONTINUED | OUTPATIENT
Start: 2024-12-17 | End: 2024-12-20

## 2024-12-17 RX ORDER — ASPIRIN 81 MG/1
81 TABLET ORAL DAILY
Status: DISCONTINUED | OUTPATIENT
Start: 2024-12-17 | End: 2024-12-22

## 2024-12-17 RX ORDER — ACETAMINOPHEN 160 MG/5ML
650 SOLUTION ORAL EVERY 4 HOURS PRN
Status: DISCONTINUED | OUTPATIENT
Start: 2024-12-17 | End: 2024-12-21

## 2024-12-17 RX ORDER — ACETAMINOPHEN 650 MG/1
650 SUPPOSITORY RECTAL EVERY 4 HOURS PRN
Status: DISCONTINUED | OUTPATIENT
Start: 2024-12-17 | End: 2024-12-21

## 2024-12-17 RX ADMIN — SODIUM CHLORIDE, SODIUM LACTATE, POTASSIUM CHLORIDE, CALCIUM CHLORIDE 75 ML/HR: 20; 30; 600; 310 INJECTION, SOLUTION INTRAVENOUS at 06:39

## 2024-12-17 RX ADMIN — Medication 10 ML: at 20:24

## 2024-12-17 RX ADMIN — SODIUM CHLORIDE 100 ML/HR: 9 INJECTION, SOLUTION INTRAVENOUS at 17:34

## 2024-12-17 RX ADMIN — BUSPIRONE HYDROCHLORIDE 5 MG: 5 TABLET ORAL at 20:21

## 2024-12-17 RX ADMIN — MORPHINE SULFATE 2 MG: 2 INJECTION, SOLUTION INTRAMUSCULAR; INTRAVENOUS at 19:32

## 2024-12-17 RX ADMIN — Medication 10 ML: at 19:33

## 2024-12-17 RX ADMIN — BUPROPION HYDROCHLORIDE 150 MG: 150 TABLET, EXTENDED RELEASE ORAL at 11:04

## 2024-12-17 RX ADMIN — Medication 10 ML: at 08:30

## 2024-12-17 RX ADMIN — MORPHINE SULFATE 2 MG: 2 INJECTION, SOLUTION INTRAMUSCULAR; INTRAVENOUS at 00:01

## 2024-12-17 RX ADMIN — LUBIPROSTONE 8 MCG: 8 CAPSULE, GELATIN COATED ORAL at 18:33

## 2024-12-17 RX ADMIN — LIDOCAINE 1 PATCH: 4 PATCH TOPICAL at 06:40

## 2024-12-17 RX ADMIN — BUSPIRONE HYDROCHLORIDE 5 MG: 5 TABLET ORAL at 11:04

## 2024-12-17 RX ADMIN — OXYCODONE 5 MG: 5 TABLET ORAL at 04:49

## 2024-12-17 RX ADMIN — GADOBENATE DIMEGLUMINE 8 ML: 529 INJECTION, SOLUTION INTRAVENOUS at 09:50

## 2024-12-17 RX ADMIN — BUSPIRONE HYDROCHLORIDE 5 MG: 5 TABLET ORAL at 17:00

## 2024-12-17 RX ADMIN — ONDANSETRON 4 MG: 2 INJECTION INTRAMUSCULAR; INTRAVENOUS at 00:01

## 2024-12-17 RX ADMIN — SENNOSIDES AND DOCUSATE SODIUM 2 TABLET: 50; 8.6 TABLET ORAL at 13:54

## 2024-12-17 RX ADMIN — MORPHINE SULFATE 2 MG: 2 INJECTION, SOLUTION INTRAMUSCULAR; INTRAVENOUS at 13:54

## 2024-12-17 RX ADMIN — OXYCODONE HYDROCHLORIDE 10 MG: 10 TABLET ORAL at 20:21

## 2024-12-17 NOTE — ED PROVIDER NOTES
Subjective   History of Present Illness  This is a cachectic, debilitated female that presents the ER after fall with injury at home that occurred 1 hour prior to arrival.  Patient lives alone.  She went outside to  her mail and she fell at the straight onto concrete.  She fell face forward and struck her face, but she denied loss of consciousness.  She has abrasions to the right lower lip, as well as bridge of her nose and also a posterior scalp wound.  Patient reports right sided neck pain.  She denies any headache, upper back pain, or lower back pain.  She reports right anterior and lateral rib pain.  There is no sign of visible bruising or soft tissue edema or skin changes.  Patient also reports left knee pain.  She denies any pelvic or hip pain or bilateral upper extremity pain.   she takes aspirin 81 mg by mouth daily but no oth chronic anticoagulation or frequent NSAIDs.  erPatient says her tetanus status is not up-to-date.  After reviewing epic, her last fall was 10/22/2024 and she had normal x-rays of the left hip and pelvis as well as normal CT of the brain and facial bones without contrast.  Patient has past medical history of depression, ovarian cancer, right sided breast cancer status post bilateral mastectomy with metastases to the brain, tobacco dependence, heart murmur, generalized anxiety disorder, and history of UTI.  Patient reports some mild dysuria and urgency.  Patient lives alone.  She denies any chest pain or shortness of breath.  She denies any abdominal pain or flank pain.  No other injuries from the fall.    History provided by:  Patient  Fall  Mechanism of injury: fall    Incident location:  Outdoors (Patient fell out at the end of her driveway getting her mail)  Time since incident:  1 hour  Arrived directly from scene: yes    Fall:     Fall occurred:  Standing    Impact [place]:  [place]    Point of impact:  Face  Suspicion of alcohol use: no     Suspicion of drug use: no    Tetanus status:  Out of date  Prior to arrival data:     Patient ambulatory at scene: yes (Patient says her neighbor saw her fall and came out and helped her get up)      Blood loss:  Minimal    Responsiveness at scene:  Alert    Orientation at scene:  Person, place, situation and time    Loss of consciousness: no      Amnesic to event: no    Associated symptoms: chest pain (Right anterior and lateral rib pain) and neck pain (Right sided neck pain)    Associated symptoms: no abdominal pain, no back pain, no blindness, no difficulty breathing, no headaches, no hearing loss, no loss of consciousness, no nausea, no seizures and no vomiting    Risk factors: no anticoagulation therapy    Risk factors comment:  Daily aspirin 81 mg by mouth use.  Patient also has history of right sided breast cancer status post bilateral mastectomy with metastases to the brain.      Review of Systems   Constitutional:  Positive for activity change and fatigue. Negative for appetite change, chills and diaphoresis.   HENT:  Negative for congestion, hearing loss, nosebleeds, postnasal drip, rhinorrhea, sinus pressure, sinus pain, sneezing and sore throat.         Patient has nasal bone pain with superficial abrasion and she also has some superficial abrasions to the right lower lip, exterior and inferior.   Eyes:  Negative for blindness.   Respiratory: Negative.  Negative for cough and shortness of breath.    Cardiovascular:  Positive for chest pain (Right anterior and lateral rib pain). Negative for palpitations and leg swelling.   Gastrointestinal: Negative.  Negative for abdominal pain, nausea and vomiting.   Genitourinary:  Positive for dysuria and urgency. Negative for decreased urine volume, flank pain and frequency.   Musculoskeletal:  Positive for arthralgias (Right anterior and lateral rib pain, left knee pain.) and neck pain (Right sided neck pain). Negative for back pain.   Skin:  Positive for color  change (Localized bruising to left anterior knee) and wound (Superficial abrasions to the bridge of the nose, exterior and inferior right lower lip, deep abrasion to the posterior scalp.).   Neurological:  Positive for dizziness. Negative for seizures, loss of consciousness, syncope and headaches.        Patient had fall while at the end of her driveway getting her mail from the mailbox.  No head injury or LOC.  No preceding stroke-like symptoms.  Patient does report some chronic dizziness with history of metastatic breast cancer to the brain.  She denies any speech difficulty or unilateral extremity weakness or numbness/tingling.   Hematological:         History of right-sided breast cancer status post bilateral mastectomy.  Patient says she was diagnosed with metastatic disease to the brain.  She follows with oncology, Dr. Patel.   All other systems reviewed and are negative.      Past Medical History:   Diagnosis Date    Breast CA 10/4/2018    Depression     DJD (degenerative joint disease) of cervical spine     THALIA (generalized anxiety disorder)     Heart murmur     Ovarian cancer 1989    Rt Cellulitis of chest wall 10/15/2018    Tobacco dependence     UTI (urinary tract infection) 9/9/2020       No Known Allergies    Past Surgical History:   Procedure Laterality Date    APPENDECTOMY      BREAST BIOPSY Right 2003    DONE IN [place]    COLONOSCOPY  06/2018    HYSTERECTOMY  1989    MASTECTOMY W/ SENTINEL NODE BIOPSY Bilateral 10/4/2018    Procedure: LEFT SKIN SPARING BREAST MASTECTOMY WITH LEFT SENTINEL NODE BIOPSY, RIGHT PROPHYLACTIC SKIN SPARING MASTECTOMY;  Surgeon: Koffi Worthington MD;  Location: [facility];  Service: General    OOPHORECTOMY  1989       Family History   Problem Relation Age of Onset    Arthritis Mother     Heart attack Mother     Osteoporosis Mother     Liver disease Father     Celiac disease Other        Social History     Socioeconomic History    Marital status: Single     Spouse name:  N/A    Number of children: 1    Years of education: H.S.    Highest education level: High school graduate   Tobacco Use    Smoking status: Every Day     Current packs/day: 0.50     Average packs/day: 0.5 packs/day for 25.0 years (12.5 ttl pk-yrs)     Types: Cigarettes    Smokeless tobacco: Never   Vaping Use    Vaping status: Never Used   Substance and Sexual Activity    Alcohol use: Not Currently    Drug use: No    Sexual activity: Defer     Partners: Male           Objective   Physical Exam  Vitals and nursing note reviewed.   Constitutional:       General: She is not in acute distress.     Appearance: Normal appearance. She is not ill-appearing, toxic-appearing or diaphoretic.      Comments: Debilitated, elderly female.  No acute distress.  Nontoxic.   HENT:      Head: Normocephalic and atraumatic. Abrasion present. No contusion, masses or laceration.      Jaw: There is normal jaw occlusion. No tenderness, swelling, pain on movement or malocclusion.        Comments: Tenderness with deep abrasion to the mid posterior scalp.  No active bleeding.  No scalp hematoma.  Jaw occlusion normal.  No tenderness or pops/clicks with movement.     Right Ear: Tympanic membrane normal.      Left Ear: Tympanic membrane normal.      Nose: Nose normal. Signs of injury and nasal tenderness present. No nasal deformity, septal deviation or laceration.      Right Nostril: No epistaxis or septal hematoma.      Left Nostril: No epistaxis or septal hematoma.      Right Sinus: No maxillary sinus tenderness or frontal sinus tenderness.      Left Sinus: No maxillary sinus tenderness or frontal sinus tenderness.        Comments: Patient has mild tenderness with superficial abrasion to the bridge of the nose.  No obvious deformity and no septal deviation.  No nasal bleeding or septal hematoma bilaterally.  No frontal or maxillary sinus tenderness     Mouth/Throat:      Mouth: Mucous membranes are moist. Injury present.      Dentition: Normal  dentition. No dental tenderness.      Pharynx: Oropharynx is clear. No pharyngeal swelling, oropharyngeal exudate, posterior oropharyngeal erythema or uvula swelling.        Comments: Oral mucous membranes are moist.  Patient has superficial abrasion to the right exterior lower lip and right inferior lower lip.  No laceration and no necessity for closure.  No loose teeth palpable or oral injury.  Posterior pharynx is patent and there is no erythema or vesicles or swelling.  Eyes:      Extraocular Movements: Extraocular movements intact.      Right eye: Normal extraocular motion and no nystagmus.      Left eye: Normal extraocular motion and no nystagmus.      Conjunctiva/sclera: Conjunctivae normal.      Pupils: Pupils are equal, round, and reactive to light.      Comments: Pupils equal round reactive to light.  Extraocular movements intact   Neck:      Comments: No C-spine tenderness.  Tenderness to right cervical myofascia with muscle tightness and spasm.  Painful range of motion.  Cardiovascular:      Rate and Rhythm: Normal rate and regular rhythm. No extrasystoles are present.     Pulses: Normal pulses.           Radial pulses are 2+ on the right side and 2+ on the left side.        Dorsalis pedis pulses are 2+ on the right side and 2+ on the left side.        Posterior tibial pulses are 2+ on the right side and 2+ on the left side.      Heart sounds: Normal heart sounds.      Comments: Regular rate and rhythm.  No tachycardia or ectopy.  No pedal edema to bilateral lower extremities.  Strong bilateral radial and ulnar pulses and strong bilateral DP and PT pulses.  Pulmonary:      Effort: Pulmonary effort is normal. No tachypnea, accessory muscle usage or retractions.      Breath sounds: Normal breath sounds. No decreased breath sounds.      Comments: Regular respiratory effort.  No tachypnea, retractions, or accessory muscle use.  No decreased breath sounds concerning for pneumothorax.  Chest:      Chest wall:  Tenderness present. No deformity, swelling, crepitus or edema.      Comments: Tenderness to right anterior lower rib cage and right lower lateral rib cage.  No palpable rib fracture or crepitus.  No bruising, skin changes, or sign of deformity.  Abdominal:      General: Bowel sounds are normal. There is no distension. There are no signs of injury.      Palpations: Abdomen is soft.      Tenderness: There is no abdominal tenderness. There is no right CVA tenderness, left CVA tenderness, guarding or rebound.      Comments: Abdomen soft without distention or rigidity.  Active bowel sounds in all 4 quadrants.  Nontender to palpation.  No flank or CVA tenderness.  Abdominal exam is benign and nonsurgical.   Musculoskeletal:         General: Normal range of motion.      Cervical back: Normal range of motion and neck supple. Pain with movement and muscular tenderness present. No spinous process tenderness.      Comments: No T or LS spinal tenderness.  No pelvic or hip tenderness.  Full range of motion bilateral upper extremities without bony tenderness.  Mild tenderness with superficial abrasion to the left anterior knee.  No soft tissue swelling or joint effusion.  Full range of motion.  No tenderness to bilateral ankles or feet and no tenderness to the right knee.   Skin:     General: Skin is warm and dry.      Findings: Abrasion and bruising present.      Comments: Patient has superficial abrasion to the right exterior and inferior lower lip without laceration.  Patient has deep abrasion to the mid posterior scalp.  She also has localized bruising to the left anterior knee.   Neurological:      General: No focal deficit present.      Mental Status: She is alert. She is disoriented.      Cranial Nerves: Cranial nerves 2-12 are intact.      Sensory: Sensation is intact.      Motor: Motor function is intact. Weakness present.      Coordination: Coordination is intact.      Comments: Patient is alert and oriented x 2.  She  does not know the date.  She follows commands and there are no focal neurologic deficits.  She is significantly debilitated and generally weak with overall functional decline.  History of right sided breast cancer with metastasis to the brain.   Psychiatric:         Mood and Affect: Mood normal. Affect is flat.         Speech: Speech normal.         Behavior: Behavior is cooperative.         Thought Content: Thought content normal.         Cognition and Memory: Cognition and memory normal.         Judgment: Judgment normal.      Comments: Normal mood and flat affect.  Patient answers all questions appropriately about recent events concerning the fall and her chronic health conditions.  She appears to have normal judgment.         Procedures           ED Course  ED Course as of 12/17/24 0055   Mon Dec 16, 2024   2117 Vital signs are stable.  I personally interpreted EKG, which showed sinus rhythm.  I did not appreciate any ST-T wave elevation consistent with ischemia and also did not appreciate any evidence of arrhythmia.  CBC shows white blood cell count of 7000.  H&H is 10.8 and 35.  Chemistries were essentially normal other than sodium of 132.  LFTs were normal.  Creatinine kinase was 77 and magnesium is 1.8.  High-sensitivity troponin is 7.  Awaiting urinalysis results.  I personally interpreted chest x-ray which showed no evidence of pulmonary vascular congestion or acute infiltrative process.  I also personally interpreted x-rays of the left knee which revealed no acute bony abnormality and no dislocation.  Awaiting CT scan results as well as urinalysis since patient does have history of UTIs.  We are giving IV fluid bolus and we cleaned patient's wounds.  I do not see any need for closure on any of patient's wounds.  They all appear to be abrasions.  Tetanus status was updated. [FC]   2138 Cath urinalysis reveals trace leukocytes, 3-5 white blood cells and 4+ bacteria.  I reviewed 2 previous abnormal urine  cultures in epic and patient had growth of E. coli with resistance to ampicillin and tetracycline.  We will give her a dose of IV Rocephin.  Awaiting CT scan results. [FC]   2303 Discussed the case with Jayme Phelan, neurosurgery mid-level. She wants to talk to co-worker and will call me back with recommendations. [FC]   Tue Dec 17, 2024   0053 Neurosurgery midlevel said that they would consult in the morning but there was no further recommendations or interventions necessary at this time.  I updated patient on all results and need for admission.  Discussed admission with hospitalist, Dr. Trammell, who is agreeable to admission on telemetry.  Vital signs are stable. [FC]      ED Course User Index  [FC] Vaishali Moran, HEATHER                                             Recent Results (from the past 24 hours)   ECG 12 Lead ED Triage Standing Order; Weak / Dizzy / AMS    Collection Time: 12/16/24  7:22 PM   Result Value Ref Range    QT Interval 404 ms    QTC Interval 472 ms   Comprehensive Metabolic Panel    Collection Time: 12/16/24  7:53 PM    Specimen: Blood   Result Value Ref Range    Glucose 104 (H) 65 - 99 mg/dL    BUN 12 8 - 23 mg/dL    Creatinine 0.67 0.57 - 1.00 mg/dL    Sodium 132 (L) 136 - 145 mmol/L    Potassium 3.9 3.5 - 5.2 mmol/L    Chloride 96 (L) 98 - 107 mmol/L    CO2 22.0 22.0 - 29.0 mmol/L    Calcium 8.8 8.6 - 10.5 mg/dL    Total Protein 6.5 6.0 - 8.5 g/dL    Albumin 4.0 3.5 - 5.2 g/dL    ALT (SGPT) 10 1 - 33 U/L    AST (SGOT) 25 1 - 32 U/L    Alkaline Phosphatase 89 39 - 117 U/L    Total Bilirubin 0.5 0.0 - 1.2 mg/dL    Globulin 2.5 gm/dL    A/G Ratio 1.6 g/dL    BUN/Creatinine Ratio 17.9 7.0 - 25.0    Anion Gap 14.0 5.0 - 15.0 mmol/L    eGFR 97.7 >60.0 mL/min/1.73   High Sensitivity Troponin T    Collection Time: 12/16/24  7:53 PM    Specimen: Blood   Result Value Ref Range    HS Troponin T 7 <14 ng/L   Magnesium    Collection Time: 12/16/24  7:53 PM    Specimen: Blood   Result Value Ref Range     Magnesium 1.8 1.6 - 2.4 mg/dL   Green Top (Gel)    Collection Time: 12/16/24  7:53 PM   Result Value Ref Range    Extra Tube Hold for add-ons.    Lavender Top    Collection Time: 12/16/24  7:53 PM   Result Value Ref Range    Extra Tube hold for add-on    Gold Top - SST    Collection Time: 12/16/24  7:53 PM   Result Value Ref Range    Extra Tube Hold for add-ons.    Gray Top    Collection Time: 12/16/24  7:53 PM   Result Value Ref Range    Extra Tube Hold for add-ons.    Light Blue Top    Collection Time: 12/16/24  7:53 PM   Result Value Ref Range    Extra Tube Hold for add-ons.    CBC Auto Differential    Collection Time: 12/16/24  7:53 PM    Specimen: Blood   Result Value Ref Range    WBC 7.86 3.40 - 10.80 10*3/mm3    RBC 3.35 (L) 3.77 - 5.28 10*6/mm3    Hemoglobin 10.8 (L) 12.0 - 15.9 g/dL    Hematocrit 35.0 34.0 - 46.6 %    .5 (H) 79.0 - 97.0 fL    MCH 32.2 26.6 - 33.0 pg    MCHC 30.9 (L) 31.5 - 35.7 g/dL    RDW 12.9 12.3 - 15.4 %    RDW-SD 49.3 37.0 - 54.0 fl    MPV 9.5 6.0 - 12.0 fL    Platelets 174 140 - 450 10*3/mm3    Neutrophil % 70.2 42.7 - 76.0 %    Lymphocyte % 18.3 (L) 19.6 - 45.3 %    Monocyte % 9.0 5.0 - 12.0 %    Eosinophil % 0.9 0.3 - 6.2 %    Basophil % 1.0 0.0 - 1.5 %    Immature Grans % 0.6 (H) 0.0 - 0.5 %    Neutrophils, Absolute 5.51 1.70 - 7.00 10*3/mm3    Lymphocytes, Absolute 1.44 0.70 - 3.10 10*3/mm3    Monocytes, Absolute 0.71 0.10 - 0.90 10*3/mm3    Eosinophils, Absolute 0.07 0.00 - 0.40 10*3/mm3    Basophils, Absolute 0.08 0.00 - 0.20 10*3/mm3    Immature Grans, Absolute 0.05 0.00 - 0.05 10*3/mm3    nRBC 0.0 0.0 - 0.2 /100 WBC   CK    Collection Time: 12/16/24  7:53 PM    Specimen: Blood   Result Value Ref Range    Creatine Kinase 77 20 - 180 U/L   POC CHEM 8    Collection Time: 12/16/24  7:58 PM    Specimen: Blood   Result Value Ref Range    Glucose 97 70 - 130 mg/dL    BUN 14 8 - 26 mg/dL    Creatinine 0.70 0.60 - 1.30 mg/dL    Sodium 132 (L) 138 - 146 mmol/L    POC Potassium  4.3 3.5 - 4.9 mmol/L    Chloride 100 98 - 109 mmol/L    Total CO2 24 24 - 29 mmol/L    Hemoglobin 5.4 (C) 12.0 - 17.0 g/dL    Hematocrit 16 (L) 38 - 51 %    Ionized Calcium 1.24 1.20 - 1.32 mmol/L    eGFR 96.7 >60.0 mL/min/1.73   Urinalysis With Culture If Indicated - Urine, Catheter    Collection Time: 12/16/24  9:24 PM    Specimen: Urine, Catheter   Result Value Ref Range    Color, UA Yellow Yellow, Straw    Appearance, UA Cloudy (A) Clear    pH, UA 7.5 5.0 - 8.0    Specific Gravity, UA 1.017 1.001 - 1.030    Glucose, UA Negative Negative    Ketones, UA Negative Negative    Bilirubin, UA Negative Negative    Blood, UA Negative Negative    Protein, UA Trace (A) Negative    Leuk Esterase, UA Trace (A) Negative    Nitrite, UA Negative Negative    Urobilinogen, UA 1.0 E.U./dL 0.2 - 1.0 E.U./dL   Urinalysis, Microscopic Only - Urine, Catheter    Collection Time: 12/16/24  9:24 PM    Specimen: Urine, Catheter   Result Value Ref Range    RBC, UA 0-2 None Seen, 0-2 /HPF    WBC, UA 3-5 (A) None Seen, 0-2 /HPF    Bacteria, UA 4+ (A) None Seen, Trace /HPF    Squamous Epithelial Cells, UA 0-2 None Seen, 0-2 /HPF    Hyaline Casts, UA 0-6 0 - 6 /LPF    Methodology Automated Microscopy    High Sensitivity Troponin T 1Hr    Collection Time: 12/16/24  9:30 PM    Specimen: Blood   Result Value Ref Range    HS Troponin T 7 <14 ng/L    Troponin T Delta 0 Abnormal if >/=3 ng/L     Note: In addition to lab results from this visit, the labs listed above may include labs taken at another facility or during a different encounter within the last 24 hours. Please correlate lab times with ED admission and discharge times for further clarification of the services performed during this visit.    CT Head Without Contrast   Final Result   Impression:   Compared to head CT from 10/22/2024, new hypoattenuating subdural collections along the bilateral cerebral convexities measuring up to 5 mm on the right and 4 mm on the left, suspicious for acute  subdural hygromas in setting of trauma.      No acute maxillofacial fracture.         Electronically Signed: Simone Mcdowell     12/16/2024 10:11 PM EST     Workstation ID: [id number]      CT Facial Bones Without Contrast   Final Result   Impression:   Compared to head CT from 10/22/2024, new hypoattenuating subdural collections along the bilateral cerebral convexities measuring up to 5 mm on the right and 4 mm on the left, suspicious for acute subdural hygromas in setting of trauma.      No acute maxillofacial fracture.         Electronically Signed: Simone Mcdowell     12/16/2024 10:11 PM EST     Workstation ID: [id number]      CT Cervical Spine Without Contrast   Final Result   Impression:   1.No acute fracture or malalignment of the cervical spine.   2.No acute intrathoracic abnormality. No evidence of rib fracture.            Electronically Signed: Antonio Marsh MD     12/16/2024 10:09 PM EST     Workstation ID: [id number]      CT Chest With Contrast Diagnostic   Final Result   Impression:   1.No acute fracture or malalignment of the cervical spine.   2.No acute intrathoracic abnormality. No evidence of rib fracture.            Electronically Signed: Antonio Marsh MD     12/16/2024 10:09 PM EST     Workstation ID: [id number]      XR Chest 1 View   Final Result   Impression:   No radiographic evidence of acute cardiopulmonary disease.            Electronically Signed: Simone Mcdowell     12/16/2024 8:11 PM EST     Workstation ID: [id number]      XR Knee 1 or 2 View Left   Final Result   Impression:   No acute fracture or malalignment.            Electronically Signed: Simone Mcdowell     12/16/2024 8:13 PM EST     Workstation ID: [id number]        Vitals:    12/16/24 1902 12/16/24 1903 12/16/24 2117 12/16/24 2136   BP: 155/89 155/89 131/91 131/91   BP Location:    Right arm   Patient Position:    Lying   Pulse: 92   68   Resp:    20   Temp:    98.5 °F (36.9 °C)   TempSrc:    Oral   SpO2: 97%   100%   Weight:       Height:          Medications   sodium chloride 0.9 % flush 10 mL (has no administration in time range)   sodium chloride 0.9 % flush 10 mL (has no administration in time range)   sodium chloride 0.9 % bolus 1,000 mL (0 mL Intravenous Stopped 12/16/24 2202)   Tetanus-Diphth-Acell Pertussis (BOOSTRIX) injection 0.5 mL (0.5 mL Intramuscular Given 12/16/24 2134)   oxyCODONE-acetaminophen (PERCOCET) 5-325 MG per tablet 1 tablet (1 tablet Oral Given 12/16/24 2004)   cefTRIAXone (ROCEPHIN) 1,000 mg in sodium chloride 0.9 % 100 mL MBP (0 mg Intravenous Stopped 12/16/24 2333)   iopamidol (ISOVUE-300) 61 % injection 75 mL (75 mL Intravenous Given 12/16/24 2151)   morphine injection 2 mg (2 mg Intravenous Given 12/17/24 0001)   ondansetron (ZOFRAN) injection 4 mg (4 mg Intravenous Given 12/17/24 0001)     ECG/EMG Results (last 24 hours)       Procedure Component Value Units Date/Time    ECG 12 Lead ED Triage Standing Order; Weak / Dizzy / AMS [[id number]] Collected: 12/16/24 1922     Updated: 12/16/24 1923     QT Interval 404 ms      QTC Interval 472 ms     Narrative:      Test Reason : ED Triage Standing Order~  Blood Pressure :   */*   mmHG  Vent. Rate :  82 BPM     Atrial Rate :  82 BPM     P-R Int : 136 ms          QRS Dur :  82 ms      QT Int : 404 ms       P-R-T Axes :  70  -3  13 degrees    QTcB Int : 472 ms    Normal sinus rhythm  Nonspecific ST and T wave abnormality  Abnormal ECG  When compared with ECG of 22-Oct-2024 09:56,  Criteria for Septal infarct are no longer present    Referred By: EDMD           Confirmed By:           ECG 12 Lead ED Triage Standing Order; Weak / Dizzy / AMS   Preliminary Result   Test Reason : ED Triage Standing Order~   Blood Pressure :   */*   mmHG   Vent. Rate :  82 BPM     Atrial Rate :  82 BPM      P-R Int : 136 ms          QRS Dur :  82 ms       QT Int : 404 ms       P-R-T Axes :  70  -3  13 degrees     QTcB Int : 472 ms      Normal sinus rhythm   Nonspecific ST and T wave abnormality   Abnormal  ECG   When compared with ECG of 22-Oct-2024 09:56,   Criteria for Septal infarct are no longer present      Referred By: EDMD           Confirmed By:                     Medical Decision Making      Final diagnoses:   Fall, initial encounter   Injury of head, initial encounter   Post-traumatic subdural hygroma   Strain of neck muscle, initial encounter   Multiple abrasions   Contusion of nose, initial encounter   Contusion of left knee, initial encounter   Cancer of right breast metastatic to brain   History of bilateral mastectomy   Acute UTI   Chronic pain syndrome       ED Disposition  ED Disposition       ED Disposition   Decision to Admit    Condition   --    Comment   Level of Care: Telemetry [5]   Diagnosis: Subdural hematoma [[id number]]   Admitting Physician: SARA MOREJON [[id number]]   Attending Physician: SARA MROEJON [[id number]]                 No follow-up provider specified.       Medication List      No changes were made to your prescriptions during this visit.            Vaishali Moran PA-C  12/17/24 0055

## 2024-12-17 NOTE — ED NOTES
Brittani Lambert    Nursing Report ED to Floor:  Mental status: A & O x 4  Ambulatory status: 1 assist  Oxygen Therapy:  RA  Cardiac Rhythm: sinus  Admitted from: home  Safety Concerns:  N/A  Social Issues: N/A  ED Room #:  04    ED Nurse Phone Extension - [phone] or may call [phone].      HPI:   Chief Complaint   Patient presents with    Fall       Past Medical History:  Past Medical History:   Diagnosis Date    Breast CA 10/4/2018    Depression     DJD (degenerative joint disease) of cervical spine     THALIA (generalized anxiety disorder)     Heart murmur     Ovarian cancer 1989    Rt Cellulitis of chest wall 10/15/2018    Tobacco dependence     UTI (urinary tract infection) 9/9/2020        Past Surgical History:  Past Surgical History:   Procedure Laterality Date    APPENDECTOMY      BREAST BIOPSY Right 2003    DONE IN [place]    COLONOSCOPY  06/2018    HYSTERECTOMY  1989    MASTECTOMY W/ SENTINEL NODE BIOPSY Bilateral 10/4/2018    Procedure: LEFT SKIN SPARING BREAST MASTECTOMY WITH LEFT SENTINEL NODE BIOPSY, RIGHT PROPHYLACTIC SKIN SPARING MASTECTOMY;  Surgeon: Koffi Worthington MD;  Location: [facility], 1928–1983;  Service: General    OOPHORECTOMY  1989        Admitting Doctor:   Antonio Torres DO    Consulting Provider(s):  Consults       Date and Time Order Name Status Description    12/17/2024  6:31 AM Inpatient Neurosurgery Consult Completed              Admitting Diagnosis:   The primary encounter diagnosis was Fall, initial encounter. 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    Most Recent Vitals:   Vitals:    12/17/24 1104 12/17/24 1200 12/17/24 1230 12/17/24 1300   BP: 156/83 162/96 (!)  181/101 173/97   Pulse: 61 69 70 64   Resp:       Temp:       TempSrc:       SpO2:  95% 100% 97%   Weight:       Height:           Active LDAs/IV Access:   Lines, Drains & Airways       Active LDAs       Name Placement date Placement time Site Days    Peripheral IV 12/16/24 2132 Anterior;Right Forearm 12/16/24 2132  Forearm  less than 1                    Labs (abnormal labs have a star):   Labs Reviewed   COMPREHENSIVE METABOLIC PANEL - Abnormal; Notable for the following components:       Result Value    Glucose 104 (*)     Sodium 132 (*)     Chloride 96 (*)     All other components within normal limits    Narrative:     GFR Categories in Chronic Kidney Disease (CKD)      GFR Category          GFR (mL/min/1.73)    Interpretation  G1                     90 or greater         Normal or high (1)  G2                      60-89                Mild decrease (1)  G3a                   45-59                Mild to moderate decrease  G3b                   30-44                Moderate to severe decrease  G4                    15-29                Severe decrease  G5                    14 or less           Kidney failure          (1)In the absence of evidence of kidney disease, neither GFR category G1 or G2 fulfill the criteria for CKD.    eGFR calculation 2021 CKD-EPI creatinine equation, which does not include race as a factor   CBC WITH AUTO DIFFERENTIAL - Abnormal; Notable for the following components:    RBC 3.35 (*)     Hemoglobin 10.8 (*)     .5 (*)     MCHC 30.9 (*)     Lymphocyte % 18.3 (*)     Immature Grans % 0.6 (*)     All other components within normal limits   URINALYSIS W/ CULTURE IF INDICATED - Abnormal; Notable for the following components:    Appearance, UA Cloudy (*)     Protein, UA Trace (*)     Leuk Esterase, UA Trace (*)     All other components within normal limits    Narrative:     In absence of clinical symptoms, the presence of pyuria, bacteria, and/or nitrites on the urinalysis result  does not correlate with infection.   URINALYSIS, MICROSCOPIC ONLY - Abnormal; Notable for the following components:    WBC, UA 3-5 (*)     Bacteria, UA 4+ (*)     All other components within normal limits   PREALBUMIN - Abnormal; Notable for the following components:    Prealbumin 17.5 (*)     All other components within normal limits   POCT CHEM 8 - Abnormal; Notable for the following components:    Sodium 132 (*)     Hemoglobin 5.4 (*)     Hematocrit 16 (*)     All other components within normal limits   URINE CULTURE - Normal   TROPONIN - Normal    Narrative:     High Sensitive Troponin T Reference Range:  <14.0 ng/L- Negative Female for AMI  <22.0 ng/L- Negative Male for AMI  >=14 - Abnormal Female indicating possible myocardial injury.  >=22 - Abnormal Male indicating possible myocardial injury.   Clinicians would have to utilize clinical acumen, EKG, Troponin, and serial changes to determine if it is an Acute Myocardial Infarction or myocardial injury due to an underlying chronic condition.        MAGNESIUM - Normal   CK - Normal   HIGH SENSITIVITIY TROPONIN T 1HR - Normal    Narrative:     High Sensitive Troponin T Reference Range:  <14.0 ng/L- Negative Female for AMI  <22.0 ng/L- Negative Male for AMI  >=14 - Abnormal Female indicating possible myocardial injury.  >=22 - Abnormal Male indicating possible myocardial injury.   Clinicians would have to utilize clinical acumen, EKG, Troponin, and serial changes to determine if it is an Acute Myocardial Infarction or myocardial injury due to an underlying chronic condition.        TSH - Normal   RAINBOW DRAW    Narrative:     The following orders were created for panel order [place] Draw.  Procedure                               Abnormality         Status                     ---------                               -----------         ------                     Green Top (Gel)[[id number]]                                  Final result               Lavender  Top[[id number]]                                     Final result               Gold Top - SST[[id number]]                                   Final result               Griggs Top[[id number]]                                         Final result               Light Blue Top[[id number]]                                   Final result                 Please view results for these tests on the individual orders.   POCT CREATININE   CBC AND DIFFERENTIAL    Narrative:     The following orders were created for panel order CBC & Differential.  Procedure                               Abnormality         Status                     ---------                               -----------         ------                     CBC Auto Differential[[id number]]        Abnormal            Final result                 Please view results for these tests on the individual orders.   GREEN TOP   LAVENDER TOP   GOLD TOP - SST   GRAY TOP   LIGHT BLUE TOP       Meds Given in ED:   Medications   sodium chloride 0.9 % flush 10 mL (has no administration in time range)   sodium chloride 0.9 % flush 10 mL (has no administration in time range)   aspirin EC tablet 81 mg ( Oral Dose Auto Held 12/25/24 0900)   buPROPion XL (WELLBUTRIN XL) 24 hr tablet 150 mg (150 mg Oral Given 12/17/24 1104)   busPIRone (BUSPAR) tablet 5 mg (5 mg Oral Given 12/17/24 1104)   hydrOXYzine (ATARAX) tablet 25 mg ( Oral Held by provider 12/17/24 0931)   sodium chloride 0.9 % flush 10 mL (10 mL Intravenous Given 12/17/24 0830)   sodium chloride 0.9 % flush 10 mL (has no administration in time range)   sodium chloride 0.9 % infusion 40 mL (has no administration in time range)   lactated ringers infusion (75 mL/hr Intravenous Currently Infusing 12/17/24 1135)   Potassium Replacement - Follow Nurse / BPA Driven Protocol (has no administration in time range)   Magnesium Standard Dose Replacement - Follow Nurse / BPA Driven Protocol (has no administration in time range)   Phosphorus  Replacement - Follow Nurse / BPA Driven Protocol (has no administration in time range)   Calcium Replacement - Follow Nurse / BPA Driven Protocol (has no administration in time range)   acetaminophen (TYLENOL) tablet 650 mg (has no administration in time range)     Or   acetaminophen (TYLENOL) 160 MG/5ML oral solution 650 mg (has no administration in time range)     Or   acetaminophen (TYLENOL) suppository 650 mg (has no administration in time range)   sennosides-docusate (PERICOLACE) 8.6-50 MG per tablet 2 tablet (2 tablets Oral Given 12/17/24 1354)     And   polyethylene glycol (MIRALAX) packet 17 g (has no administration in time range)     And   bisacodyl (DULCOLAX) EC tablet 5 mg (has no administration in time range)     And   bisacodyl (DULCOLAX) suppository 10 mg (has no administration in time range)   Lidocaine 4 % 1 patch (1 patch Transdermal Medication Applied 12/17/24 0640)   lubiprostone (AMITIZA) capsule 8 mcg (has no administration in time range)   oxyCODONE (ROXICODONE) immediate release tablet 10 mg (has no administration in time range)   morphine injection 2 mg (2 mg Intravenous Given 12/17/24 1354)   nicotine (NICODERM CQ) 14 MG/24HR patch 1 patch (has no administration in time range)   sodium chloride 0.9 % infusion (has no administration in time range)   sodium chloride 0.9 % bolus 1,000 mL (0 mL Intravenous Stopped 12/16/24 2202)   Tetanus-Diphth-Acell Pertussis (BOOSTRIX) injection 0.5 mL (0.5 mL Intramuscular Given 12/16/24 2134)   oxyCODONE-acetaminophen (PERCOCET) 5-325 MG per tablet 1 tablet (1 tablet Oral Given 12/16/24 2004)   cefTRIAXone (ROCEPHIN) 1,000 mg in sodium chloride 0.9 % 100 mL MBP (0 mg Intravenous Stopped 12/16/24 2333)   iopamidol (ISOVUE-300) 61 % injection 75 mL (75 mL Intravenous Given 12/16/24 2151)   morphine injection 2 mg (2 mg Intravenous Given 12/17/24 0001)   ondansetron (ZOFRAN) injection 4 mg (4 mg Intravenous Given 12/17/24 0001)   gadobenate dimeglumine  (MULTIHANCE) injection 8 mL (8 mL Intravenous Given 12/17/24 1699)     lactated ringers, 75 mL/hr, Last Rate: 75 mL/hr (12/17/24 1135)  sodium chloride, 100 mL/hr         Last NIH score:                                                          Dysphagia screening results:  Patient Factors Component (Dysphagia:Stroke or Rule-out)  Best Eye Response: 4-->(E4) spontaneous (12/16/24 2225)  Best Motor Response: 6-->(M6) obeys commands (12/16/24 2225)  Best Verbal Response: 5-->(V5) oriented (12/16/24 2225)  [place] Coma Scale Score: 15 (12/16/24 2225)     [place] Coma Scale:  No data recorded     CIWA:        Restraint Type:            Isolation Status:  No active isolations

## 2024-12-17 NOTE — H&P
[facility] Medicine Services  HISTORY AND PHYSICAL    Patient Name: Brittani Lambert  : 1960  MRN: [id number]  Primary Care Physician: Alla Colon DO  Date of admission: 2024      Subjective   Subjective     Chief Complaint:  Fall, headache    HPI:  Brittani Lambert is a 64 y.o. female with PMH metastatic breast cancer to brain s/p whole brain radiation 6 years ago following with oncology, afib not on anticoagulation who is presenting with multiple syncopal episodes, dizziness, fall. She reports over past few months recurrent abrupt LOC from standing without preceding symptoms. Happened yesterday in driveway, states she black out and woke up on ground, neighbors found her and called EMS. She reports headache currently in back of head, but no blurred vision, loss of strength, chest pain, shortness of air, nausea. Does have history of ongoing wt loss and poor oral intake. Lives alone.       Personal History     Past Medical History:   Diagnosis Date    Breast CA 10/4/2018    Depression     DJD (degenerative joint disease) of cervical spine     THALIA (generalized anxiety disorder)     Heart murmur     Ovarian cancer 1989    Rt Cellulitis of chest wall 10/15/2018    Tobacco dependence     UTI (urinary tract infection) 2020         Oncology Problem List:  Invasive ductal carcinoma of left breast (04/10/2019; Status: Active)  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 (04/10/2019; Status: Active)  Breast CA (10/04/2018; Status: Active)  Oncology/Hematology History   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   4/10/2019 Initial Diagnosis    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 (CMS/HCC)     2019 - 2019 Radiation    Radiation OncologyTreatment Course:  Brittani Lambert received 3000 cGy in 10 fractions to whole brain via External Beam Radiation - EBRT.       Past Surgical History:   Procedure Laterality Date    APPENDECTOMY      BREAST BIOPSY Right     DONE  IN [place]    COLONOSCOPY  06/2018    HYSTERECTOMY  1989    MASTECTOMY W/ SENTINEL NODE BIOPSY Bilateral 10/4/2018    Procedure: LEFT SKIN SPARING BREAST MASTECTOMY WITH LEFT SENTINEL NODE BIOPSY, RIGHT PROPHYLACTIC SKIN SPARING MASTECTOMY;  Surgeon: Koffi Worthington MD;  Location: [facility];  Service: General    OOPHORECTOMY  1989       Family History: family history includes Arthritis in her mother; Celiac disease in an other family member; Heart attack in her mother; Liver disease in her father; Osteoporosis in her mother.     Social History:  reports that she has been smoking cigarettes. She has a 12.5 pack-year smoking history. She has never used smokeless tobacco. She reports that she does not currently use alcohol. She reports that she does not use drugs.  Social History     Social History Narrative    Support from sister and mother.  Enjoys time with nieces and great nieces.        Medications:  Available home medication information reviewed.  HYDROcodone-acetaminophen, aspirin, buPROPion XL, busPIRone, hydrOXYzine, sodium-potassium-magnesium sulfates, and traZODone    No Known Allergies    Objective   Objective     Vital Signs:   Temp:  [98.5 °F (36.9 °C)] 98.5 °F (36.9 °C)  Heart Rate:  [62-92] 66  Resp:  [15-20] 16  BP: (129-155)/(72-91) 153/79       Physical Exam   AAOX3  Comfortable  Scalp hematoma over occiput  EOMI, PERRL  Neck flexion intact  Facial expression intact  Abrasions over forehead and chin  Heart RRR  Lungs CTAB  Abd soft, nontender  No peripheral edema    Result Review:  I have personally reviewed the results from the time of this admission to 12/17/2024 05:07 EST and agree with these findings:  [x]  Laboratory list / accordion  []  Microbiology  [x]  Radiology  [x]  EKG/Telemetry   []  Cardiology/Vascular   []  Pathology  [x]  Old records  []  Other:  Most notable findings include: See A+P      LAB RESULTS:      Lab 12/16/24 1958 12/16/24 1953   WBC  --  7.86   HEMOGLOBIN  --   10.8*   HEMOGLOBIN, POC 5.4*  --    HEMATOCRIT  --  35.0   HEMATOCRIT POC 16*  --    PLATELETS  --  174   NEUTROS ABS  --  5.51   IMMATURE GRANS (ABS)  --  0.05   LYMPHS ABS  --  1.44   MONOS ABS  --  0.71   EOS ABS  --  0.07   MCV  --  104.5*         Lab 12/16/24 1958 12/16/24 1953   SODIUM  --  132*   POTASSIUM  --  3.9   CHLORIDE  --  96*   CO2  --  22.0   ANION GAP  --  14.0   BUN  --  12   CREATININE 0.70 0.67   EGFR 96.7 97.7   GLUCOSE  --  104*   CALCIUM  --  8.8   MAGNESIUM  --  1.8         Lab 12/16/24 1953   TOTAL PROTEIN 6.5   ALBUMIN 4.0   GLOBULIN 2.5   ALT (SGPT) 10   AST (SGOT) 25   BILIRUBIN 0.5   ALK PHOS 89         Lab 12/16/24 2130 12/16/24 1953   HSTROP T 7 7                 UA          10/22/2024    10:51 12/16/2024    21:24   Urinalysis   Squamous Epithelial Cells, UA 0-2  0-2    Specific Gravity, UA 1.007  1.017    Ketones, UA 40 mg/dL (2+)  Negative    Blood, UA Negative  Negative    Leukocytes, UA Trace  Trace    Nitrite, UA Negative  Negative    RBC, UA 0-2  0-2    WBC, UA 0-2  3-5    Bacteria, UA None Seen  4+        Microbiology Results (last 10 days)       ** No results found for the last 240 hours. **            CT Head Without Contrast    Result Date: 12/16/2024  CT HEAD WO CONTRAST, CT FACIAL BONES WO CONTRAST Date of Exam: 12/16/2024 9:38 PM EST Indication: fall with head injury. Comparison: Head and face CT 10/22/2024. Technique: Axial CT images were obtained of the head and face without contrast administration.  Automated exposure control and iterative construction methods were used. Findings: HEAD CT: New bilateral subdural hypoattenuating collections along the bilateral cerebral convexities measuring up to 5 mm on the right and 4 mm on the left. No foci of hyperattenuation to suggest acute intracranial hemorrhage. Intact appearing gray-white differentiation. No significant mass effect. No hydrocephalus. Mild generalized parenchymal volume loss. Scattered areas of  periventricular and subcortical white matter hypoattenuation, nonspecific, perhaps from small vessel ischemic/hypertensive changes in a patient of this age.There are intracranial atherosclerotic calcifications. No acute or aggressive appearing bony or extracranial soft tissue process. FACE CT: No acute maxillofacial fracture. Facial and extracranial soft tissues appear unremarkable. Globes and orbits appear unremarkable by CT. Mild scattered mucosal thickening of the paranasal sinuses. Mastoid air cells are essentially clear. Imaged aerodigestive tract demonstrates no significant abnormality. Evaluation of the oral cavity is degraded by dental hardware artifact. Imaged major salivary glands demonstrate no significant abnormality.     Impression: Impression: Compared to head CT from 10/22/2024, new hypoattenuating subdural collections along the bilateral cerebral convexities measuring up to 5 mm on the right and 4 mm on the left, suspicious for acute subdural hygromas in setting of trauma. No acute maxillofacial fracture. Electronically Signed: Simone Mcdowell  12/16/2024 10:11 PM EST  Workstation ID: [id number]    CT Facial Bones Without Contrast    Result Date: 12/16/2024  CT HEAD WO CONTRAST, CT FACIAL BONES WO CONTRAST Date of Exam: 12/16/2024 9:38 PM EST Indication: fall with head injury. Comparison: Head and face CT 10/22/2024. Technique: Axial CT images were obtained of the head and face without contrast administration.  Automated exposure control and iterative construction methods were used. Findings: HEAD CT: New bilateral subdural hypoattenuating collections along the bilateral cerebral convexities measuring up to 5 mm on the right and 4 mm on the left. No foci of hyperattenuation to suggest acute intracranial hemorrhage. Intact appearing gray-white differentiation. No significant mass effect. No hydrocephalus. Mild generalized parenchymal volume loss. Scattered areas of periventricular and subcortical white  matter hypoattenuation, nonspecific, perhaps from small vessel ischemic/hypertensive changes in a patient of this age.There are intracranial atherosclerotic calcifications. No acute or aggressive appearing bony or extracranial soft tissue process. FACE CT: No acute maxillofacial fracture. Facial and extracranial soft tissues appear unremarkable. Globes and orbits appear unremarkable by CT. Mild scattered mucosal thickening of the paranasal sinuses. Mastoid air cells are essentially clear. Imaged aerodigestive tract demonstrates no significant abnormality. Evaluation of the oral cavity is degraded by dental hardware artifact. Imaged major salivary glands demonstrate no significant abnormality.     Impression: Impression: Compared to head CT from 10/22/2024, new hypoattenuating subdural collections along the bilateral cerebral convexities measuring up to 5 mm on the right and 4 mm on the left, suspicious for acute subdural hygromas in setting of trauma. No acute maxillofacial fracture. Electronically Signed: Simone Mcdowell  12/16/2024 10:11 PM EST  Workstation ID: [id number]    CT Cervical Spine Without Contrast    Result Date: 12/16/2024  CT CERVICAL SPINE WO CONTRAST, CT CHEST W CONTRAST DIAGNOSTIC Date of Exam: 12/16/2024 9:38 PM EST Indication: neck pain s/p fall with head injury. Comparison: 2020 MRI Technique: Axial CT images were obtained of the cervical spine without contrast administration. Axial CT images were obtained of the chest after administration of intravenous contrast. Reconstructed coronal and sagittal images were also obtained. Automated exposure control and iterative construction methods were used. Findings: CT cervical spine: There is no evidence of acute fracture. The craniocervical junction is intact. There is mild atlantoaxial joint arthritis. There is trace grade 1 anterolisthesis of C4 on C5. Mild scattered endplate osteophyte formations with facet arthropathy. No evidence of severe canal  narrowing. Varying multilevel bony neural foraminal narrowing. Paraspinal soft tissues show no acute abnormality. CT chest: There is no evidence of pulmonary embolism. Pulmonary arteries are normal in caliber. No right heart strain. No pericardial effusion. Coronary artery calcifications. Central airways are patent. No significant bronchial wall thickening or bronchiectasis. No focal airspace consolidation, pleural effusion, or pneumothorax. Bibasilar atelectasis. There is no suspicious pulmonary nodule or mass. No mediastinal mass or threshold lymphadenopathy. Normal appearance of the thoracic esophagus. Chest wall soft tissues and included upper abdomen show no acute abnormality. No acute fracture or suspicious bone lesion.     Impression: Impression: 1.No acute fracture or malalignment of the cervical spine. 2.No acute intrathoracic abnormality. No evidence of rib fracture. Electronically Signed: Antonio Marsh MD  12/16/2024 10:09 PM EST  Workstation ID: [id number]    CT Chest With Contrast Diagnostic    Result Date: 12/16/2024  CT CERVICAL SPINE WO CONTRAST, CT CHEST W CONTRAST DIAGNOSTIC Date of Exam: 12/16/2024 9:38 PM EST Indication: neck pain s/p fall with head injury. Comparison: 2020 MRI Technique: Axial CT images were obtained of the cervical spine without contrast administration. Axial CT images were obtained of the chest after administration of intravenous contrast. Reconstructed coronal and sagittal images were also obtained. Automated exposure control and iterative construction methods were used. Findings: CT cervical spine: There is no evidence of acute fracture. The craniocervical junction is intact. There is mild atlantoaxial joint arthritis. There is trace grade 1 anterolisthesis of C4 on C5. Mild scattered endplate osteophyte formations with facet arthropathy. No evidence of severe canal narrowing. Varying multilevel bony neural foraminal narrowing. Paraspinal soft tissues show no acute abnormality.  CT chest: There is no evidence of pulmonary embolism. Pulmonary arteries are normal in caliber. No right heart strain. No pericardial effusion. Coronary artery calcifications. Central airways are patent. No significant bronchial wall thickening or bronchiectasis. No focal airspace consolidation, pleural effusion, or pneumothorax. Bibasilar atelectasis. There is no suspicious pulmonary nodule or mass. No mediastinal mass or threshold lymphadenopathy. Normal appearance of the thoracic esophagus. Chest wall soft tissues and included upper abdomen show no acute abnormality. No acute fracture or suspicious bone lesion.     Impression: Impression: 1.No acute fracture or malalignment of the cervical spine. 2.No acute intrathoracic abnormality. No evidence of rib fracture. Electronically Signed: Antonio Marsh MD  12/16/2024 10:09 PM EST  Workstation ID: [id number]    XR Knee 1 or 2 View Left    Result Date: 12/16/2024  XR KNEE 1 OR 2 VW LEFT Date of Exam: 12/16/2024 7:25 PM EST Indication: fall with left knee pain Comparison: None available. Findings: No acute fracture or malalignment. No significant joint effusion. No focal soft tissue abnormalities appreciated. Joint spaces appear grossly preserved without significant degenerative change.     Impression: Impression: No acute fracture or malalignment. Electronically Signed: Simone Mcdowell  12/16/2024 8:13 PM EST  Workstation ID: [id number]    XR Chest 1 View    Result Date: 12/16/2024  XR CHEST 1 VW Date of Exam: 12/16/2024 7:16 PM EST Indication: Weak/Dizzy/AMS triage protocol Comparison: Chest radiograph 10/22/2024 Findings: Mediastinum: Cardiac silhouette appears unchanged and normal in size Lungs: The lungs appear clear without focal consolidation appreciated. Pleura: No pleural effusion or pneumothorax. Bones and soft tissues: No acute, displaced fracture seen.     Impression: Impression: No radiographic evidence of acute cardiopulmonary disease. Electronically Signed:  Simone Mcdowell  12/16/2024 8:11 PM EST  Workstation ID: [id number]     Results for orders placed during the hospital encounter of 09/24/20    Transthoracic Echo Complete With Contrast if Necessary Per Protocol    Interpretation Summary  · Left ventricular ejection fraction appears to be 61 - 65%. Left ventricular systolic function is normal.  · Left ventricular diastolic function is consistent with (grade I) impaired relaxation.  · Mild tricuspid valve regurgitation is present.  · Estimated right ventricular systolic pressure from tricuspid regurgitation is normal (<35 mmHg). Calculated right ventricular systolic pressure from tricuspid regurgitation is 22 mmHg.      Assessment & Plan   Assessment & Plan       Subdural hematoma    Mixed hyperlipidemia    Breast CA    Atrial fibrillation    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    Generalized weakness    Fall  Recurrent syncope  Metastatic breast cancer to brain  BL subdural hygroma  -Mental status currently normal, multiple recurrent syncopal spells without inciting factor or warning, new BL hygromas on CT. NSGY reviewed, think may be chronic. Last CT for comparison 10/22/24. S/p whole brain radiation 6 years ago, follows with Dr Patel.  -Will obtain MRI brain  -Echo, orthostatics for syncope workup  -neurochecks  -Hold ASA  -NSGY to evaluate today    Afib  -not currently on anticoagulation    Asymptomatic bacteruria   -no further abx indicated at this time    Cachexia  -nutrition eval            VTE Prophylaxis:  Mechanical VTE prophylaxis orders are signed & held.            CODE STATUS:    Code Status and Medical Interventions: No CPR (Do Not Attempt to Resuscitate); Limited Support; No intubation (DNI)   Ordered at: 12/17/24 0440     Medical Intervention Limits:    No intubation (DNI)     Level Of Support Discussed With:    Patient     Code Status (Patient has no pulse and is not breathing):    No CPR (Do Not Attempt to Resuscitate)     Medical Interventions  (Patient has pulse or is breathing):    Limited Support       Expected Discharge   Expected discharge date/ time has not been documented.     Carlos Trammell MD  12/17/24

## 2024-12-17 NOTE — CASE MANAGEMENT/SOCIAL WORK
Discharge Planning Assessment  [facility]     Patient Name: Brittani Lambert  MRN: [id number]  Today's Date: 12/17/2024    Admit Date: 12/16/2024    Plan: Home   Discharge Needs Assessment       Row Name 12/17/24 1026       Living Environment    People in Home alone    Current Living Arrangements apartment    Primary Care Provided by self    Provides Primary Care For no one    Family Caregiver if Needed sibling(s)    Family Caregiver Names Zo Morgan, sister       Transition Planning    Patient/Family Anticipates Transition to home;home with help/services;inpatient rehabilitation facility    Patient/Family Anticipated Services at Transition        Discharge Needs Assessment    Equipment Currently Used at Home none    Concerns to be Addressed denies needs/concerns at this time    Discharge Coordination/Progress Lives in a house alone in [place], can call on her sister Zo if needed.  No DME or history of home health.  Has an advanced directive.                   Discharge Plan       Row Name 12/17/24 1027       Plan    Plan Home    Patient/Family in Agreement with Plan yes    Plan Comments I spoke with Ms Lambert at bedside.  Ms Lambert resides in an apartment alone in [place], but can call on her sister if she needs any  help.  She does not use any DME and has not had home health in the past.  She does have an advanced directive.  Her insurance is [employer], and there have not been any issues or lapses in coverage. Her insurance does help with prescription costs.  Her PCP is Alla Colon, and she gets her prescriptions filled at [facility] in [place].  At this time she denies any discharge needs.    Final Discharge Disposition Code 01 - home or self-care                  Continued Care and Services - Admitted Since 12/16/2024    No active coordination exists for this encounter.       Selected Continued Care - Episodes Includes continued care and service providers with selected  services from the active episodes listed below      Ambulatory Social Work Case Management Episode start date: 11/7/2024   There are no active outsourced providers for this episode.                    Demographic Summary    No documentation.                  Functional Status       Row Name 12/17/24 1026       Functional Status    Usual Activity Tolerance good    Current Activity Tolerance moderate       Functional Status, IADL    Medications independent    Meal Preparation independent    Housekeeping independent    Laundry independent    Shopping independent       Mental Status    General Appearance WDL WDL                   Psychosocial    No documentation.                  Abuse/Neglect    No documentation.                  Legal    No documentation.                  Substance Abuse    No documentation.                  Patient Forms    No documentation.                     Ana Laura Hope RN

## 2024-12-17 NOTE — PLAN OF CARE
Goal Outcome Evaluation:      Pt a/ox4. VSS on Ra. NS on tele. Pt admitted from ED today status post fall. Abrasions to nose, lips, and face POA. Skin precautions in place. SCD's on. Pt up with x1 to BSC. Pt is now resting in bed, denies any discomfort this PM.

## 2024-12-17 NOTE — CONSULTS
Inpatient Neurosurgery Consult  Consult performed by: Juvenal Hanson PA-C  Consult ordered by: Carlos Trammell MD          Referring Provider: Melissa NICOLAS    Patient Care Team:  Alla Colon DO as PCP - General (Family Medicine)  Carrie Patel MD as Referring Physician (Hematology and Oncology)  Rod Garcia MD as Consulting Physician (Radiation Oncology)  Alla Colon DO as Referring Physician (Family Medicine)    Chief Complaint: Frequent falls    Subjective .     History of present illness:       Patient is a 64-year-old female who has history of breast cancer with metastatic disease to the brain.  Patient underwent whole brain radiation for this.    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    Patient unfortunately fell yesterday and got pretty banged up and lost consciousness.    Patient states that she tends to remember her falls but has had multiple episodes of loss of consciousness secondary to falls over the last month.    Patient had an MRI back in October that did not have any hygromas or subdurals noted.  Patient has had multiple falls in between now and then so it is hard to discern where these new hygromas came from but they look to be old and no sign of active bleeding.    Secondary to the hygromas being noted on the CT scan neurosurgery was consulted.  MRI today confirmed that this was not just atrophy but likely has some component of blood in it.    Patient pretty banged up today and in quite a bit of pain with lots of lacerations on her scalp and face.    Patient denies any overt weakness but has quite a bit of pain with constipation    Review of Systems  Negative other than HPI  History  Past Medical History:   Diagnosis Date    Breast CA 10/4/2018    Depression     DJD (degenerative joint disease) of cervical spine     THALIA (generalized anxiety disorder)      Heart murmur     Ovarian cancer 1989    Rt Cellulitis of chest wall 10/15/2018    Tobacco dependence     UTI (urinary tract infection) 9/9/2020   ,   Past Surgical History:   Procedure Laterality Date    APPENDECTOMY      BREAST BIOPSY Right 2003    DONE IN [place]    COLONOSCOPY  06/2018    HYSTERECTOMY  1989    MASTECTOMY W/ SENTINEL NODE BIOPSY Bilateral 10/4/2018    Procedure: LEFT SKIN SPARING BREAST MASTECTOMY WITH LEFT SENTINEL NODE BIOPSY, RIGHT PROPHYLACTIC SKIN SPARING MASTECTOMY;  Surgeon: Koffi Worthington MD;  [facility];  Service: General    OOPHORECTOMY  1989   ,   Family History   Problem Relation Age of Onset    Arthritis Mother     Heart attack Mother     Osteoporosis Mother     Liver disease Father     Celiac disease Other    ,   Social History     Socioeconomic History    Marital status: Single     Spouse name: N/A    Number of children: 1    Years of education: H.S.    Highest education level: High school graduate   Tobacco Use    Smoking status: Every Day     Current packs/day: 0.50     Average packs/day: 0.5 packs/day for 25.0 years (12.5 ttl pk-yrs)     Types: Cigarettes    Smokeless tobacco: Never   Vaping Use    Vaping status: Never Used   Substance and Sexual Activity    Alcohol use: Not Currently    Drug use: No    Sexual activity: Defer     Partners: Male     E-cigarette/Vaping    E-cigarette/Vaping Use Never User      E-cigarette/Vaping Substances    Nicotine No     THC No     CBD No     Flavoring No      E-cigarette/Vaping Devices    Disposable No     Pre-filled or Refillable Cartridge No     Refillable Tank No     Pre-filled Pod No          , (Not in a hospital admission) , Scheduled Meds:  [Held by provider] aspirin, 81 mg, Oral, Daily  buPROPion XL, 150 mg, Oral, Daily  busPIRone, 5 mg, Oral, TID  Lidocaine, 1 patch, Transdermal, Q24H  lubiprostone, 8 mcg, Oral, BID With Meals  sodium chloride, 10 mL, Intravenous, Q12H   , Continuous Infusions:  lactated ringers, 75  mL/hr, Last Rate: 75 mL/hr (12/17/24 1135)  sodium chloride, 100 mL/hr   , PRN Meds:    acetaminophen **OR** acetaminophen **OR** acetaminophen    senna-docusate sodium **AND** polyethylene glycol **AND** bisacodyl **AND** bisacodyl    Calcium Replacement - Follow Nurse / BPA Driven Protocol    [Held by provider] hydrOXYzine    Magnesium Standard Dose Replacement - Follow Nurse / BPA Driven Protocol    Morphine    nicotine    oxyCODONE    Phosphorus Replacement - Follow Nurse / BPA Driven Protocol    Potassium Replacement - Follow Nurse / BPA Driven Protocol    sodium chloride    [COMPLETED] Insert Peripheral IV **AND** sodium chloride    sodium chloride    sodium chloride, and Allergies:  Patient has no known allergies.     Objective     Vital Signs   Temp:  [98.5 °F (36.9 °C)] 98.5 °F (36.9 °C)  Heart Rate:  [61-92] 64  Resp:  [15-20] 15  BP: (110-181)/() 173/97  Body mass index is 16.83 kg/m².    Physical Exam:     Body mass index is 16.83 kg/m².    GENERAL:           The patient is in no acute distress, and is able to answer all questions appropriately.    Musculoskeletal:            strength is 5 out of 5 bilaterally.        Shoulder abduction is 5 out of 5.         Dorsiflexion is 5/5 Bilaterally       Plantarflexion is 5/5 bilaterally       Hip Flexion 5/5 bilaterally.        Neurologic:          The patient is alert and oriented by 3.          Pupils are equal and reactive to light.         Visual fields are full.         Extraocular movements are intact without nystagmus.              Sensation is equal bilaterally with no deficit.           Reflexes:  2+ through out  No Garduno's noted no clonus noted    CRANIAL NERVES:         Cranial nerve II: Visual fields are full to confrontation.       Cranial nerves III, IV and VI: PERRLA DC.  Extraocular movements are intact.  Nystagmus is not present.       Cranial nerve V: Facial sensation is intact to light touch.       Cranial nerve VII: Muscles of  facial expression revealed no asymmetry.  Slight facial asymmetry noted secondary to bruising and lip swelling       Cranial nerve VIII: Hearing is intact to finger rub bilaterally.       Cranial nerve IX and X: Palate elevates symmetrically.        Cranial nerve XI: Shoulder shrug is intact.       Cranial nerve XII: Tongue is midline without evidence of Atrophy or fasciculation.      Results Review:   I reviewed the patient's new imaging results and agree with the interpretation.  Appears that the patient has very small convexity hygromas based off of the most recent CT and MRIs.  No active bleeding noted.  There is also no new metastatic disease noted on the MRI.    Assessment & Plan     Diagnosis:  Frequent falls  Subdural hygroma    PLAN:    From neurosurgical perspective no role for neurosurgical intervention at this time.  We would recommend outpatient follow-up after she gets sorted out for all the falling.    I will discuss this with Dr. Torres.    We will see her back in 2 weeks with CT of the head to ensure these have resolved.    I discussed the patients findings and my recommendations with patient, family, and nursing staff    Juvenal Hanson PA-C  12/17/24  13:58 EST    Time:  60 minutes

## 2024-12-17 NOTE — PROGRESS NOTES
[facility]  ADMISSION FOLLOW-UP NOTE          Patient admitted after midnight, H&P by my partner performed earlier on today's date reviewed.  Interim findings, labs, and charting also reviewed.        The [facility] Problem List has been managed and updated to include any new diagnoses:  Active Hospital Problems    Diagnosis  POA    **Subdural hygroma [G96.08]  Yes    Generalized weakness [R53.1]  Yes    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 [C50.912, C79.31]  Yes    Atrial fibrillation [I48.91]  Yes    Breast CA [C50.919]  Yes    Mixed hyperlipidemia [E78.2]  Yes      Resolved Hospital Problems   No resolved problems to display.         ADDITIONAL PLAN:  - detailed assessment and plan from admission reviewed  - Patient admitted late this AM by Dr. Trammell, chart reviewed, pt briefly examined while boarding in the ED  - Summary: This is a 65 y/o female w/ anxiety, depression, tobacco abuse, ovarian cancer 1989, metastatic breast cancer 2018 s/p whole brain radiation, who presented for evaluation of recurrent falls; she reports weight loss of approx 10lbs since the beginning of this year, anorexia for approx 1 mo, and repeatedly falling when her legs get weak and give out (no loss of consciousness); sometimes on standing her legs will feel so weak that they quiver and she has to sit back down; she had a fall onto pavement with head injury prompting visit to the ED; neuro imaging on arrival showed BL subdural hygromas    Assessment/Plan    Recurrent falls  BL subdural hygromas  -CT facial bones w/o fracture/dislocation  -CT/MRI brain w/ BL subdural hygromas  -pt denies LOC w/ falls, her legs just get weak and give out; strongly suspect deconditioning giver her poor PO intake and low body weight  -prn pain control po/iv  -PT/OT  -NSGY consulted by admitting team, formal note pending    Weight loss/failure to thrive  Cachexia/low body weight  Constipation  -reports  ~10lb weight loss since beginning of 2024 (approx 10% of body weight)  -check pre-albumin; add nutritional shake, RD consult  -check TSH  -states no BM >7 days pta; prn bowel reg, add amitiza; if no BM anticipate movatnik +/- enema, check KUB this AM  -leg weakness and FTT noted to have been charted on patient's problem list since at least 4 years pta; she has been seen on an ambulatory basis multiple times this year for hip pain, falls, etc    Hx metastatic breast cancer  -treated [id number] for breast Ca 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 Patel    Asymptomatic bacteruria - s/p dose IV CTX in the ED  Afib - chronically not on AC  Anxiety/Depression - buproprion  Hx ovarian Ca 1989  Tobacco abuse - prn nrt      Antonio Torres,   12/17/24

## 2024-12-18 PROBLEM — E43 SEVERE PROTEIN-CALORIE MALNUTRITION: Status: ACTIVE | Noted: 2024-12-18

## 2024-12-18 LAB
ANION GAP SERPL CALCULATED.3IONS-SCNC: 12 MMOL/L (ref 5–15)
BUN SERPL-MCNC: 6 MG/DL (ref 8–23)
BUN/CREAT SERPL: 12 (ref 7–25)
CALCIUM SPEC-SCNC: 8.5 MG/DL (ref 8.6–10.5)
CHLORIDE SERPL-SCNC: 98 MMOL/L (ref 98–107)
CO2 SERPL-SCNC: 23 MMOL/L (ref 22–29)
CREAT SERPL-MCNC: 0.5 MG/DL (ref 0.57–1)
EGFRCR SERPLBLD CKD-EPI 2021: 104.9 ML/MIN/1.73
GLUCOSE SERPL-MCNC: 84 MG/DL (ref 65–99)
MAGNESIUM SERPL-MCNC: 1.8 MG/DL (ref 1.6–2.4)
PHOSPHATE SERPL-MCNC: 3.4 MG/DL (ref 2.5–4.5)
POTASSIUM SERPL-SCNC: 3.8 MMOL/L (ref 3.5–5.2)
SODIUM SERPL-SCNC: 133 MMOL/L (ref 136–145)

## 2024-12-18 PROCEDURE — 97535 SELF CARE MNGMENT TRAINING: CPT

## 2024-12-18 PROCEDURE — 83735 ASSAY OF MAGNESIUM: CPT | Performed by: INTERNAL MEDICINE

## 2024-12-18 PROCEDURE — 25010000002 MORPHINE PER 10 MG: Performed by: INTERNAL MEDICINE

## 2024-12-18 PROCEDURE — 84100 ASSAY OF PHOSPHORUS: CPT | Performed by: INTERNAL MEDICINE

## 2024-12-18 PROCEDURE — 80048 BASIC METABOLIC PNL TOTAL CA: CPT | Performed by: INTERNAL MEDICINE

## 2024-12-18 PROCEDURE — 97162 PT EVAL MOD COMPLEX 30 MIN: CPT

## 2024-12-18 PROCEDURE — G0378 HOSPITAL OBSERVATION PER HR: HCPCS

## 2024-12-18 PROCEDURE — 25810000003 SODIUM CHLORIDE 0.9 % SOLUTION: Performed by: INTERNAL MEDICINE

## 2024-12-18 PROCEDURE — 97166 OT EVAL MOD COMPLEX 45 MIN: CPT

## 2024-12-18 PROCEDURE — 99232 SBSQ HOSP IP/OBS MODERATE 35: CPT | Performed by: INTERNAL MEDICINE

## 2024-12-18 RX ADMIN — MORPHINE SULFATE 2 MG: 2 INJECTION, SOLUTION INTRAMUSCULAR; INTRAVENOUS at 22:53

## 2024-12-18 RX ADMIN — BISACODYL 10 MG: 10 SUPPOSITORY RECTAL at 09:13

## 2024-12-18 RX ADMIN — SODIUM CHLORIDE 100 ML/HR: 9 INJECTION, SOLUTION INTRAVENOUS at 03:20

## 2024-12-18 RX ADMIN — NALOXEGOL OXALATE 25 MG: 25 TABLET, FILM COATED ORAL at 12:21

## 2024-12-18 RX ADMIN — LUBIPROSTONE 8 MCG: 8 CAPSULE, GELATIN COATED ORAL at 09:13

## 2024-12-18 RX ADMIN — LUBIPROSTONE 8 MCG: 8 CAPSULE, GELATIN COATED ORAL at 17:32

## 2024-12-18 RX ADMIN — OXYCODONE HYDROCHLORIDE 10 MG: 10 TABLET ORAL at 11:29

## 2024-12-18 RX ADMIN — BUSPIRONE HYDROCHLORIDE 5 MG: 5 TABLET ORAL at 09:13

## 2024-12-18 RX ADMIN — BUSPIRONE HYDROCHLORIDE 5 MG: 5 TABLET ORAL at 20:04

## 2024-12-18 RX ADMIN — MORPHINE SULFATE 2 MG: 2 INJECTION, SOLUTION INTRAMUSCULAR; INTRAVENOUS at 09:13

## 2024-12-18 RX ADMIN — BUPROPION HYDROCHLORIDE 150 MG: 150 TABLET, EXTENDED RELEASE ORAL at 09:13

## 2024-12-18 RX ADMIN — BUSPIRONE HYDROCHLORIDE 5 MG: 5 TABLET ORAL at 15:48

## 2024-12-18 RX ADMIN — LIDOCAINE 1 PATCH: 4 PATCH TOPICAL at 09:13

## 2024-12-18 RX ADMIN — Medication 10 ML: at 20:05

## 2024-12-18 RX ADMIN — MORPHINE SULFATE 2 MG: 2 INJECTION, SOLUTION INTRAMUSCULAR; INTRAVENOUS at 04:02

## 2024-12-18 RX ADMIN — MORPHINE SULFATE 2 MG: 2 INJECTION, SOLUTION INTRAMUSCULAR; INTRAVENOUS at 15:55

## 2024-12-18 NOTE — PROGRESS NOTES
Malnutrition Severity Assessment    Patient Name:  Brittani Lambert  YOB: 1960  MRN: [id number]  Admit Date:  12/16/2024    Patient meets criteria for : Severe Malnutrition (Chronic severe malnutrition: <75% of EEN x >/= 1 month, severe muscle wasting, and severe subcutaneous fat loss)      Malnutrition Severity Assessment  Malnutrition Type: Chronic Disease - Related Malnutrition  Malnutrition Type (Last 8 Hours)       Malnutrition Severity Assessment       Row Name 12/18/24 1329       Malnutrition Severity Assessment    Malnutrition Type Chronic Disease - Related Malnutrition      Row Name 12/18/24 1329       Insufficient Energy Intake     Insufficient Energy Intake Findings Moderate    Insufficient Energy Intake  <75% of est. energy requirement for >7d)      Row Name 12/18/24 1329       Muscle Loss    Loss of Muscle Mass Findings Severe    [place] Region Severe - deep hollowing/scooping, lack of muscle to touch, facial bones well defined    Clavicle Bone Region Severe - protruding prominent bone    Acromion Bone Region Severe - squared shoulders, bones, and acromion process protrusion prominent    Dorsal Hand Region Severe - prominent depression    Patellar Region Severe - prominent bone, square looking, very little muscle definition    Anterior Thigh Region Severe - line/depression along thigh, obviously thin    Posterior Calf Region Severe - thin with very little definition/firmness      Row Name 12/18/24 1329       Fat Loss    Subcutaneous Fat Loss Findings Severe    Orbital Region  Severe - pronounced hollowness/depression, dark circles, loose saggy skin    Upper Arm Region Severe - mostly skin, very little space between folds, fingers touch      Row Name 12/18/24 1329       Criteria Met (Must meet criteria for severity in at least 2 of these categories: M Wasting, Fat Loss, Fluid, Secondary Signs, Wt. Status, Intake)    Patient meets criteria for  Severe Malnutrition  Chronic severe  malnutrition: <75% of EEN x >/= 1 month, severe muscle wasting, and severe subcutaneous fat loss                    Electronically signed by:  Lana Patrick RD  12/18/24 13:37 EST

## 2024-12-18 NOTE — CONSULTS
"          Clinical Nutrition Assessment     Patient Name: Brittani Lambert  YOB: 1960  MRN: [id number]  Date of Encounter: 12/18/24 13:16 EST  Admission date: 12/16/2024  Reason for Visit: BMI, Consult    Assessment   Nutrition Assessment   Admission Diagnosis:  Subdural hematoma [S06.5XAA]    Problem List:    Subdural hygroma    Mixed hyperlipidemia    Atrial fibrillation    Generalized weakness      PMH:   She  has a past medical history of Breast CA (10/4/2018), Depression, DJD (degenerative joint disease) of cervical spine, THALIA (generalized anxiety disorder), Heart murmur, Ovarian cancer (1989), Rt Cellulitis of chest wall (10/15/2018), Tobacco dependence, and UTI (urinary tract infection) (9/9/2020).    PSH:  She  has a past surgical history that includes Appendectomy; Breast biopsy (Right, 2003); Hysterectomy (1989); Oophorectomy (1989); Mastectomy w/ sentinel node biopsy (Bilateral, 10/4/2018); and Colonoscopy (06/2018).    Applicable Nutrition History:       Anthropometrics     Height: Height: 157.5 cm (62\")  Last Filed Weight: Weight: 41.7 kg (92 lb) (12/16/24 1901)  Method: Weight Method: Stated  BMI: BMI (Calculated): 16.8    UBW:  108# per patient   Weight change: weight loss of 11 lbs (10.7%) over 10 month(s)    Significant?  No     Weight       Weight (kg) Weight (lbs) Weight Method Visit Report   8/25/2023 48.353 kg  106 lb 9.6 oz   --    2/26/2024 46.72 kg  103 lb   --    6/28/2024 43.954 kg  96 lb 14.4 oz   --    8/9/2024 42.638 kg  94 lb   --    8/15/2024 42.82 kg  94 lb 6.4 oz   --    8/28/2024 42.185 kg  93 lb   --    10/22/2024 42.185 kg  93 lb  Stated      42.185 kg  93 lb  Stated     12/16/2024 41.731 kg  92 lb  Stated         Nutrition Focused Physical Exam    Date:  12/18       Patient meets criteria for malnutrition diagnosis, see MSA note.     Subjective   Reported/Observed/Food/Nutrition Related History:     Patient reports not much of an appetite for a couple months, still " continuing while admitted. Pt denies any difficulties C/S or recent N/V. Pt stated she was constipated but had BM this morning. NKFA. Would be willing to drink Boost with breakfast and dinner. Pt denies any specific food preferences, also denied any wants/needs at this time.    Current Nutrition Prescription   PO: Diet: Regular/House; Fluid Consistency: Thin (IDDSI 0)  Oral Nutrition Supplement: N/A  Intake: 2 Days: 63% x 2 meals    Assessment & Plan   Nutrition Diagnosis   Date:  12/18  Updated:  Problem Malnutrition, chronic severe   Etiology Decreased ability to consume sufficient energy 2/2 multiple chronic conditions   Signs/Symptoms <75% of EEN x >/= 1 month, severe muscle wasting, and severe subcutaneous fat loss   Status: New    Goal:   Nutrition to support treatment and Maintain intake per meals documented      Nutrition Intervention      Follow treatment progress, Care plan reviewed, Advise alternate selection, Advised available snacks, Interview for preferences, Menu provided, Encourage intake, Supplement provided    Patient meets criteria for chronic severe malnutrition  Boost Plus with breakfast and dinner  Follow for adequate PO intakes    Monitoring/Evaluation:   Per protocol, I&O, PO intake, Supplement intake, Pertinent labs, Weight, Symptoms, POC/GOC    Lana Patrick RD  Time Spent: 35m

## 2024-12-18 NOTE — PLAN OF CARE
Goal Outcome Evaluation:  Plan of Care Reviewed With: patient           Outcome Evaluation: Pt presents with generalized pain, as well as strength, balance, and endurance below baseline contributing to bed mobility, transfer, and ambulation deficits. Pt will benefit from PT to address aforementioned deficits and return to PLOF. PT rec IRF upon dc for best functional outcomes and facilitate safe return home.    Anticipated Discharge Disposition (PT): inpatient rehabilitation facility

## 2024-12-18 NOTE — PLAN OF CARE
Goal Outcome Evaluation:  Plan of Care Reviewed With: patient        Progress: improving  Outcome Evaluation: Pt is alert, oriented, and able to ambulate assist x1 to BSC.     Absence of Hospital-Acquired Illness or Injury  Add  Today at 0516 - Progressing by Kyle Campbell RN  Add  Identify and Manage Fall Risk  Add  Recent Flowsheet Documentation  Add  Taken today at 0402 by Kyle Campbell RN  Safety Promotion/Fall Prevention  activity supervised;clutter free environment maintained;fall prevention program maintained;gait belt;mobility aid in reach;nonskid shoes/slippers when out of bed;room organization consistent;safety round/check completed  Taken today at 0033 by Kyle Campbell RN  Safety Promotion/Fall Prevention  activity supervised;clutter free environment maintained;fall prevention program maintained;gait belt;mobility aid in reach;nonskid shoes/slippers when out of bed;room organization consistent;safety round/check completed  Taken yesterday at 2259 by Kyle Campbell RN  Safety Promotion/Fall Prevention  activity supervised;clutter free environment maintained;fall prevention program maintained;gait belt;mobility aid in reach;nonskid shoes/slippers when out of bed;room organization consistent;safety round/check completed  Taken yesterday at 2021 by Kyle Campbell RN  Safety Promotion/Fall Prevention  assistive device/personal items within reach;activity supervised;clutter free environment maintained;elopement precautions;fall prevention program maintained;gait belt;lighting adjusted;nonskid shoes/slippers when out of bed;room organization consistent;safety round/check completed  Prevent Skin Injury  Add  Recent Flowsheet Documentation  Add  Taken today at 0402 by Kyle Campbell RN  Body Position  turned  Skin Protection  incontinence pads utilized;silicone foam dressing in place  Taken today at 0230 by Kyle Campbell RN  Skin Protection  incontinence pads  utilized;silicone foam dressing in place  Taken today at 0033 by Kyle Campbell RN  Body Position  turned  Skin Protection  incontinence pads utilized;silicone foam dressing in place  Taken yesterday at 2259 by Kyle Campbell RN  Body Position  turned  Skin Protection  incontinence pads utilized;silicone foam dressing in place  Taken yesterday at 2021 by Kyle Campbell RN  Body Position  supine;turned;position changed independently;position maintained  Skin Protection  incontinence pads utilized;silicone foam dressing in place  Prevent and Manage VTE (Venous Thromboembolism) Risk  Add  Recent Flowsheet Documentation  Add  Taken yesterday at 2021 by Kyle Campbell RN  VTE Prevention/Management  bilateral;SCDs (sequential compression devices) on  Prevent Infection  Add  Recent Flowsheet Documentation  Add  Taken today at 0402 by Kyle Campbell RN  Infection Prevention  environmental surveillance performed;hand hygiene promoted;personal protective equipment utilized;rest/sleep promoted;single patient room provided  Taken today at 0033 by Kyle Campbell RN  Infection Prevention  environmental surveillance performed;hand hygiene promoted;personal protective equipment utilized;rest/sleep promoted;single patient room provided  Taken yesterday at 2259 by Kyle Campbell RN  Infection Prevention  environmental surveillance performed;hand hygiene promoted;personal protective equipment utilized;rest/sleep promoted;single patient room provided  Taken yesterday at 2021 by Kyle Campbell RN  Infection Prevention  environmental surveillance performed;hand hygiene promoted;personal protective equipment utilized;rest/sleep promoted;single patient room provided  Optimal Comfort and Wellbeing  Add  Today at 0516 - Progressing by Kyle Campbell RN  Add  Provide Person-Centered Care  Add  Recent Flowsheet Documentation  Add  Taken today at 0402 by Kyle Campbell  RN  Trust Relationship/Rapport  care explained  Taken today at 0033 by Kyle Campbell RN  Trust Relationship/Rapport  care explained  Taken yesterday at 2259 by Kyle Campbell RN  Trust Relationship/Rapport  care explained  Taken yesterday at 2021 by Kyle Campbell RN  Trust Relationship/Rapport  care explained;choices provided;emotional support provided;empathic listening provided;questions answered;reassurance provided;thoughts/feelings acknowledged  Readiness for Transition of Care  Add  Today at 0516 - Progressing by Kyle Campbell RN  Add  Skin Injury Risk Increased  Skin Health and Integrity  Add  Today at 0516 - Progressing by Kyle Campbell RN  Add  Optimize Skin Protection  Add  Recent Flowsheet Documentation  Add  Taken today at 0402 by Kyle Campbell RN  Pressure Reduction Techniques  frequent weight shift encouraged;positioned off wounds;heels elevated off bed;pressure points protected;weight shift assistance provided;rest period provided between sit times  Head of Bed (HOB) Positioning  HOB elevated  Pressure Reduction Devices  alternating pressure pump (NEERU);foam padding utilized;positioning supports utilized;heel offloading device utilized;other (see comments)  Skin Protection  incontinence pads utilized;silicone foam dressing in place  Taken today at 0230 by Kyle Campbell RN  Pressure Reduction Techniques  frequent weight shift encouraged;weight shift assistance provided;pressure points protected;heels elevated off bed  Pressure Reduction Devices  alternating pressure pump (NEERU);heel offloading device utilized;positioning supports utilized;foam padding utilized;other (see comments)  Skin Protection  incontinence pads utilized;silicone foam dressing in place  Taken today at 0033 by Kyle Campbell RN  Pressure Reduction Techniques  frequent weight shift encouraged;weight shift assistance provided;pressure points protected;heels elevated off  bed  Head of Bed (HOB) Positioning  HOB elevated  Pressure Reduction Devices  alternating pressure pump (NEERU);heel offloading device utilized;positioning supports utilized;foam padding utilized;other (see comments)  Skin Protection  incontinence pads utilized;silicone foam dressing in place  Taken yesterday at 2259 by Kyle Campbell RN  Pressure Reduction Techniques  frequent weight shift encouraged;weight shift assistance provided;pressure points protected;heels elevated off bed  Head of Bed (HOB) Positioning  HOB elevated  Pressure Reduction Devices  alternating pressure pump (NEERU);heel offloading device utilized;positioning supports utilized;foam padding utilized;other (see comments)  Skin Protection  incontinence pads utilized;silicone foam dressing in place  Taken yesterday at 2021 by Kyle Campbell RN  Activity Management  ambulated in room;back to bed;standing at bedside;up to bedside commode  Pressure Reduction Techniques  heels elevated off bed;pressure points protected;weight shift assistance provided;frequent weight shift encouraged  Head of Bed (HOB) Positioning  HOB elevated;HOB at 20-30 degrees  Pressure Reduction Devices  pressure-redistributing mattress utilized;heel offloading device utilized;positioning supports utilized;foam padding utilized;other (see comments)  Skin Protection  incontinence pads utilized;silicone foam dressing in place  Fall Injury Risk  Absence of Fall and Fall-Related Injury  Add  Today at 0516 - Progressing by Kyle Campbell RN  Add  Identify and Manage Contributors  Add  Recent Flowsheet Documentation  Add  Taken yesterday at 2021 by Kyle Campbell RN  Medication Review/Management  medications reviewed  Promote Injury-Free Environment  Add  Recent Flowsheet Documentation  Add  Taken today at 0402 by Kyle Campbell RN  Safety Promotion/Fall Prevention  activity supervised;clutter free environment maintained;fall prevention program  maintained;gait belt;mobility aid in reach;nonskid shoes/slippers when out of bed;room organization consistent;safety round/check completed  Taken today at 0033 by Kyle Campbell RN  Safety Promotion/Fall Prevention  activity supervised;clutter free environment maintained;fall prevention program maintained;gait belt;mobility aid in reach;nonskid shoes/slippers when out of bed;room organization consistent;safety round/check completed  Taken yesterday at 2259 by Kyle Campbell RN  Safety Promotion/Fall Prevention  activity supervised;clutter free environment maintained;fall prevention program maintained;gait belt;mobility aid in reach;nonskid shoes/slippers when out of bed;room organization consistent;safety round/check completed  Taken yesterday at 2021 by Kyle Campbell, RN  Safety Promotion/Fall Prevention  assistive device/personal items within reach;activity supervised;clutter free environment maintained;elopement precautions;fall prevention program maintained;gait belt;lighting adjusted;nonskid shoes/slippers when out of bed;room organization consistent;safety round/check completed

## 2024-12-18 NOTE — THERAPY EVALUATION
Patient Name: Brittani Lambert  : 1960    MRN: [id number]                              Today's Date: 2024       Admit Date: 2024    Visit Dx:     ICD-10-CM ICD-9-CM   1. Fall, initial encounter  W19.XXXA E888.9   2. Injury of head, initial encounter  S09.90XA 959.01   3. Post-traumatic subdural hygroma  G96.08 852.20   4. Strain of neck muscle, initial encounter  S16.1XXA 847.0   5. Multiple abrasions  T07.XXXA 919.0   6. Contusion of nose, initial encounter  S00.33XA 920   7. Contusion of left knee, initial encounter  S80.02XA 924.11   8. Cancer of right breast metastatic to brain  C50.911 174.9    C79.31 198.3   9. History of bilateral mastectomy  Z90.13 V45.71   10. Acute UTI  N39.0 599.0   11. Chronic pain syndrome  G89.4 338.4     Patient Active Problem List   Diagnosis    Mixed hyperlipidemia    Moderate episode of recurrent major depressive disorder    Anxiety    Functional diarrhea    Breast CA    Atrial fibrillation    Invasive ductal carcinoma of left breast    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    Left-sided weakness    THALIA (generalized anxiety disorder)    Acute deep vein thrombosis (DVT) of left upper extremity    Persistent atrial fibrillation    Lactic acidosis    T12 compression fracture    Falls    History of atrial fibrillation    History of pulmonary embolism    Bilateral leg weakness    Tobacco abuse    Alcohol abuse    Generalized weakness    Failure to thrive in adult    Depression    Subdural hygroma     Past Medical History:   Diagnosis Date    Breast CA 10/4/2018    Depression     DJD (degenerative joint disease) of cervical spine     THALIA (generalized anxiety disorder)     Heart murmur     Ovarian cancer     Rt Cellulitis of chest wall 10/15/2018    Tobacco dependence     UTI (urinary tract infection) 2020     Past Surgical History:   Procedure Laterality Date    APPENDECTOMY      BREAST BIOPSY Right     DONE IN [place]    COLONOSCOPY  2018     HYSTERECTOMY  1989    MASTECTOMY W/ SENTINEL NODE BIOPSY Bilateral 10/4/2018    Procedure: LEFT SKIN SPARING BREAST MASTECTOMY WITH LEFT SENTINEL NODE BIOPSY, RIGHT PROPHYLACTIC SKIN SPARING MASTECTOMY;  Surgeon: Koffi Worthington MD;  Location: [facility] OR;  Service: General    OOPHORECTOMY  1989      General Information       Row Name 12/18/24 1437          Physical Therapy Time and Intention    Document Type evaluation  -KR     Mode of Treatment physical therapy  -KR       Row Name 12/18/24 1437          General Information    Patient Profile Reviewed yes  -KR     Prior Level of Function independent:;all household mobility;community mobility;ADL's  -KR     Existing Precautions/Restrictions fall  -KR     Barriers to Rehab medically complex;previous functional deficit  -KR       Row Name 12/18/24 1437          Living Environment    People in Home alone  -KR       Row Name 12/18/24 1437          Home Main Entrance    Number of Stairs, Main Entrance eight  -KR     Stair Railings, Main Entrance railings on both sides of stairs  -KR       Row Name 12/18/24 1437          Stairs Within Home, Primary    Number of Stairs, Within Home, Primary none  -KR       Row Name 12/18/24 1437          Cognition    Orientation Status (Cognition) oriented x 3  -KR       Row Name 12/18/24 1437          Safety Issues/Impairments Affecting Functional Mobility    Safety Issues Affecting Function (Mobility) insight into deficits/self-awareness;problem-solving;safety precaution awareness;sequencing abilities;safety precautions follow-through/compliance  -KR     Impairments Affecting Function (Mobility) balance;endurance/activity tolerance;strength;pain  -KR     Comment, Safety Issues/Impairments (Mobility) floaters in L eye  -KR               User Key  (r) = Recorded By, (t) = Taken By, (c) = Cosigned By      Initials Name Provider Type    KR Carol Guerra PT Physical Therapist                   Mobility       Row Name 12/18/24 1435           Bed Mobility    Bed Mobility supine-sit  -KR     Supine-Sit [place] (Bed Mobility) standby assist  -KR     Comment, (Bed Mobility) increased time and effort requried  -KR       Row Name 12/18/24 1438          Transfers    Comment, (Transfers) BP sup: 117/81; BP sit 124/89; BP stand 151/105  -KR       Row Name 12/18/24 1438          Sit-Stand Transfer    Sit-Stand [place] (Transfers) minimum assist (75% patient effort);1 person assist  -KR     Assistive Device (Sit-Stand Transfers) other (see comments)  L HHA  -KR     Comment, (Sit-Stand Transfer) 1x from bed, 1x from toilet  -KR       Row Name 12/18/24 1438          Gait/Stairs (Locomotion)    [place] Level (Gait) minimum assist (75% patient effort);2 person assist  -KR     Assistive Device (Gait) other (see comments)  B HHA  -KR     Distance in Feet (Gait) 350  10 + 350  -KR     Deviations/Abnormal Patterns (Gait) marquita decreased;gait speed decreased;bilateral deviations;base of support, narrow;stride length decreased;weight shifting decreased  -KR     Bilateral Gait Deviations heel strike decreased  -KR     Comment, (Gait/Stairs) pt quite unstable with ambulation requiring minAx2 via B HHA. Pt refusing use of FWW. BP following ambulation 141/95.  -KR               User Key  (r) = Recorded By, (t) = Taken By, (c) = Cosigned By      Initials Name Provider Type    Carol Ventura, PT Physical Therapist                   Obj/Interventions       Row Name 12/18/24 1453          Range of Motion Comprehensive    General Range of Motion no range of motion deficits identified  -KR       Row Name 12/18/24 1453          Strength Comprehensive (MMT)    General Manual Muscle Testing (MMT) Assessment lower extremity strength deficits identified  -KR     Comment, General Manual Muscle Testing (MMT) Assessment BLEs grossly 4-/5  -KR       Row Name 12/18/24 1453          Balance    Balance Assessment sitting static balance;sitting dynamic balance;standing  static balance;standing dynamic balance  -KR     Static Sitting Balance standby assist  -KR     Dynamic Sitting Balance contact guard  -KR     Position, Sitting Balance unsupported;sitting in chair;sitting edge of bed;other (see comments)  toilet  -KR     Static Standing Balance minimal assist;1-person assist  -KR     Dynamic Standing Balance minimal assist;2-person assist;non-verbal cues (demo/gesture);verbal cues  -KR     Position/Device Used, Standing Balance supported;other (see comments)  B HHA  -KR     Balance Interventions sitting;standing;sit to stand;supported;static;dynamic  -KR       Row Name 12/18/24 1453          Sensory Assessment (Somatosensory)    Sensory Assessment (Somatosensory) LE sensation intact  -KR               User Key  (r) = Recorded By, (t) = Taken By, (c) = Cosigned By      Initials Name Provider Type    Carol Ventura, PT Physical Therapist                   Goals/Plan       Row Name 12/18/24 1456          Bed Mobility Goal 1 (PT)    Activity/Assistive Device (Bed Mobility Goal 1, PT) bed mobility activities, all  -KR     [place] Level/Cues Needed (Bed Mobility Goal 1, PT) independent  -KR     Time Frame (Bed Mobility Goal 1, PT) short term goal (STG);1 week  -KR       Row Name 12/18/24 1454          Transfer Goal 1 (PT)    Activity/Assistive Device (Transfer Goal 1, PT) sit-to-stand/stand-to-sit;bed-to-chair/chair-to-bed  -KR     [place] Level/Cues Needed (Transfer Goal 1, PT) independent  -KR     Time Frame (Transfer Goal 1, PT) long term goal (LTG);2 weeks  -KR       Row Name 12/18/24 1809          Gait Training Goal 1 (PT)    Activity/Assistive Device (Gait Training Goal 1, PT) gait (walking locomotion);improve balance and speed;increase endurance/gait distance  -KR     [place] Level (Gait Training Goal 1, PT) independent  -KR     Distance (Gait Training Goal 1, PT) 150  -KR     Time Frame (Gait Training Goal 1, PT) long term goal (LTG);2 weeks  -KR       Row Name  12/18/24 1456          Stairs Goal 1 (PT)    Activity/Assistive Device (Stairs Goal 1, PT) stairs, all skills;using handrail, right;using handrail, left  -KR     [place] Level/Cues Needed (Stairs Goal 1, PT) standby assist  -KR     Number of Stairs (Stairs Goal 1, PT) 8  -KR     Time Frame (Stairs Goal 1, PT) long term goal (LTG);2 weeks  -KR       Row Name 12/18/24 1456          Therapy Assessment/Plan (PT)    Planned Therapy Interventions (PT) balance training;bed mobility training;gait training;home exercise program;neuromuscular re-education;patient/family education;postural re-education;transfer training;stretching;strengthening;stair training;ROM (range of motion)  -KR               User Key  (r) = Recorded By, (t) = Taken By, (c) = Cosigned By      Initials Name Provider Type    KR Carol Guerra, PT Physical Therapist                   Clinical Impression       Row Name 12/18/24 145          Pain    Pretreatment Pain Rating 8/10  -KR     Posttreatment Pain Rating 6/10  -KR     Pain Location back;hip;other (see comments)  ribs  -KR     Pain Management Interventions exercise or physical activity utilized;positioning techniques utilized  -KR     Response to Pain Interventions intervention effective per patient report;activity participation with decreased pain  -KR       Row Name 12/18/24 8628          Plan of Care Review    Plan of Care Reviewed With patient  -KR     Outcome Evaluation Pt presents with generalized pain, as well as strength, balance, and endurance below baseline contributing to bed mobility, transfer, and ambulation deficits. Pt will benefit from PT to address aforementioned deficits and return to PLOF. PT rec IRF upon dc for best functional outcomes and facilitate safe return home.  -KR       Row Name 12/18/24 4637          Therapy Assessment/Plan (PT)    Patient/Family Therapy Goals Statement (PT) to get stronger  -KR     Rehab Potential (PT) good  -KR     Criteria for Skilled  Interventions Met (PT) skilled treatment is necessary;yes  -KR     Therapy Frequency (PT) daily  -KR     Predicted Duration of Therapy Intervention (PT) 2 weeks  -KR       Row Name 12/18/24 1454          Vital Signs    Pre Systolic BP Rehab 117  -KR     Pre Treatment Diastolic BP 81  -KR     Intra Systolic BP Rehab 151  -KR     Intra Treatment Diastolic   -KR     Post Systolic BP Rehab 141  -KR     Post Treatment Diastolic BP 95  -KR     Pre Patient Position Supine  -KR     Intra Patient Position Standing  -KR     Post Patient Position Sitting  -KR       Row Name 12/18/24 1454          Positioning and Restraints    Pre-Treatment Position in bed  -KR     Post Treatment Position chair  -KR     In Chair notified nsg;reclined;call light within reach;encouraged to call for assist;exit alarm on;waffle cushion;legs elevated;compression device  -KR               User Key  (r) = Recorded By, (t) = Taken By, (c) = Cosigned By      Initials Name Provider Type    Carol Ventura, PT Physical Therapist                   Outcome Measures       Row Name 12/18/24 1457 12/18/24 0913       How much help from another person do you currently need...    Turning from your back to your side while in flat bed without using bedrails? 4  -KR 3  -MD    Moving from lying on back to sitting on the side of a flat bed without bedrails? 3  -KR 3  -MD    Moving to and from a bed to a chair (including a wheelchair)? 3  -KR 3  -MD    Standing up from a chair using your arms (e.g., wheelchair, bedside chair)? 3  -KR 3  -MD    Climbing 3-5 steps with a railing? 2  -KR 2  -MD    To walk in hospital room? 3  -KR 3  -MD    AM-PAC 6 Clicks Score (PT) 18  -KR 17  -MD    Highest Level of Mobility Goal 6 --> Walk 10 steps or more  -KR 5 --> Static standing  -MD      Row Name 12/18/24 0402          How much help from another person do you currently need...    Turning from your back to your side while in flat bed without using bedrails? 3  -YS      Moving from lying on back to sitting on the side of a flat bed without bedrails? 3  -YS     Moving to and from a bed to a chair (including a wheelchair)? 3  -YS     Standing up from a chair using your arms (e.g., wheelchair, bedside chair)? 3  -YS     Climbing 3-5 steps with a railing? 2  -YS     To walk in hospital room? 3  -YS     AM-PAC 6 Clicks Score (PT) 17  -YS     Highest Level of Mobility Goal 5 --> Static standing  -YS       Row Name 12/18/24 1457          Functional Assessment    Outcome Measure Options AM-PAC 6 Clicks Basic Mobility (PT)  -KR               User Key  (r) = Recorded By, (t) = Taken By, (c) = Cosigned By      Initials Name Provider Type    KR Carol Guerra, PT Physical Therapist    Shelby Roland, RN Registered Nurse    Kyle Nava RN Registered Nurse                                 Physical Therapy Education       Title: PT OT SLP Therapies (In Progress)       Topic: Physical Therapy (In Progress)       Point: Mobility training (Done)       Learning Progress Summary            Patient Acceptance, E, VU by KR at 12/18/2024 1458                      Point: Home exercise program (Not Started)       Learner Progress:  Not documented in this visit.              Point: Body mechanics (Done)       Learning Progress Summary            Patient Acceptance, E, VU by KR at 12/18/2024 1458                      Point: Precautions (Done)       Learning Progress Summary            Patient Acceptance, E, VU by KR at 12/18/2024 1458                                      User Key       Initials Effective Dates Name Provider Type Discipline     12/30/22 -  Carol Guerra, PT Physical Therapist PT                  PT Recommendation and Plan  Planned Therapy Interventions (PT): balance training, bed mobility training, gait training, home exercise program, neuromuscular re-education, patient/family education, postural re-education, transfer training, stretching, strengthening, stair  training, ROM (range of motion)  Outcome Evaluation: Pt presents with generalized pain, as well as strength, balance, and endurance below baseline contributing to bed mobility, transfer, and ambulation deficits. Pt will benefit from PT to address aforementioned deficits and return to PLOF. PT rec IRF upon dc for best functional outcomes and facilitate safe return home.     Time Calculation:   PT Evaluation Complexity  History, PT Evaluation Complexity: 3 or more personal factors and/or comorbidities  Examination of Body Systems (PT Eval Complexity): total of 4 or more elements  Clinical Presentation (PT Evaluation Complexity): evolving  Clinical Decision Making (PT Evaluation Complexity): moderate complexity  Overall Complexity (PT Evaluation Complexity): moderate complexity     PT Charges       Row Name 12/18/24 1458             Time Calculation    Start Time 1357  -KR      PT Received On 12/18/24  -KR      PT Goal Re-Cert Due Date 12/28/24  -KR         Untimed Charges    PT Eval/Re-eval Minutes 50  -KR         Total Minutes    Untimed Charges Total Minutes 50  -KR       Total Minutes 50  -KR                User Key  (r) = Recorded By, (t) = Taken By, (c) = Cosigned By      Initials Name Provider Type    Carol Ventura PT Physical Therapist                  Therapy Charges for Today       Code Description Service Date Service Provider Modifiers Qty    [id number] HC PT EVAL MOD COMPLEXITY 4 12/18/2024 Carol Guerra, PT GP 1            PT G-Codes  Outcome Measure Options: AM-PAC 6 Clicks Basic Mobility (PT)  AM-PAC 6 Clicks Score (PT): 18  PT Discharge Summary  Anticipated Discharge Disposition (PT): inpatient rehabilitation facility    Carol Guerra PT  12/18/2024

## 2024-12-18 NOTE — PLAN OF CARE
Problem: Adult Inpatient Plan of Care  Goal: Plan of Care Review  Outcome: Progressing  Goal: Patient-Specific Goal (Individualized)  Outcome: Progressing  Goal: Absence of Hospital-Acquired Illness or Injury  Outcome: Progressing  Intervention: Identify and Manage Fall Risk  Intervention: Prevent Skin Injury  Intervention: Prevent Infection  Goal: Optimal Comfort and Wellbeing  Outcome: Progressing  Intervention: Monitor Pain and Promote Comfort  Goal: Readiness for Transition of Care  Outcome: Progressing     Problem: Skin Injury Risk Increased  Goal: Skin Health and Integrity  Outcome: Progressing  Intervention: Optimize Skin Protection     Problem: Fall Injury Risk  Goal: Absence of Fall and Fall-Related Injury  Outcome: Progressing  Intervention: Identify and Manage Contributors  Intervention: Promote Injury-Free Environment   Goal Outcome Evaluation:      Pt a/o. VSS on Ra. NSR on tele. Pain controlled with IV pain medication. Pt had a BM today. Up x1 to BSC. Adequate UOP. Skin interventions in place. Pt is resting in bed with call light in reach.

## 2024-12-18 NOTE — PLAN OF CARE
Goal Outcome Evaluation:  Plan of Care Reviewed With: patient        Progress: no change  Outcome Evaluation: OT eval complete. Pt presents below fxl baseline with ADLs and fxl mobility, limited by pain, impaired balance, and poor activity tolerance. Pt would benefit from ongoing skilled OT services to progress to PLOF. Recommend d/c to IRF at this time for best fxl outcomes.    Anticipated Discharge Disposition (OT): inpatient rehabilitation facility

## 2024-12-18 NOTE — PROGRESS NOTES
[facility] Medicine Services  PROGRESS NOTE    Patient Name: Brittani Lambert  : 1960  MRN: [id number]    Date of Admission: 2024  Primary Care Physician: Alla Colon DO    Subjective   Subjective     CC:  F/u hygromas    HPI:  Hurting all over, face, body, etc. No other acute changes this AM      Objective   Objective     Vital Signs:   Temp:  [97.6 °F (36.4 °C)-98.3 °F (36.8 °C)] 97.8 °F (36.6 °C)  Heart Rate:  [58-73] 73  Resp:  [16-18] 18  BP: (115-153)/(73-98) 115/73     Physical Exam:  Constitutional: Awake, alert, sitting up in bed in NAD  HENT: Multiple abrasions/contusions  Respiratory: Clear to auscultation bilaterally, respiratory effort normal   Cardiovascular: RRR, palpable radial pulse  Gastrointestinal: Positive bowel sounds, soft, nontender, nondistended  Psychiatric: Appropriate affect, cooperative  Neurologic: Speech clear and fluent, moving all extremities spontaneously    Results Reviewed:  LAB RESULTS:      Lab 24   WBC  --   --  7.86   HEMOGLOBIN  --   --  10.8*   HEMOGLOBIN, POC  --  5.4*  --    HEMATOCRIT  --   --  35.0   HEMATOCRIT POC  --  16*  --    PLATELETS  --   --  174   NEUTROS ABS  --   --  5.51   IMMATURE GRANS (ABS)  --   --  0.05   LYMPHS ABS  --   --  1.44   MONOS ABS  --   --  0.71   EOS ABS  --   --  0.07   MCV  --   --  104.5*   HSTROP T 7  --  7         Lab 24  0441 24  1027 24   SODIUM 133*  --   --  132*   POTASSIUM 3.8  --   --  3.9   CHLORIDE 98  --   --  96*   CO2 23.0  --   --  22.0   ANION GAP 12.0  --   --  14.0   BUN 6*  --   --  12   CREATININE 0.50*  --  0.70 0.67   EGFR 104.9  --  96.7 97.7   GLUCOSE 84  --   --  104*   CALCIUM 8.5*  --   --  8.8   MAGNESIUM 1.8  --   --  1.8   PHOSPHORUS 3.4  --   --   --    TSH  --  1.550  --   --          Lab 24   TOTAL PROTEIN 6.5   ALBUMIN 4.0   GLOBULIN 2.5   ALT (SGPT) 10   AST (SGOT)  25   BILIRUBIN 0.5   ALK PHOS 89         Lab 12/16/24 2130 12/16/24 1953   HSTROP T 7 7                 Brief Urine Lab Results  (Last result in the past 365 days)        Color   Clarity   Blood   Leuk Est   Nitrite   Protein   CREAT   Urine HCG        12/16/24 2124 Yellow   Cloudy   Negative   Trace   Negative   Trace                   Microbiology Results Abnormal       Procedure Component Value - Date/Time    Urine Culture - Urine, Urine, Catheter [[id number]]  (Abnormal) Collected: 12/16/24 2124    Lab Status: Preliminary result Specimen: Urine, Catheter Updated: 12/18/24 0956     Urine Culture >100,000 CFU/mL Gram Negative Bacilli    Narrative:      Colonization of the urinary tract without infection is common. Treatment is discouraged unless the patient is symptomatic, pregnant, or undergoing an invasive urologic procedure.            XR Abdomen KUB    Result Date: 12/17/2024  XR ABDOMEN KUB Date of Exam: 12/17/2024 11:02 AM EST Indication: No BM >7 days Comparison: None available. Findings: Single supine view. There is no large or small bowel dilatation. There is a small amount of stool. There is moderate to severe distention of the urinary bladder with contrast. There are no abnormal calcifications.     Impression: Impression: Moderate to severe bladder distention. Unremarkable bowel gas pattern. Electronically Signed: Julissa Blair MD  12/17/2024 11:38 AM EST  Workstation ID: [id number]    MRI Brain With & Without Contrast    Result Date: 12/17/2024  MRI BRAIN W WO CONTRAST Date of Exam: 12/17/2024 9:10 AM EST Indication: follow abnormal CT scan, BL subdural hygromas, suspect acute.  Comparison: Noncontrast head CT dated 12/16/2024, brain MRI dated 10/1/2024 Technique:  Routine multiplanar/multisequence sequence images of the brain were obtained before and after the uneventful administration of 8 mL Multihance. FINDINGS: There is mild T2/FLAIR signal hyperintensity within the bilateral periventricular  white matter. There is focal gliosis within the right frontoparietal region and within the midline cerebellum, similar since prior studies. As noted on yesterday's head CT,  there are bilateral subdural hygromas measuring approximately 6 mm in thickness on the right and 5 mm in thickness on the left. These were hypodense on yesterday's head CT. SWI imaging demonstrates numerous tiny foci of susceptibility artifact within the bilateral parieto-occipital regions and focal gyriform SWI hypointensity along the right centrum semiovale. Findings may be related to calcifications or chronic blood products. Midline structures appear unremarkable. No significant mass effect, midline shift, or hydrocephalus. No abnormal contrast enhancement is seen. Diffusion-weighted sequences demonstrate no acute infarct.The visualized intracranial flow-voids appear unremarkable. The paranasal sinuses and mastoid air cells show no fluid signal. The orbits, globes, retrobulbar soft tissues appear unremarkable. The visualized superficial soft tissues and cervical spine demonstrate no significant abnormality.     Impression: 1.Bilateral cerebral convexity subdural hygromas measuring 6 mm in thickness on the right and 5 mm in thickness on the left. These were hypodense on yesterday's head CT. These are new since brain MRI from 10/1/2024. 2.Mild T2/FLAIR signal hyperintensity within the bilateral periventricular white matter. There is focal gliosis within the right frontoparietal region and within the midline cerebellum, similar since prior studies. 3.SWI imaging demonstrates numerous tiny foci of susceptibility artifact within the bilateral parieto-occipital regions, more apparent and slightly more numerous when compared with 5/13/2022. There is unchanged focal gyriform SWI hypointensity along the right centrum semiovale. Findings may be related to calcifications or chronic blood products. 4.No diffusion restriction is identified to suggest  acute infarct. 5.No suspicious contrast enhancement is identified. Electronically Signed: Jerrell Palomo MD  12/17/2024 10:12 AM EST  Workstation ID: [id number]    CT Head Without Contrast    Result Date: 12/16/2024  CT HEAD WO CONTRAST, CT FACIAL BONES WO CONTRAST Date of Exam: 12/16/2024 9:38 PM EST Indication: fall with head injury. Comparison: Head and face CT 10/22/2024. Technique: Axial CT images were obtained of the head and face without contrast administration.  Automated exposure control and iterative construction methods were used. Findings: HEAD CT: New bilateral subdural hypoattenuating collections along the bilateral cerebral convexities measuring up to 5 mm on the right and 4 mm on the left. No foci of hyperattenuation to suggest acute intracranial hemorrhage. Intact appearing gray-white differentiation. No significant mass effect. No hydrocephalus. Mild generalized parenchymal volume loss. Scattered areas of periventricular and subcortical white matter hypoattenuation, nonspecific, perhaps from small vessel ischemic/hypertensive changes in a patient of this age.There are intracranial atherosclerotic calcifications. No acute or aggressive appearing bony or extracranial soft tissue process. FACE CT: No acute maxillofacial fracture. Facial and extracranial soft tissues appear unremarkable. Globes and orbits appear unremarkable by CT. Mild scattered mucosal thickening of the paranasal sinuses. Mastoid air cells are essentially clear. Imaged aerodigestive tract demonstrates no significant abnormality. Evaluation of the oral cavity is degraded by dental hardware artifact. Imaged major salivary glands demonstrate no significant abnormality.     Impression: Impression: Compared to head CT from 10/22/2024, new hypoattenuating subdural collections along the bilateral cerebral convexities measuring up to 5 mm on the right and 4 mm on the left, suspicious for acute subdural hygromas in setting of trauma. No acute  maxillofacial fracture. Electronically Signed: Simone CARDONA Jeremias  12/16/2024 10:11 PM EST  Workstation ID: [id number]    CT Facial Bones Without Contrast    Result Date: 12/16/2024  CT HEAD WO CONTRAST, CT FACIAL BONES WO CONTRAST Date of Exam: 12/16/2024 9:38 PM EST Indication: fall with head injury. Comparison: Head and face CT 10/22/2024. Technique: Axial CT images were obtained of the head and face without contrast administration.  Automated exposure control and iterative construction methods were used. Findings: HEAD CT: New bilateral subdural hypoattenuating collections along the bilateral cerebral convexities measuring up to 5 mm on the right and 4 mm on the left. No foci of hyperattenuation to suggest acute intracranial hemorrhage. Intact appearing gray-white differentiation. No significant mass effect. No hydrocephalus. Mild generalized parenchymal volume loss. Scattered areas of periventricular and subcortical white matter hypoattenuation, nonspecific, perhaps from small vessel ischemic/hypertensive changes in a patient of this age.There are intracranial atherosclerotic calcifications. No acute or aggressive appearing bony or extracranial soft tissue process. FACE CT: No acute maxillofacial fracture. Facial and extracranial soft tissues appear unremarkable. Globes and orbits appear unremarkable by CT. Mild scattered mucosal thickening of the paranasal sinuses. Mastoid air cells are essentially clear. Imaged aerodigestive tract demonstrates no significant abnormality. Evaluation of the oral cavity is degraded by dental hardware artifact. Imaged major salivary glands demonstrate no significant abnormality.     Impression: Impression: Compared to head CT from 10/22/2024, new hypoattenuating subdural collections along the bilateral cerebral convexities measuring up to 5 mm on the right and 4 mm on the left, suspicious for acute subdural hygromas in setting of trauma. No acute maxillofacial fracture. Electronically  Signed: Simone Mcdowell  12/16/2024 10:11 PM EST  Workstation ID: [id number]    CT Cervical Spine Without Contrast    Result Date: 12/16/2024  CT CERVICAL SPINE WO CONTRAST, CT CHEST W CONTRAST DIAGNOSTIC Date of Exam: 12/16/2024 9:38 PM EST Indication: neck pain s/p fall with head injury. Comparison: 2020 MRI Technique: Axial CT images were obtained of the cervical spine without contrast administration. Axial CT images were obtained of the chest after administration of intravenous contrast. Reconstructed coronal and sagittal images were also obtained. Automated exposure control and iterative construction methods were used. Findings: CT cervical spine: There is no evidence of acute fracture. The craniocervical junction is intact. There is mild atlantoaxial joint arthritis. There is trace grade 1 anterolisthesis of C4 on C5. Mild scattered endplate osteophyte formations with facet arthropathy. No evidence of severe canal narrowing. Varying multilevel bony neural foraminal narrowing. Paraspinal soft tissues show no acute abnormality. CT chest: There is no evidence of pulmonary embolism. Pulmonary arteries are normal in caliber. No right heart strain. No pericardial effusion. Coronary artery calcifications. Central airways are patent. No significant bronchial wall thickening or bronchiectasis. No focal airspace consolidation, pleural effusion, or pneumothorax. Bibasilar atelectasis. There is no suspicious pulmonary nodule or mass. No mediastinal mass or threshold lymphadenopathy. Normal appearance of the thoracic esophagus. Chest wall soft tissues and included upper abdomen show no acute abnormality. No acute fracture or suspicious bone lesion.     Impression: Impression: 1.No acute fracture or malalignment of the cervical spine. 2.No acute intrathoracic abnormality. No evidence of rib fracture. Electronically Signed: Antonio Marsh MD  12/16/2024 10:09 PM EST  Workstation ID: [id number]    CT Chest With Contrast  Diagnostic    Result Date: 12/16/2024  CT CERVICAL SPINE WO CONTRAST, CT CHEST W CONTRAST DIAGNOSTIC Date of Exam: 12/16/2024 9:38 PM EST Indication: neck pain s/p fall with head injury. Comparison: 2020 MRI Technique: Axial CT images were obtained of the cervical spine without contrast administration. Axial CT images were obtained of the chest after administration of intravenous contrast. Reconstructed coronal and sagittal images were also obtained. Automated exposure control and iterative construction methods were used. Findings: CT cervical spine: There is no evidence of acute fracture. The craniocervical junction is intact. There is mild atlantoaxial joint arthritis. There is trace grade 1 anterolisthesis of C4 on C5. Mild scattered endplate osteophyte formations with facet arthropathy. No evidence of severe canal narrowing. Varying multilevel bony neural foraminal narrowing. Paraspinal soft tissues show no acute abnormality. CT chest: There is no evidence of pulmonary embolism. Pulmonary arteries are normal in caliber. No right heart strain. No pericardial effusion. Coronary artery calcifications. Central airways are patent. No significant bronchial wall thickening or bronchiectasis. No focal airspace consolidation, pleural effusion, or pneumothorax. Bibasilar atelectasis. There is no suspicious pulmonary nodule or mass. No mediastinal mass or threshold lymphadenopathy. Normal appearance of the thoracic esophagus. Chest wall soft tissues and included upper abdomen show no acute abnormality. No acute fracture or suspicious bone lesion.     Impression: Impression: 1.No acute fracture or malalignment of the cervical spine. 2.No acute intrathoracic abnormality. No evidence of rib fracture. Electronically Signed: Antonio Marsh MD  12/16/2024 10:09 PM EST  Workstation ID: [id number]    XR Knee 1 or 2 View Left    Result Date: 12/16/2024  XR KNEE 1 OR 2 VW LEFT Date of Exam: 12/16/2024 7:25 PM EST Indication: fall with  left knee pain Comparison: None available. Findings: No acute fracture or malalignment. No significant joint effusion. No focal soft tissue abnormalities appreciated. Joint spaces appear grossly preserved without significant degenerative change.     Impression: Impression: No acute fracture or malalignment. Electronically Signed: Simone DULCE Jeremias  12/16/2024 8:13 PM EST  Workstation ID: [id number]    XR Chest 1 View    Result Date: 12/16/2024  XR CHEST 1 VW Date of Exam: 12/16/2024 7:16 PM EST Indication: Weak/Dizzy/AMS triage protocol Comparison: Chest radiograph 10/22/2024 Findings: Mediastinum: Cardiac silhouette appears unchanged and normal in size Lungs: The lungs appear clear without focal consolidation appreciated. Pleura: No pleural effusion or pneumothorax. Bones and soft tissues: No acute, displaced fracture seen.     Impression: Impression: No radiographic evidence of acute cardiopulmonary disease. Electronically Signed: Simone D Jeremias  12/16/2024 8:11 PM EST  Workstation ID: [id number]     Results for orders placed during the hospital encounter of 09/24/20    Transthoracic Echo Complete With Contrast if Necessary Per Protocol    Interpretation Summary  · Left ventricular ejection fraction appears to be 61 - 65%. Left ventricular systolic function is normal.  · Left ventricular diastolic function is consistent with (grade I) impaired relaxation.  · Mild tricuspid valve regurgitation is present.  · Estimated right ventricular systolic pressure from tricuspid regurgitation is normal (<35 mmHg). Calculated right ventricular systolic pressure from tricuspid regurgitation is 22 mmHg.      Current medications:  Scheduled Meds:[Held by provider] aspirin, 81 mg, Oral, Daily  buPROPion XL, 150 mg, Oral, Daily  busPIRone, 5 mg, Oral, TID  Lidocaine, 1 patch, Transdermal, Q24H  lubiprostone, 8 mcg, Oral, BID With Meals  Naloxegol Oxalate, 25 mg, Oral, QAM  sodium chloride, 10 mL, Intravenous, Q12H      Continuous  Infusions:   PRN Meds:.  acetaminophen **OR** acetaminophen **OR** acetaminophen    senna-docusate sodium **AND** polyethylene glycol **AND** bisacodyl **AND** bisacodyl    Calcium Replacement - Follow Nurse / BPA Driven Protocol    [Held by provider] hydrOXYzine    Magnesium Standard Dose Replacement - Follow Nurse / BPA Driven Protocol    Morphine    nicotine    oxyCODONE    Phosphorus Replacement - Follow Nurse / BPA Driven Protocol    Potassium Replacement - Follow Nurse / BPA Driven Protocol    [COMPLETED] Insert Peripheral IV **AND** sodium chloride    sodium chloride    sodium chloride    Assessment & Plan   Assessment & Plan     Active Hospital Problems    Diagnosis  POA    **Subdural hygroma [G96.08]  Yes    Generalized weakness [R53.1]  Yes    Atrial fibrillation [I48.91]  Yes    Mixed hyperlipidemia [E78.2]  Yes      Resolved Hospital Problems   No resolved problems to display.        Brief Hospital Course to date:  Brittani Lambert is a 64 y.o. female w/ anxiety, depression, tobacco abuse, ovarian cancer 1989, metastatic breast cancer 2018 s/p whole brain radiation, who presented for evaluation of recurrent falls; she reports weight loss of approx 10lbs since the beginning of this year, anorexia for approx 1 mo, and repeatedly falling when her legs get weak and give out (no loss of consciousness); sometimes on standing her legs will feel so weak that they quiver and she has to sit back down; she had a fall onto pavement with head injury prompting visit to the ED; neuro imaging on arrival showed BL subdural hygromas     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 deconditioning giver her poor PO intake and low body weight  -prn pain control po/iv  -NSGY evaluated, no acute intervention, f/u opt 2 weeks with repeat head CT  -PT/OT      Weight loss/failure to thrive  Cachexia/low body weight; severe malnutrition  Constipation  -reports ~10lb weight loss since beginning of 2024 (approx 10% of body weight)  -leg weakness and FTT noted to have been charted on patient's problem list since at least 4 years pta; she has been seen on an ambulatory basis multiple times this year for hip pain, falls, etc  -cont nutritional shake, RD consult pending     Hx metastatic breast cancer  -treated [id number] for breast Ca 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 Patel     Asymptomatic bacteruria - s/p dose IV CTX in the ED  Afib - chronically not on AC  Anxiety/Depression - buproprion  Hx ovarian Ca 1989  Tobacco abuse - prn nrt    Expected Discharge Location and Transportation: pending PT/OT  Expected Discharge   Expected Discharge Date: 12/20/2024; Expected Discharge Time:      VTE Prophylaxis:  Mechanical VTE prophylaxis orders are present.         AM-PAC 6 Clicks Score (PT): 18 (12/18/24 5686)    CODE STATUS:   Code Status and Medical Interventions: No CPR (Do Not Attempt to Resuscitate); Limited Support; No intubation (DNI)   Ordered at: 12/17/24 0440     Medical Intervention Limits:    No intubation (DNI)     Level Of Support Discussed With:    Patient     Code Status (Patient has no pulse and is not breathing):    No CPR (Do Not Attempt to Resuscitate)     Medical Interventions (Patient has pulse or is breathing):    Limited Support       Antonio Torres, DO  12/18/24

## 2024-12-18 NOTE — THERAPY EVALUATION
Patient Name: Brittani Lambert  : 1960    MRN: [id number]                              Today's Date: 2024       Admit Date: 2024    Visit Dx:     ICD-10-CM ICD-9-CM   1. Fall, initial encounter  W19.XXXA E888.9   2. Injury of head, initial encounter  S09.90XA 959.01   3. Post-traumatic subdural hygroma  G96.08 852.20   4. Strain of neck muscle, initial encounter  S16.1XXA 847.0   5. Multiple abrasions  T07.XXXA 919.0   6. Contusion of nose, initial encounter  S00.33XA 920   7. Contusion of left knee, initial encounter  S80.02XA 924.11   8. Cancer of right breast metastatic to brain  C50.911 174.9    C79.31 198.3   9. History of bilateral mastectomy  Z90.13 V45.71   10. Acute UTI  N39.0 599.0   11. Chronic pain syndrome  G89.4 338.4     Patient Active Problem List   Diagnosis    Mixed hyperlipidemia    Moderate episode of recurrent major depressive disorder    Anxiety    Functional diarrhea    Breast CA    Atrial fibrillation    Invasive ductal carcinoma of left breast    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    Left-sided weakness    THALIA (generalized anxiety disorder)    Acute deep vein thrombosis (DVT) of left upper extremity    Persistent atrial fibrillation    Lactic acidosis    T12 compression fracture    Falls    History of atrial fibrillation    History of pulmonary embolism    Bilateral leg weakness    Tobacco abuse    Alcohol abuse    Generalized weakness    Failure to thrive in adult    Depression    Subdural hygroma     Past Medical History:   Diagnosis Date    Breast CA 10/4/2018    Depression     DJD (degenerative joint disease) of cervical spine     THALIA (generalized anxiety disorder)     Heart murmur     Ovarian cancer     Rt Cellulitis of chest wall 10/15/2018    Tobacco dependence     UTI (urinary tract infection) 2020     Past Surgical History:   Procedure Laterality Date    APPENDECTOMY      BREAST BIOPSY Right     DONE IN [place]    COLONOSCOPY  2018     HYSTERECTOMY  1989    MASTECTOMY W/ SENTINEL NODE BIOPSY Bilateral 10/4/2018    Procedure: LEFT SKIN SPARING BREAST MASTECTOMY WITH LEFT SENTINEL NODE BIOPSY, RIGHT PROPHYLACTIC SKIN SPARING MASTECTOMY;  Surgeon: Koffi Worthington MD;  Location: [facility];  Service: General    OOPHORECTOMY  1989      General Information       Row Name 12/18/24 1505          OT Time and Intention    Document Type evaluation  -AJ     Mode of Treatment occupational therapy  -AJ     Patient Effort good  -AJ       Row Name 12/18/24 1505          General Information    Patient Profile Reviewed yes  -AJ     Prior Level of Function independent:;all household mobility;community mobility;bed mobility;ADL's  -AJ     Existing Precautions/Restrictions fall;other (see comments)  Endorses x5 recent falls in the previous 6 months  -AJ     Barriers to Rehab medically complex;previous functional deficit  -AJ       Row Name 12/18/24 1505          Living Environment    People in Home alone  -AJ       Row Name 12/18/24 1505          Home Main Entrance    Number of Stairs, Main Entrance eight  -AJ     Stair Railings, Main Entrance railings on both sides of stairs  -AJ       Row Name 12/18/24 1505          Stairs Within Home, Primary    Number of Stairs, Within Home, Primary none  -AJ       Row Name 12/18/24 1505          Cognition    Orientation Status (Cognition) oriented x 4  -AJ       Row Name 12/18/24 1505          Safety Issues/Impairments Affecting Functional Mobility    Safety Issues Affecting Function (Mobility) awareness of need for assistance;insight into deficits/self-awareness;judgment;problem-solving;safety precaution awareness;safety precautions follow-through/compliance;sequencing abilities  -AJ     Impairments Affecting Function (Mobility) balance;endurance/activity tolerance;pain;strength  -AJ     Comment, Safety Issues/Impairments (Mobility) Pt reported floaters/fuzziness in L eye  -AJ               User Key  (r) = Recorded By, (t)  = Taken By, (c) = Cosigned By      Initials Name Provider Type    AJ Daya Avila, OT Occupational Therapist                     Mobility/ADL's       Row Name 12/18/24 1507          Bed Mobility    Bed Mobility supine-sit  -     Supine-Sit [place] (Bed Mobility) standby assist;verbal cues  -     Assistive Device (Bed Mobility) bed rails;head of bed elevated  -     Comment, (Bed Mobility) Extra time and effort required  -       Row Name 12/18/24 1507          Transfers    Transfers sit-stand transfer;stand-sit transfer;toilet transfer  -       Row Name 12/18/24 1507          Sit-Stand Transfer    Sit-Stand [place] (Transfers) minimum assist (75% patient effort);1 person assist  -     Assistive Device (Sit-Stand Transfers) other (see comments)  L A  -       Row Name 12/18/24 1507          Stand-Sit Transfer    Stand-Sit [place] (Transfers) minimum assist (75% patient effort);2 person assist;verbal cues  -     Comment, (Stand-Sit Transfer) VCs for safe lowering  -       Row Name 12/18/24 1507          Toilet Transfer    Type (Toilet Transfer) sit-stand;stand-sit  -     [place] Level (Toilet Transfer) minimum assist (75% patient effort);2 person assist;verbal cues  -     Assistive Device (Toilet Transfer) commode;grab bars/safety frame  -     Comment, (Toilet Transfer) VCs for hip alignment and safe lowering  -       Row Name 12/18/24 1507          Functional Mobility    Functional Mobility- Ind. Level minimum assist (75% patient effort);2 person assist required  -     Functional Mobility-Distance (Feet) --   distance  -     Functional Mobility- Comment B HHA  -       Row Name 12/18/24 1507          Activities of Daily Living    BADL Assessment/Intervention grooming;toileting  -       Row Name 12/18/24 1507          Grooming Assessment/Training    [place] Level (Grooming) wash face, hands;supervision  -     Position (Grooming) sink side  -[facility]  Name 12/18/24 1507          Toileting Assessment/Training    [employer] Level (Toileting) adjust/manage clothing;perform perineal hygiene;supervision  -     Assistive Devices (Toileting) commode;grab bar/safety frame  -     Position (Toileting) unsupported sitting  -               User Key  (r) = Recorded By, (t) = Taken By, (c) = Cosigned By      Initials Name Provider Type    AJ Daya Avila OT Occupational Therapist                   Obj/Interventions       Row Name 12/18/24 1510          Sensory Assessment (Somatosensory)    Sensory Assessment (Somatosensory) UE sensation intact  -     Sensory Assessment Pt reported having numbness/tingling in hands and fingers- was not occuring at this time. Able to localize touch on both sides and reported intact general sensation in BUE.  -       Row Name 12/18/24 1510          Vision Assessment/Intervention    Visual Impairment/Limitations WFL  -       Row Name 12/18/24 1510          Range of Motion Comprehensive    General Range of Motion bilateral upper extremity ROM WFL  -       Row Name 12/18/24 1510          Strength Comprehensive (MMT)    General Manual Muscle Testing (MMT) Assessment upper extremity strength deficits identified  -     Comment, General Manual Muscle Testing (MMT) Assessment BUE grossly 4/5  -       Row Name 12/18/24 1510          Balance    Balance Assessment sitting static balance;sitting dynamic balance;sit to stand dynamic balance;standing static balance;standing dynamic balance  -     Static Sitting Balance standby assist  -     Dynamic Sitting Balance contact guard  -     Position, Sitting Balance unsupported;sitting edge of bed;other (see comments)  Commode  -     Static Standing Balance minimal assist;1-person assist  -     Dynamic Standing Balance minimal assist;2-person assist;verbal cues  -     Position/Device Used, Standing Balance supported;other (see comments)  B HHA  -     Balance Interventions  sitting;standing;sit to stand;supported;static;dynamic;occupation based/functional task  -AJ               User Key  (r) = Recorded By, (t) = Taken By, (c) = Cosigned By      Initials Name Provider Type    Daya Gill OT Occupational Therapist                   Goals/Plan       Row Name 12/18/24 1517          Transfer Goal 1 (OT)    Activity/Assistive Device (Transfer Goal 1, OT) sit-to-stand/stand-to-sit;bed-to-chair/chair-to-bed;toilet  -AJ     [place] Level/Cues Needed (Transfer Goal 1, OT) supervision required  -AJ     Time Frame (Transfer Goal 1, OT) long term goal (LTG);10 days  -AJ     Progress/Outcome (Transfer Goal 1, OT) new goal  -AJ       Row Name 12/18/24 1517          Dressing Goal 1 (OT)    Activity/Device (Dressing Goal 1, OT) lower body dressing  -AJ     [place]/Cues Needed (Dressing Goal 1, OT) moderate assist (50-74% patient effort)  -AJ     Time Frame (Dressing Goal 1, OT) short term goal (STG);5 days  -AJ     Progress/Outcome (Dressing Goal 1, OT) new goal  -AJ       Row Name 12/18/24 1517          Toileting Goal 1 (OT)    Activity/Device (Toileting Goal 1, OT) perform perineal hygiene;adjust/manage clothing  -AJ     [place] Level/Cues Needed (Toileting Goal 1, OT) standby assist  -AJ     Time Frame (Toileting Goal 1, OT) long term goal (LTG);10 days  -AJ     Progress/Outcome (Toileting Goal 1, OT) new goal  -AJ       Row Name 12/18/24 1517          Grooming Goal 1 (OT)    Activity/Device (Grooming Goal 1, OT) nail care;wash face, hands;oral care  -AJ     [place] (Grooming Goal 1, OT) supervision required  -AJ     Time Frame (Grooming Goal 1, OT) long term goal (LTG);10 days  -AJ     Strategies/Barriers (Grooming Goal 1, OT) standing sink side for increased activity tolerance  -AJ     Progress/Outcome (Grooming Goal 1, OT) new goal  -AJ       Row Name 12/18/24 1517          Therapy Assessment/Plan (OT)    Planned Therapy Interventions (OT) activity tolerance  training;adaptive equipment training;BADL retraining;functional balance retraining;IADL retraining;occupation/activity based interventions;patient/caregiver education/training;ROM/therapeutic exercise;strengthening exercise;transfer/mobility retraining  -               User Key  (r) = Recorded By, (t) = Taken By, (c) = Cosigned By      Initials Name Provider Type    AJ Daya Avila, OT Occupational Therapist                   Clinical Impression       Row Name 12/18/24 1514          Pain Assessment    Pretreatment Pain Rating 8/10  -     Posttreatment Pain Rating 6/10  -     Pain Location back;hip;other (see comments)  ribs  -     Pain Side/Orientation right  -     Pain Management Interventions exercise or physical activity utilized;positioning techniques utilized  -     Response to Pain Interventions activity participation with decreased pain;intervention effective per patient report  -       Row Name 12/18/24 6804          Plan of Care Review    Plan of Care Reviewed With patient  -     Progress no change  -     Outcome Evaluation OT eval complete. Pt presents below fxl baseline with ADLs and fxl mobility, limited by pain, impaired balance, and poor activity tolerance. Pt would benefit from ongoing skilled OT services to progress to PLOF. Recommend d/c to IRF at this time for best fxl outcomes.  -       Row Name 12/18/24 1512          Therapy Assessment/Plan (OT)    Patient/Family Therapy Goal Statement (OT) Return to PLOF  -     Rehab Potential (OT) good  -     Criteria for Skilled Therapeutic Interventions Met (OT) yes;meets criteria;skilled treatment is necessary  -     Therapy Frequency (OT) daily  -     Predicted Duration of Therapy Intervention (OT) 10 days  -       Row Name 12/18/24 5322          Therapy Plan Review/Discharge Plan (OT)    Anticipated Discharge Disposition (OT) inpatient rehabilitation facility  -       Row Name 12/18/24 6591          Vital Signs    Pre  Systolic BP Rehab 117  -AJ     Pre Treatment Diastolic BP 81  -AJ     Intra Systolic BP Rehab 151  -AJ     Intra Treatment Diastolic   -AJ     Post Systolic BP Rehab 141  -AJ     Post Treatment Diastolic BP 95  -AJ     O2 Delivery Pre Treatment room air  -AJ     O2 Delivery Intra Treatment room air  -AJ     O2 Delivery Post Treatment room air  -AJ     Pre Patient Position Supine  -AJ     Intra Patient Position Standing  -AJ     Post Patient Position Sitting  -AJ       Row Name 12/18/24 1514          Positioning and Restraints    Pre-Treatment Position in bed  -AJ     Post Treatment Position chair  -AJ     In Chair notified nsg;reclined;sitting;call light within reach;encouraged to call for assist;exit alarm on;legs elevated;waffle cushion  -AJ               User Key  (r) = Recorded By, (t) = Taken By, (c) = Cosigned By      Initials Name Provider Type    Daya Gill, OT Occupational Therapist                   Outcome Measures       Row Name 12/18/24 1520          How much help from another is currently needed...    Putting on and taking off regular lower body clothing? 2  -AJ     Bathing (including washing, rinsing, and drying) 2  -AJ     Toileting (which includes using toilet bed pan or urinal) 3  -AJ     Putting on and taking off regular upper body clothing 3  -AJ     Taking care of personal grooming (such as brushing teeth) 3  -AJ     Eating meals 4  -AJ     AM-PAC 6 Clicks Score (OT) 17  -AJ       Row Name 12/18/24 1457 12/18/24 0913       How much help from another person do you currently need...    Turning from your back to your side while in flat bed without using bedrails? 4  -KR 3  -MD    Moving from lying on back to sitting on the side of a flat bed without bedrails? 3  -KR 3  -MD    Moving to and from a bed to a chair (including a wheelchair)? 3  -KR 3  -MD    Standing up from a chair using your arms (e.g., wheelchair, bedside chair)? 3  -KR 3  -MD    Climbing 3-5 steps with a railing? 2   -KR 2  -MD    To walk in hospital room? 3  -KR 3  -MD    AM-PAC 6 Clicks Score (PT) 18  -KR 17  -MD    Highest Level of Mobility Goal 6 --> Walk 10 steps or more  -KR 5 --> Static standing  -MD      Row Name 12/18/24 0402          How much help from another person do you currently need...    Turning from your back to your side while in flat bed without using bedrails? 3  -YS     Moving from lying on back to sitting on the side of a flat bed without bedrails? 3  -YS     Moving to and from a bed to a chair (including a wheelchair)? 3  -YS     Standing up from a chair using your arms (e.g., wheelchair, bedside chair)? 3  -YS     Climbing 3-5 steps with a railing? 2  -YS     To walk in hospital room? 3  -YS     AM-PAC 6 Clicks Score (PT) 17  -YS     Highest Level of Mobility Goal 5 --> Static standing  -YS       Row Name 12/18/24 1520 12/18/24 1457       Functional Assessment    Outcome Measure Options AM-PAC 6 Clicks Daily Activity (OT)  -SANYA -PAC 6 Clicks Basic Mobility (PT)  -KR              User Key  (r) = Recorded By, (t) = Taken By, (c) = Cosigned By      Initials Name Provider Type    Carol Ventura, PT Physical Therapist    Shelby Roland, RN Registered Nurse    Daya Gill, OT Occupational Therapist    Kyle Nava RN Registered Nurse                    Occupational Therapy Education       Title: PT OT SLP Therapies (In Progress)       Topic: Occupational Therapy (In Progress)       Point: ADL training (Done)       Description:   Instruct learner(s) on proper safety adaptation and remediation techniques during self care or transfers.   Instruct in proper use of assistive devices.                  Learning Progress Summary            Patient Acceptance, E, VU,NR by SANYA at 12/18/2024 1521                      Point: Home exercise program (Not Started)       Description:   Instruct learner(s) on appropriate technique for monitoring, assisting and/or progressing therapeutic  exercises/activities.                  Learner Progress:  Not documented in this visit.              Point: Precautions (Done)       Description:   Instruct learner(s) on prescribed precautions during self-care and functional transfers.                  Learning Progress Summary            Patient Acceptance, E, VU,NR by SANYA at 12/18/2024 1521                      Point: Body mechanics (Done)       Description:   Instruct learner(s) on proper positioning and spine alignment during self-care, functional mobility activities and/or exercises.                  Learning Progress Summary            Patient Acceptance, E, VU,NR by SANYA at 12/18/2024 1521                                      User Key       Initials Effective Dates Name Provider Type Discipline    SANYA 08/26/24 -  Daya Avila, OT Occupational Therapist OT                  OT Recommendation and Plan  Planned Therapy Interventions (OT): activity tolerance training, adaptive equipment training, BADL retraining, functional balance retraining, IADL retraining, occupation/activity based interventions, patient/caregiver education/training, ROM/therapeutic exercise, strengthening exercise, transfer/mobility retraining  Therapy Frequency (OT): daily  Plan of Care Review  Plan of Care Reviewed With: patient  Progress: no change  Outcome Evaluation: OT eval complete. Pt presents below fxl baseline with ADLs and fxl mobility, limited by pain, impaired balance, and poor activity tolerance. Pt would benefit from ongoing skilled OT services to progress to PLOF. Recommend d/c to IRF at this time for best fxl outcomes.     Time Calculation:   Evaluation Complexity (OT)  Review Occupational Profile/Medical/Therapy History Complexity: expanded/moderate complexity  Assessment, Occupational Performance/Identification of Deficit Complexity: 3-5 performance deficits  Clinical Decision Making Complexity (OT): detailed assessment/moderate complexity  Overall Complexity of Evaluation  (OT): moderate complexity     Time Calculation- OT       Row Name 12/18/24 1522             Time Calculation- OT    OT Start Time 1355  -AJ      OT Received On 12/18/24  -AJ      OT Goal Re-Cert Due Date 12/28/24  -AJ         Timed Charges    62546 - OT Self Care/Mgmt Minutes 8  -AJ         Untimed Charges    OT Eval/Re-eval Minutes 46  -AJ         Total Minutes    Timed Charges Total Minutes 8  -AJ      Untimed Charges Total Minutes 46  -AJ       Total Minutes 54  -AJ                User Key  (r) = Recorded By, (t) = Taken By, (c) = Cosigned By      Initials Name Provider Type    AJ Daya Avila OT Occupational Therapist                  Therapy Charges for Today       Code Description Service Date Service Provider Modifiers Qty    35113364017 HC OT SELF CARE/MGMT/TRAIN EA 15 MIN 12/18/2024 Daya Avila OT GO 1    68130898617 HC OT EVAL MOD COMPLEXITY 4 12/18/2024 Daya Avila OT GO 1                 Daya Avila OT  12/18/2024

## 2024-12-19 ENCOUNTER — APPOINTMENT (OUTPATIENT)
Dept: GENERAL RADIOLOGY | Facility: HOSPITAL | Age: 64
End: 2024-12-19
Payer: MEDICARE

## 2024-12-19 PROBLEM — N39.0 UTI (URINARY TRACT INFECTION): Status: ACTIVE | Noted: 2024-12-19

## 2024-12-19 LAB
BACTERIA SPEC AEROBE CULT: ABNORMAL
HCT VFR BLD AUTO: 31.8 % (ref 34–46.6)
HGB BLD-MCNC: 10.7 G/DL (ref 12–15.9)

## 2024-12-19 PROCEDURE — 25010000002 MORPHINE PER 10 MG: Performed by: INTERNAL MEDICINE

## 2024-12-19 PROCEDURE — 25010000002 CEFTRIAXONE PER 250 MG: Performed by: NURSE PRACTITIONER

## 2024-12-19 PROCEDURE — 99232 SBSQ HOSP IP/OBS MODERATE 35: CPT | Performed by: NURSE PRACTITIONER

## 2024-12-19 PROCEDURE — 97110 THERAPEUTIC EXERCISES: CPT

## 2024-12-19 PROCEDURE — 85014 HEMATOCRIT: CPT | Performed by: NURSE PRACTITIONER

## 2024-12-19 PROCEDURE — 74018 RADEX ABDOMEN 1 VIEW: CPT

## 2024-12-19 PROCEDURE — 97116 GAIT TRAINING THERAPY: CPT

## 2024-12-19 PROCEDURE — 85018 HEMOGLOBIN: CPT | Performed by: NURSE PRACTITIONER

## 2024-12-19 RX ADMIN — BUPROPION HYDROCHLORIDE 150 MG: 150 TABLET, EXTENDED RELEASE ORAL at 08:09

## 2024-12-19 RX ADMIN — LUBIPROSTONE 8 MCG: 8 CAPSULE, GELATIN COATED ORAL at 08:09

## 2024-12-19 RX ADMIN — Medication 10 ML: at 22:14

## 2024-12-19 RX ADMIN — SODIUM CHLORIDE 1000 MG: 900 INJECTION INTRAVENOUS at 11:09

## 2024-12-19 RX ADMIN — MORPHINE SULFATE 2 MG: 2 INJECTION, SOLUTION INTRAMUSCULAR; INTRAVENOUS at 11:09

## 2024-12-19 RX ADMIN — BUSPIRONE HYDROCHLORIDE 5 MG: 5 TABLET ORAL at 19:51

## 2024-12-19 RX ADMIN — LUBIPROSTONE 8 MCG: 8 CAPSULE, GELATIN COATED ORAL at 18:05

## 2024-12-19 RX ADMIN — OXYCODONE HYDROCHLORIDE 10 MG: 10 TABLET ORAL at 19:51

## 2024-12-19 RX ADMIN — OXYCODONE HYDROCHLORIDE 10 MG: 10 TABLET ORAL at 08:09

## 2024-12-19 RX ADMIN — MORPHINE SULFATE 2 MG: 2 INJECTION, SOLUTION INTRAMUSCULAR; INTRAVENOUS at 15:50

## 2024-12-19 RX ADMIN — BUSPIRONE HYDROCHLORIDE 5 MG: 5 TABLET ORAL at 08:10

## 2024-12-19 RX ADMIN — OXYCODONE HYDROCHLORIDE 10 MG: 10 TABLET ORAL at 03:50

## 2024-12-19 RX ADMIN — LIDOCAINE 1 PATCH: 4 PATCH TOPICAL at 08:10

## 2024-12-19 RX ADMIN — MORPHINE SULFATE 2 MG: 2 INJECTION, SOLUTION INTRAMUSCULAR; INTRAVENOUS at 06:51

## 2024-12-19 RX ADMIN — MORPHINE SULFATE 2 MG: 2 INJECTION, SOLUTION INTRAMUSCULAR; INTRAVENOUS at 21:13

## 2024-12-19 RX ADMIN — OXYCODONE HYDROCHLORIDE 10 MG: 10 TABLET ORAL at 14:20

## 2024-12-19 RX ADMIN — Medication 10 ML: at 08:10

## 2024-12-19 RX ADMIN — BUSPIRONE HYDROCHLORIDE 5 MG: 5 TABLET ORAL at 16:48

## 2024-12-19 RX ADMIN — NALOXEGOL OXALATE 25 MG: 25 TABLET, FILM COATED ORAL at 08:10

## 2024-12-19 NOTE — THERAPY TREATMENT NOTE
Patient Name: Brittani Lambert  : 1960    MRN: [id number]                              Today's Date: 2024       Admit Date: 2024    Visit Dx:     ICD-10-CM ICD-9-CM   1. Fall, initial encounter  W19.XXXA E888.9   2. Injury of head, initial encounter  S09.90XA 959.01   3. Post-traumatic subdural hygroma  G96.08 852.20   4. Strain of neck muscle, initial encounter  S16.1XXA 847.0   5. Multiple abrasions  T07.XXXA 919.0   6. Contusion of nose, initial encounter  S00.33XA 920   7. Contusion of left knee, initial encounter  S80.02XA 924.11   8. Cancer of right breast metastatic to brain  C50.911 174.9    C79.31 198.3   9. History of bilateral mastectomy  Z90.13 V45.71   10. Acute UTI  N39.0 599.0   11. Chronic pain syndrome  G89.4 338.4     Patient Active Problem List   Diagnosis    Mixed hyperlipidemia    Moderate episode of recurrent major depressive disorder    Anxiety    Functional diarrhea    Breast CA    Atrial fibrillation    Invasive ductal carcinoma of left breast    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    Left-sided weakness    THALIA (generalized anxiety disorder)    Acute deep vein thrombosis (DVT) of left upper extremity    Persistent atrial fibrillation    Lactic acidosis    T12 compression fracture    Falls    History of atrial fibrillation    History of pulmonary embolism    Bilateral leg weakness    Tobacco abuse    Alcohol abuse    Generalized weakness    Failure to thrive in adult    Depression    Subdural hygroma    Severe protein-calorie malnutrition     Past Medical History:   Diagnosis Date    Breast CA 10/4/2018    Depression     DJD (degenerative joint disease) of cervical spine     THALIA (generalized anxiety disorder)     Heart murmur     Ovarian cancer     Rt Cellulitis of chest wall 10/15/2018    Tobacco dependence     UTI (urinary tract infection) 2020     Past Surgical History:   Procedure Laterality Date    APPENDECTOMY      BREAST BIOPSY Right     DONE IN  [place]    COLONOSCOPY  06/2018    HYSTERECTOMY  1989    MASTECTOMY W/ SENTINEL NODE BIOPSY Bilateral 10/4/2018    Procedure: LEFT SKIN SPARING BREAST MASTECTOMY WITH LEFT SENTINEL NODE BIOPSY, RIGHT PROPHYLACTIC SKIN SPARING MASTECTOMY;  Surgeon: Koffi Worthington MD;  Location: [facility];  Service: General    OOPHORECTOMY  1989      General Information       Row Name 12/19/24 1142          Physical Therapy Time and Intention    Document Type therapy note (daily note)  -SS     Mode of Treatment physical therapy  -SS       Row Name 12/19/24 1142          General Information    Patient Profile Reviewed yes  -SS     Existing Precautions/Restrictions fall;other (see comments)  kadie, history of frequent falls  -SS     Barriers to Rehab medically complex;previous functional deficit  -SS       Row Name 12/19/24 1142          Cognition    Orientation Status (Cognition) oriented x 4  -SS       Row Name 12/19/24 1142          Safety Issues/Impairments Affecting Functional Mobility    Safety Issues Affecting Function (Mobility) awareness of need for assistance;insight into deficits/self-awareness;positioning of assistive device;safety precaution awareness;safety precautions follow-through/compliance;sequencing abilities  -SS     Impairments Affecting Function (Mobility) balance;endurance/activity tolerance;pain;strength;postural/trunk control;visual/perceptual  -               User Key  (r) = Recorded By, (t) = Taken By, (c) = Cosigned By      Initials Name Provider Type     Gloria Powers PT Physical Therapist                   Mobility       Row Name 12/19/24 1144          Bed Mobility    Bed Mobility scooting/bridging;supine-sit  -SS     Scooting/Bridging [place] (Bed Mobility) verbal cues;standby assist  -     Supine-Sit [place] (Bed Mobility) standby assist;verbal cues  -     Assistive Device (Bed Mobility) bed rails;head of bed elevated  -     Comment, (Bed Mobility) VC for sequencing;  increased time/effort required  -       Row Name 12/19/24 1144          Sit-Stand Transfer    Sit-Stand [place] (Transfers) contact guard;verbal cues  -     Assistive Device (Sit-Stand Transfers) walker, front-wheeled  -     Comment, (Sit-Stand Transfer) VC for hand placement, appropriate alignment, lowering w/eccentric control  -       Row Name 12/19/24 1144          Gait/Stairs (Locomotion)    [place] Level (Gait) minimum assist (75% patient effort);verbal cues  -     Assistive Device (Gait) walker, front-wheeled  -SS     Patient was able to Ambulate yes  -     Distance in Feet (Gait) 50  x6  -     Deviations/Abnormal Patterns (Gait) marquita decreased;gait speed decreased;bilateral deviations;base of support, narrow;stride length decreased;weight shifting decreased  -     Bilateral Gait Deviations heel strike decreased  -     Comment, (Gait/Stairs) Pt. ambulated with a step through gait pattern w/improved stability w/use of FWW. Challenged pt. to different dual tasking activities for 50' increments to address balance deficits. No LOB noted but some lingering instability.  -               User Key  (r) = Recorded By, (t) = Taken By, (c) = Cosigned By      Initials Name Provider Type     Gloria Powers, CHRIS Physical Therapist                   Obj/Interventions       Row Name 12/19/24 1154          Motor Skills    Therapeutic Exercise hip;knee;ankle  -       Row Name 12/19/24 1154          Hip (Therapeutic Exercise)    Hip (Therapeutic Exercise) isometric exercises;strengthening exercise  -     Hip Isometrics (Therapeutic Exercise) bilateral;gluteal sets;10 repetitions  -     Hip Strengthening (Therapeutic Exercise) bilateral;aBduction;aDduction;heel slides;marching while seated;10 repetitions  -       Row Name 12/19/24 1154          Knee (Therapeutic Exercise)    Knee (Therapeutic Exercise) isometric exercises;strengthening exercise  -     Knee Isometrics (Therapeutic  Exercise) bilateral;quad sets;10 repetitions  -     Knee Strengthening (Therapeutic Exercise) bilateral;SLR (straight leg raise);LAQ (long arc quad);10 repetitions  -       Row Name 12/19/24 1154          Ankle (Therapeutic Exercise)    Ankle (Therapeutic Exercise) AROM (active range of motion)  -     Ankle AROM (Therapeutic Exercise) bilateral;dorsiflexion;plantarflexion;10 repetitions  -       Row Name 12/19/24 1154          Balance    Balance Assessment sitting dynamic balance;sitting static balance;standing static balance;standing dynamic balance  -     Static Sitting Balance standby assist  -     Dynamic Sitting Balance standby assist  -     Position, Sitting Balance unsupported;sitting edge of bed  -     Static Standing Balance minimal assist  -     Dynamic Standing Balance minimal assist  -     Position/Device Used, Standing Balance supported;walker, front-wheeled  -     Balance Interventions sitting;standing;sit to stand;supported;static;dynamic  -               User Key  (r) = Recorded By, (t) = Taken By, (c) = Cosigned By      Initials Name Provider Type     Gloria Powers PT Physical Therapist                   Goals/Plan    No documentation.                  Clinical Impression       Row Name 12/19/24 1158          Pain    Pretreatment Pain Rating 8/10  -     Posttreatment Pain Rating 8/10  -SS     Pain Location abdomen;other (see comments)  ribs  -     Pain Management Interventions activity modification encouraged;breathing exercises utilized;complementary health approaches promoted;diversional activity provided;movement retraining implemented  -     Response to Pain Interventions activity level improved;functional ability improved;mobility function improved;movement patterns improved;nonverbal indicators decreased;activity participation with tolerable pain  -       Row Name 12/19/24 2214          Plan of Care Review    Plan of Care Reviewed With patient  -SS      Progress improving  -     Outcome Evaluation Pt. continues to present below baseline function w/generalized weakness, instability and decreased functional endurance affecting her ability to safely participate in functional mobility. She performed bed mobility, transfers and ambulated w/front wheeled walker, min assist. Ambulation focused on dual tasking to challenge her stability. She tolerated ther-ex well. Continue acute PT POC to progress as tolerated.  -       Row Name 12/19/24 1155          Therapy Assessment/Plan (PT)    Rehab Potential (PT) good  -     Criteria for Skilled Interventions Met (PT) yes;meets criteria;skilled treatment is necessary  -     Therapy Frequency (PT) daily  -       Row Name 12/19/24 1155          Vital Signs    Pre Systolic BP Rehab 125  -SS     Pre Treatment Diastolic BP 69  -SS     Post Systolic BP Rehab 139  -SS     Post Treatment Diastolic BP 71  -SS     Pretreatment Heart Rate (beats/min) 67  -SS     Posttreatment Heart Rate (beats/min) 64  -SS     Pre SpO2 (%) 93  -SS     O2 Delivery Pre Treatment room air  -SS     Post SpO2 (%) 97  -SS     O2 Delivery Post Treatment room air  -SS     Pre Patient Position Supine  -SS     Post Patient Position Sitting  -       Row Name 12/19/24 1155          Positioning and Restraints    Pre-Treatment Position in bed  -SS     Post Treatment Position chair  -SS     In Chair notified nsg;reclined;call light within reach;encouraged to call for assist;exit alarm on;waffle cushion;legs elevated  -               User Key  (r) = Recorded By, (t) = Taken By, (c) = Cosigned By      Initials Name Provider Type     Gloria Powers, PT Physical Therapist                   Outcome Measures       Row Name 12/19/24 1151          How much help from another person do you currently need...    Turning from your back to your side while in flat bed without using bedrails? 4  -SS     Moving from lying on back to sitting on the side of a flat bed without  bedrails? 3  -SS     Moving to and from a bed to a chair (including a wheelchair)? 3  -SS     Standing up from a chair using your arms (e.g., wheelchair, bedside chair)? 3  -SS     Climbing 3-5 steps with a railing? 2  -SS     To walk in hospital room? 3  -SS     AM-PAC 6 Clicks Score (PT) 18  -SS     Highest Level of Mobility Goal 6 --> Walk 10 steps or more  -       Row Name 12/19/24 1158          Functional Assessment    Outcome Measure Options AM-PAC 6 Clicks Basic Mobility (PT)  -               User Key  (r) = Recorded By, (t) = Taken By, (c) = Cosigned By      Initials Name Provider Type    Gloria Sarabia, PT Physical Therapist                                 Physical Therapy Education       Title: PT OT SLP Therapies (In Progress)       Topic: Physical Therapy (Done)       Point: Mobility training (Done)       Learning Progress Summary            Patient Eager, E, VU,DU,NR by  at 12/19/2024 1158    Comment: Reviewed safety/technique w/bed mobility, transfers, ambulation, HEP, PT POC    Acceptance, E, VU by KR at 12/18/2024 1458                      Point: Home exercise program (Done)       Learning Progress Summary            Patient Eager, E, VU,DU,NR by  at 12/19/2024 1158    Comment: Reviewed safety/technique w/bed mobility, transfers, ambulation, HEP, PT POC                      Point: Body mechanics (Done)       Learning Progress Summary            Patient Eager, E, VU,DU,NR by  at 12/19/2024 1158    Comment: Reviewed safety/technique w/bed mobility, transfers, ambulation, HEP, PT POC    Acceptance, E, VU by KR at 12/18/2024 1458                      Point: Precautions (Done)       Learning Progress Summary            Patient Eager, E, VU,DU,NR by  at 12/19/2024 1158    Comment: Reviewed safety/technique w/bed mobility, transfers, ambulation, HEP, PT POC    Acceptance, E, VU by KR at 12/18/2024 1458                                      User Key       Initials Effective Dates Name Provider  Type Discipline    SS 06/01/21 -  Gloria Powers, PT Physical Therapist PT    KR 12/30/22 -  Carol Guerra PT Physical Therapist PT                  PT Recommendation and Plan     Progress: improving  Outcome Evaluation: Pt. continues to present below baseline function w/generalized weakness, instability and decreased functional endurance affecting her ability to safely participate in functional mobility. She performed bed mobility, transfers and ambulated w/front wheeled walker, min assist. Ambulation focused on dual tasking to challenge her stability. She tolerated ther-ex well. Continue acute PT POC to progress as tolerated.     Time Calculation:         PT Charges       Row Name 12/19/24 1159             Time Calculation    Start Time 1005  -SS      PT Received On 12/19/24  -SS         Timed Charges    86114 - PT Therapeutic Exercise Minutes 10  -SS      03872 - Gait Training Minutes  8  -SS      07193 - PT Therapeutic Activity Minutes 5  -SS         Total Minutes    Timed Charges Total Minutes 23  -SS       Total Minutes 23  -SS                User Key  (r) = Recorded By, (t) = Taken By, (c) = Cosigned By      Initials Name Provider Type    SS Gloria Powers, PT Physical Therapist                  Therapy Charges for Today       Code Description Service Date Service Provider Modifiers Qty    88333545851 HC PT THER PROC EA 15 MIN 12/19/2024 Gloria Powers, PT GP 1    97253324479 HC GAIT TRAINING EA 15 MIN 12/19/2024 Gloria Powers, PT GP 1            PT G-Codes  Outcome Measure Options: AM-PAC 6 Clicks Basic Mobility (PT)  AM-PAC 6 Clicks Score (PT): 18  AM-PAC 6 Clicks Score (OT): 17  PT Discharge Summary  Anticipated Discharge Disposition (PT): inpatient rehabilitation facility    Gloria Powers PT  12/19/2024

## 2024-12-19 NOTE — CASE MANAGEMENT/SOCIAL WORK
Continued Stay Note   [place]     Patient Name: Brittani Lambert  MRN: [id number]  Today's Date: 12/19/2024    Admit Date: 12/16/2024    Plan: [facility]   Discharge Plan       Row Name 12/19/24 1309       Plan    Plan [facility]    Patient/Family in Agreement with Plan yes    Plan Comments CM spoke with patient at bedside regarding DC planning. Discussed therapy recommendations with patient: Rehab. Patient agreeable to rehab and would like a referral to [facility]. Referral given to sonya Jones with [facility] for review. Patient's insurance, [employer], will require a prior authorization for this rehab request once accepted by the facility. CM continuing to follow.    1545: Rec'd call from sonya Jones. She has a private bed available at [facility] - patient declined. No bed availability at [facility], however, may have one tomorrow or over weekend. CM will follow up with sonya on Friday, 12/20 & if no bed available at [facility] then we will obtain a second choice facility. Explained this in detail to patient that we are unable to wait for a bed to open up at [facility] and that she would have to make a decision regarding a second facility choice. Patient very tearful. She verbalized an understanding. CM following.       Final Discharge Disposition Code 03 - skilled nursing facility (SNF)                   Discharge Codes    No documentation.                 Expected Discharge Date and Time       Expected Discharge Date Expected Discharge Time    Dec 23, 2024               Emerald Davis RN

## 2024-12-19 NOTE — PAYOR COMM NOTE
"Brittani Rivera (64 y.o. Female)       Date of Birth   1960    Social Security Number       Address   [address] [place] [address]    Home Phone   [phone]    MRN   [id number]       [facility]    Marital Status   Single                            Admission Date   24    Admission Type   Emergency    Admitting Provider   Antonio Torres DO    Attending Provider   Antonio Torres DO    Department, Room/Bed   [facility] 3G, S357/1       Discharge Date       Discharge Disposition       Discharge Destination                                 Attending Provider: Antonio Torres DO    Allergies: No Known Allergies    Isolation: None   Infection: None   Code Status: No CPR    Ht: 157.5 cm (62\")   Wt: 41.7 kg (92 lb)    Admission Cmt: None   Principal Problem: Subdural hygroma [G96.08]                   Active Insurance as of 2024       Primary Coverage       Payor Plan Insurance Group Employer/Plan Group    ANTHEM MEDICARE REPLACEMENT ANTHEM MED ADV HMO [id number]       Payor Plan Address Payor Plan Phone Number Payor Plan Fax Number Effective Dates    [address] [phone]  2024 - None Entered    [facility] [address]         Subscriber Name Subscriber Birth Date Member ID       BRITTANI RIVERA 1960 [id number]                     Emergency Contacts        (Rel.) Home Phone Work Phone Mobile Phone    ZIYAD CHAPARRO (Sister) [phone] -- [phone]                 History & Physical        Carlos Trammell MD at 24 [facility]              [facility] Medicine Services  HISTORY AND PHYSICAL    Patient Name: Brittani Rivera  : 1960  MRN: [id number]  Primary Care Physician: Alla Colon DO  Date of admission: 2024      Subjective  Subjective     Chief Complaint:  Fall, headache    HPI:  Brittani Rivera is a 64 y.o. female with PMH metastatic breast cancer to brain " s/p whole brain radiation 6 years ago following with oncology, afib not on anticoagulation who is presenting with multiple syncopal episodes, dizziness, fall. She reports over past few months recurrent abrupt LOC from standing without preceding symptoms. Happened yesterday in driveway, states she black out and woke up on ground, neighbors found her and called EMS. She reports headache currently in back of head, but no blurred vision, loss of strength, chest pain, shortness of air, nausea. Does have history of ongoing wt loss and poor oral intake. Lives alone.       Personal History     Past Medical History:   Diagnosis Date    Breast CA 10/4/2018    Depression     DJD (degenerative joint disease) of cervical spine     THALIA (generalized anxiety disorder)     Heart murmur     Ovarian cancer 1989    Rt Cellulitis of chest wall 10/15/2018    Tobacco dependence     UTI (urinary tract infection) 9/9/2020         Oncology Problem List:  Invasive ductal carcinoma of left breast (04/10/2019; Status: Active)  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 (04/10/2019; Status: Active)  Breast CA (10/04/2018; Status: Active)  Oncology/Hematology History   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   4/10/2019 Initial Diagnosis    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 (CMS/HCC)     4/11/2019 - 4/24/2019 Radiation    Radiation OncologyTreatment Course:  Brittani Lambert received 3000 cGy in 10 fractions to whole brain via External Beam Radiation - EBRT.       Past Surgical History:   Procedure Laterality Date    APPENDECTOMY      BREAST BIOPSY Right 2003    DONE IN [place]    COLONOSCOPY  06/2018    HYSTERECTOMY  1989    MASTECTOMY W/ SENTINEL NODE BIOPSY Bilateral 10/4/2018    Procedure: LEFT SKIN SPARING BREAST MASTECTOMY WITH LEFT SENTINEL NODE BIOPSY, RIGHT PROPHYLACTIC SKIN SPARING MASTECTOMY;  Surgeon: Koffi Worthington MD;  Location: [facility];  Service: General    OOPHORECTOMY  1989       Family History: family history  includes Arthritis in her mother; Celiac disease in an other family member; Heart attack in her mother; Liver disease in her father; Osteoporosis in her mother.     Social History:  reports that she has been smoking cigarettes. She has a 12.5 pack-year smoking history. She has never used smokeless tobacco. She reports that she does not currently use alcohol. She reports that she does not use drugs.  Social History     Social History Narrative    Support from sister and mother.  Enjoys time with nieces and great nieces.        Medications:  Available home medication information reviewed.  HYDROcodone-acetaminophen, aspirin, buPROPion XL, busPIRone, hydrOXYzine, sodium-potassium-magnesium sulfates, and traZODone    No Known Allergies    Objective  Objective     Vital Signs:   Temp:  [98.5 °F (36.9 °C)] 98.5 °F (36.9 °C)  Heart Rate:  [62-92] 66  Resp:  [15-20] 16  BP: (129-155)/(72-91) 153/79       Physical Exam   AAOX3  Comfortable  Scalp hematoma over occiput  EOMI, PERRL  Neck flexion intact  Facial expression intact  Abrasions over forehead and chin  Heart RRR  Lungs CTAB  Abd soft, nontender  No peripheral edema    Result Review:  I have personally reviewed the results from the time of this admission to 12/17/2024 05:07 EST and agree with these findings:  [x]  Laboratory list / accordion  []  Microbiology  [x]  Radiology  [x]  EKG/Telemetry   []  Cardiology/Vascular   []  Pathology  [x]  Old records  []  Other:  Most notable findings include: See A+P      LAB RESULTS:      Lab 12/16/24 1958 12/16/24 1953   WBC  --  7.86   HEMOGLOBIN  --  10.8*   HEMOGLOBIN, POC 5.4*  --    HEMATOCRIT  --  35.0   HEMATOCRIT POC 16*  --    PLATELETS  --  174   NEUTROS ABS  --  5.51   IMMATURE GRANS (ABS)  --  0.05   LYMPHS ABS  --  1.44   MONOS ABS  --  0.71   EOS ABS  --  0.07   MCV  --  104.5*         Lab 12/16/24 1958 12/16/24 1953   SODIUM  --  132*   POTASSIUM  --  3.9   CHLORIDE  --  96*   CO2  --  22.0   ANION GAP  --   14.0   BUN  --  12   CREATININE 0.70 0.67   EGFR 96.7 97.7   GLUCOSE  --  104*   CALCIUM  --  8.8   MAGNESIUM  --  1.8         Lab 12/16/24 1953   TOTAL PROTEIN 6.5   ALBUMIN 4.0   GLOBULIN 2.5   ALT (SGPT) 10   AST (SGOT) 25   BILIRUBIN 0.5   ALK PHOS 89         Lab 12/16/24  2130 12/16/24 1953   HSTROP T 7 7                 UA          10/22/2024    10:51 12/16/2024    21:24   Urinalysis   Squamous Epithelial Cells, UA 0-2  0-2    Specific Gravity, UA 1.007  1.017    Ketones, UA 40 mg/dL (2+)  Negative    Blood, UA Negative  Negative    Leukocytes, UA Trace  Trace    Nitrite, UA Negative  Negative    RBC, UA 0-2  0-2    WBC, UA 0-2  3-5    Bacteria, UA None Seen  4+        Microbiology Results (last 10 days)       ** No results found for the last 240 hours. **            CT Head Without Contrast    Result Date: 12/16/2024  CT HEAD WO CONTRAST, CT FACIAL BONES WO CONTRAST Date of Exam: 12/16/2024 9:38 PM EST Indication: fall with head injury. Comparison: Head and face CT 10/22/2024. Technique: Axial CT images were obtained of the head and face without contrast administration.  Automated exposure control and iterative construction methods were used. Findings: HEAD CT: New bilateral subdural hypoattenuating collections along the bilateral cerebral convexities measuring up to 5 mm on the right and 4 mm on the left. No foci of hyperattenuation to suggest acute intracranial hemorrhage. Intact appearing gray-white differentiation. No significant mass effect. No hydrocephalus. Mild generalized parenchymal volume loss. Scattered areas of periventricular and subcortical white matter hypoattenuation, nonspecific, perhaps from small vessel ischemic/hypertensive changes in a patient of this age.There are intracranial atherosclerotic calcifications. No acute or aggressive appearing bony or extracranial soft tissue process. FACE CT: No acute maxillofacial fracture. Facial and extracranial soft tissues appear unremarkable.  Globes and orbits appear unremarkable by CT. Mild scattered mucosal thickening of the paranasal sinuses. Mastoid air cells are essentially clear. Imaged aerodigestive tract demonstrates no significant abnormality. Evaluation of the oral cavity is degraded by dental hardware artifact. Imaged major salivary glands demonstrate no significant abnormality.     Impression: Impression: Compared to head CT from 10/22/2024, new hypoattenuating subdural collections along the bilateral cerebral convexities measuring up to 5 mm on the right and 4 mm on the left, suspicious for acute subdural hygromas in setting of trauma. No acute maxillofacial fracture. Electronically Signed: Simone Mcdowell  12/16/2024 10:11 PM EST  Workstation ID: [id number]    CT Facial Bones Without Contrast    Result Date: 12/16/2024  CT HEAD WO CONTRAST, CT FACIAL BONES WO CONTRAST Date of Exam: 12/16/2024 9:38 PM EST Indication: fall with head injury. Comparison: Head and face CT 10/22/2024. Technique: Axial CT images were obtained of the head and face without contrast administration.  Automated exposure control and iterative construction methods were used. Findings: HEAD CT: New bilateral subdural hypoattenuating collections along the bilateral cerebral convexities measuring up to 5 mm on the right and 4 mm on the left. No foci of hyperattenuation to suggest acute intracranial hemorrhage. Intact appearing gray-white differentiation. No significant mass effect. No hydrocephalus. Mild generalized parenchymal volume loss. Scattered areas of periventricular and subcortical white matter hypoattenuation, nonspecific, perhaps from small vessel ischemic/hypertensive changes in a patient of this age.There are intracranial atherosclerotic calcifications. No acute or aggressive appearing bony or extracranial soft tissue process. FACE CT: No acute maxillofacial fracture. Facial and extracranial soft tissues appear unremarkable. Globes and orbits appear unremarkable  by CT. Mild scattered mucosal thickening of the paranasal sinuses. Mastoid air cells are essentially clear. Imaged aerodigestive tract demonstrates no significant abnormality. Evaluation of the oral cavity is degraded by dental hardware artifact. Imaged major salivary glands demonstrate no significant abnormality.     Impression: Impression: Compared to head CT from 10/22/2024, new hypoattenuating subdural collections along the bilateral cerebral convexities measuring up to 5 mm on the right and 4 mm on the left, suspicious for acute subdural hygromas in setting of trauma. No acute maxillofacial fracture. Electronically Signed: Simone Mcdowell  12/16/2024 10:11 PM EST  Workstation ID: [id number]    CT Cervical Spine Without Contrast    Result Date: 12/16/2024  CT CERVICAL SPINE WO CONTRAST, CT CHEST W CONTRAST DIAGNOSTIC Date of Exam: 12/16/2024 9:38 PM EST Indication: neck pain s/p fall with head injury. Comparison: 2020 MRI Technique: Axial CT images were obtained of the cervical spine without contrast administration. Axial CT images were obtained of the chest after administration of intravenous contrast. Reconstructed coronal and sagittal images were also obtained. Automated exposure control and iterative construction methods were used. Findings: CT cervical spine: There is no evidence of acute fracture. The craniocervical junction is intact. There is mild atlantoaxial joint arthritis. There is trace grade 1 anterolisthesis of C4 on C5. Mild scattered endplate osteophyte formations with facet arthropathy. No evidence of severe canal narrowing. Varying multilevel bony neural foraminal narrowing. Paraspinal soft tissues show no acute abnormality. CT chest: There is no evidence of pulmonary embolism. Pulmonary arteries are normal in caliber. No right heart strain. No pericardial effusion. Coronary artery calcifications. Central airways are patent. No significant bronchial wall thickening or bronchiectasis. No focal  airspace consolidation, pleural effusion, or pneumothorax. Bibasilar atelectasis. There is no suspicious pulmonary nodule or mass. No mediastinal mass or threshold lymphadenopathy. Normal appearance of the thoracic esophagus. Chest wall soft tissues and included upper abdomen show no acute abnormality. No acute fracture or suspicious bone lesion.     Impression: Impression: 1.No acute fracture or malalignment of the cervical spine. 2.No acute intrathoracic abnormality. No evidence of rib fracture. Electronically Signed: Antonio Marsh MD  12/16/2024 10:09 PM EST  Workstation ID: [id number]    CT Chest With Contrast Diagnostic    Result Date: 12/16/2024  CT CERVICAL SPINE WO CONTRAST, CT CHEST W CONTRAST DIAGNOSTIC Date of Exam: 12/16/2024 9:38 PM EST Indication: neck pain s/p fall with head injury. Comparison: 2020 MRI Technique: Axial CT images were obtained of the cervical spine without contrast administration. Axial CT images were obtained of the chest after administration of intravenous contrast. Reconstructed coronal and sagittal images were also obtained. Automated exposure control and iterative construction methods were used. Findings: CT cervical spine: There is no evidence of acute fracture. The craniocervical junction is intact. There is mild atlantoaxial joint arthritis. There is trace grade 1 anterolisthesis of C4 on C5. Mild scattered endplate osteophyte formations with facet arthropathy. No evidence of severe canal narrowing. Varying multilevel bony neural foraminal narrowing. Paraspinal soft tissues show no acute abnormality. CT chest: There is no evidence of pulmonary embolism. Pulmonary arteries are normal in caliber. No right heart strain. No pericardial effusion. Coronary artery calcifications. Central airways are patent. No significant bronchial wall thickening or bronchiectasis. No focal airspace consolidation, pleural effusion, or pneumothorax. Bibasilar atelectasis. There is no suspicious  pulmonary nodule or mass. No mediastinal mass or threshold lymphadenopathy. Normal appearance of the thoracic esophagus. Chest wall soft tissues and included upper abdomen show no acute abnormality. No acute fracture or suspicious bone lesion.     Impression: Impression: 1.No acute fracture or malalignment of the cervical spine. 2.No acute intrathoracic abnormality. No evidence of rib fracture. Electronically Signed: Antonio Marsh MD  12/16/2024 10:09 PM EST  Workstation ID: [id number]    XR Knee 1 or 2 View Left    Result Date: 12/16/2024  XR KNEE 1 OR 2 VW LEFT Date of Exam: 12/16/2024 7:25 PM EST Indication: fall with left knee pain Comparison: None available. Findings: No acute fracture or malalignment. No significant joint effusion. No focal soft tissue abnormalities appreciated. Joint spaces appear grossly preserved without significant degenerative change.     Impression: Impression: No acute fracture or malalignment. Electronically Signed: Simone Mcdowell  12/16/2024 8:13 PM EST  Workstation ID: [id number]    XR Chest 1 View    Result Date: 12/16/2024  XR CHEST 1 VW Date of Exam: 12/16/2024 7:16 PM EST Indication: Weak/Dizzy/AMS triage protocol Comparison: Chest radiograph 10/22/2024 Findings: Mediastinum: Cardiac silhouette appears unchanged and normal in size Lungs: The lungs appear clear without focal consolidation appreciated. Pleura: No pleural effusion or pneumothorax. Bones and soft tissues: No acute, displaced fracture seen.     Impression: Impression: No radiographic evidence of acute cardiopulmonary disease. Electronically Signed: Simone Mcdowell  12/16/2024 8:11 PM EST  Workstation ID: [id number]     Results for orders placed during the hospital encounter of 09/24/20    Transthoracic Echo Complete With Contrast if Necessary Per Protocol    Interpretation Summary  · Left ventricular ejection fraction appears to be 61 - 65%. Left ventricular systolic function is normal.  · Left ventricular diastolic  function is consistent with (grade I) impaired relaxation.  · Mild tricuspid valve regurgitation is present.  · Estimated right ventricular systolic pressure from tricuspid regurgitation is normal (<35 mmHg). Calculated right ventricular systolic pressure from tricuspid regurgitation is 22 mmHg.      Assessment & Plan  Assessment & Plan       Subdural hematoma    Mixed hyperlipidemia    Breast CA    Atrial fibrillation    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    Generalized weakness    Fall  Recurrent syncope  Metastatic breast cancer to brain  BL subdural hygroma  -Mental status currently normal, multiple recurrent syncopal spells without inciting factor or warning, new BL hygromas on CT. NSGY reviewed, think may be chronic. Last CT for comparison 10/22/24. S/p whole brain radiation 6 years ago, follows with Dr Patel.  -Will obtain MRI brain  -Echo, orthostatics for syncope workup  -neurochecks  -Hold ASA  -NSGY to evaluate today    Afib  -not currently on anticoagulation    Asymptomatic bacteruria   -no further abx indicated at this time    Cachexia  -nutrition eval            VTE Prophylaxis:  Mechanical VTE prophylaxis orders are signed & held.            CODE STATUS:    Code Status and Medical Interventions: No CPR (Do Not Attempt to Resuscitate); Limited Support; No intubation (DNI)   Ordered at: 12/17/24 0440     Medical Intervention Limits:    No intubation (DNI)     Level Of Support Discussed With:    Patient     Code Status (Patient has no pulse and is not breathing):    No CPR (Do Not Attempt to Resuscitate)     Medical Interventions (Patient has pulse or is breathing):    Limited Support       Expected Discharge   Expected discharge date/ time has not been documented.     Carlos Trammell MD  12/17/24      Electronically signed by Carlos Trammell MD at 12/17/24 0559          Physician Progress Notes (all)        Tayla Levy, LONG at 12/19/24 0963              [facility] Medicine Services  PROGRESS NOTE    Patient Name: Brittani Lambert  : 1960  MRN: [id number]    Date of Admission: 2024  Primary Care Physician: Alla Colon DO    Subjective   Subjective     CC:  F/u hygromas     HPI:  Patient is resting in bed. She states she had abd pain yesterday. She states she has not felt like she was emptying her bladder when she voided.       Objective   Objective     Vital Signs:   Temp:  [97.8 °F (36.6 °C)-98.8 °F (37.1 °C)] 98 °F (36.7 °C)  Heart Rate:  [65-79] 65  Resp:  [16-18] 18  BP: (115-153)/(69-84) 125/69     Physical Exam:  Constitutional: No acute distress, awake, alert, thin frail female   HENT: NCAT, mucous membranes moist, abrasions/contusions   Respiratory: Clear to auscultation bilaterally, respiratory effort normal room air 95%  Cardiovascular: RRR, no murmurs, rubs, or gallops  Gastrointestinal: Positive bowel sounds, soft, generalized tenderness, nondistended  Musculoskeletal: No bilateral ankle edema  Psychiatric: Appropriate affect, cooperative  Neurologic: Oriented x 3, strength symmetric in all extremities,, speech clear  Skin: No rashes,pale       Results Reviewed:  LAB RESULTS:      Lab 24  0849 240 24   WBC  --   --   --  7.86   HEMOGLOBIN 10.7*  --   --  10.8*   HEMOGLOBIN, POC  --   --  5.4*  --    HEMATOCRIT 31.8*  --   --  35.0   HEMATOCRIT POC  --   --  16*  --    PLATELETS  --   --   --  174   NEUTROS ABS  --   --   --  5.51   IMMATURE GRANS (ABS)  --   --   --  0.05   LYMPHS ABS  --   --   --  1.44   MONOS ABS  --   --   --  0.71   EOS ABS  --   --   --  0.07   MCV  --   --   --  104.5*   HSTROP T  --  7  --  7         Lab 24  0441 24  1027 24   SODIUM 133*  --   --  132*   POTASSIUM 3.8  --   --  3.9   CHLORIDE 98  --   --  96*   CO2 23.0  --   --  22.0   ANION GAP 12.0  --   --  14.0   BUN 6*  --   --  12   CREATININE 0.50*  --  0.70 0.67   EGFR  104.9  --  96.7 97.7   GLUCOSE 84  --   --  104*   CALCIUM 8.5*  --   --  8.8   MAGNESIUM 1.8  --   --  1.8   PHOSPHORUS 3.4  --   --   --    TSH  --  1.550  --   --          Lab 12/16/24 1953   TOTAL PROTEIN 6.5   ALBUMIN 4.0   GLOBULIN 2.5   ALT (SGPT) 10   AST (SGOT) 25   BILIRUBIN 0.5   ALK PHOS 89         Lab 12/16/24 2130 12/16/24 1953   HSTROP T 7 7                 Brief Urine Lab Results  (Last result in the past 365 days)        Color   Clarity   Blood   Leuk Est   Nitrite   Protein   CREAT   Urine HCG        12/16/24 2124 Yellow   Cloudy   Negative   Trace   Negative   Trace                   Microbiology Results Abnormal       Procedure Component Value - Date/Time    Urine Culture - Urine, Urine, Catheter [[id number]]  (Abnormal)  (Susceptibility) Collected: 12/16/24 2124    Lab Status: Final result Specimen: Urine, Catheter Updated: 12/19/24 0914     Urine Culture >100,000 CFU/mL Escherichia coli    Narrative:      Colonization of the urinary tract without infection is common. Treatment is discouraged unless the patient is symptomatic, pregnant, or undergoing an invasive urologic procedure.    Susceptibility        Escherichia coli      MARS      Amoxicillin + Clavulanate Susceptible      Ampicillin Resistant      Ampicillin + Sulbactam Intermediate      Cefazolin (Urine) Susceptible      Cefepime Susceptible      Ceftazidime Susceptible      Ceftriaxone Susceptible      Gentamicin Susceptible      Levofloxacin Susceptible      Nitrofurantoin Susceptible      Piperacillin + Tazobactam Susceptible      Trimethoprim + Sulfamethoxazole Susceptible                                   XR Abdomen KUB    Result Date: 12/17/2024  XR ABDOMEN KUB Date of Exam: 12/17/2024 11:02 AM EST Indication: No BM >7 days Comparison: None available. Findings: Single supine view. There is no large or small bowel dilatation. There is a small amount of stool. There is moderate to severe distention of the urinary bladder with  contrast. There are no abnormal calcifications.     Impression: Impression: Moderate to severe bladder distention. Unremarkable bowel gas pattern. Electronically Signed: Julissa Blair MD  12/17/2024 11:38 AM EST  Workstation ID: [id number]     Results for orders placed during the hospital encounter of 09/24/20    Transthoracic Echo Complete With Contrast if Necessary Per Protocol    Interpretation Summary  · Left ventricular ejection fraction appears to be 61 - 65%. Left ventricular systolic function is normal.  · Left ventricular diastolic function is consistent with (grade I) impaired relaxation.  · Mild tricuspid valve regurgitation is present.  · Estimated right ventricular systolic pressure from tricuspid regurgitation is normal (<35 mmHg). Calculated right ventricular systolic pressure from tricuspid regurgitation is 22 mmHg.      Current medications:  Scheduled Meds:[Held by provider] aspirin, 81 mg, Oral, Daily  buPROPion XL, 150 mg, Oral, Daily  busPIRone, 5 mg, Oral, TID  cefTRIAXone, 1,000 mg, Intravenous, Q24H  Lidocaine, 1 patch, Transdermal, Q24H  lubiprostone, 8 mcg, Oral, BID With Meals  Naloxegol Oxalate, 25 mg, Oral, QAM  sodium chloride, 10 mL, Intravenous, Q12H      Continuous Infusions:   PRN Meds:.  acetaminophen **OR** acetaminophen **OR** acetaminophen    senna-docusate sodium **AND** polyethylene glycol **AND** bisacodyl **AND** bisacodyl    Calcium Replacement - Follow Nurse / BPA Driven Protocol    [Held by provider] hydrOXYzine    Magnesium Standard Dose Replacement - Follow Nurse / BPA Driven Protocol    Morphine    nicotine    oxyCODONE    Phosphorus Replacement - Follow Nurse / BPA Driven Protocol    Potassium Replacement - Follow Nurse / BPA Driven Protocol    [COMPLETED] Insert Peripheral IV **AND** sodium chloride    sodium chloride    sodium chloride    Assessment & Plan   Assessment & Plan     Active Hospital Problems    Diagnosis  POA    **Subdural hygroma [G96.08]  Yes    Severe  protein-calorie malnutrition [E43]  Yes    Generalized weakness [R53.1]  Yes    Atrial fibrillation [I48.91]  Yes    Mixed hyperlipidemia [E78.2]  Yes      Resolved Hospital Problems   No resolved problems to display.        Brief Hospital Course to date:  Brittani Lambert is a 64 y.o. female  w/ anxiety, depression, tobacco abuse, ovarian cancer 1989, metastatic breast cancer 2018 s/p whole brain radiation, who presented for evaluation of recurrent falls; she reports weight loss of approx 10lbs since the beginning of this year, anorexia for approx 1 mo, and repeatedly falling when her legs get weak and give out (no loss of consciousness); sometimes on standing her legs will feel so weak that they quiver and she has to sit back down; she had a fall onto pavement with head injury prompting visit to the ED; neuro imaging on arrival showed BL subdural hygromas    Plan was partially entered by my partner and I have reviewed and updated as appropriate on 12/19/24  Problems are new to me today.      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 deconditioning giver her poor PO intake and low body weight  -prn pain control po/iv  -NSGY evaluated, no acute intervention, f/u opt 2 weeks with repeat head CT  -PT/OT recommend rehab      Weight loss/failure to thrive  Cachexia/low body weight; severe malnutrition  Constipation  -reports ~10lb weight loss since beginning of 2024 (approx 10% of body weight)  -leg weakness and FTT noted to have been charted on patient's problem list since at least 4 years pta; she has been seen on an ambulatory basis multiple times this year for hip pain, falls, etc  -cont nutritional shake, RD consult      Hx metastatic breast cancer  -treated [id number] for breast Ca w/ concern for leptomeningeal carcinomatosis (CNS)  -s/p  mastectomy, adjuvant chemo, whole brain radiation  -recent opt MRI brain and CT C/A/P Sep-Oct this year for weight loss, no evidence for recurrent/active malignancy  -prev followed by Dr. Patel     UTI E coli   Urinary retention over 1L  constipation  - cont rocephin for 3 doses  -- KUB on  showed severe bladder distention  -- in and out cath today had 1.3L out   -- check PVR and in and out cath >500ml, if she continues to need in and out cath may need to place [place]   -- recheck KUB today due to abd pain and tenderness, showed mod stool   -- s/p amitiza and cont movantik, had large BM on       Afib - chronically not on AC  Anxiety/Depression - buproprion  Hx ovarian Ca 1989  Tobacco abuse - prn nrt    Expected Discharge Location and Transportation: rehab   Expected Discharge   Expected Discharge Date: 2024; Expected Discharge Time:      VTE Prophylaxis:  Mechanical VTE prophylaxis orders are present.         AM-PAC 6 Clicks Score (PT): 18 (24 1457)    CODE STATUS:   Code Status and Medical Interventions: No CPR (Do Not Attempt to Resuscitate); Limited Support; No intubation (DNI)   Ordered at: 24 0440     Medical Intervention Limits:    No intubation (DNI)     Level Of Support Discussed With:    Patient     Code Status (Patient has no pulse and is not breathing):    No CPR (Do Not Attempt to Resuscitate)     Medical Interventions (Patient has pulse or is breathing):    Limited Support       LONG Norman  24        Electronically signed by Tayla Levy APRN at 24 1105       Antonio Torres DO at 24 0831              [facility] Medicine Services  PROGRESS NOTE    Patient Name: Brittani Lambert  : 1960  MRN: [id number]    Date of Admission: 2024  Primary Care Physician: Alla Colon DO    Subjective   Subjective     CC:  F/u hygromas    HPI:  Hurting all over, face, body, etc. No other acute changes this  AM      Objective   Objective     Vital Signs:   Temp:  [97.6 °F (36.4 °C)-98.3 °F (36.8 °C)] 97.8 °F (36.6 °C)  Heart Rate:  [58-73] 73  Resp:  [16-18] 18  BP: (115-153)/(73-98) 115/73     Physical Exam:  Constitutional: Awake, alert, sitting up in bed in NAD  HENT: Multiple abrasions/contusions  Respiratory: Clear to auscultation bilaterally, respiratory effort normal   Cardiovascular: RRR, palpable radial pulse  Gastrointestinal: Positive bowel sounds, soft, nontender, nondistended  Psychiatric: Appropriate affect, cooperative  Neurologic: Speech clear and fluent, moving all extremities spontaneously    Results Reviewed:  LAB RESULTS:      Lab 12/16/24 2130 12/16/24 1958 12/16/24 1953   WBC  --   --  7.86   HEMOGLOBIN  --   --  10.8*   HEMOGLOBIN, POC  --  5.4*  --    HEMATOCRIT  --   --  35.0   HEMATOCRIT POC  --  16*  --    PLATELETS  --   --  174   NEUTROS ABS  --   --  5.51   IMMATURE GRANS (ABS)  --   --  0.05   LYMPHS ABS  --   --  1.44   MONOS ABS  --   --  0.71   EOS ABS  --   --  0.07   MCV  --   --  104.5*   HSTROP T 7  --  7         Lab 12/18/24  0441 12/17/24  1027 12/16/24 1958 12/16/24 1953   SODIUM 133*  --   --  132*   POTASSIUM 3.8  --   --  3.9   CHLORIDE 98  --   --  96*   CO2 23.0  --   --  22.0   ANION GAP 12.0  --   --  14.0   BUN 6*  --   --  12   CREATININE 0.50*  --  0.70 0.67   EGFR 104.9  --  96.7 97.7   GLUCOSE 84  --   --  104*   CALCIUM 8.5*  --   --  8.8   MAGNESIUM 1.8  --   --  1.8   PHOSPHORUS 3.4  --   --   --    TSH  --  1.550  --   --          Lab 12/16/24 1953   TOTAL PROTEIN 6.5   ALBUMIN 4.0   GLOBULIN 2.5   ALT (SGPT) 10   AST (SGOT) 25   BILIRUBIN 0.5   ALK PHOS 89         Lab 12/16/24 2130 12/16/24 1953   HSTROP T 7 7                 Brief Urine Lab Results  (Last result in the past 365 days)        Color   Clarity   Blood   Leuk Est   Nitrite   Protein   CREAT   Urine HCG        12/16/24 2124 Yellow   Cloudy   Negative   Trace   Negative   Trace                    Microbiology Results Abnormal       Procedure Component Value - Date/Time    Urine Culture - Urine, Urine, Catheter [[id number]]  (Abnormal) Collected: 12/16/24 2124    Lab Status: Preliminary result Specimen: Urine, Catheter Updated: 12/18/24 0956     Urine Culture >100,000 CFU/mL Gram Negative Bacilli    Narrative:      Colonization of the urinary tract without infection is common. Treatment is discouraged unless the patient is symptomatic, pregnant, or undergoing an invasive urologic procedure.            XR Abdomen KUB    Result Date: 12/17/2024  XR ABDOMEN KUB Date of Exam: 12/17/2024 11:02 AM EST Indication: No BM >7 days Comparison: None available. Findings: Single supine view. There is no large or small bowel dilatation. There is a small amount of stool. There is moderate to severe distention of the urinary bladder with contrast. There are no abnormal calcifications.     Impression: Impression: Moderate to severe bladder distention. Unremarkable bowel gas pattern. Electronically Signed: Julissa Blair MD  12/17/2024 11:38 AM EST  Workstation ID: [id number]    MRI Brain With & Without Contrast    Result Date: 12/17/2024  MRI BRAIN W WO CONTRAST Date of Exam: 12/17/2024 9:10 AM EST Indication: follow abnormal CT scan, BL subdural hygromas, suspect acute.  Comparison: Noncontrast head CT dated 12/16/2024, brain MRI dated 10/1/2024 Technique:  Routine multiplanar/multisequence sequence images of the brain were obtained before and after the uneventful administration of 8 mL Multihance. FINDINGS: There is mild T2/FLAIR signal hyperintensity within the bilateral periventricular white matter. There is focal gliosis within the right frontoparietal region and within the midline cerebellum, similar since prior studies. As noted on yesterday's head CT,  there are bilateral subdural hygromas measuring approximately 6 mm in thickness on the right and 5 mm in thickness on the left. These were hypodense on yesterday's  head CT. SWI imaging demonstrates numerous tiny foci of susceptibility artifact within the bilateral parieto-occipital regions and focal gyriform SWI hypointensity along the right centrum semiovale. Findings may be related to calcifications or chronic blood products. Midline structures appear unremarkable. No significant mass effect, midline shift, or hydrocephalus. No abnormal contrast enhancement is seen. Diffusion-weighted sequences demonstrate no acute infarct.The visualized intracranial flow-voids appear unremarkable. The paranasal sinuses and mastoid air cells show no fluid signal. The orbits, globes, retrobulbar soft tissues appear unremarkable. The visualized superficial soft tissues and cervical spine demonstrate no significant abnormality.     Impression: 1.Bilateral cerebral convexity subdural hygromas measuring 6 mm in thickness on the right and 5 mm in thickness on the left. These were hypodense on yesterday's head CT. These are new since brain MRI from 10/1/2024. 2.Mild T2/FLAIR signal hyperintensity within the bilateral periventricular white matter. There is focal gliosis within the right frontoparietal region and within the midline cerebellum, similar since prior studies. 3.SWI imaging demonstrates numerous tiny foci of susceptibility artifact within the bilateral parieto-occipital regions, more apparent and slightly more numerous when compared with 5/13/2022. There is unchanged focal gyriform SWI hypointensity along the right centrum semiovale. Findings may be related to calcifications or chronic blood products. 4.No diffusion restriction is identified to suggest acute infarct. 5.No suspicious contrast enhancement is identified. Electronically Signed: Jerrell Palomo MD  12/17/2024 10:12 AM EST  Workstation ID: [id number]    CT Head Without Contrast    Result Date: 12/16/2024  CT HEAD WO CONTRAST, CT FACIAL BONES WO CONTRAST Date of Exam: 12/16/2024 9:38 PM EST Indication: fall with head injury.  Comparison: Head and face CT 10/22/2024. Technique: Axial CT images were obtained of the head and face without contrast administration.  Automated exposure control and iterative construction methods were used. Findings: HEAD CT: New bilateral subdural hypoattenuating collections along the bilateral cerebral convexities measuring up to 5 mm on the right and 4 mm on the left. No foci of hyperattenuation to suggest acute intracranial hemorrhage. Intact appearing gray-white differentiation. No significant mass effect. No hydrocephalus. Mild generalized parenchymal volume loss. Scattered areas of periventricular and subcortical white matter hypoattenuation, nonspecific, perhaps from small vessel ischemic/hypertensive changes in a patient of this age.There are intracranial atherosclerotic calcifications. No acute or aggressive appearing bony or extracranial soft tissue process. FACE CT: No acute maxillofacial fracture. Facial and extracranial soft tissues appear unremarkable. Globes and orbits appear unremarkable by CT. Mild scattered mucosal thickening of the paranasal sinuses. Mastoid air cells are essentially clear. Imaged aerodigestive tract demonstrates no significant abnormality. Evaluation of the oral cavity is degraded by dental hardware artifact. Imaged major salivary glands demonstrate no significant abnormality.     Impression: Impression: Compared to head CT from 10/22/2024, new hypoattenuating subdural collections along the bilateral cerebral convexities measuring up to 5 mm on the right and 4 mm on the left, suspicious for acute subdural hygromas in setting of trauma. No acute maxillofacial fracture. Electronically Signed: Simone Mcdowell  12/16/2024 10:11 PM EST  Workstation ID: [id number]    CT Facial Bones Without Contrast    Result Date: 12/16/2024  CT HEAD WO CONTRAST, CT FACIAL BONES WO CONTRAST Date of Exam: 12/16/2024 9:38 PM EST Indication: fall with head injury. Comparison: Head and face CT  10/22/2024. Technique: Axial CT images were obtained of the head and face without contrast administration.  Automated exposure control and iterative construction methods were used. Findings: HEAD CT: New bilateral subdural hypoattenuating collections along the bilateral cerebral convexities measuring up to 5 mm on the right and 4 mm on the left. No foci of hyperattenuation to suggest acute intracranial hemorrhage. Intact appearing gray-white differentiation. No significant mass effect. No hydrocephalus. Mild generalized parenchymal volume loss. Scattered areas of periventricular and subcortical white matter hypoattenuation, nonspecific, perhaps from small vessel ischemic/hypertensive changes in a patient of this age.There are intracranial atherosclerotic calcifications. No acute or aggressive appearing bony or extracranial soft tissue process. FACE CT: No acute maxillofacial fracture. Facial and extracranial soft tissues appear unremarkable. Globes and orbits appear unremarkable by CT. Mild scattered mucosal thickening of the paranasal sinuses. Mastoid air cells are essentially clear. Imaged aerodigestive tract demonstrates no significant abnormality. Evaluation of the oral cavity is degraded by dental hardware artifact. Imaged major salivary glands demonstrate no significant abnormality.     Impression: Impression: Compared to head CT from 10/22/2024, new hypoattenuating subdural collections along the bilateral cerebral convexities measuring up to 5 mm on the right and 4 mm on the left, suspicious for acute subdural hygromas in setting of trauma. No acute maxillofacial fracture. Electronically Signed: Simone Mcdowell  12/16/2024 10:11 PM EST  Workstation ID: [id number]    CT Cervical Spine Without Contrast    Result Date: 12/16/2024  CT CERVICAL SPINE WO CONTRAST, CT CHEST W CONTRAST DIAGNOSTIC Date of Exam: 12/16/2024 9:38 PM EST Indication: neck pain s/p fall with head injury. Comparison: 2020 MRI Technique: Axial  CT images were obtained of the cervical spine without contrast administration. Axial CT images were obtained of the chest after administration of intravenous contrast. Reconstructed coronal and sagittal images were also obtained. Automated exposure control and iterative construction methods were used. Findings: CT cervical spine: There is no evidence of acute fracture. The craniocervical junction is intact. There is mild atlantoaxial joint arthritis. There is trace grade 1 anterolisthesis of C4 on C5. Mild scattered endplate osteophyte formations with facet arthropathy. No evidence of severe canal narrowing. Varying multilevel bony neural foraminal narrowing. Paraspinal soft tissues show no acute abnormality. CT chest: There is no evidence of pulmonary embolism. Pulmonary arteries are normal in caliber. No right heart strain. No pericardial effusion. Coronary artery calcifications. Central airways are patent. No significant bronchial wall thickening or bronchiectasis. No focal airspace consolidation, pleural effusion, or pneumothorax. Bibasilar atelectasis. There is no suspicious pulmonary nodule or mass. No mediastinal mass or threshold lymphadenopathy. Normal appearance of the thoracic esophagus. Chest wall soft tissues and included upper abdomen show no acute abnormality. No acute fracture or suspicious bone lesion.     Impression: Impression: 1.No acute fracture or malalignment of the cervical spine. 2.No acute intrathoracic abnormality. No evidence of rib fracture. Electronically Signed: Antonio Marsh MD  12/16/2024 10:09 PM EST  Workstation ID: [id number]    CT Chest With Contrast Diagnostic    Result Date: 12/16/2024  CT CERVICAL SPINE WO CONTRAST, CT CHEST W CONTRAST DIAGNOSTIC Date of Exam: 12/16/2024 9:38 PM EST Indication: neck pain s/p fall with head injury. Comparison: 2020 MRI Technique: Axial CT images were obtained of the cervical spine without contrast administration. Axial CT images were obtained of  the chest after administration of intravenous contrast. Reconstructed coronal and sagittal images were also obtained. Automated exposure control and iterative construction methods were used. Findings: CT cervical spine: There is no evidence of acute fracture. The craniocervical junction is intact. There is mild atlantoaxial joint arthritis. There is trace grade 1 anterolisthesis of C4 on C5. Mild scattered endplate osteophyte formations with facet arthropathy. No evidence of severe canal narrowing. Varying multilevel bony neural foraminal narrowing. Paraspinal soft tissues show no acute abnormality. CT chest: There is no evidence of pulmonary embolism. Pulmonary arteries are normal in caliber. No right heart strain. No pericardial effusion. Coronary artery calcifications. Central airways are patent. No significant bronchial wall thickening or bronchiectasis. No focal airspace consolidation, pleural effusion, or pneumothorax. Bibasilar atelectasis. There is no suspicious pulmonary nodule or mass. No mediastinal mass or threshold lymphadenopathy. Normal appearance of the thoracic esophagus. Chest wall soft tissues and included upper abdomen show no acute abnormality. No acute fracture or suspicious bone lesion.     Impression: Impression: 1.No acute fracture or malalignment of the cervical spine. 2.No acute intrathoracic abnormality. No evidence of rib fracture. Electronically Signed: Antonio Marsh MD  12/16/2024 10:09 PM EST  Workstation ID: [id number]    XR Knee 1 or 2 View Left    Result Date: 12/16/2024  XR KNEE 1 OR 2 VW LEFT Date of Exam: 12/16/2024 7:25 PM EST Indication: fall with left knee pain Comparison: None available. Findings: No acute fracture or malalignment. No significant joint effusion. No focal soft tissue abnormalities appreciated. Joint spaces appear grossly preserved without significant degenerative change.     Impression: Impression: No acute fracture or malalignment. Electronically Signed: Simone  DULCE Mcdowell  12/16/2024 8:13 PM EST  Workstation ID: [id number]    XR Chest 1 View    Result Date: 12/16/2024  XR CHEST 1 VW Date of Exam: 12/16/2024 7:16 PM EST Indication: Weak/Dizzy/AMS triage protocol Comparison: Chest radiograph 10/22/2024 Findings: Mediastinum: Cardiac silhouette appears unchanged and normal in size Lungs: The lungs appear clear without focal consolidation appreciated. Pleura: No pleural effusion or pneumothorax. Bones and soft tissues: No acute, displaced fracture seen.     Impression: Impression: No radiographic evidence of acute cardiopulmonary disease. Electronically Signed: Simone DULCE Mcdowell  12/16/2024 8:11 PM EST  Workstation ID: [id number]     Results for orders placed during the hospital encounter of 09/24/20    Transthoracic Echo Complete With Contrast if Necessary Per Protocol    Interpretation Summary  · Left ventricular ejection fraction appears to be 61 - 65%. Left ventricular systolic function is normal.  · Left ventricular diastolic function is consistent with (grade I) impaired relaxation.  · Mild tricuspid valve regurgitation is present.  · Estimated right ventricular systolic pressure from tricuspid regurgitation is normal (<35 mmHg). Calculated right ventricular systolic pressure from tricuspid regurgitation is 22 mmHg.      Current medications:  Scheduled Meds:[Held by provider] aspirin, 81 mg, Oral, Daily  buPROPion XL, 150 mg, Oral, Daily  busPIRone, 5 mg, Oral, TID  Lidocaine, 1 patch, Transdermal, Q24H  lubiprostone, 8 mcg, Oral, BID With Meals  Naloxegol Oxalate, 25 mg, Oral, QAM  sodium chloride, 10 mL, Intravenous, Q12H      Continuous Infusions:   PRN Meds:.  acetaminophen **OR** acetaminophen **OR** acetaminophen    senna-docusate sodium **AND** polyethylene glycol **AND** bisacodyl **AND** bisacodyl    Calcium Replacement - Follow Nurse / BPA Driven Protocol    [Held by provider] hydrOXYzine    Magnesium Standard Dose Replacement - Follow Nurse / BPA Driven Protocol     Morphine    nicotine    oxyCODONE    Phosphorus Replacement - Follow Nurse / BPA Driven Protocol    Potassium Replacement - Follow Nurse / BPA Driven Protocol    [COMPLETED] Insert Peripheral IV **AND** sodium chloride    sodium chloride    sodium chloride    Assessment & Plan   Assessment & Plan     Active Hospital Problems    Diagnosis  POA    **Subdural hygroma [G96.08]  Yes    Generalized weakness [R53.1]  Yes    Atrial fibrillation [I48.91]  Yes    Mixed hyperlipidemia [E78.2]  Yes      Resolved Hospital Problems   No resolved problems to display.        Brief Hospital Course to date:  Brittani Lambert is a 64 y.o. female w/ anxiety, depression, tobacco abuse, ovarian cancer 1989, metastatic breast cancer 2018 s/p whole brain radiation, who presented for evaluation of recurrent falls; she reports weight loss of approx 10lbs since the beginning of this year, anorexia for approx 1 mo, and repeatedly falling when her legs get weak and give out (no loss of consciousness); sometimes on standing her legs will feel so weak that they quiver and she has to sit back down; she had a fall onto pavement with head injury prompting visit to the ED; neuro imaging on arrival showed BL subdural hygromas     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 deconditioning giver her poor PO intake and low body weight  -prn pain control po/iv  -NSGY evaluated, no acute intervention, f/u opt 2 weeks with repeat head CT  -PT/OT     Weight loss/failure to thrive  Cachexia/low body weight; severe malnutrition  Constipation  -reports ~10lb weight loss since beginning of 2024 (approx 10% of body weight)  -leg weakness and FTT noted to have been charted on patient's problem list since at least 4 years pta; she has been seen on an ambulatory basis multiple times this year for  hip pain, falls, etc  -cont nutritional shake, RD consult pending     Hx metastatic breast cancer  -treated [id number] for breast Ca 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 Patel     Asymptomatic bacteruria - s/p dose IV CTX in the ED  Afib - chronically not on AC  Anxiety/Depression - buproprion  Hx ovarian Ca 1989  Tobacco abuse - prn nrt    Expected Discharge Location and Transportation: pending PT/OT  Expected Discharge   Expected Discharge Date: 12/20/2024; Expected Discharge Time:      VTE Prophylaxis:  Mechanical VTE prophylaxis orders are present.         AM-PAC 6 Clicks Score (PT): 18 (12/18/24 7937)    CODE STATUS:   Code Status and Medical Interventions: No CPR (Do Not Attempt to Resuscitate); Limited Support; No intubation (DNI)   Ordered at: 12/17/24 0440     Medical Intervention Limits:    No intubation (DNI)     Level Of Support Discussed With:    Patient     Code Status (Patient has no pulse and is not breathing):    No CPR (Do Not Attempt to Resuscitate)     Medical Interventions (Patient has pulse or is breathing):    Limited Support       Antonio Torres DO  12/18/24        Electronically signed by Antonio Torres DO at 12/18/24 1618       Antonio Torres DO at 12/17/24 0834                [facility] Medicine Services  ADMISSION FOLLOW-UP NOTE          Patient admitted after midnight, H&P by my partner performed earlier on today's date reviewed.  Interim findings, labs, and charting also reviewed.        The [facility] Hospital Problem List has been managed and updated to include any new diagnoses:  Active Hospital Problems    Diagnosis  POA    **Subdural hygroma [G96.08]  Yes    Generalized weakness [R53.1]  Yes    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  [C50.912, C79.31]  Yes    Atrial fibrillation [I48.91]  Yes    Breast CA [C50.919]  Yes    Mixed hyperlipidemia [E78.2]  Yes      Resolved Hospital Problems   No resolved problems to display.         ADDITIONAL PLAN:  - detailed assessment and plan from admission reviewed  - Patient admitted late this AM by Dr. Trammell, chart reviewed, pt briefly examined while boarding in the ED  - Summary: This is a 65 y/o female w/ anxiety, depression, tobacco abuse, ovarian cancer 1989, metastatic breast cancer 2018 s/p whole brain radiation, who presented for evaluation of recurrent falls; she reports weight loss of approx 10lbs since the beginning of this year, anorexia for approx 1 mo, and repeatedly falling when her legs get weak and give out (no loss of consciousness); sometimes on standing her legs will feel so weak that they quiver and she has to sit back down; she had a fall onto pavement with head injury prompting visit to the ED; neuro imaging on arrival showed BL subdural hygromas    Assessment/Plan    Recurrent falls  BL subdural hygromas  -CT facial bones w/o fracture/dislocation  -CT/MRI brain w/ BL subdural hygromas  -pt denies LOC w/ falls, her legs just get weak and give out; strongly suspect deconditioning giver her poor PO intake and low body weight  -prn pain control po/iv  -PT/OT  -NSGY consulted by admitting team, formal note pending    Weight loss/failure to thrive  Cachexia/low body weight  Constipation  -reports ~10lb weight loss since beginning of 2024 (approx 10% of body weight)  -check pre-albumin; add nutritional shake, RD consult  -check TSH  -states no BM >7 days pta; prn bowel reg, add amitiza; if no BM anticipate movatnik +/- enema, check KUB this AM  -leg weakness and FTT noted to have been charted on patient's problem list since at least 4 years pta; she has been seen on an ambulatory basis multiple times this year for hip pain, falls, etc    Hx metastatic breast cancer  -treated [id number] for  breast Ca 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 Patel    Asymptomatic bacteruria - s/p dose IV CTX in the ED  Afib - chronically not on AC  Anxiety/Depression - buproprion  Hx ovarian Ca 1989  Tobacco abuse - prn nrt      Antonio Torres DO  12/17/24        Electronically signed by Antonio Torres DO at 12/17/24 9987

## 2024-12-19 NOTE — PROGRESS NOTES
[facility] Medicine Services  PROGRESS NOTE    Patient Name: Brittani Lambert  : 1960  MRN: [id number]    Date of Admission: 2024  Primary Care Physician: Alla Colon DO    Subjective   Subjective     CC:  F/u hygromas     HPI:  Patient is resting in bed. She states she had abd pain yesterday. She states she has not felt like she was emptying her bladder when she voided.       Objective   Objective     Vital Signs:   Temp:  [97.8 °F (36.6 °C)-98.8 °F (37.1 °C)] 98 °F (36.7 °C)  Heart Rate:  [65-79] 65  Resp:  [16-18] 18  BP: (115-153)/(69-84) 125/69     Physical Exam:  Constitutional: No acute distress, awake, alert, thin frail female   HENT: NCAT, mucous membranes moist, abrasions/contusions   Respiratory: Clear to auscultation bilaterally, respiratory effort normal room air 95%  Cardiovascular: RRR, no murmurs, rubs, or gallops  Gastrointestinal: Positive bowel sounds, soft, generalized tenderness, nondistended  Musculoskeletal: No bilateral ankle edema  Psychiatric: Appropriate affect, cooperative  Neurologic: Oriented x 3, strength symmetric in all extremities,, speech clear  Skin: No rashes,pale       Results Reviewed:  LAB RESULTS:      Lab 24  0849 240 24   WBC  --   --   --  7.86   HEMOGLOBIN 10.7*  --   --  10.8*   HEMOGLOBIN, POC  --   --  5.4*  --    HEMATOCRIT 31.8*  --   --  35.0   HEMATOCRIT POC  --   --  16*  --    PLATELETS  --   --   --  174   NEUTROS ABS  --   --   --  5.51   IMMATURE GRANS (ABS)  --   --   --  0.05   LYMPHS ABS  --   --   --  1.44   MONOS ABS  --   --   --  0.71   EOS ABS  --   --   --  0.07   MCV  --   --   --  104.5*   HSTROP T  --  7  --  7         Lab 24  0441 24  1027 24   SODIUM 133*  --   --  132*   POTASSIUM 3.8  --   --  3.9   CHLORIDE 98  --   --  96*   CO2 23.0  --   --  22.0   ANION GAP 12.0  --   --  14.0   BUN 6*  --   --  12   CREATININE  0.50*  --  0.70 0.67   EGFR 104.9  --  96.7 97.7   GLUCOSE 84  --   --  104*   CALCIUM 8.5*  --   --  8.8   MAGNESIUM 1.8  --   --  1.8   PHOSPHORUS 3.4  --   --   --    TSH  --  1.550  --   --          Lab 12/16/24 1953   TOTAL PROTEIN 6.5   ALBUMIN 4.0   GLOBULIN 2.5   ALT (SGPT) 10   AST (SGOT) 25   BILIRUBIN 0.5   ALK PHOS 89         Lab 12/16/24 2130 12/16/24 1953   HSTROP T 7 7                 Brief Urine Lab Results  (Last result in the past 365 days)        Color   Clarity   Blood   Leuk Est   Nitrite   Protein   CREAT   Urine HCG        12/16/24 2124 Yellow   Cloudy   Negative   Trace   Negative   Trace                   Microbiology Results Abnormal       Procedure Component Value - Date/Time    Urine Culture - Urine, Urine, Catheter [[id number]]  (Abnormal)  (Susceptibility) Collected: 12/16/24 2124    Lab Status: Final result Specimen: Urine, Catheter Updated: 12/19/24 0914     Urine Culture >100,000 CFU/mL Escherichia coli    Narrative:      Colonization of the urinary tract without infection is common. Treatment is discouraged unless the patient is symptomatic, pregnant, or undergoing an invasive urologic procedure.    Susceptibility        Escherichia coli      MARS      Amoxicillin + Clavulanate Susceptible      Ampicillin Resistant      Ampicillin + Sulbactam Intermediate      Cefazolin (Urine) Susceptible      Cefepime Susceptible      Ceftazidime Susceptible      Ceftriaxone Susceptible      Gentamicin Susceptible      Levofloxacin Susceptible      Nitrofurantoin Susceptible      Piperacillin + Tazobactam Susceptible      Trimethoprim + Sulfamethoxazole Susceptible                                   XR Abdomen KUB    Result Date: 12/17/2024  XR ABDOMEN KUB Date of Exam: 12/17/2024 11:02 AM EST Indication: No BM >7 days Comparison: None available. Findings: Single supine view. There is no large or small bowel dilatation. There is a small amount of stool. There is moderate to severe distention of  the urinary bladder with contrast. There are no abnormal calcifications.     Impression: Impression: Moderate to severe bladder distention. Unremarkable bowel gas pattern. Electronically Signed: Julissa Blair MD  12/17/2024 11:38 AM EST  Workstation ID: [id number]     Results for orders placed during the hospital encounter of 09/24/20    Transthoracic Echo Complete With Contrast if Necessary Per Protocol    Interpretation Summary  · Left ventricular ejection fraction appears to be 61 - 65%. Left ventricular systolic function is normal.  · Left ventricular diastolic function is consistent with (grade I) impaired relaxation.  · Mild tricuspid valve regurgitation is present.  · Estimated right ventricular systolic pressure from tricuspid regurgitation is normal (<35 mmHg). Calculated right ventricular systolic pressure from tricuspid regurgitation is 22 mmHg.      Current medications:  Scheduled Meds:[Held by provider] aspirin, 81 mg, Oral, Daily  buPROPion XL, 150 mg, Oral, Daily  busPIRone, 5 mg, Oral, TID  cefTRIAXone, 1,000 mg, Intravenous, Q24H  Lidocaine, 1 patch, Transdermal, Q24H  lubiprostone, 8 mcg, Oral, BID With Meals  Naloxegol Oxalate, 25 mg, Oral, QAM  sodium chloride, 10 mL, Intravenous, Q12H      Continuous Infusions:   PRN Meds:.  acetaminophen **OR** acetaminophen **OR** acetaminophen    senna-docusate sodium **AND** polyethylene glycol **AND** bisacodyl **AND** bisacodyl    Calcium Replacement - Follow Nurse / BPA Driven Protocol    [Held by provider] hydrOXYzine    Magnesium Standard Dose Replacement - Follow Nurse / BPA Driven Protocol    Morphine    nicotine    oxyCODONE    Phosphorus Replacement - Follow Nurse / BPA Driven Protocol    Potassium Replacement - Follow Nurse / BPA Driven Protocol    [COMPLETED] Insert Peripheral IV **AND** sodium chloride    sodium chloride    sodium chloride    Assessment & Plan   Assessment & Plan     Active Hospital Problems    Diagnosis  POA    **Subdural  hygroma [G96.08]  Yes    Severe protein-calorie malnutrition [E43]  Yes    Generalized weakness [R53.1]  Yes    Atrial fibrillation [I48.91]  Yes    Mixed hyperlipidemia [E78.2]  Yes      Resolved Hospital Problems   No resolved problems to display.        Brief Hospital Course to date:  Brittani Lambert is a 64 y.o. female  w/ anxiety, depression, tobacco abuse, ovarian cancer 1989, metastatic breast cancer 2018 s/p whole brain radiation, who presented for evaluation of recurrent falls; she reports weight loss of approx 10lbs since the beginning of this year, anorexia for approx 1 mo, and repeatedly falling when her legs get weak and give out (no loss of consciousness); sometimes on standing her legs will feel so weak that they quiver and she has to sit back down; she had a fall onto pavement with head injury prompting visit to the ED; neuro imaging on arrival showed BL subdural hygromas    Plan was partially entered by my partner and I have reviewed and updated as appropriate on 12/19/24  Problems are new to me today.      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 deconditioning giver her poor PO intake and low body weight  -prn pain control po/iv  -NSGY evaluated, no acute intervention, f/u opt 2 weeks with repeat head CT  -PT/OT recommend rehab      Weight loss/failure to thrive  Cachexia/low body weight; severe malnutrition  Constipation  -reports ~10lb weight loss since beginning of 2024 (approx 10% of body weight)  -leg weakness and FTT noted to have been charted on patient's problem list since at least 4 years pta; she has been seen on an ambulatory basis multiple times this year for hip pain, falls, etc  -cont nutritional shake, RD consult      Hx metastatic breast cancer  -treated [id number] for breast Ca w/ concern for leptomeningeal  carcinomatosis (CNS)  -s/p mastectomy, adjuvant chemo, whole brain radiation  -recent opt MRI brain and CT C/A/P Sep-Oct this year for weight loss, no evidence for recurrent/active malignancy  -prev followed by Dr. Patel     UTI E coli   Urinary retention over 1L  constipation  - cont rocephin for 3 doses  -- KUB on 12/17 showed severe bladder distention  -- in and out cath today had 1.3L out   -- check PVR and in and out cath >500ml, if she continues to need in and out cath may need to place engle   -- recheck KUB today due to abd pain and tenderness, showed mod stool   -- s/p amitiza and cont movantik, had large BM on 12/18      Afib - chronically not on AC  Anxiety/Depression - buproprion  Hx ovarian Ca 1989  Tobacco abuse - prn nrt    Expected Discharge Location and Transportation: rehab   Expected Discharge   Expected Discharge Date: 12/23/2024; Expected Discharge Time:      VTE Prophylaxis:  Mechanical VTE prophylaxis orders are present.         AM-PAC 6 Clicks Score (PT): 18 (12/18/24 1457)    CODE STATUS:   Code Status and Medical Interventions: No CPR (Do Not Attempt to Resuscitate); Limited Support; No intubation (DNI)   Ordered at: 12/17/24 0440     Medical Intervention Limits:    No intubation (DNI)     Level Of Support Discussed With:    Patient     Code Status (Patient has no pulse and is not breathing):    No CPR (Do Not Attempt to Resuscitate)     Medical Interventions (Patient has pulse or is breathing):    Limited Support       Tayla Levy, APRN  12/19/24

## 2024-12-19 NOTE — PLAN OF CARE
Problem: Adult Inpatient Plan of Care  Goal: Plan of Care Review  Outcome: Progressing  Goal: Patient-Specific Goal (Individualized)  Outcome: Progressing  Goal: Absence of Hospital-Acquired Illness or Injury  Outcome: Progressing  Intervention: Identify and Manage Fall Risk  Intervention: Prevent Skin Injury  Intervention: Prevent Infection  Goal: Optimal Comfort and Wellbeing  Outcome: Progressing  Intervention: Monitor Pain and Promote Comfort  Goal: Readiness for Transition of Care  Outcome: Progressing     Problem: Skin Injury Risk Increased  Goal: Skin Health and Integrity  Outcome: Progressing  Intervention: Optimize Skin Protection     Problem: Fall Injury Risk  Goal: Absence of Fall and Fall-Related Injury  Outcome: Progressing  Intervention: Identify and Manage Contributors  Intervention: Promote Injury-Free Environment   Goal Outcome Evaluation:     Pt a/o. VSS. Pain controlled with IV pain medication. Pt complained of abd pain this AM. Bladder scan revealed 999+. In/out cath was placed to drain the bladder. Pt expressed relief afterwards. APRN notified. New orders in place.

## 2024-12-19 NOTE — PLAN OF CARE
Goal Outcome Evaluation:  Plan of Care Reviewed With: patient        Progress: improving  Outcome Evaluation: Pt. continues to present below baseline function w/generalized weakness, instability and decreased functional endurance affecting her ability to safely participate in functional mobility. She performed bed mobility, transfers and ambulated w/front wheeled walker, min assist. Ambulation focused on dual tasking to challenge her stability. She tolerated ther-ex well. Continue acute PT POC to progress as tolerated.    Anticipated Discharge Disposition (PT): inpatient rehabilitation facility

## 2024-12-19 NOTE — PLAN OF CARE
Problem: Adult Inpatient Plan of Care  Goal: Plan of Care Review  Outcome: Progressing  Flowsheets  Taken 12/19/2024 0333 by Med Knapp RN  Progress: no change  Taken 12/18/2024 1454 by Carol Guerra PT  Plan of Care Reviewed With: patient  Goal: Patient-Specific Goal (Individualized)  Outcome: Progressing  Goal: Absence of Hospital-Acquired Illness or Injury  Outcome: Progressing  Intervention: Identify and Manage Fall Risk  Recent Flowsheet Documentation  Taken 12/18/2024 2015 by Med Knapp RN  Safety Promotion/Fall Prevention: safety round/check completed  Intervention: Prevent Skin Injury  Recent Flowsheet Documentation  Taken 12/18/2024 2015 by Med Knapp RN  Body Position: supine  Goal: Optimal Comfort and Wellbeing  Outcome: Progressing  Goal: Readiness for Transition of Care  Outcome: Progressing     Problem: Skin Injury Risk Increased  Goal: Skin Health and Integrity  Outcome: Progressing     Problem: Fall Injury Risk  Goal: Absence of Fall and Fall-Related Injury  Outcome: Progressing  Intervention: Promote Injury-Free Environment  Recent Flowsheet Documentation  Taken 12/18/2024 2015 by Med Knapp RN  Safety Promotion/Fall Prevention: safety round/check completed   Goal Outcome Evaluation:           Progress: no change

## 2024-12-20 PROCEDURE — 25010000002 CEFTRIAXONE PER 250 MG: Performed by: NURSE PRACTITIONER

## 2024-12-20 PROCEDURE — 99232 SBSQ HOSP IP/OBS MODERATE 35: CPT | Performed by: NURSE PRACTITIONER

## 2024-12-20 PROCEDURE — 25010000002 MORPHINE PER 10 MG: Performed by: INTERNAL MEDICINE

## 2024-12-20 PROCEDURE — 97116 GAIT TRAINING THERAPY: CPT

## 2024-12-20 PROCEDURE — 97535 SELF CARE MNGMENT TRAINING: CPT

## 2024-12-20 RX ORDER — TAMSULOSIN HYDROCHLORIDE 0.4 MG/1
0.4 CAPSULE ORAL DAILY
Status: DISCONTINUED | OUTPATIENT
Start: 2024-12-20 | End: 2024-12-26 | Stop reason: HOSPADM

## 2024-12-20 RX ORDER — HYDROCODONE BITARTRATE AND ACETAMINOPHEN 7.5; 325 MG/1; MG/1
1 TABLET ORAL EVERY 4 HOURS PRN
Status: DISCONTINUED | OUTPATIENT
Start: 2024-12-20 | End: 2024-12-21

## 2024-12-20 RX ADMIN — OXYCODONE HYDROCHLORIDE 10 MG: 10 TABLET ORAL at 00:48

## 2024-12-20 RX ADMIN — HYDROCODONE BITARTRATE AND ACETAMINOPHEN 1 TABLET: 7.5; 325 TABLET ORAL at 19:56

## 2024-12-20 RX ADMIN — BUSPIRONE HYDROCHLORIDE 5 MG: 5 TABLET ORAL at 16:49

## 2024-12-20 RX ADMIN — TAMSULOSIN HYDROCHLORIDE 0.4 MG: 0.4 CAPSULE ORAL at 08:36

## 2024-12-20 RX ADMIN — HYDROCODONE BITARTRATE AND ACETAMINOPHEN 1 TABLET: 7.5; 325 TABLET ORAL at 23:58

## 2024-12-20 RX ADMIN — LUBIPROSTONE 8 MCG: 8 CAPSULE, GELATIN COATED ORAL at 18:19

## 2024-12-20 RX ADMIN — NALOXEGOL OXALATE 25 MG: 25 TABLET, FILM COATED ORAL at 08:36

## 2024-12-20 RX ADMIN — BUSPIRONE HYDROCHLORIDE 5 MG: 5 TABLET ORAL at 08:36

## 2024-12-20 RX ADMIN — ACETAMINOPHEN 650 MG: 325 TABLET ORAL at 16:49

## 2024-12-20 RX ADMIN — HYDROCODONE BITARTRATE AND ACETAMINOPHEN 1 TABLET: 7.5; 325 TABLET ORAL at 15:39

## 2024-12-20 RX ADMIN — OXYCODONE HYDROCHLORIDE 10 MG: 10 TABLET ORAL at 08:36

## 2024-12-20 RX ADMIN — BUPROPION HYDROCHLORIDE 150 MG: 150 TABLET, EXTENDED RELEASE ORAL at 08:36

## 2024-12-20 RX ADMIN — LUBIPROSTONE 8 MCG: 8 CAPSULE, GELATIN COATED ORAL at 08:37

## 2024-12-20 RX ADMIN — HYDROCODONE BITARTRATE AND ACETAMINOPHEN 1 TABLET: 7.5; 325 TABLET ORAL at 12:19

## 2024-12-20 RX ADMIN — BUSPIRONE HYDROCHLORIDE 5 MG: 5 TABLET ORAL at 19:56

## 2024-12-20 RX ADMIN — MORPHINE SULFATE 2 MG: 2 INJECTION, SOLUTION INTRAMUSCULAR; INTRAVENOUS at 04:08

## 2024-12-20 RX ADMIN — SODIUM CHLORIDE 1000 MG: 900 INJECTION INTRAVENOUS at 12:19

## 2024-12-20 RX ADMIN — LIDOCAINE 1 PATCH: 4 PATCH TOPICAL at 08:36

## 2024-12-20 RX ADMIN — Medication 10 ML: at 21:24

## 2024-12-20 NOTE — DISCHARGE PLACEMENT REQUEST
" [phone]    Brittani Rivera (64 y.o. Female)       Date of Birth   1960    Social Security Number       Address   [address] [place] [address] [address] [place] [id number]    Home Phone   [phone]    MRN   [id number]       [facility]    Marital Status   Single                            Admission Date   24    Admission Type   Emergency    Admitting Provider   Wilfredo Villavicencio DO    Attending Provider   Wilfredo Villavicencio DO    Department, Room/Bed   [facility] 3G, S357/1       Discharge Date       Discharge Disposition       Discharge Destination                                 Attending Provider: Wilfredo Villavicencio DO    Allergies: No Known Allergies    Isolation: None   Infection: None   Code Status: No CPR    Ht: 157.5 cm (62\")   Wt: 44.9 kg (99 lb)    Admission Cmt: None   Principal Problem: Subdural hygroma [G96.08]                   Active Insurance as of 2024       Primary Coverage       Payor Plan Insurance Group Employer/Plan Group    ANTHEM MEDICARE REPLACEMENT ANTHEM MED ADV HMO [id number]       Payor Plan Address Payor Plan Phone Number Payor Plan Fax Number Effective Dates    [address] [phone]  2024 - None Entered    [facility] [address]         Subscriber Name Subscriber Birth Date Member ID       BRITTANI RIVERA 1960 [id number]                     Emergency Contacts        (Rel.) Home Phone Work Phone Mobile Phone    ZIYAD CHAPARRO (Sister) [phone] -- [phone]                 History & Physical        Carlos Trammell MD at 24 0507              [facility] Medicine Services  HISTORY AND PHYSICAL    Patient Name: Brittani Rivera  : 1960  MRN: [id number]  Primary Care Physician: Alla Colon DO  Date of admission: 2024      Subjective  Subjective     Chief Complaint:  Fall, headache    HPI:  Brittani Rivera is a 64 y.o. female with PMH metastatic breast cancer to brain " s/p whole brain radiation 6 years ago following with oncology, afib not on anticoagulation who is presenting with multiple syncopal episodes, dizziness, fall. She reports over past few months recurrent abrupt LOC from standing without preceding symptoms. Happened yesterday in driveway, states she black out and woke up on ground, neighbors found her and called EMS. She reports headache currently in back of head, but no blurred vision, loss of strength, chest pain, shortness of air, nausea. Does have history of ongoing wt loss and poor oral intake. Lives alone.       Personal History     Past Medical History:   Diagnosis Date    Breast CA 10/4/2018    Depression     DJD (degenerative joint disease) of cervical spine     THALIA (generalized anxiety disorder)     Heart murmur     Ovarian cancer 1989    Rt Cellulitis of chest wall 10/15/2018    Tobacco dependence     UTI (urinary tract infection) 9/9/2020         Oncology Problem List:  Invasive ductal carcinoma of left breast (04/10/2019; Status: Active)  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 (04/10/2019; Status: Active)  Breast CA (10/04/2018; Status: Active)  Oncology/Hematology History   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   4/10/2019 Initial Diagnosis    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 (CMS/HCC)     4/11/2019 - 4/24/2019 Radiation    Radiation OncologyTreatment Course:  Brittani Lambert received 3000 cGy in 10 fractions to whole brain via External Beam Radiation - EBRT.       Past Surgical History:   Procedure Laterality Date    APPENDECTOMY      BREAST BIOPSY Right 2003    DONE IN [place]    COLONOSCOPY  06/2018    HYSTERECTOMY  1989    MASTECTOMY W/ SENTINEL NODE BIOPSY Bilateral 10/4/2018    Procedure: LEFT SKIN SPARING BREAST MASTECTOMY WITH LEFT SENTINEL NODE BIOPSY, RIGHT PROPHYLACTIC SKIN SPARING MASTECTOMY;  Surgeon: Koffi Worthington MD;  Location: [facility];  Service: General    OOPHORECTOMY  1989       Family History: family history  includes Arthritis in her mother; Celiac disease in an other family member; Heart attack in her mother; Liver disease in her father; Osteoporosis in her mother.     Social History:  reports that she has been smoking cigarettes. She has a 12.5 pack-year smoking history. She has never used smokeless tobacco. She reports that she does not currently use alcohol. She reports that she does not use drugs.  Social History     Social History Narrative    Support from sister and mother.  Enjoys time with nieces and great nieces.        Medications:  Available home medication information reviewed.  HYDROcodone-acetaminophen, aspirin, buPROPion XL, busPIRone, hydrOXYzine, sodium-potassium-magnesium sulfates, and traZODone    No Known Allergies    Objective  Objective     Vital Signs:   Temp:  [98.5 °F (36.9 °C)] 98.5 °F (36.9 °C)  Heart Rate:  [62-92] 66  Resp:  [15-20] 16  BP: (129-155)/(72-91) 153/79       Physical Exam   AAOX3  Comfortable  Scalp hematoma over occiput  EOMI, PERRL  Neck flexion intact  Facial expression intact  Abrasions over forehead and chin  Heart RRR  Lungs CTAB  Abd soft, nontender  No peripheral edema    Result Review:  I have personally reviewed the results from the time of this admission to 12/17/2024 05:07 EST and agree with these findings:  [x]  Laboratory list / accordion  []  Microbiology  [x]  Radiology  [x]  EKG/Telemetry   []  Cardiology/Vascular   []  Pathology  [x]  Old records  []  Other:  Most notable findings include: See A+P      LAB RESULTS:      Lab 12/16/24 1958 12/16/24 1953   WBC  --  7.86   HEMOGLOBIN  --  10.8*   HEMOGLOBIN, POC 5.4*  --    HEMATOCRIT  --  35.0   HEMATOCRIT POC 16*  --    PLATELETS  --  174   NEUTROS ABS  --  5.51   IMMATURE GRANS (ABS)  --  0.05   LYMPHS ABS  --  1.44   MONOS ABS  --  0.71   EOS ABS  --  0.07   MCV  --  104.5*         Lab 12/16/24 1958 12/16/24 1953   SODIUM  --  132*   POTASSIUM  --  3.9   CHLORIDE  --  96*   CO2  --  22.0   ANION GAP  --   14.0   BUN  --  12   CREATININE 0.70 0.67   EGFR 96.7 97.7   GLUCOSE  --  104*   CALCIUM  --  8.8   MAGNESIUM  --  1.8         Lab 12/16/24 1953   TOTAL PROTEIN 6.5   ALBUMIN 4.0   GLOBULIN 2.5   ALT (SGPT) 10   AST (SGOT) 25   BILIRUBIN 0.5   ALK PHOS 89         Lab 12/16/24  2130 12/16/24 1953   HSTROP T 7 7                 UA          10/22/2024    10:51 12/16/2024    21:24   Urinalysis   Squamous Epithelial Cells, UA 0-2  0-2    Specific Gravity, UA 1.007  1.017    Ketones, UA 40 mg/dL (2+)  Negative    Blood, UA Negative  Negative    Leukocytes, UA Trace  Trace    Nitrite, UA Negative  Negative    RBC, UA 0-2  0-2    WBC, UA 0-2  3-5    Bacteria, UA None Seen  4+        Microbiology Results (last 10 days)       ** No results found for the last 240 hours. **            CT Head Without Contrast    Result Date: 12/16/2024  CT HEAD WO CONTRAST, CT FACIAL BONES WO CONTRAST Date of Exam: 12/16/2024 9:38 PM EST Indication: fall with head injury. Comparison: Head and face CT 10/22/2024. Technique: Axial CT images were obtained of the head and face without contrast administration.  Automated exposure control and iterative construction methods were used. Findings: HEAD CT: New bilateral subdural hypoattenuating collections along the bilateral cerebral convexities measuring up to 5 mm on the right and 4 mm on the left. No foci of hyperattenuation to suggest acute intracranial hemorrhage. Intact appearing gray-white differentiation. No significant mass effect. No hydrocephalus. Mild generalized parenchymal volume loss. Scattered areas of periventricular and subcortical white matter hypoattenuation, nonspecific, perhaps from small vessel ischemic/hypertensive changes in a patient of this age.There are intracranial atherosclerotic calcifications. No acute or aggressive appearing bony or extracranial soft tissue process. FACE CT: No acute maxillofacial fracture. Facial and extracranial soft tissues appear unremarkable.  Globes and orbits appear unremarkable by CT. Mild scattered mucosal thickening of the paranasal sinuses. Mastoid air cells are essentially clear. Imaged aerodigestive tract demonstrates no significant abnormality. Evaluation of the oral cavity is degraded by dental hardware artifact. Imaged major salivary glands demonstrate no significant abnormality.     Impression: Impression: Compared to head CT from 10/22/2024, new hypoattenuating subdural collections along the bilateral cerebral convexities measuring up to 5 mm on the right and 4 mm on the left, suspicious for acute subdural hygromas in setting of trauma. No acute maxillofacial fracture. Electronically Signed: Simone Mcdowell  12/16/2024 10:11 PM EST  Workstation ID: [id number]    CT Facial Bones Without Contrast    Result Date: 12/16/2024  CT HEAD WO CONTRAST, CT FACIAL BONES WO CONTRAST Date of Exam: 12/16/2024 9:38 PM EST Indication: fall with head injury. Comparison: Head and face CT 10/22/2024. Technique: Axial CT images were obtained of the head and face without contrast administration.  Automated exposure control and iterative construction methods were used. Findings: HEAD CT: New bilateral subdural hypoattenuating collections along the bilateral cerebral convexities measuring up to 5 mm on the right and 4 mm on the left. No foci of hyperattenuation to suggest acute intracranial hemorrhage. Intact appearing gray-white differentiation. No significant mass effect. No hydrocephalus. Mild generalized parenchymal volume loss. Scattered areas of periventricular and subcortical white matter hypoattenuation, nonspecific, perhaps from small vessel ischemic/hypertensive changes in a patient of this age.There are intracranial atherosclerotic calcifications. No acute or aggressive appearing bony or extracranial soft tissue process. FACE CT: No acute maxillofacial fracture. Facial and extracranial soft tissues appear unremarkable. Globes and orbits appear unremarkable  by CT. Mild scattered mucosal thickening of the paranasal sinuses. Mastoid air cells are essentially clear. Imaged aerodigestive tract demonstrates no significant abnormality. Evaluation of the oral cavity is degraded by dental hardware artifact. Imaged major salivary glands demonstrate no significant abnormality.     Impression: Impression: Compared to head CT from 10/22/2024, new hypoattenuating subdural collections along the bilateral cerebral convexities measuring up to 5 mm on the right and 4 mm on the left, suspicious for acute subdural hygromas in setting of trauma. No acute maxillofacial fracture. Electronically Signed: Simone Mcdowell  12/16/2024 10:11 PM EST  Workstation ID: [id number]    CT Cervical Spine Without Contrast    Result Date: 12/16/2024  CT CERVICAL SPINE WO CONTRAST, CT CHEST W CONTRAST DIAGNOSTIC Date of Exam: 12/16/2024 9:38 PM EST Indication: neck pain s/p fall with head injury. Comparison: 2020 MRI Technique: Axial CT images were obtained of the cervical spine without contrast administration. Axial CT images were obtained of the chest after administration of intravenous contrast. Reconstructed coronal and sagittal images were also obtained. Automated exposure control and iterative construction methods were used. Findings: CT cervical spine: There is no evidence of acute fracture. The craniocervical junction is intact. There is mild atlantoaxial joint arthritis. There is trace grade 1 anterolisthesis of C4 on C5. Mild scattered endplate osteophyte formations with facet arthropathy. No evidence of severe canal narrowing. Varying multilevel bony neural foraminal narrowing. Paraspinal soft tissues show no acute abnormality. CT chest: There is no evidence of pulmonary embolism. Pulmonary arteries are normal in caliber. No right heart strain. No pericardial effusion. Coronary artery calcifications. Central airways are patent. No significant bronchial wall thickening or bronchiectasis. No focal  airspace consolidation, pleural effusion, or pneumothorax. Bibasilar atelectasis. There is no suspicious pulmonary nodule or mass. No mediastinal mass or threshold lymphadenopathy. Normal appearance of the thoracic esophagus. Chest wall soft tissues and included upper abdomen show no acute abnormality. No acute fracture or suspicious bone lesion.     Impression: Impression: 1.No acute fracture or malalignment of the cervical spine. 2.No acute intrathoracic abnormality. No evidence of rib fracture. Electronically Signed: Antonio Marsh MD  12/16/2024 10:09 PM EST  Workstation ID: [id number]    CT Chest With Contrast Diagnostic    Result Date: 12/16/2024  CT CERVICAL SPINE WO CONTRAST, CT CHEST W CONTRAST DIAGNOSTIC Date of Exam: 12/16/2024 9:38 PM EST Indication: neck pain s/p fall with head injury. Comparison: 2020 MRI Technique: Axial CT images were obtained of the cervical spine without contrast administration. Axial CT images were obtained of the chest after administration of intravenous contrast. Reconstructed coronal and sagittal images were also obtained. Automated exposure control and iterative construction methods were used. Findings: CT cervical spine: There is no evidence of acute fracture. The craniocervical junction is intact. There is mild atlantoaxial joint arthritis. There is trace grade 1 anterolisthesis of C4 on C5. Mild scattered endplate osteophyte formations with facet arthropathy. No evidence of severe canal narrowing. Varying multilevel bony neural foraminal narrowing. Paraspinal soft tissues show no acute abnormality. CT chest: There is no evidence of pulmonary embolism. Pulmonary arteries are normal in caliber. No right heart strain. No pericardial effusion. Coronary artery calcifications. Central airways are patent. No significant bronchial wall thickening or bronchiectasis. No focal airspace consolidation, pleural effusion, or pneumothorax. Bibasilar atelectasis. There is no suspicious  pulmonary nodule or mass. No mediastinal mass or threshold lymphadenopathy. Normal appearance of the thoracic esophagus. Chest wall soft tissues and included upper abdomen show no acute abnormality. No acute fracture or suspicious bone lesion.     Impression: Impression: 1.No acute fracture or malalignment of the cervical spine. 2.No acute intrathoracic abnormality. No evidence of rib fracture. Electronically Signed: Antonio Marsh MD  12/16/2024 10:09 PM EST  Workstation ID: [id number]    XR Knee 1 or 2 View Left    Result Date: 12/16/2024  XR KNEE 1 OR 2 VW LEFT Date of Exam: 12/16/2024 7:25 PM EST Indication: fall with left knee pain Comparison: None available. Findings: No acute fracture or malalignment. No significant joint effusion. No focal soft tissue abnormalities appreciated. Joint spaces appear grossly preserved without significant degenerative change.     Impression: Impression: No acute fracture or malalignment. Electronically Signed: Simone Mcdowell  12/16/2024 8:13 PM EST  Workstation ID: [id number]    XR Chest 1 View    Result Date: 12/16/2024  XR CHEST 1 VW Date of Exam: 12/16/2024 7:16 PM EST Indication: Weak/Dizzy/AMS triage protocol Comparison: Chest radiograph 10/22/2024 Findings: Mediastinum: Cardiac silhouette appears unchanged and normal in size Lungs: The lungs appear clear without focal consolidation appreciated. Pleura: No pleural effusion or pneumothorax. Bones and soft tissues: No acute, displaced fracture seen.     Impression: Impression: No radiographic evidence of acute cardiopulmonary disease. Electronically Signed: Simone Mcdowell  12/16/2024 8:11 PM EST  Workstation ID: [id number]     Results for orders placed during the hospital encounter of 09/24/20    Transthoracic Echo Complete With Contrast if Necessary Per Protocol    Interpretation Summary  · Left ventricular ejection fraction appears to be 61 - 65%. Left ventricular systolic function is normal.  · Left ventricular diastolic  function is consistent with (grade I) impaired relaxation.  · Mild tricuspid valve regurgitation is present.  · Estimated right ventricular systolic pressure from tricuspid regurgitation is normal (<35 mmHg). Calculated right ventricular systolic pressure from tricuspid regurgitation is 22 mmHg.      Assessment & Plan  Assessment & Plan       Subdural hematoma    Mixed hyperlipidemia    Breast CA    Atrial fibrillation    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    Generalized weakness    Fall  Recurrent syncope  Metastatic breast cancer to brain  BL subdural hygroma  -Mental status currently normal, multiple recurrent syncopal spells without inciting factor or warning, new BL hygromas on CT. NSGY reviewed, think may be chronic. Last CT for comparison 10/22/24. S/p whole brain radiation 6 years ago, follows with Dr Patel.  -Will obtain MRI brain  -Echo, orthostatics for syncope workup  -neurochecks  -Hold ASA  -NSGY to evaluate today    Afib  -not currently on anticoagulation    Asymptomatic bacteruria   -no further abx indicated at this time    Cachexia  -nutrition eval            VTE Prophylaxis:  Mechanical VTE prophylaxis orders are signed & held.            CODE STATUS:    Code Status and Medical Interventions: No CPR (Do Not Attempt to Resuscitate); Limited Support; No intubation (DNI)   Ordered at: 12/17/24 0440     Medical Intervention Limits:    No intubation (DNI)     Level Of Support Discussed With:    Patient     Code Status (Patient has no pulse and is not breathing):    No CPR (Do Not Attempt to Resuscitate)     Medical Interventions (Patient has pulse or is breathing):    Limited Support       Expected Discharge   Expected discharge date/ time has not been documented.     Carlos Trammell MD  12/17/24      Electronically signed by Carlos Trammell MD at 12/17/24 0506          Physician Progress Notes (most recent note)        Tayla Levy, LONG at 12/20/24 0930              [facility]  [facility] Medicine Services  PROGRESS NOTE    Patient Name: Brittani Lambert  : 1960  MRN: [id number]    Date of Admission: 2024  Primary Care Physician: Alla Colon DO    Subjective   Subjective     CC:  F/u hygromas     HPI:  Patient is resting in bed. Did not sleep well. Tolerating diet. Unable to void last night. Still some abd tenderness       Objective   Objective     Vital Signs:   Temp:  [97 °F (36.1 °C)-98.2 °F (36.8 °C)] 97.9 °F (36.6 °C)  Heart Rate:  [62-75] 63  Resp:  [18] 18  BP: (118-144)/(71-90) 128/79     Physical Exam:  Constitutional: No acute distress, awake, alert, thin frail female   HENT: NCAT, mucous membranes moist, abrasions/contusions   Respiratory: Clear to auscultation bilaterally, respiratory effort normal room air 95%  Cardiovascular: RRR, no murmurs, rubs, or gallops  Gastrointestinal: Positive bowel sounds, soft, generalized tenderness, nondistended  Musculoskeletal: No bilateral ankle edema  Psychiatric: Appropriate affect, cooperative  Neurologic: Oriented x 3, strength symmetric in all extremities,, speech clear  Skin: No rashes, pale       Results Reviewed:  LAB RESULTS:      Lab 24  0849 240 24   WBC  --   --   --  7.86   HEMOGLOBIN 10.7*  --   --  10.8*   HEMOGLOBIN, POC  --   --  5.4*  --    HEMATOCRIT 31.8*  --   --  35.0   HEMATOCRIT POC  --   --  16*  --    PLATELETS  --   --   --  174   NEUTROS ABS  --   --   --  5.51   IMMATURE GRANS (ABS)  --   --   --  0.05   LYMPHS ABS  --   --   --  1.44   MONOS ABS  --   --   --  0.71   EOS ABS  --   --   --  0.07   MCV  --   --   --  104.5*   HSTROP T  --  7  --  7         Lab 24  0441 24  1027 24   SODIUM 133*  --   --  132*   POTASSIUM 3.8  --   --  3.9   CHLORIDE 98  --   --  96*   CO2 23.0  --   --  22.0   ANION GAP 12.0  --   --  14.0   BUN 6*  --   --  12   CREATININE 0.50*  --  0.70 0.67   EGFR 104.9  --  96.7 97.7    GLUCOSE 84  --   --  104*   CALCIUM 8.5*  --   --  8.8   MAGNESIUM 1.8  --   --  1.8   PHOSPHORUS 3.4  --   --   --    TSH  --  1.550  --   --          Lab 12/16/24 1953   TOTAL PROTEIN 6.5   ALBUMIN 4.0   GLOBULIN 2.5   ALT (SGPT) 10   AST (SGOT) 25   BILIRUBIN 0.5   ALK PHOS 89         Lab 12/16/24 2130 12/16/24 1953   HSTROP T 7 7                 Brief Urine Lab Results  (Last result in the past 365 days)        Color   Clarity   Blood   Leuk Est   Nitrite   Protein   CREAT   Urine HCG        12/16/24 2124 Yellow   Cloudy   Negative   Trace   Negative   Trace                   Microbiology Results Abnormal       Procedure Component Value - Date/Time    Urine Culture - Urine, Urine, Catheter [[id number]]  (Abnormal)  (Susceptibility) Collected: 12/16/24 2124    Lab Status: Final result Specimen: Urine, Catheter Updated: 12/19/24 0914     Urine Culture >100,000 CFU/mL Escherichia coli    Narrative:      Colonization of the urinary tract without infection is common. Treatment is discouraged unless the patient is symptomatic, pregnant, or undergoing an invasive urologic procedure.    Susceptibility        Escherichia coli      MARS      Amoxicillin + Clavulanate Susceptible      Ampicillin Resistant      Ampicillin + Sulbactam Intermediate      Cefazolin (Urine) Susceptible      Cefepime Susceptible      Ceftazidime Susceptible      Ceftriaxone Susceptible      Gentamicin Susceptible      Levofloxacin Susceptible      Nitrofurantoin Susceptible      Piperacillin + Tazobactam Susceptible      Trimethoprim + Sulfamethoxazole Susceptible                                   XR Abdomen KUB    Result Date: 12/19/2024  XR ABDOMEN KUB Date of Exam: 12/19/2024 10:03 AM EST Indication: abd pain Comparison: December 17, 2024 Findings: There is a nonobstructive bowel gas pattern. Moderate stool is present throughout the colon. No pathological calcifications are identified. The osseous structures appear intact.     Impression:  Impression: No acute process identified. Moderate stool throughout colon. Electronically Signed: Nando Dempsey MD  12/19/2024 10:53 AM EST  Workstation ID: [id number]     Results for orders placed during the hospital encounter of 09/24/20    Transthoracic Echo Complete With Contrast if Necessary Per Protocol    Interpretation Summary  · Left ventricular ejection fraction appears to be 61 - 65%. Left ventricular systolic function is normal.  · Left ventricular diastolic function is consistent with (grade I) impaired relaxation.  · Mild tricuspid valve regurgitation is present.  · Estimated right ventricular systolic pressure from tricuspid regurgitation is normal (<35 mmHg). Calculated right ventricular systolic pressure from tricuspid regurgitation is 22 mmHg.      Current medications:  Scheduled Meds:[Held by provider] aspirin, 81 mg, Oral, Daily  buPROPion XL, 150 mg, Oral, Daily  busPIRone, 5 mg, Oral, TID  cefTRIAXone, 1,000 mg, Intravenous, Q24H  Lidocaine, 1 patch, Transdermal, Q24H  lubiprostone, 8 mcg, Oral, BID With Meals  Naloxegol Oxalate, 25 mg, Oral, QAM  sodium chloride, 10 mL, Intravenous, Q12H  tamsulosin, 0.4 mg, Oral, Daily      Continuous Infusions:   PRN Meds:.  acetaminophen **OR** acetaminophen **OR** acetaminophen    senna-docusate sodium **AND** polyethylene glycol **AND** bisacodyl **AND** bisacodyl    Calcium Replacement - Follow Nurse / BPA Driven Protocol    HYDROcodone-acetaminophen    [Held by provider] hydrOXYzine    Magnesium Standard Dose Replacement - Follow Nurse / BPA Driven Protocol    nicotine    Phosphorus Replacement - Follow Nurse / BPA Driven Protocol    Potassium Replacement - Follow Nurse / BPA Driven Protocol    [COMPLETED] Insert Peripheral IV **AND** sodium chloride    sodium chloride    sodium chloride    Assessment & Plan   Assessment & Plan     Active Hospital Problems    Diagnosis  POA    **Subdural hygroma [G96.08]  Yes    UTI (urinary tract infection) [N39.0]   Yes    Severe protein-calorie malnutrition [E43]  Yes    Generalized weakness [R53.1]  Yes    Atrial fibrillation [I48.91]  Yes    Mixed hyperlipidemia [E78.2]  Yes      Resolved Hospital Problems   No resolved problems to display.        Brief Hospital Course to date:  Brittani Lambert is a 64 y.o. female  w/ anxiety, depression, tobacco abuse, ovarian cancer 1989, metastatic breast cancer 2018 s/p whole brain radiation, who presented for evaluation of recurrent falls; she reports weight loss of approx 10lbs since the beginning of this year, anorexia for approx 1 mo, and repeatedly falling when her legs get weak and give out (no loss of consciousness); sometimes on standing her legs will feel so weak that they quiver and she has to sit back down; she had a fall onto pavement with head injury prompting visit to the ED; neuro imaging on arrival showed BL subdural hygromas    Plan was partially entered by my partner and I have reviewed and updated as appropriate on 12/20/24     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 deconditioning giver her poor PO intake and low body weight  -prn pain control. Stopped IV 12/20, changed oxycodone to lortab per patient request  -NSGY evaluated, no acute intervention, f/u opt 2 weeks with repeat head CT  -PT/OT recommend rehab      Weight loss/failure to thrive  Cachexia/low body weight; severe malnutrition  Constipation  -reports ~10lb weight loss since beginning of 2024 (approx 10% of body weight)  -leg weakness and FTT noted to have been charted on patient's problem list since at least 4 years pta; she has been seen on an ambulatory basis multiple times this year for hip pain, falls, etc  -cont nutritional shake, RD consult      Hx metastatic breast cancer  -treated [id number] for breast Ca w/ concern for  leptomeningeal carcinomatosis (CNS)  -s/p mastectomy, adjuvant chemo, whole brain radiation  -recent opt MRI brain and CT C/A/P Sep-Oct this year for weight loss, no evidence for recurrent/active malignancy  -prev followed by Dr. Patel     UTI E coli   Urinary retention over 1L  constipation  - cont rocephin for 3 doses  -- KUB on  showed severe bladder distention  -- bladder scan > 999 ml on   -- in and out cath  had 1.3L out   -- check PVR and in and out cath >500ml, if she continues to need in and out cath may need to place engle   -- recheck KUB today due to abd pain and tenderness, showed mod stool   -- s/p amitiza and cont movantik, had large BM on   -- flomax started       Afib - chronically not on AC  Anxiety/Depression - buproprion  Hx ovarian Ca   Tobacco abuse - prn nrt    Expected Discharge Location and Transportation: rehab   Expected Discharge   Expected Discharge Date: 2024; Expected Discharge Time:      VTE Prophylaxis:  Mechanical VTE prophylaxis orders are present.         AM-PAC 6 Clicks Score (PT): 17 (24 0836)    CODE STATUS:   Code Status and Medical Interventions: No CPR (Do Not Attempt to Resuscitate); Limited Support; No intubation (DNI)   Ordered at: 24 0440     Medical Intervention Limits:    No intubation (DNI)     Level Of Support Discussed With:    Patient     Code Status (Patient has no pulse and is not breathing):    No CPR (Do Not Attempt to Resuscitate)     Medical Interventions (Patient has pulse or is breathing):    Limited Support       LONG [place]  24        Electronically signed by Tayla Levy APRN at 24 1048          Physical Therapy Notes (most recent note)        Gloria Powers, PT at 24 1005  Version 1 of 1         Patient Name: Brittani Lambert  : 1960    MRN: [id number]                              Today's Date: 2024       Admit Date: 2024    Visit Dx:     ICD-10-CM  ICD-9-CM   1. Fall, initial encounter  W19.XXXA E888.9   2. Injury of head, initial encounter  S09.90XA 959.01   3. Post-traumatic subdural hygroma  G96.08 852.20   4. Strain of neck muscle, initial encounter  S16.1XXA 847.0   5. Multiple abrasions  T07.XXXA 919.0   6. Contusion of nose, initial encounter  S00.33XA 920   7. Contusion of left knee, initial encounter  S80.02XA 924.11   8. Cancer of right breast metastatic to brain  C50.911 174.9    C79.31 198.3   9. History of bilateral mastectomy  Z90.13 V45.71   10. Acute UTI  N39.0 599.0   11. Chronic pain syndrome  G89.4 338.4     Patient Active Problem List   Diagnosis    Mixed hyperlipidemia    Moderate episode of recurrent major depressive disorder    Anxiety    Functional diarrhea    Breast CA    Atrial fibrillation    Invasive ductal carcinoma of left breast    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    Left-sided weakness    THALIA (generalized anxiety disorder)    Acute deep vein thrombosis (DVT) of left upper extremity    Persistent atrial fibrillation    Lactic acidosis    T12 compression fracture    Falls    History of atrial fibrillation    History of pulmonary embolism    Bilateral leg weakness    Tobacco abuse    Alcohol abuse    Generalized weakness    Failure to thrive in adult    Depression    Subdural hygroma    Severe protein-calorie malnutrition     Past Medical History:   Diagnosis Date    Breast CA 10/4/2018    Depression     DJD (degenerative joint disease) of cervical spine     THALIA (generalized anxiety disorder)     Heart murmur     Ovarian cancer 1989    Rt Cellulitis of chest wall 10/15/2018    Tobacco dependence     UTI (urinary tract infection) 9/9/2020     Past Surgical History:   Procedure Laterality Date    APPENDECTOMY      BREAST BIOPSY Right 2003    DONE IN [place]    COLONOSCOPY  06/2018    HYSTERECTOMY  1989    MASTECTOMY W/ SENTINEL NODE BIOPSY Bilateral 10/4/2018    Procedure: LEFT SKIN SPARING BREAST MASTECTOMY WITH LEFT  SENTINEL NODE BIOPSY, RIGHT PROPHYLACTIC SKIN SPARING MASTECTOMY;  Surgeon: Koffi Worthington MD;  Location: [facility];  Service: General    OOPHORECTOMY  1989      General Information       Row Name 12/19/24 1142          Physical Therapy Time and Intention    Document Type therapy note (daily note)  -     Mode of Treatment physical therapy  -       Row Name 12/19/24 1142          General Information    Patient Profile Reviewed yes  -SS     Existing Precautions/Restrictions fall;other (see comments)  engle, history of frequent falls  -     Barriers to Rehab medically complex;previous functional deficit  -       Row Name 12/19/24 1142          Cognition    Orientation Status (Cognition) oriented x 4  -       Row Name 12/19/24 1142          Safety Issues/Impairments Affecting Functional Mobility    Safety Issues Affecting Function (Mobility) awareness of need for assistance;insight into deficits/self-awareness;positioning of assistive device;safety precaution awareness;safety precautions follow-through/compliance;sequencing abilities  -     Impairments Affecting Function (Mobility) balance;endurance/activity tolerance;pain;strength;postural/trunk control;visual/perceptual  -               User Key  (r) = Recorded By, (t) = Taken By, (c) = Cosigned By      Initials Name Provider Type    SS Gloria Powers PT Physical Therapist                   Mobility       Row Name 12/19/24 1144          Bed Mobility    Bed Mobility scooting/bridging;supine-sit  -     Scooting/Bridging [place] (Bed Mobility) verbal cues;standby assist  -     Supine-Sit [place] (Bed Mobility) standby assist;verbal cues  -     Assistive Device (Bed Mobility) bed rails;head of bed elevated  -     Comment, (Bed Mobility) VC for sequencing; increased time/effort required  -       Row Name 12/19/24 1144          Sit-Stand Transfer    Sit-Stand [place] (Transfers) contact guard;verbal cues  -     Assistive  Device (Sit-Stand Transfers) walker, front-wheeled  -     Comment, (Sit-Stand Transfer) VC for hand placement, appropriate alignment, lowering w/eccentric control  -       Row Name 12/19/24 1144          Gait/Stairs (Locomotion)    [place] Level (Gait) minimum assist (75% patient effort);verbal cues  -     Assistive Device (Gait) walker, front-wheeled  -SS     Patient was able to Ambulate yes  -     Distance in Feet (Gait) 50  x6  -     Deviations/Abnormal Patterns (Gait) marquita decreased;gait speed decreased;bilateral deviations;base of support, narrow;stride length decreased;weight shifting decreased  -     Bilateral Gait Deviations heel strike decreased  -     Comment, (Gait/Stairs) Pt. ambulated with a step through gait pattern w/improved stability w/use of FWW. Challenged pt. to different dual tasking activities for 50' increments to address balance deficits. No LOB noted but some lingering instability.  -               User Key  (r) = Recorded By, (t) = Taken By, (c) = Cosigned By      Initials Name Provider Type     Gloria Powers, PT Physical Therapist                   Obj/Interventions       Row Name 12/19/24 1154          Motor Skills    Therapeutic Exercise hip;knee;ankle  -       Row Name 12/19/24 1154          Hip (Therapeutic Exercise)    Hip (Therapeutic Exercise) isometric exercises;strengthening exercise  -     Hip Isometrics (Therapeutic Exercise) bilateral;gluteal sets;10 repetitions  -     Hip Strengthening (Therapeutic Exercise) bilateral;aBduction;aDduction;heel slides;marching while seated;10 repetitions  -       Row Name 12/19/24 1154          Knee (Therapeutic Exercise)    Knee (Therapeutic Exercise) isometric exercises;strengthening exercise  -     Knee Isometrics (Therapeutic Exercise) bilateral;quad sets;10 repetitions  -     Knee Strengthening (Therapeutic Exercise) bilateral;SLR (straight leg raise);LAQ (long arc quad);10 repetitions  -       Row  Name 12/19/24 1154          Ankle (Therapeutic Exercise)    Ankle (Therapeutic Exercise) AROM (active range of motion)  -     Ankle AROM (Therapeutic Exercise) bilateral;dorsiflexion;plantarflexion;10 repetitions  -SS       Row Name 12/19/24 1154          Balance    Balance Assessment sitting dynamic balance;sitting static balance;standing static balance;standing dynamic balance  -SS     Static Sitting Balance standby assist  -SS     Dynamic Sitting Balance standby assist  -SS     Position, Sitting Balance unsupported;sitting edge of bed  -     Static Standing Balance minimal assist  -SS     Dynamic Standing Balance minimal assist  -SS     Position/Device Used, Standing Balance supported;walker, front-wheeled  -     Balance Interventions sitting;standing;sit to stand;supported;static;dynamic  -SS               User Key  (r) = Recorded By, (t) = Taken By, (c) = Cosigned By      Initials Name Provider Type     Gloria Powers, PT Physical Therapist                   Goals/Plan    No documentation.                  Clinical Impression       Row Name 12/19/24 1155          Pain    Pretreatment Pain Rating 8/10  -SS     Posttreatment Pain Rating 8/10  -SS     Pain Location abdomen;other (see comments)  ribs  -     Pain Management Interventions activity modification encouraged;breathing exercises utilized;complementary health approaches promoted;diversional activity provided;movement retraining implemented  -     Response to Pain Interventions activity level improved;functional ability improved;mobility function improved;movement patterns improved;nonverbal indicators decreased;activity participation with tolerable pain  -       Row Name 12/19/24 1152          Plan of Care Review    Plan of Care Reviewed With patient  -SS     Progress improving  -     Outcome Evaluation Pt. continues to present below baseline function w/generalized weakness, instability and decreased functional endurance affecting her  ability to safely participate in functional mobility. She performed bed mobility, transfers and ambulated w/front wheeled walker, min assist. Ambulation focused on dual tasking to challenge her stability. She tolerated ther-ex well. Continue acute PT POC to progress as tolerated.  -       Row Name 12/19/24 1155          Therapy Assessment/Plan (PT)    Rehab Potential (PT) good  -     Criteria for Skilled Interventions Met (PT) yes;meets criteria;skilled treatment is necessary  -     Therapy Frequency (PT) daily  -       Row Name 12/19/24 1155          Vital Signs    Pre Systolic BP Rehab 125  -SS     Pre Treatment Diastolic BP 69  -SS     Post Systolic BP Rehab 139  -SS     Post Treatment Diastolic BP 71  -SS     Pretreatment Heart Rate (beats/min) 67  -SS     Posttreatment Heart Rate (beats/min) 64  -SS     Pre SpO2 (%) 93  -SS     O2 Delivery Pre Treatment room air  -SS     Post SpO2 (%) 97  -SS     O2 Delivery Post Treatment room air  -SS     Pre Patient Position Supine  -SS     Post Patient Position Sitting  -       Row Name 12/19/24 1156          Positioning and Restraints    Pre-Treatment Position in bed  -SS     Post Treatment Position chair  -SS     In Chair notified nsg;reclined;call light within reach;encouraged to call for assist;exit alarm on;waffle cushion;legs elevated  -SS               User Key  (r) = Recorded By, (t) = Taken By, (c) = Cosigned By      Initials Name Provider Type    SS Gloria Powers, PT Physical Therapist                   Outcome Measures       Row Name 12/19/24 6098          How much help from another person do you currently need...    Turning from your back to your side while in flat bed without using bedrails? 4  -SS     Moving from lying on back to sitting on the side of a flat bed without bedrails? 3  -SS     Moving to and from a bed to a chair (including a wheelchair)? 3  -SS     Standing up from a chair using your arms (e.g., wheelchair, bedside chair)? 3  -SS      Climbing 3-5 steps with a railing? 2  -SS     To walk in hospital room? 3  -SS     AM-PAC 6 Clicks Score (PT) 18  -     Highest Level of Mobility Goal 6 --> Walk 10 steps or more  -       Row Name 12/19/24 1158          Functional Assessment    Outcome Measure Options AM-PAC 6 Clicks Basic Mobility (PT)  -               User Key  (r) = Recorded By, (t) = Taken By, (c) = Cosigned By      Initials Name Provider Type     Gloria Powers, PT Physical Therapist                                 Physical Therapy Education       Title: PT OT SLP Therapies (In Progress)       Topic: Physical Therapy (Done)       Point: Mobility training (Done)       Learning Progress Summary            Patient Eager, E, VU,DU,NR by  at 12/19/2024 1158    Comment: Reviewed safety/technique w/bed mobility, transfers, ambulation, HEP, PT POC    Acceptance, E, VU by KR at 12/18/2024 1458                      Point: Home exercise program (Done)       Learning Progress Summary            Patient Eager, E, VU,DU,NR by  at 12/19/2024 1158    Comment: Reviewed safety/technique w/bed mobility, transfers, ambulation, HEP, PT POC                      Point: Body mechanics (Done)       Learning Progress Summary            Patient Eager, E, VU,DU,NR by  at 12/19/2024 1158    Comment: Reviewed safety/technique w/bed mobility, transfers, ambulation, HEP, PT POC    Acceptance, E, VU by KR at 12/18/2024 1458                      Point: Precautions (Done)       Learning Progress Summary            Patient Eager, E, VU,DU,NR by  at 12/19/2024 1158    Comment: Reviewed safety/technique w/bed mobility, transfers, ambulation, HEP, PT POC    Acceptance, E, VU by KR at 12/18/2024 1458                                      User Key       Initials Effective Dates Name Provider Type Discipline     06/01/21 -  Gloria Powers, PT Physical Therapist PT    KR 12/30/22 -  Carol Guerra, PT Physical Therapist PT                  PT Recommendation and Plan      Progress: improving  Outcome Evaluation: Pt. continues to present below baseline function w/generalized weakness, instability and decreased functional endurance affecting her ability to safely participate in functional mobility. She performed bed mobility, transfers and ambulated w/front wheeled walker, min assist. Ambulation focused on dual tasking to challenge her stability. She tolerated ther-ex well. Continue acute PT POC to progress as tolerated.     Time Calculation:         PT Charges       Row Name 24 1159             Time Calculation    Start Time 1005  -SS      PT Received On 24  -SS         Timed Charges    81411 - PT Therapeutic Exercise Minutes 10  -SS      50031 - Gait Training Minutes  8  -SS      66777 - PT Therapeutic Activity Minutes 5  -SS         Total Minutes    Timed Charges Total Minutes 23  -SS       Total Minutes 23  -SS                User Key  (r) = Recorded By, (t) = Taken By, (c) = Cosigned By      Initials Name Provider Type    SS Gloria Powers, PT Physical Therapist                  Therapy Charges for Today       Code Description Service Date Service Provider Modifiers Qty    84141404559 HC PT THER PROC EA 15 MIN 2024 Gloria Powers, PT GP 1    62417950715 HC GAIT TRAINING EA 15 MIN 2024 Gloria Powers, PT GP 1            PT G-Codes  Outcome Measure Options: AM-PAC 6 Clicks Basic Mobility (PT)  AM-PAC 6 Clicks Score (PT): 18  AM-PAC 6 Clicks Score (OT): 17  PT Discharge Summary  Anticipated Discharge Disposition (PT): inpatient rehabilitation facility    Gloria Powers PT  2024      Electronically signed by Gloria Powers PT at 24 1200          Occupational Therapy Notes (most recent note)        Daya Avila, OT at 24 1355          Patient Name: Brittani Lambert  : 1960    MRN: [id number]                              Today's Date: 2024       Admit Date: 2024    Visit Dx:     ICD-10-CM ICD-9-CM   1. Fall, initial  encounter  W19.XXXA E888.9   2. Injury of head, initial encounter  S09.90XA 959.01   3. Post-traumatic subdural hygroma  G96.08 852.20   4. Strain of neck muscle, initial encounter  S16.1XXA 847.0   5. Multiple abrasions  T07.XXXA 919.0   6. Contusion of nose, initial encounter  S00.33XA 920   7. Contusion of left knee, initial encounter  S80.02XA 924.11   8. Cancer of right breast metastatic to brain  C50.911 174.9    C79.31 198.3   9. History of bilateral mastectomy  Z90.13 V45.71   10. Acute UTI  N39.0 599.0   11. Chronic pain syndrome  G89.4 338.4     Patient Active Problem List   Diagnosis    Mixed hyperlipidemia    Moderate episode of recurrent major depressive disorder    Anxiety    Functional diarrhea    Breast CA    Atrial fibrillation    Invasive ductal carcinoma of left breast    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    Left-sided weakness    THALIA (generalized anxiety disorder)    Acute deep vein thrombosis (DVT) of left upper extremity    Persistent atrial fibrillation    Lactic acidosis    T12 compression fracture    Falls    History of atrial fibrillation    History of pulmonary embolism    Bilateral leg weakness    Tobacco abuse    Alcohol abuse    Generalized weakness    Failure to thrive in adult    Depression    Subdural hygroma     Past Medical History:   Diagnosis Date    Breast CA 10/4/2018    Depression     DJD (degenerative joint disease) of cervical spine     THALIA (generalized anxiety disorder)     Heart murmur     Ovarian cancer 1989    Rt Cellulitis of chest wall 10/15/2018    Tobacco dependence     UTI (urinary tract infection) 9/9/2020     Past Surgical History:   Procedure Laterality Date    APPENDECTOMY      BREAST BIOPSY Right 2003    DONE IN [place]    COLONOSCOPY  06/2018    HYSTERECTOMY  1989    MASTECTOMY W/ SENTINEL NODE BIOPSY Bilateral 10/4/2018    Procedure: LEFT SKIN SPARING BREAST MASTECTOMY WITH LEFT SENTINEL NODE BIOPSY, RIGHT PROPHYLACTIC SKIN SPARING MASTECTOMY;   Surgeon: Koffi Worthington MD;  Location: [facility];  Service: General    OOPHORECTOMY  1989      General Information       Row Name 12/18/24 1505          OT Time and Intention    Document Type evaluation  -AJ     Mode of Treatment occupational therapy  -AJ     Patient Effort good  -AJ       Row Name 12/18/24 1505          General Information    Patient Profile Reviewed yes  -AJ     Prior Level of Function independent:;all household mobility;community mobility;bed mobility;ADL's  -AJ     Existing Precautions/Restrictions fall;other (see comments)  Endorses x5 recent falls in the previous 6 months  -AJ     Barriers to Rehab medically complex;previous functional deficit  -AJ       Row Name 12/18/24 1505          Living Environment    People in Home alone  -AJ       Row Name 12/18/24 1505          Home Main Entrance    Number of Stairs, Main Entrance eight  -AJ     Stair Railings, Main Entrance railings on both sides of stairs  -AJ       Row Name 12/18/24 1505          Stairs Within Home, Primary    Number of Stairs, Within Home, Primary none  -AJ       Row Name 12/18/24 1505          Cognition    Orientation Status (Cognition) oriented x 4  -AJ       Row Name 12/18/24 1505          Safety Issues/Impairments Affecting Functional Mobility    Safety Issues Affecting Function (Mobility) awareness of need for assistance;insight into deficits/self-awareness;judgment;problem-solving;safety precaution awareness;safety precautions follow-through/compliance;sequencing abilities  -AJ     Impairments Affecting Function (Mobility) balance;endurance/activity tolerance;pain;strength  -AJ     Comment, Safety Issues/Impairments (Mobility) Pt reported floaters/fuzziness in L eye  -AJ               User Key  (r) = Recorded By, (t) = Taken By, (c) = Cosigned By      Initials Name Provider Type    AJ Daya Avila OT Occupational Therapist                     Mobility/ADL's       Row Name 12/18/24 1509          Bed Mobility    Bed  Mobility supine-sit  -     Supine-Sit [place] (Bed Mobility) standby assist;verbal cues  -     Assistive Device (Bed Mobility) bed rails;head of bed elevated  -     Comment, (Bed Mobility) Extra time and effort required  -       Row Name 12/18/24 1507          Transfers    Transfers sit-stand transfer;stand-sit transfer;toilet transfer  -       Row Name 12/18/24 1507          Sit-Stand Transfer    Sit-Stand [place] (Transfers) minimum assist (75% patient effort);1 person assist  -     Assistive Device (Sit-Stand Transfers) other (see comments)  L HHA  -       Row Name 12/18/24 1507          Stand-Sit Transfer    Stand-Sit [place] (Transfers) minimum assist (75% patient effort);2 person assist;verbal cues  -     Comment, (Stand-Sit Transfer) VCs for safe lowering  -[facility] Name 12/18/24 1507          Toilet Transfer    Type (Toilet Transfer) sit-stand;stand-sit  -     [place] Level (Toilet Transfer) minimum assist (75% patient effort);2 person assist;verbal cues  -     Assistive Device (Toilet Transfer) commode;grab bars/safety frame  -     Comment, (Toilet Transfer) VCs for hip alignment and safe lowering  -       Row Name 12/18/24 1507          Functional Mobility    Functional Mobility- Ind. Level minimum assist (75% patient effort);2 person assist required  -     Functional Mobility-Distance (Feet) --  HH distance  -     Functional Mobility- Comment B HHA  -       Row Name 12/18/24 1507          Activities of Daily Living    BADL Assessment/Intervention grooming;toileting  -       Row Name 12/18/24 1507          Grooming Assessment/Training    [place] Level (Grooming) wash face, hands;supervision  -     Position (Grooming) sink side  -[facility] Name 12/18/24 1507          Toileting Assessment/Training    [place] Level (Toileting) adjust/manage clothing;perform perineal hygiene;supervision  -     Assistive Devices (Toileting) commode;grab  bar/safety frame  -     Position (Toileting) unsupported sitting  -               User Key  (r) = Recorded By, (t) = Taken By, (c) = Cosigned By      Initials Name Provider Type    Daya Gill OT Occupational Therapist                   Obj/Interventions       Row Name 12/18/24 1510          Sensory Assessment (Somatosensory)    Sensory Assessment (Somatosensory) UE sensation intact  -     Sensory Assessment Pt reported having numbness/tingling in hands and fingers- was not occuring at this time. Able to localize touch on both sides and reported intact general sensation in BUE.  -AJ       Row Name 12/18/24 1510          Vision Assessment/Intervention    Visual Impairment/Limitations WFL  -       Row Name 12/18/24 1510          Range of Motion Comprehensive    General Range of Motion bilateral upper extremity ROM WFL  -       Row Name 12/18/24 1510          Strength Comprehensive (MMT)    General Manual Muscle Testing (MMT) Assessment upper extremity strength deficits identified  -AJ     Comment, General Manual Muscle Testing (MMT) Assessment BUE grossly 4/5  -       Row Name 12/18/24 1510          Balance    Balance Assessment sitting static balance;sitting dynamic balance;sit to stand dynamic balance;standing static balance;standing dynamic balance  -     Static Sitting Balance standby assist  -     Dynamic Sitting Balance contact guard  -     Position, Sitting Balance unsupported;sitting edge of bed;other (see comments)  Commode  -AJ     Static Standing Balance minimal assist;1-person assist  -     Dynamic Standing Balance minimal assist;2-person assist;verbal cues  -     Position/Device Used, Standing Balance supported;other (see comments)  B HHA  -     Balance Interventions sitting;standing;sit to stand;supported;static;dynamic;occupation based/functional task  -               User Key  (r) = Recorded By, (t) = Taken By, (c) = Cosigned By      Initials Name Provider Type    SANYA  Daya Avila, OT Occupational Therapist                   Goals/Plan       Row Name 12/18/24 1517          Transfer Goal 1 (OT)    Activity/Assistive Device (Transfer Goal 1, OT) sit-to-stand/stand-to-sit;bed-to-chair/chair-to-bed;toilet  -AJ     [place] Level/Cues Needed (Transfer Goal 1, OT) supervision required  -AJ     Time Frame (Transfer Goal 1, OT) long term goal (LTG);10 days  -AJ     Progress/Outcome (Transfer Goal 1, OT) new goal  -AJ       Row Name 12/18/24 1517          Dressing Goal 1 (OT)    Activity/Device (Dressing Goal 1, OT) lower body dressing  -AJ     [place]/Cues Needed (Dressing Goal 1, OT) moderate assist (50-74% patient effort)  -AJ     Time Frame (Dressing Goal 1, OT) short term goal (STG);5 days  -AJ     Progress/Outcome (Dressing Goal 1, OT) new goal  -AJ       Row Name 12/18/24 1517          Toileting Goal 1 (OT)    Activity/Device (Toileting Goal 1, OT) perform perineal hygiene;adjust/manage clothing  -AJ     [place] Level/Cues Needed (Toileting Goal 1, OT) standby assist  -AJ     Time Frame (Toileting Goal 1, OT) long term goal (LTG);10 days  -AJ     Progress/Outcome (Toileting Goal 1, OT) new goal  -AJ       Row Name 12/18/24 1517          Grooming Goal 1 (OT)    Activity/Device (Grooming Goal 1, OT) nail care;wash face, hands;oral care  -AJ     [place] (Grooming Goal 1, OT) supervision required  -AJ     Time Frame (Grooming Goal 1, OT) long term goal (LTG);10 days  -AJ     Strategies/Barriers (Grooming Goal 1, OT) standing sink side for increased activity tolerance  -AJ     Progress/Outcome (Grooming Goal 1, OT) new goal  -AJ       Row Name 12/18/24 1517          Therapy Assessment/Plan (OT)    Planned Therapy Interventions (OT) activity tolerance training;adaptive equipment training;BADL retraining;functional balance retraining;IADL retraining;occupation/activity based interventions;patient/caregiver education/training;ROM/therapeutic exercise;strengthening  exercise;transfer/mobility retraining  -               User Key  (r) = Recorded By, (t) = Taken By, (c) = Cosigned By      Initials Name Provider Type    Daya Gill, OT Occupational Therapist                   Clinical Impression       Row Name 12/18/24 1513          Pain Assessment    Pretreatment Pain Rating 8/10  -     Posttreatment Pain Rating 6/10  -     Pain Location back;hip;other (see comments)  ribs  -     Pain Side/Orientation right  -     Pain Management Interventions exercise or physical activity utilized;positioning techniques utilized  -     Response to Pain Interventions activity participation with decreased pain;intervention effective per patient report  -       Row Name 12/18/24 1514          Plan of Care Review    Plan of Care Reviewed With patient  -     Progress no change  -     Outcome Evaluation OT eval complete. Pt presents below fxl baseline with ADLs and fxl mobility, limited by pain, impaired balance, and poor activity tolerance. Pt would benefit from ongoing skilled OT services to progress to PLOF. Recommend d/c to IRF at this time for best fxl outcomes.  -       Row Name 12/18/24 1519          Therapy Assessment/Plan (OT)    Patient/Family Therapy Goal Statement (OT) Return to PLOF  -     Rehab Potential (OT) good  -     Criteria for Skilled Therapeutic Interventions Met (OT) yes;meets criteria;skilled treatment is necessary  -     Therapy Frequency (OT) daily  -     Predicted Duration of Therapy Intervention (OT) 10 days  -       Row Name 12/18/24 1510          Therapy Plan Review/Discharge Plan (OT)    Anticipated Discharge Disposition (OT) inpatient rehabilitation facility  -       Row Name 12/18/24 1511          Vital Signs    Pre Systolic BP Rehab 117  -AJ     Pre Treatment Diastolic BP 81  -AJ     Intra Systolic BP Rehab 151  -AJ     Intra Treatment Diastolic   -AJ     Post Systolic BP Rehab 141  -AJ     Post Treatment Diastolic BP 95   -AJ     O2 Delivery Pre Treatment room air  -AJ     O2 Delivery Intra Treatment room air  -AJ     O2 Delivery Post Treatment room air  -AJ     Pre Patient Position Supine  -AJ     Intra Patient Position Standing  -AJ     Post Patient Position Sitting  -AJ       Row Name 12/18/24 1514          Positioning and Restraints    Pre-Treatment Position in bed  -AJ     Post Treatment Position chair  -AJ     In Chair notified nsg;reclined;sitting;call light within reach;encouraged to call for assist;exit alarm on;legs elevated;waffle cushion  -AJ               User Key  (r) = Recorded By, (t) = Taken By, (c) = Cosigned By      Initials Name Provider Type    Daya Gill OT Occupational Therapist                   Outcome Measures       Row Name 12/18/24 1520          How much help from another is currently needed...    Putting on and taking off regular lower body clothing? 2  -AJ     Bathing (including washing, rinsing, and drying) 2  -AJ     Toileting (which includes using toilet bed pan or urinal) 3  -AJ     Putting on and taking off regular upper body clothing 3  -AJ     Taking care of personal grooming (such as brushing teeth) 3  -AJ     Eating meals 4  -AJ     AM-PAC 6 Clicks Score (OT) 17  -AJ       Row Name 12/18/24 1457 12/18/24 0913       How much help from another person do you currently need...    Turning from your back to your side while in flat bed without using bedrails? 4  -KR 3  -MD    Moving from lying on back to sitting on the side of a flat bed without bedrails? 3  -KR 3  -MD    Moving to and from a bed to a chair (including a wheelchair)? 3  -KR 3  -MD    Standing up from a chair using your arms (e.g., wheelchair, bedside chair)? 3  -KR 3  -MD    Climbing 3-5 steps with a railing? 2  -KR 2  -MD    To walk in hospital room? 3  -KR 3  -MD    AM-PAC 6 Clicks Score (PT) 18  -KR 17  -MD    Highest Level of Mobility Goal 6 --> Walk 10 steps or more  -KR 5 --> Static standing  -MD      Row Name 12/18/24  0402          How much help from another person do you currently need...    Turning from your back to your side while in flat bed without using bedrails? 3  -YS     Moving from lying on back to sitting on the side of a flat bed without bedrails? 3  -YS     Moving to and from a bed to a chair (including a wheelchair)? 3  -YS     Standing up from a chair using your arms (e.g., wheelchair, bedside chair)? 3  -YS     Climbing 3-5 steps with a railing? 2  -YS     To walk in hospital room? 3  -YS     AM-PAC 6 Clicks Score (PT) 17  -YS     Highest Level of Mobility Goal 5 --> Static standing  -YS       Row Name 12/18/24 1520 12/18/24 1457       Functional Assessment    Outcome Measure Options AM-PAC 6 Clicks Daily Activity (OT)  -SANYA AM-PAC 6 Clicks Basic Mobility (PT)  -CAMILA              User Key  (r) = Recorded By, (t) = Taken By, (c) = Cosigned By      Initials Name Provider Type    Carol Ventura, PT Physical Therapist    Shelby Roland, RN Registered Nurse    Daya Gill, OT Occupational Therapist    Kyle Nava RN Registered Nurse                    Occupational Therapy Education       Title: PT OT SLP Therapies (In Progress)       Topic: Occupational Therapy (In Progress)       Point: ADL training (Done)       Description:   Instruct learner(s) on proper safety adaptation and remediation techniques during self care or transfers.   Instruct in proper use of assistive devices.                  Learning Progress Summary            Patient Acceptance, E, VU,NR by SANYA at 12/18/2024 1521                      Point: Home exercise program (Not Started)       Description:   Instruct learner(s) on appropriate technique for monitoring, assisting and/or progressing therapeutic exercises/activities.                  Learner Progress:  Not documented in this visit.              Point: Precautions (Done)       Description:   Instruct learner(s) on prescribed precautions during self-care and functional  transfers.                  Learning Progress Summary            Patient Acceptance, E, VU,NR by  at 12/18/2024 1521                      Point: Body mechanics (Done)       Description:   Instruct learner(s) on proper positioning and spine alignment during self-care, functional mobility activities and/or exercises.                  Learning Progress Summary            Patient Acceptance, E, VU,NR by  at 12/18/2024 1521                                      User Key       Initials Effective Dates Name Provider Type Discipline     08/26/24 -  Daya Avila, OT Occupational Therapist OT                  OT Recommendation and Plan  Planned Therapy Interventions (OT): activity tolerance training, adaptive equipment training, BADL retraining, functional balance retraining, IADL retraining, occupation/activity based interventions, patient/caregiver education/training, ROM/therapeutic exercise, strengthening exercise, transfer/mobility retraining  Therapy Frequency (OT): daily  Plan of Care Review  Plan of Care Reviewed With: patient  Progress: no change  Outcome Evaluation: OT eval complete. Pt presents below fxl baseline with ADLs and fxl mobility, limited by pain, impaired balance, and poor activity tolerance. Pt would benefit from ongoing skilled OT services to progress to PLOF. Recommend d/c to IRF at this time for best fxl outcomes.     Time Calculation:   Evaluation Complexity (OT)  Review Occupational Profile/Medical/Therapy History Complexity: expanded/moderate complexity  Assessment, Occupational Performance/Identification of Deficit Complexity: 3-5 performance deficits  Clinical Decision Making Complexity (OT): detailed assessment/moderate complexity  Overall Complexity of Evaluation (OT): moderate complexity     Time Calculation- OT       Row Name 12/18/24 1522             Time Calculation- OT    OT Start Time 1355  -      OT Received On 12/18/24  -      OT Goal Re-Cert Due Date 12/28/24  -SANYA          Timed Charges    79926 - OT Self Care/Mgmt Minutes 8  -AJ         Untimed Charges    OT Eval/Re-eval Minutes 46  -AJ         Total Minutes    Timed Charges Total Minutes 8  -AJ      Untimed Charges Total Minutes 46  -AJ       Total Minutes 54  -AJ                User Key  (r) = Recorded By, (t) = Taken By, (c) = Cosigned By      Initials Name Provider Type    Daya Gill OT Occupational Therapist                  Therapy Charges for Today       Code Description Service Date Service Provider Modifiers Qty    82157112434 HC OT SELF CARE/MGMT/TRAIN EA 15 MIN 12/18/2024 Daya Avila OT GO 1    35439329386 HC OT EVAL MOD COMPLEXITY 4 12/18/2024 Daya Avila OT GO 1                 Daya Avila OT  12/18/2024    Electronically signed by Daya Avila OT at 12/18/24 1529

## 2024-12-20 NOTE — PLAN OF CARE
Problem: Adult Inpatient Plan of Care  Goal: Plan of Care Review  Recent Flowsheet Documentation  Taken 12/20/2024 1624 by Rossi Melgar OT  Progress: improving  Outcome Evaluation: Pt is A/Ox4 and participates in therapy with encouragement and increased time all tasks. Remains below her baseline with strength, balance, and activity tolerance deficits. Up in room and transfering with RW support and CGAx1. Unsteady without LOB this session. Set-up/SBA UB  ADLs and toileting. OT will continue to follow IP. Pt would benefit from IRF for best outcome when medically ready.

## 2024-12-20 NOTE — PLAN OF CARE
Goal Outcome Evaluation:  Plan of Care Reviewed With: patient        Progress: improving  Outcome Evaluation: Patient remains unsteady with ambulation requireing rolling walker for safety.    Anticipated Discharge Disposition (PT): inpatient rehabilitation facility

## 2024-12-20 NOTE — PLAN OF CARE
Goal Outcome Evaluation:      Pt has rested on and off tonight.  Bladder scanned twice.  1st scan was 350s and 2nd was 461.  Pt ambulated to bathroom both times and was unable to urinate either time.  Straight Cathed after 2nd scan and noted approx 650ml clear yellow urine.  Pt voiced relief.  NAD noted at this time.

## 2024-12-20 NOTE — THERAPY TREATMENT NOTE
Patient Name: Brittani Lambert  : 1960    MRN: [id number]                              Today's Date: 2024       Admit Date: 2024    Visit Dx:     ICD-10-CM ICD-9-CM   1. Fall, initial encounter  W19.XXXA E888.9   2. Injury of head, initial encounter  S09.90XA 959.01   3. Post-traumatic subdural hygroma  G96.08 852.20   4. Strain of neck muscle, initial encounter  S16.1XXA 847.0   5. Multiple abrasions  T07.XXXA 919.0   6. Contusion of nose, initial encounter  S00.33XA 920   7. Contusion of left knee, initial encounter  S80.02XA 924.11   8. Cancer of right breast metastatic to brain  C50.911 174.9    C79.31 198.3   9. History of bilateral mastectomy  Z90.13 V45.71   10. Acute UTI  N39.0 599.0   11. Chronic pain syndrome  G89.4 338.4     Patient Active Problem List   Diagnosis    Mixed hyperlipidemia    Moderate episode of recurrent major depressive disorder    Anxiety    Functional diarrhea    Breast CA    Atrial fibrillation    Invasive ductal carcinoma of left breast    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    Left-sided weakness    THALIA (generalized anxiety disorder)    Acute deep vein thrombosis (DVT) of left upper extremity    Persistent atrial fibrillation    Lactic acidosis    T12 compression fracture    Falls    History of atrial fibrillation    History of pulmonary embolism    Bilateral leg weakness    Tobacco abuse    Alcohol abuse    Generalized weakness    Failure to thrive in adult    Depression    Subdural hygroma    Severe protein-calorie malnutrition    UTI (urinary tract infection)     Past Medical History:   Diagnosis Date    Breast CA 10/4/2018    Depression     DJD (degenerative joint disease) of cervical spine     THALIA (generalized anxiety disorder)     Heart murmur     Ovarian cancer     Rt Cellulitis of chest wall 10/15/2018    Tobacco dependence     UTI (urinary tract infection) 2020     Past Surgical History:   Procedure Laterality Date    APPENDECTOMY      BREAST  BIOPSY Right 2003    DONE IN [place]    COLONOSCOPY  06/2018    HYSTERECTOMY  1989    MASTECTOMY W/ SENTINEL NODE BIOPSY Bilateral 10/4/2018    Procedure: LEFT SKIN SPARING BREAST MASTECTOMY WITH LEFT SENTINEL NODE BIOPSY, RIGHT PROPHYLACTIC SKIN SPARING MASTECTOMY;  Surgeon: Koffi Worthington MD;  Location: [facility];  Service: General    OOPHORECTOMY  1989      General Information       Row Name 12/20/24 1555          Physical Therapy Time and Intention    Document Type therapy note (daily note)  -SC     Mode of Treatment physical therapy;co-treatment  -SC       Row Name 12/20/24 1550          General Information    Patient Profile Reviewed yes  -SC     Existing Precautions/Restrictions fall;other (see comments)  kadie, jennifer of falls  -SC       Row Name 12/20/24 7474          Cognition    Orientation Status (Cognition) oriented x 4  -SC       Row Name 12/20/24 1551          Safety Issues/Impairments Affecting Functional Mobility    Impairments Affecting Function (Mobility) balance;endurance/activity tolerance;pain;strength;postural/trunk control;visual/perceptual  -SC     Comment, Safety Issues/Impairments (Mobility) alert, following commands  -SC               User Key  (r) = Recorded By, (t) = Taken By, (c) = Cosigned By      Initials Name Provider Type    SC Brian Huntley, PT Physical Therapist                   Mobility       Row Name 12/20/24 7840          Bed Mobility    Bed Mobility supine-sit;scooting/bridging  -SC     Scooting/Bridging [place] (Bed Mobility) verbal cues;standby assist  -SC     Supine-Sit [place] (Bed Mobility) standby assist;verbal cues  -SC     Assistive Device (Bed Mobility) bed rails;head of bed elevated  -SC     Comment, (Bed Mobility) extra time needed o get to edge of bed  -SC       Row Name 12/20/24 2664          Sit-Stand Transfer    Sit-Stand [place] (Transfers) contact guard;verbal cues  -SC     Assistive Device (Sit-Stand Transfers) walker, front-wheeled  -SC      Comment, (Sit-Stand Transfer) demonstrated slow transfers  -[facility] Name 12/20/24 1556          Gait/Stairs (Locomotion)    [place] Level (Gait) contact guard  -SC     Assistive Device (Gait) walker, front-wheeled  -SC     Distance in Feet (Gait) 350  -SC     Deviations/Abnormal Patterns (Gait) [place] decreased;gait speed decreased;bilateral deviations;base of support, narrow;stride length decreased;weight shifting decreased  -SC     Bilateral Gait Deviations heel strike decreased  -SC     Comment, (Gait/Stairs) Gt training focused on conroling walker with step throught gait pattern. Demonstrated slightly unsteady gait with no overt LOB  -SC               User Key  (r) = Recorded By, (t) = Taken By, (c) = Cosigned By      Initials Name Provider Type    SC Brian Huntley, PT Physical Therapist                   Obj/Interventions       [facility] Name 12/20/24 1559          Balance    Balance Assessment standing dynamic balance  -SC     Dynamic Sitting Balance contact guard;1-person assist  -SC     Position, Sitting Balance supported  -SC     Comment, Balance Worked on side stepping balance activities with UE support on wang ledge. Cues to keep facing forward. , Also marching in  place and heel raises x10  -SC               User Key  (r) = Recorded By, (t) = Taken By, (c) = Cosigned By      Initials Name Provider Type    SC Brian Huntley, PT Physical Therapist                   Goals/Plan    No documentation.                  Clinical Impression       Row Name 12/20/24 1600          Pain    Pain Management Interventions positioning techniques utilized  -SC     Additional Documentation Pain Scale: FACES Pre/Post-Treatment (Group)  -SC       Row Name 12/20/24 1600          Pain Scale: FACES Pre/Post-Treatment    Pain: FACES Scale, Pretreatment 4-->hurts little more  -SC     Posttreatment Pain Rating 4-->hurts little more  -[facility] Name 12/20/24 1600          Plan of Care Review    Plan of Care Reviewed  With patient  -SC     Progress improving  -SC     Outcome Evaluation Patient remains unsteady with ambulation requireing rolling walker for safety.  -SC       Row Name 12/20/24 1600          Therapy Assessment/Plan (PT)    Patient/Family Therapy Goals Statement (PT) get stronger  -SC     Rehab Potential (PT) good  -SC     Criteria for Skilled Interventions Met (PT) yes;meets criteria;skilled treatment is necessary  -SC     Therapy Frequency (PT) daily  -SC       Row Name 12/20/24 1600          Positioning and Restraints    Pre-Treatment Position in bed  -SC     Post Treatment Position bed  -SC     In Bed notified nsg;supine;encouraged to call for assist;call light within reach;exit alarm on  -SC               User Key  (r) = Recorded By, (t) = Taken By, (c) = Cosigned By      Initials Name Provider Type    Brian Rivera, PT Physical Therapist                   Outcome Measures       Row Name 12/20/24 1602 12/20/24 0836       How much help from another person do you currently need...    Turning from your back to your side while in flat bed without using bedrails? 4  -SC 3  -SCA    Moving from lying on back to sitting on the side of a flat bed without bedrails? 3  -SC 3  -SCA    Moving to and from a bed to a chair (including a wheelchair)? 3  -SC 3  -SCA    Standing up from a chair using your arms (e.g., wheelchair, bedside chair)? 3  -SC 3  -SCA    Climbing 3-5 steps with a railing? 3  -SC 2  -SCA    To walk in hospital room? 3  -SC 3  -SCA    AM-PAC 6 Clicks Score (PT) 19  -SC 17  -[facility]    Highest Level of Mobility Goal 6 --> Walk 10 steps or more  -SC 5 --> Static standing  -[facility]      Row Name 12/20/24 1602          Functional Assessment    Outcome Measure Options AM-PAC 6 Clicks Basic Mobility (PT)  -SC               User Key  (r) = Recorded By, (t) = Taken By, (c) = Cosigned By      Initials Name Provider Type    Brian Rivera, PT Physical Therapist    Florecita Gordon, RN Registered Nurse                                  Physical Therapy Education       Title: PT OT SLP Therapies (Done)       Topic: Physical Therapy (Done)       Point: Mobility training (Done)       Learning Progress Summary            Patient Eager, E, VU by SC at 12/20/2024 1602    Comment: reviewed benefits of activity    Acceptance, E,TB, VU by MD at 12/19/2024 1737    Eager, E, VU,DU,NR by SS at 12/19/2024 1158    Comment: Reviewed safety/technique w/bed mobility, transfers, ambulation, HEP, PT POC    Acceptance, E, VU by KR at 12/18/2024 1458                      Point: Home exercise program (Done)       Learning Progress Summary            Patient Eager, E, VU by SC at 12/20/2024 1602    Comment: reviewed benefits of activity    Acceptance, E,TB, VU by MD at 12/19/2024 1737    Eager, E, VU,DU,NR by SS at 12/19/2024 1158    Comment: Reviewed safety/technique w/bed mobility, transfers, ambulation, HEP, PT POC                      Point: Body mechanics (Done)       Learning Progress Summary            Patient Eager, E, VU by SC at 12/20/2024 1602    Comment: reviewed benefits of activity    Acceptance, E,TB, VU by MD at 12/19/2024 1737    Eager, E, VU,DU,NR by SS at 12/19/2024 1158    Comment: Reviewed safety/technique w/bed mobility, transfers, ambulation, HEP, PT POC    Acceptance, E, VU by KR at 12/18/2024 1458                      Point: Precautions (Done)       Learning Progress Summary            Patient Eager, E, VU by SC at 12/20/2024 1602    Comment: reviewed benefits of activity    Acceptance, E,TB, VU by MD at 12/19/2024 1737    Eager, E, VU,DU,NR by SS at 12/19/2024 1158    Comment: Reviewed safety/technique w/bed mobility, transfers, ambulation, HEP, PT POC    Acceptance, E, VU by KR at 12/18/2024 1458                                      User Key       Initials Effective Dates Name Provider Type Discipline    SC 02/03/23 -  Brian Huntley, PT Physical Therapist PT     06/01/21 -  Gloria Powers, PT Physical Therapist PT     CAMILA 12/30/22 -  Carol Guerra, PT Physical Therapist PT    MD 07/26/24 -  Shelby Coronel, RN Registered Nurse Nurse                  PT Recommendation and Plan     Progress: improving  Outcome Evaluation: Patient remains unsteady with ambulation requireing rolling walker for safety.     Time Calculation:         PT Charges       Row Name 12/20/24 1511             Time Calculation    Start Time 1511  -SC      PT Received On 12/20/24  -SC      PT Goal Re-Cert Due Date 12/28/24  -SC         Timed Charges    49123 - Gait Training Minutes  8  -SC         Total Minutes    Timed Charges Total Minutes 8  -SC       Total Minutes 8  -SC                User Key  (r) = Recorded By, (t) = Taken By, (c) = Cosigned By      Initials Name Provider Type    SC Brian Huntley PT Physical Therapist                  Therapy Charges for Today       Code Description Service Date Service Provider Modifiers Qty    83248943602 HC GAIT TRAINING EA 15 MIN 12/20/2024 Brian Huntley PT GP 1            PT G-Codes  Outcome Measure Options: AM-PAC 6 Clicks Basic Mobility (PT)  AM-PAC 6 Clicks Score (PT): 19  AM-PAC 6 Clicks Score (OT): 17  PT Discharge Summary  Anticipated Discharge Disposition (PT): inpatient rehabilitation facility    Brian Huntley PT  12/20/2024

## 2024-12-20 NOTE — PROGRESS NOTES
[facility]  PROGRESS NOTE    Patient Name: Brittani Lambert  : 1960  MRN: [id number]    Date of Admission: 2024  Primary Care Physician: Alla Colon DO    Subjective   Subjective     CC:  F/u hygromas     HPI:  Patient is resting in bed. Did not sleep well. Tolerating diet. Unable to void last night. Still some abd tenderness       Objective   Objective     Vital Signs:   Temp:  [97 °F (36.1 °C)-98.2 °F (36.8 °C)] 97.9 °F (36.6 °C)  Heart Rate:  [62-75] 63  Resp:  [18] 18  BP: (118-144)/(71-90) 128/79     Physical Exam:  Constitutional: No acute distress, awake, alert, thin frail female   HENT: NCAT, mucous membranes moist, abrasions/contusions   Respiratory: Clear to auscultation bilaterally, respiratory effort normal room air 95%  Cardiovascular: RRR, no murmurs, rubs, or gallops  Gastrointestinal: Positive bowel sounds, soft, generalized tenderness, nondistended  Musculoskeletal: No bilateral ankle edema  Psychiatric: Appropriate affect, cooperative  Neurologic: Oriented x 3, strength symmetric in all extremities,, speech clear  Skin: No rashes, pale       Results Reviewed:  LAB RESULTS:      Lab 24  0849 240 24   WBC  --   --   --  7.86   HEMOGLOBIN 10.7*  --   --  10.8*   HEMOGLOBIN, POC  --   --  5.4*  --    HEMATOCRIT 31.8*  --   --  35.0   HEMATOCRIT POC  --   --  16*  --    PLATELETS  --   --   --  174   NEUTROS ABS  --   --   --  5.51   IMMATURE GRANS (ABS)  --   --   --  0.05   LYMPHS ABS  --   --   --  1.44   MONOS ABS  --   --   --  0.71   EOS ABS  --   --   --  0.07   MCV  --   --   --  104.5*   HSTROP T  --  7  --  7         Lab 24  0441 24  1027 24   SODIUM 133*  --   --  132*   POTASSIUM 3.8  --   --  3.9   CHLORIDE 98  --   --  96*   CO2 23.0  --   --  22.0   ANION GAP 12.0  --   --  14.0   BUN 6*  --   --  12   CREATININE 0.50*  --  0.70 0.67   EGFR  104.9  --  96.7 97.7   GLUCOSE 84  --   --  104*   CALCIUM 8.5*  --   --  8.8   MAGNESIUM 1.8  --   --  1.8   PHOSPHORUS 3.4  --   --   --    TSH  --  1.550  --   --          Lab 12/16/24 1953   TOTAL PROTEIN 6.5   ALBUMIN 4.0   GLOBULIN 2.5   ALT (SGPT) 10   AST (SGOT) 25   BILIRUBIN 0.5   ALK PHOS 89         Lab 12/16/24 2130 12/16/24 1953   HSTROP T 7 7                 Brief Urine Lab Results  (Last result in the past 365 days)        Color   Clarity   Blood   Leuk Est   Nitrite   Protein   CREAT   Urine HCG        12/16/24 2124 Yellow   Cloudy   Negative   Trace   Negative   Trace                   Microbiology Results Abnormal       Procedure Component Value - Date/Time    Urine Culture - Urine, Urine, Catheter [[id number]]  (Abnormal)  (Susceptibility) Collected: 12/16/24 2124    Lab Status: Final result Specimen: Urine, Catheter Updated: 12/19/24 0914     Urine Culture >100,000 CFU/mL Escherichia coli    Narrative:      Colonization of the urinary tract without infection is common. Treatment is discouraged unless the patient is symptomatic, pregnant, or undergoing an invasive urologic procedure.    Susceptibility        Escherichia coli      MARS      Amoxicillin + Clavulanate Susceptible      Ampicillin Resistant      Ampicillin + Sulbactam Intermediate      Cefazolin (Urine) Susceptible      Cefepime Susceptible      Ceftazidime Susceptible      Ceftriaxone Susceptible      Gentamicin Susceptible      Levofloxacin Susceptible      Nitrofurantoin Susceptible      Piperacillin + Tazobactam Susceptible      Trimethoprim + Sulfamethoxazole Susceptible                                   XR Abdomen KUB    Result Date: 12/19/2024  XR ABDOMEN KUB Date of Exam: 12/19/2024 10:03 AM EST Indication: abd pain Comparison: December 17, 2024 Findings: There is a nonobstructive bowel gas pattern. Moderate stool is present throughout the colon. No pathological calcifications are identified. The osseous structures appear  intact.     Impression: Impression: No acute process identified. Moderate stool throughout colon. Electronically Signed: Nando Dempsey MD  12/19/2024 10:53 AM EST  Workstation ID: [id number]     Results for orders placed during the hospital encounter of 09/24/20    Transthoracic Echo Complete With Contrast if Necessary Per Protocol    Interpretation Summary  · Left ventricular ejection fraction appears to be 61 - 65%. Left ventricular systolic function is normal.  · Left ventricular diastolic function is consistent with (grade I) impaired relaxation.  · Mild tricuspid valve regurgitation is present.  · Estimated right ventricular systolic pressure from tricuspid regurgitation is normal (<35 mmHg). Calculated right ventricular systolic pressure from tricuspid regurgitation is 22 mmHg.      Current medications:  Scheduled Meds:[Held by provider] aspirin, 81 mg, Oral, Daily  buPROPion XL, 150 mg, Oral, Daily  busPIRone, 5 mg, Oral, TID  cefTRIAXone, 1,000 mg, Intravenous, Q24H  Lidocaine, 1 patch, Transdermal, Q24H  lubiprostone, 8 mcg, Oral, BID With Meals  Naloxegol Oxalate, 25 mg, Oral, QAM  sodium chloride, 10 mL, Intravenous, Q12H  tamsulosin, 0.4 mg, Oral, Daily      Continuous Infusions:   PRN Meds:.  acetaminophen **OR** acetaminophen **OR** acetaminophen    senna-docusate sodium **AND** polyethylene glycol **AND** bisacodyl **AND** bisacodyl    Calcium Replacement - Follow Nurse / BPA Driven Protocol    HYDROcodone-acetaminophen    [Held by provider] hydrOXYzine    Magnesium Standard Dose Replacement - Follow Nurse / BPA Driven Protocol    nicotine    Phosphorus Replacement - Follow Nurse / BPA Driven Protocol    Potassium Replacement - Follow Nurse / BPA Driven Protocol    [COMPLETED] Insert Peripheral IV **AND** sodium chloride    sodium chloride    sodium chloride    Assessment & Plan   Assessment & Plan     Active Hospital Problems    Diagnosis  POA    **Subdural hygroma [G96.08]  Yes    UTI (urinary  tract infection) [N39.0]  Yes    Severe protein-calorie malnutrition [E43]  Yes    Generalized weakness [R53.1]  Yes    Atrial fibrillation [I48.91]  Yes    Mixed hyperlipidemia [E78.2]  Yes      Resolved Hospital Problems   No resolved problems to display.        Brief Hospital Course to date:  Brittani Lambert is a 64 y.o. female  w/ anxiety, depression, tobacco abuse, ovarian cancer 1989, metastatic breast cancer 2018 s/p whole brain radiation, who presented for evaluation of recurrent falls; she reports weight loss of approx 10lbs since the beginning of this year, anorexia for approx 1 mo, and repeatedly falling when her legs get weak and give out (no loss of consciousness); sometimes on standing her legs will feel so weak that they quiver and she has to sit back down; she had a fall onto pavement with head injury prompting visit to the ED; neuro imaging on arrival showed BL subdural hygromas    Plan was partially entered by my partner and I have reviewed and updated as appropriate on 12/20/24     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 deconditioning giver her poor PO intake and low body weight  -prn pain control. Stopped IV 12/20, changed oxycodone to lortab per patient request  -NSGY evaluated, no acute intervention, f/u opt 2 weeks with repeat head CT  -PT/OT recommend rehab      Weight loss/failure to thrive  Cachexia/low body weight; severe malnutrition  Constipation  -reports ~10lb weight loss since beginning of 2024 (approx 10% of body weight)  -leg weakness and FTT noted to have been charted on patient's problem list since at least 4 years pta; she has been seen on an ambulatory basis multiple times this year for hip pain, falls, etc  -cont nutritional shake, RD consult      Hx metastatic breast cancer  -treated [phone] for  breast Ca 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 Patel     UTI E coli   Urinary retention over 1L  constipation  - cont rocephin for 3 doses  -- KUB on 12/17 showed severe bladder distention  -- bladder scan > 999 ml on 12/19  -- in and out cath 12/19 had 1.3L out   -- check PVR and in and out cath >500ml, if she continues to need in and out cath may need to place engle   -- recheck KUB today due to abd pain and tenderness, showed mod stool   -- s/p amitiza and cont movantik, had large BM on 12/18  -- flomax started 12/20      Afib - chronically not on AC  Anxiety/Depression - buproprion  Hx ovarian Ca 1989  Tobacco abuse - prn nrt    Expected Discharge Location and Transportation: rehab   Expected Discharge   Expected Discharge Date: 12/23/2024; Expected Discharge Time:      VTE Prophylaxis:  Mechanical VTE prophylaxis orders are present.         AM-PAC 6 Clicks Score (PT): 17 (12/20/24 0836)    CODE STATUS:   Code Status and Medical Interventions: No CPR (Do Not Attempt to Resuscitate); Limited Support; No intubation (DNI)   Ordered at: 12/17/24 0440     Medical Intervention Limits:    No intubation (DNI)     Level Of Support Discussed With:    Patient     Code Status (Patient has no pulse and is not breathing):    No CPR (Do Not Attempt to Resuscitate)     Medical Interventions (Patient has pulse or is breathing):    Limited Support       Tayla Levy, APRN  12/20/24

## 2024-12-20 NOTE — THERAPY TREATMENT NOTE
Patient Name: Brittani Lambert  : 1960    MRN: [id number]                              Today's Date: 2024       Admit Date: 2024    Visit Dx:     ICD-10-CM ICD-9-CM   1. Fall, initial encounter  W19.XXXA E888.9   2. Injury of head, initial encounter  S09.90XA 959.01   3. Post-traumatic subdural hygroma  G96.08 852.20   4. Strain of neck muscle, initial encounter  S16.1XXA 847.0   5. Multiple abrasions  T07.XXXA 919.0   6. Contusion of nose, initial encounter  S00.33XA 920   7. Contusion of left knee, initial encounter  S80.02XA 924.11   8. Cancer of right breast metastatic to brain  C50.911 174.9    C79.31 198.3   9. History of bilateral mastectomy  Z90.13 V45.71   10. Acute UTI  N39.0 599.0   11. Chronic pain syndrome  G89.4 338.4     Patient Active Problem List   Diagnosis    Mixed hyperlipidemia    Moderate episode of recurrent major depressive disorder    Anxiety    Functional diarrhea    Breast CA    Atrial fibrillation    Invasive ductal carcinoma of left breast    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    Left-sided weakness    THALIA (generalized anxiety disorder)    Acute deep vein thrombosis (DVT) of left upper extremity    Persistent atrial fibrillation    Lactic acidosis    T12 compression fracture    Falls    History of atrial fibrillation    History of pulmonary embolism    Bilateral leg weakness    Tobacco abuse    Alcohol abuse    Generalized weakness    Failure to thrive in adult    Depression    Subdural hygroma    Severe protein-calorie malnutrition    UTI (urinary tract infection)     Past Medical History:   Diagnosis Date    Breast CA 10/4/2018    Depression     DJD (degenerative joint disease) of cervical spine     THALIA (generalized anxiety disorder)     Heart murmur     Ovarian cancer     Rt Cellulitis of chest wall 10/15/2018    Tobacco dependence     UTI (urinary tract infection) 2020     Past Surgical History:   Procedure Laterality Date    APPENDECTOMY      BREAST  BIOPSY Right 2003    DONE IN [place]    COLONOSCOPY  06/2018    HYSTERECTOMY  1989    MASTECTOMY W/ SENTINEL NODE BIOPSY Bilateral 10/4/2018    Procedure: LEFT SKIN SPARING BREAST MASTECTOMY WITH LEFT SENTINEL NODE BIOPSY, RIGHT PROPHYLACTIC SKIN SPARING MASTECTOMY;  Surgeon: Koffi Worthington MD;  Location: [facility] OR;  Service: General    OOPHORECTOMY  1989      General Information       Row Name 12/20/24 1616          OT Time and Intention    Subjective Information complains of;weakness  -TB     Document Type therapy note (daily note)  -TB     Mode of Treatment occupational therapy  -TB     Patient Effort good  -TB     Symptoms Noted During/After Treatment fatigue  -TB       Row Name 12/20/24 1616          General Information    Existing Precautions/Restrictions fall;other (see comments)  engle cath, recurrent falls  -TB     Barriers to Rehab medically complex;previous functional deficit  -TB       Row Name 12/20/24 1616          Occupational Profile    Reason for Services/Referral (Occupational Profile) Occupational decline  -TB       Row Name 12/20/24 1616          Cognition    Orientation Status (Cognition) oriented x 4  -TB       Row Name 12/20/24 1616          Safety Issues/Impairments Affecting Functional Mobility    Safety Issues Affecting Function (Mobility) insight into deficits/self-awareness;awareness of need for assistance;safety precaution awareness;safety precautions follow-through/compliance;sequencing abilities  -TB     Impairments Affecting Function (Mobility) balance;endurance/activity tolerance;pain;strength;postural/trunk control  -TB               User Key  (r) = Recorded By, (t) = Taken By, (c) = Cosigned By      Initials Name Provider Type    TB Rossi Melgar OT Occupational Therapist                     Mobility/ADL's       Row Name 12/20/24 1615          Bed Mobility    Comment, (Bed Mobility) Pt rec'vd EOB  -TB       Row Name 12/20/24 1619          Transfers    Transfers  sit-stand transfer;stand-sit transfer;bed-chair transfer;toilet transfer  -TB       Row Name 12/20/24 1619          Bed-Chair Transfer    Bed-Chair [place] (Transfers) contact guard;1 person assist;verbal cues  -TB     Assistive Device (Bed-Chair Transfers) walker, front-wheeled  -TB       Row Name 12/20/24 1619          Sit-Stand Transfer    Sit-Stand [place] (Transfers) contact guard;1 person assist;verbal cues  -TB     Assistive Device (Sit-Stand Transfers) walker, front-wheeled  -TB       Row Name 12/20/24 1619          Stand-Sit Transfer    Stand-Sit [place] (Transfers) contact guard;1 person assist;verbal cues  -TB     Assistive Device (Stand-Sit Transfers) walker, front-wheeled  -TB       Row Name 12/20/24 1619          Toilet Transfer    Type (Toilet Transfer) sit-stand;stand-sit  -TB     [place] Level (Toilet Transfer) contact guard;1 person assist;verbal cues  -TB     Assistive Device (Toilet Transfer) commode, bedside without drop arms  -       Row Name 12/20/24 1619          Functional Mobility    Functional Mobility- Ind. Level contact guard assist;1 person;verbal cues required  -TB     Functional Mobility- Device walker, front-wheeled  -TB     Functional Mobility- Safety Issues balance decreased during turns  -TB     Functional Mobility- Comment Pt is up in room with encouragement and time. Denies dizziness with OOB activity. Unsteady without LOB this session.  -TB     Patient was able to Ambulate yes  -       Row Name 12/20/24 1619          Activities of Daily Living    BADL Assessment/Intervention upper body dressing;feeding;toileting;grooming  -       Row Name 12/20/24 1619          Grooming Assessment/Training    [place] Level (Grooming) set up;wash face, hands  -TB     Position (Grooming) edge of bed sitting  -       Row Name 12/20/24 1619          Toileting Assessment/Training    [place] Level (Toileting) toileting skills;standby assist  -TB     Position  (Toileting) unsupported sitting;supported standing  -TB       Row Name 12/20/24 1619          Upper Body Dressing Assessment/Training    [place] Level (Upper Body Dressing) don;pajama/robe;set up  -TB     Position (Upper Body Dressing) edge of bed sitting  -TB       Row Name 12/20/24 1619          Self-Feeding Assessment/Training    [place] Level (Feeding) independent;liquids to mouth  -TB     Position (Feeding) sitting up in bed  -TB               User Key  (r) = Recorded By, (t) = Taken By, (c) = Cosigned By      Initials Name Provider Type     Rossi Melgar OT Occupational Therapist                   Obj/Interventions       Row Name 12/20/24 1623          Balance    Balance Assessment sitting static balance;sitting dynamic balance;sit to stand dynamic balance;standing static balance;standing dynamic balance  -TB     Static Sitting Balance standby assist  -TB     Dynamic Sitting Balance contact guard  -TB     Position, Sitting Balance sitting edge of bed;other (see comments)  BSC  -TB     Sit to Stand Dynamic Balance contact guard;1-person assist;verbal cues  -TB     Static Standing Balance contact guard  -TB     Dynamic Standing Balance contact guard  -TB     Position/Device Used, Standing Balance supported;walker, front-wheeled  -TB     Balance Interventions sitting;standing;sit to stand;supported;static;dynamic;dynamic reaching;occupation based/functional task;UE activity with balance activity  -TB     Comment, Balance Unsteady without LOB  -TB               User Key  (r) = Recorded By, (t) = Taken By, (c) = Cosigned By      Initials Name Provider Type    TB Rossi Melgar OT Occupational Therapist                   Goals/Plan    No documentation.                  Clinical Impression       Row Name 12/20/24 1624          Pain Assessment    Pain Location abdomen  -TB     Pain Side/Orientation generalized  -TB     Pain Management Interventions positioning techniques utilized  -TB        Row Name 12/20/24 1624          Pain Scale: FACES Pre/Post-Treatment    Pain: FACES Scale, Pretreatment 4-->hurts little more  -TB     Posttreatment Pain Rating 4-->hurts little more  -TB       Row Name 12/20/24 1624          Plan of Care Review    Plan of Care Reviewed With patient  -TB     Progress improving  -TB     Outcome Evaluation Pt is A/Ox4 and participates in therapy with encouragement and time all tasks. Remains below her baseline with strength, balance, and activity tolerance deficits. Up in room and transfering with RW support and CGAx1. Unsteady without LOB this session. Set-up/SBA UB  ADLs and toileting. OT will continue to follow IP. Pt would benefit from IRF for best outcome when medically ready.  -TB       Row Name 12/20/24 1624          Therapy Plan Review/Discharge Plan (OT)    Anticipated Discharge Disposition (OT) inpatient rehabilitation facility  -TB       Row Name 12/20/24 1624          Vital Signs    Pre Systolic BP Rehab --  RN cleared OT  -TB     O2 Delivery Pre Treatment room air  -TB     Pre Patient Position Supine  -TB     Intra Patient Position Standing  -TB     Post Patient Position Supine  -TB       Row Name 12/20/24 1624          Positioning and Restraints    Pre-Treatment Position in bed  -TB     Post Treatment Position bed  -TB     In Bed notified nsg;fowlers;call light within reach;encouraged to call for assist;exit alarm on;heels elevated  -TB               User Key  (r) = Recorded By, (t) = Taken By, (c) = Cosigned By      Initials Name Provider Type    TB Rossi Melgar, OT Occupational Therapist                   Outcome Measures       Row Name 12/20/24 1602 12/20/24 0836       How much help from another person do you currently need...    Turning from your back to your side while in flat bed without using bedrails? 4  -SC 3  -SCA    Moving from lying on back to sitting on the side of a flat bed without bedrails? 3  -SC 3  -SCA    Moving to and from a bed to a  chair (including a wheelchair)? 3  -SC 3  -SCA    Standing up from a chair using your arms (e.g., wheelchair, bedside chair)? 3  -SC 3  -SCA    Climbing 3-5 steps with a railing? 3  -SC 2  -SCA    To walk in hospital room? 3  -SC 3  -SCA    AM-PAC 6 Clicks Score (PT) 19  -SC 17  -SCA    Highest Level of Mobility Goal 6 --> Walk 10 steps or more  -SC 5 --> Static standing  -[facility]      Row Name 12/20/24 1602          Functional Assessment    Outcome Measure Options AM-PAC 6 Clicks Basic Mobility (PT)  -SC               User Key  (r) = Recorded By, (t) = Taken By, (c) = Cosigned By      Initials Name Provider Type    SC Brian Huntley, PT Physical Therapist    Florecita Gordon, RN Registered Nurse                    Occupational Therapy Education       Title: PT OT SLP Therapies (Done)       Topic: Occupational Therapy (Done)       Point: ADL training (Done)       Description:   Instruct learner(s) on proper safety adaptation and remediation techniques during self care or transfers.   Instruct in proper use of assistive devices.                  Learning Progress Summary            Patient Acceptance, E,D, VU,DU,NR by TB at 12/20/2024 1627    Acceptance, E,TB, VU by MD at 12/19/2024 1737    Acceptance, E, VU,NR by SANYA at 12/18/2024 1521                      Point: Home exercise program (Done)       Description:   Instruct learner(s) on appropriate technique for monitoring, assisting and/or progressing therapeutic exercises/activities.                  Learning Progress Summary            Patient Acceptance, E,TB, VU by MD at 12/19/2024 1737                      Point: Precautions (Done)       Description:   Instruct learner(s) on prescribed precautions during self-care and functional transfers.                  Learning Progress Summary            Patient Acceptance, E,TB, VU by MD at 12/19/2024 1737    Acceptance, E, VU,NR by SANYA at 12/18/2024 1521                      Point: Body mechanics (Done)        Description:   Instruct learner(s) on proper positioning and spine alignment during self-care, functional mobility activities and/or exercises.                  Learning Progress Summary            Patient Acceptance, E,TB, VU by MD at 12/19/2024 1737    Acceptance, E, VU,NR by SANYA at 12/18/2024 1521                                      User Key       Initials Effective Dates Name Provider Type Discipline     07/11/23 -  Rossi Melgar, OT Occupational Therapist OT    MD 07/26/24 -  Shelby Coronel RN Registered Nurse Nurse    SANYA 08/26/24 -  Daya Avila OT Occupational Therapist OT                  OT Recommendation and Plan     Plan of Care Review  Plan of Care Reviewed With: patient  Progress: improving  Outcome Evaluation: Pt is A/Ox4 and participates in therapy with encouragement and time all tasks. Remains below her baseline with strength, balance, and activity tolerance deficits. Up in room and transfering with RW support and CGAx1. Unsteady without LOB this session. Set-up/SBA UB  ADLs and toileting. OT will continue to follow IP. Pt would benefit from IRF for best outcome when medically ready.     Time Calculation:         Time Calculation- OT       Row Name 12/20/24 1520 12/20/24 1511          Time Calculation- OT    OT Start Time 1520  -TB --     OT Received On 12/20/24  -TB --     OT Goal Re-Cert Due Date 12/28/24  -TB --        Timed Charges    34155 - Gait Training Minutes  -- 8  -SC     98861 - OT Self Care/Mgmt Minutes 23  -TB --        Total Minutes    Timed Charges Total Minutes 23  -TB 8  -SC      Total Minutes 23  -TB 8  -SC               User Key  (r) = Recorded By, (t) = Taken By, (c) = Cosigned By      Initials Name Provider Type    SC Brian Huntley, PT Physical Therapist    Rossi Mendosa, OT Occupational Therapist                  Therapy Charges for Today       Code Description Service Date Service Provider Modifiers Qty    [id number]  OT SELF CARE/MGMT/TRAIN  EA 15 MIN 12/20/2024 Rossi Melgar, OT GO 2                 Rossi Melgar OT  12/20/2024

## 2024-12-20 NOTE — PLAN OF CARE
"Patient was unable to void.  Bladder scanned at 10:00 with 304ml/ 12:30 with 520ml.  I&0 cath attempted.  Engle kit required to obtain urine return.  Patient did not tolerate well and complained of pain with catheterization.  Small amount of blood noted.  Notified APRN.  Order to keep engle \"for now\".  Medications changed to help control pain with not effecting bladder.  IV morphine discontinued d/t to coincidence of the timing of not being able to empty bladder and the use of morphine.  PO medications changed per patient request.  Ambulated with therapy.  VSS.                                              "

## 2024-12-21 ENCOUNTER — APPOINTMENT (OUTPATIENT)
Dept: GENERAL RADIOLOGY | Facility: HOSPITAL | Age: 64
End: 2024-12-21
Payer: MEDICARE

## 2024-12-21 PROBLEM — E87.1 HYPONATREMIA: Status: ACTIVE | Noted: 2024-12-21

## 2024-12-21 PROBLEM — E22.2 SIADH (SYNDROME OF INAPPROPRIATE ADH PRODUCTION): Status: ACTIVE | Noted: 2024-12-21

## 2024-12-21 LAB
ANION GAP SERPL CALCULATED.3IONS-SCNC: 12 MMOL/L (ref 5–15)
ANION GAP SERPL CALCULATED.3IONS-SCNC: 15 MMOL/L (ref 5–15)
BASOPHILS # BLD AUTO: 0.06 10*3/MM3 (ref 0–0.2)
BASOPHILS NFR BLD AUTO: 0.8 % (ref 0–1.5)
BUN SERPL-MCNC: 6 MG/DL (ref 8–23)
BUN SERPL-MCNC: 7 MG/DL (ref 8–23)
BUN/CREAT SERPL: 12.8 (ref 7–25)
BUN/CREAT SERPL: 15.2 (ref 7–25)
CALCIUM SPEC-SCNC: 8.7 MG/DL (ref 8.6–10.5)
CALCIUM SPEC-SCNC: 9 MG/DL (ref 8.6–10.5)
CHLORIDE SERPL-SCNC: 81 MMOL/L (ref 98–107)
CHLORIDE SERPL-SCNC: 84 MMOL/L (ref 98–107)
CO2 SERPL-SCNC: 21 MMOL/L (ref 22–29)
CO2 SERPL-SCNC: 25 MMOL/L (ref 22–29)
CORTIS SERPL-MCNC: 15.25 MCG/DL
CREAT SERPL-MCNC: 0.46 MG/DL (ref 0.57–1)
CREAT SERPL-MCNC: 0.47 MG/DL (ref 0.57–1)
DEPRECATED RDW RBC AUTO: 39.8 FL (ref 37–54)
EGFRCR SERPLBLD CKD-EPI 2021: 106.5 ML/MIN/1.73
EGFRCR SERPLBLD CKD-EPI 2021: 107 ML/MIN/1.73
EOSINOPHIL # BLD AUTO: 0.17 10*3/MM3 (ref 0–0.4)
EOSINOPHIL NFR BLD AUTO: 2.3 % (ref 0.3–6.2)
ERYTHROCYTE [DISTWIDTH] IN BLOOD BY AUTOMATED COUNT: 12 % (ref 12.3–15.4)
GLUCOSE SERPL-MCNC: 109 MG/DL (ref 65–99)
GLUCOSE SERPL-MCNC: 124 MG/DL (ref 65–99)
HCT VFR BLD AUTO: 33.7 % (ref 34–46.6)
HGB BLD-MCNC: 11.8 G/DL (ref 12–15.9)
IMM GRANULOCYTES # BLD AUTO: 0.03 10*3/MM3 (ref 0–0.05)
IMM GRANULOCYTES NFR BLD AUTO: 0.4 % (ref 0–0.5)
LYMPHOCYTES # BLD AUTO: 1.2 10*3/MM3 (ref 0.7–3.1)
LYMPHOCYTES NFR BLD AUTO: 16.4 % (ref 19.6–45.3)
MAGNESIUM SERPL-MCNC: 1.5 MG/DL (ref 1.6–2.4)
MCH RBC QN AUTO: 31.6 PG (ref 26.6–33)
MCHC RBC AUTO-ENTMCNC: 35 G/DL (ref 31.5–35.7)
MCV RBC AUTO: 90.3 FL (ref 79–97)
MONOCYTES # BLD AUTO: 0.56 10*3/MM3 (ref 0.1–0.9)
MONOCYTES NFR BLD AUTO: 7.7 % (ref 5–12)
NEUTROPHILS NFR BLD AUTO: 5.29 10*3/MM3 (ref 1.7–7)
NEUTROPHILS NFR BLD AUTO: 72.4 % (ref 42.7–76)
NRBC BLD AUTO-RTO: 0 /100 WBC (ref 0–0.2)
OSMOLALITY SERPL: 245 MOSM/KG (ref 275–295)
OSMOLALITY UR: 604 MOSM/KG (ref 300–1100)
PHOSPHATE SERPL-MCNC: 3.5 MG/DL (ref 2.5–4.5)
PLATELET # BLD AUTO: 258 10*3/MM3 (ref 140–450)
PMV BLD AUTO: 9.1 FL (ref 6–12)
POTASSIUM SERPL-SCNC: 4 MMOL/L (ref 3.5–5.2)
POTASSIUM SERPL-SCNC: 4.1 MMOL/L (ref 3.5–5.2)
POTASSIUM SERPL-SCNC: 4.6 MMOL/L (ref 3.5–5.2)
RBC # BLD AUTO: 3.73 10*6/MM3 (ref 3.77–5.28)
SODIUM SERPL-SCNC: 117 MMOL/L (ref 136–145)
SODIUM SERPL-SCNC: 119 MMOL/L (ref 136–145)
SODIUM SERPL-SCNC: 120 MMOL/L (ref 136–145)
SODIUM SERPL-SCNC: 121 MMOL/L (ref 136–145)
SODIUM SERPL-SCNC: 121 MMOL/L (ref 136–145)
SODIUM UR-SCNC: 177 MMOL/L
SODIUM UR-SCNC: 23 MMOL/L
WBC NRBC COR # BLD AUTO: 7.31 10*3/MM3 (ref 3.4–10.8)

## 2024-12-21 PROCEDURE — 85025 COMPLETE CBC W/AUTO DIFF WBC: CPT | Performed by: STUDENT IN AN ORGANIZED HEALTH CARE EDUCATION/TRAINING PROGRAM

## 2024-12-21 PROCEDURE — 84295 ASSAY OF SERUM SODIUM: CPT | Performed by: NURSE PRACTITIONER

## 2024-12-21 PROCEDURE — B548ZZA ULTRASONOGRAPHY OF SUPERIOR VENA CAVA, GUIDANCE: ICD-10-PCS | Performed by: NURSE PRACTITIONER

## 2024-12-21 PROCEDURE — 36556 INSERT NON-TUNNEL CV CATH: CPT | Performed by: NURSE PRACTITIONER

## 2024-12-21 PROCEDURE — 93010 ELECTROCARDIOGRAM REPORT: CPT | Performed by: INTERNAL MEDICINE

## 2024-12-21 PROCEDURE — 82533 TOTAL CORTISOL: CPT | Performed by: STUDENT IN AN ORGANIZED HEALTH CARE EDUCATION/TRAINING PROGRAM

## 2024-12-21 PROCEDURE — 25810000003 SODIUM CHLORIDE 0.9 % SOLUTION: Performed by: INTERNAL MEDICINE

## 2024-12-21 PROCEDURE — 71045 X-RAY EXAM CHEST 1 VIEW: CPT

## 2024-12-21 PROCEDURE — 84132 ASSAY OF SERUM POTASSIUM: CPT | Performed by: INTERNAL MEDICINE

## 2024-12-21 PROCEDURE — 83935 ASSAY OF URINE OSMOLALITY: CPT | Performed by: NURSE PRACTITIONER

## 2024-12-21 PROCEDURE — 25010000002 ONDANSETRON PER 1 MG

## 2024-12-21 PROCEDURE — P9047 ALBUMIN (HUMAN), 25%, 50ML: HCPCS | Performed by: INTERNAL MEDICINE

## 2024-12-21 PROCEDURE — 02HV33Z INSERTION OF INFUSION DEVICE INTO SUPERIOR VENA CAVA, PERCUTANEOUS APPROACH: ICD-10-PCS | Performed by: NURSE PRACTITIONER

## 2024-12-21 PROCEDURE — 25010000002 PROCHLORPERAZINE 10 MG/2ML SOLUTION: Performed by: STUDENT IN AN ORGANIZED HEALTH CARE EDUCATION/TRAINING PROGRAM

## 2024-12-21 PROCEDURE — 99291 CRITICAL CARE FIRST HOUR: CPT | Performed by: INTERNAL MEDICINE

## 2024-12-21 PROCEDURE — 83935 ASSAY OF URINE OSMOLALITY: CPT | Performed by: INTERNAL MEDICINE

## 2024-12-21 PROCEDURE — 83930 ASSAY OF BLOOD OSMOLALITY: CPT | Performed by: STUDENT IN AN ORGANIZED HEALTH CARE EDUCATION/TRAINING PROGRAM

## 2024-12-21 PROCEDURE — 74018 RADEX ABDOMEN 1 VIEW: CPT

## 2024-12-21 PROCEDURE — 25010000002 ALBUMIN HUMAN 25% PER 50 ML: Performed by: INTERNAL MEDICINE

## 2024-12-21 PROCEDURE — 25010000002 KETOROLAC TROMETHAMINE PER 15 MG

## 2024-12-21 PROCEDURE — 80048 BASIC METABOLIC PNL TOTAL CA: CPT | Performed by: STUDENT IN AN ORGANIZED HEALTH CARE EDUCATION/TRAINING PROGRAM

## 2024-12-21 PROCEDURE — 83735 ASSAY OF MAGNESIUM: CPT | Performed by: STUDENT IN AN ORGANIZED HEALTH CARE EDUCATION/TRAINING PROGRAM

## 2024-12-21 PROCEDURE — 25010000002 MAGNESIUM SULFATE 2 GM/50ML SOLUTION: Performed by: STUDENT IN AN ORGANIZED HEALTH CARE EDUCATION/TRAINING PROGRAM

## 2024-12-21 PROCEDURE — 84300 ASSAY OF URINE SODIUM: CPT | Performed by: NURSE PRACTITIONER

## 2024-12-21 PROCEDURE — 25010000002 HYDROMORPHONE PER 4 MG: Performed by: NURSE PRACTITIONER

## 2024-12-21 PROCEDURE — 93005 ELECTROCARDIOGRAM TRACING: CPT | Performed by: STUDENT IN AN ORGANIZED HEALTH CARE EDUCATION/TRAINING PROGRAM

## 2024-12-21 PROCEDURE — 76937 US GUIDE VASCULAR ACCESS: CPT | Performed by: NURSE PRACTITIONER

## 2024-12-21 PROCEDURE — 80048 BASIC METABOLIC PNL TOTAL CA: CPT | Performed by: INTERNAL MEDICINE

## 2024-12-21 PROCEDURE — 84300 ASSAY OF URINE SODIUM: CPT | Performed by: INTERNAL MEDICINE

## 2024-12-21 PROCEDURE — 63710000001 ONDANSETRON ODT 4 MG TABLET DISPERSIBLE: Performed by: STUDENT IN AN ORGANIZED HEALTH CARE EDUCATION/TRAINING PROGRAM

## 2024-12-21 PROCEDURE — 84100 ASSAY OF PHOSPHORUS: CPT | Performed by: STUDENT IN AN ORGANIZED HEALTH CARE EDUCATION/TRAINING PROGRAM

## 2024-12-21 PROCEDURE — 99232 SBSQ HOSP IP/OBS MODERATE 35: CPT | Performed by: NURSE PRACTITIONER

## 2024-12-21 RX ORDER — ONDANSETRON 4 MG/1
4 TABLET, ORALLY DISINTEGRATING ORAL EVERY 6 HOURS PRN
Status: DISCONTINUED | OUTPATIENT
Start: 2024-12-21 | End: 2024-12-22

## 2024-12-21 RX ORDER — ONDANSETRON 2 MG/ML
4 INJECTION INTRAMUSCULAR; INTRAVENOUS ONCE
Status: COMPLETED | OUTPATIENT
Start: 2024-12-21 | End: 2024-12-21

## 2024-12-21 RX ORDER — 3% SODIUM CHLORIDE 3 G/100ML
20 INJECTION, SOLUTION INTRAVENOUS CONTINUOUS
Status: DISCONTINUED | OUTPATIENT
Start: 2024-12-21 | End: 2024-12-22

## 2024-12-21 RX ORDER — BISACODYL 5 MG/1
5 TABLET, DELAYED RELEASE ORAL DAILY PRN
Status: DISCONTINUED | OUTPATIENT
Start: 2024-12-21 | End: 2024-12-26 | Stop reason: HOSPADM

## 2024-12-21 RX ORDER — PROCHLORPERAZINE EDISYLATE 5 MG/ML
5 INJECTION INTRAMUSCULAR; INTRAVENOUS ONCE
Status: COMPLETED | OUTPATIENT
Start: 2024-12-21 | End: 2024-12-21

## 2024-12-21 RX ORDER — HYDROMORPHONE HYDROCHLORIDE 1 MG/ML
0.5 INJECTION, SOLUTION INTRAMUSCULAR; INTRAVENOUS; SUBCUTANEOUS ONCE
Status: COMPLETED | OUTPATIENT
Start: 2024-12-21 | End: 2024-12-21

## 2024-12-21 RX ORDER — ACETAMINOPHEN 500 MG
1000 TABLET ORAL EVERY 8 HOURS
Status: DISCONTINUED | OUTPATIENT
Start: 2024-12-21 | End: 2024-12-26 | Stop reason: HOSPADM

## 2024-12-21 RX ORDER — OXYCODONE HYDROCHLORIDE 5 MG/1
5 TABLET ORAL EVERY 4 HOURS PRN
Status: DISCONTINUED | OUTPATIENT
Start: 2024-12-21 | End: 2024-12-21

## 2024-12-21 RX ORDER — BISACODYL 10 MG
10 SUPPOSITORY, RECTAL RECTAL DAILY PRN
Status: DISCONTINUED | OUTPATIENT
Start: 2024-12-21 | End: 2024-12-26 | Stop reason: HOSPADM

## 2024-12-21 RX ORDER — DESMOPRESSIN ACETATE 4 UG/ML
2 INJECTION, SOLUTION INTRAVENOUS; SUBCUTANEOUS EVERY 6 HOURS PRN
Status: DISCONTINUED | OUTPATIENT
Start: 2024-12-21 | End: 2024-12-22

## 2024-12-21 RX ORDER — AMOXICILLIN 250 MG
2 CAPSULE ORAL 2 TIMES DAILY
Status: DISCONTINUED | OUTPATIENT
Start: 2024-12-21 | End: 2024-12-26 | Stop reason: HOSPADM

## 2024-12-21 RX ORDER — DEXTROSE MONOHYDRATE 50 MG/ML
6 INJECTION, SOLUTION INTRAVENOUS CONTINUOUS PRN
Status: DISCONTINUED | OUTPATIENT
Start: 2024-12-21 | End: 2024-12-22

## 2024-12-21 RX ORDER — MAGNESIUM SULFATE HEPTAHYDRATE 40 MG/ML
2 INJECTION, SOLUTION INTRAVENOUS
Status: COMPLETED | OUTPATIENT
Start: 2024-12-21 | End: 2024-12-21

## 2024-12-21 RX ORDER — OXYCODONE HYDROCHLORIDE 15 MG/1
7.5 TABLET ORAL EVERY 4 HOURS PRN
Status: DISCONTINUED | OUTPATIENT
Start: 2024-12-21 | End: 2024-12-22

## 2024-12-21 RX ORDER — KETOROLAC TROMETHAMINE 15 MG/ML
15 INJECTION, SOLUTION INTRAMUSCULAR; INTRAVENOUS ONCE
Status: COMPLETED | OUTPATIENT
Start: 2024-12-21 | End: 2024-12-21

## 2024-12-21 RX ORDER — SODIUM CHLORIDE 1 G/1
2 TABLET ORAL
Status: DISCONTINUED | OUTPATIENT
Start: 2024-12-21 | End: 2024-12-22

## 2024-12-21 RX ORDER — POLYETHYLENE GLYCOL 3350 17 G/17G
17 POWDER, FOR SOLUTION ORAL DAILY PRN
Status: DISCONTINUED | OUTPATIENT
Start: 2024-12-21 | End: 2024-12-26 | Stop reason: HOSPADM

## 2024-12-21 RX ORDER — ALBUMIN (HUMAN) 12.5 G/50ML
25 SOLUTION INTRAVENOUS ONCE
Status: COMPLETED | OUTPATIENT
Start: 2024-12-21 | End: 2024-12-21

## 2024-12-21 RX ADMIN — BISACODYL 5 MG: 5 TABLET, COATED ORAL at 13:04

## 2024-12-21 RX ADMIN — SENNOSIDES AND DOCUSATE SODIUM 2 TABLET: 50; 8.6 TABLET ORAL at 10:27

## 2024-12-21 RX ADMIN — ACETAMINOPHEN 1000 MG: 500 TABLET, FILM COATED ORAL at 15:18

## 2024-12-21 RX ADMIN — TAMSULOSIN HYDROCHLORIDE 0.4 MG: 0.4 CAPSULE ORAL at 09:07

## 2024-12-21 RX ADMIN — MAGNESIUM SULFATE HEPTAHYDRATE 2 G: 2 INJECTION, SOLUTION INTRAVENOUS at 18:12

## 2024-12-21 RX ADMIN — ONDANSETRON 4 MG: 4 TABLET, ORALLY DISINTEGRATING ORAL at 18:37

## 2024-12-21 RX ADMIN — ONDANSETRON 4 MG: 4 TABLET, ORALLY DISINTEGRATING ORAL at 07:28

## 2024-12-21 RX ADMIN — KETOROLAC TROMETHAMINE 15 MG: 15 INJECTION, SOLUTION INTRAMUSCULAR; INTRAVENOUS at 03:32

## 2024-12-21 RX ADMIN — NALOXEGOL OXALATE 25 MG: 25 TABLET, FILM COATED ORAL at 09:07

## 2024-12-21 RX ADMIN — SENNOSIDES AND DOCUSATE SODIUM 2 TABLET: 50; 8.6 TABLET ORAL at 20:27

## 2024-12-21 RX ADMIN — BUSPIRONE HYDROCHLORIDE 5 MG: 5 TABLET ORAL at 09:15

## 2024-12-21 RX ADMIN — HYDROMORPHONE HYDROCHLORIDE 0.5 MG: 1 INJECTION, SOLUTION INTRAMUSCULAR; INTRAVENOUS; SUBCUTANEOUS at 22:59

## 2024-12-21 RX ADMIN — BUSPIRONE HYDROCHLORIDE 5 MG: 5 TABLET ORAL at 15:18

## 2024-12-21 RX ADMIN — BUPROPION HYDROCHLORIDE 150 MG: 150 TABLET, EXTENDED RELEASE ORAL at 09:07

## 2024-12-21 RX ADMIN — ONDANSETRON 4 MG: 4 TABLET, ORALLY DISINTEGRATING ORAL at 13:04

## 2024-12-21 RX ADMIN — Medication 10 ML: at 22:20

## 2024-12-21 RX ADMIN — ONDANSETRON 4 MG: 2 INJECTION INTRAMUSCULAR; INTRAVENOUS at 03:32

## 2024-12-21 RX ADMIN — LIDOCAINE 1 PATCH: 4 PATCH TOPICAL at 09:07

## 2024-12-21 RX ADMIN — SODIUM CHLORIDE 30 ML/HR: 234 INJECTION, SOLUTION, CONCENTRATE INTRAVENOUS at 21:16

## 2024-12-21 RX ADMIN — PROCHLORPERAZINE EDISYLATE 5 MG: 5 INJECTION INTRAMUSCULAR; INTRAVENOUS at 09:34

## 2024-12-21 RX ADMIN — MAGNESIUM SULFATE HEPTAHYDRATE 2 G: 2 INJECTION, SOLUTION INTRAVENOUS at 12:56

## 2024-12-21 RX ADMIN — OXYCODONE 5 MG: 5 TABLET ORAL at 13:04

## 2024-12-21 RX ADMIN — OXYCODONE HYDROCHLORIDE 7.5 MG: 15 TABLET ORAL at 20:28

## 2024-12-21 RX ADMIN — OXYCODONE HYDROCHLORIDE 7.5 MG: 15 TABLET ORAL at 15:18

## 2024-12-21 RX ADMIN — SODIUM CHLORIDE 2 G: 1 TABLET ORAL at 18:12

## 2024-12-21 RX ADMIN — ALBUMIN (HUMAN) 25 G: 0.25 INJECTION, SOLUTION INTRAVENOUS at 16:28

## 2024-12-21 RX ADMIN — HYDROCODONE BITARTRATE AND ACETAMINOPHEN 1 TABLET: 7.5; 325 TABLET ORAL at 05:46

## 2024-12-21 RX ADMIN — MAGNESIUM SULFATE HEPTAHYDRATE 2 G: 2 INJECTION, SOLUTION INTRAVENOUS at 15:05

## 2024-12-21 RX ADMIN — BUSPIRONE HYDROCHLORIDE 5 MG: 5 TABLET ORAL at 20:27

## 2024-12-21 RX ADMIN — OXYCODONE 5 MG: 5 TABLET ORAL at 09:06

## 2024-12-21 NOTE — PLAN OF CARE
Goal Outcome Evaluation:      Pt had a period of nausea, vomiting, pain and tachycardia in 130s-140s.  Got EKG and noted Normal Sinus rhythm in 90s.  Notified NP on call, Beryl, and orders for one time dose of Zofran and Toradol, both IV obtained and given.  Pt tolerated and calmed down.  Noted good urine output to Rodriguez Cath as well.  Patient is currently resting quietly.  NAD noted at this time.

## 2024-12-21 NOTE — CASE MANAGEMENT/SOCIAL WORK
Continued Stay Note  [facility]     Patient Name: Brittani Lambert  MRN: [id number]  Today's Date: 12/21/2024    Admit Date: 12/16/2024    Plan: [facility]   Discharge Plan       Row Name 12/21/24 1211       Plan    Plan [facility]    Patient/Family in Agreement with Plan other (see comments)    Plan Comments  spoke with Callie at [facility], insurance is still pending.  spoke with [facility]. They can transport pt, by stretcher, tomorrow at 1330.  will continue to follow.    Final Discharge Disposition Code 03 - skilled nursing facility (SNF)                   Discharge Codes    No documentation.                 Expected Discharge Date and Time       Expected Discharge Date Expected Discharge Time    Dec 23, 2024               Janice Barkley RN

## 2024-12-21 NOTE — PROGRESS NOTES
[facility] Medicine Services  PROGRESS NOTE    Patient Name: Brittani Lambert  : 1960  MRN: [id number]    Date of Admission: 2024  Primary Care Physician: Alla Colon,     Subjective   Subjective     CC:  F/u subdural hygroma    HPI:  Patient resting in bed.  Discussed plan to have nephrology see her given her decreased and sodium.  Patient says she is still having some nausea.  Still has not had a BM in about 10 days.  Says her normal pattern is to have a bowel movement every 5 to 7 days.  Patient still notes pain in her right rib and leg from her fall.      Objective   Objective     Vital Signs:   Temp:  [96.8 °F (36 °C)-98.3 °F (36.8 °C)] 97.2 °F (36.2 °C)  Heart Rate:  [] 71  Resp:  [18] 18  BP: (109-182)/() 123/78     Physical Exam:  Constitutional: No acute distress, awake, alert  HENT: Normocephalic, mucous membranes moist, ecchymosis right jaw and neck  Respiratory: Clear to auscultation bilaterally, respiratory effort normal, roomair  Cardiovascular: RRR, no murmurs, rubs, or gallops  Gastrointestinal: Positive bowel sounds, soft, nontender, nondistended  Musculoskeletal: No bilateral ankle edema  Psychiatric: Appropriate affect, cooperative  Neurologic: Oriented x 3, moves all extremities, speech clear  Skin: No rashes      Results Reviewed:  LAB RESULTS:      Lab 24  0748 24  0849 24  2130 24   WBC 7.31  --   --   --  7.86   HEMOGLOBIN 11.8* 10.7*  --   --  10.8*   HEMOGLOBIN, POC  --   --   --  5.4*  --    HEMATOCRIT 33.7* 31.8*  --   --  35.0   HEMATOCRIT POC  --   --   --  16*  --    PLATELETS 258  --   --   --  174   NEUTROS ABS 5.29  --   --   --  5.51   IMMATURE GRANS (ABS) 0.03  --   --   --  0.05   LYMPHS ABS 1.20  --   --   --  1.44   MONOS ABS 0.56  --   --   --  0.71   EOS ABS 0.17  --   --   --  0.07   MCV 90.3  --   --   --  104.5*   HSTROP T  --   --  7  --  [facility] 24  5110  12/21/24  0853 12/21/24  0748 12/18/24  0441 12/17/24  1027 12/16/24 1958 12/16/24 1953   SODIUM 120* 121*  --  133*  --   --  132*   POTASSIUM  --  4.6  --  3.8  --   --  3.9   CHLORIDE  --  84*  --  98  --   --  96*   CO2  --  25.0  --  23.0  --   --  22.0   ANION GAP  --  12.0  --  12.0  --   --  14.0   BUN  --  7*  --  6*  --   --  12   CREATININE  --  0.46*  --  0.50*  --  0.70 0.67   EGFR  --  107.0  --  104.9  --  96.7 97.7   GLUCOSE  --  124*  --  84  --   --  104*   CALCIUM  --  9.0  --  8.5*  --   --  8.8   MAGNESIUM  --  1.5*  --  1.8  --   --  1.8   PHOSPHORUS  --   --  3.5 3.4  --   --   --    TSH  --   --   --   --  1.550  --   --          Lab 12/16/24 1953   TOTAL PROTEIN 6.5   ALBUMIN 4.0   GLOBULIN 2.5   ALT (SGPT) 10   AST (SGOT) 25   BILIRUBIN 0.5   ALK PHOS 89         Lab 12/16/24 2130 12/16/24 1953   HSTROP T 7 7                 Brief Urine Lab Results  (Last result in the past 365 days)        Color   Clarity   Blood   Leuk Est   Nitrite   Protein   CREAT   Urine HCG        12/16/24 2124 Yellow   Cloudy   Negative   Trace   Negative   Trace                   Microbiology Results Abnormal       Procedure Component Value - Date/Time    Urine Culture - Urine, Urine, Catheter [[id number]]  (Abnormal)  (Susceptibility) Collected: 12/16/24 2124    Lab Status: Final result Specimen: Urine, Catheter Updated: 12/19/24 0914     Urine Culture >100,000 CFU/mL Escherichia coli    Narrative:      Colonization of the urinary tract without infection is common. Treatment is discouraged unless the patient is symptomatic, pregnant, or undergoing an invasive urologic procedure.    Susceptibility        Escherichia coli      MARS      Amoxicillin + Clavulanate Susceptible      Ampicillin Resistant      Ampicillin + Sulbactam Intermediate      Cefazolin (Urine) Susceptible      Cefepime Susceptible      Ceftazidime Susceptible      Ceftriaxone Susceptible      Gentamicin Susceptible      Levofloxacin Susceptible       Nitrofurantoin Susceptible      Piperacillin + Tazobactam Susceptible      Trimethoprim + Sulfamethoxazole Susceptible                                   XR Abdomen KUB    Result Date: 12/21/2024  XR ABDOMEN KUB Date of Exam: 12/21/2024 10:05 AM EST Indication: Abdominal pain, constipation Comparison: KUB 12/19/2024 Findings: Redemonstration of Rodriguez catheter. Lung bases are excluded from the field-of-view. Nonobstructive, nonspecific bowel gas pattern. There are scattered vascular calcifications. No pneumoperitoneum although supine radiographs are insensitive for this finding. There is scattered stool in the right colon; no evidence of large colonic stool burden. No acute osseous findings.     Impression: Impression: Nonobstructive, nonspecific bowel gas pattern. No evidence of large colonic stool burden. Electronically Signed: Sarabjit Zepeda MD  12/21/2024 12:18 PM EST  Workstation ID: [id number]     Results for orders placed during the hospital encounter of 09/24/20    Transthoracic Echo Complete With Contrast if Necessary Per Protocol    Interpretation Summary  · Left ventricular ejection fraction appears to be 61 - 65%. Left ventricular systolic function is normal.  · Left ventricular diastolic function is consistent with (grade I) impaired relaxation.  · Mild tricuspid valve regurgitation is present.  · Estimated right ventricular systolic pressure from tricuspid regurgitation is normal (<35 mmHg). Calculated right ventricular systolic pressure from tricuspid regurgitation is 22 mmHg.      Current medications:  Scheduled Meds:albumin human, 25 g, Intravenous, Once  [Held by provider] aspirin, 81 mg, Oral, Daily  [Held by provider] buPROPion XL, 150 mg, Oral, Daily  busPIRone, 5 mg, Oral, TID  Lidocaine, 1 patch, Transdermal, Q24H  magnesium sulfate, 2 g, Intravenous, Q2H  Naloxegol Oxalate, 25 mg, Oral, QAM  senna-docusate sodium, 2 tablet, Oral, BID  sodium chloride, 10 mL, Intravenous, Q12H  sodium  chloride, 2 g, Oral, TID With Meals  tamsulosin, 0.4 mg, Oral, Daily      Continuous Infusions:   PRN Meds:.  acetaminophen **OR** acetaminophen **OR** acetaminophen    senna-docusate sodium **AND** polyethylene glycol **AND** bisacodyl **AND** bisacodyl    Calcium Replacement - Follow Nurse / BPA Driven Protocol    [Held by provider] hydrOXYzine    Magnesium Standard Dose Replacement - Follow Nurse / BPA Driven Protocol    nicotine    ondansetron ODT    oxyCODONE    Phosphorus Replacement - Follow Nurse / BPA Driven Protocol    Potassium Replacement - Follow Nurse / BPA Driven Protocol    [COMPLETED] Insert Peripheral IV **AND** sodium chloride    sodium chloride    sodium chloride    Assessment & Plan   Assessment & Plan     Active Hospital Problems    Diagnosis  POA    **Subdural hygroma [G96.08]  Yes    UTI (urinary tract infection) [N39.0]  Yes    Severe protein-calorie malnutrition [E43]  Yes    Generalized weakness [R53.1]  Yes    Atrial fibrillation [I48.91]  Yes    Mixed hyperlipidemia [E78.2]  Yes      Resolved Hospital Problems   No resolved problems to display.        Brief Hospital Course to date:  Brittani Lambert is a 64 y.o. female w/ anxiety, depression, tobacco abuse, ovarian cancer 1989, metastatic breast cancer 2018 s/p whole brain radiation who presented for evaluation of recurrent falls. She reports weight loss of approx 10lbs since the beginning of this year, anorexia for approx 1 mo, and repeatedly falling when her legs get weak and give out (no loss of consciousness). She had a fall onto pavement with head injury that prompted her visit to the ED. Neuro imaging on arrival showed BL subdural hygromas. UA showed incidental UTI with urine culture positive for E. Coli.     This patient's problems and plans were partially entered by my partner and updated as appropriate by me 12/21/24.    Assessment/Plan     Recurrent falls  BL subdural hygromas  -CT facial bones w/o fracture/dislocation  -CT/MRI  brain w/ BL subdural hygromas  -Pt denies LOC w/ falls, legs just get weak and give out; strongly suspect deconditioning giver her poor PO intake and low body weight  -prn pain control. Stopped IV pain meds 12/20. Changed oxycodone to lortab per patient request but Lortab did not work for pain. Oxycodone resumed 12/21.   -NSGY evaluated, no acute intervention, f/u opt 2 weeks with repeat head CT  -PT/OT recommend rehab      Nausea/vomiting  Constipation  -- KUB on 12/17 showed severe bladder distention, Rodriguez placed  -- s/p Amitiza and cont Movantik, small BM documented 12/18  -- recheck KUB 12/19 due to abd pain and tenderness, showed mod stool   --N/V overnight 12/21 with concomitant tachycardia and elevated BP  --repeat KUB 12/21 with nonobstructive, non-specific bowel gas pattern, scattered stool in right colon, no evidence of large colonic stool burden  --pt notes normal bowel pattern is to have a BM every 5-7 days  --AM BMP, mag    Hyponatremia  --Na 121, likely contributing to N/V  --Calculated serum osmoles 245  --Urine osmoles 604, urine sodium 177  --Fluid restriction for now 1500 ml daily until seen by nephrology  --Serial sodium q4h  --Seizure precautions, hold Wellbutrin  --Nephrology consult given drop in Na in setting of recent head injury    UTI E coli   Urinary retention over 1L  constipation  -- Completed Rocephin for 3 doses  -- bladder scan > 999 ml on 12/19  -- in and out cath 12/19 had 1.3L out   -- Rodriguez anchored for urinary retention 12/20  -- Flomax started 12/20    Weight loss/failure to thrive  Cachexia/low body weight; severe malnutrition  Constipation  -reports ~10lb weight loss since beginning of 2024 (approx 10% of body weight), low BMI 18.49  -leg weakness and FTT noted to have been charted on patient's problem list since at least 4 years pta; she has been seen on an ambulatory basis multiple times this year for hip pain, falls, etc  -cont nutritional shake, RD consult      Hx  metastatic breast cancer  -treated [id number] for breast Ca 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 Jorge Pitts - chronically not on AC  Anxiety/Depression - buproprion  Hx ovarian Ca 1989  Tobacco abuse - prn nrt      Expected Discharge Location and Transportation: [facility]  Expected Discharge pending improved clinical status, insurance approval  Expected Discharge Date: 12/24/2024; Expected Discharge Time:      VTE Prophylaxis:  Mechanical VTE prophylaxis orders are present.         AM-PAC 6 Clicks Score (PT): 19 (12/21/24 2824)    CODE STATUS:   Code Status and Medical Interventions: No CPR (Do Not Attempt to Resuscitate); Limited Support; No intubation (DNI)   Ordered at: 12/17/24 0440     Medical Intervention Limits:    No intubation (DNI)     Level Of Support Discussed With:    Patient     Code Status (Patient has no pulse and is not breathing):    No CPR (Do Not Attempt to Resuscitate)     Medical Interventions (Patient has pulse or is breathing):    Limited Support       Meenu Bosch, APRN  12/21/24

## 2024-12-21 NOTE — PLAN OF CARE
Problem: Adult Inpatient Plan of Care  Goal: Plan of Care Review  Outcome: Progressing  Flowsheets (Taken 12/21/2024 1827)  Progress: declining  Goal: Patient-Specific Goal (Individualized)  Outcome: Progressing  Goal: Absence of Hospital-Acquired Illness or Injury  Outcome: Progressing  Intervention: Identify and Manage Fall Risk  Recent Flowsheet Documentation  Taken 12/21/2024 1800 by Erlinda Gonzalez RN  Safety Promotion/Fall Prevention:   activity supervised   assistive device/personal items within reach   clutter free environment maintained   room organization consistent   safety round/check completed   toileting scheduled  Taken 12/21/2024 1600 by Erlinda Gonzalez RN  Safety Promotion/Fall Prevention:   activity supervised   assistive device/personal items within reach   clutter free environment maintained   room organization consistent   safety round/check completed   toileting scheduled  Taken 12/21/2024 1400 by Erlinda Gonzalez RN  Safety Promotion/Fall Prevention:   activity supervised   assistive device/personal items within reach   clutter free environment maintained   room organization consistent   safety round/check completed   toileting scheduled  Taken 12/21/2024 1200 by Erlinda Gonzalez RN  Safety Promotion/Fall Prevention:   activity supervised   clutter free environment maintained   assistive device/personal items within reach   room organization consistent   safety round/check completed   toileting scheduled  Taken 12/21/2024 1000 by Erlinda Gonzalez RN  Safety Promotion/Fall Prevention:   activity supervised   assistive device/personal items within reach   clutter free environment maintained   room organization consistent   safety round/check completed   toileting scheduled  Taken 12/21/2024 0728 by Erlinda Gonzalez RN  Safety Promotion/Fall Prevention:   activity supervised   assistive device/personal items within reach   clutter free environment maintained   room organization consistent   safety  round/check completed   toileting scheduled  Intervention: Prevent Skin Injury  Recent Flowsheet Documentation  Taken 12/21/2024 1800 by Erlinda Gonzalez RN  Body Position: position changed independently  Skin Protection:   incontinence pads utilized   transparent dressing maintained   silicone foam dressing in place  Taken 12/21/2024 1600 by Erlinda Gonzalez RN  Body Position: position changed independently  Skin Protection:   incontinence pads utilized   silicone foam dressing in place   transparent dressing maintained  Taken 12/21/2024 1400 by Erlinda Gonzalez RN  Body Position: position changed independently  Skin Protection:   incontinence pads utilized   transparent dressing maintained   silicone foam dressing in place  Taken 12/21/2024 1200 by Erlinda Gonzalez RN  Body Position: position changed independently  Skin Protection:   incontinence pads utilized   transparent dressing maintained  Taken 12/21/2024 1000 by Erlinda Gonzalez RN  Body Position: position changed independently  Skin Protection:   incontinence pads utilized   transparent dressing maintained   silicone foam dressing in place  Taken 12/21/2024 0728 by Erlinda Gonzalez RN  Body Position: position changed independently  Skin Protection:   incontinence pads utilized   transparent dressing maintained   silicone foam dressing in place  Intervention: Prevent and Manage VTE (Venous Thromboembolism) Risk  Recent Flowsheet Documentation  Taken 12/21/2024 1800 by Erlinda Gonzalez RN  VTE Prevention/Management:   bilateral   SCDs (sequential compression devices) on  Taken 12/21/2024 1600 by Erlinda Gonzalez RN  VTE Prevention/Management:   bilateral   SCDs (sequential compression devices) on  Taken 12/21/2024 1400 by Erlinda Gonzalez RN  VTE Prevention/Management:   bilateral   SCDs (sequential compression devices) on  Taken 12/21/2024 1200 by rElinda Gonzalez RN  VTE Prevention/Management:   bilateral   SCDs (sequential compression devices) on  Taken 12/21/2024  1000 by Erlinda Gonzalez RN  VTE Prevention/Management:   bilateral   SCDs (sequential compression devices) on  Taken 12/21/2024 0728 by Erlinda Gonzalez RN  VTE Prevention/Management:   bilateral   SCDs (sequential compression devices) on  Intervention: Prevent Infection  Recent Flowsheet Documentation  Taken 12/21/2024 1800 by Erlinda Gonzalez RN  Infection Prevention:   environmental surveillance performed   rest/sleep promoted  Taken 12/21/2024 1600 by Erlinda Gonzalez RN  Infection Prevention:   environmental surveillance performed   rest/sleep promoted  Taken 12/21/2024 1400 by Erlinda Gonzalez RN  Infection Prevention:   environmental surveillance performed   rest/sleep promoted  Taken 12/21/2024 1200 by Erlinda Gonzalez RN  Infection Prevention:   environmental surveillance performed   rest/sleep promoted  Taken 12/21/2024 1000 by Erlinda Gonzalez RN  Infection Prevention:   environmental surveillance performed   rest/sleep promoted  Taken 12/21/2024 0728 by Erlinda Gonzalez RN  Infection Prevention:   environmental surveillance performed   rest/sleep promoted  Goal: Optimal Comfort and Wellbeing  Outcome: Progressing  Intervention: Provide Person-Centered Care  Recent Flowsheet Documentation  Taken 12/21/2024 0728 by Erlinda Gonzalez RN  Trust Relationship/Rapport: care explained  Goal: Readiness for Transition of Care  Outcome: Progressing     Problem: Skin Injury Risk Increased  Goal: Skin Health and Integrity  Outcome: Progressing  Intervention: Optimize Skin Protection  Recent Flowsheet Documentation  Taken 12/21/2024 1800 by Erlinda Gonzalez RN  Activity Management: activity encouraged  Pressure Reduction Techniques:   frequent weight shift encouraged   weight shift assistance provided  Head of Bed (HOB) Positioning: HOB at 30-45 degrees  Pressure Reduction Devices:   pressure-redistributing mattress utilized   positioning supports utilized  Skin Protection:   incontinence pads utilized   transparent dressing  maintained   silicone foam dressing in place  Taken 12/21/2024 1600 by Erlinda Gonzalez RN  Activity Management: activity encouraged  Pressure Reduction Techniques:   frequent weight shift encouraged   weight shift assistance provided  Head of Bed (HOB) Positioning: HOB at 30-45 degrees  Pressure Reduction Devices:   pressure-redistributing mattress utilized   positioning supports utilized  Skin Protection:   incontinence pads utilized   silicone foam dressing in place   transparent dressing maintained  Taken 12/21/2024 1400 by Erlinda Gonzalez RN  Activity Management: activity encouraged  Pressure Reduction Techniques:   frequent weight shift encouraged   weight shift assistance provided  Head of Bed (HOB) Positioning: HOB at 30-45 degrees  Pressure Reduction Devices:   pressure-redistributing mattress utilized   positioning supports utilized  Skin Protection:   incontinence pads utilized   transparent dressing maintained   silicone foam dressing in place  Taken 12/21/2024 1200 by Erlinda Gonzalez RN  Activity Management: activity encouraged  Pressure Reduction Techniques:   frequent weight shift encouraged   weight shift assistance provided  Head of Bed (HOB) Positioning: HOB at 30-45 degrees  Pressure Reduction Devices:   pressure-redistributing mattress utilized   positioning supports utilized  Skin Protection:   incontinence pads utilized   transparent dressing maintained  Taken 12/21/2024 1000 by Erlinda Gonzalez RN  Activity Management: activity encouraged  Pressure Reduction Techniques:   frequent weight shift encouraged   weight shift assistance provided  Head of Bed (HOB) Positioning: HOB at 20-30 degrees  Pressure Reduction Devices:   pressure-redistributing mattress utilized   positioning supports utilized  Skin Protection:   incontinence pads utilized   transparent dressing maintained   silicone foam dressing in place  Taken 12/21/2024 0728 by Erlinda Gonzalez RN  Activity Management: activity  encouraged  Pressure Reduction Techniques:   frequent weight shift encouraged   weight shift assistance provided  Head of Bed (HOB) Positioning: HOB at 20-30 degrees  Pressure Reduction Devices:   pressure-redistributing mattress utilized   positioning supports utilized  Skin Protection:   incontinence pads utilized   transparent dressing maintained   silicone foam dressing in place     Problem: Fall Injury Risk  Goal: Absence of Fall and Fall-Related Injury  Outcome: Progressing  Intervention: Identify and Manage Contributors  Recent Flowsheet Documentation  Taken 12/21/2024 1800 by Erlinda Gonzalez RN  Medication Review/Management: medications reviewed  Taken 12/21/2024 1600 by Erlinda Gonzalez RN  Medication Review/Management: medications reviewed  Taken 12/21/2024 1400 by Erlinda Gonzalez RN  Medication Review/Management: medications reviewed  Taken 12/21/2024 1200 by Erlinda Gonzalez RN  Medication Review/Management: medications reviewed  Taken 12/21/2024 1000 by Erlinda Gonzalez RN  Medication Review/Management: medications reviewed  Taken 12/21/2024 0728 by Erlinda Gonzalez RN  Medication Review/Management: medications reviewed  Intervention: Promote Injury-Free Environment  Recent Flowsheet Documentation  Taken 12/21/2024 1800 by Erlinda Gonzalez RN  Safety Promotion/Fall Prevention:   activity supervised   assistive device/personal items within reach   clutter free environment maintained   room organization consistent   safety round/check completed   toileting scheduled  Taken 12/21/2024 1600 by Erlinda Gonzalez RN  Safety Promotion/Fall Prevention:   activity supervised   assistive device/personal items within reach   clutter free environment maintained   room organization consistent   safety round/check completed   toileting scheduled  Taken 12/21/2024 1400 by Erlinda Gonzalez RN  Safety Promotion/Fall Prevention:   activity supervised   assistive device/personal items within reach   clutter free environment  maintained   room organization consistent   safety round/check completed   toileting scheduled  Taken 12/21/2024 1200 by Erlinda Gonzalez RN  Safety Promotion/Fall Prevention:   activity supervised   clutter free environment maintained   assistive device/personal items within reach   room organization consistent   safety round/check completed   toileting scheduled  Taken 12/21/2024 1000 by Erlinda Gonzalez RN  Safety Promotion/Fall Prevention:   activity supervised   assistive device/personal items within reach   clutter free environment maintained   room organization consistent   safety round/check completed   toileting scheduled  Taken 12/21/2024 0728 by Erlinda Gonzalez RN  Safety Promotion/Fall Prevention:   activity supervised   assistive device/personal items within reach   clutter free environment maintained   room organization consistent   safety round/check completed   toileting scheduled   Goal Outcome Evaluation:           Progress: declining        Pt alert and oriented x4. VSS. RA. Pain not well controlled with PRN pain medication. Pt experienced nausea and vomiting throughout the shift, resolved with PRN zofran and compazine. Abdominal pain not resolved throughout this shift. NA dropped to 117 @ 1630. Nephrologist, Yrn Rockwell, notified and new orders placed for Q2 neuro checks, Q2 VS, daily weight, and NS3% infusion. Last BM 12/10 per charting, MD aware, BM regimen ordered and medications administered as ordered. JOSEPH completed this AM.

## 2024-12-21 NOTE — CONSULTS
Patient Care Team:  Alla Colon DO as PCP - General (Family Medicine)  Carrie Patel MD as Referring Physician (Hematology and Oncology)  Rod Garcia MD as Consulting Physician (Radiation Oncology)  Alla Colon DO as Referring Physician (Family Medicine)    Chief complaint: Hyponatremia     Consults   THIS IS A 64YOF with Breast cancer with brain metastasis, s/p brain radiation. Hx of leptomeningeal carcinomatosis. Patient was admitted on 12/16/24 with a fall resulted in LOC.  CT head showed hygroma. Nephrology consulted for hyponatremia management. Serum sodium of 133 on 12/18/24 trended down to 117 today. Patient reports recurrent episodes of vomiting and poor po intake, she reports nauseas as well.  Urine studies - Urine Na 177, urine Osmo 600. Serum osm 245. No hx of cirrhosis, no SSRI use.     Subjective     Review of Systems   12 points ROS negative except as per HPI  Past Medical History:   Diagnosis Date    Breast CA 10/4/2018    Depression     DJD (degenerative joint disease) of cervical spine     THALIA (generalized anxiety disorder)     Heart murmur     Ovarian cancer 1989    Rt Cellulitis of chest wall 10/15/2018    Tobacco dependence     UTI (urinary tract infection) 9/9/2020   ,   Past Surgical History:   Procedure Laterality Date    APPENDECTOMY      BREAST BIOPSY Right 2003    DONE IN [place]    COLONOSCOPY  06/2018    HYSTERECTOMY  1989    MASTECTOMY W/ SENTINEL NODE BIOPSY Bilateral 10/4/2018    Procedure: LEFT SKIN SPARING BREAST MASTECTOMY WITH LEFT SENTINEL NODE BIOPSY, RIGHT PROPHYLACTIC SKIN SPARING MASTECTOMY;  Surgeon: Koffi Worthington MD;  Location: [facility];  Service: General    OOPHORECTOMY  1989   ,   Family History   Problem Relation Age of Onset    Arthritis Mother     Heart attack Mother     Osteoporosis Mother     Liver disease Father     Celiac disease Other    ,   Social History     Socioeconomic History    Marital status: Single     Spouse  name: N/A    Number of children: 1    Years of education: H.S.    Highest education level: High school graduate   Tobacco Use    Smoking status: Every Day     Current packs/day: 0.50     Average packs/day: 0.5 packs/day for 25.0 years (12.5 ttl pk-yrs)     Types: Cigarettes    Smokeless tobacco: Never   Vaping Use    Vaping status: Never Used   Substance and Sexual Activity    Alcohol use: Not Currently    Drug use: No    Sexual activity: Defer     Partners: Male     E-cigarette/Vaping    E-cigarette/Vaping Use Never User      E-cigarette/Vaping Substances    Nicotine No     THC No     CBD No     Flavoring No      E-cigarette/Vaping Devices    Disposable No     Pre-filled or Refillable Cartridge No     Refillable Tank No     Pre-filled Pod No          ,   Medications Prior to Admission   Medication Sig Dispense Refill Last Dose/Taking    aspirin (aspirin) 81 MG EC tablet Take 1 tablet by mouth Daily. (Patient taking differently: Take 1 tablet by mouth Daily. OTC) 30 tablet 5 12/16/2024    buPROPion XL (WELLBUTRIN XL) 150 MG 24 hr tablet Take 1 tablet by mouth Daily. mornings (Patient taking differently: Take 1 tablet by mouth Every Morning.) 30 tablet 1 12/16/2024 Morning    busPIRone (BUSPAR) 5 MG tablet TAKE 1 TABLET BY MOUTH THREE TIMES A  tablet 1 12/16/2024    HYDROcodone-acetaminophen (Norco) 7.5-325 MG per tablet Take 1 tablet by mouth Every 6 (Six) Hours As Needed for Moderate Pain. 120 tablet 0 Past Month    hydrOXYzine (ATARAX) 25 MG tablet TAKE 1 TABLET BY MOUTH 3 TIMES A DAY AS NEEDED FOR ANXIETY. 90 tablet 2 Past Month    traZODone (DESYREL) 100 MG tablet TAKE 0.5-1 TABLETS BY MOUTH EVERY NIGHT. 90 tablet 1 Past Week Bedtime   , Scheduled Meds:  acetaminophen, 1,000 mg, Oral, Q8H  [Held by provider] aspirin, 81 mg, Oral, Daily  [Held by provider] buPROPion XL, 150 mg, Oral, Daily  busPIRone, 5 mg, Oral, TID  Lidocaine, 1 patch, Transdermal, Q24H  magnesium sulfate, 2 g, Intravenous,  Q2H  Naloxegol Oxalate, 25 mg, Oral, QAM  senna-docusate sodium, 2 tablet, Oral, BID  sodium chloride, 10 mL, Intravenous, Q12H  sodium chloride, 2 g, Oral, TID With Meals  tamsulosin, 0.4 mg, Oral, Daily   , Continuous Infusions:  dextrose, 6 mL/kg  sodium chloride, 30 mL/hr   , PRN Meds:    senna-docusate sodium **AND** polyethylene glycol **AND** bisacodyl **AND** bisacodyl    Calcium Replacement - Follow Nurse / BPA Driven Protocol    desmopressin    dextrose    [Held by provider] hydrOXYzine    Magnesium Standard Dose Replacement - Follow Nurse / BPA Driven Protocol    nicotine    ondansetron ODT    oxyCODONE    Phosphorus Replacement - Follow Nurse / BPA Driven Protocol    Potassium Replacement - Follow Nurse / BPA Driven Protocol    [COMPLETED] Insert Peripheral IV **AND** sodium chloride    sodium chloride    sodium chloride, and Allergies:  Patient has no known allergies.    Objective     Vital Signs  Temp:  [96.8 °F (36 °C)-98.3 °F (36.8 °C)] 97 °F (36.1 °C)  Heart Rate:  [] 77  Resp:  [18] 18  BP: (109-182)/() 109/75    I/O this shift:  In: 222 [P.O.:222]  Out: -   I/O last 3 completed shifts:  In: 600 [P.O.:600]  Out: 2750 [Urine:2750]    Physical Exam    Constitutional: No acute distress, awake, alert  HENT: Normocephalic, mucous membranes moist, ecchymosis right jaw and neck  Respiratory: Clear to auscultation bilaterally  Cardiovascular: RRR, no murmurs, rubs, or gallops  Gastrointestinal:  soft, nontender, nondistended  Musculoskeletal: No bilateral ankle edema  Psychiatric: Appropriate affect, cooperative  Neurologic: Oriented x 3, moves all extremities  Skin: No rashes     Results Review:    I reviewed the patient's new clinical results.    WBC WBC   Date Value Ref Range Status   12/21/2024 7.31 3.40 - 10.80 10*3/mm3 Final      HGB Hemoglobin   Date Value Ref Range Status   12/21/2024 11.8 (L) 12.0 - 15.9 g/dL Final   12/19/2024 10.7 (L) 12.0 - 15.9 g/dL Final      HCT Hematocrit   Date  "Value Ref Range Status   12/21/2024 33.7 (L) 34.0 - 46.6 % Final   12/19/2024 31.8 (L) 34.0 - 46.6 % Final      Platlets No results found for: \"LABPLAT\"   MCV MCV   Date Value Ref Range Status   12/21/2024 90.3 79.0 - 97.0 fL Final          Sodium Sodium   Date Value Ref Range Status   12/21/2024 121 (L) 136 - 145 mmol/L Final      Potassium Potassium   Date Value Ref Range Status   12/21/2024 4.6 3.5 - 5.2 mmol/L Final      Chloride Chloride   Date Value Ref Range Status   12/21/2024 84 (L) 98 - 107 mmol/L Final      CO2 CO2   Date Value Ref Range Status   12/21/2024 25.0 22.0 - 29.0 mmol/L Final      BUN BUN   Date Value Ref Range Status   12/21/2024 7 (L) 8 - 23 mg/dL Final      Creatinine Creatinine   Date Value Ref Range Status   12/21/2024 0.46 (L) 0.57 - 1.00 mg/dL Final      Calcium Calcium   Date Value Ref Range Status   12/21/2024 9.0 8.6 - 10.5 mg/dL Final      PO4 No results found for: \"CAPO4\"   Albumin No results found for: \"ALBUMIN\"   Magnesium Magnesium   Date Value Ref Range Status   12/21/2024 1.5 (L) 1.6 - 2.4 mg/dL Final      Uric Acid No results found for: \"URICACID\"         Assessment & Plan       Subdural hygroma    Mixed hyperlipidemia    Atrial fibrillation    Generalized weakness    Severe protein-calorie malnutrition    UTI (urinary tract infection)    ===========================    Assessment & Plan    - Severe hypotonic hyponatremia - urine studies suggestive of SIADH vs salt wasting syndrome ( Serum Na dropped from 133 on 12/18 to Na 117 today )  LIKELY related to nausea vs     - Persistent nausea and vomiting     - Bilateral subdural hygroma    - Recurrent falls, weight loss    - UTI Ecoli and urine retention     - Hypomagnesemia     - 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     - Afib, depression.     PLAN   Will start 3% Hypertonic IVF at 30 cc/h for total of 100cc  BMP q2h   Monitor urine Na closely  Check uric " acid  Cortisol and TSH wnl   Strict I/Os   Will follow along with you     I discussed the patients findings and my recommendations with patient    Kymberly Tian MD  12/21/24  11:08 EST

## 2024-12-22 LAB
ANION GAP SERPL CALCULATED.3IONS-SCNC: 10 MMOL/L (ref 5–15)
BUN SERPL-MCNC: 8 MG/DL (ref 8–23)
BUN/CREAT SERPL: 14 (ref 7–25)
CALCIUM SPEC-SCNC: 8.5 MG/DL (ref 8.6–10.5)
CHLORIDE SERPL-SCNC: 88 MMOL/L (ref 98–107)
CO2 SERPL-SCNC: 24 MMOL/L (ref 22–29)
CREAT SERPL-MCNC: 0.57 MG/DL (ref 0.57–1)
EGFRCR SERPLBLD CKD-EPI 2021: 101.6 ML/MIN/1.73
GLUCOSE SERPL-MCNC: 142 MG/DL (ref 65–99)
MAGNESIUM SERPL-MCNC: 2.9 MG/DL (ref 1.6–2.4)
OSMOLALITY UR: 152 MOSM/KG (ref 300–1100)
OSMOLALITY UR: 332 MOSM/KG (ref 300–1100)
POTASSIUM SERPL-SCNC: 3.3 MMOL/L (ref 3.5–5.2)
POTASSIUM SERPL-SCNC: 3.3 MMOL/L (ref 3.5–5.2)
POTASSIUM SERPL-SCNC: 3.8 MMOL/L (ref 3.5–5.2)
POTASSIUM SERPL-SCNC: 4.6 MMOL/L (ref 3.5–5.2)
SODIUM SERPL-SCNC: 122 MMOL/L (ref 136–145)
SODIUM SERPL-SCNC: 123 MMOL/L (ref 136–145)
SODIUM SERPL-SCNC: 124 MMOL/L (ref 136–145)
SODIUM SERPL-SCNC: 124 MMOL/L (ref 136–145)
SODIUM SERPL-SCNC: 125 MMOL/L (ref 136–145)
SODIUM SERPL-SCNC: 126 MMOL/L (ref 136–145)
SODIUM UR-SCNC: <20 MMOL/L
URATE SERPL-MCNC: 2.1 MG/DL (ref 2.4–5.7)

## 2024-12-22 PROCEDURE — 83735 ASSAY OF MAGNESIUM: CPT | Performed by: STUDENT IN AN ORGANIZED HEALTH CARE EDUCATION/TRAINING PROGRAM

## 2024-12-22 PROCEDURE — 83935 ASSAY OF URINE OSMOLALITY: CPT | Performed by: INTERNAL MEDICINE

## 2024-12-22 PROCEDURE — 25010000003 DEXTROSE 5 % SOLUTION: Performed by: INTERNAL MEDICINE

## 2024-12-22 PROCEDURE — 97530 THERAPEUTIC ACTIVITIES: CPT

## 2024-12-22 PROCEDURE — 84300 ASSAY OF URINE SODIUM: CPT | Performed by: INTERNAL MEDICINE

## 2024-12-22 PROCEDURE — 84550 ASSAY OF BLOOD/URIC ACID: CPT | Performed by: INTERNAL MEDICINE

## 2024-12-22 PROCEDURE — 84295 ASSAY OF SERUM SODIUM: CPT | Performed by: NURSE PRACTITIONER

## 2024-12-22 PROCEDURE — 84132 ASSAY OF SERUM POTASSIUM: CPT | Performed by: INTERNAL MEDICINE

## 2024-12-22 PROCEDURE — 25010000002 ONDANSETRON PER 1 MG: Performed by: NURSE PRACTITIONER

## 2024-12-22 PROCEDURE — 99232 SBSQ HOSP IP/OBS MODERATE 35: CPT | Performed by: INTERNAL MEDICINE

## 2024-12-22 PROCEDURE — 97164 PT RE-EVAL EST PLAN CARE: CPT

## 2024-12-22 PROCEDURE — 25010000002 HYDROMORPHONE PER 4 MG: Performed by: INTERNAL MEDICINE

## 2024-12-22 PROCEDURE — 97168 OT RE-EVAL EST PLAN CARE: CPT

## 2024-12-22 PROCEDURE — 84132 ASSAY OF SERUM POTASSIUM: CPT | Performed by: NURSE PRACTITIONER

## 2024-12-22 PROCEDURE — 80048 BASIC METABOLIC PNL TOTAL CA: CPT | Performed by: STUDENT IN AN ORGANIZED HEALTH CARE EDUCATION/TRAINING PROGRAM

## 2024-12-22 PROCEDURE — 84295 ASSAY OF SERUM SODIUM: CPT

## 2024-12-22 RX ORDER — OXYCODONE HYDROCHLORIDE 10 MG/1
10 TABLET ORAL EVERY 4 HOURS PRN
Status: DISCONTINUED | OUTPATIENT
Start: 2024-12-22 | End: 2024-12-24

## 2024-12-22 RX ORDER — POTASSIUM CHLORIDE 1500 MG/1
40 TABLET, EXTENDED RELEASE ORAL EVERY 4 HOURS
Status: COMPLETED | OUTPATIENT
Start: 2024-12-22 | End: 2024-12-22

## 2024-12-22 RX ORDER — HYDROMORPHONE HYDROCHLORIDE 1 MG/ML
0.5 INJECTION, SOLUTION INTRAMUSCULAR; INTRAVENOUS; SUBCUTANEOUS
Status: DISCONTINUED | OUTPATIENT
Start: 2024-12-22 | End: 2024-12-23

## 2024-12-22 RX ORDER — ONDANSETRON 2 MG/ML
4 INJECTION INTRAMUSCULAR; INTRAVENOUS EVERY 6 HOURS PRN
Status: DISCONTINUED | OUTPATIENT
Start: 2024-12-22 | End: 2024-12-26 | Stop reason: HOSPADM

## 2024-12-22 RX ORDER — SODIUM CHLORIDE 1 G/1
2 TABLET ORAL 3 TIMES DAILY
Status: DISCONTINUED | OUTPATIENT
Start: 2024-12-22 | End: 2024-12-25

## 2024-12-22 RX ORDER — POTASSIUM CHLORIDE 1500 MG/1
40 TABLET, EXTENDED RELEASE ORAL EVERY 4 HOURS
Status: CANCELLED | OUTPATIENT
Start: 2024-12-22 | End: 2024-12-22

## 2024-12-22 RX ADMIN — DEXTROSE MONOHYDRATE 275.4 ML: 50 INJECTION, SOLUTION INTRAVENOUS at 03:18

## 2024-12-22 RX ADMIN — SODIUM CHLORIDE 2 G: 1 TABLET ORAL at 20:55

## 2024-12-22 RX ADMIN — SODIUM CHLORIDE 2 G: 1 TABLET ORAL at 08:16

## 2024-12-22 RX ADMIN — SODIUM CHLORIDE 2 G: 1 TABLET ORAL at 17:38

## 2024-12-22 RX ADMIN — TAMSULOSIN HYDROCHLORIDE 0.4 MG: 0.4 CAPSULE ORAL at 08:15

## 2024-12-22 RX ADMIN — OXYCODONE HYDROCHLORIDE 7.5 MG: 15 TABLET ORAL at 00:39

## 2024-12-22 RX ADMIN — OXYCODONE HYDROCHLORIDE 7.5 MG: 15 TABLET ORAL at 10:05

## 2024-12-22 RX ADMIN — NALOXEGOL OXALATE 25 MG: 25 TABLET, FILM COATED ORAL at 08:17

## 2024-12-22 RX ADMIN — ACETAMINOPHEN 1000 MG: 500 TABLET, FILM COATED ORAL at 00:39

## 2024-12-22 RX ADMIN — OXYCODONE HYDROCHLORIDE 7.5 MG: 15 TABLET ORAL at 05:53

## 2024-12-22 RX ADMIN — HYDROMORPHONE HYDROCHLORIDE 0.5 MG: 1 INJECTION, SOLUTION INTRAMUSCULAR; INTRAVENOUS; SUBCUTANEOUS at 11:56

## 2024-12-22 RX ADMIN — ONDANSETRON 4 MG: 2 INJECTION INTRAMUSCULAR; INTRAVENOUS at 20:54

## 2024-12-22 RX ADMIN — BISACODYL 10 MG: 10 SUPPOSITORY RECTAL at 10:05

## 2024-12-22 RX ADMIN — OXYCODONE HYDROCHLORIDE 10 MG: 10 TABLET ORAL at 20:03

## 2024-12-22 RX ADMIN — SENNOSIDES AND DOCUSATE SODIUM 2 TABLET: 50; 8.6 TABLET ORAL at 20:03

## 2024-12-22 RX ADMIN — LIDOCAINE 1 PATCH: 4 PATCH TOPICAL at 08:16

## 2024-12-22 RX ADMIN — SENNOSIDES AND DOCUSATE SODIUM 2 TABLET: 50; 8.6 TABLET ORAL at 08:15

## 2024-12-22 RX ADMIN — Medication 10 ML: at 20:10

## 2024-12-22 RX ADMIN — MUPIROCIN 1 APPLICATION: 20 OINTMENT TOPICAL at 08:15

## 2024-12-22 RX ADMIN — HYDROMORPHONE HYDROCHLORIDE 0.5 MG: 1 INJECTION, SOLUTION INTRAMUSCULAR; INTRAVENOUS; SUBCUTANEOUS at 18:32

## 2024-12-22 RX ADMIN — ACETAMINOPHEN 1000 MG: 500 TABLET, FILM COATED ORAL at 08:15

## 2024-12-22 RX ADMIN — SODIUM CHLORIDE 2 G: 1 TABLET ORAL at 11:40

## 2024-12-22 RX ADMIN — POTASSIUM CHLORIDE 40 MEQ: 1500 TABLET, EXTENDED RELEASE ORAL at 05:49

## 2024-12-22 RX ADMIN — HYDROMORPHONE HYDROCHLORIDE 0.5 MG: 1 INJECTION, SOLUTION INTRAMUSCULAR; INTRAVENOUS; SUBCUTANEOUS at 21:04

## 2024-12-22 RX ADMIN — POTASSIUM CHLORIDE 40 MEQ: 1500 TABLET, EXTENDED RELEASE ORAL at 10:05

## 2024-12-22 RX ADMIN — HYDROMORPHONE HYDROCHLORIDE 0.5 MG: 1 INJECTION, SOLUTION INTRAMUSCULAR; INTRAVENOUS; SUBCUTANEOUS at 14:08

## 2024-12-22 RX ADMIN — BISACODYL 5 MG: 5 TABLET, COATED ORAL at 10:05

## 2024-12-22 RX ADMIN — ACETAMINOPHEN 1000 MG: 500 TABLET, FILM COATED ORAL at 17:38

## 2024-12-22 NOTE — PROGRESS NOTES
" LOS: 3 days   Patient Care Team:  Alla Colon DO as PCP - General (Family Medicine)  Carrie Patel MD as Referring Physician (Hematology and Oncology)  Rod Garcia MD as Consulting Physician (Radiation Oncology)  Alla Colon DO as Referring Physician (Family Medicine)    Chief Complaint: Hyponatremia     Subjective     Severe Hyponatremia of 117  S/p  3% hypertonic saline   Na today 122   C/o abdminal pain and constipation     Fall        Subjective    History taken from: patient    Objective     Vital Sign Min/Max for last 24 hours  Temp  Min: 97 °F (36.1 °C)  Max: 97.8 °F (36.6 °C)   BP  Min: 92/68  Max: 153/99   Pulse  Min: 60  Max: 92   Resp  Min: 16  Max: 18   SpO2  Min: 91 %  Max: 97 %   No data recorded   Weight  Min: 39.7 kg (87 lb 8.4 oz)  Max: 39.7 kg (87 lb 8.4 oz)     Flowsheet Rows      Flowsheet Row First Filed Value   Admission Height 157.5 cm (62\") Documented at 12/16/2024 1901   Admission Weight 41.7 kg (92 lb) Documented at 12/16/2024 1901            No intake/output data recorded.  I/O last 3 completed shifts:  In: 560.7 [P.O.:118; I.V.:442.7]  Out: 3550 [Urine:3550]    Objective:  Vital signs: (most recent): Blood pressure 162/95, pulse 81, temperature 97.6 °F (36.4 °C), temperature source Oral, resp. rate 16, height 157.5 cm (62\"), weight 39.7 kg (87 lb 8.4 oz), SpO2 94%, not currently breastfeeding.            Constitutional: No acute distress, awake, alert  HENT: Normocephalic, mucous membranes moist, ecchymosis right jaw and neck  Respiratory: Clear to auscultation bilaterally  Cardiovascular: RRR, no murmurs, rubs, or gallops  Gastrointestinal:  soft, nontender, nondistended  Musculoskeletal: No bilateral ankle edema  Psychiatric: Appropriate affect, cooperative  Neurologic: Oriented x 3, moves all extremities  Skin: No rashes    Results Review:     I reviewed the patient's new clinical results.    WBC WBC   Date Value Ref Range Status   12/21/2024 7.31 3.40 " "- 10.80 10*3/mm3 Final      HGB Hemoglobin   Date Value Ref Range Status   12/21/2024 11.8 (L) 12.0 - 15.9 g/dL Final      HCT Hematocrit   Date Value Ref Range Status   12/21/2024 33.7 (L) 34.0 - 46.6 % Final      Platlets No results found for: \"LABPLAT\"   MCV MCV   Date Value Ref Range Status   12/21/2024 90.3 79.0 - 97.0 fL Final          Sodium Sodium   Date Value Ref Range Status   12/22/2024 122 (L) 136 - 145 mmol/L Final   12/22/2024 122 (L) 136 - 145 mmol/L Final   12/22/2024 124 (L) 136 - 145 mmol/L Final   12/22/2024 122 (L) 136 - 145 mmol/L Final   12/22/2024 126 (L) 136 - 145 mmol/L Final   12/22/2024 122 (L) 136 - 145 mmol/L Final   12/21/2024 121 (L) 136 - 145 mmol/L Final   12/21/2024 119 (C) 136 - 145 mmol/L Final   12/21/2024 117 (C) 136 - 145 mmol/L Final   12/21/2024 120 (L) 136 - 145 mmol/L Final   12/21/2024 121 (L) 136 - 145 mmol/L Final      Potassium Potassium   Date Value Ref Range Status   12/22/2024 3.3 (L) 3.5 - 5.2 mmol/L Final   12/22/2024 3.3 (L) 3.5 - 5.2 mmol/L Final   12/22/2024 3.8 3.5 - 5.2 mmol/L Final   12/21/2024 4.0 3.5 - 5.2 mmol/L Final   12/21/2024 4.1 3.5 - 5.2 mmol/L Final     Comment:     Slight hemolysis detected by analyzer. Result may be falsely elevated.   12/21/2024 4.6 3.5 - 5.2 mmol/L Final      Chloride Chloride   Date Value Ref Range Status   12/22/2024 88 (L) 98 - 107 mmol/L Final   12/21/2024 81 (L) 98 - 107 mmol/L Final   12/21/2024 84 (L) 98 - 107 mmol/L Final      CO2 CO2   Date Value Ref Range Status   12/22/2024 24.0 22.0 - 29.0 mmol/L Final   12/21/2024 21.0 (L) 22.0 - 29.0 mmol/L Final   12/21/2024 25.0 22.0 - 29.0 mmol/L Final      BUN BUN   Date Value Ref Range Status   12/22/2024 8 8 - 23 mg/dL Final   12/21/2024 6 (L) 8 - 23 mg/dL Final   12/21/2024 7 (L) 8 - 23 mg/dL Final      Creatinine Creatinine   Date Value Ref Range Status   12/22/2024 0.57 0.57 - 1.00 mg/dL Final   12/21/2024 0.47 (L) 0.57 - 1.00 mg/dL Final   12/21/2024 0.46 (L) 0.57 - 1.00 " "mg/dL Final      Calcium Calcium   Date Value Ref Range Status   12/22/2024 8.5 (L) 8.6 - 10.5 mg/dL Final   12/21/2024 8.7 8.6 - 10.5 mg/dL Final   12/21/2024 9.0 8.6 - 10.5 mg/dL Final      PO4 No results found for: \"CAPO4\"   Albumin No results found for: \"ALBUMIN\"   Magnesium Magnesium   Date Value Ref Range Status   12/22/2024 2.9 (H) 1.6 - 2.4 mg/dL Final   12/21/2024 1.5 (L) 1.6 - 2.4 mg/dL Final      Uric Acid Uric Acid   Date Value Ref Range Status   12/22/2024 2.1 (L) 2.4 - 5.7 mg/dL Final     Comment:     Falsely depressed results may occur on samples drawn from patients receiving N-Acetylcysteine (NAC) or Metamizole.        Medication Review:       Assessment & Plan       Hyponatremia    Mixed hyperlipidemia    Atrial fibrillation    Generalized weakness    Subdural hygroma    Severe protein-calorie malnutrition    UTI (urinary tract infection)    SIADH (syndrome of inappropriate ADH production)  ====================================    Assessment & Plan  - Severe hypotonic hyponatremia - urine studies suggestive of SIADH vs salt wasting syndrome ( Serum Na dropped from 133 on 12/18 to Na 117 today )  LIKELY related to nausea      - Persistent nausea and vomiting      - Bilateral subdural hygroma     - Recurrent falls, weight loss     - UTI Ecoli and urine retention      - Hypomagnesemia      - 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      - Afib, depression.      PLAN   Hyponatremia improving s/p 3% Hypertonic saline  Continue salt tablet  BMP q4h   Monitor urine Na closely  Check uric acid  Cortisol and TSH wnl   Strict I/Os   Will follow along with you      I discussed the patients findings and my recommendations with patient     Kymberly Tian MD  12/22/24  09:08 EST            "

## 2024-12-22 NOTE — PROGRESS NOTES
Intensive Care Follow-up     Hospital:  LOS: 3 days   Ms. Brittani Lambert, 64 y.o. female is followed for:   Hyponatremia        Subjective     Brittani Lambert is a 64 y.o. female admitted on 12/16 for subdural hygroma by Hospital Medicine.  Na was 133 on 12/18, down to 117.  Nephrology was consulted and recommended 3% NS therapy, so the patient was transferred to the ICU for higher level of care/CVC access.    Interval History:  The chart has been reviewed.  The patient has done well overnight.  Hypertonic saline is currently on hold and she is actually increasing in the absence of fluid currently.  She denies any nausea or headache currently.  She is having some problems with her chronic pain and is requiring further elevation of her baseline narcotics.    The patient's past medical, surgical and social history were reviewed and updated in Epic as appropriate.        Objective     Infusions:  dextrose, 6 mL/kg  [Held by provider] sodium chloride, 20 mL/hr, Last Rate: Stopped (12/22/24 0319)      Medications:  acetaminophen, 1,000 mg, Oral, Q8H  Lidocaine, 1 patch, Transdermal, Q24H  mupirocin, 1 Application, Each Nare, BID  Naloxegol Oxalate, 25 mg, Oral, QAM  senna-docusate sodium, 2 tablet, Oral, BID  sodium chloride, 10 mL, Intravenous, Q12H  sodium chloride, 2 g, Oral, TID With Meals  tamsulosin, 0.4 mg, Oral, Daily        Vital Sign Min/Max for last 24 hours  Temp  Min: 97 °F (36.1 °C)  Max: 97.8 °F (36.6 °C)   BP  Min: 92/68  Max: 153/99   Pulse  Min: 60  Max: 92   Resp  Min: 16  Max: 18   SpO2  Min: 91 %  Max: 99 %   No data recorded       Input/Output for last 24 hour shift  12/21 0701 - 12/22 0700  In: 560.7 [P.O.:118; I.V.:442.7]  Out: 2000 [Urine:2000]      Objective:  General Appearance:  Uncomfortable, ill-appearing and in pain (Overall cachexia).    Vital signs: (most recent): Blood pressure 121/70, pulse 73, temperature 97.6 °F (36.4 °C), temperature source Oral, resp. rate 16, height 157.5 cm  "(62\"), weight 39.7 kg (87 lb 8.4 oz), SpO2 99%, not currently breastfeeding.    HEENT: Normal HEENT exam.    Lungs:  Normal effort and normal respiratory rate.  Breath sounds clear to auscultation.    Heart: Normal rate.  Regular rhythm.  S1 normal and S2 normal.  No murmur.   Chest: Symmetric chest wall expansion.   Abdomen: Abdomen is soft and non-distended.  Bowel sounds are normal.   There is no abdominal tenderness.     Extremities: Normal range of motion.    Pulses: Distal pulses are intact.    Neurological: Patient is alert and oriented to person, place and time.    Pupils:  Pupils are equal, round, and reactive to light.  Pupils are equal.               Results from last 7 days   Lab Units 12/21/24  0748 12/19/24  0849 12/16/24 1958 12/16/24 1953   WBC 10*3/mm3 7.31  --   --  7.86   HEMOGLOBIN g/dL 11.8* 10.7*  --  10.8*   HEMOGLOBIN, POC g/dL  --   --  5.4*  --    PLATELETS 10*3/mm3 258  --   --  174     Results from last 7 days   Lab Units 12/22/24  1144 12/22/24  1023 12/22/24  0823 12/22/24  0600 12/22/24  0419 12/22/24  0156 12/22/24  0031 12/21/24  1943 12/21/24  1629 12/21/24  1152 12/21/24  0853 12/21/24  0748 12/18/24  0441   SODIUM mmol/L 125* 124* 122*   < > 122*  124*   < > 122*   < > 117*   < > 121*  --  133*   POTASSIUM mmol/L 4.6  --   --   --  3.3*  3.3*  --  3.8   < > 4.1  --  4.6  --  3.8   CO2 mmol/L  --   --   --   --  24.0  --   --   --  21.0*  --  25.0  --  23.0   BUN mg/dL  --   --   --   --  8  --   --   --  6*  --  7*  --  6*   CREATININE mg/dL  --   --   --   --  0.57  --   --   --  0.47*  --  0.46*  --  0.50*   MAGNESIUM mg/dL  --   --   --   --  2.9*  --   --   --   --   --  1.5*  --  1.8   PHOSPHORUS mg/dL  --   --   --   --   --   --   --   --   --   --   --  3.5 3.4   GLUCOSE mg/dL  --   --   --   --  142*  --   --   --  109*  --  124*  --  84    < > = values in this interval not displayed.     Estimated Creatinine Clearance: 62.5 mL/min (by C-G formula based on SCr of 0.57 " mg/dL).              I reviewed the patient's results and images.     Assessment & Plan   Impression        Hyponatremia    Subdural hygroma    Severe protein-calorie malnutrition    UTI (urinary tract infection)    SIADH (syndrome of inappropriate ADH production)       Plan        We will continue with serial sodium checks.  Continue to hold on fluid for now.  Nutritional support as before.  We will provide pain control as needed for her metastatic cancer.  She has completed antimicrobial therapy for urinary tract infection.  Plan to transition to telemetry when a bed is available.    Plan of care and goals reviewed with mulitdisciplinary/antibiotic stewardship team during rounds.   I discussed the patient's findings and my recommendations with patient and nursing staff         Yuval Addison MD, [facility]  Pulmonology and Critical Care Medicine

## 2024-12-22 NOTE — CASE MANAGEMENT/SOCIAL WORK
Discharge Planning Assessment  [facility]     Patient Name: Brittani Lambert  MRN: [id number]  Today's Date: 12/22/2024    Admit Date: 12/16/2024    Plan: [facility] still pending insurance approval   Discharge Needs Assessment    No documentation.                  Discharge Plan       Row Name 12/22/24 0938       Plan    Plan [facility] still pending insurance approval    Patient/Family in Agreement with Plan yes    Plan Comments I talked with Callie/[facility] and still pending insurance, I let her know patient not medical ready anyway. I called [employer] transportation and cancelled 1330 .    Final Discharge Disposition Code 03 - skilled nursing facility (SNF)                  Continued Care and Services - Admitted Since 12/16/2024       Destination Coordination complete.      Service Provider Request Status Services Address Phone Fax Patient Preferred    [facility]  [place] Skilled Nursing [address] [place] [id number] [phone] --    [facility] Pending - Request Sent -- [address] [id number] [phone] --    [facility] Pending - No Request Sent -- [address] [id number] [phone] --    [facility]  Bed not available -- [address] [facility] [id number] [phone] --    [facility] Declined  Out of network -- [address] [id number] [phone] --       Internal Comment last updated by Janice Barkley RN 12/20/2024 1332    12/20:Spoke with Tami, fax [phone]                             Selected Continued Care - Episodes Includes continued care and service providers with selected services from the active episodes listed below      Ambulatory Social Work Case Management Episode start date: 11/7/2024   There are no active outsourced providers for this episode.                  Expected Discharge Date and Time       Expected Discharge Date Expected Discharge Time    Dec 24, 2024            Demographic Summary    No documentation.                  Functional Status    No documentation.                  Psychosocial    No documentation.                  Abuse/Neglect    No documentation.                  Legal    No documentation.                  Substance Abuse    No documentation.                  Patient Forms    No documentation.                     Bev Magana RN

## 2024-12-22 NOTE — NURSING NOTE
Report given to ICU Nurse via phone.  Transferred to ICU Room 254 via bed and portable monitor.  Accompanied by Charge Nurse as well.  Patient aware of reason for transfer to ICU.  She verbalized understanding.  PM medications given orally before transfer and prn pain med given orally.

## 2024-12-22 NOTE — PLAN OF CARE
Goal Outcome Evaluation:  Plan of Care Reviewed With: patient           Outcome Evaluation: OT Re-eval complete. Pt presents w/ deficits in balance, functional endurance, and strength warranting cont skilled IPOT POC to promote return to PLOF. Pt session limited d/t c/o N/V w/ activity. Recommend pt DC to IP rehab, however will monitor progress closely.    Anticipated Discharge Disposition (OT): inpatient rehabilitation facility

## 2024-12-22 NOTE — THERAPY RE-EVALUATION
Patient Name: Brittani Lambert  : 1960    MRN: [id number]                              Today's Date: 2024       Admit Date: 2024    Visit Dx:     ICD-10-CM ICD-9-CM   1. Fall, initial encounter  W19.XXXA E888.9   2. Injury of head, initial encounter  S09.90XA 959.01   3. Post-traumatic subdural hygroma  G96.08 852.20   4. Strain of neck muscle, initial encounter  S16.1XXA 847.0   5. Multiple abrasions  T07.XXXA 919.0   6. Contusion of nose, initial encounter  S00.33XA 920   7. Contusion of left knee, initial encounter  S80.02XA 924.11   8. Cancer of right breast metastatic to brain  C50.911 174.9    C79.31 198.3   9. History of bilateral mastectomy  Z90.13 V45.71   10. Acute UTI  N39.0 599.0   11. Chronic pain syndrome  G89.4 338.4   12. Lactic acidosis  E87.20 276.2     Patient Active Problem List   Diagnosis    Mixed hyperlipidemia    Moderate episode of recurrent major depressive disorder    Anxiety    Functional diarrhea    Breast CA    Atrial fibrillation    Invasive ductal carcinoma of left breast    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    Left-sided weakness    THALIA (generalized anxiety disorder)    Acute deep vein thrombosis (DVT) of left upper extremity    Persistent atrial fibrillation    Lactic acidosis    T12 compression fracture    Falls    History of atrial fibrillation    History of pulmonary embolism    Bilateral leg weakness    Tobacco abuse    Alcohol abuse    Generalized weakness    Failure to thrive in adult    Depression    Subdural hygroma    Severe protein-calorie malnutrition    UTI (urinary tract infection)    Hyponatremia    SIADH (syndrome of inappropriate ADH production)     Past Medical History:   Diagnosis Date    Breast CA 10/4/2018    Depression     DJD (degenerative joint disease) of cervical spine     THALIA (generalized anxiety disorder)     Heart murmur     Ovarian cancer     Rt Cellulitis of chest wall 10/15/2018    Tobacco dependence     UTI (urinary  tract infection) 9/9/2020     Past Surgical History:   Procedure Laterality Date    APPENDECTOMY      BREAST BIOPSY Right 2003    DONE IN [place]    COLONOSCOPY  06/2018    HYSTERECTOMY  1989    MASTECTOMY W/ SENTINEL NODE BIOPSY Bilateral 10/4/2018    Procedure: LEFT SKIN SPARING BREAST MASTECTOMY WITH LEFT SENTINEL NODE BIOPSY, RIGHT PROPHYLACTIC SKIN SPARING MASTECTOMY;  Surgeon: Koffi Worthington MD;  Location: [facility];  Service: General    OOPHORECTOMY  1989      General Information       Row Name 12/22/24 0954          OT Time and Intention    Document Type re-evaluation  -CS     Mode of Treatment occupational therapy  -CS       Row Name 12/22/24 0954          General Information    Patient Profile Reviewed yes  -CS     Prior Level of Function --  see IE  -CS     Existing Precautions/Restrictions fall  -CS     Barriers to Rehab medically complex  -CS       Row Name 12/22/24 0954          Living Environment    People in Home --  see IE  -CS       Row Name 12/22/24 0954          Cognition    Orientation Status (Cognition) oriented x 3  -CS       Row Name 12/22/24 0954          Safety Issues/Impairments Affecting Functional Mobility    Impairments Affecting Function (Mobility) balance;endurance/activity tolerance;strength;postural/trunk control  -CS               User Key  (r) = Recorded By, (t) = Taken By, (c) = Cosigned By      Initials Name Provider Type    CS Bernarda Gonzalez OT Occupational Therapist                     Mobility/ADL's       Row Name 12/22/24 0954          Bed Mobility    Bed Mobility supine-sit  -CS     Scooting/Bridging [place] (Bed Mobility) contact guard;verbal cues  -CS     Assistive Device (Bed Mobility) bed rails;head of bed elevated  -CS       Row Name 12/22/24 0954          Transfers    Transfers stand-sit transfer;toilet transfer;sit-stand transfer  -CS       Row Name 12/22/24 0954          Sit-Stand Transfer    Sit-Stand [place] (Transfers) minimum assist (75%  patient effort);verbal cues  -CS       Row Name 12/22/24 0954          Stand-Sit Transfer    Stand-Sit [place] (Transfers) minimum assist (75% patient effort);verbal cues  -CS       Row Name 12/22/24 0954          Toilet Transfer    Type (Toilet Transfer) sit-stand;stand-sit  -CS     [place] Level (Toilet Transfer) minimum assist (75% patient effort);verbal cues  -CS     Assistive Device (Toilet Transfer) grab bars/safety frame  -CS       Row Name 12/22/24 0954          Functional Mobility    Functional Mobility- Ind. Level minimum assist (75% patient effort);verbal cues required  -CS     Functional Mobility-Distance (Feet) --  to the bathroom  -CS       Row Name 12/22/24 0954          Activities of Daily Living    BADL Assessment/Intervention toileting;grooming  -       Row Name 12/22/24 0954          Grooming Assessment/Training    [place] Level (Grooming) wash face, hands;standby assist  -     Position (Grooming) supported standing;sink side  -       Row Name 12/22/24 0954          Toileting Assessment/Training    [place] Level (Toileting) adjust/manage clothing;minimum assist (75% patient effort);perform perineal hygiene;standby assist  -CS     Position (Toileting) supported sitting;supported standing  -CS               User Key  (r) = Recorded By, (t) = Taken By, (c) = Cosigned By      Initials Name Provider Type    Bernarda Tatum OT Occupational Therapist                   Obj/Interventions       Row Name 12/22/24 0955          Sensory Assessment (Somatosensory)    Sensory Assessment (Somatosensory) UE sensation intact  -       Row Name 12/22/24 0955          Vision Assessment/Intervention    Visual Impairment/Limitations WFL  -CS       Row Name 12/22/24 0955          Range of Motion Comprehensive    General Range of Motion bilateral upper extremity ROM WFL  -       Row Name 12/22/24 0955          Strength Comprehensive (MMT)    Comment, General Manual Muscle Testing (MMT)  Assessment BUE observed grossly 3+/5  -CS       Row Name 12/22/24 0955          Balance    Balance Assessment sitting static balance;sitting dynamic balance;standing static balance;standing dynamic balance  -CS     Static Sitting Balance standby assist  -CS     Dynamic Sitting Balance standby assist  -CS     Position, Sitting Balance sitting edge of bed  -CS     Static Standing Balance contact guard  -CS     Dynamic Standing Balance minimal assist  -CS     Position/Device Used, Standing Balance supported  -CS     Balance Interventions standing;sitting;static;dynamic;dynamic reaching;weight shifting activity;occupation based/functional task  -CS               User Key  (r) = Recorded By, (t) = Taken By, (c) = Cosigned By      Initials Name Provider Type    CS Bernarda Gonzalez, OT Occupational Therapist                   Goals/Plan       Row Name 12/22/24 0958          Transfer Goal 1 (OT)    Activity/Assistive Device (Transfer Goal 1, OT) sit-to-stand/stand-to-sit;toilet  -CS     [place] Level/Cues Needed (Transfer Goal 1, OT) standby assist  -CS     Time Frame (Transfer Goal 1, OT) long term goal (LTG);10 days  -     Progress/Outcome (Transfer Goal 1, OT) goal revised this date  -       Row Name 12/22/24 0958          Dressing Goal 1 (OT)    Progress/Outcome (Dressing Goal 1, OT) goal ongoing  -       Row Name 12/22/24 0958          Toileting Goal 1 (OT)    Progress/Outcome (Toileting Goal 1, OT) goal ongoing  -       Row Name 12/22/24 0958          Grooming Goal 1 (OT)    Progress/Outcome (Grooming Goal 1, OT) goal ongoing  -       Row Name 12/22/24 0958          Therapy Assessment/Plan (OT)    Planned Therapy Interventions (OT) activity tolerance training;adaptive equipment training;BADL retraining;functional balance retraining;occupation/activity based interventions;ROM/therapeutic exercise;strengthening exercise;transfer/mobility retraining;patient/caregiver education/training  -                User Key  (r) = Recorded By, (t) = Taken By, (c) = Cosigned By      Initials Name Provider Type    CS Bernarda Gonzalez, YVAN Occupational Therapist                   Clinical Impression       Row Name 12/22/24 0955          Pain Assessment    Pain Side/Orientation generalized  -CS     Pain Management Interventions activity modification encouraged;exercise or physical activity utilized;positioning techniques utilized  -CS     Response to Pain Interventions intervention effective per patient report  -CS       Row Name 12/22/24 0955          Pain Scale: FACES Pre/Post-Treatment    Pain: FACES Scale, Pretreatment 2-->hurts little bit  -CS     Posttreatment Pain Rating 2-->hurts little bit  -CS       Row Name 12/22/24 0955          Plan of Care Review    Plan of Care Reviewed With patient  -CS     Outcome Evaluation OT Re-eval complete. Pt presents w/ deficits in balance, functional endurance, and strength warranting cont skilled IPOT POC to promote return to PLOF. Pt session limited d/t c/o N/V w/ activity. Recommend pt DC to IP rehab, however will monitor progress closely.  -       Row Name 12/22/24 0955          Therapy Assessment/Plan (OT)    Patient/Family Therapy Goal Statement (OT) Return to PLOF  -CS     Rehab Potential (OT) good  -CS     Criteria for Skilled Therapeutic Interventions Met (OT) yes;skilled treatment is necessary  -CS     Therapy Frequency (OT) daily  -CS     Predicted Duration of Therapy Intervention (OT) 10 days  -       Row Name 12/22/24 0955          Therapy Plan Review/Discharge Plan (OT)    Anticipated Discharge Disposition (OT) inpatient rehabilitation facility  -       Row Name 12/22/24 0955          Vital Signs    Pre Systolic BP Rehab 112  -CS     Pre Treatment Diastolic BP 69  -CS     Pretreatment Heart Rate (beats/min) 70  -CS     Posttreatment Heart Rate (beats/min) 75  -CS     Pre SpO2 (%) 95  -CS     O2 Delivery Pre Treatment room air  -CS     Post SpO2 (%) 98  -CS     O2 Delivery  Post Treatment room air  -CS     Pre Patient Position Supine  -CS     Intra Patient Position Standing  -CS     Post Patient Position Sitting  -CS       Row Name 12/22/24 0955          Positioning and Restraints    Pre-Treatment Position in bed  -CS     Post Treatment Position chair  -CS     In Chair notified nsg;reclined;call light within reach;encouraged to call for assist;exit alarm on;RUE elevated;LUE elevated;waffle cushion;legs elevated;heels elevated;with nsg  -CS               User Key  (r) = Recorded By, (t) = Taken By, (c) = Cosigned By      Initials Name Provider Type    Bernarda Tatum OT Occupational Therapist                   Outcome Measures       Row Name 12/22/24 0959          How much help from another is currently needed...    Putting on and taking off regular lower body clothing? 2  -CS     Bathing (including washing, rinsing, and drying) 2  -CS     Toileting (which includes using toilet bed pan or urinal) 3  -CS     Putting on and taking off regular upper body clothing 3  -CS     Taking care of personal grooming (such as brushing teeth) 3  -CS     Eating meals 4  -CS     AM-PAC 6 Clicks Score (OT) 17  -CS       Row Name 12/22/24 0959          Functional Assessment    Outcome Measure Options AM-PAC 6 Clicks Daily Activity (OT)  -CS               User Key  (r) = Recorded By, (t) = Taken By, (c) = Cosigned By      Initials Name Provider Type    Bernarda Tatum OT Occupational Therapist                    Occupational Therapy Education       Title: PT OT SLP Therapies (In Progress)       Topic: Occupational Therapy (In Progress)       Point: ADL training (In Progress)       Description:   Instruct learner(s) on proper safety adaptation and remediation techniques during self care or transfers.   Instruct in proper use of assistive devices.                  Learning Progress Summary            Patient Acceptance, E, NR by CS at 12/22/2024 0959    Acceptance, E,D, VU,DU,NR by TB at 12/20/2024  1627    Acceptance, E,TB, VU by MD at 12/19/2024 1737    Acceptance, E, VU,NR by SANYA at 12/18/2024 1521                      Point: Home exercise program (Done)       Description:   Instruct learner(s) on appropriate technique for monitoring, assisting and/or progressing therapeutic exercises/activities.                  Learning Progress Summary            Patient Acceptance, E,TB, VU by MD at 12/19/2024 1737                      Point: Precautions (In Progress)       Description:   Instruct learner(s) on prescribed precautions during self-care and functional transfers.                  Learning Progress Summary            Patient Acceptance, E, NR by MARIO at 12/22/2024 0959    Acceptance, E,TB, VU by MD at 12/19/2024 1737    Acceptance, E, VU,NR by SANYA at 12/18/2024 1521                      Point: Body mechanics (In Progress)       Description:   Instruct learner(s) on proper positioning and spine alignment during self-care, functional mobility activities and/or exercises.                  Learning Progress Summary            Patient Acceptance, E, NR by MARIO at 12/22/2024 0959    Acceptance, E,TB, VU by MD at 12/19/2024 1737    Acceptance, E, VU,NR by SANYA at 12/18/2024 1521                                      User Key       Initials Effective Dates Name Provider Type Discipline     07/11/23 -  Rossi Melgar OT Occupational Therapist OT     09/02/21 -  Bernarda Gonzalez OT Occupational Therapist OT    MD 07/26/24 -  Shelby Coronel, RN Registered Nurse Nurse     08/26/24 -  Daya Avila OT Occupational Therapist OT                  OT Recommendation and Plan  Planned Therapy Interventions (OT): activity tolerance training, adaptive equipment training, BADL retraining, functional balance retraining, occupation/activity based interventions, ROM/therapeutic exercise, strengthening exercise, transfer/mobility retraining, patient/caregiver education/training  Therapy Frequency (OT): daily  Plan of Care  Review  Plan of Care Reviewed With: patient  Outcome Evaluation: OT Re-eval complete. Pt presents w/ deficits in balance, functional endurance, and strength warranting cont skilled IPOT POC to promote return to PLOF. Pt session limited d/t c/o N/V w/ activity. Recommend pt DC to IP rehab, however will monitor progress closely.     Time Calculation:         Time Calculation- OT       Row Name 12/22/24 0959             Time Calculation- OT    OT Start Time 0935  -CS      OT Received On 12/22/24  -CS      OT Goal Re-Cert Due Date 01/01/25  -CS         Timed Charges    44326 - OT Therapeutic Activity Minutes 5  -CS      30650 - OT Self Care/Mgmt Minutes 4  -CS         Untimed Charges    OT Eval/Re-eval Minutes 20  -CS         Total Minutes    Timed Charges Total Minutes 9  -CS      Untimed Charges Total Minutes 20  -CS       Total Minutes 29  -CS                User Key  (r) = Recorded By, (t) = Taken By, (c) = Cosigned By      Initials Name Provider Type    CS Bernarda Gonzalez OT Occupational Therapist                  Therapy Charges for Today       Code Description Service Date Service Provider Modifiers Qty    [id number]  OT THERAPEUTIC ACT EA 15 MIN 12/22/2024 Bernarda Gonzalez OT GO 1    [id number] HC OT RE-EVAL 2 12/22/2024 Bernarda Gonzalez OT GO 1                 Bernarda Gonzalez OT  12/22/2024

## 2024-12-22 NOTE — PLAN OF CARE
Goal Outcome Evaluation:  Plan of Care Reviewed With: patient        Progress: no change  Outcome Evaluation: PT re-evaluation completed. Pt cont to demonstrate generalized weakness and decreased indep/ functional endurance re: mobility tasks, warranting further skilled PT services to promote PLOF. Limited today by c/o nausea/ dizziness with mobility but able to ambulate 80 ft with min A. Recommend IP rehab at d/c (TBA further pending progress). Goals addressed and modified to reflect current functional status.    Anticipated Discharge Disposition (PT): inpatient rehabilitation facility

## 2024-12-22 NOTE — PROCEDURES
"Insert Central Line At Bedside    Date/Time: 12/21/2024 10:44 PM    Performed by: Jason Cole APRN  Authorized by: Jason Cole APRN  Consent: Verbal consent obtained. Written consent obtained.  Risks and benefits: risks, benefits and alternatives were discussed  Consent given by: patient and spouse  Patient understanding: patient states understanding of the procedure being performed  Patient consent: the patient's understanding of the procedure matches consent given  Procedure consent: procedure consent matches procedure scheduled  Relevant documents: relevant documents present and verified  Test results: test results available and properly labeled  Site marked: the operative site was marked  Required items: required blood products, implants, devices, and special equipment available  Patient identity confirmed: verbally with patient, arm band and hospital-assigned identification number  Time out: Immediately prior to procedure a \"time out\" was called to verify the correct patient, procedure, equipment, support staff and site/side marked as required.  Indications: vascular access  Anesthesia: local infiltration    Anesthesia:  Local Anesthetic: lidocaine 2% without epinephrine  Anesthetic total: 5 mL    Sedation:  Patient sedated: no    Preparation: skin prepped with 2% chlorhexidine  Skin prep agent dried: skin prep agent completely dried prior to procedure  Sterile barriers: all five maximum sterile barriers used - cap, mask, sterile gown, sterile gloves, and large sterile sheet  Hand hygiene: hand hygiene performed prior to central venous catheter insertion  Location details: right internal jugular  Patient position: flat  Catheter type: triple lumen  Catheter size: 7 Fr  Pre-procedure: landmarks identified  Ultrasound guidance: yes  Sterile ultrasound techniques: sterile gel and sterile probe covers were used  Number of attempts: 1  Successful placement: yes  Post-procedure: line sutured and " dressing applied  Assessment: blood return through all ports, free fluid flow and placement verified by x-ray  Patient tolerance: Patient tolerated the procedure well with no immediate complications

## 2024-12-22 NOTE — SIGNIFICANT NOTE
Significant Event:       Hyponatremia:   Patient with intractable nausea/vomiting overnight and found to have a sodium of 121 this AM. Sodium previously 133 on 12/18. TSH/Cortisol within normal limits. Urine sodium 177, urine Osm 604, and serum Osm 245. Etiology thought to be 2/2 SIADH vs salt wasting in setting of recent head injury. Nephrology consulted for hyponatremia and patient was started on albumin, salt tabs, with q4hr sodium checks. Patient's sodium continued to decrease despite intervention to 117 this evening. ICU was consulted this evening for initiation of hypertonic saline infusion +/- central line insertion.     Wilfredo Villavicencio DO  Internal Medicine

## 2024-12-22 NOTE — THERAPY RE-EVALUATION
Patient Name: Brittani Lambert  : 1960    MRN: [id number]                              Today's Date: 2024       Admit Date: 2024    Visit Dx:     ICD-10-CM ICD-9-CM   1. Fall, initial encounter  W19.XXXA E888.9   2. Injury of head, initial encounter  S09.90XA 959.01   3. Post-traumatic subdural hygroma  G96.08 852.20   4. Strain of neck muscle, initial encounter  S16.1XXA 847.0   5. Multiple abrasions  T07.XXXA 919.0   6. Contusion of nose, initial encounter  S00.33XA 920   7. Contusion of left knee, initial encounter  S80.02XA 924.11   8. Cancer of right breast metastatic to brain  C50.911 174.9    C79.31 198.3   9. History of bilateral mastectomy  Z90.13 V45.71   10. Acute UTI  N39.0 599.0   11. Chronic pain syndrome  G89.4 338.4   12. Lactic acidosis  E87.20 276.2     Patient Active Problem List   Diagnosis    Mixed hyperlipidemia    Moderate episode of recurrent major depressive disorder    Anxiety    Functional diarrhea    Breast CA    Atrial fibrillation    Invasive ductal carcinoma of left breast    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    Left-sided weakness    THALIA (generalized anxiety disorder)    Acute deep vein thrombosis (DVT) of left upper extremity    Persistent atrial fibrillation    Lactic acidosis    T12 compression fracture    Falls    History of atrial fibrillation    History of pulmonary embolism    Bilateral leg weakness    Tobacco abuse    Alcohol abuse    Generalized weakness    Failure to thrive in adult    Depression    Subdural hygroma    Severe protein-calorie malnutrition    UTI (urinary tract infection)    Hyponatremia    SIADH (syndrome of inappropriate ADH production)     Past Medical History:   Diagnosis Date    Breast CA 10/4/2018    Depression     DJD (degenerative joint disease) of cervical spine     THALIA (generalized anxiety disorder)     Heart murmur     Ovarian cancer     Rt Cellulitis of chest wall 10/15/2018    Tobacco dependence     UTI (urinary  tract infection) 9/9/2020     Past Surgical History:   Procedure Laterality Date    APPENDECTOMY      BREAST BIOPSY Right 2003    DONE IN [place]    COLONOSCOPY  06/2018    HYSTERECTOMY  1989    MASTECTOMY W/ SENTINEL NODE BIOPSY Bilateral 10/4/2018    Procedure: LEFT SKIN SPARING BREAST MASTECTOMY WITH LEFT SENTINEL NODE BIOPSY, RIGHT PROPHYLACTIC SKIN SPARING MASTECTOMY;  Surgeon: Koffi Worthington MD;  Location: [facility] OR;  Service: General    OOPHORECTOMY  1989      General Information       Row Name 12/22/24 1205          Physical Therapy Time and Intention    Document Type re-evaluation  -LS     Mode of Treatment physical therapy  -LS       Row Name 12/22/24 1205          General Information    Patient Profile Reviewed yes  -LS     Prior Level of Function --  see IE  -LS     Existing Precautions/Restrictions fall  -LS     Barriers to Rehab medically complex  -LS       Row Name 12/22/24 1205          Living Environment    People in Home --  see IE for home/ social hx  -LS       Row Name 12/22/24 1205          Cognition    Orientation Status (Cognition) oriented x 3  -LS       Row Name 12/22/24 1205          Safety Issues/Impairments Affecting Functional Mobility    Impairments Affecting Function (Mobility) balance;endurance/activity tolerance;strength;postural/trunk control  -LS               User Key  (r) = Recorded By, (t) = Taken By, (c) = Cosigned By      Initials Name Provider Type    LS Sierra Whitley, PT Physical Therapist                   Mobility       Row Name 12/22/24 1206          Bed Mobility    Supine-Sit [place] (Bed Mobility) contact guard;verbal cues  -LS     Assistive Device (Bed Mobility) bed rails;head of bed elevated  -LS       Row Name 12/22/24 1206          Sit-Stand Transfer    Sit-Stand [place] (Transfers) minimum assist (75% patient effort);verbal cues  -LS       Row Name 12/22/24 1206          Gait/Stairs (Locomotion)    [place] Level (Gait) minimum assist (75%  patient effort);verbal cues  -LS     Assistive Device (Gait) other (see comments)  UE support on tele monitor  -LS     Distance in Feet (Gait) 80  -LS     Deviations/Abnormal Patterns (Gait) marquita decreased;gait speed decreased;bilateral deviations;base of support, narrow;stride length decreased;weight shifting decreased  -LS     Bilateral Gait Deviations heel strike decreased;forward flexed posture  -LS     Comment, (Gait/Stairs) Distance limited by c/o dizziness and nausea.  -LS               User Key  (r) = Recorded By, (t) = Taken By, (c) = Cosigned By      Initials Name Provider Type    LS Sierra Whitley, PT Physical Therapist                   Obj/Interventions       Row Name 12/22/24 1208          Range of Motion Comprehensive    General Range of Motion bilateral lower extremity ROM WFL  -LS       Row Name 12/22/24 1208          Strength Comprehensive (MMT)    General Manual Muscle Testing (MMT) Assessment lower extremity strength deficits identified  -LS     Comment, General Manual Muscle Testing (MMT) Assessment BLE grossly 4-/5  -LS       Row Name 12/22/24 1208          Balance    Balance Assessment sitting static balance;standing static balance  -LS     Static Sitting Balance standby assist  -LS     Position, Sitting Balance sitting edge of bed  -LS     Static Standing Balance contact guard  -LS     Position/Device Used, Standing Balance supported  -LS       Row Name 12/22/24 1208          Sensory Assessment (Somatosensory)    Sensory Assessment (Somatosensory) LE sensation intact  -LS               User Key  (r) = Recorded By, (t) = Taken By, (c) = Cosigned By      Initials Name Provider Type    Sierra Negron, PT Physical Therapist                   Goals/Plan       Row Name 12/22/24 1212          Bed Mobility Goal 1 (PT)    Activity/Assistive Device (Bed Mobility Goal 1, PT) sit to supine/supine to sit  -LS     [place] Level/Cues Needed (Bed Mobility Goal 1, PT) independent  -LS     Time  Frame (Bed Mobility Goal 1, PT) short term goal (STG);1 week  -LS     Progress/Outcomes (Bed Mobility Goal 1, PT) goal revised this date;goal ongoing  -LS       Row Name 12/22/24 1212          Transfer Goal 1 (PT)    Activity/Assistive Device (Transfer Goal 1, PT) sit-to-stand/stand-to-sit  -LS     [place] Level/Cues Needed (Transfer Goal 1, PT) modified independence  -LS     Time Frame (Transfer Goal 1, PT) long term goal (LTG);2 weeks  -LS     Progress/Outcome (Transfer Goal 1, PT) goal revised this date  -LS       Row Name 12/22/24 1212          Gait Training Goal 1 (PT)    Activity/Assistive Device (Gait Training Goal 1, PT) gait (walking locomotion);improve balance and speed;increase endurance/gait distance  -LS     [place] Level (Gait Training Goal 1, PT) modified independence  -LS     Distance (Gait Training Goal 1, PT) 150  -LS     Time Frame (Gait Training Goal 1, PT) long term goal (LTG);2 weeks  -LS     Progress/Outcome (Gait Training Goal 1, PT) goal revised this date  -LS       Row Name 12/22/24 1212          Stairs Goal 1 (PT)    Activity/Assistive Device (Stairs Goal 1, PT) stairs, all skills;using handrail, right;using handrail, left  -LS     [place] Level/Cues Needed (Stairs Goal 1, PT) standby assist  -LS     Number of Stairs (Stairs Goal 1, PT) 8  -LS     Time Frame (Stairs Goal 1, PT) long term goal (LTG);2 weeks  -LS     Progress/Outcome (Stairs Goal 1, PT) goal ongoing  -LS       Row Name 12/22/24 1212          Therapy Assessment/Plan (PT)    Planned Therapy Interventions (PT) balance training;bed mobility training;gait training;home exercise program;patient/family education;strengthening;stair training;transfer training  -LS               User Key  (r) = Recorded By, (t) = Taken By, (c) = Cosigned By      Initials Name Provider Type    Sierra Negron, PT Physical Therapist                   Clinical Impression       Row Name 12/22/24 1209          Pain    Pain Location head   -LS     Pain Management Interventions activity modification encouraged;exercise or physical activity utilized  -LS     Response to Pain Interventions activity participation with tolerable pain  -LS     Pre/Posttreatment Pain Comment also R hip pain  -       Row Name 12/22/24 1209          Pain Scale: FACES Pre/Post-Treatment    Pain: FACES Scale, Pretreatment 2-->hurts little bit  -LS     Posttreatment Pain Rating 2-->hurts little bit  -LS       Row Name 12/22/24 1209          Plan of Care Review    Plan of Care Reviewed With patient  -LS     Progress no change  -LS     Outcome Evaluation PT re-evaluation completed. Pt cont to demonstrate generalized weakness and decreased indep/ functional endurance re: mobility tasks, warranting further skilled PT services to promote PLOF. Limited today by c/o nausea/ dizziness with mobility but able to ambulate 80 ft with min A. Recommend IP rehab at d/c (TBA further pending progress). Goals addressed and modified to reflect current functional status.  -       Row Name 12/22/24 1204          Therapy Assessment/Plan (PT)    Patient/Family Therapy Goals Statement (PT) return to PLOF  -LS     Rehab Potential (PT) good  -LS     Criteria for Skilled Interventions Met (PT) yes;meets criteria;skilled treatment is necessary  -LS     Therapy Frequency (PT) daily  -LS     Predicted Duration of Therapy Intervention (PT) 2 wks  -       Row Name 12/22/24 1209          Vital Signs    Pre Systolic BP Rehab 112  -LS     Pre Treatment Diastolic BP 69  -LS     Post Systolic BP Rehab --  with OT at end of session  -LS     Pretreatment Heart Rate (beats/min) 77  -LS     Posttreatment Heart Rate (beats/min) 77  -LS     Pre SpO2 (%) 96  -LS     O2 Delivery Pre Treatment room air  -LS     O2 Delivery Intra Treatment room air  -LS     O2 Delivery Post Treatment room air  -LS     Pre Patient Position Supine  -LS     Intra Patient Position Standing  -LS     Post Patient Position Sitting  -LS        Row Name 12/22/24 1209          Positioning and Restraints    Pre-Treatment Position in bed  -LS     Post Treatment Position chair  -LS     In Chair notified nsg;reclined;call light within reach;encouraged to call for assist;exit alarm on;with OT;legs elevated;waffle cushion  -               User Key  (r) = Recorded By, (t) = Taken By, (c) = Cosigned By      Initials Name Provider Type     Sierra Whitley, PT Physical Therapist                   Outcome Measures       Row Name 12/22/24 1214 12/22/24 1000       How much help from another person do you currently need...    Turning from your back to your side while in flat bed without using bedrails? 3  -LS 4  -QH    Moving from lying on back to sitting on the side of a flat bed without bedrails? 3  -LS 3  -QH    Moving to and from a bed to a chair (including a wheelchair)? 3  -LS 3  -QH    Standing up from a chair using your arms (e.g., wheelchair, bedside chair)? 3  -LS 3  -QH    Climbing 3-5 steps with a railing? 2  -LS 3  -QH    To walk in hospital room? 3  -LS 3  -QH    AM-PAC 6 Clicks Score (PT) 17  -LS 19  -QH    Highest Level of Mobility Goal 5 --> Static standing  -LS 6 --> Walk 10 steps or more  -Q      Row Name 12/22/24 0959          Functional Assessment    Outcome Measure Options AM-PAC 6 Clicks Daily Activity (OT)  -               User Key  (r) = Recorded By, (t) = Taken By, (c) = Cosigned By      Initials Name Provider Type    Sierra Negron, PT Physical Therapist    Bernarda Tatum, OT Occupational Therapist     Rosa Ponce, RN Registered Nurse                                 Physical Therapy Education       Title: PT OT SLP Therapies (In Progress)       Topic: Physical Therapy (Done)       Point: Mobility training (Done)       Learning Progress Summary            Patient Acceptance, E,D, NR,VU by  at 12/22/2024 1214    Eager, E, VU by SC at 12/20/2024 1602    Comment: reviewed benefits of activity    Acceptance, E,TB, VU by MD at  12/19/2024 1737    Eager, E, VU,DU,NR by SS at 12/19/2024 1158    Comment: Reviewed safety/technique w/bed mobility, transfers, ambulation, HEP, PT POC    Acceptance, E, VU by KR at 12/18/2024 1458                      Point: Home exercise program (Done)       Learning Progress Summary            Patient Eager, E, VU by SC at 12/20/2024 1602    Comment: reviewed benefits of activity    Acceptance, E,TB, VU by MD at 12/19/2024 1737    Eager, E, VU,DU,NR by SS at 12/19/2024 1158    Comment: Reviewed safety/technique w/bed mobility, transfers, ambulation, HEP, PT POC                      Point: Body mechanics (Done)       Learning Progress Summary            Patient Acceptance, E,D, NR,VU by  at 12/22/2024 1214    Eager, E, VU by SC at 12/20/2024 1602    Comment: reviewed benefits of activity    Acceptance, E,TB, VU by MD at 12/19/2024 1737    Eager, E, VU,DU,NR by SS at 12/19/2024 1158    Comment: Reviewed safety/technique w/bed mobility, transfers, ambulation, HEP, PT POC    Acceptance, E, VU by KR at 12/18/2024 1458                      Point: Precautions (Done)       Learning Progress Summary            Patient Acceptance, E,D, NR,VU by  at 12/22/2024 1214    Eager, E, VU by SC at 12/20/2024 1602    Comment: reviewed benefits of activity    Acceptance, E,TB, VU by MD at 12/19/2024 1737    Eager, E, VU,DU,NR by SS at 12/19/2024 1158    Comment: Reviewed safety/technique w/bed mobility, transfers, ambulation, HEP, PT POC    Acceptance, E, VU by KR at 12/18/2024 1458                                      User Key       Initials Effective Dates Name Provider Type Discipline    SC 02/03/23 -  Brian Huntley, PT Physical Therapist PT    LS 02/03/23 -  Sierra Whitley, PT Physical Therapist PT    SS 06/01/21 -  Gloria Powers, PT Physical Therapist PT    KR 12/30/22 -  Carol Guerra, PT Physical Therapist PT    MD 07/26/24 -  Shelby Coronel, RN Registered Nurse Nurse                  PT Recommendation and  Plan  Planned Therapy Interventions (PT): balance training, bed mobility training, gait training, home exercise program, patient/family education, strengthening, stair training, transfer training  Progress: no change  Outcome Evaluation: PT re-evaluation completed. Pt cont to demonstrate generalized weakness and decreased indep/ functional endurance re: mobility tasks, warranting further skilled PT services to promote PLOF. Limited today by c/o nausea/ dizziness with mobility but able to ambulate 80 ft with min A. Recommend IP rehab at d/c (TBA further pending progress). Goals addressed and modified to reflect current functional status.     Time Calculation:         PT Charges       Row Name 12/22/24 1215             Time Calculation    Start Time 0931  -LS      PT Received On 12/22/24  -      PT Goal Re-Cert Due Date 01/01/25  -LS         Timed Charges    12330 - PT Therapeutic Activity Minutes 9  -LS         Untimed Charges    PT Eval/Re-eval Minutes 27  -LS         Total Minutes    Timed Charges Total Minutes 9  -LS      Untimed Charges Total Minutes 27  -LS       Total Minutes 36  -LS                User Key  (r) = Recorded By, (t) = Taken By, (c) = Cosigned By      Initials Name Provider Type     Sierra Whitley, PT Physical Therapist                  Therapy Charges for Today       Code Description Service Date Service Provider Modifiers Qty    04213460172 HC PT THERAPEUTIC ACT EA 15 MIN 12/22/2024 Sirera Whitley, PT GP 1    55302797905 HC PT RE-EVAL ESTABLISHED PLAN 2 12/22/2024 Sierra Whitley, PT GP 1            PT G-Codes  Outcome Measure Options: AM-PAC 6 Clicks Daily Activity (OT)  AM-PAC 6 Clicks Score (PT): 17  AM-PAC 6 Clicks Score (OT): 17  PT Discharge Summary  Anticipated Discharge Disposition (PT): inpatient rehabilitation facility    Sierra Whitley, PT  12/22/2024

## 2024-12-23 LAB
ANION GAP SERPL CALCULATED.3IONS-SCNC: 11 MMOL/L (ref 5–15)
BASOPHILS # BLD AUTO: 0.04 10*3/MM3 (ref 0–0.2)
BASOPHILS NFR BLD AUTO: 0.3 % (ref 0–1.5)
BUN SERPL-MCNC: 19 MG/DL (ref 8–23)
BUN/CREAT SERPL: 38 (ref 7–25)
CALCIUM SPEC-SCNC: 8.3 MG/DL (ref 8.6–10.5)
CHLORIDE SERPL-SCNC: 99 MMOL/L (ref 98–107)
CO2 SERPL-SCNC: 19 MMOL/L (ref 22–29)
CREAT SERPL-MCNC: 0.5 MG/DL (ref 0.57–1)
DEPRECATED RDW RBC AUTO: 42 FL (ref 37–54)
EGFRCR SERPLBLD CKD-EPI 2021: 104.9 ML/MIN/1.73
EOSINOPHIL # BLD AUTO: 0.03 10*3/MM3 (ref 0–0.4)
EOSINOPHIL NFR BLD AUTO: 0.2 % (ref 0.3–6.2)
ERYTHROCYTE [DISTWIDTH] IN BLOOD BY AUTOMATED COUNT: 12.4 % (ref 12.3–15.4)
GLUCOSE SERPL-MCNC: 130 MG/DL (ref 65–99)
HCT VFR BLD AUTO: 32.3 % (ref 34–46.6)
HGB BLD-MCNC: 10.9 G/DL (ref 12–15.9)
IMM GRANULOCYTES # BLD AUTO: 0.11 10*3/MM3 (ref 0–0.05)
IMM GRANULOCYTES NFR BLD AUTO: 0.8 % (ref 0–0.5)
LYMPHOCYTES # BLD AUTO: 0.59 10*3/MM3 (ref 0.7–3.1)
LYMPHOCYTES NFR BLD AUTO: 4.2 % (ref 19.6–45.3)
MAGNESIUM SERPL-MCNC: 2 MG/DL (ref 1.6–2.4)
MCH RBC QN AUTO: 31.1 PG (ref 26.6–33)
MCHC RBC AUTO-ENTMCNC: 33.7 G/DL (ref 31.5–35.7)
MCV RBC AUTO: 92.3 FL (ref 79–97)
MONOCYTES # BLD AUTO: 1.64 10*3/MM3 (ref 0.1–0.9)
MONOCYTES NFR BLD AUTO: 11.6 % (ref 5–12)
NEUTROPHILS NFR BLD AUTO: 11.71 10*3/MM3 (ref 1.7–7)
NEUTROPHILS NFR BLD AUTO: 82.9 % (ref 42.7–76)
NRBC BLD AUTO-RTO: 0 /100 WBC (ref 0–0.2)
OSMOLALITY UR: 707 MOSM/KG (ref 300–1100)
PLATELET # BLD AUTO: 257 10*3/MM3 (ref 140–450)
PMV BLD AUTO: 9.2 FL (ref 6–12)
POTASSIUM SERPL-SCNC: 4.4 MMOL/L (ref 3.5–5.2)
QT INTERVAL: 380 MS
QT INTERVAL: 404 MS
QTC INTERVAL: 467 MS
QTC INTERVAL: 472 MS
RBC # BLD AUTO: 3.5 10*6/MM3 (ref 3.77–5.28)
SODIUM SERPL-SCNC: 128 MMOL/L (ref 136–145)
SODIUM SERPL-SCNC: 129 MMOL/L (ref 136–145)
SODIUM UR-SCNC: 37 MMOL/L
WBC NRBC COR # BLD AUTO: 14.12 10*3/MM3 (ref 3.4–10.8)

## 2024-12-23 PROCEDURE — 80048 BASIC METABOLIC PNL TOTAL CA: CPT | Performed by: INTERNAL MEDICINE

## 2024-12-23 PROCEDURE — 25010000002 HYDROMORPHONE PER 4 MG: Performed by: INTERNAL MEDICINE

## 2024-12-23 PROCEDURE — 85025 COMPLETE CBC W/AUTO DIFF WBC: CPT | Performed by: INTERNAL MEDICINE

## 2024-12-23 PROCEDURE — 84300 ASSAY OF URINE SODIUM: CPT | Performed by: INTERNAL MEDICINE

## 2024-12-23 PROCEDURE — 99232 SBSQ HOSP IP/OBS MODERATE 35: CPT | Performed by: INTERNAL MEDICINE

## 2024-12-23 PROCEDURE — 84295 ASSAY OF SERUM SODIUM: CPT

## 2024-12-23 PROCEDURE — 83935 ASSAY OF URINE OSMOLALITY: CPT | Performed by: INTERNAL MEDICINE

## 2024-12-23 PROCEDURE — 83735 ASSAY OF MAGNESIUM: CPT | Performed by: INTERNAL MEDICINE

## 2024-12-23 RX ORDER — HYDROMORPHONE HYDROCHLORIDE 2 MG/1
1 TABLET ORAL
Status: DISCONTINUED | OUTPATIENT
Start: 2024-12-23 | End: 2024-12-24

## 2024-12-23 RX ADMIN — HYDROMORPHONE HYDROCHLORIDE 1 MG: 2 TABLET ORAL at 18:08

## 2024-12-23 RX ADMIN — ACETAMINOPHEN 1000 MG: 500 TABLET, FILM COATED ORAL at 09:07

## 2024-12-23 RX ADMIN — LIDOCAINE 1 PATCH: 4 PATCH TOPICAL at 09:07

## 2024-12-23 RX ADMIN — OXYCODONE HYDROCHLORIDE 10 MG: 10 TABLET ORAL at 00:37

## 2024-12-23 RX ADMIN — HYDROMORPHONE HYDROCHLORIDE 0.5 MG: 1 INJECTION, SOLUTION INTRAMUSCULAR; INTRAVENOUS; SUBCUTANEOUS at 04:44

## 2024-12-23 RX ADMIN — ACETAMINOPHEN 1000 MG: 500 TABLET, FILM COATED ORAL at 15:19

## 2024-12-23 RX ADMIN — TAMSULOSIN HYDROCHLORIDE 0.4 MG: 0.4 CAPSULE ORAL at 09:07

## 2024-12-23 RX ADMIN — NALOXEGOL OXALATE 25 MG: 25 TABLET, FILM COATED ORAL at 09:07

## 2024-12-23 RX ADMIN — MUPIROCIN 1 APPLICATION: 20 OINTMENT TOPICAL at 09:07

## 2024-12-23 RX ADMIN — ACETAMINOPHEN 1000 MG: 500 TABLET, FILM COATED ORAL at 00:36

## 2024-12-23 RX ADMIN — MUPIROCIN 1 APPLICATION: 20 OINTMENT TOPICAL at 19:45

## 2024-12-23 NOTE — PROGRESS NOTES
" LOS: 4 days   Patient Care Team:  Alla Colon DO as PCP - General (Family Medicine)  Carrie Patel MD as Referring Physician (Hematology and Oncology)  Rod Garcia MD as Consulting Physician (Radiation Oncology)  Alla Colon DO as Referring Physician (Family Medicine)    Chief Complaint: Hyponatremia     Subjective   Reviewed vitals, labs and medications list  Seen and examined  No new complaints     Fall        Subjective    History taken from: patient    Objective     Vital Sign Min/Max for last 24 hours  Temp  Min: 97.2 °F (36.2 °C)  Max: 97.7 °F (36.5 °C)   BP  Min: 85/59  Max: 169/86   Pulse  Min: 64  Max: 125   Resp  Min: 16  Max: 18   SpO2  Min: 83 %  Max: 100 %   No data recorded   No data recorded     Flowsheet Rows      Flowsheet Row First Filed Value   Admission Height 157.5 cm (62\") Documented at 12/16/2024 1901   Admission Weight 41.7 kg (92 lb) Documented at 12/16/2024 1901            No intake/output data recorded.  I/O last 3 completed shifts:  In: 667.7 [P.O.:225; I.V.:442.7]  Out: 1740 [Urine:1740]    Objective:  Vital signs: (most recent): Blood pressure 115/71, pulse 89, temperature 98.9 °F (37.2 °C), temperature source Oral, resp. rate 16, height 157.5 cm (62\"), weight 39.7 kg (87 lb 8.4 oz), SpO2 97%, not currently breastfeeding.            Constitutional: No acute distress, awake, alert  HENT: Normocephalic, mucous membranes moist, ecchymosis right jaw and neck  Respiratory: Clear to auscultation bilaterally  Cardiovascular: RRR, no murmurs, rubs, or gallops  Gastrointestinal:  soft, nontender, nondistended  Musculoskeletal: No bilateral ankle edema  Psychiatric: Appropriate affect, cooperative  Neurologic: Oriented x 3, moves all extremities  Skin: No rashes    Results Review:     I reviewed the patient's new clinical results.    WBC WBC   Date Value Ref Range Status   12/23/2024 14.12 (H) 3.40 - 10.80 10*3/mm3 Final   12/21/2024 7.31 3.40 - 10.80 10*3/mm3 " "Final      HGB Hemoglobin   Date Value Ref Range Status   12/23/2024 10.9 (L) 12.0 - 15.9 g/dL Final   12/21/2024 11.8 (L) 12.0 - 15.9 g/dL Final      HCT Hematocrit   Date Value Ref Range Status   12/23/2024 32.3 (L) 34.0 - 46.6 % Final   12/21/2024 33.7 (L) 34.0 - 46.6 % Final      Platlets No results found for: \"LABPLAT\"   MCV MCV   Date Value Ref Range Status   12/23/2024 92.3 79.0 - 97.0 fL Final   12/21/2024 90.3 79.0 - 97.0 fL Final          Sodium Sodium   Date Value Ref Range Status   12/23/2024 129 (L) 136 - 145 mmol/L Final   12/23/2024 128 (L) 136 - 145 mmol/L Final   12/22/2024 123 (L) 136 - 145 mmol/L Final   12/22/2024 125 (L) 136 - 145 mmol/L Final   12/22/2024 124 (L) 136 - 145 mmol/L Final   12/22/2024 122 (L) 136 - 145 mmol/L Final   12/22/2024 122 (L) 136 - 145 mmol/L Final   12/22/2024 124 (L) 136 - 145 mmol/L Final   12/22/2024 122 (L) 136 - 145 mmol/L Final   12/22/2024 126 (L) 136 - 145 mmol/L Final   12/22/2024 122 (L) 136 - 145 mmol/L Final   12/21/2024 121 (L) 136 - 145 mmol/L Final   12/21/2024 119 (C) 136 - 145 mmol/L Final   12/21/2024 117 (C) 136 - 145 mmol/L Final   12/21/2024 120 (L) 136 - 145 mmol/L Final   12/21/2024 121 (L) 136 - 145 mmol/L Final      Potassium Potassium   Date Value Ref Range Status   12/23/2024 4.4 3.5 - 5.2 mmol/L Final     Comment:     Slight hemolysis detected by analyzer. Result may be falsely elevated.   12/22/2024 4.6 3.5 - 5.2 mmol/L Final   12/22/2024 3.3 (L) 3.5 - 5.2 mmol/L Final   12/22/2024 3.3 (L) 3.5 - 5.2 mmol/L Final   12/22/2024 3.8 3.5 - 5.2 mmol/L Final   12/21/2024 4.0 3.5 - 5.2 mmol/L Final   12/21/2024 4.1 3.5 - 5.2 mmol/L Final     Comment:     Slight hemolysis detected by analyzer. Result may be falsely elevated.   12/21/2024 4.6 3.5 - 5.2 mmol/L Final      Chloride Chloride   Date Value Ref Range Status   12/23/2024 99 98 - 107 mmol/L Final   12/22/2024 88 (L) 98 - 107 mmol/L Final   12/21/2024 81 (L) 98 - 107 mmol/L Final   12/21/2024 " "84 (L) 98 - 107 mmol/L Final      CO2 CO2   Date Value Ref Range Status   12/23/2024 19.0 (L) 22.0 - 29.0 mmol/L Final   12/22/2024 24.0 22.0 - 29.0 mmol/L Final   12/21/2024 21.0 (L) 22.0 - 29.0 mmol/L Final   12/21/2024 25.0 22.0 - 29.0 mmol/L Final      BUN BUN   Date Value Ref Range Status   12/23/2024 19 8 - 23 mg/dL Final   12/22/2024 8 8 - 23 mg/dL Final   12/21/2024 6 (L) 8 - 23 mg/dL Final   12/21/2024 7 (L) 8 - 23 mg/dL Final      Creatinine Creatinine   Date Value Ref Range Status   12/23/2024 0.50 (L) 0.57 - 1.00 mg/dL Final   12/22/2024 0.57 0.57 - 1.00 mg/dL Final   12/21/2024 0.47 (L) 0.57 - 1.00 mg/dL Final   12/21/2024 0.46 (L) 0.57 - 1.00 mg/dL Final      Calcium Calcium   Date Value Ref Range Status   12/23/2024 8.3 (L) 8.6 - 10.5 mg/dL Final   12/22/2024 8.5 (L) 8.6 - 10.5 mg/dL Final   12/21/2024 8.7 8.6 - 10.5 mg/dL Final   12/21/2024 9.0 8.6 - 10.5 mg/dL Final      PO4 No results found for: \"CAPO4\"   Albumin No results found for: \"ALBUMIN\"   Magnesium Magnesium   Date Value Ref Range Status   12/23/2024 2.0 1.6 - 2.4 mg/dL Final   12/22/2024 2.9 (H) 1.6 - 2.4 mg/dL Final   12/21/2024 1.5 (L) 1.6 - 2.4 mg/dL Final      Uric Acid Uric Acid   Date Value Ref Range Status   12/22/2024 2.1 (L) 2.4 - 5.7 mg/dL Final     Comment:     Falsely depressed results may occur on samples drawn from patients receiving N-Acetylcysteine (NAC) or Metamizole.        Medication Review:       Assessment & Plan       Hyponatremia    Subdural hygroma    Severe protein-calorie malnutrition    UTI (urinary tract infection)    SIADH (syndrome of inappropriate ADH production)  ====================================    Assessment & Plan  - Severe hypotonic hyponatremia - urine studies suggestive of SIADH vs salt wasting syndrome ( Serum Na dropped from 133 on 12/18 to Na 117 today )  LIKELY related to nausea      - Persistent nausea and vomiting      - Bilateral subdural hygroma     - Recurrent falls, weight loss     - UTI " Ecoli and urine retention      - Hypomagnesemia      - 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      - Afib, depression.      PLAN   Hyponatremia improving s/p 3% Hypertonic saline  Continue salt tablet  Daily BMP   Cortisol and TSH wnl   Strict I/Os     Will follow along with you      I discussed the patients findings and my recommendations with patient     Kymberly Tian MD  12/23/24  07:44 EST

## 2024-12-23 NOTE — PROGRESS NOTES
"Intensive Care Follow-up     Hospital:  LOS: 4 days   Ms. Brittani Lambert, 64 y.o. female is followed for:   Hyponatremia        Subjective     Brittani Lambert is a 64 y.o. female admitted on 12/16 for subdural hygroma by Hospital Medicine. Na was 133 on 12/18, down to 117. Nephrology was consulted and recommended 3% NS therapy, so the patient was transferred to the ICU for higher level of care/CVC access.   Interval History:  The chart has been reviewed.  Pain control has been a bit better today.  Creatinine clearance remains stable.  Urinary outflow has increased somewhat.    The patient's past medical, surgical and social history were reviewed and updated in Epic as appropriate.        Objective     Infusions:     Medications:  acetaminophen, 1,000 mg, Oral, Q8H  Lidocaine, 1 patch, Transdermal, Q24H  mupirocin, 1 Application, Each Nare, BID  Naloxegol Oxalate, 25 mg, Oral, QAM  senna-docusate sodium, 2 tablet, Oral, BID  [Held by provider] sodium chloride, 2 g, Oral, TID  tamsulosin, 0.4 mg, Oral, Daily        Vital Sign Min/Max for last 24 hours  Temp  Min: 97.2 °F (36.2 °C)  Max: 98.9 °F (37.2 °C)   BP  Min: 85/59  Max: 169/86   Pulse  Min: 64  Max: 125   Resp  Min: 16  Max: 18   SpO2  Min: 83 %  Max: 100 %   No data recorded       Input/Output for last 24 hour shift  12/22 0701 - 12/23 0700  In: 225 [P.O.:225]  Out: 390 [Urine:390]      Objective:  General Appearance:  Uncomfortable (Overall cachexia).    Vital signs: (most recent): Blood pressure 110/68, pulse 77, temperature 98.9 °F (37.2 °C), temperature source Oral, resp. rate 16, height 157.5 cm (62\"), weight 39.7 kg (87 lb 8.4 oz), SpO2 100%, not currently breastfeeding.    HEENT: Normal HEENT exam.    Lungs:  Normal effort and normal respiratory rate.  Breath sounds clear to auscultation.    Heart: Normal rate.  Regular rhythm.  S1 normal and S2 normal.  No murmur.   Chest: Symmetric chest wall expansion.   Abdomen: Abdomen is soft and non-distended.  " Bowel sounds are normal.   There is no abdominal tenderness.     Extremities: Normal range of motion.    Pulses: Distal pulses are intact.    Neurological: Patient is alert and oriented to person, place and time.    Pupils:  Pupils are equal, round, and reactive to light.  Pupils are equal.               Results from last 7 days   Lab Units 12/23/24  0556 12/21/24  0748 12/19/24  0849 12/16/24  1958 12/16/24  1953   WBC 10*3/mm3 14.12* 7.31  --   --  7.86   HEMOGLOBIN g/dL 10.9* 11.8* 10.7*  --  10.8*   HEMOGLOBIN, POC   --   --   --    < >  --    PLATELETS 10*3/mm3 257 258  --   --  174    < > = values in this interval not displayed.     Results from last 7 days   Lab Units 12/23/24  0556 12/23/24  0031 12/22/24  1752 12/22/24  1144 12/22/24  0600 12/22/24  0419 12/21/24  1943 12/21/24  1629 12/21/24  1152 12/21/24  0853 12/21/24  0748 12/18/24  0441   SODIUM mmol/L 129* 128* 123* 125*   < > 122*  124*   < > 117*   < > 121*  --  133*   POTASSIUM mmol/L 4.4  --   --  4.6  --  3.3*  3.3*   < > 4.1  --  4.6  --  3.8   CO2 mmol/L 19.0*  --   --   --   --  24.0  --  21.0*  --  25.0  --  23.0   BUN mg/dL 19  --   --   --   --  8  --  6*  --  7*  --  6*   CREATININE mg/dL 0.50*  --   --   --   --  0.57  --  0.47*  --  0.46*  --  0.50*   MAGNESIUM mg/dL 2.0  --   --   --   --  2.9*  --   --   --  1.5*  --  1.8   PHOSPHORUS mg/dL  --   --   --   --   --   --   --   --   --   --  3.5 3.4   GLUCOSE mg/dL 130*  --   --   --   --  142*  --  109*  --  124*  --  84    < > = values in this interval not displayed.     Estimated Creatinine Clearance: 71.2 mL/min (A) (by C-G formula based on SCr of 0.5 mg/dL (L)).            I reviewed the patient's results and images.     Assessment & Plan   Impression        Hyponatremia    Subdural hygroma    Severe protein-calorie malnutrition    UTI (urinary tract infection)    SIADH (syndrome of inappropriate ADH production)       Plan        Hyponatremia is slowly improving.  Continue with  pain control as needed.  In order to help with pain control and to determine further goals of care with possible consideration of home hospice, I will have the palliative care service meet with her today.  Antimicrobial therapy has been completed.  Transition to telemetry.    Plan of care and goals reviewed with mulitdisciplinary/antibiotic stewardship team during rounds.   I discussed the patient's findings and my recommendations with patient and nursing staff         Yuval Addison MD, [facility]  Pulmonology and Critical Care Medicine

## 2024-12-23 NOTE — NURSING NOTE
Prior to central line removal, order for the removal of catheter was verified, patient was assessed, necessary materials were gathered and patient was educated regarding procedure .    Patient was positioned flat to ensure that the insertion site was at or below the level of the heart.    Hands were washed, clean gloves were applied and central line dressing was gently removed. Catheter exit site was not cultured.     A new pair of clean gloves were then applied. Insertion site was cleansed with 2% Chlorhexidine swab using a circular motion beginning at the insertion site and moving outward for 30 seconds and allowed to dry.     Clamp on line was present and clamped.     Patient was instructed to perform Valsalva maneuver as catheter was withdrawn.     The central line was grasped at the insertion site and slowly pulled outward parallel to the skin. Resistance was not met.    After central line was completely removed, a sterile 4x4 gauze pad was used to apply light pressure until bleeding stopped. At that time, petroleum-based gauze and a sterile occlusive dressing was applied to exit site.     Patient was instructed to keep dressing in place for at least 24 hours and to remain in a flat or reclining position for 30 minutes post-removal.     Catheter was inspected after removal and was intact. Tip of central line was not sent for culture. Patient tolerated procedure.

## 2024-12-23 NOTE — PROGRESS NOTES
"          Clinical Nutrition Assessment     Patient Name: Brittani Lambert  YOB: 1960  MRN: [id number]  Date of Encounter: 12/23/24 08:18 EST  Admission date: 12/16/2024  Reason for Visit: MDR, Follow-up protocol    Assessment   Nutrition Assessment   Admission Diagnosis:  Subdural hematoma [S06.5XAA]  UTI (urinary tract infection) [N39.0]    Problem List:    Hyponatremia    Subdural hygroma    Severe protein-calorie malnutrition    UTI (urinary tract infection)    SIADH (syndrome of inappropriate ADH production)      PMH:   She  has a past medical history of Breast CA (10/4/2018), Depression, DJD (degenerative joint disease) of cervical spine, THALIA (generalized anxiety disorder), Heart murmur, Ovarian cancer (1989), Rt Cellulitis of chest wall (10/15/2018), Tobacco dependence, and UTI (urinary tract infection) (9/9/2020).    PSH:  She  has a past surgical history that includes Appendectomy; Breast biopsy (Right, 2003); Hysterectomy (1989); Oophorectomy (1989); Mastectomy w/ sentinel node biopsy (Bilateral, 10/4/2018); and Colonoscopy (06/2018).    Applicable Nutrition History:   (12/18) MSA/chronic/severe   (12/21) Transfer to the ICU due to severe hyponatremia - 3% NS started.      Anthropometrics     Height: Height: 157.5 cm (62\")  Last Filed Weight: Weight: 39.7 kg (87 lb 8.4 oz) (12/22/24 0400)  Method: Weight Method: Bed scale  BMI: BMI (Calculated): 16    UBW:  108# per patient   Weight change: weight loss of 11 lbs (10.7%) over 10 month(s)    Significant?  No     Weight       Weight (kg) Weight (lbs) Weight Method Visit Report   8/25/2023 48.353 kg  106 lb 9.6 oz   --    2/26/2024 46.72 kg  103 lb   --    6/28/2024 43.954 kg  96 lb 14.4 oz   --    8/9/2024 42.638 kg  94 lb   --    8/15/2024 42.82 kg  94 lb 6.4 oz   --    8/28/2024 42.185 kg  93 lb   --    10/22/2024 42.185 kg  93 lb  Stated      42.185 kg  93 lb  Stated     12/16/2024 41.731 kg  92 lb  Stated         Nutrition Focused Physical " Exam    Date:  12/18       Patient meets criteria for malnutrition diagnosis, see MSA note.     Subjective   Reported/Observed/Food/Nutrition Related History:   12/23  Patient reports decline in appetite since transfer to the ICU.  She has not received Boost yet (not order in cboard).  Menu provided and prefs noted.     12/18  Patient reports not much of an appetite for a couple months, still continuing while admitted. Pt denies any difficulties C/S or recent N/V. Pt stated she was constipated but had BM this morning. NKFA. Would be willing to drink Boost with breakfast and dinner. Pt denies any specific food preferences, also denied any wants/needs at this time.    Current Nutrition Prescription   PO: Diet: Regular/House, Fluid Restriction (240 mL/tray); 1000 mL/day; Fluid Consistency: Thin (IDDSI 0)  Oral Nutrition Supplement: Boost plus BID - patient not receiving   Intake: 4 Days:    (12/19) 7% x 3 meals  (12/20) 50% x 3 meals  (12/21) 0% x 1 meal  (12/22) No intake documented       Assessment & Plan   Nutrition Diagnosis   Date:  12/18  Updated:  Problem Malnutrition, chronic severe   Etiology Decreased ability to consume sufficient energy 2/2 multiple chronic conditions   Signs/Symptoms <75% of EEN x >/= 1 month, severe muscle wasting, and severe subcutaneous fat loss   Status: New    Date:  12/23 Updated:  Problem Inadequate oral intake   Etiology Poor appetite    Signs/Symptoms Reported per patient    Status: New    Goal:   Nutrition to support treatment and Maintain intake      Nutrition Intervention      Follow treatment progress, Care plan reviewed, Advise alternate selection, Advised available snacks, Interview for preferences, Menu provided, Encourage intake, Supplement provided    Boost Plus with all meals per patient request (assured entered in ordering system)   Menu provided and prefs noted for todays meals     Monitoring/Evaluation:   Per protocol, I&O, PO intake, Supplement intake, Pertinent labs,  Weight, Symptoms, POC/GOC    Kasia Au RD  Time Spent: 35m

## 2024-12-23 NOTE — PLAN OF CARE
Goal Outcome Evaluation:    VSS.    Sodium @ 0000 128. Sodium @ 0600 129.    Up to bathroom with assist x1. BM x3.     Dilaudid given x2.     Oxycodone given x2.     Zofran given x1.    225 mL of 1000 mL of fluid given since 0000.

## 2024-12-23 NOTE — PLAN OF CARE
Goal Outcome Evaluation:  Plan of Care Reviewed With: patient        Progress: no change  Outcome Evaluation: Palliative RN and Dr. LAUREN Chavez saw pt. at 1047.  New palliative consult for assistance with GOC and pain management per Dr. Addison.  NOK listed is sister Zo Morgan.  Palliative care introduced.  Palliative blanket, brochure and meal discount card provided.  Pt. is DNR/DNI at time of consult.  Pt. sitting up in bed on RA endorsing 7/10 right flank pain as well as abdominal pain.  Pt. demonstrated some confusion as she struggled to figure out what medication she was taking at home for pain.  Confusion also noted when she stated she had not moved her bowels in 12 days (although primary RN reported pt, has had multiple BMs overnight).  Pt. stated her flank pain was from recent fall.  Pt. endorsed nausea as well as pain.  She began to moan and stated she needed assistance getting up to bathroom.  Tech entered room to assist patient to restroom.  Primary RN called palliative office at 1249 and stated that pt. would like to speak with palliative provider and felt that she had been somewhat rude.  Explained that pt. was not rude but that she had to go to restroom.  Dr. LAUREN Chavez to revisit pt. as soon as she gets chance this afternoon.  Did speak with RN regarding patient's pain and nausea and RN stated that pt. appeared comfortable and did not have any complaints of pain or nausea for her.  Palliative care to continue to follow for support, symptom management and ongoing GOC.       Problem: Palliative Care  Goal: Enhanced Quality of Life  Intervention: Promote Advance Care Planning  Flowsheets (Taken 12/23/2024 1235)  Life Transition/Adjustment:   palliative care initiated   palliative care discussed        0930 Palliative IDT meeting: VIJAY Tinajero RN, CHPN; LAUREN Chavez; Dr. MICHAEL Barclay; TUAN Ga, MDiv, [facility] ; JACOB Sena RN, CHPN; JAMES Camp RN, CHPN; TUAN Estevez RN, CHPN  After hours, weekends and holidays,  contact Palliative Provider by calling [phone].

## 2024-12-23 NOTE — CONSULTS
"Palliative Care Daily Progress Note     Referring: Yuval Addison    C/C: \"need to go to the bathroom\"    S: Medical record reviewed. Events noted.  63 yo female with hx of breast CA with double mastectomy and later meningeal carcinomatosis treated with WBRT in 2019 without known recurrence.  Receives pain medication for her abdomen from her primary care MD.  Presented to ED 12/17 after a fall with HA.  Imaging demonstrated small B hygromas for which neurosurgery recommended oupt f/u.  Developed hyponatremia and dx with SIADH.  Transferred to ICU for hypertonic saline.  No cleared for transfer to floor.  Renal following.  Staff reports pt states she is okay unless specific questions asked.      ROS:   C/o pain ranging 5-10 - primarily in abd  C/O constipation with nlly having BM every 4 days and now has been 12 days though staff report enema over noc with several BMs and liquid stool at this time.    No CP or SOA    O: Code Status:   Code Status and Medical Interventions: No CPR (Do Not Attempt to Resuscitate); Limited Support; No intubation (DNI)   Ordered at: 12/17/24 0440     Medical Intervention Limits:    No intubation (DNI)     Level Of Support Discussed With:    Patient     Code Status (Patient has no pulse and is not breathing):    No CPR (Do Not Attempt to Resuscitate)     Medical Interventions (Patient has pulse or is breathing):    Limited Support      Advanced Directives: Advance Directive Status: Patient has advance directive, copy in chart   Goals of Care: Ongoing.   Palliative Performance Scale Score: 40%     /68   Pulse 77   Temp 98.9 °F (37.2 °C) (Oral)   Resp 16   Ht 157.5 cm (62\")   Wt 39.7 kg (87 lb 8.4 oz)   SpO2 100%   BMI 16.01 kg/m²     Intake/Output Summary (Last 24 hours) at 12/23/2024 1245  Last data filed at 12/23/2024 0800  Gross per 24 hour   Intake 225 ml   Output 440 ml   Net -215 ml       Physical Exam:    General Appearance:    Alert, cooperative, NAD   HEENT:    " NC/AT, EOMI, anicteric, MMM, face relaxed   Neck:   supple, trachea midline, no JVD   Lungs:     CTA bilat, diminished in bases; respirations regular, even     and unlabored    Heart:    RRR, normal S1 and S2, no M/R/G   Abdomen:     Normal bowel sounds, soft, nontender, nondistended   G/U:   Deferred   MSK/Extremities:   No clubbing , cyanosis or edema, No wasting   Pulses:   Pulses palpable and equal bilaterally   Skin:   Warm, dry, no mottling   Neurologic:   A/Ox3, moves extremities x 4, no tremor, nl   tone   Psych:   Calm       Current Medications:      Current Facility-Administered Medications:     acetaminophen (TYLENOL) tablet 1,000 mg, 1,000 mg, Oral, Q8H, Meenu Bosch APRN, 1,000 mg at 12/23/24 0907    sennosides-docusate (PERICOLACE) 8.6-50 MG per tablet 2 tablet, 2 tablet, Oral, BID, 2 tablet at 12/22/24 2003 **AND** polyethylene glycol (MIRALAX) packet 17 g, 17 g, Oral, Daily PRN **AND** bisacodyl (DULCOLAX) EC tablet 5 mg, 5 mg, Oral, Daily PRN, 5 mg at 12/22/24 1005 **AND** bisacodyl (DULCOLAX) suppository 10 mg, 10 mg, Rectal, Daily PRN, Meenu Bosch APRN, 10 mg at 12/22/24 1005    Calcium Replacement - Follow Nurse / BPA Driven Protocol, , Not Applicable, PRN, Carlos Trammell MD    HYDROmorphone (DILAUDID) tablet 1 mg, 1 mg, Oral, Q3H PRN, Yuval Addison MD    Lidocaine 4 % 1 patch, 1 patch, Transdermal, Q24H, Carlos Trammell MD, 1 patch at 12/23/24 0907    Magnesium Standard Dose Replacement - Follow Nurse / BPA Driven Protocol, , Not Applicable, PRN, Carlos Trammell MD    mupirocin (BACTROBAN) 2 % nasal ointment 1 Application, 1 Application, Each Nare, BID, Jason Cole, APRN, 1 Application at 12/23/24 0907    Naloxegol Oxalate (MOVANTIK) tablet 25 mg, 25 mg, Oral, QAM, Antonio Torres DO, 25 mg at 12/23/24 0907    nicotine (NICODERM CQ) 14 MG/24HR patch 1 patch, 1 patch, Transdermal, Daily PRN, Antonio Torres DO    ondansetron (ZOFRAN) injection 4  mg, 4 mg, Intravenous, Q6H PRN, Jason Cole APRN, 4 mg at 12/22/24 2054    oxyCODONE (ROXICODONE) immediate release tablet 10 mg, 10 mg, Oral, Q4H PRN, Yuval Addison MD, 10 mg at 12/23/24 0037    Phosphorus Replacement - Follow Nurse / BPA Driven Protocol, , Not Applicable, Valeri ISIDRO John L, MD    Potassium Replacement - Follow Nurse / BPA Driven Protocol, , Not Applicable, Valeri ISIDRO John L, MD    sodium chloride 0.9 % flush 10 mL, 10 mL, Intravenous, PRN, Carlos Trammell MD, 10 mL at 12/17/24 1933    [Held by provider] sodium chloride tablet 2 g, 2 g, Oral, TID, Kymberly Tian MD, 2 g at 12/22/24 2055    tamsulosin (FLOMAX) 24 hr capsule 0.4 mg, 0.4 mg, Oral, Daily, Tayla Levy APRN, 0.4 mg at 12/23/24 0907     Labs:   Results from last 7 days   Lab Units 12/23/24  0556   WBC 10*3/mm3 14.12*   HEMOGLOBIN g/dL 10.9*   HEMATOCRIT % 32.3*   PLATELETS 10*3/mm3 257     Results from last 7 days   Lab Units 12/23/24  0556 12/16/24 1958 12/16/24 1953   SODIUM mmol/L 129*   < > 132*   POTASSIUM mmol/L 4.4   < > 3.9   CHLORIDE mmol/L 99   < > 96*   CO2 mmol/L 19.0*   < > 22.0   BUN mg/dL 19   < > 12   CREATININE mg/dL 0.50*   < > 0.67   CALCIUM mg/dL 8.3*   < > 8.8   BILIRUBIN mg/dL  --   --  0.5   ALK PHOS U/L  --   --  89   ALT (SGPT) U/L  --   --  10   AST (SGOT) U/L  --   --  25   GLUCOSE mg/dL 130*   < > 104*    < > = values in this interval not displayed.     Imaging Results (Last 72 Hours)       Procedure Component Value Units Date/Time    XR Chest 1 View [[id number]] Collected: 12/21/24 2244     Updated: 12/21/24 2248    Narrative:      XR CHEST 1 VW    Date of Exam: 12/21/2024 10:15 PM EST    Indication: Central line placement    Comparison: 12/16/2024.    Findings:  There is a right internal jugular venous catheter that terminates at the cavoatrial junction. There is no pneumothorax, pleural effusion or focal airspace consolidation. Heart size and pulmonary vasculature  appear within normal limits. Regional bones   appear intact.      Impression:      Impression:  Right internal jugular venous catheter terminates at the cavoatrial junction. No pneumothorax.          Electronically Signed: Antonio Mcnulty MD    12/21/2024 10:45 PM EST    Workstation ID: [id number]    XR Abdomen KUB [[id number]] Collected: 12/21/24 1217     Updated: 12/21/24 1221    Narrative:      XR ABDOMEN KUB    Date of Exam: 12/21/2024 10:05 AM EST    Indication: Abdominal pain, constipation    Comparison: KUB 12/19/2024    Findings:  Redemonstration of Rodriguez catheter. Lung bases are excluded from the field-of-view. Nonobstructive, nonspecific bowel gas pattern. There are scattered vascular calcifications. No pneumoperitoneum although supine radiographs are insensitive for this   finding. There is scattered stool in the right colon; no evidence of large colonic stool burden. No acute osseous findings.      Impression:      Impression:  Nonobstructive, nonspecific bowel gas pattern. No evidence of large colonic stool burden.      Electronically Signed: Sarabjit Zepeda MD    12/21/2024 12:18 PM EST    Workstation ID: [id number]                  Diagnostics: Reviewed    A:     Hyponatremia    Subdural hygroma    Severe protein-calorie malnutrition    UTI (urinary tract infection)    SIADH (syndrome of inappropriate ADH production)       Impression:  Hyponatremia/SIADH  Hx breast CA  Ecoli UTI  Afib    Symptoms:   Pain with variable reports.      P:   Conversation somewhat limited today per pt.  Certainly however would not recommend changing pt's home pain medication without discussion with primary prescribing MD.  Palliative Care Team will continue to monitor.     Sonal Chavez MD, 12/23/2024, 12:45 EST

## 2024-12-24 PROCEDURE — 99232 SBSQ HOSP IP/OBS MODERATE 35: CPT | Performed by: STUDENT IN AN ORGANIZED HEALTH CARE EDUCATION/TRAINING PROGRAM

## 2024-12-24 RX ORDER — GABAPENTIN 100 MG/1
100 CAPSULE ORAL 3 TIMES DAILY
Status: DISCONTINUED | OUTPATIENT
Start: 2024-12-24 | End: 2024-12-26 | Stop reason: HOSPADM

## 2024-12-24 RX ORDER — HYDROMORPHONE HYDROCHLORIDE 2 MG/1
1 TABLET ORAL EVERY 4 HOURS PRN
Status: DISCONTINUED | OUTPATIENT
Start: 2024-12-24 | End: 2024-12-25

## 2024-12-24 RX ADMIN — NALOXEGOL OXALATE 25 MG: 25 TABLET, FILM COATED ORAL at 09:39

## 2024-12-24 RX ADMIN — MUPIROCIN 1 APPLICATION: 20 OINTMENT TOPICAL at 09:40

## 2024-12-24 RX ADMIN — SENNOSIDES AND DOCUSATE SODIUM 2 TABLET: 50; 8.6 TABLET ORAL at 19:43

## 2024-12-24 RX ADMIN — GABAPENTIN 100 MG: 100 CAPSULE ORAL at 17:35

## 2024-12-24 RX ADMIN — ACETAMINOPHEN 1000 MG: 500 TABLET, FILM COATED ORAL at 09:38

## 2024-12-24 RX ADMIN — Medication 10 ML: at 19:43

## 2024-12-24 RX ADMIN — OXYCODONE HYDROCHLORIDE 10 MG: 10 TABLET ORAL at 09:49

## 2024-12-24 RX ADMIN — TAMSULOSIN HYDROCHLORIDE 0.4 MG: 0.4 CAPSULE ORAL at 09:38

## 2024-12-24 RX ADMIN — LIDOCAINE 1 PATCH: 4 PATCH TOPICAL at 09:39

## 2024-12-24 RX ADMIN — GABAPENTIN 100 MG: 100 CAPSULE ORAL at 19:43

## 2024-12-24 RX ADMIN — ACETAMINOPHEN 1000 MG: 500 TABLET, FILM COATED ORAL at 17:34

## 2024-12-24 RX ADMIN — SENNOSIDES AND DOCUSATE SODIUM 2 TABLET: 50; 8.6 TABLET ORAL at 09:38

## 2024-12-24 RX ADMIN — MUPIROCIN 1 APPLICATION: 20 OINTMENT TOPICAL at 19:42

## 2024-12-24 RX ADMIN — HYDROMORPHONE HYDROCHLORIDE 1 MG: 2 TABLET ORAL at 18:04

## 2024-12-24 RX ADMIN — ACETAMINOPHEN 1000 MG: 500 TABLET, FILM COATED ORAL at 00:28

## 2024-12-24 NOTE — PLAN OF CARE
Problem: Palliative Care  Goal: Enhanced Quality of Life  Intervention: Optimize Psychosocial Wellbeing  Flowsheets (Taken 12/24/2024 1114)  Supportive Measures: (symptom assessment)   active listening utilized   decision-making supported   positive reinforcement provided   self-care encouraged   verbalization of feelings encouraged   other (see comments)  Visit at bedside with patient and Palliative NP CHAD ALVES.  Patient c/o 8/10 pain (head and ribs), nausea after bites and with movement.  Patient planning STR then home, follows with Dr. Patel for symptom needs, declines referral to palliative care clinic, continues to see ALEX ALVES.  Patient also declines discussion about hospice support at this time.  Patient states she would like her niece Sara to help with decisions if she does not have capacity to do so, patient's sister Zo Morgan is listed as emergency contact - patient confirms this is correct and her sister has Sara's contact number.   Patient tired, weary, with pain, mildly drowsy but receptive to active listening, supportive/empathic presence and validation of feelings.  Palliative Care continues to follow for support and assist with symptom management.

## 2024-12-24 NOTE — PROGRESS NOTES
"Palliative Care Daily Progress Note     C/C: Patient reports uncontrolled pain.     S: Medical record reviewed. Follow-up visit for GOC and symptom management. Events noted. Patient reports acute pain to back of head, site of fall, as well as ribs which she describes as constant throbbing. Neurosurgery reviewed subdural hygroma with no role for neurosurgical interventions, no new metastatic disease on MRI. She reports chronic pain to hips that radiates down her legs, also describes as tingling, burning. She reports nausea with food but not fluids. She is short of breath with coughing. Hyponatremia improving. Plan to follow up with Dr. Patel in February.     ROS: +back of head, constant throbbing, +rib, constant throbbing, 8/10. +chronic bilateral hip pain that radiates down her legs, tingling/burning, is not improved with pain medication. Generalized pain improved with Hydromorphone 1mg PO over Oxycodone. +nausea, with bites of food. +shortness of breath, with cough. +weakness/debility. LBM 12/23.     O: Code Status:   Code Status and Medical Interventions: No CPR (Do Not Attempt to Resuscitate); Limited Support; No intubation (DNI)   Ordered at: 12/17/24 0440     Medical Intervention Limits:    No intubation (DNI)     Level Of Support Discussed With:    Patient     Code Status (Patient has no pulse and is not breathing):    No CPR (Do Not Attempt to Resuscitate)     Medical Interventions (Patient has pulse or is breathing):    Limited Support      Advanced Directives: Advance Directive Status: Patient has advance directive, copy in chart   Goals of Care: Ongoing.   Palliative Performance Scale Score: 40%     /71 (BP Location: Right arm, Patient Position: Lying)   Pulse 69   Temp 97.5 °F (36.4 °C) (Oral)   Resp 18   Ht 157.5 cm (62\")   Wt 39.7 kg (87 lb 8.4 oz)   SpO2 95%   BMI 16.01 kg/m²     Intake/Output Summary (Last 24 hours) at 12/24/2024 1138  Last data filed at 12/24/2024 0809  Gross per 24 " hour   Intake 790 ml   Output --   Net 790 ml       PE:  General Appearance:    Patient laying in bed, awake, alert, frail, A/C ill appearing,  cooperative, NAD   HEENT:    NC/AT, EOMI, anicteric, MMM, face relaxed   Neck:   supple, trachea midline, no JVD   Lungs:     CTA bilaterally, diminished in bases; respirations regular,     even and unlabored; RR 16-18 on exam, on RA    Heart:    RRR, normal S1 and S2, no M/R/G, HR 69 on monitor   Abdomen:     Normal bowel sounds, soft, nontender, nondistended   G/U:   Deferred   MSK/Extremities:   Wasting, no edema, bruising to multiple extremities   Pulses:   Pulses palpable and equal bilaterally   Skin:   Warm, dry   Neurologic:   A/Ox3, cooperative, ESTEVES   Psych:   Calm, appropriate     Meds: Reviewed and changes noted    Labs:   Results from last 7 days   Lab Units 12/23/24  0556   WBC 10*3/mm3 14.12*   HEMOGLOBIN g/dL 10.9*   HEMATOCRIT % 32.3*   PLATELETS 10*3/mm3 257     Results from last 7 days   Lab Units 12/23/24  0556   SODIUM mmol/L 129*   POTASSIUM mmol/L 4.4   CHLORIDE mmol/L 99   CO2 mmol/L 19.0*   BUN mg/dL 19   CREATININE mg/dL 0.50*   GLUCOSE mg/dL 130*   CALCIUM mg/dL 8.3*     Results from last 7 days   Lab Units 12/23/24  0556   SODIUM mmol/L 129*   POTASSIUM mmol/L 4.4   CHLORIDE mmol/L 99   CO2 mmol/L 19.0*   BUN mg/dL 19   CREATININE mg/dL 0.50*   CALCIUM mg/dL 8.3*   GLUCOSE mg/dL 130*     Imaging Results (Last 72 Hours)       Procedure Component Value Units Date/Time    XR Chest 1 View [[id number]] Collected: 12/21/24 2244     Updated: 12/21/24 2248    Narrative:      XR CHEST 1 VW    Date of Exam: 12/21/2024 10:15 PM EST    Indication: Central line placement    Comparison: 12/16/2024.    Findings:  There is a right internal jugular venous catheter that terminates at the cavoatrial junction. There is no pneumothorax, pleural effusion or focal airspace consolidation. Heart size and pulmonary vasculature appear within normal limits. Regional bones    appear intact.      Impression:      Impression:  Right internal jugular venous catheter terminates at the cavoatrial junction. No pneumothorax.          Electronically Signed: Antonio Mcnulty MD    12/21/2024 10:45 PM EST    Workstation ID: [id number]    XR Abdomen KUB [[id number]] Collected: 12/21/24 1217     Updated: 12/21/24 1221    Narrative:      XR ABDOMEN KUB    Date of Exam: 12/21/2024 10:05 AM EST    Indication: Abdominal pain, constipation    Comparison: KUB 12/19/2024    Findings:  Redemonstration of Rodriguez catheter. Lung bases are excluded from the field-of-view. Nonobstructive, nonspecific bowel gas pattern. There are scattered vascular calcifications. No pneumoperitoneum although supine radiographs are insensitive for this   finding. There is scattered stool in the right colon; no evidence of large colonic stool burden. No acute osseous findings.      Impression:      Impression:  Nonobstructive, nonspecific bowel gas pattern. No evidence of large colonic stool burden.      Electronically Signed: Sarabjit Zepeda MD    12/21/2024 12:18 PM EST    Workstation ID: [id number]                  Diagnostics: Reviewed    A:   Hyponatremia    Subdural hygroma    Severe protein-calorie malnutrition    UTI (urinary tract infection)    SIADH (syndrome of inappropriate ADH production)     64 y.o. female with hyponatremia/SIADH, malnutrition, acute on chronic pain, UTI, subdural hygroma, history of breast cancer.     S/Sx:  1. Pain -acute on chronic, chronic neuropathic pain to back/legs, rib/headache s/p fall, MSK post pain from falls  -continue Tylenol 1000mg PO q 8 hours   -started Neurontin 100mg PO TID  -discontinued Oxycodone 10mg PO as patient states it is not effective  -continue Hydromorphone 1mg PO q 4 hours prn severe pain    2. Anxiety -follow with [facility]    3. GOC -DNR/DNI -per review of chart  -She is interested in obtaining additional caregiving for home  -Goal to discharge to [facility]  and then interested in home with HH and caregivers  -she does not want any information regarding hospice services in the future  -she wants her niece to be HCS, reports paperwork has been completed, niece's name is Sara Esposito, phone number not on chart    P: Follow up visit for GOC and symptom management. Review of symptoms as above. Medications adjusted as above. Reviewed GOC and patient is looking to discharge to [facility] followed by return to home with HH and interested in additional caregivers. All questions and concerns addressed. Patient to follow up with Dr. Patel in February.   Palliative Care Team will continue to follow patient. Please do not hesitate to contact us regarding further sx mgmt or GOC needs.  Tabatha Salas, APRN  12/24/2024  Time spent: 30 minutes

## 2024-12-24 NOTE — CONSULTS
I visited this patient to assess spiritual need and offer appropriate support, including prayer and facilitation with other spiritual resources. The visit seemed to be helpful to the patient. I will continue to follow, but please re-consult with any urgent needs or if requested.

## 2024-12-24 NOTE — PROGRESS NOTES
[facility] Medicine Services  PROGRESS NOTE    Patient Name: Brittani Lambert  : 1960  MRN: [id number]    Date of Admission: 2024  Primary Care Physician: Alla Colon DO    Subjective   Subjective     CC:  Subdural hygroma, hyponatremia    HPI:  Patient seen resting comfortably in bed no acute distress.  Patient states her nausea vomiting is improved since normalization of her sodium.  Reports bowel movement yesterday      Objective   Objective     Vital Signs:   Temp:  [96 °F (35.6 °C)-99 °F (37.2 °C)] 97.5 °F (36.4 °C)  Heart Rate:  [69-95] 69  Resp:  [16-18] 18  BP: (113-150)/() 122/71     Physical Exam  Constitutional:       General: She is not in acute distress.  Cardiovascular:      Rate and Rhythm: Normal rate.      Pulses: Normal pulses.   Pulmonary:      Effort: Pulmonary effort is normal. No respiratory distress.   Abdominal:      General: There is no distension.      Palpations: Abdomen is soft.      Tenderness: There is no abdominal tenderness. There is no guarding or rebound.   Musculoskeletal:      Right lower leg: No edema.      Left lower leg: No edema.   Skin:     General: Skin is warm.   Neurological:      Mental Status: She is alert.   Psychiatric:         Mood and Affect: Mood normal.         Behavior: Behavior normal.           Results Reviewed:  LAB RESULTS:      Lab 24  0556 24  0748 24  0849   WBC 14.12* 7.31  --    HEMOGLOBIN 10.9* 11.8* 10.7*   HEMATOCRIT 32.3* 33.7* 31.8*   PLATELETS 257 258  --    NEUTROS ABS 11.71* 5.29  --    IMMATURE GRANS (ABS) 0.11* 0.03  --    LYMPHS ABS 0.59* 1.20  --    MONOS ABS 1.64* 0.56  --    EOS ABS 0.03 0.17  --    MCV 92.3 90.3  --          Lab 24  0556 24  0031 24  1752 24  1144 24  1023 24  0600 24  0419 24  0156 24  0031 24  2155 24  1943 24  1629 24  1152 24  0853 24  0748 24  0441    SODIUM 129* 128* 123* 125* 124*   < > 122*  124*   < > 122*   < > 119* 117*   < > 121*  --  133*   POTASSIUM 4.4  --   --  4.6  --   --  3.3*  3.3*  --  3.8  --  4.0 4.1  --  4.6  --  3.8   CHLORIDE 99  --   --   --   --   --  88*  --   --   --   --  81*  --  84*  --  98   CO2 19.0*  --   --   --   --   --  24.0  --   --   --   --  21.0*  --  25.0  --  23.0   ANION GAP 11.0  --   --   --   --   --  10.0  --   --   --   --  15.0  --  12.0  --  12.0   BUN 19  --   --   --   --   --  8  --   --   --   --  6*  --  7*  --  6*   CREATININE 0.50*  --   --   --   --   --  0.57  --   --   --   --  0.47*  --  0.46*  --  0.50*   EGFR 104.9  --   --   --   --   --  101.6  --   --   --   --  106.5  --  107.0  --  104.9   GLUCOSE 130*  --   --   --   --   --  142*  --   --   --   --  109*  --  124*  --  84   CALCIUM 8.3*  --   --   --   --   --  8.5*  --   --   --   --  8.7  --  9.0  --  8.5*   MAGNESIUM 2.0  --   --   --   --   --  2.9*  --   --   --   --   --   --  1.5*  --  1.8   PHOSPHORUS  --   --   --   --   --   --   --   --   --   --   --   --   --   --  3.5 3.4    < > = values in this interval not displayed.                         Brief Urine Lab Results  (Last result in the past 365 days)        Color   Clarity   Blood   Leuk Est   Nitrite   Protein   CREAT   Urine HCG        12/16/24 2124 Yellow   Cloudy   Negative   Trace   Negative   Trace                   Microbiology Results Abnormal       Procedure Component Value - Date/Time    Urine Culture - Urine, Urine, Catheter [[id number]]  (Abnormal)  (Susceptibility) Collected: 12/16/24 2124    Lab Status: Final result Specimen: Urine, Catheter Updated: 12/19/24 0914     Urine Culture >100,000 CFU/mL Escherichia coli    Narrative:      Colonization of the urinary tract without infection is common. Treatment is discouraged unless the patient is symptomatic, pregnant, or undergoing an invasive urologic procedure.    Susceptibility        Escherichia coli      MARS       Amoxicillin + Clavulanate Susceptible      Ampicillin Resistant      Ampicillin + Sulbactam Intermediate      Cefazolin (Urine) Susceptible      Cefepime Susceptible      Ceftazidime Susceptible      Ceftriaxone Susceptible      Gentamicin Susceptible      Levofloxacin Susceptible      Nitrofurantoin Susceptible      Piperacillin + Tazobactam Susceptible      Trimethoprim + Sulfamethoxazole Susceptible                                   No radiology results from the last 24 hrs    Results for orders placed during the hospital encounter of 09/24/20    Transthoracic Echo Complete With Contrast if Necessary Per Protocol    Interpretation Summary  · Left ventricular ejection fraction appears to be 61 - 65%. Left ventricular systolic function is normal.  · Left ventricular diastolic function is consistent with (grade I) impaired relaxation.  · Mild tricuspid valve regurgitation is present.  · Estimated right ventricular systolic pressure from tricuspid regurgitation is normal (<35 mmHg). Calculated right ventricular systolic pressure from tricuspid regurgitation is 22 mmHg.      Current medications:  Scheduled Meds:acetaminophen, 1,000 mg, Oral, Q8H  gabapentin, 100 mg, Oral, TID  Lidocaine, 1 patch, Transdermal, Q24H  mupirocin, 1 Application, Each Nare, BID  Naloxegol Oxalate, 25 mg, Oral, QAM  senna-docusate sodium, 2 tablet, Oral, BID  [Held by provider] sodium chloride, 2 g, Oral, TID  tamsulosin, 0.4 mg, Oral, Daily      Continuous Infusions:   PRN Meds:.  senna-docusate sodium **AND** polyethylene glycol **AND** bisacodyl **AND** bisacodyl    Calcium Replacement - Follow Nurse / BPA Driven Protocol    HYDROmorphone    Magnesium Standard Dose Replacement - Follow Nurse / BPA Driven Protocol    nicotine    ondansetron    Phosphorus Replacement - Follow Nurse / BPA Driven Protocol    Potassium Replacement - Follow Nurse / BPA Driven Protocol    sodium chloride    Assessment & Plan   Assessment & Plan     Active  Hospital Problems    Diagnosis  POA    **Hyponatremia [E87.1]  Yes    SIADH (syndrome of inappropriate ADH production) [E22.2]  Yes    UTI (urinary tract infection) [N39.0]  Yes    Severe protein-calorie malnutrition [E43]  Yes    Subdural hygroma [G96.08]  Yes      Resolved Hospital Problems   No resolved problems to display.        Brief Hospital Course to date:  Brittani Lambert is a 64 y.o. female w/ anxiety, depression, tobacco abuse, ovarian cancer 1989, metastatic breast cancer 2018 s/p whole brain radiation who presented for evaluation of recurrent falls. She reports weight loss of approx 10lbs since the beginning of this year, anorexia for approx 1 mo, and repeatedly falling when her legs get weak and give out (no loss of consciousness). She had a fall onto pavement with head injury that prompted her visit to the ED. Neuro imaging on arrival showed BL subdural hygromas. UA showed incidental UTI with urine culture positive for E. Coli.  Hospital course complicated by significant hyponatremia resulting in ICU admission for hypertonic saline.  Downgraded to ICU on 12/23.     Recurrent falls  BL subdural hygromas  -CT facial bones w/o fracture/dislocation  -CT/MRI brain w/ BL subdural hygromas  -Pt denies LOC w/ falls, legs just get weak and give out; strongly suspect deconditioning giver her poor PO intake and low body weight  -NSGY evaluated, no acute intervention, f/u opt 2 weeks with repeat head CT  -PT/OT recommend rehab      Nausea/vomiting, resolved   Constipation, resolved   -- s/p Amitiza and Movantik  -- Having stools, continue to monitor      Hyponatremia, improving   -- Patient with intractable nausea/vomiting and found to have a sodium of 121 on 12/21. Sodium previously 133 on 12/18. TSH/Cortisol within normal limits. Urine sodium 177, urine Osm 604, and serum Osm 245. Etiology thought to be 2/2 SIADH vs salt wasting in setting of recent head injury.   --Nephrology consulted, required ICU admission  for hypertonic saline  --Downgraded from ICU on 12/23  --Continue salt tabs, strict I's and O's, daily BMP     UTI E coli   Urinary retention over 1L  constipation  -- Completed Rocephin for 3 doses  -- required in/out caths and engle catheter, however now spontaneously voiding  -- Flomax started 12/20     Weight loss/failure to thrive  Cachexia/low body weight; severe malnutrition  Constipation  --reports ~10lb weight loss since beginning of 2024 (approx 10% of body weight), low BMI 18.49  --leg weakness and FTT noted to have been charted on patient's problem list since at least 4 years pta; she has been seen on an ambulatory basis multiple times this year for hip pain, falls, etc  --cont nutritional shake, RD consult      Hx metastatic breast cancer  --treated [id number] for breast Ca w/ 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 Patel     Afib - chronically not on AC  Anxiety/Depression - buproprion  Hx ovarian Ca 1989  Tobacco abuse - prn nrt    Expected Discharge Location and Transportation: TBD  Expected Discharge   Expected Discharge Date: 12/24/2024; Expected Discharge Time:      VTE Prophylaxis:  Mechanical VTE prophylaxis orders are present.         AM-PAC 6 Clicks Score (PT): 20 (12/23/24 1950)    CODE STATUS:   Code Status and Medical Interventions: No CPR (Do Not Attempt to Resuscitate); Limited Support; No intubation (DNI)   Ordered at: 12/17/24 0440     Medical Intervention Limits:    No intubation (DNI)     Level Of Support Discussed With:    Patient     Code Status (Patient has no pulse and is not breathing):    No CPR (Do Not Attempt to Resuscitate)     Medical Interventions (Patient has pulse or is breathing):    Limited Support       Wilfredo Villvaicencio, DO  12/24/24

## 2024-12-25 LAB
ANION GAP SERPL CALCULATED.3IONS-SCNC: 15 MMOL/L (ref 5–15)
BUN SERPL-MCNC: 9 MG/DL (ref 8–23)
BUN/CREAT SERPL: 20.5 (ref 7–25)
CALCIUM SPEC-SCNC: 8.8 MG/DL (ref 8.6–10.5)
CHLORIDE SERPL-SCNC: 92 MMOL/L (ref 98–107)
CO2 SERPL-SCNC: 22 MMOL/L (ref 22–29)
CREAT SERPL-MCNC: 0.44 MG/DL (ref 0.57–1)
EGFRCR SERPLBLD CKD-EPI 2021: 108.2 ML/MIN/1.73
GLUCOSE SERPL-MCNC: 107 MG/DL (ref 65–99)
POTASSIUM SERPL-SCNC: 3.3 MMOL/L (ref 3.5–5.2)
POTASSIUM SERPL-SCNC: 4.5 MMOL/L (ref 3.5–5.2)
SODIUM SERPL-SCNC: 129 MMOL/L (ref 136–145)

## 2024-12-25 PROCEDURE — 84132 ASSAY OF SERUM POTASSIUM: CPT | Performed by: STUDENT IN AN ORGANIZED HEALTH CARE EDUCATION/TRAINING PROGRAM

## 2024-12-25 PROCEDURE — 99232 SBSQ HOSP IP/OBS MODERATE 35: CPT | Performed by: STUDENT IN AN ORGANIZED HEALTH CARE EDUCATION/TRAINING PROGRAM

## 2024-12-25 PROCEDURE — 80048 BASIC METABOLIC PNL TOTAL CA: CPT | Performed by: STUDENT IN AN ORGANIZED HEALTH CARE EDUCATION/TRAINING PROGRAM

## 2024-12-25 RX ORDER — HYDROMORPHONE HYDROCHLORIDE 2 MG/1
1 TABLET ORAL EVERY 6 HOURS PRN
Status: DISCONTINUED | OUTPATIENT
Start: 2024-12-25 | End: 2024-12-26 | Stop reason: HOSPADM

## 2024-12-25 RX ORDER — POTASSIUM CHLORIDE 1500 MG/1
40 TABLET, EXTENDED RELEASE ORAL EVERY 4 HOURS
Status: COMPLETED | OUTPATIENT
Start: 2024-12-25 | End: 2024-12-25

## 2024-12-25 RX ORDER — SODIUM CHLORIDE 1 G/1
1 TABLET ORAL 3 TIMES DAILY
Status: DISCONTINUED | OUTPATIENT
Start: 2024-12-25 | End: 2024-12-26 | Stop reason: HOSPADM

## 2024-12-25 RX ADMIN — POTASSIUM CHLORIDE 40 MEQ: 1500 TABLET, EXTENDED RELEASE ORAL at 14:35

## 2024-12-25 RX ADMIN — POTASSIUM CHLORIDE 40 MEQ: 1500 TABLET, EXTENDED RELEASE ORAL at 10:16

## 2024-12-25 RX ADMIN — TAMSULOSIN HYDROCHLORIDE 0.4 MG: 0.4 CAPSULE ORAL at 08:03

## 2024-12-25 RX ADMIN — GABAPENTIN 100 MG: 100 CAPSULE ORAL at 16:15

## 2024-12-25 RX ADMIN — GABAPENTIN 100 MG: 100 CAPSULE ORAL at 19:42

## 2024-12-25 RX ADMIN — ACETAMINOPHEN 1000 MG: 500 TABLET, FILM COATED ORAL at 08:03

## 2024-12-25 RX ADMIN — MUPIROCIN 1 APPLICATION: 20 OINTMENT TOPICAL at 19:41

## 2024-12-25 RX ADMIN — LIDOCAINE 1 PATCH: 4 PATCH TOPICAL at 08:05

## 2024-12-25 RX ADMIN — ACETAMINOPHEN 1000 MG: 500 TABLET, FILM COATED ORAL at 16:15

## 2024-12-25 RX ADMIN — HYDROMORPHONE HYDROCHLORIDE 1 MG: 2 TABLET ORAL at 08:03

## 2024-12-25 RX ADMIN — GABAPENTIN 100 MG: 100 CAPSULE ORAL at 08:02

## 2024-12-25 RX ADMIN — MUPIROCIN 1 APPLICATION: 20 OINTMENT TOPICAL at 08:02

## 2024-12-25 RX ADMIN — ACETAMINOPHEN 1000 MG: 500 TABLET, FILM COATED ORAL at 23:52

## 2024-12-25 NOTE — PROGRESS NOTES
" LOS: 5 days   Patient Care Team:  Alla Colon DO as PCP - General (Family Medicine)  Carrie Patel MD as Referring Physician (Hematology and Oncology)  Rod Garcia MD as Consulting Physician (Radiation Oncology)  Alla Colon DO as Referring Physician (Family Medicine)    Chief Complaint: Hyponatremia     Subjective   Seen and examined at bedside for the first time.   Reports feeling better.  No complaints.   Denies chest pain or shortness of breath.   Appetite is not good.     Objective     Vital Sign Min/Max for last 24 hours  Temp  Min: 96 °F (35.6 °C)  Max: 99.1 °F (37.3 °C)   BP  Min: 118/75  Max: 150/105   Pulse  Min: 69  Max: 80   Resp  Min: 18  Max: 18   SpO2  Min: 95 %  Max: 99 %   No data recorded   No data recorded     Flowsheet Rows      Flowsheet Row First Filed Value   Admission Height 157.5 cm (62\") Documented at 12/16/2024 1901   Admission Weight 41.7 kg (92 lb) Documented at 12/16/2024 1901            No intake/output data recorded.  I/O last 3 completed shifts:  In: 1148 [P.O.:1148]  Out: 50 [Urine:50]    Objective:  Vital signs: (most recent): Blood pressure 136/80, pulse 71, temperature 98.6 °F (37 °C), resp. rate 18, height 157.5 cm (62\"), weight 39.7 kg (87 lb 8.4 oz), SpO2 99%, not currently breastfeeding.            Constitutional: No acute distress, awake, alert  HENT: Normocephalic, mucous membranes moist  Respiratory: Clear to auscultation bilaterally  Cardiovascular: RRR, no murmurs, rubs, or gallops  Gastrointestinal:  soft, nontender, nondistended  Musculoskeletal: No bilateral ankle edema  Psychiatric: Appropriate affect, cooperative  Neurologic: Oriented x 3, moves all extremities  Skin: No rashes    Results Review:     I reviewed the patient's new clinical results.    WBC WBC   Date Value Ref Range Status   12/23/2024 14.12 (H) 3.40 - 10.80 10*3/mm3 Final      HGB Hemoglobin   Date Value Ref Range Status   12/23/2024 10.9 (L) 12.0 - 15.9 g/dL Final    " "  HCT Hematocrit   Date Value Ref Range Status   12/23/2024 32.3 (L) 34.0 - 46.6 % Final      Platlets No results found for: \"LABPLAT\"   MCV MCV   Date Value Ref Range Status   12/23/2024 92.3 79.0 - 97.0 fL Final          Sodium Sodium   Date Value Ref Range Status   12/23/2024 129 (L) 136 - 145 mmol/L Final   12/23/2024 128 (L) 136 - 145 mmol/L Final   12/22/2024 123 (L) 136 - 145 mmol/L Final   12/22/2024 125 (L) 136 - 145 mmol/L Final   12/22/2024 124 (L) 136 - 145 mmol/L Final   12/22/2024 122 (L) 136 - 145 mmol/L Final   12/22/2024 122 (L) 136 - 145 mmol/L Final   12/22/2024 124 (L) 136 - 145 mmol/L Final   12/22/2024 122 (L) 136 - 145 mmol/L Final   12/22/2024 126 (L) 136 - 145 mmol/L Final   12/22/2024 122 (L) 136 - 145 mmol/L Final   12/21/2024 121 (L) 136 - 145 mmol/L Final      Potassium Potassium   Date Value Ref Range Status   12/23/2024 4.4 3.5 - 5.2 mmol/L Final     Comment:     Slight hemolysis detected by analyzer. Result may be falsely elevated.   12/22/2024 4.6 3.5 - 5.2 mmol/L Final   12/22/2024 3.3 (L) 3.5 - 5.2 mmol/L Final   12/22/2024 3.3 (L) 3.5 - 5.2 mmol/L Final   12/22/2024 3.8 3.5 - 5.2 mmol/L Final      Chloride Chloride   Date Value Ref Range Status   12/23/2024 99 98 - 107 mmol/L Final   12/22/2024 88 (L) 98 - 107 mmol/L Final      CO2 CO2   Date Value Ref Range Status   12/23/2024 19.0 (L) 22.0 - 29.0 mmol/L Final   12/22/2024 24.0 22.0 - 29.0 mmol/L Final      BUN BUN   Date Value Ref Range Status   12/23/2024 19 8 - 23 mg/dL Final   12/22/2024 8 8 - 23 mg/dL Final      Creatinine Creatinine   Date Value Ref Range Status   12/23/2024 0.50 (L) 0.57 - 1.00 mg/dL Final   12/22/2024 0.57 0.57 - 1.00 mg/dL Final      Calcium Calcium   Date Value Ref Range Status   12/23/2024 8.3 (L) 8.6 - 10.5 mg/dL Final   12/22/2024 8.5 (L) 8.6 - 10.5 mg/dL Final      PO4 No results found for: \"CAPO4\"   Albumin No results found for: \"ALBUMIN\"   Magnesium Magnesium   Date Value Ref Range Status "   12/23/2024 2.0 1.6 - 2.4 mg/dL Final   12/22/2024 2.9 (H) 1.6 - 2.4 mg/dL Final      Uric Acid Uric Acid   Date Value Ref Range Status   12/22/2024 2.1 (L) 2.4 - 5.7 mg/dL Final     Comment:     Falsely depressed results may occur on samples drawn from patients receiving N-Acetylcysteine (NAC) or Metamizole.        Medication Review:       Assessment & Plan       Hyponatremia    Subdural hygroma    Severe protein-calorie malnutrition    UTI (urinary tract infection)    SIADH (syndrome of inappropriate ADH production)  ====================================    Assessment & Plan  - Severe hypotonic hyponatremia - urine studies suggestive of SIADH vs salt wasting syndrome (Serum Na dropped from 133 on 12/18 to Na 117 today )  LIKELY related to nausea      - Persistent nausea and vomiting      - Bilateral subdural hygroma     - Recurrent falls, weight loss     - UTI Ecoli and urine retention      - Hypomagnesemia      - 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      - Afib, depression.      PLAN   Sodium improved to 128-129 mmol/L. No new blood work today.   ?  Salt tablets on hold.  Will check sodium now.   Daily BMP   Cortisol and TSH wnl   Strict I/Os     Will follow along with you        Charan Bautista MD  12/24/24  20:31 EST

## 2024-12-25 NOTE — PROGRESS NOTES
" LOS: 6 days   Patient Care Team:  Alla Colon DO as PCP - General (Family Medicine)  Carrie Patel MD as Referring Physician (Hematology and Oncology)  Rod Garcia MD as Consulting Physician (Radiation Oncology)  Alla Colon DO as Referring Physician (Family Medicine)    Chief Complaint: Hyponatremia     Subjective   Seen and examined at bedside.   Denies chest pain or shortness of breath.   Sodium ~ 129 mmol/L    Objective     Vital Sign Min/Max for last 24 hours  Temp  Min: 96.1 °F (35.6 °C)  Max: 98.7 °F (37.1 °C)   BP  Min: 102/72  Max: 128/73   Pulse  Min: 67  Max: 85   Resp  Min: 16  Max: 18   SpO2  Min: 96 %  Max: 98 %   No data recorded   No data recorded     Flowsheet Rows      Flowsheet Row First Filed Value   Admission Height 157.5 cm (62\") Documented at 12/16/2024 1901   Admission Weight 41.7 kg (92 lb) Documented at 12/16/2024 1901            I/O this shift:  In: 340 [P.O.:340]  Out: -   I/O last 3 completed shifts:  In: 480 [P.O.:480]  Out: -     Objective:  Vital signs: (most recent): Blood pressure 117/58, pulse 69, temperature 98.7 °F (37.1 °C), temperature source Oral, resp. rate 18, height 157.5 cm (62\"), weight 39.7 kg (87 lb 8.4 oz), SpO2 97%, not currently breastfeeding.            Constitutional: No acute distress, awake, alert  HENT: Normocephalic, mucous membranes moist  Respiratory: Clear to auscultation bilaterally  Cardiovascular: RRR, no murmurs, rubs, or gallops  Gastrointestinal:  soft, nontender, nondistended  Musculoskeletal: No bilateral ankle edema  Psychiatric: Appropriate affect, cooperative  Neurologic: Oriented x 3, moves all extremities  Skin: No rashes    Results Review:     I reviewed the patient's new clinical results.    WBC WBC   Date Value Ref Range Status   12/23/2024 14.12 (H) 3.40 - 10.80 10*3/mm3 Final      HGB Hemoglobin   Date Value Ref Range Status   12/23/2024 10.9 (L) 12.0 - 15.9 g/dL Final      HCT Hematocrit   Date Value Ref " "Range Status   12/23/2024 32.3 (L) 34.0 - 46.6 % Final      Platlets No results found for: \"LABPLAT\"   MCV MCV   Date Value Ref Range Status   12/23/2024 92.3 79.0 - 97.0 fL Final          Sodium Sodium   Date Value Ref Range Status   12/25/2024 129 (L) 136 - 145 mmol/L Final   12/23/2024 129 (L) 136 - 145 mmol/L Final   12/23/2024 128 (L) 136 - 145 mmol/L Final   12/22/2024 123 (L) 136 - 145 mmol/L Final      Potassium Potassium   Date Value Ref Range Status   12/25/2024 3.3 (L) 3.5 - 5.2 mmol/L Final   12/23/2024 4.4 3.5 - 5.2 mmol/L Final     Comment:     Slight hemolysis detected by analyzer. Result may be falsely elevated.      Chloride Chloride   Date Value Ref Range Status   12/25/2024 92 (L) 98 - 107 mmol/L Final   12/23/2024 99 98 - 107 mmol/L Final      CO2 CO2   Date Value Ref Range Status   12/25/2024 22.0 22.0 - 29.0 mmol/L Final   12/23/2024 19.0 (L) 22.0 - 29.0 mmol/L Final      BUN BUN   Date Value Ref Range Status   12/25/2024 9 8 - 23 mg/dL Final   12/23/2024 19 8 - 23 mg/dL Final      Creatinine Creatinine   Date Value Ref Range Status   12/25/2024 0.44 (L) 0.57 - 1.00 mg/dL Final   12/23/2024 0.50 (L) 0.57 - 1.00 mg/dL Final      Calcium Calcium   Date Value Ref Range Status   12/25/2024 8.8 8.6 - 10.5 mg/dL Final   12/23/2024 8.3 (L) 8.6 - 10.5 mg/dL Final      PO4 No results found for: \"CAPO4\"   Albumin No results found for: \"ALBUMIN\"   Magnesium Magnesium   Date Value Ref Range Status   12/23/2024 2.0 1.6 - 2.4 mg/dL Final      Uric Acid No results found for: \"URICACID\"       Medication Review:       Assessment & Plan       Hyponatremia    Subdural hygroma    Severe protein-calorie malnutrition    UTI (urinary tract infection)    SIADH (syndrome of inappropriate ADH production)  ====================================    Assessment & Plan  - Severe hypotonic hyponatremia - urine studies suggestive of SIADH vs salt wasting syndrome (Serum Na dropped from 133 on 12/18 to Na 117 today )  LIKELY " related to nausea      - Persistent nausea and vomiting      - Bilateral subdural hygroma     - Recurrent falls, weight loss     - UTI Ecoli and urine retention      - Hypomagnesemia      - 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      - Afib, depression.      PLAN   Sodium improved to 128-129 mmol/L.   ?  Salt tablets on hold.  Restart salt tab 1 g twice daily.   Daily BMP   Cortisol and TSH wnl   Strict I/Os     Will follow along with you        Charan Bautista MD  12/25/24  16:11 EST

## 2024-12-25 NOTE — PLAN OF CARE
Problem: Adult Inpatient Plan of Care  Goal: Plan of Care Review  Outcome: Progressing  Flowsheets (Taken 12/25/2024 1849)  Progress: no change  Plan of Care Reviewed With: patient  Goal: Patient-Specific Goal (Individualized)  Outcome: Progressing  Goal: Absence of Hospital-Acquired Illness or Injury  Outcome: Progressing  Intervention: Identify and Manage Fall Risk  Recent Flowsheet Documentation  Taken 12/25/2024 1830 by Zenaida Love, RN  Safety Promotion/Fall Prevention:   activity supervised   assistive device/personal items within reach   clutter free environment maintained   fall prevention program maintained   gait belt   toileting scheduled   safety round/check completed   room organization consistent   nonskid shoes/slippers when out of bed  Taken 12/25/2024 1615 by Zenaida Love, RN  Safety Promotion/Fall Prevention:   activity supervised   assistive device/personal items within reach   clutter free environment maintained   fall prevention program maintained   gait belt   toileting scheduled   safety round/check completed   room organization consistent   nonskid shoes/slippers when out of bed  Taken 12/25/2024 1437 by Zenaida Love, RN  Safety Promotion/Fall Prevention:   activity supervised   assistive device/personal items within reach   clutter free environment maintained   fall prevention program maintained   gait belt   toileting scheduled   safety round/check completed   room organization consistent   nonskid shoes/slippers when out of bed  Taken 12/25/2024 1250 by Zenaida Love, RN  Safety Promotion/Fall Prevention:   activity supervised   assistive device/personal items within reach   clutter free environment maintained   fall prevention program maintained   gait belt   toileting scheduled   safety round/check completed   room organization consistent   nonskid shoes/slippers when out of bed  Intervention: Prevent Skin Injury  Recent Flowsheet Documentation  Taken 12/25/2024 1830  by Zenaida Love RN  Body Position: position changed independently  Taken 12/25/2024 1615 by Zenaida Love RN  Body Position: position changed independently  Skin Protection: incontinence pads utilized  Taken 12/25/2024 1437 by Zenaida Love RN  Body Position: position changed independently  Taken 12/25/2024 1250 by Zenaida Love RN  Body Position: position changed independently  Skin Protection:   incontinence pads utilized   silicone foam dressing in place  Intervention: Prevent and Manage VTE (Venous Thromboembolism) Risk  Recent Flowsheet Documentation  Taken 12/25/2024 1830 by Zenaida Love RN  VTE Prevention/Management:   bilateral   SCDs (sequential compression devices) off  Taken 12/25/2024 1615 by Zenaida Love RN  VTE Prevention/Management:   bilateral   SCDs (sequential compression devices) off  Taken 12/25/2024 1437 by Zenaida Love RN  VTE Prevention/Management:   bilateral   SCDs (sequential compression devices) off  Taken 12/25/2024 1250 by Zenaida Love RN  VTE Prevention/Management:   bilateral   SCDs (sequential compression devices) off  Intervention: Prevent Infection  Recent Flowsheet Documentation  Taken 12/25/2024 1830 by Zenaida Love RN  Infection Prevention: environmental surveillance performed  Taken 12/25/2024 1615 by Zenaida Love RN  Infection Prevention: environmental surveillance performed  Taken 12/25/2024 1437 by Zenaida Love RN  Infection Prevention: environmental surveillance performed  Taken 12/25/2024 1250 by Zenaida Love RN  Infection Prevention: environmental surveillance performed  Goal: Optimal Comfort and Wellbeing  Outcome: Progressing  Intervention: Provide Person-Centered Care  Recent Flowsheet Documentation  Taken 12/25/2024 1830 by Zenaida Love RN  Trust Relationship/Rapport: care explained  Taken 12/25/2024 1615 by Zenaida Love RN  Trust Relationship/Rapport:   care explained   choices  provided  Taken 12/25/2024 1437 by Zenaida Love RN  Trust Relationship/Rapport: care explained  Taken 12/25/2024 1250 by Zenaida Love RN  Trust Relationship/Rapport:   care explained   choices provided   questions encouraged   questions answered   reassurance provided   thoughts/feelings acknowledged  Goal: Readiness for Transition of Care  Outcome: Progressing     Problem: Skin Injury Risk Increased  Goal: Skin Health and Integrity  Outcome: Progressing  Intervention: Optimize Skin Protection  Recent Flowsheet Documentation  Taken 12/25/2024 1830 by Zenaida Love, RN  Head of Bed (HOB) Positioning: HOB at 30-45 degrees  Taken 12/25/2024 1615 by Zenaida Love, RN  Pressure Reduction Techniques: frequent weight shift encouraged  Head of Bed (HOB) Positioning: HOB at 30 degrees  Pressure Reduction Devices: pressure-redistributing mattress utilized  Skin Protection: incontinence pads utilized  Taken 12/25/2024 1437 by Zenaida Love, RN  Activity Management: ambulated to bathroom  Head of Bed (HOB) Positioning: HOB at 30 degrees  Taken 12/25/2024 1250 by Zenaida Love RN  Pressure Reduction Techniques: frequent weight shift encouraged  Head of Bed (HOB) Positioning: HOB at 30 degrees  Pressure Reduction Devices: pressure-redistributing mattress utilized  Skin Protection:   incontinence pads utilized   silicone foam dressing in place     Problem: Fall Injury Risk  Goal: Absence of Fall and Fall-Related Injury  Outcome: Progressing  Intervention: Promote Injury-Free Environment  Recent Flowsheet Documentation  Taken 12/25/2024 1830 by Zenaida Love, RN  Safety Promotion/Fall Prevention:   activity supervised   assistive device/personal items within reach   clutter free environment maintained   fall prevention program maintained   gait belt   toileting scheduled   safety round/check completed   room organization consistent   nonskid shoes/slippers when out of bed  Taken 12/25/2024 1615 by  Zenaida Love, RN  Safety Promotion/Fall Prevention:   activity supervised   assistive device/personal items within reach   clutter free environment maintained   fall prevention program maintained   gait belt   toileting scheduled   safety round/check completed   room organization consistent   nonskid shoes/slippers when out of bed  Taken 12/25/2024 1437 by Zenaida Love RN  Safety Promotion/Fall Prevention:   activity supervised   assistive device/personal items within reach   clutter free environment maintained   fall prevention program maintained   gait belt   toileting scheduled   safety round/check completed   room organization consistent   nonskid shoes/slippers when out of bed  Taken 12/25/2024 1250 by Zenaida Love RN  Safety Promotion/Fall Prevention:   activity supervised   assistive device/personal items within reach   clutter free environment maintained   fall prevention program maintained   gait belt   toileting scheduled   safety round/check completed   room organization consistent   nonskid shoes/slippers when out of bed     Problem: Palliative Care  Goal: Enhanced Quality of Life  Outcome: Progressing  Intervention: Optimize Psychosocial Wellbeing  Recent Flowsheet Documentation  Taken 12/25/2024 1437 by Zenaida Love RN  Family/Support System Care: support provided  Taken 12/25/2024 1250 by Zenaida Love RN  Family/Support System Care:   self-care encouraged   support provided     Problem: Pain Acute  Goal: Optimal Pain Control and Function  Outcome: Progressing  Intervention: Optimize Psychosocial Wellbeing  Recent Flowsheet Documentation  Taken 12/25/2024 1437 by Zenaida Love RN  Diversional Activities:   television   smartphone  Taken 12/25/2024 1250 by Zenaida Love RN  Diversional Activities:   smartphone   television     Problem: Comorbidity Management  Goal: Maintenance of COPD Symptom Control  Outcome: Progressing  Goal: Blood Pressure in Desired  Range  Outcome: Progressing   Goal Outcome Evaluation:  Plan of Care Reviewed With: patient        Progress: no change

## 2024-12-25 NOTE — PROGRESS NOTES
"Palliative Care Daily Progress Note     C/C: Patient reports pain much more controlled today, able to ambulate in room and dress herself.     S: Medical record reviewed. Follow-up visit for GOC and symptom management. Events noted. Patient reports acute pain to back of head, site of fall, as well as ribs which she describes as constant throbbing due to fall. Neurosurgery reviewed subdural hygroma with no role for neurosurgical interventions, no new metastatic disease on MRI. She reports chronic pain to hips that radiates down her legs, also describes as tingling, burning. Improved with initiation of Neurontin yesterday. Hyponatremia improving. Plan to follow up with Dr. Patel in February.     ROS: +back of head, constant throbbing, +rib, constant throbbing, 5/10. +chronic bilateral hip pain that radiates down her legs, tingling/burning, is not improved with pain medication. Generalized pain improved with Hydromorphone 1mg PO over Oxycodone. +nausea, with bites of food. +shortness of breath, with cough. +weakness/debility. LBM 12/23.     O: Code Status:   Code Status and Medical Interventions: No CPR (Do Not Attempt to Resuscitate); Limited Support; No intubation (DNI)   Ordered at: 12/17/24 0440     Medical Intervention Limits:    No intubation (DNI)     Level Of Support Discussed With:    Patient     Code Status (Patient has no pulse and is not breathing):    No CPR (Do Not Attempt to Resuscitate)     Medical Interventions (Patient has pulse or is breathing):    Limited Support      Advanced Directives: Advance Directive Status: Patient has advance directive, copy in chart   Goals of Care: Ongoing.   Palliative Performance Scale Score: 40%     /64 (BP Location: Right arm, Patient Position: Lying)   Pulse 85   Temp 96.9 °F (36.1 °C) (Axillary)   Resp 16   Ht 157.5 cm (62\")   Wt 39.7 kg (87 lb 8.4 oz)   SpO2 96%   BMI 16.01 kg/m²     Intake/Output Summary (Last 24 hours) at 12/25/2024 1208  Last data " filed at 12/25/2024 0800  Gross per 24 hour   Intake 480 ml   Output --   Net 480 ml       PE:  General Appearance:    Patient laying in bed, awake, alert, frail, A/C ill appearing,  cooperative, NAD   HEENT:    NC/AT, EOMI, anicteric, MMM, face relaxed   Neck:   supple, trachea midline, no JVD   Lungs:     CTA bilaterally, diminished in bases; respirations regular,     even and unlabored; RR 16-18 on exam, on RA    Heart:    RRR, normal S1 and S2, no M/R/G, HR 85 on monitor   Abdomen:     Normal bowel sounds, soft, nontender, nondistended   G/U:   Deferred   MSK/Extremities:   Wasting, no edema, bruising to multiple extremities   Pulses:   Pulses palpable and equal bilaterally   Skin:   Warm, dry   Neurologic:   A/Ox3, cooperative, ESTEVES   Psych:   Calm, appropriate     Meds: Reviewed and changes noted    Labs:   Results from last 7 days   Lab Units 12/23/24  0556   WBC 10*3/mm3 14.12*   HEMOGLOBIN g/dL 10.9*   HEMATOCRIT % 32.3*   PLATELETS 10*3/mm3 257     Results from last 7 days   Lab Units 12/25/24  0817   SODIUM mmol/L 129*   POTASSIUM mmol/L 3.3*   CHLORIDE mmol/L 92*   CO2 mmol/L 22.0   BUN mg/dL 9   CREATININE mg/dL 0.44*   GLUCOSE mg/dL 107*   CALCIUM mg/dL 8.8     Results from last 7 days   Lab Units 12/25/24  0817   SODIUM mmol/L 129*   POTASSIUM mmol/L 3.3*   CHLORIDE mmol/L 92*   CO2 mmol/L 22.0   BUN mg/dL 9   CREATININE mg/dL 0.44*   CALCIUM mg/dL 8.8   GLUCOSE mg/dL 107*     Imaging Results (Last 72 Hours)       ** No results found for the last 72 hours. **                  Diagnostics: Reviewed    A:   Hyponatremia    Subdural hygroma    Severe protein-calorie malnutrition    UTI (urinary tract infection)    SIADH (syndrome of inappropriate ADH production)     64 y.o. female with hyponatremia/SIADH, malnutrition, acute on chronic pain, UTI, subdural hygroma, history of breast cancer.     S/Sx:  1. Pain -acute on chronic, chronic neuropathic pain to back/legs, rib/headache s/p fall, MSK post pain  from falls  -continue Tylenol 1000mg PO q 8 hours   -continue Neurontin 100mg PO TID  -discontinued Oxycodone 10mg PO as patient states it is not effective  -changed Hydromorphone 1mg PO to q 6 hours prn severe pain    2. Anxiety -follow with Yamila Gonsales, Behavioral Health    3. GOC -DNR/DNI -per review of chart  -She is interested in obtaining additional caregiving for home  -Goal to discharge to [facility] and then interested in home with HH and caregivers  -she does not want any information regarding hospice services in the future  -she wants her niece to be HCS, reports paperwork has been completed, niece's name is Sara Esposito, phone number not on chart    P: Follow up visit for GOC and symptom management. Review of symptoms as above. Medications adjusted as above. Reviewed GOC and patient is looking to discharge to [facility] followed by return to home with HH and interested in additional caregivers. Patient's pain much improved today with addition of Neurontin. All questions and concerns addressed. Patient to follow up with Dr. Patel in February.   Palliative Care Team will continue to follow patient. Please do not hesitate to contact us regarding further sx mgmt or GOC needs.  Tabatha Salas, APRN  12/25/2024  Time spent: 30 minutes

## 2024-12-25 NOTE — PLAN OF CARE
Per reporting nurse, patient had a panic attack like sx on earlier. Oriented x 4, mood / affect appropriate. Patient denies pain / discomfort at this time. Ambulates to the bathroom with SB assist. VSS on RA. SR on cardiac mx. Bladder volume periodically assessed due to decreased UOP. Call light in reach.

## 2024-12-25 NOTE — PROGRESS NOTES
[facility] Medicine Services  PROGRESS NOTE    Patient Name: Brittani Lambert  : 1960  MRN: [id number]    Date of Admission: 2024  Primary Care Physician: Alla Colon DO    Subjective   Subjective     CC:  Subdural hygroma, hyponatremia    HPI:  Patient seen resting comfortably in bed no acute distress.  Denies any vomiting but reports mild nausea.  Having bowel movements.      Objective   Objective     Vital Signs:   Temp:  [96.1 °F (35.6 °C)-99.1 °F (37.3 °C)] 96.9 °F (36.1 °C)  Heart Rate:  [67-85] 85  Resp:  [16-18] 16  BP: (102-136)/(64-80) 122/64     Physical Exam  Constitutional:       General: She is not in acute distress.     Appearance: She is underweight.   Cardiovascular:      Rate and Rhythm: Normal rate.      Pulses: Normal pulses.   Pulmonary:      Effort: Pulmonary effort is normal. No respiratory distress.   Abdominal:      General: There is no distension.      Palpations: Abdomen is soft.      Tenderness: There is no abdominal tenderness. There is no guarding or rebound.   Musculoskeletal:      Right lower leg: No edema.      Left lower leg: No edema.   Skin:     General: Skin is warm.   Neurological:      Mental Status: She is alert and oriented to person, place, and time.   Psychiatric:         Mood and Affect: Mood normal.         Behavior: Behavior normal.           Results Reviewed:  LAB RESULTS:      Lab 24  0556 24  0748 24  0849   WBC 14.12* 7.31  --    HEMOGLOBIN 10.9* 11.8* 10.7*   HEMATOCRIT 32.3* 33.7* 31.8*   PLATELETS 257 258  --    NEUTROS ABS 11.71* 5.29  --    IMMATURE GRANS (ABS) 0.11* 0.03  --    LYMPHS ABS 0.59* 1.20  --    MONOS ABS 1.64* 0.56  --    EOS ABS 0.03 0.17  --    MCV 92.3 90.3  --          Lab 24  0817 24  0556 24  0031 24  1752 24  1144 24  0600 24  0419 24  0156 24  0031 24  1943 24  1629 24  1152 24  0853 24  0748    0000   SODIUM 129* 129* 128* 123* 125*   < > 122*  124*   < > 122*   < > 117*   < > 121*  --   --    POTASSIUM 3.3* 4.4  --   --  4.6  --  3.3*  3.3*  --  3.8   < > 4.1  --  4.6  --    < >   CHLORIDE 92* 99  --   --   --   --  88*  --   --   --  81*  --  84*  --   --    CO2 22.0 19.0*  --   --   --   --  24.0  --   --   --  21.0*  --  25.0  --   --    ANION GAP 15.0 11.0  --   --   --   --  10.0  --   --   --  15.0  --  12.0  --   --    BUN 9 19  --   --   --   --  8  --   --   --  6*  --  7*  --   --    CREATININE 0.44* 0.50*  --   --   --   --  0.57  --   --   --  0.47*  --  0.46*  --   --    EGFR 108.2 104.9  --   --   --   --  101.6  --   --   --  106.5  --  107.0  --   --    GLUCOSE 107* 130*  --   --   --   --  142*  --   --   --  109*  --  124*  --   --    CALCIUM 8.8 8.3*  --   --   --   --  8.5*  --   --   --  8.7  --  9.0  --   --    MAGNESIUM  --  2.0  --   --   --   --  2.9*  --   --   --   --   --  1.5*  --   --    PHOSPHORUS  --   --   --   --   --   --   --   --   --   --   --   --   --  3.5  --     < > = values in this interval not displayed.                         Brief Urine Lab Results  (Last result in the past 365 days)        Color   Clarity   Blood   Leuk Est   Nitrite   Protein   CREAT   Urine HCG        12/16/24 2124 Yellow   Cloudy   Negative   Trace   Negative   Trace                   Microbiology Results Abnormal       Procedure Component Value - Date/Time    Urine Culture - Urine, Urine, Catheter [[id number]]  (Abnormal)  (Susceptibility) Collected: 12/16/24 2124    Lab Status: Final result Specimen: Urine, Catheter Updated: 12/19/24 0914     Urine Culture >100,000 CFU/mL Escherichia coli    Narrative:      Colonization of the urinary tract without infection is common. Treatment is discouraged unless the patient is symptomatic, pregnant, or undergoing an invasive urologic procedure.    Susceptibility        Escherichia coli      MARS      Amoxicillin + Clavulanate Susceptible       Ampicillin Resistant      Ampicillin + Sulbactam Intermediate      Cefazolin (Urine) Susceptible      Cefepime Susceptible      Ceftazidime Susceptible      Ceftriaxone Susceptible      Gentamicin Susceptible      Levofloxacin Susceptible      Nitrofurantoin Susceptible      Piperacillin + Tazobactam Susceptible      Trimethoprim + Sulfamethoxazole Susceptible                                   No radiology results from the last 24 hrs    Results for orders placed during the hospital encounter of 09/24/20    Transthoracic Echo Complete With Contrast if Necessary Per Protocol    Interpretation Summary  · Left ventricular ejection fraction appears to be 61 - 65%. Left ventricular systolic function is normal.  · Left ventricular diastolic function is consistent with (grade I) impaired relaxation.  · Mild tricuspid valve regurgitation is present.  · Estimated right ventricular systolic pressure from tricuspid regurgitation is normal (<35 mmHg). Calculated right ventricular systolic pressure from tricuspid regurgitation is 22 mmHg.      Current medications:  Scheduled Meds:acetaminophen, 1,000 mg, Oral, Q8H  gabapentin, 100 mg, Oral, TID  Lidocaine, 1 patch, Transdermal, Q24H  mupirocin, 1 Application, Each Nare, BID  [Held by provider] Naloxegol Oxalate, 25 mg, Oral, QAM  potassium chloride ER, 40 mEq, Oral, Q4H  senna-docusate sodium, 2 tablet, Oral, BID  [Held by provider] sodium chloride, 2 g, Oral, TID  tamsulosin, 0.4 mg, Oral, Daily      Continuous Infusions:   PRN Meds:.  senna-docusate sodium **AND** polyethylene glycol **AND** bisacodyl **AND** bisacodyl    Calcium Replacement - Follow Nurse / BPA Driven Protocol    HYDROmorphone    Magnesium Standard Dose Replacement - Follow Nurse / BPA Driven Protocol    nicotine    ondansetron    Phosphorus Replacement - Follow Nurse / BPA Driven Protocol    Potassium Replacement - Follow Nurse / BPA Driven Protocol    sodium chloride    Assessment & Plan   Assessment & Plan      Active Hospital Problems    Diagnosis  POA    **Hyponatremia [E87.1]  Yes    SIADH (syndrome of inappropriate ADH production) [E22.2]  Yes    UTI (urinary tract infection) [N39.0]  Yes    Severe protein-calorie malnutrition [E43]  Yes    Subdural hygroma [G96.08]  Yes      Resolved Hospital Problems   No resolved problems to display.        Brief Hospital Course to date:  Brittani Lambert is a 64 y.o. female w/ anxiety, depression, tobacco abuse, ovarian cancer 1989, metastatic breast cancer 2018 s/p whole brain radiation who presented for evaluation of recurrent falls. She reports weight loss of approx 10lbs since the beginning of this year, anorexia for approx 1 mo, and repeatedly falling when her legs get weak and give out (no loss of consciousness). She had a fall onto pavement with head injury that prompted her visit to the ED. Neuro imaging on arrival showed BL subdural hygromas. UA showed incidental UTI with urine culture positive for E. Coli.  Hospital course complicated by significant hyponatremia resulting in ICU admission for hypertonic saline.  Downgraded to ICU on 12/23.  Monitoring sodium.     Recurrent falls  BL subdural hygromas  -CT facial bones w/o fracture/dislocation  -CT/MRI brain w/ BL subdural hygromas  -Pt denies LOC w/ falls, legs just get weak and give out; strongly suspect deconditioning giver her poor PO intake and low body weight  -NSGY evaluated, no acute intervention, f/u opt 2 weeks with repeat head CT  -PT/OT recommend inpatient rehab      Nausea/vomiting, resolved   Constipation, resolved   -- s/p Amitiza and Movantik  -- Having stools, continue to monitor      Hyponatremia, improving   -- Patient with intractable nausea/vomiting and found to have a sodium of 121 on 12/21. Sodium previously 133 on 12/18. TSH/Cortisol within normal limits. Urine sodium 177, urine Osm 604, and serum Osm 245. Etiology thought to be 2/2 SIADH vs salt wasting in setting of recent head injury.    --Nephrology consulted, required ICU admission for hypertonic saline  --Downgraded from ICU on 12/23  --Initially on salt tabs, however salt tabs currently on hold with stable sodium levels  -- strict I's and O's, daily BMP     UTI E coli   Urinary retention over 1L  -- Completed Rocephin for 3 doses  -- required in/out caths and engle catheter, however now spontaneously voiding  -- Flomax started 12/20     Weight loss/failure to thrive  Cachexia/low body weight; severe malnutrition  Constipation  --reports ~10lb weight loss since beginning of 2024 (approx 10% of body weight), low BMI 18.49  --leg weakness and FTT noted to have been charted on patient's problem list since at least 4 years pta; she has been seen on an ambulatory basis multiple times this year for hip pain, falls, etc  --cont nutritional shake, RD consult   --Having stools      Hx metastatic breast cancer  --treated [id number] for breast Ca w/ 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 Jorge Pitts - chronically not on AC  Anxiety/Depression - buproprion  Hx ovarian Ca 1989  Tobacco abuse - prn nrt    Expected Discharge Location and Transportation: TBD  Expected Discharge   Expected Discharge Date: 12/24/2024; Expected Discharge Time:      VTE Prophylaxis:  Mechanical VTE prophylaxis orders are present.         AM-PAC 6 Clicks Score (PT): 23 (12/24/24 1947)    CODE STATUS:   Code Status and Medical Interventions: No CPR (Do Not Attempt to Resuscitate); Limited Support; No intubation (DNI)   Ordered at: 12/17/24 0440     Medical Intervention Limits:    No intubation (DNI)     Level Of Support Discussed With:    Patient     Code Status (Patient has no pulse and is not breathing):    No CPR (Do Not Attempt to Resuscitate)     Medical Interventions  (Patient has pulse or is breathing):    Limited Support       Wilfredo Villavicencio, DO  12/25/24

## 2024-12-26 ENCOUNTER — HOME HEALTH ADMISSION (OUTPATIENT)
Dept: HOME HEALTH SERVICES | Facility: HOME HEALTHCARE | Age: 64
End: 2024-12-26
Payer: COMMERCIAL

## 2024-12-26 ENCOUNTER — TELEPHONE (OUTPATIENT)
Dept: FAMILY MEDICINE CLINIC | Facility: CLINIC | Age: 64
End: 2024-12-26
Payer: MEDICARE

## 2024-12-26 ENCOUNTER — READMISSION MANAGEMENT (OUTPATIENT)
Dept: CALL CENTER | Facility: HOSPITAL | Age: 64
End: 2024-12-26
Payer: MEDICARE

## 2024-12-26 ENCOUNTER — DOCUMENTATION (OUTPATIENT)
Dept: HOME HEALTH SERVICES | Facility: HOME HEALTHCARE | Age: 64
End: 2024-12-26
Payer: MEDICARE

## 2024-12-26 VITALS
TEMPERATURE: 98.9 F | WEIGHT: 87.52 LBS | OXYGEN SATURATION: 98 % | RESPIRATION RATE: 18 BRPM | HEIGHT: 62 IN | SYSTOLIC BLOOD PRESSURE: 139 MMHG | BODY MASS INDEX: 16.11 KG/M2 | DIASTOLIC BLOOD PRESSURE: 79 MMHG | HEART RATE: 77 BPM

## 2024-12-26 DIAGNOSIS — G96.08 SUBDURAL HYGROMA: Primary | ICD-10-CM

## 2024-12-26 LAB
ANION GAP SERPL CALCULATED.3IONS-SCNC: 16 MMOL/L (ref 5–15)
BASOPHILS # BLD AUTO: 0.07 10*3/MM3 (ref 0–0.2)
BASOPHILS NFR BLD AUTO: 0.6 % (ref 0–1.5)
BUN SERPL-MCNC: 7 MG/DL (ref 8–23)
BUN/CREAT SERPL: 15.2 (ref 7–25)
CALCIUM SPEC-SCNC: 8.9 MG/DL (ref 8.6–10.5)
CHLORIDE SERPL-SCNC: 95 MMOL/L (ref 98–107)
CO2 SERPL-SCNC: 20 MMOL/L (ref 22–29)
CREAT SERPL-MCNC: 0.46 MG/DL (ref 0.57–1)
DEPRECATED RDW RBC AUTO: 45.9 FL (ref 37–54)
EGFRCR SERPLBLD CKD-EPI 2021: 107 ML/MIN/1.73
EOSINOPHIL # BLD AUTO: 0.15 10*3/MM3 (ref 0–0.4)
EOSINOPHIL NFR BLD AUTO: 1.3 % (ref 0.3–6.2)
ERYTHROCYTE [DISTWIDTH] IN BLOOD BY AUTOMATED COUNT: 12.8 % (ref 12.3–15.4)
GLUCOSE SERPL-MCNC: 131 MG/DL (ref 65–99)
HCT VFR BLD AUTO: 30.1 % (ref 34–46.6)
HGB BLD-MCNC: 9.7 G/DL (ref 12–15.9)
IMM GRANULOCYTES # BLD AUTO: 0.09 10*3/MM3 (ref 0–0.05)
IMM GRANULOCYTES NFR BLD AUTO: 0.8 % (ref 0–0.5)
LYMPHOCYTES # BLD AUTO: 1.64 10*3/MM3 (ref 0.7–3.1)
LYMPHOCYTES NFR BLD AUTO: 14.7 % (ref 19.6–45.3)
MCH RBC QN AUTO: 31.6 PG (ref 26.6–33)
MCHC RBC AUTO-ENTMCNC: 32.2 G/DL (ref 31.5–35.7)
MCV RBC AUTO: 98 FL (ref 79–97)
MONOCYTES # BLD AUTO: 0.51 10*3/MM3 (ref 0.1–0.9)
MONOCYTES NFR BLD AUTO: 4.6 % (ref 5–12)
NEUTROPHILS NFR BLD AUTO: 78 % (ref 42.7–76)
NEUTROPHILS NFR BLD AUTO: 8.67 10*3/MM3 (ref 1.7–7)
NRBC BLD AUTO-RTO: 0 /100 WBC (ref 0–0.2)
PLATELET # BLD AUTO: 283 10*3/MM3 (ref 140–450)
PMV BLD AUTO: 9.1 FL (ref 6–12)
POTASSIUM SERPL-SCNC: 3.9 MMOL/L (ref 3.5–5.2)
RBC # BLD AUTO: 3.07 10*6/MM3 (ref 3.77–5.28)
SODIUM SERPL-SCNC: 131 MMOL/L (ref 136–145)
WBC NRBC COR # BLD AUTO: 11.13 10*3/MM3 (ref 3.4–10.8)

## 2024-12-26 PROCEDURE — 85025 COMPLETE CBC W/AUTO DIFF WBC: CPT | Performed by: STUDENT IN AN ORGANIZED HEALTH CARE EDUCATION/TRAINING PROGRAM

## 2024-12-26 PROCEDURE — 80048 BASIC METABOLIC PNL TOTAL CA: CPT | Performed by: STUDENT IN AN ORGANIZED HEALTH CARE EDUCATION/TRAINING PROGRAM

## 2024-12-26 PROCEDURE — 99239 HOSP IP/OBS DSCHRG MGMT >30: CPT | Performed by: INTERNAL MEDICINE

## 2024-12-26 RX ORDER — TAMSULOSIN HYDROCHLORIDE 0.4 MG/1
0.4 CAPSULE ORAL DAILY
Qty: 30 CAPSULE | Refills: 0 | Status: SHIPPED | OUTPATIENT
Start: 2024-12-27 | End: 2024-12-26

## 2024-12-26 RX ORDER — HYDROMORPHONE HYDROCHLORIDE 2 MG/1
1 TABLET ORAL EVERY 6 HOURS PRN
Qty: 30 TABLET | Refills: 0 | Status: SHIPPED | OUTPATIENT
Start: 2024-12-26 | End: 2024-12-26

## 2024-12-26 RX ORDER — NICOTINE 21 MG/24HR
1 PATCH, TRANSDERMAL 24 HOURS TRANSDERMAL DAILY PRN
Qty: 30 PATCH | Refills: 0 | Status: SHIPPED | OUTPATIENT
Start: 2024-12-26

## 2024-12-26 RX ORDER — GABAPENTIN 100 MG/1
100 CAPSULE ORAL 3 TIMES DAILY
Qty: 30 CAPSULE | Refills: 0 | Status: SHIPPED | OUTPATIENT
Start: 2024-12-26

## 2024-12-26 RX ORDER — NICOTINE 21 MG/24HR
1 PATCH, TRANSDERMAL 24 HOURS TRANSDERMAL DAILY PRN
Qty: 30 PATCH | Refills: 0 | Status: SHIPPED | OUTPATIENT
Start: 2024-12-26 | End: 2024-12-26

## 2024-12-26 RX ORDER — TAMSULOSIN HYDROCHLORIDE 0.4 MG/1
0.4 CAPSULE ORAL DAILY
Qty: 30 CAPSULE | Refills: 0 | Status: SHIPPED | OUTPATIENT
Start: 2024-12-27

## 2024-12-26 RX ORDER — GABAPENTIN 100 MG/1
100 CAPSULE ORAL 3 TIMES DAILY
Qty: 30 CAPSULE | Refills: 0 | Status: SHIPPED | OUTPATIENT
Start: 2024-12-26 | End: 2024-12-26

## 2024-12-26 RX ORDER — HYDROMORPHONE HYDROCHLORIDE 2 MG/1
1 TABLET ORAL EVERY 6 HOURS PRN
Qty: 30 TABLET | Refills: 0 | Status: SHIPPED | OUTPATIENT
Start: 2024-12-26

## 2024-12-26 RX ADMIN — SENNOSIDES AND DOCUSATE SODIUM 2 TABLET: 50; 8.6 TABLET ORAL at 08:37

## 2024-12-26 RX ADMIN — TAMSULOSIN HYDROCHLORIDE 0.4 MG: 0.4 CAPSULE ORAL at 08:37

## 2024-12-26 RX ADMIN — MUPIROCIN 1 APPLICATION: 20 OINTMENT TOPICAL at 08:36

## 2024-12-26 RX ADMIN — ACETAMINOPHEN 1000 MG: 500 TABLET, FILM COATED ORAL at 08:37

## 2024-12-26 RX ADMIN — LIDOCAINE 1 PATCH: 4 PATCH TOPICAL at 08:36

## 2024-12-26 RX ADMIN — GABAPENTIN 100 MG: 100 CAPSULE ORAL at 08:37

## 2024-12-26 NOTE — TELEPHONE ENCOUNTER
JI WITH [facility] HAS CALLED AND STATED PATIENT WILL BE DISCHARGED FROM [facility] TODAY 12/26/24 AND WILL NEED HOME HEALTH SERVICES BEGINNING 12/28/24. JI IS REQUESTING A CALL BACK OR A NOTE PUT IN [facility] TO LET HER KNOW IF PCP WILL FOLLOW HOME HEALTH ORDERS.     CALL BACK NUMBER [phone]

## 2024-12-26 NOTE — PROGRESS NOTES
" LOS: 7 days   Patient Care Team:  Alla Colon DO as PCP - General (Family Medicine)  Carrie Patel MD as Referring Physician (Hematology and Oncology)  Rod Garcia MD as Consulting Physician (Radiation Oncology)  Alla Colon DO as Referring Physician (Family Medicine)    Chief Complaint: Hyponatremia     Subjective   Seen and examined at bedside.  No complaints  Denies chest pain or shortness of breath.   Sodium ~ 131 mmol/L    Objective     Vital Sign Min/Max for last 24 hours  Temp  Min: 97.9 °F (36.6 °C)  Max: 98.9 °F (37.2 °C)   BP  Min: 117/68  Max: 140/78   Pulse  Min: 64  Max: 77   Resp  Min: 14  Max: 18   SpO2  Min: 98 %  Max: 98 %   No data recorded   No data recorded     Flowsheet Rows      Flowsheet Row First Filed Value   Admission Height 157.5 cm (62\") Documented at 12/16/2024 1901   Admission Weight 41.7 kg (92 lb) Documented at 12/16/2024 1901            I/O this shift:  In: 200 [P.O.:200]  Out: -   I/O last 3 completed shifts:  In: 440 [P.O.:440]  Out: -     Objective:  Vital signs: (most recent): Blood pressure 139/79, pulse 77, temperature 98.9 °F (37.2 °C), temperature source Oral, resp. rate 18, height 157.5 cm (62\"), weight 39.7 kg (87 lb 8.4 oz), SpO2 98%, not currently breastfeeding.            Constitutional: No acute distress, awake, alert  HENT: Normocephalic, mucous membranes moist  Respiratory: Clear to auscultation bilaterally  Cardiovascular: RRR, no murmurs, rubs, or gallops  Gastrointestinal:  soft, nontender, nondistended  Musculoskeletal: No bilateral ankle edema  Psychiatric: Appropriate affect, cooperative  Neurologic: Oriented x 3, moves all extremities  Skin: No rashes    Results Review:     I reviewed the patient's new clinical results.    WBC WBC   Date Value Ref Range Status   12/26/2024 11.13 (H) 3.40 - 10.80 10*3/mm3 Final      HGB Hemoglobin   Date Value Ref Range Status   12/26/2024 9.7 (L) 12.0 - 15.9 g/dL Final      HCT Hematocrit   Date " "Value Ref Range Status   12/26/2024 30.1 (L) 34.0 - 46.6 % Final      Platlets No results found for: \"LABPLAT\"   MCV MCV   Date Value Ref Range Status   12/26/2024 98.0 (H) 79.0 - 97.0 fL Final          Sodium Sodium   Date Value Ref Range Status   12/26/2024 131 (L) 136 - 145 mmol/L Final   12/25/2024 129 (L) 136 - 145 mmol/L Final      Potassium Potassium   Date Value Ref Range Status   12/26/2024 3.9 3.5 - 5.2 mmol/L Final     Comment:     Slight hemolysis detected by analyzer. Result may be falsely elevated.   12/25/2024 4.5 3.5 - 5.2 mmol/L Final     Comment:     Slight hemolysis detected by analyzer. Result may be falsely elevated.   12/25/2024 3.3 (L) 3.5 - 5.2 mmol/L Final      Chloride Chloride   Date Value Ref Range Status   12/26/2024 95 (L) 98 - 107 mmol/L Final   12/25/2024 92 (L) 98 - 107 mmol/L Final      CO2 CO2   Date Value Ref Range Status   12/26/2024 20.0 (L) 22.0 - 29.0 mmol/L Final   12/25/2024 22.0 22.0 - 29.0 mmol/L Final      BUN BUN   Date Value Ref Range Status   12/26/2024 7 (L) 8 - 23 mg/dL Final   12/25/2024 9 8 - 23 mg/dL Final      Creatinine Creatinine   Date Value Ref Range Status   12/26/2024 0.46 (L) 0.57 - 1.00 mg/dL Final   12/25/2024 0.44 (L) 0.57 - 1.00 mg/dL Final      Calcium Calcium   Date Value Ref Range Status   12/26/2024 8.9 8.6 - 10.5 mg/dL Final   12/25/2024 8.8 8.6 - 10.5 mg/dL Final      PO4 No results found for: \"CAPO4\"   Albumin No results found for: \"ALBUMIN\"   Magnesium No results found for: \"MG\"     Uric Acid No results found for: \"URICACID\"       Medication Review:       Assessment & Plan       Hyponatremia    Subdural hygroma    Severe protein-calorie malnutrition    UTI (urinary tract infection)    SIADH (syndrome of inappropriate ADH production)  ====================================    Assessment & Plan  - Severe hypotonic hyponatremia - urine studies suggestive of SIADH vs salt wasting syndrome (Serum Na dropped from 133 on 12/18 to Na 117 today )  LIKELY " related to nausea      - Persistent nausea and vomiting      - Bilateral subdural hygroma     - Recurrent falls, weight loss     - UTI Ecoli and urine retention      - Hypomagnesemia      - 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      - Afib, depression.      PLAN   Sodium improved to 128-129 mmol/L.   ?  Salt tablets on hold.  Restart salt tab 1 g twice daily.   Daily BMP   Cortisol and TSH wnl   Strict I/Os     Patient could be discharged from nephrology standpoint.  [facility] in 1 month. Continue 1 g salt tablet twice a day. .        Charan Bautista MD  12/26/24  16:20 EST

## 2024-12-26 NOTE — PLAN OF CARE
Surrounded by family members, patient appears to be in good spirits. Cont to deny pain / discomfort. VSS on RA SR on cardiac mx. Call light in reach.

## 2024-12-26 NOTE — PLAN OF CARE
Problem: Adult Inpatient Plan of Care  Goal: Absence of Hospital-Acquired Illness or Injury  Intervention: Identify and Manage Fall Risk  Recent Flowsheet Documentation  Taken 12/26/2024 1000 by Zenaida Love RN  Safety Promotion/Fall Prevention:   activity supervised   assistive device/personal items within reach   clutter free environment maintained   fall prevention program maintained   gait belt   toileting scheduled   safety round/check completed   room organization consistent   nonskid shoes/slippers when out of bed  Taken 12/26/2024 0837 by Zenaida Love RN  Safety Promotion/Fall Prevention:   activity supervised   assistive device/personal items within reach   clutter free environment maintained   fall prevention program maintained   gait belt   toileting scheduled   safety round/check completed   room organization consistent   nonskid shoes/slippers when out of bed  Intervention: Prevent Skin Injury  Recent Flowsheet Documentation  Taken 12/26/2024 1230 by Zenaida Love RN  Skin Protection: incontinence pads utilized  Taken 12/26/2024 1000 by Zenaida Love RN  Body Position: position changed independently  Skin Protection:   incontinence pads utilized   silicone foam dressing in place  Taken 12/26/2024 0837 by Zenaida Love RN  Body Position: position changed independently  Skin Protection:   incontinence pads utilized   silicone foam dressing in place  Intervention: Prevent and Manage VTE (Venous Thromboembolism) Risk  Recent Flowsheet Documentation  Taken 12/26/2024 1230 by Zenaida Love RN  VTE Prevention/Management:   bilateral   SCDs (sequential compression devices) off  Taken 12/26/2024 1000 by Zenaida Love RN  VTE Prevention/Management:   bilateral   SCDs (sequential compression devices) off  Taken 12/26/2024 0837 by Zenaida Love RN  VTE Prevention/Management:   bilateral   SCDs (sequential compression devices) off  Intervention: Prevent Infection  Recent  Flowsheet Documentation  Taken 12/26/2024 1000 by Zenaida Love RN  Infection Prevention: environmental surveillance performed  Taken 12/26/2024 0837 by Zenaida Love RN  Infection Prevention: environmental surveillance performed  Goal: Optimal Comfort and Wellbeing  Intervention: Provide Person-Centered Care  Recent Flowsheet Documentation  Taken 12/26/2024 1230 by Zenaida Love RN  Trust Relationship/Rapport:   care explained   choices provided  Taken 12/26/2024 1000 by Zenaida Love RN  Trust Relationship/Rapport: care explained  Taken 12/26/2024 0837 by Zenaida Love RN  Trust Relationship/Rapport:   care explained   choices provided   questions encouraged   questions answered   thoughts/feelings acknowledged   reassurance provided     Problem: Skin Injury Risk Increased  Goal: Skin Health and Integrity  Intervention: Optimize Skin Protection  Recent Flowsheet Documentation  Taken 12/26/2024 1230 by Zenaida Love RN  Pressure Reduction Techniques: frequent weight shift encouraged  Pressure Reduction Devices: pressure-redistributing mattress utilized  Skin Protection: incontinence pads utilized  Taken 12/26/2024 1000 by Zenaida Love RN  Pressure Reduction Techniques: frequent weight shift encouraged  Head of Bed (HOB) Positioning: HOB at 30 degrees  Pressure Reduction Devices: pressure-redistributing mattress utilized  Skin Protection:   incontinence pads utilized   silicone foam dressing in place  Taken 12/26/2024 0837 by Zenaida Love RN  Pressure Reduction Techniques: frequent weight shift encouraged  Head of Bed (HOB) Positioning: HOB at 30-45 degrees  Pressure Reduction Devices: pressure-redistributing mattress utilized  Skin Protection:   incontinence pads utilized   silicone foam dressing in place     Problem: Fall Injury Risk  Goal: Absence of Fall and Fall-Related Injury  Intervention: Identify and Manage Contributors  Recent Flowsheet Documentation  Taken  12/26/2024 0837 by Zenaida Love RN  Medication Review/Management: medications reviewed  Intervention: Promote Injury-Free Environment  Recent Flowsheet Documentation  Taken 12/26/2024 1000 by Zenaida Love, RN  Safety Promotion/Fall Prevention:   activity supervised   assistive device/personal items within reach   clutter free environment maintained   fall prevention program maintained   gait belt   toileting scheduled   safety round/check completed   room organization consistent   nonskid shoes/slippers when out of bed  Taken 12/26/2024 0837 by Zenaida Love, RN  Safety Promotion/Fall Prevention:   activity supervised   assistive device/personal items within reach   clutter free environment maintained   fall prevention program maintained   gait belt   toileting scheduled   safety round/check completed   room organization consistent   nonskid shoes/slippers when out of bed     Problem: Palliative Care  Goal: Enhanced Quality of Life  Intervention: Optimize Psychosocial Wellbeing  Recent Flowsheet Documentation  Taken 12/26/2024 1230 by Zenaida Love RN  Family/Support System Care: support provided  Taken 12/26/2024 0837 by Zenaida Love RN  Family/Support System Care:   self-care encouraged   support provided     Problem: Pain Acute  Goal: Optimal Pain Control and Function  Intervention: Optimize Psychosocial Wellbeing  Recent Flowsheet Documentation  Taken 12/26/2024 1230 by Zenaida Love RN  Diversional Activities: television  Taken 12/26/2024 0837 by Zenaida Love RN  Diversional Activities: television  Intervention: Prevent or Manage Pain  Recent Flowsheet Documentation  Taken 12/26/2024 0837 by Zenaida Love, RN  Medication Review/Management: medications reviewed     Problem: Comorbidity Management  Goal: Maintenance of COPD Symptom Control  Intervention: Maintain COPD (Chronic Obstructive Pulmonary Disease) Symptom Control  Recent Flowsheet Documentation  Taken 12/26/2024  0837 by Zenaida Love, RN  Medication Review/Management: medications reviewed  Goal: Blood Pressure in Desired Range  Intervention: Maintain Blood Pressure Management  Recent Flowsheet Documentation  Taken 12/26/2024 0837 by Zenaida Love, RN  Medication Review/Management: medications reviewed   Goal Outcome Evaluation:  Plan of Care Reviewed With: patient        Progress: no change

## 2024-12-26 NOTE — DISCHARGE SUMMARY
[facility] Medicine Services  DISCHARGE SUMMARY    Patient Name: Brittani Lambert  : 1960  MRN: [id number]    Date of Admission: 2024  6:56 PM  Date of Discharge:  2024  Primary Care Physician: Alla Colon DO    Consults       Date and Time Order Name Status Description    2024  9:38 AM Inpatient Palliative Care MD Consult Completed     2024  9:46 AM Inpatient Nephrology Consult      2024  6:31 AM Inpatient Neurosurgery Consult Completed             Hospital Course     Presenting Problem: subdural hygroma    Active Hospital Problems    Diagnosis  POA    **Hyponatremia [E87.1]  Yes    SIADH (syndrome of inappropriate ADH production) [E22.2]  Yes    UTI (urinary tract infection) [N39.0]  Yes    Severe protein-calorie malnutrition [E43]  Yes    Subdural hygroma [G96.08]  Yes      Resolved Hospital Problems   No resolved problems to display.          Hospital Course:  Brittani Lambert is a 64 y.o. female w/ anxiety, depression, tobacco abuse, ovarian cancer , metastatic breast cancer  s/p whole brain radiation who presented for evaluation of recurrent falls.  Neuro imaging on arrival showed BL subdural hygromas. UA showed incidental UTI with urine culture positive for E. Coli.  Hospital course complicated by significant hyponatremia resulting in ICU admission for hypertonic saline.  Downgraded from ICU on .  Monitoring sodium.      Recurrent falls  BL subdural hygromas  -CT facial bones w/o fracture/dislocation  -CT/MRI brain w/ BL subdural hygromas  -Pt denies LOC w/ falls, legs just get weak and give out; strongly suspect deconditioning giver her poor PO intake and low body weight  -NSGY evaluated, no acute intervention, f/u opt 2 weeks with repeat head CT  -PT/OT recommend inpatient rehab but pt wants to go home     Nausea/vomiting, resolved   Constipation, resolved   -- s/p Amitiza and Movantik  -- Having stools, continue to monitor       Hyponatremia, improving   -- Patient with intractable nausea/vomiting and found to have a sodium of 121 on 12/21. Sodium previously 133 on 12/18. TSH/Cortisol within normal limits. Urine sodium 177, urine Osm 604, and serum Osm 245. Etiology thought to be due to SIADH vs salt wasting in setting of recent head injury.   --Nephrology consulted, required ICU admission for hypertonic saline  --Downgraded from ICU on 12/23  --Initially on salt tabs, however salt tabs currently on hold with stable sodium levels     UTI E coli   Urinary retention over 1L  -- Completed Rocephin for 3 doses  -- required in/out caths and engle catheter, however now spontaneously voiding  -- Flomax started 12/20     Weight loss/failure to thrive  Cachexia/low body weight; severe malnutrition  Constipation  --reports ~10lb weight loss since beginning of 2024 (approx 10% of body weight), low BMI 18.49  --leg weakness and FTT noted to have been charted on patient's problem list since at least 4 years pta; she has been seen on an ambulatory basis multiple times this year for hip pain, falls, etc  --cont nutritional shake, RD consult   --Having stools      Hx metastatic breast cancer  --treated [id number] for breast Ca w/ 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 Patel     Afib  -chronically not on AC    Anxiety/Depression  - buproprion on hold due to hyponatremia    Hx ovarian Ca 1989    Tobacco abuse   - prn nrt      Discharge Follow Up Recommendations for outpatient labs/diagnostics:   Follow up with PCP within one week  Follow up with NSGY in 2 weeks     Day of Discharge     HPI:   Doing well this am, wants to go home today.     Review of Systems  Gen- No fevers, chills  CV- No chest pain, palpitations  Resp- No cough, dyspnea  GI- No  N/V/D, abd pain     Vital Signs:   Temp:  [97.9 °F (36.6 °C)-98.7 °F (37.1 °C)] 98.5 °F (36.9 °C)  Heart Rate:  [64-67] 64  Resp:  [14-18] 18  BP: (117-140)/(50-78) 117/68      Physical Exam:  Constitutional: No acute distress, awake, alert, chronically ill appearing WF   HENT: NCAT, mucous membranes moist  Respiratory: Clear to auscultation bilaterally, respiratory effort normal   Cardiovascular: RRR, no murmurs, rubs, or gallops  Gastrointestinal: Positive bowel sounds, soft, nontender, nondistended  Musculoskeletal: No bilateral ankle edema  Psychiatric: Appropriate affect, cooperative  Neurologic: Oriented x 3, strength symmetric in all extremities, Cranial Nerves grossly intact to confrontation, speech clear  Skin: No rashes   Pertinent  and/or Most Recent Results     LAB RESULTS:      Lab 12/26/24  0857 12/23/24  0556 12/21/24  0748   WBC 11.13* 14.12* 7.31   HEMOGLOBIN 9.7* 10.9* 11.8*   HEMATOCRIT 30.1* 32.3* 33.7*   PLATELETS 283 257 258   NEUTROS ABS 8.67* 11.71* 5.29   IMMATURE GRANS (ABS) 0.09* 0.11* 0.03   LYMPHS ABS 1.64 0.59* 1.20   MONOS ABS 0.51 1.64* 0.56   EOS ABS 0.15 0.03 0.17   MCV 98.0* 92.3 90.3         Lab 12/26/24  0857 12/25/24  2239 12/25/24  0817 12/23/24  0556 12/23/24  0031 12/22/24  1752 12/22/24  1144 12/22/24  0600 12/22/24  0419 12/21/24  1943 12/21/24  1629 12/21/24  1152 12/21/24  0853 12/21/24  0748   0000   SODIUM 131*  --  129* 129* 128* 123* 125*   < > 122*  124*   < > 117*   < > 121*  --   --    POTASSIUM 3.9 4.5 3.3* 4.4  --   --  4.6  --  3.3*  3.3*   < > 4.1  --  4.6  --    < >   CHLORIDE 95*  --  92* 99  --   --   --   --  88*  --  81*  --  84*  --    < >   CO2 20.0*  --  22.0 19.0*  --   --   --   --  24.0  --  21.0*  --  25.0  --    < >   ANION GAP 16.0*  --  15.0 11.0  --   --   --   --  10.0  --  15.0  --  12.0  --    < >   BUN 7*  --  9 19  --   --   --   --  8  --  6*  --  7*  --    < >   CREATININE 0.46*  --  0.44* 0.50*  --   --   --   --  0.57  --  0.47*  --   0.46*  --    < >   EGFR 107.0  --  108.2 104.9  --   --   --   --  101.6  --  106.5  --  107.0  --    < >   GLUCOSE 131*  --  107* 130*  --   --   --   --  142*  --  109*  --  124*  --    < >   CALCIUM 8.9  --  8.8 8.3*  --   --   --   --  8.5*  --  8.7  --  9.0  --    < >   MAGNESIUM  --   --   --  2.0  --   --   --   --  2.9*  --   --   --  1.5*  --   --    PHOSPHORUS  --   --   --   --   --   --   --   --   --   --   --   --   --  3.5  --     < > = values in this interval not displayed.                         Brief Urine Lab Results  (Last result in the past 365 days)        Color   Clarity   Blood   Leuk Est   Nitrite   Protein   CREAT   Urine HCG        12/16/24 2124 Yellow   Cloudy   Negative   Trace   Negative   Trace                 Microbiology Results (last 10 days)       Procedure Component Value - Date/Time    Urine Culture - Urine, Urine, Catheter [[id number]]  (Abnormal)  (Susceptibility) Collected: 12/16/24 2124    Lab Status: Final result Specimen: Urine, Catheter Updated: 12/19/24 0914     Urine Culture >100,000 CFU/mL Escherichia coli    Narrative:      Colonization of the urinary tract without infection is common. Treatment is discouraged unless the patient is symptomatic, pregnant, or undergoing an invasive urologic procedure.    Susceptibility        Escherichia coli      MARS      Amoxicillin + Clavulanate Susceptible      Ampicillin Resistant      Ampicillin + Sulbactam Intermediate      Cefazolin (Urine) Susceptible      Cefepime Susceptible      Ceftazidime Susceptible      Ceftriaxone Susceptible      Gentamicin Susceptible      Levofloxacin Susceptible      Nitrofurantoin Susceptible      Piperacillin + Tazobactam Susceptible      Trimethoprim + Sulfamethoxazole Susceptible                                   XR Chest 1 View    Result Date: 12/21/2024  XR CHEST 1 VW Date of Exam: 12/21/2024 10:15 PM EST Indication: Central line placement Comparison: 12/16/2024. Findings: There is a right  internal jugular venous catheter that terminates at the cavoatrial junction. There is no pneumothorax, pleural effusion or focal airspace consolidation. Heart size and pulmonary vasculature appear within normal limits. Regional bones appear intact.     Impression: Right internal jugular venous catheter terminates at the cavoatrial junction. No pneumothorax. Electronically Signed: Antonio Mcnulty MD  12/21/2024 10:45 PM EST  Workstation ID: [id number]    XR Abdomen KUB    Result Date: 12/21/2024  XR ABDOMEN KUB Date of Exam: 12/21/2024 10:05 AM EST Indication: Abdominal pain, constipation Comparison: KUB 12/19/2024 Findings: Redemonstration of Rodriguez catheter. Lung bases are excluded from the field-of-view. Nonobstructive, nonspecific bowel gas pattern. There are scattered vascular calcifications. No pneumoperitoneum although supine radiographs are insensitive for this finding. There is scattered stool in the right colon; no evidence of large colonic stool burden. No acute osseous findings.     Impression: Nonobstructive, nonspecific bowel gas pattern. No evidence of large colonic stool burden. Electronically Signed: Sarabjit Zepeda MD  12/21/2024 12:18 PM EST  Workstation ID: [id number]    XR Abdomen KUB    Result Date: 12/19/2024  XR ABDOMEN KUB Date of Exam: 12/19/2024 10:03 AM EST Indication: abd pain Comparison: December 17, 2024 Findings: There is a nonobstructive bowel gas pattern. Moderate stool is present throughout the colon. No pathological calcifications are identified. The osseous structures appear intact.     Impression: No acute process identified. Moderate stool throughout colon. Electronically Signed: Nando Dempsey MD  12/19/2024 10:53 AM EST  Workstation ID: [id number]    XR Abdomen KUB    Result Date: 12/17/2024  XR ABDOMEN KUB Date of Exam: 12/17/2024 11:02 AM EST Indication: No BM >7 days Comparison: None available. Findings: Single supine view. There is no large or small bowel dilatation. There is a  small amount of stool. There is moderate to severe distention of the urinary bladder with contrast. There are no abnormal calcifications.     Impression: Moderate to severe bladder distention. Unremarkable bowel gas pattern. Electronically Signed: Julissa Blair MD  12/17/2024 11:38 AM EST  Workstation ID: [id number]    MRI Brain With & Without Contrast    Result Date: 12/17/2024  MRI BRAIN W WO CONTRAST Date of Exam: 12/17/2024 9:10 AM EST Indication: follow abnormal CT scan, BL subdural hygromas, suspect acute.  Comparison: Noncontrast head CT dated 12/16/2024, brain MRI dated 10/1/2024 Technique:  Routine multiplanar/multisequence sequence images of the brain were obtained before and after the uneventful administration of 8 mL Multihance. FINDINGS: There is mild T2/FLAIR signal hyperintensity within the bilateral periventricular white matter. There is focal gliosis within the right frontoparietal region and within the midline cerebellum, similar since prior studies. As noted on yesterday's head CT,  there are bilateral subdural hygromas measuring approximately 6 mm in thickness on the right and 5 mm in thickness on the left. These were hypodense on yesterday's head CT. SWI imaging demonstrates numerous tiny foci of susceptibility artifact within the bilateral parieto-occipital regions and focal gyriform SWI hypointensity along the right centrum semiovale. Findings may be related to calcifications or chronic blood products. Midline structures appear unremarkable. No significant mass effect, midline shift, or hydrocephalus. No abnormal contrast enhancement is seen. Diffusion-weighted sequences demonstrate no acute infarct.The visualized intracranial flow-voids appear unremarkable. The paranasal sinuses and mastoid air cells show no fluid signal. The orbits, globes, retrobulbar soft tissues appear unremarkable. The visualized superficial soft tissues and cervical spine demonstrate no significant abnormality.      1.Bilateral cerebral convexity subdural hygromas measuring 6 mm in thickness on the right and 5 mm in thickness on the left. These were hypodense on yesterday's head CT. These are new since brain MRI from 10/1/2024. 2.Mild T2/FLAIR signal hyperintensity within the bilateral periventricular white matter. There is focal gliosis within the right frontoparietal region and within the midline cerebellum, similar since prior studies. 3.SWI imaging demonstrates numerous tiny foci of susceptibility artifact within the bilateral parieto-occipital regions, more apparent and slightly more numerous when compared with 5/13/2022. There is unchanged focal gyriform SWI hypointensity along the right centrum semiovale. Findings may be related to calcifications or chronic blood products. 4.No diffusion restriction is identified to suggest acute infarct. 5.No suspicious contrast enhancement is identified. Electronically Signed: Jerrell Palomo MD  12/17/2024 10:12 AM EST  Workstation ID: [id number]    CT Head Without Contrast    Result Date: 12/16/2024  CT HEAD WO CONTRAST, CT FACIAL BONES WO CONTRAST Date of Exam: 12/16/2024 9:38 PM EST Indication: fall with head injury. Comparison: Head and face CT 10/22/2024. Technique: Axial CT images were obtained of the head and face without contrast administration.  Automated exposure control and iterative construction methods were used. Findings: HEAD CT: New bilateral subdural hypoattenuating collections along the bilateral cerebral convexities measuring up to 5 mm on the right and 4 mm on the left. No foci of hyperattenuation to suggest acute intracranial hemorrhage. Intact appearing gray-white differentiation. No significant mass effect. No hydrocephalus. Mild generalized parenchymal volume loss. Scattered areas of periventricular and subcortical white matter hypoattenuation, nonspecific, perhaps from small vessel ischemic/hypertensive changes in a patient of this age.There are intracranial  atherosclerotic calcifications. No acute or aggressive appearing bony or extracranial soft tissue process. FACE CT: No acute maxillofacial fracture. Facial and extracranial soft tissues appear unremarkable. Globes and orbits appear unremarkable by CT. Mild scattered mucosal thickening of the paranasal sinuses. Mastoid air cells are essentially clear. Imaged aerodigestive tract demonstrates no significant abnormality. Evaluation of the oral cavity is degraded by dental hardware artifact. Imaged major salivary glands demonstrate no significant abnormality.     Impression: Compared to head CT from 10/22/2024, new hypoattenuating subdural collections along the bilateral cerebral convexities measuring up to 5 mm on the right and 4 mm on the left, suspicious for acute subdural hygromas in setting of trauma. No acute maxillofacial fracture. Electronically Signed: Simone Mcdowell  12/16/2024 10:11 PM EST  Workstation ID: [id number]    CT Facial Bones Without Contrast    Result Date: 12/16/2024  CT HEAD WO CONTRAST, CT FACIAL BONES WO CONTRAST Date of Exam: 12/16/2024 9:38 PM EST Indication: fall with head injury. Comparison: Head and face CT 10/22/2024. Technique: Axial CT images were obtained of the head and face without contrast administration.  Automated exposure control and iterative construction methods were used. Findings: HEAD CT: New bilateral subdural hypoattenuating collections along the bilateral cerebral convexities measuring up to 5 mm on the right and 4 mm on the left. No foci of hyperattenuation to suggest acute intracranial hemorrhage. Intact appearing gray-white differentiation. No significant mass effect. No hydrocephalus. Mild generalized parenchymal volume loss. Scattered areas of periventricular and subcortical white matter hypoattenuation, nonspecific, perhaps from small vessel ischemic/hypertensive changes in a patient of this age.There are intracranial atherosclerotic calcifications. No acute or  aggressive appearing bony or extracranial soft tissue process. FACE CT: No acute maxillofacial fracture. Facial and extracranial soft tissues appear unremarkable. Globes and orbits appear unremarkable by CT. Mild scattered mucosal thickening of the paranasal sinuses. Mastoid air cells are essentially clear. Imaged aerodigestive tract demonstrates no significant abnormality. Evaluation of the oral cavity is degraded by dental hardware artifact. Imaged major salivary glands demonstrate no significant abnormality.     Impression: Compared to head CT from 10/22/2024, new hypoattenuating subdural collections along the bilateral cerebral convexities measuring up to 5 mm on the right and 4 mm on the left, suspicious for acute subdural hygromas in setting of trauma. No acute maxillofacial fracture. Electronically Signed: Simone Mcdowell  12/16/2024 10:11 PM EST  Workstation ID: [id number]    CT Cervical Spine Without Contrast    Result Date: 12/16/2024  CT CERVICAL SPINE WO CONTRAST, CT CHEST W CONTRAST DIAGNOSTIC Date of Exam: 12/16/2024 9:38 PM EST Indication: neck pain s/p fall with head injury. Comparison: 2020 MRI Technique: Axial CT images were obtained of the cervical spine without contrast administration. Axial CT images were obtained of the chest after administration of intravenous contrast. Reconstructed coronal and sagittal images were also obtained. Automated exposure control and iterative construction methods were used. Findings: CT cervical spine: There is no evidence of acute fracture. The craniocervical junction is intact. There is mild atlantoaxial joint arthritis. There is trace grade 1 anterolisthesis of C4 on C5. Mild scattered endplate osteophyte formations with facet arthropathy. No evidence of severe canal narrowing. Varying multilevel bony neural foraminal narrowing. Paraspinal soft tissues show no acute abnormality. CT chest: There is no evidence of pulmonary embolism. Pulmonary arteries are normal in  caliber. No right heart strain. No pericardial effusion. Coronary artery calcifications. Central airways are patent. No significant bronchial wall thickening or bronchiectasis. No focal airspace consolidation, pleural effusion, or pneumothorax. Bibasilar atelectasis. There is no suspicious pulmonary nodule or mass. No mediastinal mass or threshold lymphadenopathy. Normal appearance of the thoracic esophagus. Chest wall soft tissues and included upper abdomen show no acute abnormality. No acute fracture or suspicious bone lesion.     Impression: 1.No acute fracture or malalignment of the cervical spine. 2.No acute intrathoracic abnormality. No evidence of rib fracture. Electronically Signed: Antonio Marsh MD  12/16/2024 10:09 PM EST  Workstation ID: [id number]    CT Chest With Contrast Diagnostic    Result Date: 12/16/2024  CT CERVICAL SPINE WO CONTRAST, CT CHEST W CONTRAST DIAGNOSTIC Date of Exam: 12/16/2024 9:38 PM EST Indication: neck pain s/p fall with head injury. Comparison: 2020 MRI Technique: Axial CT images were obtained of the cervical spine without contrast administration. Axial CT images were obtained of the chest after administration of intravenous contrast. Reconstructed coronal and sagittal images were also obtained. Automated exposure control and iterative construction methods were used. Findings: CT cervical spine: There is no evidence of acute fracture. The craniocervical junction is intact. There is mild atlantoaxial joint arthritis. There is trace grade 1 anterolisthesis of C4 on C5. Mild scattered endplate osteophyte formations with facet arthropathy. No evidence of severe canal narrowing. Varying multilevel bony neural foraminal narrowing. Paraspinal soft tissues show no acute abnormality. CT chest: There is no evidence of pulmonary embolism. Pulmonary arteries are normal in caliber. No right heart strain. No pericardial effusion. Coronary artery calcifications. Central airways are patent. No  significant bronchial wall thickening or bronchiectasis. No focal airspace consolidation, pleural effusion, or pneumothorax. Bibasilar atelectasis. There is no suspicious pulmonary nodule or mass. No mediastinal mass or threshold lymphadenopathy. Normal appearance of the thoracic esophagus. Chest wall soft tissues and included upper abdomen show no acute abnormality. No acute fracture or suspicious bone lesion.     Impression: 1.No acute fracture or malalignment of the cervical spine. 2.No acute intrathoracic abnormality. No evidence of rib fracture. Electronically Signed: Antonio Marsh MD  12/16/2024 10:09 PM EST  Workstation ID: [id number]    XR Knee 1 or 2 View Left    Result Date: 12/16/2024  XR KNEE 1 OR 2 VW LEFT Date of Exam: 12/16/2024 7:25 PM EST Indication: fall with left knee pain Comparison: None available. Findings: No acute fracture or malalignment. No significant joint effusion. No focal soft tissue abnormalities appreciated. Joint spaces appear grossly preserved without significant degenerative change.     Impression: No acute fracture or malalignment. Electronically Signed: Simone Mcdowell  12/16/2024 8:13 PM EST  Workstation ID: [id number]    XR Chest 1 View    Result Date: 12/16/2024  XR CHEST 1 VW Date of Exam: 12/16/2024 7:16 PM EST Indication: Weak/Dizzy/AMS triage protocol Comparison: Chest radiograph 10/22/2024 Findings: Mediastinum: Cardiac silhouette appears unchanged and normal in size Lungs: The lungs appear clear without focal consolidation appreciated. Pleura: No pleural effusion or pneumothorax. Bones and soft tissues: No acute, displaced fracture seen.     Impression: No radiographic evidence of acute cardiopulmonary disease. Electronically Signed: Simone Mcdowell  12/16/2024 8:11 PM EST  Workstation ID: [id number]             Results for orders placed during the hospital encounter of 09/24/20    Transthoracic Echo Complete With Contrast if Necessary Per Protocol    Interpretation Summary  ·  Left ventricular ejection fraction appears to be 61 - 65%. Left ventricular systolic function is normal.  · Left ventricular diastolic function is consistent with (grade I) impaired relaxation.  · Mild tricuspid valve regurgitation is present.  · Estimated right ventricular systolic pressure from tricuspid regurgitation is normal (<35 mmHg). Calculated right ventricular systolic pressure from tricuspid regurgitation is 22 mmHg.      Plan for Follow-up of Pending Labs/Results:     Discharge Details        Discharge Medications        New Medications        Instructions Start Date   gabapentin 100 MG capsule  Commonly known as: NEURONTIN   100 mg, Oral, 3 Times Daily      HYDROmorphone 2 MG tablet  Commonly known as: DILAUDID   1 mg, Oral, Every 6 Hours PRN      naloxone 4 MG/0.1ML nasal spray  Commonly known as: NARCAN   Call 911. Don't prime. [place] in 1 nostril for overdose. Repeat in 2-3 minutes in other nostril if no or minimal breathing/responsiveness.      nicotine 14 MG/24HR patch  Commonly known as: NICODERM CQ   1 patch, Transdermal, Daily PRN      tamsulosin 0.4 MG capsule 24 hr capsule  Commonly known as: FLOMAX   0.4 mg, Oral, Daily   Start Date: December 27, 2024            Changes to Medications        Instructions Start Date   aspirin 81 MG EC tablet  What changed: additional instructions   81 mg, Oral, Daily             Continue These Medications        Instructions Start Date   traZODone 100 MG tablet  Commonly known as: DESYREL    mg, Oral, Nightly             Stop These Medications      buPROPion  MG 24 hr tablet  Commonly known as: WELLBUTRIN XL     busPIRone 5 MG tablet  Commonly known as: BUSPAR     HYDROcodone-acetaminophen 7.5-325 MG per tablet  Commonly known as: Norco     hydrOXYzine 25 MG tablet  Commonly known as: ATARAX              No Known Allergies      Discharge Disposition:  Home or Self Care    Diet:  Hospital:  Diet Order   Procedures    Diet: Regular/House, Fluid  Restriction (240 mL/tray); 1000 mL/day; Fluid Consistency: Thin (IDDSI 0)            Activity:      Restrictions or Other Recommendations:         CODE STATUS:    Code Status and Medical Interventions: No CPR (Do Not Attempt to Resuscitate); Limited Support; No intubation (DNI)   Ordered at: 12/17/24 0440     Medical Intervention Limits:    No intubation (DNI)     Level Of Support Discussed With:    Patient     Code Status (Patient has no pulse and is not breathing):    No CPR (Do Not Attempt to Resuscitate)     Medical Interventions (Patient has pulse or is breathing):    Limited Support       Future Appointments   Date Time Provider Department [place]   2/6/2025  1:45 PM Carrie Patel MD [facility]       Additional Instructions for the Follow-ups that You Need to Schedule       Discharge Follow-up with PCP   As directed       Currently Documented PCP:    Alla Colon DO    PCP Phone Number:    [phone]     Follow Up Details: in one week with PCP                      Therese Cano MD  12/26/24      Time Spent on Discharge:  I spent  35 minutes on this discharge activity which included: face-to-face encounter with the patient, reviewing the data in the system, coordination of the care with the nursing staff as well as consultants, documentation, and entering orders.

## 2024-12-26 NOTE — PROGRESS NOTES
Met with patient at bedside she is agreeable to [facility] services. Verified address, contact number and PCP Dr Colon. Message sent to follow she  is not current. Spoke with Thierry. Margarita SÁNCHEZ, [facility]-Liaison.

## 2024-12-26 NOTE — OUTREACH NOTE
Prep Survey      Flowsheet Row Responses   [facility] patient discharged from? [place]   Is LACE score < 7 ? No   Eligibility [facility]   Date of Admission 12/16/24   Date of Discharge 12/26/24   Discharge Disposition Home-Health Care Sv   Discharge diagnosis Hyponatremia, SIADH (syndrome of inappropriate ADH production)   Does the patient have one of the following disease processes/diagnoses(primary or secondary)? Other   Does the patient have Home health ordered? Yes   What is the Home health agency?  [facility]   Is there a DME ordered? No   Prep survey completed? Yes            Pati WALDRON - Registered Nurse

## 2024-12-26 NOTE — PLAN OF CARE
Problem: Palliative Care  Goal: Enhanced Quality of Life  Intervention: Optimize Psychosocial Wellbeing  Flowsheets (Taken 12/26/2024 1239)  Supportive Measures:   active listening utilized   verbalization of feelings encouraged   goal-setting facilitated   decision-making supported   Goal Outcome Evaluation:  Plan of Care Reviewed With: patient        Progress: no change  Outcome Evaluation: Pt wishes to go home today with home health. Pt denies hip/LE pain. Pt is on neurontin tid to manage pain and scheduled tylenol. Pt has some right chest wall discomfort with coughing. Encouraged splinting and use of lidocaine patch as needed. Pt denies nausea.Decreased appetite.  Pt had a bm today. She reports she has never had trouble with her sodium in the past.  today.   Palliative IDT; RN, APRN, MD  After hours# [phone]

## 2024-12-26 NOTE — CASE MANAGEMENT/SOCIAL WORK
Discharge Planning Assessment  [facility]     Patient Name: Brittani Lambert  MRN: [id number]  Today's Date: 12/26/2024    Admit Date: 12/16/2024    Plan: Home with sister's assistance and [facility] for SN and PT       Discharge Plan       Row Name 12/26/24 1226       Plan    Plan Home with sister's assistance and [facility] for SN and PT    Patient/Family in Agreement with Plan yes    Plan Comments Followed up with Ms. Lambert, at the bedside, for discharge planning.    Ms. Lambert was denied by her [employer] Medicare insurance for inpatient rehab at [facility].  P2P was requested on 12/24, but requested on incorrect CM phone.  The patient declines initiating a family appeal for SNF rehab.  Discuss DC planning and the patient states that she would prefer to go home with home health.  Referred the patient to Margarita with [facility], and they accepted her for skilled nursing and PT.  The patient declines any DME needs.  Ms Persaud states that her sister can assist her at home as needed, and will transport her home when discharged.    Final Discharge Disposition Code 06 - home with home health care                     Home Medical Care       Service Provider Request Status Services Address Phone Fax Patient Preferred    [facility]  Selected Home Nursing, [facility] [address] [address] [phone] --                    Expected Discharge Date and Time       Expected Discharge Date Expected Discharge Time    Dec 26, 2024           Vanessa Strange RN

## 2024-12-27 ENCOUNTER — TRANSITIONAL CARE MANAGEMENT TELEPHONE ENCOUNTER (OUTPATIENT)
Dept: CALL CENTER | Facility: HOSPITAL | Age: 64
End: 2024-12-27
Payer: MEDICARE

## 2024-12-27 ENCOUNTER — TELEPHONE (OUTPATIENT)
Dept: FAMILY MEDICINE CLINIC | Facility: CLINIC | Age: 64
End: 2024-12-27
Payer: MEDICARE

## 2024-12-27 ENCOUNTER — NURSE TRIAGE (OUTPATIENT)
Dept: CALL CENTER | Facility: HOSPITAL | Age: 64
End: 2024-12-27
Payer: MEDICARE

## 2024-12-27 NOTE — PROGRESS NOTES
Per epic note and Makayla Colon or her office will follow patient for home health services. Margarita SÁNCHEZ, [facility] [place]-Liaison.

## 2024-12-27 NOTE — TELEPHONE ENCOUNTER
Requesting Oxycodone-Act 7.5mg rf.     Informed her the last time this was rf by Dr Colon was 7/22/24. Pt was insistent that Dr Colon rf this for her every mo.     Pt hasn't p/u the Dilaudid yet (was rx when she was in hosp. D/c from  on 12/26) but does plan on it. Informed her I wasn't sure if this would affect her getting the rf on Oxycodone or not. She understood.

## 2024-12-27 NOTE — TELEPHONE ENCOUNTER
Caller: Brittani Lambert    Relationship: Self    Best call back number:          Additional details provided by patient: ADVISED PATIENT THAT REFILLS WERE SENT AND CONFIRMED TO PHARMACY YESTERDAY       Rodger Bower Rep   12/27/24 11:53 EST

## 2024-12-27 NOTE — OUTREACH NOTE
Call Center TCM Note      Flowsheet Row Responses   [facility] patient discharged from? [facility]   Does the patient have one of the following disease processes/diagnoses(primary or secondary)? Other   TCM attempt successful? Yes   Call start time 1318   Call end time 1322   Discharge diagnosis Hyponatremia, SIADH (syndrome of inappropriate ADH production)   Person spoke with today (if not patient) and relationship patient   Meds reviewed with patient/caregiver? Yes   Is the patient having any side effects they believe may be caused by any medication additions or changes? No   Does the patient have all medications ordered at discharge? Yes   Is the patient taking all medications as directed (includes completed medication regime)? Yes   Comments Patient has a hospital followup scheduled with PCP office on 1/3/2025   Does the patient have an appointment with their PCP within 7-14 days of discharge? Yes   What is the Home health agency?  [facility]   Has home health visited the patient within 72 hours of discharge? Yes   Psychosocial issues? No   Did the patient receive a copy of their discharge instructions? Yes   Nursing interventions Reviewed instructions with patient   What is the patient's perception of their health status since discharge? Same  [Very sore and stiff, but she is moving around and overall doing quite well.]   Is the patient/caregiver able to teach back signs and symptoms related to disease process for when to call PCP? Yes   Is the patient/caregiver able to teach back signs and symptoms related to disease process for when to call 911? Yes   Is the patient/caregiver able to teach back the hierarchy of who to call/visit for symptoms/problems? PCP, Specialist, Home health nurse, Urgent Care, ED, 911 Yes   TCM call completed? Yes   Call end time 1322   Would this patient benefit from a Referral to Amb Social Work? No   Is the patient interested in additional calls from an ambulatory ? No             Sunday Nunez RN    12/27/2024, 13:22 EST

## 2024-12-27 NOTE — TELEPHONE ENCOUNTER
Reason for Disposition   [1] Caller has medicine question about med NOT prescribed by PCP AND [2] triager unable to answer question (e.g., compatibility with other med, storage)    Additional Information   Negative: [1] Intentional drug overdose AND [2] suicidal thoughts or ideas   Negative: Drug overdose and triager unable to answer question   Negative: Caller requesting a renewal or refill of a medicine patient is currently taking   Negative: Caller requesting information unrelated to medicine   Negative: Caller requesting information about COVID-19 Vaccine   Negative: Caller requesting information about Emergency Contraception   Negative: Caller requesting information about Combined Birth Control Pills   Negative: Caller requesting information about Progestin Birth Control Pills   Negative: Caller requesting information about Post-Op pain or medicines   Negative: Caller requesting a prescription antibiotic (such as Penicillin) for Strep throat and has a positive culture result   Negative: Caller requesting a prescription anti-viral med (such as Tamiflu) and has influenza (flu) symptoms   Negative: Immunization reaction suspected   Negative: Rash while taking a medicine or within 3 days of stopping it   Negative: [1] Asthma and [2] having symptoms of asthma (cough, wheezing, etc.)   Negative: [1] Symptom of illness (e.g., headache, abdominal pain, earache, vomiting) AND [2] more than mild   Negative: Breastfeeding questions about mother's medicines and diet   Negative: MORE THAN A DOUBLE DOSE of a prescription or over-the-counter (OTC) drug   Negative: [1] DOUBLE DOSE (an extra dose or lesser amount) of prescription drug AND [2] any symptoms (e.g., dizziness, nausea, pain, sleepiness)   Negative: [1] DOUBLE DOSE (an extra dose or lesser amount) of over-the-counter (OTC) drug AND [2] any symptoms (e.g., dizziness, nausea, pain, sleepiness)   Negative: Took another person's prescription drug   Negative: [1] DOUBLE  "DOSE (an extra dose or lesser amount) of prescription drug AND [2] NO symptoms  (Exception: A double dose of antibiotics.)   Negative: Diabetes drug error or overdose (e.g., took wrong type of insulin or took extra dose)   Negative: [1] Prescription not at pharmacy AND [2] was prescribed by PCP recently (Exception: Triager has access to EMR and prescription is recorded there. Go to Home Care and confirm for pharmacy.)   Negative: [1] Pharmacy calling with prescription question AND [2] triager unable to answer question   Negative: [1] Caller has URGENT medicine question about med that PCP or specialist prescribed AND [2] triager unable to answer question   Negative: Medicine patch causing local rash or itching    Answer Assessment - Initial Assessment Questions  1. NAME of MEDICINE: \"What medicine(s) are you calling about?\"      Stated all of her discharge meds (new)  2. QUESTION: \"What is your question?\" (e.g., double dose of medicine, side effect)      States pharmacy says all need preauthorization  3. PRESCRIBER: \"Who prescribed the medicine?\" Reason: if prescribed by specialist, call should be referred to that group.      Marge Cano  4. SYMPTOMS: \"Do you have any symptoms?\" If Yes, ask: \"What symptoms are you having?\"  \"How bad are the symptoms (e.g., mild, moderate, severe)      No but states has been 2 days without needd meds.  5. PREGNANCY:  \"Is there any chance that you are pregnant?\" \"When was your last menstrual period?\"      N/A    Protocols used: Medication Question Call-ADULT-    "

## 2024-12-27 NOTE — PROGRESS NOTES
~per Janice  The letter for her to return to work has some stipulations on this those need to be removed so she can return to work. She went to work yesterday and they would not let her work said she has to be 100 percent and she states she is.     Typed letter & got MD signature.    3/13/2019  Pt picked up per Aung   no

## 2024-12-28 ENCOUNTER — HOME CARE VISIT (OUTPATIENT)
Dept: HOME HEALTH SERVICES | Facility: HOME HEALTHCARE | Age: 64
End: 2024-12-28
Payer: MEDICARE

## 2024-12-28 PROCEDURE — G0299 HHS/HOSPICE OF RN EA 15 MIN: HCPCS

## 2024-12-28 NOTE — Clinical Note
"SOC Note: January 3 follow up with DR Colon , had a traumatic fall at home by her mailboxes and stepped down and went down on her face when she fell, hyponatremia in hospital, new diagnosis SIADH, DR FERNANDEZ ENDOCRINOLOGY FOLLOW UP, METASTATIC BREAST  CANCER TO BRAIN FINISHED TREATMENT 5 YEARS AGO   Brittani Lambert is a 64 y.o. female w/ anxiety, depression, tobacco abuse, ovarian cancer 1989, metastatic breast cancer 2018 s/p whole brain radiation who presented for evaluation of recurrent falls. Neuro imaging on arrival showed BL subdural hygromas. UA showed incidental UTI with urine culture positive for E. Coli. Hospital course complicated by significant hyponatremia resulting in ICU admission for hypertonic saline. Downgraded from ICU on 12/23. Monitoring sodium.      Home Health ordered for: disciplines SN  PT     Reason for Hosp/Primary Dx/Co-morbidities: traumatic fall at home     Focus of Care: SN for teaching on hyponatremia and medications    Patient's goal(s):\"to get stronger and feel better\"    Current Functional status/mobility/DME: no assistive device     HB status/Living Arrangements: lives alone in upper apartment 16 steps to get into apartment     Skin Integrity/wound status: NO OPEN WOUNDS, MULTIPLE BRUISES FROM FALL    Code Status: FULL code     Fall Risk/Safety concerns: HIGH FALL RISK     Educated on Emergency Plan, steps to take prior to going to the ER and when to Call [facility]:      Medication issues/Concerns:PATIENT IS STILL WAITING FOR [facility] TO FILL HER dilaudid, i called and they said the 2 mg tablets have been out of stock for over a year and they cannot get them and message into doctor to change dose to dose available , patient taking ibuprofen and tylenol to control pain currently and it is not working well     Additional Problems/Concerns:    PHARMACY DOES NOT HAVE 2 MG HYDROMORPHONE IN STOCK FOR OVER A YEAR AND NEEDS 4MG OR 8 MG PRESCRIBED, WOULD DR COLON CONSIDER PRESCRIBING " DIFFERENT DOSEAGE TO HELP PATIENT WITH UNCONTROLLED PAIN ? MAYBE 4 MG TABLETS AND TAKE 1/2 TABLET ? PLEASE CALL TO [facility] PHARMACY IF YOU AGREE PLEASE

## 2024-12-30 VITALS
BODY MASS INDEX: 16.38 KG/M2 | DIASTOLIC BLOOD PRESSURE: 64 MMHG | HEIGHT: 62 IN | HEART RATE: 92 BPM | OXYGEN SATURATION: 98 % | TEMPERATURE: 97.7 F | SYSTOLIC BLOOD PRESSURE: 116 MMHG | WEIGHT: 89 LBS

## 2024-12-30 NOTE — HOME HEALTH
"SOC Note: January 3 follow up with DR Colon , had a traumatic fall at home by her mailboxes and stepped down and went down on her face when she fell, hyponatremia in hospital, new diagnosis SIADH, DR FERNANDEZ ENDOCRINOLOGY FOLLOW UP, METASTATIC BREAST  CANCER TO BRAIN FINISHED TREATMENT 5 YEARS AGO   Brittani Lambert is a 64 y.o. female w/ anxiety, depression, tobacco abuse, ovarian cancer 1989, metastatic breast cancer 2018 s/p whole brain radiation who presented for evaluation of recurrent falls. Neuro imaging on arrival showed BL subdural hygromas. UA showed incidental UTI with urine culture positive for E. Coli. Hospital course complicated by significant hyponatremia resulting in ICU admission for hypertonic saline. Downgraded from ICU on 12/23. Monitoring sodium.      Home Health ordered for: disciplines SN  PT     Reason for Hosp/Primary Dx/Co-morbidities: traumatic fall at home     Focus of Care: SN for teaching on hyponatremia and medications    Patient's goal(s):\"to get stronger and feel better\"    Current Functional status/mobility/DME: no assistive device     HB status/Living Arrangements: lives alone in upper apartment 16 steps to get into apartment     Skin Integrity/wound status: NO OPEN WOUNDS, MULTIPLE BRUISES FROM FALL    Code Status: FULL code     Fall Risk/Safety concerns: HIGH FALL RISK     Educated on Emergency Plan, steps to take prior to going to the ER and when to Call [facility] Health [facility]:      Medication issues/Concerns:PATIENT IS STILL WAITING FOR [facility] TO [facility] dilaudid, i called and they said the 2 mg tablets have been out of stock for over a year and they cannot get them and message into doctor to change dose to dose available , patient taking ibuprofen and tylenol to control pain currently and it is not working well     Additional Problems/Concerns:    PHARMACY DOES NOT HAVE 2 MG HYDROMORPHONE IN STOCK FOR OVER A YEAR AND NEEDS 4MG OR 8 MG PRESCRIBED, WOULD DR COLON CONSIDER PRESCRIBING " DIFFERENT DOSEAGE TO HELP PATIENT WITH UNCONTROLLED PAIN ? MAYBE 4 MG TABLETS AND TAKE 1/2 TABLET ? PLEASE CALL TO [facility] PHARMACY IF YOU AGREE PLEASE

## 2024-12-31 ENCOUNTER — HOME CARE VISIT (OUTPATIENT)
Dept: HOME HEALTH SERVICES | Facility: HOME HEALTHCARE | Age: 64
End: 2024-12-31
Payer: MEDICARE

## 2024-12-31 PROCEDURE — G0151 HHCP-SERV OF PT,EA 15 MIN: HCPCS

## 2025-01-01 VITALS
TEMPERATURE: 97.6 F | OXYGEN SATURATION: 99 % | SYSTOLIC BLOOD PRESSURE: 110 MMHG | DIASTOLIC BLOOD PRESSURE: 62 MMHG | RESPIRATION RATE: 16 BRPM | HEART RATE: 72 BPM

## 2025-01-02 ENCOUNTER — TELEPHONE (OUTPATIENT)
Dept: PSYCHIATRY | Facility: CLINIC | Age: 65
End: 2025-01-02
Payer: MEDICARE

## 2025-01-02 ENCOUNTER — TELEPHONE (OUTPATIENT)
Dept: FAMILY MEDICINE CLINIC | Facility: CLINIC | Age: 65
End: 2025-01-02
Payer: MEDICARE

## 2025-01-02 DIAGNOSIS — R26.89 IMBALANCE: Primary | ICD-10-CM

## 2025-01-02 DIAGNOSIS — Z91.81 AT HIGH RISK FOR FALLS: ICD-10-CM

## 2025-01-02 NOTE — TELEPHONE ENCOUNTER
Brittani called in and stated she has been having more falls and she wanted to know if you could place an order for her to have a walker with a seat on it. She went to get groceries today and fell. She believes having a walker will help her.

## 2025-01-02 NOTE — TELEPHONE ENCOUNTER
PATIENT PHARMACY FAXED A MEDICATION REQUEST TO REFILL THE BUPROPION HCL  MG TABLET FOR QUANTITY 90. PLEASE ADVISE.

## 2025-01-03 ENCOUNTER — HOME CARE VISIT (OUTPATIENT)
Dept: HOME HEALTH SERVICES | Facility: HOME HEALTHCARE | Age: 65
End: 2025-01-03
Payer: COMMERCIAL

## 2025-01-03 RX ORDER — BUPROPION HYDROCHLORIDE 150 MG/1
150 TABLET ORAL DAILY
Qty: 90 TABLET | Refills: 3 | Status: SHIPPED | OUTPATIENT
Start: 2025-01-03

## 2025-01-06 ENCOUNTER — READMISSION MANAGEMENT (OUTPATIENT)
Dept: CALL CENTER | Facility: HOSPITAL | Age: 65
End: 2025-01-06
Payer: MEDICARE

## 2025-01-06 ENCOUNTER — HOME CARE VISIT (OUTPATIENT)
Dept: HOME HEALTH SERVICES | Facility: HOME HEALTHCARE | Age: 65
End: 2025-01-06
Payer: COMMERCIAL

## 2025-01-06 NOTE — OUTREACH NOTE
Medical Week 2 Survey      Flowsheet Row Responses   [facility] patient discharged from? [facility]   Does the patient have one of the following disease processes/diagnoses(primary or secondary)? Other   Week 2 attempt successful? Yes   Call start time 1615   Discharge diagnosis Hyponatremia, SIADH (syndrome of inappropriate ADH production)   Call end time 1622   Meds reviewed with patient/caregiver? Yes   Is the patient having any side effects they believe may be caused by any medication additions or changes? No   Does the patient have all medications ordered at discharge? No   What is keeping the patient from filling the prescriptions? --  [pt does not have her pain med RX, she stated no pharmacies have 2mg dose.]   Nursing Interventions Nurse provided patient education   Is the patient taking all medications as directed (includes completed medication regime)? Yes   Does the patient have a primary care provider?  Yes   Has the patient kept scheduled appointments due by today? N/A   What is the Home health agency?  [facility]   Has home health visited the patient within 72 hours of discharge? Yes   Psychosocial issues? No   What is the patient's perception of their health status since discharge? Same   Is the patient/caregiver able to teach back the hierarchy of who to call/visit for symptoms/problems? PCP, Specialist, Home health nurse, Urgent Care, ED, 911 Yes   Week 2 Call Completed? Yes   Is the patient interested in additional calls from an ambulatory ? No   Would this patient benefit from a Referral to Amb Social Work? No   Call end time 1622            Janice WALDRON - Registered Nurse

## 2025-01-09 ENCOUNTER — HOME CARE VISIT (OUTPATIENT)
Dept: HOME HEALTH SERVICES | Facility: HOME HEALTHCARE | Age: 65
End: 2025-01-09
Payer: COMMERCIAL

## 2025-01-09 VITALS
SYSTOLIC BLOOD PRESSURE: 96 MMHG | DIASTOLIC BLOOD PRESSURE: 71 MMHG | OXYGEN SATURATION: 100 % | TEMPERATURE: 97.3 F | HEART RATE: 85 BPM | RESPIRATION RATE: 18 BRPM

## 2025-01-09 DIAGNOSIS — F51.01 PRIMARY INSOMNIA: ICD-10-CM

## 2025-01-09 PROCEDURE — G0157 HHC PT ASSISTANT EA 15: HCPCS

## 2025-01-09 RX ORDER — TRAZODONE HYDROCHLORIDE 100 MG/1
50-100 TABLET ORAL NIGHTLY
Qty: 90 TABLET | Refills: 1 | Status: SHIPPED | OUTPATIENT
Start: 2025-01-09

## 2025-01-10 ENCOUNTER — HOME CARE VISIT (OUTPATIENT)
Dept: HOME HEALTH SERVICES | Facility: HOME HEALTHCARE | Age: 65
End: 2025-01-10
Payer: COMMERCIAL

## 2025-01-10 PROCEDURE — G0299 HHS/HOSPICE OF RN EA 15 MIN: HCPCS

## 2025-01-12 VITALS
TEMPERATURE: 98.2 F | HEART RATE: 83 BPM | SYSTOLIC BLOOD PRESSURE: 130 MMHG | DIASTOLIC BLOOD PRESSURE: 83 MMHG | RESPIRATION RATE: 18 BRPM

## 2025-01-13 ENCOUNTER — TELEPHONE (OUTPATIENT)
Dept: FAMILY MEDICINE CLINIC | Facility: CLINIC | Age: 65
End: 2025-01-13

## 2025-01-13 NOTE — TELEPHONE ENCOUNTER
Called pt to see if she was ok since she no showed for her 12 pm appt today. She said she called and told (the hub) that if she wasn't here at 12, it was because she couldn't make it down her sidewalk at her apt complex due to them not being cleared of snow/ice. She said she is ok though.

## 2025-01-14 ENCOUNTER — HOME CARE VISIT (OUTPATIENT)
Dept: HOME HEALTH SERVICES | Facility: HOME HEALTHCARE | Age: 65
End: 2025-01-14
Payer: MEDICARE

## 2025-01-14 VITALS
RESPIRATION RATE: 18 BRPM | OXYGEN SATURATION: 98 % | TEMPERATURE: 97.3 F | HEART RATE: 82 BPM | DIASTOLIC BLOOD PRESSURE: 86 MMHG | SYSTOLIC BLOOD PRESSURE: 128 MMHG

## 2025-01-14 PROCEDURE — G0157 HHC PT ASSISTANT EA 15: HCPCS

## 2025-01-16 ENCOUNTER — READMISSION MANAGEMENT (OUTPATIENT)
Dept: CALL CENTER | Facility: HOSPITAL | Age: 65
End: 2025-01-16
Payer: MEDICARE

## 2025-01-16 NOTE — OUTREACH NOTE
Medical Week 3 Survey      Flowsheet Row Responses   [facility] patient discharged from? [place]   Does the patient have one of the following disease processes/diagnoses(primary or secondary)? Other   Week 3 attempt successful? No   Unsuccessful attempts Attempt 1            Dary Heard Registered Nurse

## 2025-01-17 ENCOUNTER — HOME CARE VISIT (OUTPATIENT)
Dept: HOME HEALTH SERVICES | Facility: HOME HEALTHCARE | Age: 65
End: 2025-01-17
Payer: MEDICARE

## 2025-01-17 ENCOUNTER — TELEPHONE (OUTPATIENT)
Dept: FAMILY MEDICINE CLINIC | Facility: CLINIC | Age: 65
End: 2025-01-17
Payer: MEDICARE

## 2025-01-17 DIAGNOSIS — R26.89 POOR BALANCE: ICD-10-CM

## 2025-01-17 DIAGNOSIS — E43 SEVERE PROTEIN-CALORIE MALNUTRITION: Primary | ICD-10-CM

## 2025-01-17 DIAGNOSIS — R29.6 FALLS: ICD-10-CM

## 2025-01-17 NOTE — TELEPHONE ENCOUNTER
HOME HEALTH CALLED AND STATED THEY NEED AN ORDER FOR A  TO GO GIVE COMMUNITY RESOURCE DUE TO PATIENT HAVING POOR BALANCE   NATALYA [phone]

## 2025-01-20 ENCOUNTER — TELEPHONE (OUTPATIENT)
Dept: FAMILY MEDICINE CLINIC | Facility: CLINIC | Age: 65
End: 2025-01-20

## 2025-01-22 ENCOUNTER — HOME CARE VISIT (OUTPATIENT)
Dept: HOME HEALTH SERVICES | Facility: HOME HEALTHCARE | Age: 65
End: 2025-01-22
Payer: MEDICARE

## 2025-01-22 ENCOUNTER — READMISSION MANAGEMENT (OUTPATIENT)
Dept: CALL CENTER | Facility: HOSPITAL | Age: 65
End: 2025-01-22
Payer: MEDICARE

## 2025-01-22 ENCOUNTER — TELEPHONE (OUTPATIENT)
Dept: FAMILY MEDICINE CLINIC | Facility: CLINIC | Age: 65
End: 2025-01-22

## 2025-01-22 DIAGNOSIS — C50.011 MALIGNANT NEOPLASM OF NIPPLE OF RIGHT BREAST IN FEMALE, UNSPECIFIED ESTROGEN RECEPTOR STATUS: ICD-10-CM

## 2025-01-22 DIAGNOSIS — G89.3 CHRONIC PAIN AFTER TREATMENT FOR MALIGNANT NEOPLASM: Primary | ICD-10-CM

## 2025-01-22 PROCEDURE — G0155 HHCP-SVS OF CSW,EA 15 MIN: HCPCS

## 2025-01-22 PROCEDURE — G0151 HHCP-SERV OF PT,EA 15 MIN: HCPCS

## 2025-01-22 NOTE — HOME HEALTH
Met with Ms. Lambert who lives alone in her apartment.  She said she has asked for a rollator and she also has not gotten any of her medications since she got home from the hospital.  I called the MD office and talked to Makayla.  She said she sent a message to PCP re: rollator and asked that it be sent to [facility] fax # [phone] and she also sent a message to MD re: her prescriptions.  Provided her with resources.  Made a referral to the senior  program per request.  Patient states her sister helps her and takes her to appointments.  She said she still smokes half a pack of cigarettes per day.  She denies further needs and concerns at this time.

## 2025-01-22 NOTE — TELEPHONE ENCOUNTER
Caller: [facility]    Relationship:     Best call back number: [phone]     What is the best time to reach you: ANYTIME     Who are you requesting to speak with (clinical staff, provider,  specific staff member): CLINICAL STAFF    What was the call regarding: [facility] IS CALLING WILE VISITING THE PATIENT. THEY SAYS THAT THE PATIENT IS THINKING THAT SHE IS GOING TO BE GETTING A ROLLATOR WALKER. [facility] STATES THAT THEY CALLED [facility] AND THEY HAVE NOT RECEIVED ANY ORDERS. THEY ARE ASKING TO HAVE THE OFFICE SEND IN ORDERS FOR THIS TO [phone].    THEY ARE ALSO CALLING TO LET THE OFFICE KNOW THAT THE PATIENT HAS NOT RECEIVED HER MEDICATIONS. THEY SAY THAT SHE WAS NOT SENT HOME WITH ANY MEDICATIONS WHEN LEAVING THE HOSPITAL AND HAS NOT BEEN ABLE TO GET THEM SINCE. [facility] IS ASKING TO HAVE THE OFFICE CHECK INTO THIS AND TO GET THE MEDICATIONS CALLED IN FOR THE PATIENT.     Is it okay if the provider responds through [employer]: YES

## 2025-01-22 NOTE — OUTREACH NOTE
Medical Week 3 Survey      Flowsheet Row Responses   [facility] patient discharged from? [facility]   Does the patient have one of the following disease processes/diagnoses(primary or secondary)? Other   Week 3 attempt successful? Yes   Call start time 1052   Call end time 1054   Discharge diagnosis Hyponatremia, SIADH (syndrome of inappropriate ADH production)   Meds reviewed with patient/caregiver? Yes   Is the patient taking all medications as directed (includes completed medication regime)? Yes   Does the patient have a primary care provider?  Yes   Has the patient kept scheduled appointments due by today? No   Comments States she messed appt due to snow.   Psychosocial issues? No   What is the patient's perception of their health status since discharge? Same   Is the patient/caregiver able to teach back signs and symptoms related to disease process for when to call PCP? Yes   Is the patient/caregiver able to teach back signs and symptoms related to disease process for when to call 911? Yes   Is the patient/caregiver able to teach back the hierarchy of who to call/visit for symptoms/problems? PCP, Specialist, Home health nurse, Urgent Care, ED, 911 Yes   Additional teach back comments States she doesn't have her regular medications.  Advised to contact her PCP office.  And said she already did.  Pt then ended call and hung up   Week 3 Call Completed? Yes   Call end time 1054            Ragini CLOUD - Licensed Nurse

## 2025-01-23 ENCOUNTER — HOME CARE VISIT (OUTPATIENT)
Dept: HOME HEALTH SERVICES | Facility: HOME HEALTHCARE | Age: 65
End: 2025-01-23
Payer: MEDICARE

## 2025-01-23 RX ORDER — GABAPENTIN 100 MG/1
100 CAPSULE ORAL 3 TIMES DAILY
Qty: 90 CAPSULE | Refills: 0 | Status: SHIPPED | OUTPATIENT
Start: 2025-01-23

## 2025-01-23 RX ORDER — HYDROCODONE BITARTRATE AND ACETAMINOPHEN 7.5; 325 MG/1; MG/1
1 TABLET ORAL EVERY 8 HOURS PRN
Qty: 90 TABLET | Refills: 0 | Status: SHIPPED | OUTPATIENT
Start: 2025-01-23

## 2025-01-23 NOTE — TELEPHONE ENCOUNTER
Pain medication changed back to Norco 7.5.  Gabapentin refilled.  To continue future refills, must keep upcoming appointment.  Looks like she was also scheduled with neurosurgery today, and Juvenal Hanson PA-C, but was canceled.  This appointment needs to be rescheduled.

## 2025-01-23 NOTE — TELEPHONE ENCOUNTER
Pt requesting Gabapentin and Hydromorphone. Informed her you would NOT be prescribing Hydromorphone. She understood and is asking for alternative.     Informed pt those were sent to [facility] 12/26. Stated she didn't know anything about that.     Called [facility], they stated the Gabapentin was p/u 12/26 via drive thru at 5:12p and Hydromorphone requires PA.     Informed pt of this info. She said she's been so sick she doesn't remember if she picked it up or not. Asked if she had the bottle. She wasn't sure. Expressed the importance of keeping her upcoming appt. Stated she cx her last one bc she couldn't get out of her driveway.     Pt aware walker order faxed to [facility].

## 2025-01-23 NOTE — CASE COMMUNICATION
[facility]    Reason: pt canceled visit . Reports she was discharged from  services. Will follow up with office.      For your records only.   Per CMS Guidance, MD must be notified of missed/cancelled visits; therefore the prescribed frequency was not met.

## 2025-01-25 VITALS
TEMPERATURE: 98.2 F | RESPIRATION RATE: 16 BRPM | DIASTOLIC BLOOD PRESSURE: 74 MMHG | OXYGEN SATURATION: 97 % | SYSTOLIC BLOOD PRESSURE: 128 MMHG | HEART RATE: 73 BPM

## 2025-01-27 ENCOUNTER — HOME CARE VISIT (OUTPATIENT)
Dept: HOME HEALTH SERVICES | Facility: HOME HEALTHCARE | Age: 65
End: 2025-01-27
Payer: COMMERCIAL

## 2025-01-27 PROCEDURE — G0299 HHS/HOSPICE OF RN EA 15 MIN: HCPCS

## 2025-01-27 NOTE — Clinical Note
Discharge Summary/Summary of Care Provided:  Patient received home health for diagnosis: Post-traumatic subdural hygroma, Strain of neck muscle, initial encounter , Hyponatremia , SIADH (syndrome of inappropriate ADH production)    Current level of functional ability: independent   Living arrangements: lives alone   Progress towards goals and/or Were all goals met? insurnce denies further snv  If not all goals met, barriers that prevented patient from meeting goals: insurance denials further snv   T  HE FOLLOWING INSTRUCTIONS WERE REVIEWED UPON DISCHARGE:    Keep your appointmenTS AS SCHEDULED     Call your doctor if you develop a new or worsening symptomS    Continue to take the medications prescribed by your doctor. A medication list is attached. Be sure to keep them informed of all medications you take including over the counter herbal, vitamins/minerals and the ones you take only when needed.    Follow the  diet as ordered by your doctor.     Continue with home program as instructed by therapist (handouts given).    CInstructions given to PATIENT     Instructions provided per VISIT

## 2025-01-30 ENCOUNTER — PATIENT OUTREACH (OUTPATIENT)
Age: 65
End: 2025-01-30
Payer: MEDICARE

## 2025-01-30 VITALS
HEART RATE: 88 BPM | TEMPERATURE: 97.8 F | SYSTOLIC BLOOD PRESSURE: 132 MMHG | DIASTOLIC BLOOD PRESSURE: 88 MMHG | OXYGEN SATURATION: 97 % | RESPIRATION RATE: 18 BRPM

## 2025-01-30 NOTE — OUTREACH NOTE
Care Coordination    Reviewed chart. No other needs  expressed since pt was placed in monitoring. SW will close referral.  Rut ADEN -   Ambulatory Case Management    1/30/2025, 09:41 EST

## 2025-02-06 ENCOUNTER — OFFICE VISIT (OUTPATIENT)
Dept: FAMILY MEDICINE CLINIC | Facility: CLINIC | Age: 65
End: 2025-02-06
Payer: MEDICARE

## 2025-02-06 VITALS
BODY MASS INDEX: 17.26 KG/M2 | OXYGEN SATURATION: 100 % | HEART RATE: 102 BPM | HEIGHT: 62 IN | SYSTOLIC BLOOD PRESSURE: 112 MMHG | TEMPERATURE: 96.6 F | WEIGHT: 93.8 LBS | RESPIRATION RATE: 18 BRPM | DIASTOLIC BLOOD PRESSURE: 68 MMHG

## 2025-02-06 DIAGNOSIS — Z91.81 AT HIGH RISK FOR FALLS: Primary | ICD-10-CM

## 2025-02-06 DIAGNOSIS — N39.0 E. COLI UTI: ICD-10-CM

## 2025-02-06 DIAGNOSIS — G89.3 CHRONIC PAIN AFTER TREATMENT FOR MALIGNANT NEOPLASM: ICD-10-CM

## 2025-02-06 DIAGNOSIS — Z78.9 WEIGHT GAIN ADVISED: ICD-10-CM

## 2025-02-06 DIAGNOSIS — B96.20 E. COLI UTI: ICD-10-CM

## 2025-02-06 DIAGNOSIS — G96.08 SUBDURAL HYGROMA: ICD-10-CM

## 2025-02-06 DIAGNOSIS — Z51.81 ENCOUNTER FOR THERAPEUTIC DRUG LEVEL MONITORING: ICD-10-CM

## 2025-02-06 DIAGNOSIS — D64.9 ANEMIA, UNSPECIFIED TYPE: ICD-10-CM

## 2025-02-06 DIAGNOSIS — R26.89 POOR BALANCE: ICD-10-CM

## 2025-02-06 LAB
BILIRUB BLD-MCNC: NEGATIVE MG/DL
CLARITY, POC: ABNORMAL
COLOR UR: ABNORMAL
GLUCOSE UR STRIP-MCNC: NEGATIVE MG/DL
KETONES UR QL: NEGATIVE
LEUKOCYTE EST, POC: NEGATIVE
NITRITE UR-MCNC: NEGATIVE MG/ML
PH UR: 6 [PH] (ref 5–8)
PROT UR STRIP-MCNC: ABNORMAL MG/DL
RBC # UR STRIP: NEGATIVE /UL
SP GR UR: 1.02 (ref 1–1.03)
UROBILINOGEN UR QL: ABNORMAL

## 2025-02-06 PROCEDURE — 81002 URINALYSIS NONAUTO W/O SCOPE: CPT | Performed by: FAMILY MEDICINE

## 2025-02-06 PROCEDURE — 99214 OFFICE O/P EST MOD 30 MIN: CPT | Performed by: FAMILY MEDICINE

## 2025-02-06 PROCEDURE — 1126F AMNT PAIN NOTED NONE PRSNT: CPT | Performed by: FAMILY MEDICINE

## 2025-02-06 NOTE — PROGRESS NOTES
Chief Complaint  Hospital f/u    Subjective     {Problem List  Visit Diagnosis   Encounters  Notes  Medications  Labs  Result Review Imaging  Media :23}     Brittani Lambert presents to [facility]  History of Present Illness      {Common H&P Review Areas:24000}    Objective      Physical Exam   Result Review :{Labs  Result Review  Imaging  Med Tab  Media :23}   {The following data was reviewed by (Optional):13166}  {Ambulatory Labs (Optional):63035}           Assessment and Plan {CC Problem List  Visit Diagnosis  ROS  Review (Popup)  Health Maintenance  Quality  BestPractice  Medications  SmartSets  SnapShot Encounters  Media :23}   There are no diagnoses linked to this encounter.  {Time Spent (Optional):61542}  Follow Up {Instructions Charge Capture  Follow-up Communications :23}  No follow-ups on file.  Patient was given instructions and counseling regarding her condition or for health maintenance advice. Please see specific information pulled into the AVS if appropriate.

## 2025-02-06 NOTE — PROGRESS NOTES
Chief Complaint  Hospital f/u    Subjective          Brittani Lambert presents to [facility]    History of Present Illness  The patient presents for a hospital follow-up.    She was admitted to the [facility] on 12/16/2024 and discharged on 12/26/2024. The diagnoses included recurrent falls, bilateral subdural hygromas, hyponatremia, urinary tract infection (UTI) with E. coli, and a recent head injury. The etiology of the hyponatremia was thought to be due to SIADH versus salt wasting in the setting of the recent head injury. She reports experiencing falls, including one where she landed flat on her face, necessitating hospitalization. Her mobility has been compromised since her return home, with difficulty in walking. She uses a walker for indoor mobility, but does not use it outside. She is not utilizing a cane or walker today, during her appointment.     She has been receiving home-based physical therapy as her insurance does not cover outpatient services. She has an upcoming appointment at the [facility] to address her balance issues. She is seeking a handicap sticker due to her fear of falling in parking lots.   She has been referred to neurosurgery for subdural hygromas, but she canceled her appointment after she was discharged from hospital, stating that she knows they will want to order additional imaging and she has concerns of cost.  She has been advised to undergo a CT scan, but she has not done it yet due to cost concerns. She reports persistent numbness in her chin and a laceration on her lip.   She has been prescribed gabapentin for nerve pain, which she finds beneficial. She has discontinued Wellbutrin and is having difficulty obtaining it from [facility]. She admits to occasional marijuana use for pain management.    She expresses concern about her thyroid function, citing excessive eating without weight gain. She consumes protein shakes and maintains a  diet rich in fruits, vegetables, and meats.    She was diagnosed with a UTI during her hospital stay.    She has a history of anemia and is currently taking iron supplements.    SOCIAL HISTORY  The patient uses marijuana on an as-needed basis for pain.        The following portions of the patient's history were reviewed and updated as appropriate: allergies, current medications, past family history, past medical history, past social history, past surgical history, and problem list.    Objective      Physical Exam  Vitals reviewed.   Constitutional:       Appearance: She is underweight.   Cardiovascular:      Rate and Rhythm: Normal rate and regular rhythm.      Heart sounds: Normal heart sounds.   Pulmonary:      Effort: Pulmonary effort is normal.      Breath sounds: Normal breath sounds.   Neurological:      Mental Status: She is alert.      Gait: Gait abnormal (unsteady).        Physical Exam      Result Review :       Results               Assessment and Plan    Diagnoses and all orders for this visit:    1. At high risk for falls (Primary)  -     Ambulatory Referral to Physical Therapy for Evaluation & Treatment  -     TSH+Free T4  -     Comprehensive Metabolic Panel  -     CBC & Differential  -     Vitamin B12  -     Iron  -     Ferritin  -     Folate    2. Poor balance  -     Ambulatory Referral to Physical Therapy for Evaluation & Treatment  -     TSH+Free T4  -     Comprehensive Metabolic Panel  -     CBC & Differential  -     Vitamin B12  -     Iron  -     Ferritin  -     Folate    3. Subdural hygroma  -     Ambulatory Referral to Neurosurgery  -     TSH+Free T4  -     Comprehensive Metabolic Panel  -     CBC & Differential  -     Vitamin B12  -     Iron  -     Ferritin  -     Folate    4. Weight gain advised  -     TSH+Free T4  -     Comprehensive Metabolic Panel  -     CBC & Differential  -     Vitamin B12  -     Iron  -     Ferritin  -     Folate    5. E. coli UTI  -     TSH+Free T4  -     Comprehensive  Metabolic Panel  -     CBC & Differential  -     POC Urinalysis Dipstick  -     Vitamin B12  -     Iron  -     Ferritin  -     Folate    6. Anemia, unspecified type  -     TSH+Free T4  -     Comprehensive Metabolic Panel  -     CBC & Differential  -     Vitamin B12  -     Iron  -     Ferritin  -     Folate    7. Chronic pain after treatment for malignant neoplasm  -     Compliance Drug Analysis, Ur - Urine, Clean Catch    8. Encounter for therapeutic drug level monitoring  -     Compliance Drug Analysis, Ur - Urine, Clean Catch      Assessment & Plan  1. Post-hospitalization follow-up.  She was admitted to the hospital on 12/16/2024 and discharged on 12/26/2024. The diagnoses included recurrent falls, bilateral subdural hygromas, hyponatremia, urinary tract infection (UTI) with E. coli, and a recent head injury. The etiology of the hyponatremia was thought to be due to SIADH versus salt wasting in the setting of the recent head injury. She has been experiencing recurrent falls and balance issues. She has been using a walker inside the house but not outside. She has been referred to neurosurgery for subdural hygromas, and a CT scan has been ordered, but not yet completed. She has been using gabapentin for nerve pain, which has been helpful. She has not been taking Wellbutrin (bupropion) due to issues with [facility] pharmacy. She has been using marijuana on an as-needed basis for pain management. She is advised to use her walker consistently, both indoors and outdoors, to prevent further falls. Outpatient physical therapy is recommended to improve her balance and gait. She is advised to contact [facility] to resolve the issue and resume her medication. An updated controlled substance agreement will be completed today. A urine sample will be collected today to check for any residual infection. A drug screen will also be conducted today. A CT scan has been ordered and should be scheduled as soon as possible. Refills for gabapentin  will be provided during the next prescription cycle.  UA completed today, no indication of UTI.    Anemia.  She has been in the anemic range over the last few months. A blood count panel will be rechecked today, including hemoglobin and hematocrit levels. Iron levels will also be checked to determine if supplementation is needed.    She has an appointment with oncology, Dr. Patel, today.  She was unaware of this appointment if she is unable to make it, I have advised her to contact their office and have this rescheduled.    Her weight has remained stable at 93 pounds since August 2024. She is advised to continue consuming protein shakes and maintain a balanced diet to support weight gain.        Follow Up   Return in about 3 months (around 5/6/2025) for Medicare Wellness.    Patient or patient representative verbalized consent for the use of Ambient Listening during the visit with  Alla Colon DO for chart documentation. 2/6/2025  18:37 EST  Patient was given instructions and counseling regarding her condition or for health maintenance advice. Please see specific information pulled into the AVS if appropriate.

## 2025-02-09 LAB
ALBUMIN SERPL-MCNC: 4.6 G/DL (ref 3.9–4.9)
ALP SERPL-CCNC: 126 IU/L (ref 44–121)
ALT SERPL-CCNC: 15 IU/L (ref 0–32)
AST SERPL-CCNC: 25 IU/L (ref 0–40)
BASOPHILS # BLD AUTO: 0.1 X10E3/UL (ref 0–0.2)
BASOPHILS NFR BLD AUTO: 2 %
BILIRUB SERPL-MCNC: <0.2 MG/DL (ref 0–1.2)
BUN SERPL-MCNC: 10 MG/DL (ref 8–27)
BUN/CREAT SERPL: 15 (ref 12–28)
CALCIUM SERPL-MCNC: 9.5 MG/DL (ref 8.7–10.3)
CHLORIDE SERPL-SCNC: 99 MMOL/L (ref 96–106)
CO2 SERPL-SCNC: 25 MMOL/L (ref 20–29)
CREAT SERPL-MCNC: 0.65 MG/DL (ref 0.57–1)
EGFRCR SERPLBLD CKD-EPI 2021: 98 ML/MIN/1.73
EOSINOPHIL # BLD AUTO: 0.1 X10E3/UL (ref 0–0.4)
EOSINOPHIL NFR BLD AUTO: 2 %
ERYTHROCYTE [DISTWIDTH] IN BLOOD BY AUTOMATED COUNT: 12.5 % (ref 11.7–15.4)
FERRITIN SERPL-MCNC: 114 NG/ML (ref 15–150)
FOLATE SERPL-MCNC: 8 NG/ML
GLOBULIN SER CALC-MCNC: 2.4 G/DL (ref 1.5–4.5)
GLUCOSE SERPL-MCNC: 89 MG/DL (ref 70–99)
HCT VFR BLD AUTO: 38.8 % (ref 34–46.6)
HGB BLD-MCNC: 12.7 G/DL (ref 11.1–15.9)
IMM GRANULOCYTES # BLD AUTO: 0.1 X10E3/UL (ref 0–0.1)
IMM GRANULOCYTES NFR BLD AUTO: 1 %
IRON SERPL-MCNC: 70 UG/DL (ref 27–139)
LYMPHOCYTES # BLD AUTO: 1.8 X10E3/UL (ref 0.7–3.1)
LYMPHOCYTES NFR BLD AUTO: 30 %
MCH RBC QN AUTO: 31.1 PG (ref 26.6–33)
MCHC RBC AUTO-ENTMCNC: 32.7 G/DL (ref 31.5–35.7)
MCV RBC AUTO: 95 FL (ref 79–97)
MONOCYTES # BLD AUTO: 0.5 X10E3/UL (ref 0.1–0.9)
MONOCYTES NFR BLD AUTO: 7 %
NEUTROPHILS # BLD AUTO: 3.5 X10E3/UL (ref 1.4–7)
NEUTROPHILS NFR BLD AUTO: 58 %
PLATELET # BLD AUTO: 340 X10E3/UL (ref 150–450)
POTASSIUM SERPL-SCNC: 4.4 MMOL/L (ref 3.5–5.2)
PROT SERPL-MCNC: 7 G/DL (ref 6–8.5)
RBC # BLD AUTO: 4.08 X10E6/UL (ref 3.77–5.28)
SODIUM SERPL-SCNC: 137 MMOL/L (ref 134–144)
T4 FREE SERPL-MCNC: 0.82 NG/DL (ref 0.82–1.77)
TSH RECEP AB SER-ACNC: <1.1 IU/L (ref 0–1.75)
TSH SERPL DL<=0.005 MIU/L-ACNC: 1.14 UIU/ML (ref 0.45–4.5)
VIT B12 SERPL-MCNC: 579 PG/ML (ref 232–1245)
WBC # BLD AUTO: 6.1 X10E3/UL (ref 3.4–10.8)

## 2025-02-14 LAB — DRUGS UR: NORMAL

## 2025-03-10 DIAGNOSIS — G89.3 CHRONIC PAIN AFTER TREATMENT FOR MALIGNANT NEOPLASM: ICD-10-CM

## 2025-03-10 DIAGNOSIS — C50.011 MALIGNANT NEOPLASM OF NIPPLE OF RIGHT BREAST IN FEMALE, UNSPECIFIED ESTROGEN RECEPTOR STATUS: ICD-10-CM

## 2025-03-10 RX ORDER — HYDROCODONE BITARTRATE AND ACETAMINOPHEN 7.5; 325 MG/1; MG/1
1 TABLET ORAL EVERY 8 HOURS PRN
Qty: 90 TABLET | Refills: 0 | Status: SHIPPED | OUTPATIENT
Start: 2025-03-10

## 2025-03-10 RX ORDER — GABAPENTIN 100 MG/1
CAPSULE ORAL
Qty: 90 CAPSULE | Refills: 0 | OUTPATIENT
Start: 2025-03-10

## 2025-03-10 RX ORDER — GABAPENTIN 100 MG/1
100 CAPSULE ORAL 3 TIMES DAILY
Qty: 90 CAPSULE | Refills: 2 | Status: SHIPPED | OUTPATIENT
Start: 2025-03-10

## 2025-03-10 NOTE — TELEPHONE ENCOUNTER
Caller: Brittani Lambert    Relationship: Self    Best call back number:      Requested Prescriptions:   Requested Prescriptions     Pending Prescriptions Disp Refills    HYDROcodone-acetaminophen (Norco) 7.5-325 MG per tablet 90 tablet 0     Sig: Take 1 tablet by mouth Every 8 (Eight) Hours As Needed for Moderate Pain. Indications: Pain    gabapentin (NEURONTIN) 100 MG capsule 90 capsule 0     Sig: Take 1 capsule by mouth 3 (Three) Times a Day. Must keep upcoming appointment for refill  Indications: Neuropathic Pain        Pharmacy where request should be sent: [id number] - [place], [place] - [address] AT [place] [address] - [phone] [facility] [phone] FX     Last office visit with prescribing clinician: 2/6/2025   Last telemedicine visit with prescribing clinician: Visit date not found   Next office visit with prescribing clinician: 4/24/2025     Additional details provided by patient: PATIENT IS OUT OF MEDICATION     Does the patient have less than a 3 day supply:  [x] Yes  [] No      Rodger Bower Rep   03/10/25 10:57 EDT

## 2025-03-12 ENCOUNTER — PATIENT OUTREACH (OUTPATIENT)
Age: 65
End: 2025-03-12
Payer: MEDICARE

## 2025-03-12 NOTE — OUTREACH NOTE
Social Work Assessment  Questions/Answers      Flowsheet Row Most Recent Value   Referral Source physician   Reason for Consult community resources   Preferred Language English   Advance Care Planning Reviewed no concerns identified   Employment Status retired   Source of Income social security   Medications independent   Meal Preparation independent   Housekeeping independent   Laundry independent   Shopping independent        SDOH updated and reviewed with the patient during this program:  --     Disabilities: At Risk (12/23/2024)    Disabilities     Concentrating, Remembering, or Making Decisions Difficulty: yes     Doing Errands Independently Difficulty: yes      Financial Resource Strain: High Risk (11/11/2024)    Overall Financial Resource Strain (CARDIA)     Difficulty of Paying Living Expenses: Hard      --     Food Insecurity: No Food Insecurity (11/11/2024)    Hunger Vital Sign     Worried About Running Out of Food in the Last Year: Never true     Ran Out of Food in the Last Year: Never true      --     Housing Stability: Low Risk  (3/12/2025)    Housing Stability Vital Sign     Unable to Pay for Housing in the Last Year: No     Number of Times Moved in the Last Year: 1     Homeless in the Last Year: No      --     Transportation Needs: No Transportation Needs (1/27/2025)    OASIS : Transportation     Lack of Transportation (Medical): No     Lack of Transportation (Non-Medical): No     Patient Unable or Declines to Respond: No      --     Utilities: Not At Risk (11/11/2024)    [facility] Utilities     Threatened with loss of utilities: No      Continuing Care   Community [facility] CHARITIES    [facility] [place] [id number]    Phone: [phone]    Request Status: Pending - No Request Sent    Services: Transportation    Resource for: Transportation Needs   [address] [address]    Phone: [phone]    Request Status: Pending - No Request Sent     Services: Financial Assistance    Resource for: Financial Resource Strain, Utilities   Patient Outreach    SW contacted pt after receiving an ACM referral. Pt reports financial difficulty. She got behind after a hospitalization in December and with the high utilities in winter, she is having trouble catching up. SW provided contact for [employer], [employer], and [facility]. Pt has been able to pay her rent and denies any other current needs.  Rut ADEN -   Ambulatory Case Management    3/12/2025, 16:26 EDT

## 2025-03-24 ENCOUNTER — PATIENT OUTREACH (OUTPATIENT)
Age: 65
End: 2025-03-24
Payer: MEDICARE

## 2025-03-24 NOTE — OUTREACH NOTE
Patient Outreach    SW contacted pt for follow up. Pt reports that she spoke with someone at [facility], but they were not able to help because she did not have her social security award letter. She has that now. SW provided the contact for [facility] so she can follow up.  Rut ADEN -   Ambulatory Case Management    3/24/2025, 16:18 EDT

## 2025-04-02 ENCOUNTER — PATIENT OUTREACH (OUTPATIENT)
Age: 65
End: 2025-04-02
Payer: MEDICARE

## 2025-04-02 NOTE — OUTREACH NOTE
Patient Outreach    SW contacted pt for follow up. Pt states that [employer] is helping with her utility bill. No other needs expressed. SW will close referral.  Rut ADEN -   Ambulatory Case Management    4/2/2025, 15:03 EDT

## 2025-04-09 ENCOUNTER — OFFICE VISIT (OUTPATIENT)
Dept: FAMILY MEDICINE CLINIC | Facility: CLINIC | Age: 65
End: 2025-04-09
Payer: MEDICARE

## 2025-04-09 ENCOUNTER — TELEPHONE (OUTPATIENT)
Dept: FAMILY MEDICINE CLINIC | Facility: CLINIC | Age: 65
End: 2025-04-09

## 2025-04-09 VITALS
TEMPERATURE: 97.5 F | HEIGHT: 62 IN | OXYGEN SATURATION: 98 % | DIASTOLIC BLOOD PRESSURE: 68 MMHG | SYSTOLIC BLOOD PRESSURE: 118 MMHG | RESPIRATION RATE: 18 BRPM | HEART RATE: 84 BPM | BODY MASS INDEX: 18.07 KG/M2 | WEIGHT: 98.2 LBS

## 2025-04-09 DIAGNOSIS — R06.02 SHORTNESS OF BREATH: ICD-10-CM

## 2025-04-09 DIAGNOSIS — R60.0 BILATERAL LEG EDEMA: Primary | ICD-10-CM

## 2025-04-09 RX ORDER — FUROSEMIDE 20 MG/1
20 TABLET ORAL DAILY
Qty: 7 TABLET | Refills: 0 | Status: SHIPPED | OUTPATIENT
Start: 2025-04-09

## 2025-04-09 NOTE — TELEPHONE ENCOUNTER
Caller: Brittani Lambert    Relationship: Self    Best call back number: [phone]     What medication are you requesting: WATER PILL    What are your current symptoms: VERY SWOLLEN FEET, LEGS    How long have you been experiencing symptoms: 3 DAYS    Have you had these symptoms before:    [] Yes  [x] No    Have you been treated for these symptoms before:   [] Yes  [x] No    If a prescription is needed, what is your preferred pharmacy and phone number:  [id number] - [place], [place] - [address] [place] [address] AT [facility] [address] - [phone]  - [phone]      Additional notes: ELEVATING FEET AND RUBBING THE FEET IS NOT HELPING AT ALL.

## 2025-04-09 NOTE — PROGRESS NOTES
Chief Complaint  Leg Swelling (Started 3 days ago)    Subjective     {Problem List  Visit Diagnosis   Encounters  Notes  Medications  Labs  Result Review Imaging  Media :23}     Brittani Lambert presents to [facility]  History of Present Illness      {Common H&P Review Areas:84825}    Objective      Physical Exam   Result Review :{Labs  Result Review  Imaging  Med Tab  Media :23}   {The following data was reviewed by (Optional):84441}  {Ambulatory Labs (Optional):43778}           Assessment and Plan {CC Problem List  Visit Diagnosis  ROS  Review (Popup)  Health Maintenance  Quality  BestPractice  Medications  SmartSets  SnapShot Encounters  Media :23}   There are no diagnoses linked to this encounter.  {Time Spent (Optional):50552}  Follow Up {Instructions Charge Capture  Follow-up Communications :23}  No follow-ups on file.  Patient was given instructions and counseling regarding her condition or for health maintenance advice. Please see specific information pulled into the AVS if appropriate.

## 2025-04-09 NOTE — PROGRESS NOTES
Chief Complaint  Leg Swelling (Started 3 days ago)    Subjective          Brittani Lambert presents to [facility]    History of Present Illness  The patient is a 65-year-old female who presents for evaluation of leg swelling.    She has been experiencing bilateral lower extremity edema extending to the knees for the past 3 days, accompanied by foot pain. She reports no recent changes in her medication regimen or prolonged periods of immobility. She has been managing the condition with leg elevation and topical application of balm for the past 3 days. She admits to a high intake of pickle juice over the last few days. She has no history of blood clots, but does have history of breast cancer.    She also reports a recent fall approximately 1 month ago, which resulted in a superficial skin injury of LUE. She has been applying an unspecified topical agent to the area.    Additionally, she reports dyspnea and severe hip pain due to scoliosis. She is planning a trip to [place] next month and is requesting a steroid injection prior to her departure.          The following portions of the patient's history were reviewed and updated as appropriate: allergies, current medications, past family history, past medical history, past social history, past surgical history, and problem list.    Objective      Physical Exam  Vitals reviewed.   Cardiovascular:      Rate and Rhythm: Normal rate and regular rhythm.      Heart sounds: Normal heart sounds.   Pulmonary:      Effort: Pulmonary effort is normal.      Breath sounds: Normal breath sounds. No wheezing or rhonchi.   Musculoskeletal:         General: Swelling (b/l feet and ankles 1+ pitting) present.   Skin:            Comments: Superficial skin abrasion left upper extremity, healing.  No surrounding erythema or purulent discharge   Neurological:      Mental Status: She is alert.        Physical Exam      Result Review :       Results                Assessment and Plan    Diagnoses and all orders for this visit:    1. Bilateral leg edema (Primary)  -     CBC (No Diff)  -     Comprehensive Metabolic Panel  -     proBNP  -     TSH  -     furosemide (Lasix) 20 MG tablet; Take 1 tablet by mouth Daily.  Dispense: 7 tablet; Refill: 0    2. Shortness of breath  -     proBNP      Assessment & Plan  1. Bilateral lower extremity edema.  The etiology of the edema could be multifactorial, including excessive sodium intake from pickle juice, potential side effects of gabapentin, or early signs of congestive heart failure. The possibility of deep vein thrombosis can not be completely ruled out, but unlikely since bilateral presentation that began at the same time. Her oxygen saturation levels are within normal limits, and her blood pressure is stable. A prescription for Lasix (furosemide) will be provided for a few days to manage the edema. She is advised to continue with leg elevation and consider using compression socks during prolonged standing or travel. She is also advised to avoid intake of pickle juice. A non-fasting blood work order will be placed today. If the laboratory results indicate any abnormalities or if there is no improvement in her condition following the diuretic therapy, further diagnostic investigations such as an ultrasound may be considered.    2. Hip pain.  She reports significant pain in her hip, which she attributes to scoliosis. She is interested in receiving a steroid injection before her upcoming trip next month. An appointment will be scheduled a few days before her trip to administer the injection, as it takes a few days to take full effect.    3. Skin injury.  She has a superficial skin injury from a fall about a month ago. The wound appears to be healing well. She is advised to continue applying the current treatment and monitor the wound for any changes.        Follow Up   No follow-ups on file.    Patient or patient representative  verbalized consent for the use of Ambient Listening during the visit with  Alla Colon DO for chart documentation. 4/9/2025  17:21 EDT  Patient was given instructions and counseling regarding her condition or for health maintenance advice. Please see specific information pulled into the AVS if appropriate.

## 2025-04-10 LAB
ALBUMIN SERPL-MCNC: 4.2 G/DL (ref 3.9–4.9)
ALP SERPL-CCNC: 147 IU/L (ref 44–121)
ALT SERPL-CCNC: 28 IU/L (ref 0–32)
AST SERPL-CCNC: 32 IU/L (ref 0–40)
BILIRUB SERPL-MCNC: <0.2 MG/DL (ref 0–1.2)
BUN SERPL-MCNC: 12 MG/DL (ref 8–27)
BUN/CREAT SERPL: 15 (ref 12–28)
CALCIUM SERPL-MCNC: 9.4 MG/DL (ref 8.7–10.3)
CHLORIDE SERPL-SCNC: 98 MMOL/L (ref 96–106)
CO2 SERPL-SCNC: 26 MMOL/L (ref 20–29)
CREAT SERPL-MCNC: 0.82 MG/DL (ref 0.57–1)
EGFRCR SERPLBLD CKD-EPI 2021: 79 ML/MIN/1.73
ERYTHROCYTE [DISTWIDTH] IN BLOOD BY AUTOMATED COUNT: 12.6 % (ref 11.7–15.4)
GLOBULIN SER CALC-MCNC: 2.2 G/DL (ref 1.5–4.5)
GLUCOSE SERPL-MCNC: 82 MG/DL (ref 70–99)
HCT VFR BLD AUTO: 30.9 % (ref 34–46.6)
HGB BLD-MCNC: 9.9 G/DL (ref 11.1–15.9)
MCH RBC QN AUTO: 30 PG (ref 26.6–33)
MCHC RBC AUTO-ENTMCNC: 32 G/DL (ref 31.5–35.7)
MCV RBC AUTO: 94 FL (ref 79–97)
NT-PROBNP SERPL-MCNC: 990 PG/ML (ref 0–301)
PLATELET # BLD AUTO: 296 X10E3/UL (ref 150–450)
POTASSIUM SERPL-SCNC: 4.5 MMOL/L (ref 3.5–5.2)
PROT SERPL-MCNC: 6.4 G/DL (ref 6–8.5)
RBC # BLD AUTO: 3.3 X10E6/UL (ref 3.77–5.28)
SODIUM SERPL-SCNC: 138 MMOL/L (ref 134–144)
TSH SERPL DL<=0.005 MIU/L-ACNC: 0.98 UIU/ML (ref 0.45–4.5)
WBC # BLD AUTO: 6.1 X10E3/UL (ref 3.4–10.8)

## 2025-04-11 LAB
IRON SATN MFR SERPL: 26 % (ref 15–55)
IRON SERPL-MCNC: 70 UG/DL (ref 27–139)
TIBC SERPL-MCNC: 271 UG/DL (ref 250–450)
UIBC SERPL-MCNC: 201 UG/DL (ref 118–369)
WRITTEN AUTHORIZATION: NORMAL

## 2025-04-16 ENCOUNTER — TELEPHONE (OUTPATIENT)
Dept: FAMILY MEDICINE CLINIC | Facility: CLINIC | Age: 65
End: 2025-04-16
Payer: MEDICARE

## 2025-04-16 NOTE — TELEPHONE ENCOUNTER
SCHEDULING STATED THEY CALLED PATIENT TO SCHEDULE ECHO BUT PER PATIENT SHE IS NOT HAVING THIS DONE.

## 2025-04-18 DIAGNOSIS — G89.3 CHRONIC PAIN AFTER TREATMENT FOR MALIGNANT NEOPLASM: ICD-10-CM

## 2025-04-18 DIAGNOSIS — C50.011 MALIGNANT NEOPLASM OF NIPPLE OF RIGHT BREAST IN FEMALE, UNSPECIFIED ESTROGEN RECEPTOR STATUS: ICD-10-CM

## 2025-04-18 RX ORDER — HYDROCODONE BITARTRATE AND ACETAMINOPHEN 7.5; 325 MG/1; MG/1
1 TABLET ORAL EVERY 8 HOURS PRN
Qty: 90 TABLET | Refills: 0 | Status: SHIPPED | OUTPATIENT
Start: 2025-04-18

## 2025-04-18 NOTE — TELEPHONE ENCOUNTER
Caller: Brittani Lambert    Relationship: Self    Best call back number: [phone]     Requested Prescriptions:   Requested Prescriptions     Pending Prescriptions Disp Refills    HYDROcodone-acetaminophen (Norco) 7.5-325 MG per tablet 90 tablet 0     Sig: Take 1 tablet by mouth Every 8 (Eight) Hours As Needed for Moderate Pain. Indications: Pain        [facility] sent[id number] [place], [place] - [address] [place] [address] AT [place] [address] - [phone] [facility] [phone] FX     Last office visit with prescribing clinician: 4/9/2025   Last telemedicine visit with prescribing clinician: Visit date not found   Next office visit with prescribing clinician: 5/1/2025     Additional details provided by patient: PATIENT HAS CALLED REQUESTING A REFILL ON ABOVE MEDICATION.     Does the patient have less than a 3 day supply:  [x] Yes  [] No    Would you like a call back once the refill request has been completed: [] Yes [x] No    If the office needs to give you a call back, can they leave a voicemail: [] Yes [x] No    Quentin Montiel   04/18/25 10:38 EDT

## 2025-05-12 ENCOUNTER — PATIENT OUTREACH (OUTPATIENT)
Age: 65
End: 2025-05-12
Payer: MEDICARE

## 2025-05-12 NOTE — OUTREACH NOTE
Social Work Assessment  Questions/Answers      Flowsheet Row Most Recent Value   Referral Source physician   Reason for Consult community resources   Preferred Language English   Advance Care Planning Reviewed no concerns identified   Employment Status disabled   Medications independent   Meal Preparation independent   Housekeeping independent   Laundry independent   Shopping independent        SDOH updated and reviewed with the patient during this program:  --     Depression: At risk (2/6/2025)    PHQ-2     PHQ-2 Score: 12      --     Disabilities: At Risk (12/23/2024)    Disabilities     Concentrating, Remembering, or Making Decisions Difficulty: yes     Doing Errands Independently Difficulty: yes      Financial Resource Strain: High Risk (11/11/2024)    Overall Financial Resource Strain (CARDIA)     Difficulty of Paying Living Expenses: Hard      --     Food Insecurity: No Food Insecurity (11/11/2024)    Hunger Vital Sign     Worried About Running Out of Food in the Last Year: Never true     Ran Out of Food in the Last Year: Never true      --     Housing Stability: Low Risk  (3/12/2025)    Housing Stability Vital Sign     Unable to Pay for Housing in the Last Year: No     Number of Times Moved in the Last Year: 1     Homeless in the Last Year: No      --     Transportation Needs: No Transportation Needs (1/27/2025)    OASIS : Transportation     Lack of Transportation (Medical): No     Lack of Transportation (Non-Medical): No     Patient Unable or Declines to Respond: No      --     Utilities: Not At Risk (11/11/2024)    [facility] Utilities     Threatened with loss of utilities: No     Patient Outreach    SW contacted pt after receiving a message from [facility] reporting that pt has been having trouble with her utility bills. Pt says her electric was cut off last month, but it is back on now. She says she will have the money to pay it this month. She has been having trouble with rent, but has worked an arrangement with  her landlord to pay as soon as her check  comes in the middle of the month. She cannot afford cable, but plans to get it turned off anyway. ZAHRA reviewed agencies that may be able to assist in the future, but she states she should be okay now. Encouraged her to call back if SW can assist.  Rut ADEN -   Ambulatory Case Management    5/12/2025, 15:47 EDT

## 2025-05-15 ENCOUNTER — TELEPHONE (OUTPATIENT)
Dept: FAMILY MEDICINE CLINIC | Facility: CLINIC | Age: 65
End: 2025-05-15
Payer: MEDICARE

## 2025-05-15 DIAGNOSIS — C50.011 MALIGNANT NEOPLASM OF NIPPLE OF RIGHT BREAST IN FEMALE, UNSPECIFIED ESTROGEN RECEPTOR STATUS: ICD-10-CM

## 2025-05-15 DIAGNOSIS — M25.552 CHRONIC LEFT HIP PAIN: ICD-10-CM

## 2025-05-15 DIAGNOSIS — G89.3 CHRONIC PAIN AFTER TREATMENT FOR MALIGNANT NEOPLASM: ICD-10-CM

## 2025-05-15 DIAGNOSIS — M47.816 LUMBAR SPONDYLOSIS: Primary | ICD-10-CM

## 2025-05-15 DIAGNOSIS — G89.29 CHRONIC LEFT HIP PAIN: ICD-10-CM

## 2025-05-15 NOTE — TELEPHONE ENCOUNTER
Pt called was asking when to come in for her steroid shot. She will be flying out Tuesday.  PT stated she was told to come in 2 days prior to flying , which would be Sunday.  Would Monday be ok for this?  She also wanted to know if she can have her Gabapentin refilled and hydrocodone before she leaves .

## 2025-05-16 RX ORDER — TRIAMCINOLONE ACETONIDE 40 MG/ML
40 INJECTION, SUSPENSION INTRA-ARTICULAR; INTRAMUSCULAR ONCE
Status: COMPLETED | OUTPATIENT
Start: 2025-05-16 | End: 2025-05-19

## 2025-05-16 RX ORDER — HYDROCODONE BITARTRATE AND ACETAMINOPHEN 7.5; 325 MG/1; MG/1
1 TABLET ORAL EVERY 8 HOURS PRN
Qty: 90 TABLET | Refills: 0 | Status: SHIPPED | OUTPATIENT
Start: 2025-05-16

## 2025-05-16 NOTE — TELEPHONE ENCOUNTER
Norco refilled.  She did have remaining refill of gabapentin at pharmacy.    We discussed her receiving a steroid injection during her last office visit.  Okay for her to come in to receive.    To continue refills, schedule Medicare Wellness visit and will need to update labs at that visit.

## 2025-05-19 ENCOUNTER — CLINICAL SUPPORT (OUTPATIENT)
Dept: FAMILY MEDICINE CLINIC | Facility: CLINIC | Age: 65
End: 2025-05-19
Payer: MEDICARE

## 2025-05-19 DIAGNOSIS — R60.0 BILATERAL LEG EDEMA: Primary | ICD-10-CM

## 2025-05-19 PROCEDURE — 96372 THER/PROPH/DIAG INJ SC/IM: CPT | Performed by: FAMILY MEDICINE

## 2025-05-19 RX ADMIN — TRIAMCINOLONE ACETONIDE 40 MG: 40 INJECTION, SUSPENSION INTRA-ARTICULAR; INTRAMUSCULAR at 11:46

## 2025-05-19 NOTE — TELEPHONE ENCOUNTER
JOSE ELIAS PT CALLED TO FIND OUT ABOUT RX STATUS. SHE HAD NOT RECEIVED NOTICE OF EITHER BEING REFILLED.     REREAD MESSAGE THAT PHARMACY HAS AN EXTRA REFILL OF NIC, AND THAT NORCO WAS FILLED.     SHE WILL BE CONTACTING HER PHARMACY AGAIN

## 2025-06-18 ENCOUNTER — PATIENT OUTREACH (OUTPATIENT)
Age: 65
End: 2025-06-18
Payer: MEDICARE

## 2025-06-18 NOTE — OUTREACH NOTE
Care Coordination    Reveiwed chart, no new needs. SW will close referral.  Rut ADEN -   Ambulatory Case Management    6/18/2025, 16:16 EDT

## 2025-07-01 DIAGNOSIS — F51.01 PRIMARY INSOMNIA: ICD-10-CM

## 2025-07-01 DIAGNOSIS — G89.3 CHRONIC PAIN AFTER TREATMENT FOR MALIGNANT NEOPLASM: ICD-10-CM

## 2025-07-01 DIAGNOSIS — C50.011 MALIGNANT NEOPLASM OF NIPPLE OF RIGHT BREAST IN FEMALE, UNSPECIFIED ESTROGEN RECEPTOR STATUS: ICD-10-CM

## 2025-07-01 RX ORDER — TRAZODONE HYDROCHLORIDE 100 MG/1
50-100 TABLET ORAL NIGHTLY
Qty: 90 TABLET | Refills: 1 | Status: SHIPPED | OUTPATIENT
Start: 2025-07-01

## 2025-07-01 RX ORDER — GABAPENTIN 100 MG/1
100 CAPSULE ORAL 3 TIMES DAILY
Qty: 90 CAPSULE | Refills: 2 | Status: SHIPPED | OUTPATIENT
Start: 2025-07-01

## 2025-07-01 RX ORDER — HYDROCODONE BITARTRATE AND ACETAMINOPHEN 7.5; 325 MG/1; MG/1
1 TABLET ORAL EVERY 8 HOURS PRN
Qty: 90 TABLET | Refills: 0 | Status: SHIPPED | OUTPATIENT
Start: 2025-07-01

## 2025-07-01 NOTE — TELEPHONE ENCOUNTER
Caller: Brittani Lambert    Relationship: Self    Best call back number: [phone]    Requested Prescriptions:   Requested Prescriptions     Pending Prescriptions Disp Refills    gabapentin (NEURONTIN) 100 MG capsule 90 capsule 2     Sig: Take 1 capsule by mouth 3 (Three) Times a Day. Indications: Neuropathic Pain    HYDROcodone-acetaminophen (Norco) 7.5-325 MG per tablet 90 tablet 0     Sig: Take 1 tablet by mouth Every 8 (Eight) Hours As Needed for Moderate Pain. Indications: Pain    traZODone (DESYREL) 100 MG tablet 90 tablet 1     Sig: Take 0.5-1 tablets by mouth Every Night.        Pharmacy where request should be sent: [facility] #[id number] - [place], [place] - [address] [place] [address] AT [place] [id number] - [phone] [facility] [phone] FX     Last office visit with prescribing clinician: 4/9/2025   Last telemedicine visit with prescribing clinician: Visit date not found   Next office visit with prescribing clinician: 10/8/2025     Additional details provided by patient: THE PATIENT HAS LESS THAN THREE DAYS LEFT     Does the patient have less than a 3 day supply:  [x] Yes  [] No    Would you like a call back once the refill request has been completed: [] Yes [x] No    If the office needs to give you a call back, can they leave a voicemail: [] Yes [x] No    Rodger Levine Rep   07/01/25 09:17 EDT

## 2025-07-21 ENCOUNTER — OFFICE VISIT (OUTPATIENT)
Dept: FAMILY MEDICINE CLINIC | Facility: CLINIC | Age: 65
End: 2025-07-21
Payer: MEDICARE

## 2025-07-21 VITALS
RESPIRATION RATE: 20 BRPM | HEART RATE: 56 BPM | SYSTOLIC BLOOD PRESSURE: 108 MMHG | BODY MASS INDEX: 16.64 KG/M2 | OXYGEN SATURATION: 93 % | DIASTOLIC BLOOD PRESSURE: 62 MMHG | WEIGHT: 91 LBS | TEMPERATURE: 97.1 F

## 2025-07-21 DIAGNOSIS — R53.82 CHRONIC FATIGUE: ICD-10-CM

## 2025-07-21 DIAGNOSIS — E87.1 HYPONATREMIA: ICD-10-CM

## 2025-07-21 DIAGNOSIS — F33.1 MODERATE EPISODE OF RECURRENT MAJOR DEPRESSIVE DISORDER: ICD-10-CM

## 2025-07-21 DIAGNOSIS — E78.00 ELEVATED CHOLESTEROL: ICD-10-CM

## 2025-07-21 DIAGNOSIS — R29.6 FREQUENT FALLS: ICD-10-CM

## 2025-07-21 DIAGNOSIS — Z00.00 MEDICARE ANNUAL WELLNESS VISIT, SUBSEQUENT: Primary | ICD-10-CM

## 2025-07-21 PROCEDURE — 1170F FXNL STATUS ASSESSED: CPT | Performed by: FAMILY MEDICINE

## 2025-07-21 PROCEDURE — 1126F AMNT PAIN NOTED NONE PRSNT: CPT | Performed by: FAMILY MEDICINE

## 2025-07-21 PROCEDURE — G0439 PPPS, SUBSEQ VISIT: HCPCS | Performed by: FAMILY MEDICINE

## 2025-07-21 PROCEDURE — 99214 OFFICE O/P EST MOD 30 MIN: CPT | Performed by: FAMILY MEDICINE

## 2025-07-21 PROCEDURE — G2211 COMPLEX E/M VISIT ADD ON: HCPCS | Performed by: FAMILY MEDICINE

## 2025-07-21 RX ORDER — BUPROPION HYDROCHLORIDE 300 MG/1
300 TABLET ORAL DAILY
Qty: 90 TABLET | Refills: 1 | Status: SHIPPED | OUTPATIENT
Start: 2025-07-21

## 2025-07-21 RX ORDER — BUPROPION HYDROCHLORIDE 150 MG/1
150 TABLET ORAL DAILY
Qty: 7 TABLET | Refills: 0 | Status: SHIPPED | OUTPATIENT
Start: 2025-07-21

## 2025-07-21 NOTE — PROGRESS NOTES
Subjective   The ABCs of the Annual Wellness Visit  Medicare Wellness Visit      Brittani Lambert is a 65 y.o. patient who presents for a Medicare Wellness Visit.    The following portions of the patient's history were reviewed and   updated as appropriate: allergies, current medications, past family history, past medical history, past social history, past surgical history, and problem list.    Compared to one year ago, the patient's physical   health is worse.  Compared to one year ago, the patient's mental   health is worse.    Recent Hospitalizations:  This patient has had a [facility] admission record on file within the last 365 days.  Current Medical Providers:  Patient Care Team:  Alla Colon DO as PCP - General (Family Medicine)  Carrie Patel MD as Referring Physician (Hematology and Oncology)  Rod Garcia MD as Consulting Physician (Radiation Oncology)  Alla Colon DO as Referring Physician (Family Medicine)    Outpatient Medications Prior to Visit   Medication Sig Dispense Refill    aspirin (aspirin) 81 MG EC tablet Take 1 tablet by mouth Daily. 30 tablet 5    buPROPion XL (WELLBUTRIN XL) 150 MG 24 hr tablet Take 1 tablet by mouth Daily. Indications: Major Depressive Disorder (Patient not taking: Reported on 4/9/2025) 90 tablet 3    gabapentin (NEURONTIN) 100 MG capsule Take 1 capsule by mouth 3 (Three) Times a Day. Indications: Neuropathic Pain 90 capsule 2    HYDROcodone-acetaminophen (Norco) 7.5-325 MG per tablet Take 1 tablet by mouth Every 8 (Eight) Hours As Needed for Moderate Pain. Indications: Pain 90 tablet 0    hydrOXYzine (ATARAX) 25 MG tablet Take 1 tablet by mouth Every 8 (Eight) Hours As Needed for Anxiety. Indications: Feeling Anxious      naloxone (NARCAN) 4 MG/0.1ML nasal spray Call 911. Don't prime. [place] in 1 nostril for overdose. Repeat in 2-3 minutes in other nostril if no or minimal breathing/responsiveness. 2 each 0    traZODone (DESYREL) 100 MG  "tablet Take 0.5-1 tablets by mouth Every Night. 90 tablet 1     No facility-administered medications prior to visit.     Opioid medication/s are on active medication list.  and I have evaluated her active treatment plan and pain score trends (see table).  There were no vitals filed for this visit.  I have reviewed the chart for potential of high risk medication and harmful drug interactions in the elderly.              Patient Active Problem List   Diagnosis    Mixed hyperlipidemia    Moderate episode of recurrent major depressive disorder    Anxiety    Functional diarrhea    Breast CA    Atrial fibrillation    Invasive ductal carcinoma of left breast    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    Left-sided weakness    THLAIA (generalized anxiety disorder)    Acute deep vein thrombosis (DVT) of left upper extremity    Persistent atrial fibrillation    Lactic acidosis    T12 compression fracture    Falls    History of atrial fibrillation    History of pulmonary embolism    Bilateral leg weakness    Tobacco abuse    Alcohol abuse    Generalized weakness    Failure to thrive in adult    Depression    Subdural hygroma    Severe protein-calorie malnutrition    UTI (urinary tract infection)    Hyponatremia    SIADH (syndrome of inappropriate ADH production)     Advance Care Planning Advance Directive is on file.  ACP discussion was held with the patient during this visit. Patient has an advance directive in EMR which is still valid.             Objective   Vitals:    07/21/25 1028   BP: 108/62   Pulse: 56   Resp: 20   Temp: 97.1 °F (36.2 °C)   SpO2: 93%   Weight: 41.3 kg (91 lb)       Estimated body mass index is 16.64 kg/m² as calculated from the following:    Height as of 4/9/25: 157.5 cm (62\").    Weight as of this encounter: 41.3 kg (91 lb).               Does the patient have evidence of cognitive impairment? No                                                                                                Health  Risk " Assessment    Smoking Status:  Social History     Tobacco Use   Smoking Status Every Day    Current packs/day: 0.50    Average packs/day: 0.5 packs/day for 25.0 years (12.5 ttl pk-yrs)    Types: Cigarettes   Smokeless Tobacco Never     Alcohol Consumption:  Social History     Substance and Sexual Activity   Alcohol Use Not Currently       Fall Risk Screen  STEADI Fall Risk Assessment has not been completed.    Depression Screening   The PHQ has not been completed during this encounter.     Health Habits and Functional and Cognitive Screening:       No data to display                    Age-appropriate Screening Schedule:  Refer to the list below for future screening recommendations based on patient's age, sex and/or medical conditions. Orders for these recommended tests are listed in the plan section. The patient has been provided with a written plan.    Health Maintenance List  Health Maintenance   Topic Date Due    DXA SCAN  Never done    Pneumococcal Vaccine 50+ (1 of 2 - PCV) Never done    ZOSTER VACCINE (1 of 2) Never done    ANNUAL WELLNESS VISIT  Never done    LIPID PANEL  05/08/2024    COVID-19 Vaccine (1 - 2024-25 season) Never done    INFLUENZA VACCINE  10/01/2025    COLORECTAL CANCER SCREENING  08/25/2027    TDAP/TD VACCINES (3 - Td or Tdap) 12/16/2034    HEPATITIS C SCREENING  Completed    MAMMOGRAM  Discontinued                                                                                                                                                CMS Preventative Services Quick Reference  Risk Factors Identified During Encounter  Fall Risk-High or Moderate: Discussed Fall Prevention in the home  Inactivity/Sedentary: Patient was advised to exercise at least 150 minutes a week per CDC recommendations.    The above risks/problems have been discussed with the patient.  Pertinent information has been shared with the patient in the After Visit Summary.  An After Visit Summary and PPPS were made  available to the patient.    Follow Up:   Next Medicare Wellness visit to be scheduled in 1 year.         Additional E&M Note during same encounter follows:  Patient has additional, significant, and separately identifiable condition(s)/problem(s) that require work above and beyond the Medicare Wellness Visit     Chief Complaint  Fall (Has fallen twice this month. States that she is tired of falling/) and Atrial Fibrillation (X has been having issues with Afib. )    Subjective   HPI  Brittani is also being seen today for additional medical problem/s.      She has been having falls for the past couple years  She has no warning, she just falls  She does not lose time  Legs feel shaky all the time  She almost loses her balance as she walks as her gait is leaning forward  She has had CT and MRI without any cause  She has tried PT but she cannot afford it  She has a cane, does not use it much    She has not been drinking a lot of alcohol in the past few months  She lives alone      Her mood has been down as well  Just feels sad a to of the time  Mood has not been doing well for quite a while    Review of Systems   Constitutional: Negative.    Respiratory: Negative.     Musculoskeletal:  Positive for back pain.   Neurological:  Positive for light-headedness.   Psychiatric/Behavioral:  Positive for dysphoric mood.               Objective   Vital Signs:  /62   Pulse 56   Temp 97.1 °F (36.2 °C)   Resp 20   Wt 41.3 kg (91 lb)   SpO2 93%   BMI 16.64 kg/m²   Physical Exam  Vitals and nursing note reviewed.   Constitutional:       General: She is not in acute distress.     Appearance: Normal appearance. She is well-developed.   Cardiovascular:      Rate and Rhythm: Normal rate and regular rhythm.      Heart sounds: Normal heart sounds.   Pulmonary:      Effort: Pulmonary effort is normal.      Breath sounds: Normal breath sounds.   Musculoskeletal:      Comments: Gait is very short strides, slow, walks to wall and back  to chair but slowly   Neurological:      Mental Status: She is alert and oriented to person, place, and time.   Psychiatric:         Mood and Affect: Mood normal.         Behavior: Behavior normal.         Thought Content: Thought content normal.         Judgment: Judgment normal.                Assessment and Plan     Diagnoses and all orders for this visit:    1. Medicare annual wellness visit, subsequent (Primary)    2. Moderate episode of recurrent major depressive disorder  -     buPROPion XL (WELLBUTRIN XL) 150 MG 24 hr tablet; Take 1 tablet by mouth Daily. Indications: Major Depressive Disorder  Dispense: 7 tablet; Refill: 0  -     buPROPion XL (Wellbutrin XL) 300 MG 24 hr tablet; Take 1 tablet by mouth Daily. After 7 days on 150  Dispense: 90 tablet; Refill: 1    3. Frequent falls  -     Ambulatory Referral to Physical Therapy for Evaluation & Treatment    4. Elevated cholesterol  -     CBC & Differential  -     Comprehensive Metabolic Panel  -     Lipid Panel    5. Chronic fatigue  -     CBC & Differential  -     Comprehensive Metabolic Panel  -     TSH    6. Hyponatremia  -     Comprehensive Metabolic Panel      Medicare wellness completed, exercise discussed with pt    Mood has been poor, will resume wellbutrin and taper to 300 mg for improved control of mood.  Will work on PT and discussed home exercise program to be continued on outpatient basis including silver sneakers program  Will check labs and f/u pending labs

## 2025-07-22 LAB
ALBUMIN SERPL-MCNC: 4.5 G/DL (ref 3.9–4.9)
ALP SERPL-CCNC: 88 IU/L (ref 44–121)
ALT SERPL-CCNC: 12 IU/L (ref 0–32)
AST SERPL-CCNC: 19 IU/L (ref 0–40)
BASOPHILS # BLD AUTO: 0.1 X10E3/UL (ref 0–0.2)
BASOPHILS NFR BLD AUTO: 1 %
BILIRUB SERPL-MCNC: <0.2 MG/DL (ref 0–1.2)
BUN SERPL-MCNC: 10 MG/DL (ref 8–27)
BUN/CREAT SERPL: 14 (ref 12–28)
CALCIUM SERPL-MCNC: 9.8 MG/DL (ref 8.7–10.3)
CHLORIDE SERPL-SCNC: 97 MMOL/L (ref 96–106)
CHOLEST SERPL-MCNC: 248 MG/DL (ref 100–199)
CO2 SERPL-SCNC: 22 MMOL/L (ref 20–29)
CREAT SERPL-MCNC: 0.71 MG/DL (ref 0.57–1)
EGFRCR SERPLBLD CKD-EPI 2021: 94 ML/MIN/1.73
EOSINOPHIL # BLD AUTO: 0.3 X10E3/UL (ref 0–0.4)
EOSINOPHIL NFR BLD AUTO: 4 %
ERYTHROCYTE [DISTWIDTH] IN BLOOD BY AUTOMATED COUNT: 13.4 % (ref 11.7–15.4)
GLOBULIN SER CALC-MCNC: 2.6 G/DL (ref 1.5–4.5)
GLUCOSE SERPL-MCNC: 76 MG/DL (ref 70–99)
HCT VFR BLD AUTO: 38.8 % (ref 34–46.6)
HDLC SERPL-MCNC: 40 MG/DL
HGB BLD-MCNC: 12.5 G/DL (ref 11.1–15.9)
IMM GRANULOCYTES # BLD AUTO: 0 X10E3/UL (ref 0–0.1)
IMM GRANULOCYTES NFR BLD AUTO: 0 %
LDLC SERPL CALC-MCNC: 174 MG/DL (ref 0–99)
LYMPHOCYTES # BLD AUTO: 2.2 X10E3/UL (ref 0.7–3.1)
LYMPHOCYTES NFR BLD AUTO: 31 %
MCH RBC QN AUTO: 30.6 PG (ref 26.6–33)
MCHC RBC AUTO-ENTMCNC: 32.2 G/DL (ref 31.5–35.7)
MCV RBC AUTO: 95 FL (ref 79–97)
MONOCYTES # BLD AUTO: 0.5 X10E3/UL (ref 0.1–0.9)
MONOCYTES NFR BLD AUTO: 8 %
NEUTROPHILS # BLD AUTO: 4 X10E3/UL (ref 1.4–7)
NEUTROPHILS NFR BLD AUTO: 56 %
PLATELET # BLD AUTO: 346 X10E3/UL (ref 150–450)
POTASSIUM SERPL-SCNC: 4.5 MMOL/L (ref 3.5–5.2)
PROT SERPL-MCNC: 7.1 G/DL (ref 6–8.5)
RBC # BLD AUTO: 4.09 X10E6/UL (ref 3.77–5.28)
SODIUM SERPL-SCNC: 133 MMOL/L (ref 134–144)
TRIGL SERPL-MCNC: 181 MG/DL (ref 0–149)
TSH SERPL DL<=0.005 MIU/L-ACNC: 0.96 UIU/ML (ref 0.45–4.5)
VLDLC SERPL CALC-MCNC: 34 MG/DL (ref 5–40)
WBC # BLD AUTO: 7 X10E3/UL (ref 3.4–10.8)

## 2025-07-31 ENCOUNTER — TELEPHONE (OUTPATIENT)
Dept: FAMILY MEDICINE CLINIC | Facility: CLINIC | Age: 65
End: 2025-07-31
Payer: MEDICARE

## 2025-08-04 DIAGNOSIS — R60.0 BILATERAL LEG EDEMA: ICD-10-CM

## 2025-08-04 RX ORDER — POLYETHYLENE GLYCOL-3350 AND ELECTROLYTES 236; 6.74; 5.86; 2.97; 22.74 G/274.31G; G/274.31G; G/274.31G; G/274.31G; G/274.31G
POWDER, FOR SOLUTION ORAL
OUTPATIENT
Start: 2025-08-04

## 2025-08-04 RX ORDER — FUROSEMIDE 20 MG/1
20 TABLET ORAL DAILY
Qty: 7 TABLET | Refills: 0 | OUTPATIENT
Start: 2025-08-04

## 2025-08-04 RX ORDER — ESCITALOPRAM OXALATE 10 MG/1
10 TABLET ORAL DAILY
Qty: 30 TABLET | Refills: 2 | Status: SHIPPED | OUTPATIENT
Start: 2025-08-04

## 2025-08-20 ENCOUNTER — APPOINTMENT (OUTPATIENT)
Dept: GENERAL RADIOLOGY | Facility: HOSPITAL | Age: 65
DRG: 025 | End: 2025-08-20
Payer: MEDICARE

## 2025-08-20 ENCOUNTER — APPOINTMENT (OUTPATIENT)
Dept: CT IMAGING | Facility: HOSPITAL | Age: 65
DRG: 025 | End: 2025-08-20
Payer: MEDICARE

## 2025-08-20 ENCOUNTER — HOSPITAL ENCOUNTER (INPATIENT)
Facility: HOSPITAL | Age: 65
LOS: 3 days | Discharge: HOME | DRG: 025 | End: 2025-08-24
Attending: EMERGENCY MEDICINE | Admitting: INTERNAL MEDICINE
Payer: MEDICARE

## 2025-08-20 DIAGNOSIS — M25.552 LEFT HIP PAIN: ICD-10-CM

## 2025-08-20 DIAGNOSIS — I62.01 ACUTE ON CHRONIC INTRACRANIAL SUBDURAL HEMATOMA: Primary | ICD-10-CM

## 2025-08-20 DIAGNOSIS — I62.03 ACUTE ON CHRONIC INTRACRANIAL SUBDURAL HEMATOMA: Primary | ICD-10-CM

## 2025-08-20 DIAGNOSIS — R13.11 ORAL PHASE DYSPHAGIA: ICD-10-CM

## 2025-08-20 DIAGNOSIS — R53.1 GENERALIZED WEAKNESS: ICD-10-CM

## 2025-08-20 DIAGNOSIS — S70.02XA CONTUSION OF LEFT HIP, INITIAL ENCOUNTER: ICD-10-CM

## 2025-08-20 DIAGNOSIS — R29.6 FREQUENT FALLS: ICD-10-CM

## 2025-08-20 PROBLEM — G89.29 CHRONIC BACK PAIN: Status: ACTIVE | Noted: 2025-08-20

## 2025-08-20 PROBLEM — R63.4 WEIGHT LOSS, NON-INTENTIONAL: Status: ACTIVE | Noted: 2025-08-20

## 2025-08-20 PROBLEM — S06.5XAA SUBDURAL HEMATOMA: Status: ACTIVE | Noted: 2025-08-20

## 2025-08-20 PROBLEM — M54.9 CHRONIC BACK PAIN: Status: ACTIVE | Noted: 2025-08-20

## 2025-08-20 PROBLEM — Z85.9 HISTORY OF CANCER: Status: ACTIVE | Noted: 2025-08-20

## 2025-08-20 LAB
ABO GROUP BLD: NORMAL
ABO GROUP BLD: NORMAL
ALBUMIN SERPL-MCNC: 4.6 G/DL (ref 3.5–5.2)
ALBUMIN/GLOB SERPL: 1.6 G/DL
ALP SERPL-CCNC: 89 U/L (ref 39–117)
ALT SERPL W P-5'-P-CCNC: 11 U/L (ref 1–33)
ANION GAP SERPL CALCULATED.3IONS-SCNC: 11 MMOL/L (ref 5–15)
AST SERPL-CCNC: 19 U/L (ref 1–32)
B PARAPERT DNA SPEC QL NAA+PROBE: NOT DETECTED
B PERT DNA SPEC QL NAA+PROBE: NOT DETECTED
BASOPHILS # BLD AUTO: 0.04 10*3/MM3 (ref 0–0.2)
BASOPHILS NFR BLD AUTO: 0.5 % (ref 0–1.5)
BILIRUB SERPL-MCNC: 0.5 MG/DL (ref 0–1.2)
BLD GP AB SCN SERPL QL: NEGATIVE
BUN SERPL-MCNC: 10.2 MG/DL (ref 8–23)
BUN/CREAT SERPL: 15.2 (ref 7–25)
C PNEUM DNA NPH QL NAA+NON-PROBE: NOT DETECTED
CALCIUM SPEC-SCNC: 9.2 MG/DL (ref 8.6–10.5)
CHLORIDE SERPL-SCNC: 91 MMOL/L (ref 98–107)
CK SERPL-CCNC: 51 U/L (ref 20–180)
CO2 SERPL-SCNC: 24 MMOL/L (ref 22–29)
CREAT SERPL-MCNC: 0.67 MG/DL (ref 0.57–1)
D-LACTATE SERPL-SCNC: 0.9 MMOL/L (ref 0.5–2)
DEPRECATED RDW RBC AUTO: 39.3 FL (ref 37–54)
EGFRCR SERPLBLD CKD-EPI 2021: 97.1 ML/MIN/1.73
EOSINOPHIL # BLD AUTO: 0.16 10*3/MM3 (ref 0–0.4)
EOSINOPHIL NFR BLD AUTO: 2.1 % (ref 0.3–6.2)
ERYTHROCYTE [DISTWIDTH] IN BLOOD BY AUTOMATED COUNT: 12.3 % (ref 12.3–15.4)
ETHANOL BLD-MCNC: <10 MG/DL (ref 0–10)
FLUAV SUBTYP SPEC NAA+PROBE: NOT DETECTED
FLUBV RNA NPH QL NAA+NON-PROBE: NOT DETECTED
GLOBULIN UR ELPH-MCNC: 2.9 GM/DL
GLUCOSE SERPL-MCNC: 76 MG/DL (ref 65–99)
HADV DNA SPEC NAA+PROBE: NOT DETECTED
HCOV 229E RNA SPEC QL NAA+PROBE: NOT DETECTED
HCOV HKU1 RNA SPEC QL NAA+PROBE: NOT DETECTED
HCOV NL63 RNA SPEC QL NAA+PROBE: NOT DETECTED
HCOV OC43 RNA SPEC QL NAA+PROBE: NOT DETECTED
HCT VFR BLD AUTO: 37.2 % (ref 34–46.6)
HGB BLD-MCNC: 12.9 G/DL (ref 12–15.9)
HMPV RNA NPH QL NAA+NON-PROBE: NOT DETECTED
HPIV1 RNA ISLT QL NAA+PROBE: NOT DETECTED
HPIV2 RNA SPEC QL NAA+PROBE: NOT DETECTED
HPIV3 RNA NPH QL NAA+PROBE: NOT DETECTED
HPIV4 P GENE NPH QL NAA+PROBE: NOT DETECTED
IMM GRANULOCYTES # BLD AUTO: 0.04 10*3/MM3 (ref 0–0.05)
IMM GRANULOCYTES NFR BLD AUTO: 0.5 % (ref 0–0.5)
LYMPHOCYTES # BLD AUTO: 1.59 10*3/MM3 (ref 0.7–3.1)
LYMPHOCYTES NFR BLD AUTO: 21.3 % (ref 19.6–45.3)
M PNEUMO IGG SER IA-ACNC: NOT DETECTED
MCH RBC QN AUTO: 30.6 PG (ref 26.6–33)
MCHC RBC AUTO-ENTMCNC: 34.7 G/DL (ref 31.5–35.7)
MCV RBC AUTO: 88.4 FL (ref 79–97)
MONOCYTES # BLD AUTO: 0.58 10*3/MM3 (ref 0.1–0.9)
MONOCYTES NFR BLD AUTO: 7.8 % (ref 5–12)
NEUTROPHILS NFR BLD AUTO: 5.07 10*3/MM3 (ref 1.7–7)
NEUTROPHILS NFR BLD AUTO: 67.8 % (ref 42.7–76)
NRBC BLD AUTO-RTO: 0 /100 WBC (ref 0–0.2)
OSMOLALITY SERPL: 263 MOSM/KG (ref 275–295)
PLATELET # BLD AUTO: 275 10*3/MM3 (ref 140–450)
PMV BLD AUTO: 9.6 FL (ref 6–12)
POTASSIUM SERPL-SCNC: 4.2 MMOL/L (ref 3.5–5.2)
PROT SERPL-MCNC: 7.5 G/DL (ref 6–8.5)
RBC # BLD AUTO: 4.21 10*6/MM3 (ref 3.77–5.28)
RH BLD: POSITIVE
RH BLD: POSITIVE
RHINOVIRUS RNA SPEC NAA+PROBE: NOT DETECTED
RSV RNA NPH QL NAA+NON-PROBE: NOT DETECTED
SARS-COV-2 RNA RESP QL NAA+PROBE: NOT DETECTED
SODIUM SERPL-SCNC: 126 MMOL/L (ref 136–145)
T&S EXPIRATION DATE: NORMAL
TSH SERPL DL<=0.05 MIU/L-ACNC: 1.16 UIU/ML (ref 0.27–4.2)
WBC NRBC COR # BLD AUTO: 7.48 10*3/MM3 (ref 3.4–10.8)

## 2025-08-20 PROCEDURE — 83605 ASSAY OF LACTIC ACID: CPT | Performed by: NURSE PRACTITIONER

## 2025-08-20 PROCEDURE — 36415 COLL VENOUS BLD VENIPUNCTURE: CPT

## 2025-08-20 PROCEDURE — 93005 ELECTROCARDIOGRAM TRACING: CPT | Performed by: EMERGENCY MEDICINE

## 2025-08-20 PROCEDURE — 80053 COMPREHEN METABOLIC PANEL: CPT | Performed by: EMERGENCY MEDICINE

## 2025-08-20 PROCEDURE — 0202U NFCT DS 22 TRGT SARS-COV-2: CPT | Performed by: NURSE PRACTITIONER

## 2025-08-20 PROCEDURE — 71045 X-RAY EXAM CHEST 1 VIEW: CPT

## 2025-08-20 PROCEDURE — 25810000003 SODIUM CHLORIDE 0.9 % SOLUTION: Performed by: NURSE PRACTITIONER

## 2025-08-20 PROCEDURE — 86901 BLOOD TYPING SEROLOGIC RH(D): CPT

## 2025-08-20 PROCEDURE — G0378 HOSPITAL OBSERVATION PER HR: HCPCS

## 2025-08-20 PROCEDURE — 84443 ASSAY THYROID STIM HORMONE: CPT | Performed by: NURSE PRACTITIONER

## 2025-08-20 PROCEDURE — 73552 X-RAY EXAM OF FEMUR 2/>: CPT

## 2025-08-20 PROCEDURE — 99223 1ST HOSP IP/OBS HIGH 75: CPT | Performed by: NURSE PRACTITIONER

## 2025-08-20 PROCEDURE — 86900 BLOOD TYPING SEROLOGIC ABO: CPT

## 2025-08-20 PROCEDURE — 70450 CT HEAD/BRAIN W/O DYE: CPT

## 2025-08-20 PROCEDURE — 72170 X-RAY EXAM OF PELVIS: CPT

## 2025-08-20 PROCEDURE — 72125 CT NECK SPINE W/O DYE: CPT

## 2025-08-20 PROCEDURE — 83930 ASSAY OF BLOOD OSMOLALITY: CPT | Performed by: NURSE PRACTITIONER

## 2025-08-20 PROCEDURE — 82077 ASSAY SPEC XCP UR&BREATH IA: CPT | Performed by: NURSE PRACTITIONER

## 2025-08-20 PROCEDURE — 99285 EMERGENCY DEPT VISIT HI MDM: CPT

## 2025-08-20 PROCEDURE — 86850 RBC ANTIBODY SCREEN: CPT | Performed by: EMERGENCY MEDICINE

## 2025-08-20 PROCEDURE — 25010000002 ONDANSETRON PER 1 MG: Performed by: EMERGENCY MEDICINE

## 2025-08-20 PROCEDURE — 25010000002 MORPHINE PER 10 MG: Performed by: EMERGENCY MEDICINE

## 2025-08-20 PROCEDURE — 86901 BLOOD TYPING SEROLOGIC RH(D): CPT | Performed by: EMERGENCY MEDICINE

## 2025-08-20 PROCEDURE — 86900 BLOOD TYPING SEROLOGIC ABO: CPT | Performed by: EMERGENCY MEDICINE

## 2025-08-20 PROCEDURE — 25010000002 THIAMINE PER 100 MG: Performed by: NURSE PRACTITIONER

## 2025-08-20 PROCEDURE — 82550 ASSAY OF CK (CPK): CPT | Performed by: NURSE PRACTITIONER

## 2025-08-20 PROCEDURE — 85025 COMPLETE CBC W/AUTO DIFF WBC: CPT | Performed by: EMERGENCY MEDICINE

## 2025-08-20 RX ORDER — BUPROPION HYDROCHLORIDE 100 MG/1
100 TABLET, EXTENDED RELEASE ORAL DAILY
Status: DISCONTINUED | OUTPATIENT
Start: 2025-08-21 | End: 2025-08-24 | Stop reason: HOSPADM

## 2025-08-20 RX ORDER — ONDANSETRON 4 MG/1
4 TABLET, ORALLY DISINTEGRATING ORAL EVERY 6 HOURS PRN
Status: DISCONTINUED | OUTPATIENT
Start: 2025-08-20 | End: 2025-08-24 | Stop reason: HOSPADM

## 2025-08-20 RX ORDER — ACETAMINOPHEN 650 MG/1
650 SUPPOSITORY RECTAL EVERY 4 HOURS PRN
Status: DISCONTINUED | OUTPATIENT
Start: 2025-08-20 | End: 2025-08-24 | Stop reason: HOSPADM

## 2025-08-20 RX ORDER — POLYETHYLENE GLYCOL 3350 17 G/17G
17 POWDER, FOR SOLUTION ORAL DAILY PRN
Status: DISCONTINUED | OUTPATIENT
Start: 2025-08-20 | End: 2025-08-24 | Stop reason: HOSPADM

## 2025-08-20 RX ORDER — BISACODYL 10 MG
10 SUPPOSITORY, RECTAL RECTAL DAILY PRN
Status: DISCONTINUED | OUTPATIENT
Start: 2025-08-20 | End: 2025-08-24 | Stop reason: HOSPADM

## 2025-08-20 RX ORDER — OXYCODONE HYDROCHLORIDE 5 MG/1
2.5 TABLET ORAL EVERY 4 HOURS PRN
Status: DISCONTINUED | OUTPATIENT
Start: 2025-08-20 | End: 2025-08-24 | Stop reason: HOSPADM

## 2025-08-20 RX ORDER — BUSPIRONE HYDROCHLORIDE 5 MG/1
1 TABLET ORAL 3 TIMES DAILY
Status: ON HOLD | COMMUNITY
Start: 2025-08-04

## 2025-08-20 RX ORDER — BISACODYL 5 MG/1
5 TABLET, DELAYED RELEASE ORAL DAILY PRN
Status: DISCONTINUED | OUTPATIENT
Start: 2025-08-20 | End: 2025-08-24 | Stop reason: HOSPADM

## 2025-08-20 RX ORDER — SODIUM CHLORIDE 0.9 % (FLUSH) 0.9 %
10 SYRINGE (ML) INJECTION AS NEEDED
Status: DISCONTINUED | OUTPATIENT
Start: 2025-08-20 | End: 2025-08-24 | Stop reason: HOSPADM

## 2025-08-20 RX ORDER — SODIUM CHLORIDE 9 MG/ML
40 INJECTION, SOLUTION INTRAVENOUS AS NEEDED
Status: DISCONTINUED | OUTPATIENT
Start: 2025-08-20 | End: 2025-08-24 | Stop reason: HOSPADM

## 2025-08-20 RX ORDER — ESCITALOPRAM OXALATE 10 MG/1
10 TABLET ORAL DAILY
Status: DISCONTINUED | OUTPATIENT
Start: 2025-08-21 | End: 2025-08-24 | Stop reason: HOSPADM

## 2025-08-20 RX ORDER — NICOTINE 21 MG/24HR
1 PATCH, TRANSDERMAL 24 HOURS TRANSDERMAL EVERY 24 HOURS
Status: DISCONTINUED | OUTPATIENT
Start: 2025-08-20 | End: 2025-08-24 | Stop reason: HOSPADM

## 2025-08-20 RX ORDER — SODIUM CHLORIDE 9 MG/ML
75 INJECTION, SOLUTION INTRAVENOUS CONTINUOUS
Status: ACTIVE | OUTPATIENT
Start: 2025-08-20 | End: 2025-08-21

## 2025-08-20 RX ORDER — BUPROPION HYDROCHLORIDE 100 MG/1
100 TABLET, EXTENDED RELEASE ORAL DAILY
Status: ON HOLD | COMMUNITY
Start: 2025-08-04

## 2025-08-20 RX ORDER — ACETAMINOPHEN 500 MG
1000 TABLET ORAL ONCE
Status: DISCONTINUED | OUTPATIENT
Start: 2025-08-20 | End: 2025-08-24 | Stop reason: HOSPADM

## 2025-08-20 RX ORDER — ACETAMINOPHEN 160 MG/5ML
650 SOLUTION ORAL EVERY 4 HOURS PRN
Status: DISCONTINUED | OUTPATIENT
Start: 2025-08-20 | End: 2025-08-24 | Stop reason: HOSPADM

## 2025-08-20 RX ORDER — ROSUVASTATIN CALCIUM 10 MG/1
10 TABLET, COATED ORAL NIGHTLY
Status: DISCONTINUED | OUTPATIENT
Start: 2025-08-20 | End: 2025-08-24 | Stop reason: HOSPADM

## 2025-08-20 RX ORDER — ACETAMINOPHEN 325 MG/1
650 TABLET ORAL EVERY 4 HOURS PRN
Status: DISCONTINUED | OUTPATIENT
Start: 2025-08-20 | End: 2025-08-24 | Stop reason: HOSPADM

## 2025-08-20 RX ORDER — NITROGLYCERIN 0.4 MG/1
0.4 TABLET SUBLINGUAL
Status: DISCONTINUED | OUTPATIENT
Start: 2025-08-20 | End: 2025-08-24 | Stop reason: HOSPADM

## 2025-08-20 RX ORDER — ONDANSETRON 2 MG/ML
4 INJECTION INTRAMUSCULAR; INTRAVENOUS EVERY 6 HOURS PRN
Status: DISCONTINUED | OUTPATIENT
Start: 2025-08-20 | End: 2025-08-24 | Stop reason: HOSPADM

## 2025-08-20 RX ORDER — BUSPIRONE HYDROCHLORIDE 10 MG/1
5 TABLET ORAL 3 TIMES DAILY
Status: DISCONTINUED | OUTPATIENT
Start: 2025-08-20 | End: 2025-08-24 | Stop reason: HOSPADM

## 2025-08-20 RX ORDER — FAMOTIDINE 20 MG/1
40 TABLET, FILM COATED ORAL DAILY
Status: DISCONTINUED | OUTPATIENT
Start: 2025-08-21 | End: 2025-08-24 | Stop reason: HOSPADM

## 2025-08-20 RX ORDER — ONDANSETRON 2 MG/ML
4 INJECTION INTRAMUSCULAR; INTRAVENOUS ONCE
Status: COMPLETED | OUTPATIENT
Start: 2025-08-20 | End: 2025-08-20

## 2025-08-20 RX ORDER — LIDOCAINE 4 G/G
1 PATCH TOPICAL
Status: DISCONTINUED | OUTPATIENT
Start: 2025-08-20 | End: 2025-08-24 | Stop reason: HOSPADM

## 2025-08-20 RX ORDER — GABAPENTIN 100 MG/1
100 CAPSULE ORAL 3 TIMES DAILY
Status: DISCONTINUED | OUTPATIENT
Start: 2025-08-20 | End: 2025-08-24 | Stop reason: HOSPADM

## 2025-08-20 RX ORDER — AMOXICILLIN 250 MG
2 CAPSULE ORAL 2 TIMES DAILY PRN
Status: DISCONTINUED | OUTPATIENT
Start: 2025-08-20 | End: 2025-08-24 | Stop reason: HOSPADM

## 2025-08-20 RX ORDER — SODIUM CHLORIDE 0.9 % (FLUSH) 0.9 %
10 SYRINGE (ML) INJECTION EVERY 12 HOURS SCHEDULED
Status: DISCONTINUED | OUTPATIENT
Start: 2025-08-20 | End: 2025-08-24 | Stop reason: HOSPADM

## 2025-08-20 RX ORDER — MORPHINE SULFATE 4 MG/ML
4 INJECTION, SOLUTION INTRAMUSCULAR; INTRAVENOUS ONCE
Status: COMPLETED | OUTPATIENT
Start: 2025-08-20 | End: 2025-08-20

## 2025-08-20 RX ORDER — THIAMINE HYDROCHLORIDE 100 MG/ML
200 INJECTION, SOLUTION INTRAMUSCULAR; INTRAVENOUS EVERY 8 HOURS SCHEDULED
Status: DISCONTINUED | OUTPATIENT
Start: 2025-08-20 | End: 2025-08-23

## 2025-08-20 RX ADMIN — ACETAMINOPHEN 650 MG: 325 TABLET ORAL at 20:26

## 2025-08-20 RX ADMIN — MORPHINE SULFATE 4 MG: 4 INJECTION, SOLUTION INTRAMUSCULAR; INTRAVENOUS at 16:31

## 2025-08-20 RX ADMIN — ROSUVASTATIN CALCIUM 10 MG: 10 TABLET, FILM COATED ORAL at 20:27

## 2025-08-20 RX ADMIN — THIAMINE HYDROCHLORIDE 200 MG: 100 INJECTION, SOLUTION INTRAMUSCULAR; INTRAVENOUS at 20:26

## 2025-08-20 RX ADMIN — SODIUM CHLORIDE 500 ML: 9 INJECTION, SOLUTION INTRAVENOUS at 23:25

## 2025-08-20 RX ADMIN — GABAPENTIN 100 MG: 100 CAPSULE ORAL at 20:27

## 2025-08-20 RX ADMIN — LIDOCAINE 1 PATCH: 4 PATCH TOPICAL at 20:27

## 2025-08-20 RX ADMIN — OXYCODONE HYDROCHLORIDE 2.5 MG: 5 TABLET ORAL at 20:26

## 2025-08-20 RX ADMIN — Medication 10 ML: at 20:27

## 2025-08-20 RX ADMIN — BUSPIRONE HYDROCHLORIDE 5 MG: 10 TABLET ORAL at 20:27

## 2025-08-20 RX ADMIN — ONDANSETRON 4 MG: 2 INJECTION INTRAMUSCULAR; INTRAVENOUS at 16:31

## 2025-08-20 RX ADMIN — SODIUM CHLORIDE 75 ML/HR: 9 INJECTION, SOLUTION INTRAVENOUS at 20:46

## 2025-08-21 ENCOUNTER — APPOINTMENT (OUTPATIENT)
Dept: GENERAL RADIOLOGY | Facility: HOSPITAL | Age: 65
DRG: 025 | End: 2025-08-21
Payer: MEDICARE

## 2025-08-21 LAB
AMPHET+METHAMPHET UR QL: NEGATIVE
AMPHETAMINES UR QL: NEGATIVE
ANION GAP SERPL CALCULATED.3IONS-SCNC: 10 MMOL/L (ref 5–15)
ANION GAP SERPL CALCULATED.3IONS-SCNC: 7.6 MMOL/L (ref 5–15)
BARBITURATES UR QL SCN: NEGATIVE
BASOPHILS # BLD AUTO: 0.05 10*3/MM3 (ref 0–0.2)
BASOPHILS NFR BLD AUTO: 0.8 % (ref 0–1.5)
BENZODIAZ UR QL SCN: NEGATIVE
BILIRUB UR QL STRIP: NEGATIVE
BUN SERPL-MCNC: 11 MG/DL (ref 8–23)
BUN SERPL-MCNC: 12.6 MG/DL (ref 8–23)
BUN/CREAT SERPL: 14.5 (ref 7–25)
BUN/CREAT SERPL: 15.1 (ref 7–25)
BUPRENORPHINE SERPL-MCNC: NEGATIVE NG/ML
BURR CELLS BLD QL SMEAR: NORMAL
CALCIUM SPEC-SCNC: 8.2 MG/DL (ref 8.6–10.5)
CALCIUM SPEC-SCNC: 8.2 MG/DL (ref 8.6–10.5)
CANNABINOIDS SERPL QL: NEGATIVE
CHLORIDE SERPL-SCNC: 103 MMOL/L (ref 98–107)
CHLORIDE SERPL-SCNC: 97 MMOL/L (ref 98–107)
CLARITY UR: CLEAR
CO2 SERPL-SCNC: 20 MMOL/L (ref 22–29)
CO2 SERPL-SCNC: 23.4 MMOL/L (ref 22–29)
COCAINE UR QL: NEGATIVE
COLOR UR: YELLOW
CREAT SERPL-MCNC: 0.73 MG/DL (ref 0.57–1)
CREAT SERPL-MCNC: 0.87 MG/DL (ref 0.57–1)
DEPRECATED RDW RBC AUTO: 42.8 FL (ref 37–54)
EGFRCR SERPLBLD CKD-EPI 2021: 74 ML/MIN/1.73
EGFRCR SERPLBLD CKD-EPI 2021: 91.4 ML/MIN/1.73
EOSINOPHIL # BLD AUTO: 0.13 10*3/MM3 (ref 0–0.4)
EOSINOPHIL NFR BLD AUTO: 2.2 % (ref 0.3–6.2)
ERYTHROCYTE [DISTWIDTH] IN BLOOD BY AUTOMATED COUNT: 12.7 % (ref 12.3–15.4)
FENTANYL UR-MCNC: NEGATIVE NG/ML
GLUCOSE BLDC GLUCOMTR-MCNC: 102 MG/DL (ref 70–130)
GLUCOSE BLDC GLUCOMTR-MCNC: 79 MG/DL (ref 70–130)
GLUCOSE BLDC GLUCOMTR-MCNC: 81 MG/DL (ref 70–130)
GLUCOSE SERPL-MCNC: 64 MG/DL (ref 65–99)
GLUCOSE SERPL-MCNC: 74 MG/DL (ref 65–99)
GLUCOSE UR STRIP-MCNC: NEGATIVE MG/DL
HCT VFR BLD AUTO: 32.7 % (ref 34–46.6)
HGB BLD-MCNC: 10.8 G/DL (ref 12–15.9)
HGB UR QL STRIP.AUTO: NEGATIVE
IMM GRANULOCYTES # BLD AUTO: 0.03 10*3/MM3 (ref 0–0.05)
IMM GRANULOCYTES NFR BLD AUTO: 0.5 % (ref 0–0.5)
KETONES UR QL STRIP: ABNORMAL
LEUKOCYTE ESTERASE UR QL STRIP.AUTO: NEGATIVE
LYMPHOCYTES # BLD AUTO: 1.32 10*3/MM3 (ref 0.7–3.1)
LYMPHOCYTES NFR BLD AUTO: 21.9 % (ref 19.6–45.3)
Lab: NORMAL
Lab: NORMAL
MAGNESIUM SERPL-MCNC: 2 MG/DL (ref 1.6–2.4)
MCH RBC QN AUTO: 30.6 PG (ref 26.6–33)
MCHC RBC AUTO-ENTMCNC: 33 G/DL (ref 31.5–35.7)
MCV RBC AUTO: 92.6 FL (ref 79–97)
METHADONE UR QL SCN: NEGATIVE
MONOCYTES # BLD AUTO: 0.62 10*3/MM3 (ref 0.1–0.9)
MONOCYTES NFR BLD AUTO: 10.3 % (ref 5–12)
NEUTROPHILS NFR BLD AUTO: 3.89 10*3/MM3 (ref 1.7–7)
NEUTROPHILS NFR BLD AUTO: 64.3 % (ref 42.7–76)
NITRITE UR QL STRIP: NEGATIVE
NRBC BLD AUTO-RTO: 0 /100 WBC (ref 0–0.2)
OPIATES UR QL: POSITIVE
OSMOLALITY UR: 429 MOSM/KG (ref 300–1100)
OXYCODONE UR QL SCN: NEGATIVE
PCP UR QL SCN: NEGATIVE
PH UR STRIP.AUTO: 7 [PH] (ref 5–8)
PHOSPHATE SERPL-MCNC: 2.9 MG/DL (ref 2.5–4.5)
PLATELET # BLD AUTO: 176 10*3/MM3 (ref 140–450)
PMV BLD AUTO: 11 FL (ref 6–12)
POTASSIUM SERPL-SCNC: 3.7 MMOL/L (ref 3.5–5.2)
POTASSIUM SERPL-SCNC: 4.1 MMOL/L (ref 3.5–5.2)
PROT UR QL STRIP: NEGATIVE
RBC # BLD AUTO: 3.53 10*6/MM3 (ref 3.77–5.28)
SMALL PLATELETS BLD QL SMEAR: ADEQUATE
SODIUM SERPL-SCNC: 128 MMOL/L (ref 136–145)
SODIUM SERPL-SCNC: 133 MMOL/L (ref 136–145)
SODIUM UR-SCNC: 86 MMOL/L
SP GR UR STRIP: 1.01 (ref 1–1.03)
TRICYCLICS UR QL SCN: NEGATIVE
UROBILINOGEN UR QL STRIP: ABNORMAL
WBC MORPH BLD: NORMAL
WBC NRBC COR # BLD AUTO: 6.04 10*3/MM3 (ref 3.4–10.8)

## 2025-08-21 PROCEDURE — 92611 MOTION FLUOROSCOPY/SWALLOW: CPT

## 2025-08-21 PROCEDURE — 84300 ASSAY OF URINE SODIUM: CPT | Performed by: NURSE PRACTITIONER

## 2025-08-21 PROCEDURE — 85025 COMPLETE CBC W/AUTO DIFF WBC: CPT | Performed by: NURSE PRACTITIONER

## 2025-08-21 PROCEDURE — 81003 URINALYSIS AUTO W/O SCOPE: CPT | Performed by: NURSE PRACTITIONER

## 2025-08-21 PROCEDURE — 25810000003 SODIUM CHLORIDE 0.9 % SOLUTION: Performed by: INTERNAL MEDICINE

## 2025-08-21 PROCEDURE — 85007 BL SMEAR W/DIFF WBC COUNT: CPT | Performed by: NURSE PRACTITIONER

## 2025-08-21 PROCEDURE — 99232 SBSQ HOSP IP/OBS MODERATE 35: CPT | Performed by: INTERNAL MEDICINE

## 2025-08-21 PROCEDURE — 25010000002 THIAMINE PER 100 MG: Performed by: NURSE PRACTITIONER

## 2025-08-21 PROCEDURE — 25010000002 FOLIC ACID 5 MG/ML SOLUTION 10 ML VIAL: Performed by: NURSE PRACTITIONER

## 2025-08-21 PROCEDURE — 99221 1ST HOSP IP/OBS SF/LOW 40: CPT | Performed by: NEUROLOGICAL SURGERY

## 2025-08-21 PROCEDURE — 83935 ASSAY OF URINE OSMOLALITY: CPT | Performed by: NURSE PRACTITIONER

## 2025-08-21 PROCEDURE — 74230 X-RAY XM SWLNG FUNCJ C+: CPT

## 2025-08-21 PROCEDURE — 80048 BASIC METABOLIC PNL TOTAL CA: CPT | Performed by: NURSE PRACTITIONER

## 2025-08-21 PROCEDURE — 82948 REAGENT STRIP/BLOOD GLUCOSE: CPT | Performed by: INTERNAL MEDICINE

## 2025-08-21 PROCEDURE — 83735 ASSAY OF MAGNESIUM: CPT | Performed by: NURSE PRACTITIONER

## 2025-08-21 PROCEDURE — 80307 DRUG TEST PRSMV CHEM ANLYZR: CPT | Performed by: NURSE PRACTITIONER

## 2025-08-21 PROCEDURE — 82948 REAGENT STRIP/BLOOD GLUCOSE: CPT

## 2025-08-21 PROCEDURE — 92610 EVALUATE SWALLOWING FUNCTION: CPT

## 2025-08-21 PROCEDURE — 84100 ASSAY OF PHOSPHORUS: CPT | Performed by: NURSE PRACTITIONER

## 2025-08-21 RX ORDER — MIDODRINE HYDROCHLORIDE 10 MG/1
10 TABLET ORAL ONCE
Status: COMPLETED | OUTPATIENT
Start: 2025-08-21 | End: 2025-08-21

## 2025-08-21 RX ORDER — DEXTROSE MONOHYDRATE 25 G/50ML
50 INJECTION, SOLUTION INTRAVENOUS ONCE
Status: DISCONTINUED | OUTPATIENT
Start: 2025-08-21 | End: 2025-08-24 | Stop reason: HOSPADM

## 2025-08-21 RX ORDER — SODIUM CHLORIDE 9 MG/ML
75 INJECTION, SOLUTION INTRAVENOUS CONTINUOUS
Status: ACTIVE | OUTPATIENT
Start: 2025-08-21 | End: 2025-08-22

## 2025-08-21 RX ADMIN — BARIUM SULFATE 100 ML: 0.81 POWDER, FOR SUSPENSION ORAL at 13:28

## 2025-08-21 RX ADMIN — SODIUM CHLORIDE 1 MG: 9 INJECTION, SOLUTION INTRAVENOUS at 09:56

## 2025-08-21 RX ADMIN — GABAPENTIN 100 MG: 100 CAPSULE ORAL at 16:40

## 2025-08-21 RX ADMIN — BUSPIRONE HYDROCHLORIDE 5 MG: 10 TABLET ORAL at 16:40

## 2025-08-21 RX ADMIN — BARIUM SULFATE 20 ML: 400 PASTE ORAL at 13:28

## 2025-08-21 RX ADMIN — OXYCODONE HYDROCHLORIDE 2.5 MG: 5 TABLET ORAL at 12:30

## 2025-08-21 RX ADMIN — SODIUM CHLORIDE 75 ML/HR: 9 INJECTION, SOLUTION INTRAVENOUS at 12:20

## 2025-08-21 RX ADMIN — THIAMINE HYDROCHLORIDE 200 MG: 100 INJECTION, SOLUTION INTRAMUSCULAR; INTRAVENOUS at 13:56

## 2025-08-21 RX ADMIN — GABAPENTIN 100 MG: 100 CAPSULE ORAL at 20:20

## 2025-08-21 RX ADMIN — ROSUVASTATIN CALCIUM 10 MG: 10 TABLET, FILM COATED ORAL at 20:20

## 2025-08-21 RX ADMIN — Medication 10 ML: at 20:20

## 2025-08-21 RX ADMIN — THIAMINE HYDROCHLORIDE 200 MG: 100 INJECTION, SOLUTION INTRAMUSCULAR; INTRAVENOUS at 20:20

## 2025-08-21 RX ADMIN — THIAMINE HYDROCHLORIDE 200 MG: 100 INJECTION, SOLUTION INTRAMUSCULAR; INTRAVENOUS at 06:05

## 2025-08-21 RX ADMIN — BUSPIRONE HYDROCHLORIDE 5 MG: 10 TABLET ORAL at 20:20

## 2025-08-21 RX ADMIN — BUSPIRONE HYDROCHLORIDE 5 MG: 10 TABLET ORAL at 09:55

## 2025-08-21 RX ADMIN — FAMOTIDINE 40 MG: 20 TABLET, FILM COATED ORAL at 09:56

## 2025-08-21 RX ADMIN — GABAPENTIN 100 MG: 100 CAPSULE ORAL at 09:55

## 2025-08-21 RX ADMIN — MIDODRINE HYDROCHLORIDE 10 MG: 10 TABLET ORAL at 04:20

## 2025-08-21 RX ADMIN — SODIUM CHLORIDE 500 ML: 9 INJECTION, SOLUTION INTRAVENOUS at 04:06

## 2025-08-22 LAB
ANION GAP SERPL CALCULATED.3IONS-SCNC: 8 MMOL/L (ref 5–15)
BASOPHILS # BLD AUTO: 0.06 10*3/MM3 (ref 0–0.2)
BASOPHILS NFR BLD AUTO: 0.8 % (ref 0–1.5)
BUN SERPL-MCNC: 12.1 MG/DL (ref 8–23)
BUN/CREAT SERPL: 18.9 (ref 7–25)
CALCIUM SPEC-SCNC: 8.8 MG/DL (ref 8.6–10.5)
CHLORIDE SERPL-SCNC: 105 MMOL/L (ref 98–107)
CO2 SERPL-SCNC: 25 MMOL/L (ref 22–29)
CREAT SERPL-MCNC: 0.64 MG/DL (ref 0.57–1)
DEPRECATED RDW RBC AUTO: 43.3 FL (ref 37–54)
EGFRCR SERPLBLD CKD-EPI 2021: 98.2 ML/MIN/1.73
EOSINOPHIL # BLD AUTO: 0.18 10*3/MM3 (ref 0–0.4)
EOSINOPHIL NFR BLD AUTO: 2.4 % (ref 0.3–6.2)
ERYTHROCYTE [DISTWIDTH] IN BLOOD BY AUTOMATED COUNT: 13 % (ref 12.3–15.4)
GLUCOSE BLDC GLUCOMTR-MCNC: 105 MG/DL (ref 70–130)
GLUCOSE BLDC GLUCOMTR-MCNC: 83 MG/DL (ref 70–130)
GLUCOSE BLDC GLUCOMTR-MCNC: 85 MG/DL (ref 70–130)
GLUCOSE BLDC GLUCOMTR-MCNC: 97 MG/DL (ref 70–130)
GLUCOSE SERPL-MCNC: 91 MG/DL (ref 65–99)
HCT VFR BLD AUTO: 31.7 % (ref 34–46.6)
HGB BLD-MCNC: 10.4 G/DL (ref 12–15.9)
IMM GRANULOCYTES # BLD AUTO: 0.02 10*3/MM3 (ref 0–0.05)
IMM GRANULOCYTES NFR BLD AUTO: 0.3 % (ref 0–0.5)
LYMPHOCYTES # BLD AUTO: 1.96 10*3/MM3 (ref 0.7–3.1)
LYMPHOCYTES NFR BLD AUTO: 26.2 % (ref 19.6–45.3)
Lab: NORMAL
MCH RBC QN AUTO: 30.1 PG (ref 26.6–33)
MCHC RBC AUTO-ENTMCNC: 32.8 G/DL (ref 31.5–35.7)
MCV RBC AUTO: 91.9 FL (ref 79–97)
MONOCYTES # BLD AUTO: 0.6 10*3/MM3 (ref 0.1–0.9)
MONOCYTES NFR BLD AUTO: 8 % (ref 5–12)
NEUTROPHILS NFR BLD AUTO: 4.65 10*3/MM3 (ref 1.7–7)
NEUTROPHILS NFR BLD AUTO: 62.3 % (ref 42.7–76)
NRBC BLD AUTO-RTO: 0 /100 WBC (ref 0–0.2)
PLATELET # BLD AUTO: 233 10*3/MM3 (ref 140–450)
PMV BLD AUTO: 9.8 FL (ref 6–12)
POTASSIUM SERPL-SCNC: 4.2 MMOL/L (ref 3.5–5.2)
RBC # BLD AUTO: 3.45 10*6/MM3 (ref 3.77–5.28)
SODIUM SERPL-SCNC: 138 MMOL/L (ref 136–145)
SODIUM SERPL-SCNC: 139 MMOL/L (ref 136–145)
WBC NRBC COR # BLD AUTO: 7.47 10*3/MM3 (ref 3.4–10.8)

## 2025-08-22 PROCEDURE — C1887 CATHETER, GUIDING: HCPCS | Performed by: RADIOLOGY

## 2025-08-22 PROCEDURE — 99152 MOD SED SAME PHYS/QHP 5/>YRS: CPT | Performed by: RADIOLOGY

## 2025-08-22 PROCEDURE — 75894 X-RAYS TRANSCATH THERAPY: CPT | Performed by: RADIOLOGY

## 2025-08-22 PROCEDURE — B31C1ZZ FLUOROSCOPY OF BILATERAL EXTERNAL CAROTID ARTERIES USING LOW OSMOLAR CONTRAST: ICD-10-PCS | Performed by: RADIOLOGY

## 2025-08-22 PROCEDURE — 25010000002 FENTANYL CITRATE (PF) 50 MCG/ML SOLUTION: Performed by: RADIOLOGY

## 2025-08-22 PROCEDURE — 75898 FOLLOW-UP ANGIOGRAPHY: CPT | Performed by: RADIOLOGY

## 2025-08-22 PROCEDURE — C1889 IMPLANT/INSERT DEVICE, NOC: HCPCS | Performed by: RADIOLOGY

## 2025-08-22 PROCEDURE — 25010000002 MIDAZOLAM PER 1 MG: Performed by: RADIOLOGY

## 2025-08-22 PROCEDURE — 84295 ASSAY OF SERUM SODIUM: CPT | Performed by: INTERNAL MEDICINE

## 2025-08-22 PROCEDURE — B31R1ZZ FLUOROSCOPY OF INTRACRANIAL ARTERIES USING LOW OSMOLAR CONTRAST: ICD-10-PCS | Performed by: RADIOLOGY

## 2025-08-22 PROCEDURE — 25010000002 LIDOCAINE PF 1% 1 % SOLUTION: Performed by: RADIOLOGY

## 2025-08-22 PROCEDURE — C1894 INTRO/SHEATH, NON-LASER: HCPCS | Performed by: RADIOLOGY

## 2025-08-22 PROCEDURE — 03LG3DZ OCCLUSION OF INTRACRANIAL ARTERY WITH INTRALUMINAL DEVICE, PERCUTANEOUS APPROACH: ICD-10-PCS | Performed by: RADIOLOGY

## 2025-08-22 PROCEDURE — 25510000001 IOPAMIDOL PER 1 ML: Performed by: RADIOLOGY

## 2025-08-22 PROCEDURE — 99153 MOD SED SAME PHYS/QHP EA: CPT | Performed by: RADIOLOGY

## 2025-08-22 PROCEDURE — 80048 BASIC METABOLIC PNL TOTAL CA: CPT | Performed by: INTERNAL MEDICINE

## 2025-08-22 PROCEDURE — 61624 TCAT PERM OCCLS/EMBOLJ CNS: CPT | Performed by: RADIOLOGY

## 2025-08-22 PROCEDURE — C1769 GUIDE WIRE: HCPCS | Performed by: RADIOLOGY

## 2025-08-22 PROCEDURE — 25810000003 SODIUM CHLORIDE 0.9 % SOLUTION: Performed by: INTERNAL MEDICINE

## 2025-08-22 PROCEDURE — 25010000002 THIAMINE PER 100 MG

## 2025-08-22 PROCEDURE — 36223 PLACE CATH CAROTID/INOM ART: CPT | Performed by: RADIOLOGY

## 2025-08-22 PROCEDURE — 85025 COMPLETE CBC W/AUTO DIFF WBC: CPT | Performed by: INTERNAL MEDICINE

## 2025-08-22 PROCEDURE — 92526 ORAL FUNCTION THERAPY: CPT

## 2025-08-22 PROCEDURE — 82948 REAGENT STRIP/BLOOD GLUCOSE: CPT | Performed by: INTERNAL MEDICINE

## 2025-08-22 PROCEDURE — 84295 ASSAY OF SERUM SODIUM: CPT

## 2025-08-22 PROCEDURE — 25010000002 THIAMINE PER 100 MG: Performed by: NURSE PRACTITIONER

## 2025-08-22 PROCEDURE — 75984 XRAY CONTROL CATHETER CHANGE: CPT | Performed by: RADIOLOGY

## 2025-08-22 PROCEDURE — 36227 PLACE CATH XTRNL CAROTID: CPT | Performed by: RADIOLOGY

## 2025-08-22 PROCEDURE — 99232 SBSQ HOSP IP/OBS MODERATE 35: CPT | Performed by: INTERNAL MEDICINE

## 2025-08-22 PROCEDURE — B3151ZZ FLUOROSCOPY OF BILATERAL COMMON CAROTID ARTERIES USING LOW OSMOLAR CONTRAST: ICD-10-PCS | Performed by: RADIOLOGY

## 2025-08-22 PROCEDURE — 82948 REAGENT STRIP/BLOOD GLUCOSE: CPT

## 2025-08-22 PROCEDURE — 25010000002 FOLIC ACID 5 MG/ML SOLUTION 10 ML VIAL: Performed by: NURSE PRACTITIONER

## 2025-08-22 DEVICE — IMPLANTABLE DEVICE: Type: IMPLANTABLE DEVICE | Status: FUNCTIONAL

## 2025-08-22 DEVICE — PARTICL EMB CONTRL PVA 250/355MH BX/5: Type: IMPLANTABLE DEVICE | Status: FUNCTIONAL

## 2025-08-22 RX ORDER — IOPAMIDOL 510 MG/ML
INJECTION, SOLUTION INTRAVASCULAR
Status: DISCONTINUED | OUTPATIENT
Start: 2025-08-22 | End: 2025-08-22 | Stop reason: HOSPADM

## 2025-08-22 RX ORDER — MIDAZOLAM HYDROCHLORIDE 1 MG/ML
INJECTION, SOLUTION INTRAMUSCULAR; INTRAVENOUS
Status: DISCONTINUED | OUTPATIENT
Start: 2025-08-22 | End: 2025-08-22 | Stop reason: HOSPADM

## 2025-08-22 RX ORDER — FENTANYL CITRATE 50 UG/ML
INJECTION, SOLUTION INTRAMUSCULAR; INTRAVENOUS
Status: DISCONTINUED | OUTPATIENT
Start: 2025-08-22 | End: 2025-08-22 | Stop reason: HOSPADM

## 2025-08-22 RX ORDER — LIDOCAINE HYDROCHLORIDE 10 MG/ML
INJECTION, SOLUTION EPIDURAL; INFILTRATION; INTRACAUDAL; PERINEURAL
Status: DISCONTINUED | OUTPATIENT
Start: 2025-08-22 | End: 2025-08-22 | Stop reason: HOSPADM

## 2025-08-22 RX ADMIN — LIDOCAINE 1 PATCH: 4 PATCH TOPICAL at 09:06

## 2025-08-22 RX ADMIN — THIAMINE HYDROCHLORIDE 200 MG: 100 INJECTION, SOLUTION INTRAMUSCULAR; INTRAVENOUS at 05:03

## 2025-08-22 RX ADMIN — SODIUM CHLORIDE 1 MG: 9 INJECTION, SOLUTION INTRAVENOUS at 09:07

## 2025-08-22 RX ADMIN — SODIUM CHLORIDE 500 ML: 9 INJECTION, SOLUTION INTRAVENOUS at 16:33

## 2025-08-22 RX ADMIN — ACETAMINOPHEN 650 MG: 325 TABLET ORAL at 00:10

## 2025-08-22 RX ADMIN — Medication 5 MG: at 21:55

## 2025-08-22 RX ADMIN — THIAMINE HYDROCHLORIDE 200 MG: 100 INJECTION, SOLUTION INTRAMUSCULAR; INTRAVENOUS at 22:50

## 2025-08-22 RX ADMIN — FAMOTIDINE 40 MG: 20 TABLET, FILM COATED ORAL at 09:07

## 2025-08-22 RX ADMIN — OXYCODONE HYDROCHLORIDE 2.5 MG: 5 TABLET ORAL at 00:09

## 2025-08-22 RX ADMIN — OXYCODONE HYDROCHLORIDE 2.5 MG: 5 TABLET ORAL at 21:55

## 2025-08-22 RX ADMIN — BUSPIRONE HYDROCHLORIDE 5 MG: 10 TABLET ORAL at 09:07

## 2025-08-22 RX ADMIN — OXYCODONE HYDROCHLORIDE 2.5 MG: 5 TABLET ORAL at 13:39

## 2025-08-22 RX ADMIN — THIAMINE HYDROCHLORIDE 200 MG: 100 INJECTION, SOLUTION INTRAMUSCULAR; INTRAVENOUS at 13:40

## 2025-08-22 RX ADMIN — Medication 10 ML: at 20:17

## 2025-08-22 RX ADMIN — NICOTINE 1 PATCH: 21 PATCH, EXTENDED RELEASE TRANSDERMAL at 20:17

## 2025-08-22 RX ADMIN — GABAPENTIN 100 MG: 100 CAPSULE ORAL at 09:07

## 2025-08-23 LAB
GLUCOSE BLDC GLUCOMTR-MCNC: 97 MG/DL (ref 70–130)
Lab: NORMAL

## 2025-08-23 PROCEDURE — 82948 REAGENT STRIP/BLOOD GLUCOSE: CPT

## 2025-08-23 PROCEDURE — 25010000002 THIAMINE PER 100 MG

## 2025-08-23 PROCEDURE — 99232 SBSQ HOSP IP/OBS MODERATE 35: CPT | Performed by: INTERNAL MEDICINE

## 2025-08-23 RX ORDER — FOLIC ACID 1 MG/1
1 TABLET ORAL DAILY
Status: DISCONTINUED | OUTPATIENT
Start: 2025-08-23 | End: 2025-08-24 | Stop reason: HOSPADM

## 2025-08-23 RX ADMIN — GABAPENTIN 100 MG: 100 CAPSULE ORAL at 08:45

## 2025-08-23 RX ADMIN — FOLIC ACID 1 MG: 1 TABLET ORAL at 10:11

## 2025-08-23 RX ADMIN — ESCITALOPRAM 10 MG: 10 TABLET, FILM COATED ORAL at 08:46

## 2025-08-23 RX ADMIN — ACETAMINOPHEN 650 MG: 325 TABLET ORAL at 08:50

## 2025-08-23 RX ADMIN — FAMOTIDINE 40 MG: 20 TABLET, FILM COATED ORAL at 08:46

## 2025-08-23 RX ADMIN — Medication 10 ML: at 20:47

## 2025-08-23 RX ADMIN — GABAPENTIN 100 MG: 100 CAPSULE ORAL at 15:09

## 2025-08-23 RX ADMIN — ACETAMINOPHEN 650 MG: 325 TABLET ORAL at 20:46

## 2025-08-23 RX ADMIN — NICOTINE 1 PATCH: 21 PATCH, EXTENDED RELEASE TRANSDERMAL at 20:46

## 2025-08-23 RX ADMIN — BUSPIRONE HYDROCHLORIDE 5 MG: 10 TABLET ORAL at 20:47

## 2025-08-23 RX ADMIN — Medication 10 ML: at 08:47

## 2025-08-23 RX ADMIN — BUPROPION HYDROCHLORIDE 100 MG: 100 TABLET, FILM COATED, EXTENDED RELEASE ORAL at 10:11

## 2025-08-23 RX ADMIN — OXYCODONE HYDROCHLORIDE 2.5 MG: 5 TABLET ORAL at 20:47

## 2025-08-23 RX ADMIN — BUSPIRONE HYDROCHLORIDE 5 MG: 10 TABLET ORAL at 08:45

## 2025-08-23 RX ADMIN — ROSUVASTATIN CALCIUM 10 MG: 10 TABLET, FILM COATED ORAL at 20:47

## 2025-08-23 RX ADMIN — THIAMINE HYDROCHLORIDE 200 MG: 100 INJECTION, SOLUTION INTRAMUSCULAR; INTRAVENOUS at 05:19

## 2025-08-23 RX ADMIN — GABAPENTIN 100 MG: 100 CAPSULE ORAL at 20:47

## 2025-08-23 RX ADMIN — BUSPIRONE HYDROCHLORIDE 5 MG: 10 TABLET ORAL at 15:09

## 2025-08-24 VITALS
WEIGHT: 92.59 LBS | DIASTOLIC BLOOD PRESSURE: 68 MMHG | TEMPERATURE: 98.8 F | RESPIRATION RATE: 18 BRPM | HEART RATE: 80 BPM | OXYGEN SATURATION: 96 % | SYSTOLIC BLOOD PRESSURE: 110 MMHG | HEIGHT: 62 IN | BODY MASS INDEX: 17.04 KG/M2

## 2025-08-24 PROCEDURE — 97165 OT EVAL LOW COMPLEX 30 MIN: CPT

## 2025-08-24 PROCEDURE — 97161 PT EVAL LOW COMPLEX 20 MIN: CPT

## 2025-08-24 PROCEDURE — 99238 HOSP IP/OBS DSCHRG MGMT 30/<: CPT | Performed by: NURSE PRACTITIONER

## 2025-08-24 RX ORDER — NICOTINE 21 MG/24HR
1 PATCH, TRANSDERMAL 24 HOURS TRANSDERMAL EVERY 24 HOURS
Qty: 28 EACH | Refills: 0 | Status: ON HOLD | OUTPATIENT
Start: 2025-08-24

## 2025-08-24 RX ORDER — OXYCODONE HYDROCHLORIDE 5 MG/1
2.5 TABLET ORAL EVERY 12 HOURS PRN
Qty: 3 TABLET | Refills: 0 | Status: ON HOLD | OUTPATIENT
Start: 2025-08-24 | End: 2025-08-27

## 2025-08-24 RX ORDER — ACETAMINOPHEN 325 MG/1
650 TABLET ORAL EVERY 4 HOURS PRN
Status: ON HOLD
Start: 2025-08-24

## 2025-08-24 RX ADMIN — BUPROPION HYDROCHLORIDE 100 MG: 100 TABLET, FILM COATED, EXTENDED RELEASE ORAL at 08:13

## 2025-08-24 RX ADMIN — ESCITALOPRAM 10 MG: 10 TABLET, FILM COATED ORAL at 08:14

## 2025-08-24 RX ADMIN — BUSPIRONE HYDROCHLORIDE 5 MG: 10 TABLET ORAL at 08:13

## 2025-08-24 RX ADMIN — GABAPENTIN 100 MG: 100 CAPSULE ORAL at 16:04

## 2025-08-24 RX ADMIN — GABAPENTIN 100 MG: 100 CAPSULE ORAL at 08:13

## 2025-08-24 RX ADMIN — BUSPIRONE HYDROCHLORIDE 5 MG: 10 TABLET ORAL at 16:03

## 2025-08-24 RX ADMIN — FOLIC ACID 1 MG: 1 TABLET ORAL at 08:13

## 2025-08-24 RX ADMIN — Medication 10 ML: at 08:17

## 2025-08-24 RX ADMIN — FAMOTIDINE 40 MG: 20 TABLET, FILM COATED ORAL at 08:14

## 2025-08-24 RX ADMIN — THIAMINE HCL TAB 100 MG 100 MG: 100 TAB at 08:13

## 2025-08-24 RX ADMIN — OXYCODONE HYDROCHLORIDE 2.5 MG: 5 TABLET ORAL at 08:14

## 2025-08-25 ENCOUNTER — READMISSION MANAGEMENT (OUTPATIENT)
Dept: CALL CENTER | Facility: HOSPITAL | Age: 65
End: 2025-08-25
Payer: MEDICARE

## 2025-08-26 ENCOUNTER — TRANSITIONAL CARE MANAGEMENT TELEPHONE ENCOUNTER (OUTPATIENT)
Dept: CALL CENTER | Facility: HOSPITAL | Age: 65
End: 2025-08-26
Payer: MEDICARE

## 2025-08-26 DIAGNOSIS — G96.08 SUBDURAL HYGROMA: Primary | ICD-10-CM

## 2025-08-26 LAB
QT INTERVAL: 412 MS
QTC INTERVAL: 457 MS

## 2025-08-28 ENCOUNTER — NURSE TRIAGE (OUTPATIENT)
Dept: CALL CENTER | Facility: HOSPITAL | Age: 65
End: 2025-08-28
Payer: MEDICARE

## 2025-08-28 ENCOUNTER — READMISSION MANAGEMENT (OUTPATIENT)
Dept: CALL CENTER | Facility: HOSPITAL | Age: 65
End: 2025-08-28
Payer: MEDICARE

## 2025-08-28 PROBLEM — S06.5XAA SDH (SUBDURAL HEMATOMA): Status: ACTIVE | Noted: 2025-08-28
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
## 2025-08-28 LAB — ETHANOL BLD-MCNC: <10 MG/DL (ref 0–10)

## (undated) DEVICE — CATH TEMPO 5F BER 100CM: Brand: TEMPO

## (undated) DEVICE — GW STARTER ROSEN PTFE/COAT J/TP/1.5MM 0.035IN 3X260CM

## (undated) DEVICE — AIRWY 90MM NO9

## (undated) DEVICE — ST ACC MICROPUNCTURE .018 TRANSLSS/SS/TP 5F/10CM 21G/7CM

## (undated) DEVICE — Device

## (undated) DEVICE — CATH GUIDE ENVOY MPD 5F0.056IN 100CM

## (undated) DEVICE — MEDI-VAC YANKAUER SUCTION HANDLE W/BULBOUS TIP: Brand: CARDINAL HEALTH

## (undated) DEVICE — JACKSON-PRATT 100CC BULB RESERVOIR: Brand: CARDINAL HEALTH

## (undated) DEVICE — ANTIBACTERIAL UNDYED BRAIDED (POLYGLACTIN 910), SYNTHETIC ABSORBABLE SUTURE: Brand: COATED VICRYL

## (undated) DEVICE — PINNACLE INTRODUCER SHEATH: Brand: PINNACLE

## (undated) DEVICE — GOWN,NON-REINFORCED,SIRUS,SET IN SLV,XL: Brand: MEDLINE

## (undated) DEVICE — LEX NEURO ANGIOGRAPHY: Brand: MEDLINE INDUSTRIES, INC.

## (undated) DEVICE — ADAPT ST INFUS ADMIN SYR 70IN

## (undated) DEVICE — COVADERM PLUS: Brand: DEROYAL

## (undated) DEVICE — LEX GENERAL BREAST: Brand: MEDLINE INDUSTRIES, INC.

## (undated) DEVICE — CAMERA/LASER ARM DRAPE: Brand: DEROYAL

## (undated) DEVICE — SUT SILK 3/0 TIES 18IN A184H

## (undated) DEVICE — SPNG LAP PREWSH SFTPK 18X18IN STRL PK/5

## (undated) DEVICE — COVER,LIGHT HANDLE,FLX,1/PK: Brand: MEDLINE INDUSTRIES, INC.

## (undated) DEVICE — GLV SURG TRIUMPH ORTHO W/ALOE PF LTX 7.5 STRL

## (undated) DEVICE — DRAIN JACKSON PRATT ROUND 15FR: Brand: CARDINAL HEALTH

## (undated) DEVICE — STPCK 3/WY HP M/RA W/OFF/HNDL 1050PSI STRL

## (undated) DEVICE — MEDI-VAC NON-CONDUCTIVE SUCTION TUBING: Brand: CARDINAL HEALTH

## (undated) DEVICE — ELECTRD BLD EXT EDGE/INSUL 1P 4IN

## (undated) DEVICE — RADIFOCUS GLIDEWIRE: Brand: GLIDEWIRE

## (undated) DEVICE — ROTATING HEMOSTATIC VALVE .096": Brand: RHV

## (undated) DEVICE — PROXIMATE RH ROTATING HEAD SKIN STAPLERS (35 WIDE) CONTAINS 35 STAINLESS STEEL STAPLES: Brand: PROXIMATE

## (undated) DEVICE — SUT SILK 2/0 PS 18IN 1588H

## (undated) DEVICE — RADIFOCUS TORQUE DEVICE MULTI-TORQUE VISE: Brand: RADIFOCUS TORQUE DEVICE

## (undated) DEVICE — DISH PETRI 3.5IN MD STRL LF

## (undated) DEVICE — CANNULA,OXY,ADULT,SUPERSOFT,W/7'TUB,UC: Brand: MEDLINE

## (undated) DEVICE — DETACH COIL INSNT

## (undated) DEVICE — SOL LR 1000ML